# Patient Record
Sex: MALE | Race: WHITE | Employment: OTHER | URBAN - METROPOLITAN AREA
[De-identification: names, ages, dates, MRNs, and addresses within clinical notes are randomized per-mention and may not be internally consistent; named-entity substitution may affect disease eponyms.]

---

## 2017-01-12 ENCOUNTER — HOSPITAL ENCOUNTER (OUTPATIENT)
Dept: LAB | Age: 77
Discharge: HOME | End: 2017-01-12
Payer: MEDICARE

## 2017-01-12 DIAGNOSIS — C79.89 SQUAMOUS CELL CARCINOMA METASTATIC TO HEAD AND NECK WITH UNKNOWN PRIMARY SITE (HCC): ICD-10-CM

## 2017-01-12 DIAGNOSIS — C80.1 SQUAMOUS CELL CARCINOMA METASTATIC TO HEAD AND NECK WITH UNKNOWN PRIMARY SITE (HCC): ICD-10-CM

## 2017-01-12 LAB
ALBUMIN SERPL BCP-MCNC: 3.9 G/DL (ref 3.2–4.6)
ALBUMIN/GLOB SERPL: 1.3 {RATIO} (ref 1.2–3.5)
ALP SERPL-CCNC: 58 U/L (ref 50–136)
ALT SERPL-CCNC: 20 U/L (ref 12–65)
ANION GAP BLD CALC-SCNC: 6 MMOL/L (ref 7–16)
AST SERPL W P-5'-P-CCNC: 10 U/L (ref 15–37)
BASOPHILS # BLD AUTO: 0.1 K/UL (ref 0–0.2)
BASOPHILS # BLD: 1 % (ref 0–2)
BILIRUB SERPL-MCNC: 0.3 MG/DL (ref 0.2–1.1)
BUN SERPL-MCNC: 13 MG/DL (ref 8–23)
CALCIUM SERPL-MCNC: 9 MG/DL (ref 8.3–10.4)
CHLORIDE SERPL-SCNC: 97 MMOL/L (ref 98–107)
CO2 SERPL-SCNC: 28 MMOL/L (ref 23–32)
CREAT SERPL-MCNC: 1.18 MG/DL (ref 0.8–1.5)
DIFFERENTIAL METHOD BLD: ABNORMAL
EOSINOPHIL # BLD: 0.1 K/UL (ref 0–0.8)
EOSINOPHIL NFR BLD: 2 % (ref 0.5–7.8)
ERYTHROCYTE [DISTWIDTH] IN BLOOD BY AUTOMATED COUNT: 12.4 % (ref 11.9–14.6)
GLOBULIN SER CALC-MCNC: 2.9 G/DL (ref 2.3–3.5)
GLUCOSE SERPL-MCNC: 59 MG/DL (ref 65–100)
HCT VFR BLD AUTO: 37.5 % (ref 41.1–50.3)
HGB BLD-MCNC: 13.3 G/DL (ref 13.6–17.2)
LYMPHOCYTES # BLD AUTO: 37 % (ref 13–44)
LYMPHOCYTES # BLD: 2.1 K/UL (ref 0.5–4.6)
MCH RBC QN AUTO: 32.3 PG (ref 26.1–32.9)
MCHC RBC AUTO-ENTMCNC: 35.5 G/DL (ref 31.4–35)
MCV RBC AUTO: 91 FL (ref 79.6–97.8)
MONOCYTES # BLD: 0.6 K/UL (ref 0.1–1.3)
MONOCYTES NFR BLD AUTO: 11 % (ref 4–12)
NEUTS SEG # BLD: 2.8 K/UL (ref 1.7–8.2)
NEUTS SEG NFR BLD AUTO: 49 % (ref 43–78)
NRBC # BLD: 0 K/UL (ref 0–0.2)
PLATELET # BLD AUTO: 175 K/UL (ref 150–450)
PMV BLD AUTO: 8.7 FL (ref 10.8–14.1)
POTASSIUM SERPL-SCNC: 4.1 MMOL/L (ref 3.5–5.1)
PROT SERPL-MCNC: 6.8 G/DL (ref 6.3–8.2)
RBC # BLD AUTO: 4.12 M/UL (ref 4.23–5.67)
SODIUM SERPL-SCNC: 131 MMOL/L (ref 136–145)
WBC # BLD AUTO: 5.7 K/UL (ref 4.3–11.1)

## 2017-01-12 PROCEDURE — 36415 COLL VENOUS BLD VENIPUNCTURE: CPT | Performed by: NURSE PRACTITIONER

## 2017-01-12 PROCEDURE — 85025 COMPLETE CBC W/AUTO DIFF WBC: CPT | Performed by: NURSE PRACTITIONER

## 2017-01-12 PROCEDURE — 80053 COMPREHEN METABOLIC PANEL: CPT | Performed by: NURSE PRACTITIONER

## 2017-04-13 ENCOUNTER — APPOINTMENT (OUTPATIENT)
Dept: RADIATION ONCOLOGY | Age: 77
End: 2017-04-13

## 2017-04-27 ENCOUNTER — HOSPITAL ENCOUNTER (OUTPATIENT)
Dept: LAB | Age: 77
Discharge: HOME | End: 2017-04-27
Payer: MEDICARE

## 2017-04-27 ENCOUNTER — HOSPITAL ENCOUNTER (OUTPATIENT)
Dept: RADIATION ONCOLOGY | Age: 77
Discharge: HOME | End: 2017-04-27
Payer: MEDICARE

## 2017-04-27 VITALS
DIASTOLIC BLOOD PRESSURE: 109 MMHG | SYSTOLIC BLOOD PRESSURE: 200 MMHG | TEMPERATURE: 97.9 F | OXYGEN SATURATION: 99 % | HEART RATE: 74 BPM | BODY MASS INDEX: 21.9 KG/M2 | WEIGHT: 170.6 LBS

## 2017-04-27 DIAGNOSIS — C79.89 SQUAMOUS CELL CARCINOMA METASTATIC TO HEAD AND NECK WITH UNKNOWN PRIMARY SITE (HCC): ICD-10-CM

## 2017-04-27 DIAGNOSIS — C80.1 SQUAMOUS CELL CARCINOMA METASTATIC TO HEAD AND NECK WITH UNKNOWN PRIMARY SITE (HCC): ICD-10-CM

## 2017-04-27 LAB
ALBUMIN SERPL BCP-MCNC: 3.9 G/DL (ref 3.2–4.6)
ALBUMIN/GLOB SERPL: 1.2 {RATIO} (ref 1.2–3.5)
ALP SERPL-CCNC: 71 U/L (ref 50–136)
ALT SERPL-CCNC: 26 U/L (ref 12–65)
ANION GAP BLD CALC-SCNC: 6 MMOL/L (ref 7–16)
AST SERPL W P-5'-P-CCNC: 15 U/L (ref 15–37)
BASOPHILS # BLD AUTO: 0.1 K/UL (ref 0–0.2)
BASOPHILS # BLD: 1 % (ref 0–2)
BILIRUB SERPL-MCNC: 0.3 MG/DL (ref 0.2–1.1)
BUN SERPL-MCNC: 15 MG/DL (ref 8–23)
CALCIUM SERPL-MCNC: 9.6 MG/DL (ref 8.3–10.4)
CHLORIDE SERPL-SCNC: 95 MMOL/L (ref 98–107)
CO2 SERPL-SCNC: 31 MMOL/L (ref 21–32)
CREAT SERPL-MCNC: 1.14 MG/DL (ref 0.8–1.5)
DIFFERENTIAL METHOD BLD: ABNORMAL
EOSINOPHIL # BLD: 0.1 K/UL (ref 0–0.8)
EOSINOPHIL NFR BLD: 1 % (ref 0.5–7.8)
ERYTHROCYTE [DISTWIDTH] IN BLOOD BY AUTOMATED COUNT: 12.6 % (ref 11.9–14.6)
GLOBULIN SER CALC-MCNC: 3.3 G/DL (ref 2.3–3.5)
GLUCOSE SERPL-MCNC: 116 MG/DL (ref 65–100)
HCT VFR BLD AUTO: 40.5 % (ref 41.1–50.3)
HGB BLD-MCNC: 14.2 G/DL (ref 13.6–17.2)
LYMPHOCYTES # BLD AUTO: 25 % (ref 13–44)
LYMPHOCYTES # BLD: 1.2 K/UL (ref 0.5–4.6)
MCH RBC QN AUTO: 31.6 PG (ref 26.1–32.9)
MCHC RBC AUTO-ENTMCNC: 35.1 G/DL (ref 31.4–35)
MCV RBC AUTO: 90 FL (ref 79.6–97.8)
MONOCYTES # BLD: 0.5 K/UL (ref 0.1–1.3)
MONOCYTES NFR BLD AUTO: 11 % (ref 4–12)
NEUTS SEG # BLD: 2.9 K/UL (ref 1.7–8.2)
NEUTS SEG NFR BLD AUTO: 62 % (ref 43–78)
NRBC # BLD: 0 K/UL (ref 0–0.2)
PLATELET # BLD AUTO: 187 K/UL (ref 150–450)
PMV BLD AUTO: 8.3 FL (ref 10.8–14.1)
POTASSIUM SERPL-SCNC: 4.2 MMOL/L (ref 3.5–5.1)
PROT SERPL-MCNC: 7.2 G/DL (ref 6.3–8.2)
RBC # BLD AUTO: 4.5 M/UL (ref 4.23–5.67)
SODIUM SERPL-SCNC: 132 MMOL/L (ref 136–145)
WBC # BLD AUTO: 4.7 K/UL (ref 4.3–11.1)

## 2017-04-27 PROCEDURE — 85025 COMPLETE CBC W/AUTO DIFF WBC: CPT | Performed by: INTERNAL MEDICINE

## 2017-04-27 PROCEDURE — 36415 COLL VENOUS BLD VENIPUNCTURE: CPT | Performed by: INTERNAL MEDICINE

## 2017-04-27 PROCEDURE — 80053 COMPREHEN METABOLIC PANEL: CPT | Performed by: INTERNAL MEDICINE

## 2017-04-27 PROCEDURE — 99211 OFF/OP EST MAY X REQ PHY/QHP: CPT

## 2017-04-27 NOTE — PROGRESS NOTES
Patient: Phil Leach MRN: [id number]  SSN: [id number]    YOB: 1940  Age: 69 y.o. 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 PREVIOUS TREATMENT:  Lymph node excision 9/9/15 of right neck/parapharyngeal mass    PREVIOUS TREATMENT:  1)  Lymph node excision 9/9/15 of right neck/parapharyngeal mass  2) Adjuvant radiation with gross disease present, 70 Gy in 35 fractions to the gross disease,      63 Gy to the ipsilateral neck, and 56 Gy to the contralateral neck. Delivered with concurrent weekly cisplatin. Completed radiation: 12/28/2015    HISTORY OF PRESENT ILLNESS:  Phil Leach is a 69 y.o. male now 9 months out from his chemoradiation treatment delivered adjuvantly. He presented with a right neck mass that had been growing for several months. He was evaluated by his primary care physician and eventually referred to Dr. Arjun Paula for further workup. His pertinent medical history includes hypertension and diabetes. He is a lifelong nonsmoker and nondrinker. His workup included a CT of the neck and soft tissues which showed a large necrotic mass in the right submandibular region measuring 5 x 4.1 cm. The appearance was most suggestive of a large necrotic lymph node. There was no potential primary lesion noted. He was taken for a selective neck dissection where the right neck parapharyngeal mass was excised. We reviewed the operative report where this was excised from the skull base where gross tumor was felt to be present on the resection. This was followed by a PET/CT scan 9/17/2015. This confirmed residual soft tissue abnormality at the superior margin of this previously noted large right neck mass. This was associated with FDG activity.   He was presented at our multidisciplinary head and neck tumor board and felt his tumor was consistent with an unknown primary and planned to treat him as such. He also had tooth extractions at our request in anticipation of radiation. He completed treatment now 2 weeks ago. The patient had expected side effects including oral and pharyngeal pain.  He did require supplementation with his feeding tube, oral analgesics, and a fentanyl patch.  These medications were titrated throughout his treatment successfully.  We also used magic mouthwash regularly.  While he did have difficulty with confluent mucositis, he was able to complete all of his treatments without unplanned treatment breaks. At 2 weeks out, his oral pain was slightly improved and while he still had trouble eating, he has noted dramatic improvement and can eat nearly anything at this point but has no taste. He notes that his mouth is dry and has an edematous tongue.  At 3 months his weight was increasing although he still used his feeding tube with 2 cans of ensure and medications.  Overall he feels he was recovering perhaps more slowly than expected and hoped but has made significant strides since that time. He did have his feeding tube removed 4/11/2016. INTERVAL HISTORY: Mr. Galilea Garcia returns today for follow up. He is now 16 months following completion of his previous radiation. He is eating and drinking well, denying trouble with swallowing. He has xerostomia, well controlled with fluids. He has full ROM with his neck. He continues to feel thickening on the right side of his neck (surgical site) that is tender, but doesn't appreciate change in size. He denies headaches. He continues on salt tabs. His energy continues to improve. He recently had several basal cell lesions removed from his face. He is hypertensive at this visit and denies recent change in his medications.       MEDICATIONS:     Current Outpatient Prescriptions:     potassium chloride (KLOR-CON M20) 20 mEq tablet, TAKE 1 TABLET BY MOUTH TWICE A DAY, Disp: 180 Tab, Rfl: 0    sodium chloride 1 gram tablet, TAKE 1 TAB BY MOUTH THREE (3) TIMES DAILY. , Disp: 270 Tab, Rfl: 0    amLODIPine (NORVASC) 5 mg tablet, Take 1 Tab by mouth daily. , Disp: 30 Tab, Rfl: 2    magnesium oxide 500 mg tab, Take 2 Tabs by mouth daily. , Disp: , Rfl:     acetaminophen (TYLENOL) 325 mg tablet, Take  by mouth every four (4) hours as needed for Pain., Disp: , Rfl:     lisinopril (PRINIVIL, ZESTRIL) 20 mg tablet, Take 20 mg by mouth daily. Indications: HYPERTENSION, Disp: , Rfl:     glimepiride (AMARYL) 4 mg tablet, Take 4 mg by mouth every morning. Indications: TYPE 2 DIABETES MELLITUS, Disp: , Rfl:     metFORMIN (GLUCOPHAGE) 1,000 mg tablet, Take 1,000 mg by mouth daily. Indications: TYPE 2 DIABETES MELLITUS, Disp: , Rfl:     ALLERGIES:   No Known Allergies    PHYSICAL EXAMINATION:   ECOG Performance status 1  VITAL SIGNS: Blood pressure   200/109  (repeat manual 182/100)    Pulse   74       Weight  170.6    Temperature 97.9     GENERAL: The patient is well-developed, ambulatory, alert and in no acute distress. HEENT: Head is normocephalic, atraumatic. Oral cavity is moist with no visual oral masses. Raised area on left, lateral tongue is not new. NECK: Neck is supple. Palpable firmness in the right submandibular region (tender), otherwise negative for palpable masses. CARDIOVASCULAR: Heart is regular rate and rhythm. RESPIRATORY: Lungs are clear to auscultation. There is normal respiratory effort. Remainder of exam deferred. LABORATORY: stat creatinine for restart of metformin - pending result     RADIOLOGY:  CT of the neck and chest with contrast 10/12/2016  Comparison: 6/15/2016  Neck CT:  Parotid and submandibular glands are normal. Thyroid gland is normal. Carotid  arteries and jugular veins appear patent.    Enhancing nodule posterior margin right parotid gland stable at 1.4 cm. No  interval adenopathy. .    Nasopharynx, oropharynx, and hypopharynx are normal. Larynx is normal. No  compression of the airway.  Parapharyngeal fat is normal. [place]run and lingual  tonsils are normal.  Cervical spine is normal in appearance. Imaged intracranial structures are  normal. Orbits are normal. Paranasal sinuses and mastoid air cells are normal.  Chest CT:  Mild cardiac enlargement with scattered coronary artery calcifications. No  lymphadenopathy. No effusion or pneumothorax. Limited evaluation upper abdomen  within normal limits. No acute or aggressive osseous lesion. Central airways  patent. Lung parenchyma unremarkable. No pulmonary mass. IMPRESSION  Stable right neck enhancing nodule posterior to the right parotid gland. No  progressive disease in the neck or chest..    TUMOR STATUS:  AUGUST    IMPRESSION:  Kelby Sims is a 69 y.o. male with right walker-parotid lymph node mass. Unknown primary, [id number], Stage DENTON, now 16 months following completion of his previous radiation. Patient continues to recover as anticipated from his prior course of radiotherapy. Scans in October 2016 showed stable nodule without progression (note above). He reports that he is having no trouble swallowing. We discussed scheduling an appointment with Dr. Douglas Owens at his last visit, which he has not done as of now. He is hypertensive today. He denies any recent change with his medication. We discussed following up with his primary care physician this week for further evaluation. Othewise, he is feeling well and has no complaints. PLAN:    1) Future imaging by Dr Erasmo Sarmiento - He is scheduled with him today  2) We will assist in scheduling an appointment with Dr. Douglas Owens   3) Follow up with primary care regarding elevated blood pressure  4) Continue neck excercies  5) We will see him again in 6 months     Davide Andre NP   April 27, 2017      Portions of this note were copied from prior encounters and reviewed for accuracy, currency, and represent documentation and tasks completed during this encounter. I verify and attest these portions to be unchanged from prior visits.

## 2017-04-27 NOTE — NURSE NAVIGATOR
Pt here for f/u for H/N cancer from Unknown primary. S/p Lymph node excision right parapharyngeal mass 9-9-15. S/p concurrent chemo/RT. RT end 12-28-15. F/u Dr. Anneliese Piedra today. F/u Dr. Glenys Escobedo not scheduled. CT N/C 10-12-16. Blood pressure high. Pt states he took meds. Instructed pt to notify primary MD of elevated blood pressure today. Pt recently had several basal cell skin cancers removed from face.     Jovany Garcia RN

## 2017-05-02 ENCOUNTER — PATIENT OUTREACH (OUTPATIENT)
Dept: CASE MANAGEMENT | Age: 77
End: 2017-05-02

## 2017-05-02 NOTE — PROGRESS NOTES
Called Dr Little Dobbs and appt made for 10:30 at [facility] at patient request on Monday 5-15-17-attempted to call pt to notify-unable to reach but voicemail left for pt to return call

## 2017-07-20 ENCOUNTER — HOSPITAL ENCOUNTER (OUTPATIENT)
Dept: CT IMAGING | Age: 77
Discharge: HOME | End: 2017-07-20
Attending: INTERNAL MEDICINE
Payer: MEDICARE

## 2017-07-20 DIAGNOSIS — C80.1 SQUAMOUS CELL CARCINOMA METASTATIC TO HEAD AND NECK WITH UNKNOWN PRIMARY SITE (HCC): ICD-10-CM

## 2017-07-20 DIAGNOSIS — C79.89 SQUAMOUS CELL CARCINOMA METASTATIC TO HEAD AND NECK WITH UNKNOWN PRIMARY SITE (HCC): ICD-10-CM

## 2017-07-20 LAB — CREAT BLD-MCNC: 1 MG/DL (ref 0.6–1)

## 2017-07-20 PROCEDURE — 82565 ASSAY OF CREATININE: CPT

## 2017-07-20 PROCEDURE — 74011000258 HC RX REV CODE- 258: Performed by: INTERNAL MEDICINE

## 2017-07-20 PROCEDURE — 70491 CT SOFT TISSUE NECK W/DYE: CPT

## 2017-07-20 PROCEDURE — 74011636320 HC RX REV CODE- 636/320: Performed by: INTERNAL MEDICINE

## 2017-07-20 RX ORDER — SODIUM CHLORIDE 0.9 % (FLUSH) 0.9 %
10 SYRINGE (ML) INJECTION
Status: COMPLETED | OUTPATIENT
Start: 2017-07-20 | End: 2017-07-20

## 2017-07-20 RX ADMIN — SODIUM CHLORIDE 100 ML: 900 INJECTION, SOLUTION INTRAVENOUS at 11:22

## 2017-07-20 RX ADMIN — Medication 10 ML: at 11:22

## 2017-07-20 RX ADMIN — IOPAMIDOL 80 ML: 755 INJECTION, SOLUTION INTRAVENOUS at 11:22

## 2017-07-27 ENCOUNTER — HOSPITAL ENCOUNTER (OUTPATIENT)
Dept: LAB | Age: 77
Discharge: HOME | End: 2017-07-27
Payer: MEDICARE

## 2017-07-27 DIAGNOSIS — C79.89 SQUAMOUS CELL CARCINOMA METASTATIC TO HEAD AND NECK WITH UNKNOWN PRIMARY SITE (HCC): ICD-10-CM

## 2017-07-27 DIAGNOSIS — C80.1 SQUAMOUS CELL CARCINOMA METASTATIC TO HEAD AND NECK WITH UNKNOWN PRIMARY SITE (HCC): ICD-10-CM

## 2017-07-27 LAB
ALBUMIN SERPL BCP-MCNC: 3.9 G/DL (ref 3.2–4.6)
ALBUMIN/GLOB SERPL: 1.2 {RATIO} (ref 1.2–3.5)
ALP SERPL-CCNC: 68 U/L (ref 50–136)
ALT SERPL-CCNC: 23 U/L (ref 12–65)
ANION GAP BLD CALC-SCNC: 4 MMOL/L (ref 7–16)
AST SERPL W P-5'-P-CCNC: 15 U/L (ref 15–37)
BASOPHILS # BLD AUTO: 0 K/UL (ref 0–0.2)
BASOPHILS # BLD: 1 % (ref 0–2)
BILIRUB SERPL-MCNC: 0.4 MG/DL (ref 0.2–1.1)
BUN SERPL-MCNC: 11 MG/DL (ref 8–23)
CALCIUM SERPL-MCNC: 9.3 MG/DL (ref 8.3–10.4)
CHLORIDE SERPL-SCNC: 93 MMOL/L (ref 98–107)
CO2 SERPL-SCNC: 31 MMOL/L (ref 21–32)
CREAT SERPL-MCNC: 1.23 MG/DL (ref 0.8–1.5)
DIFFERENTIAL METHOD BLD: ABNORMAL
EOSINOPHIL # BLD: 0.1 K/UL (ref 0–0.8)
EOSINOPHIL NFR BLD: 2 % (ref 0.5–7.8)
ERYTHROCYTE [DISTWIDTH] IN BLOOD BY AUTOMATED COUNT: 12.5 % (ref 11.9–14.6)
GLOBULIN SER CALC-MCNC: 3.3 G/DL (ref 2.3–3.5)
GLUCOSE SERPL-MCNC: 134 MG/DL (ref 65–100)
HCT VFR BLD AUTO: 38.9 % (ref 41.1–50.3)
HGB BLD-MCNC: 14.2 G/DL (ref 13.6–17.2)
LYMPHOCYTES # BLD AUTO: 33 % (ref 13–44)
LYMPHOCYTES # BLD: 1.2 K/UL (ref 0.5–4.6)
MAGNESIUM SERPL-MCNC: 2 MG/DL (ref 1.8–2.4)
MCH RBC QN AUTO: 32.3 PG (ref 26.1–32.9)
MCHC RBC AUTO-ENTMCNC: 36.5 G/DL (ref 31.4–35)
MCV RBC AUTO: 88.6 FL (ref 79.6–97.8)
MONOCYTES # BLD: 0.4 K/UL (ref 0.1–1.3)
MONOCYTES NFR BLD AUTO: 12 % (ref 4–12)
NEUTS SEG # BLD: 1.9 K/UL (ref 1.7–8.2)
NEUTS SEG NFR BLD AUTO: 52 % (ref 43–78)
NRBC # BLD: 0 K/UL (ref 0–0.2)
PLATELET # BLD AUTO: 141 K/UL (ref 150–450)
PMV BLD AUTO: 8.2 FL (ref 10.8–14.1)
POTASSIUM SERPL-SCNC: 4.7 MMOL/L (ref 3.5–5.1)
PROT SERPL-MCNC: 7.2 G/DL (ref 6.3–8.2)
RBC # BLD AUTO: 4.39 M/UL (ref 4.23–5.67)
SODIUM SERPL-SCNC: 128 MMOL/L (ref 136–145)
WBC # BLD AUTO: 3.5 K/UL (ref 4.3–11.1)

## 2017-07-27 PROCEDURE — 83735 ASSAY OF MAGNESIUM: CPT | Performed by: INTERNAL MEDICINE

## 2017-07-27 PROCEDURE — 36415 COLL VENOUS BLD VENIPUNCTURE: CPT | Performed by: INTERNAL MEDICINE

## 2017-07-27 PROCEDURE — 85025 COMPLETE CBC W/AUTO DIFF WBC: CPT | Performed by: INTERNAL MEDICINE

## 2017-07-27 PROCEDURE — 80053 COMPREHEN METABOLIC PANEL: CPT | Performed by: INTERNAL MEDICINE

## 2017-10-19 ENCOUNTER — HOSPITAL ENCOUNTER (OUTPATIENT)
Dept: LAB | Age: 77
Discharge: HOME | End: 2017-10-19
Payer: MEDICARE

## 2017-10-19 DIAGNOSIS — C80.1 SQUAMOUS CELL CARCINOMA METASTATIC TO HEAD AND NECK WITH UNKNOWN PRIMARY SITE (HCC): ICD-10-CM

## 2017-10-19 DIAGNOSIS — C79.89 SQUAMOUS CELL CARCINOMA METASTATIC TO HEAD AND NECK WITH UNKNOWN PRIMARY SITE (HCC): ICD-10-CM

## 2017-10-19 LAB
ALBUMIN SERPL-MCNC: 3.6 G/DL (ref 3.2–4.6)
ALBUMIN/GLOB SERPL: 1.1 {RATIO} (ref 1.2–3.5)
ALP SERPL-CCNC: 73 U/L (ref 50–136)
ALT SERPL-CCNC: 26 U/L (ref 12–65)
ANION GAP SERPL CALC-SCNC: 5 MMOL/L (ref 7–16)
APPEARANCE UR: CLEAR
AST SERPL-CCNC: 15 U/L (ref 15–37)
BASOPHILS # BLD: 0 K/UL (ref 0–0.2)
BASOPHILS NFR BLD: 1 % (ref 0–2)
BILIRUB SERPL-MCNC: 0.3 MG/DL (ref 0.2–1.1)
BILIRUB UR QL: NEGATIVE
BUN SERPL-MCNC: 12 MG/DL (ref 8–23)
CALCIUM SERPL-MCNC: 9 MG/DL (ref 8.3–10.4)
CHLORIDE SERPL-SCNC: 97 MMOL/L (ref 98–107)
CO2 SERPL-SCNC: 30 MMOL/L (ref 21–32)
COLOR UR: YELLOW
CREAT SERPL-MCNC: 1.04 MG/DL (ref 0.8–1.5)
DIFFERENTIAL METHOD BLD: ABNORMAL
EOSINOPHIL # BLD: 0.1 K/UL (ref 0–0.8)
EOSINOPHIL NFR BLD: 2 % (ref 0.5–7.8)
ERYTHROCYTE [DISTWIDTH] IN BLOOD BY AUTOMATED COUNT: 12.3 % (ref 11.9–14.6)
GLOBULIN SER CALC-MCNC: 3.4 G/DL (ref 2.3–3.5)
GLUCOSE SERPL-MCNC: 106 MG/DL (ref 65–100)
GLUCOSE UR STRIP.AUTO-MCNC: NEGATIVE MG/DL
HCT VFR BLD AUTO: 36.2 % (ref 41.1–50.3)
HGB BLD-MCNC: 13.3 G/DL (ref 13.6–17.2)
HGB UR QL STRIP: NEGATIVE
KETONES UR QL STRIP.AUTO: NEGATIVE MG/DL
LEUKOCYTE ESTERASE UR QL STRIP.AUTO: NEGATIVE
LYMPHOCYTES # BLD: 1.1 K/UL (ref 0.5–4.6)
LYMPHOCYTES NFR BLD: 29 % (ref 13–44)
MCH RBC QN AUTO: 32.9 PG (ref 26.1–32.9)
MCHC RBC AUTO-ENTMCNC: 36.7 G/DL (ref 31.4–35)
MCV RBC AUTO: 89.6 FL (ref 79.6–97.8)
MONOCYTES # BLD: 0.5 K/UL (ref 0.1–1.3)
MONOCYTES NFR BLD: 13 % (ref 4–12)
NEUTS SEG # BLD: 2.1 K/UL (ref 1.7–8.2)
NEUTS SEG NFR BLD: 55 % (ref 43–78)
NITRITE UR QL STRIP.AUTO: NEGATIVE
NRBC # BLD: 0 K/UL (ref 0–0.2)
PH UR STRIP: 7 [PH] (ref 5–9)
PLATELET # BLD AUTO: 179 K/UL (ref 150–450)
PMV BLD AUTO: 8.5 FL (ref 10.8–14.1)
POTASSIUM SERPL-SCNC: 3.6 MMOL/L (ref 3.5–5.1)
PROT SERPL-MCNC: 7 G/DL (ref 6.3–8.2)
PROT UR STRIP-MCNC: NEGATIVE MG/DL
RBC # BLD AUTO: 4.04 M/UL (ref 4.23–5.67)
SODIUM SERPL-SCNC: 132 MMOL/L (ref 136–145)
SP GR UR REFRACTOMETRY: 1.01 (ref 1–1.02)
UROBILINOGEN UR QL STRIP.AUTO: 0.2 EU/DL (ref 0.2–1)
WBC # BLD AUTO: 3.8 K/UL (ref 4.3–11.1)

## 2017-10-19 PROCEDURE — 80053 COMPREHEN METABOLIC PANEL: CPT | Performed by: NURSE PRACTITIONER

## 2017-10-19 PROCEDURE — 36415 COLL VENOUS BLD VENIPUNCTURE: CPT | Performed by: NURSE PRACTITIONER

## 2017-10-19 PROCEDURE — 85025 COMPLETE CBC W/AUTO DIFF WBC: CPT | Performed by: NURSE PRACTITIONER

## 2017-10-19 PROCEDURE — 81003 URINALYSIS AUTO W/O SCOPE: CPT | Performed by: NURSE PRACTITIONER

## 2017-10-26 ENCOUNTER — HOSPITAL ENCOUNTER (OUTPATIENT)
Dept: LAB | Age: 77
Discharge: HOME | End: 2017-10-26
Payer: MEDICARE

## 2017-10-26 ENCOUNTER — HOSPITAL ENCOUNTER (OUTPATIENT)
Dept: RADIATION ONCOLOGY | Age: 77
Discharge: HOME | End: 2017-10-26
Payer: MEDICARE

## 2017-10-26 VITALS
WEIGHT: 178 LBS | BODY MASS INDEX: 22.85 KG/M2 | HEART RATE: 79 BPM | SYSTOLIC BLOOD PRESSURE: 168 MMHG | DIASTOLIC BLOOD PRESSURE: 89 MMHG | TEMPERATURE: 97.9 F | OXYGEN SATURATION: 97 %

## 2017-10-26 DIAGNOSIS — Z92.3 HISTORY OF RADIATION THERAPY: ICD-10-CM

## 2017-10-26 DIAGNOSIS — Z92.3 HISTORY OF RADIATION THERAPY: Primary | ICD-10-CM

## 2017-10-26 DIAGNOSIS — C76.0 HEAD AND NECK CANCER (HCC): ICD-10-CM

## 2017-10-26 DIAGNOSIS — I10 HYPERTENSION, UNSPECIFIED TYPE: ICD-10-CM

## 2017-10-26 LAB
T4 FREE SERPL-MCNC: 1.1 NG/DL (ref 0.78–1.46)
TSH SERPL DL<=0.005 MIU/L-ACNC: 4.46 UIU/ML (ref 0.36–3.74)

## 2017-10-26 PROCEDURE — 84443 ASSAY THYROID STIM HORMONE: CPT | Performed by: NURSE PRACTITIONER

## 2017-10-26 PROCEDURE — 99211 OFF/OP EST MAY X REQ PHY/QHP: CPT

## 2017-10-26 PROCEDURE — 84439 ASSAY OF FREE THYROXINE: CPT | Performed by: NURSE PRACTITIONER

## 2017-10-26 PROCEDURE — 36415 COLL VENOUS BLD VENIPUNCTURE: CPT | Performed by: NURSE PRACTITIONER

## 2017-10-26 NOTE — PROGRESS NOTES
6 month f/u H/N cancer. S/p chemo/RT. RT end 12-28-15. CT scan 7-20-17. F/u Dr. Lewis Asa 1-30-18. No f/u Dr. Kayleigh Parrish. C/o periods of weakness almost daily, left hand numbness, slurred speech, left leg impaired movement, confusion. Spouse states when sodium was low in the past he had similar symptoms.     Migue Hinson RN

## 2017-10-26 NOTE — PROGRESS NOTES
Patient: Blanca Bruner MRN: [id number]  SSN: [id number]    YOB: 1940  Age: 69 y.o. Sex: male      Other Providers:  Adia Forbes MD, Aggie Simon MD    DIAGNOSIS: Right walker-parotid lymph node mass. Unknown primary, [id number], Stage DENTON. PREVIOUS TREATMENT:  Lymph node excision 9/9/15 of right neck/parapharyngeal mass    PREVIOUS TREATMENT:  1)  Lymph node excision 9/9/15 of right neck/parapharyngeal mass  2) Adjuvant radiation with gross disease present, 70 Gy in 35 fractions to the gross disease,      63 Gy to the ipsilateral neck, and 56 Gy to the contralateral neck. Delivered with concurrent weekly cisplatin. Completed radiation: 12/28/2015    HISTORY OF PRESENT ILLNESS:  Blanca Bruner is a 69 y.o. male now 22 months out from his chemoradiation treatment delivered adjuvantly. He presented with a right neck mass that had been growing for several months. He was evaluated by his primary care physician and eventually referred to Dr. Arabella Domingo for further workup. His pertinent medical history includes hypertension and diabetes. He is a lifelong nonsmoker and nondrinker. His workup included a CT of the neck and soft tissues which showed a large necrotic mass in the right submandibular region measuring 5 x 4.1 cm. The appearance was most suggestive of a large necrotic lymph node. There was no potential primary lesion noted. He was taken for a selective neck dissection where the right neck parapharyngeal mass was excised. We reviewed the operative report where this was excised from the skull base where gross tumor was felt to be present on the resection. This was followed by a PET/CT scan 9/17/2015. This confirmed residual soft tissue abnormality at the superior margin of this previously noted large right neck mass. This was associated with FDG activity.   He was presented at our multidisciplinary head and neck tumor board and felt his tumor was consistent with an unknown primary and planned to treat him as such. He also had tooth extractions at our request in anticipation of radiation. He completed treatment now 2 weeks ago. The patient had expected side effects including oral and pharyngeal pain.  He did require supplementation with his feeding tube, oral analgesics, and a fentanyl patch.  These medications were titrated throughout his treatment successfully.  We also used magic mouthwash regularly.  While he did have difficulty with confluent mucositis, he was able to complete all of his treatments without unplanned treatment breaks. At 2 weeks out, his oral pain was slightly improved and while he still had trouble eating, he has noted dramatic improvement and can eat nearly anything at this point but has no taste. He notes that his mouth is dry and has an edematous tongue.  At 3 months his weight was increasing although he still used his feeding tube with 2 cans of ensure and medications.  Overall he feels he was recovering perhaps more slowly than expected and hoped but has made significant strides since that time. He did have his feeding tube removed 4/11/2016. INTERVAL HISTORY: Mr. Sukhjinder Taylor returns today for follow up. He is now 22 months following completion of his previous radiation. He is eating and drinking well, denying trouble with swallowing. He has xerostomia, well controlled with fluids. He has full ROM with his neck. He continues to feel thickening on the right side of his neck (surgical site) that is tender, but doesn't appreciate change in size. He denies headaches. He continues on salt tabs. His energy continues to improve.   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      MEDICATIONS:     Current Outpatient Prescriptions:     sodium chloride 1 gram tablet, TAKE 1 TAB BY MOUTH THREE (3) TIMES DAILY., Disp: 90 Tab, Rfl: 3    amLODIPine (NORVASC) 5 mg tablet, Take 2 Tabs by mouth daily. , Disp: 30 Tab, Rfl: 2    sodium chloride 1 gram tablet, TAKE 1 TAB BY MOUTH THREE (3) TIMES DAILY. (Patient taking differently: Take 1 g by mouth two (2) times a day.), Disp: 270 Tab, Rfl: 0    magnesium oxide 500 mg tab, Take 2 Tabs by mouth daily. , Disp: , Rfl:     acetaminophen (TYLENOL) 325 mg tablet, Take  by mouth every four (4) hours as needed for Pain., Disp: , Rfl:     lisinopril (PRINIVIL, ZESTRIL) 20 mg tablet, Take 20 mg by mouth daily. Indications: HYPERTENSION, Disp: , Rfl:     glimepiride (AMARYL) 4 mg tablet, Take 4 mg by mouth every morning. Indications: TYPE 2 DIABETES MELLITUS, Disp: , Rfl:     metFORMIN (GLUCOPHAGE) 1,000 mg tablet, Take 1,000 mg by mouth daily. Indications: TYPE 2 DIABETES MELLITUS, Disp: , Rfl:     ALLERGIES:   No Known Allergies    PHYSICAL EXAMINATION:   ECOG Performance status 1  VITAL SIGNS: Blood pressure   200/109  (repeat manual 182/100)    Pulse   74       Weight  170.6    Temperature 97.9     GENERAL: The patient is well-developed, ambulatory, alert and in no acute distress. HEENT: Head is normocephalic, atraumatic. Oral cavity is moist with no visual oral masses. Raised area on left, lateral tongue is not new. NECK: Neck is supple. Palpable firmness in the right submandibular region (tender), otherwise negative for palpable masses.  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     There is no developing adenopathy or new mass. There are no acute inflammatory  changes or focal fluid collections. There are no suspicious lesions in the  thyroid or the submandibular glands. There is normal enhancement of the major vessels, with scattered atherosclerotic  changes noted. The visualized paranasal sinuses are aerated, with note of mild peripheral  mucosal thickening of the bilateral maxillary sinuses. .   There are no suspicious osseous lesions. The lung apices are clear. Trace debris is noted dependently within the trachea.     IMPRESSION:    1. No acute findings in the neck. No evidence of worsening disease. 2. Stable small indeterminate nodule posterior to the right parotid.     TUMOR STATUS:  AUGUST      IMPRESSION:  Van Souza is a 69 y.o. male with right walker-parotid lymph node mass. Unknown primary, [id number], Stage DENTON, now 23 months following completion of radiation. Patient continues to recover as anticipated from his prior course of radiotherapy. Negative endoscopic evaluation with Dr. Iker Manning this past May. Scans done in July 2017 were stable with no change with in right parotid bed. He reports that he is having no trouble swallowing.    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 We do not believe these symptoms are associated to his previous radiation therapy, although we will check a TSH level and send result to his physician. He has a history of type 2 diabetes and hyponatremia. Sodium level of 132 done last week by his medical oncologist. We recommend following up with his primary care physician for further evaluation. His primary care physician has retired so we will assist in referring to Dr. Areli Bray. Dr. Aimee Dixon present during visit.     PLAN:    1) Future imaging by Dr Adarsh Brito - next visit scheduled 1/30/18  2) According to the note of Dr. Alejandrina Burrows, Mr. Ruiz Dong was to schedule an appointment for the fall. We will     assist in scheduling an appointment with Dr. Alejandrina Burrows   3) Continue neck exercises and massage  4) Refer to Dr. Kaitlynn Ulrich, PCP  5) Check TSH, hx of previous neck radiation  6) We will see him again in 6 months     Leni Hopkins NP   October 26, 2017      Portions of this note were copied from prior encounters and reviewed for accuracy, currency, and represent documentation and tasks completed during this encounter. I verify and attest these portions to be unchanged from prior visits.

## 2018-01-30 ENCOUNTER — HOSPITAL ENCOUNTER (OUTPATIENT)
Dept: LAB | Age: 78
Discharge: HOME | End: 2018-01-30
Payer: MEDICARE

## 2018-01-30 DIAGNOSIS — C80.1 SQUAMOUS CELL CARCINOMA METASTATIC TO HEAD AND NECK WITH UNKNOWN PRIMARY SITE (HCC): ICD-10-CM

## 2018-01-30 DIAGNOSIS — C79.89 SQUAMOUS CELL CARCINOMA METASTATIC TO HEAD AND NECK WITH UNKNOWN PRIMARY SITE (HCC): ICD-10-CM

## 2018-01-30 LAB
ALBUMIN SERPL-MCNC: 3.8 G/DL (ref 3.2–4.6)
ALBUMIN/GLOB SERPL: 1.1 {RATIO} (ref 1.2–3.5)
ALP SERPL-CCNC: 71 U/L (ref 50–136)
ALT SERPL-CCNC: 21 U/L (ref 12–65)
ANION GAP SERPL CALC-SCNC: 5 MMOL/L (ref 7–16)
AST SERPL-CCNC: 13 U/L (ref 15–37)
BASOPHILS # BLD: 0 K/UL (ref 0–0.2)
BASOPHILS NFR BLD: 1 % (ref 0–2)
BILIRUB SERPL-MCNC: 0.2 MG/DL (ref 0.2–1.1)
BUN SERPL-MCNC: 9 MG/DL (ref 8–23)
CALCIUM SERPL-MCNC: 9.3 MG/DL (ref 8.3–10.4)
CHLORIDE SERPL-SCNC: 99 MMOL/L (ref 98–107)
CO2 SERPL-SCNC: 30 MMOL/L (ref 21–32)
CREAT SERPL-MCNC: 1.04 MG/DL (ref 0.8–1.5)
DIFFERENTIAL METHOD BLD: ABNORMAL
EOSINOPHIL # BLD: 0.2 K/UL (ref 0–0.8)
EOSINOPHIL NFR BLD: 4 % (ref 0.5–7.8)
ERYTHROCYTE [DISTWIDTH] IN BLOOD BY AUTOMATED COUNT: 12.2 % (ref 11.9–14.6)
GLOBULIN SER CALC-MCNC: 3.6 G/DL (ref 2.3–3.5)
GLUCOSE SERPL-MCNC: 132 MG/DL (ref 65–100)
HCT VFR BLD AUTO: 40.3 % (ref 41.1–50.3)
HGB BLD-MCNC: 14.3 G/DL (ref 13.6–17.2)
LYMPHOCYTES # BLD: 1.1 K/UL (ref 0.5–4.6)
LYMPHOCYTES NFR BLD: 27 % (ref 13–44)
MCH RBC QN AUTO: 32.4 PG (ref 26.1–32.9)
MCHC RBC AUTO-ENTMCNC: 35.5 G/DL (ref 31.4–35)
MCV RBC AUTO: 91.2 FL (ref 79.6–97.8)
MONOCYTES # BLD: 0.5 K/UL (ref 0.1–1.3)
MONOCYTES NFR BLD: 12 % (ref 4–12)
NEUTS SEG # BLD: 2.3 K/UL (ref 1.7–8.2)
NEUTS SEG NFR BLD: 57 % (ref 43–78)
NRBC # BLD: 0 K/UL (ref 0–0.2)
PLATELET # BLD AUTO: 189 K/UL (ref 150–450)
PMV BLD AUTO: 8.4 FL (ref 10.8–14.1)
POTASSIUM SERPL-SCNC: 3.8 MMOL/L (ref 3.5–5.1)
PROT SERPL-MCNC: 7.4 G/DL (ref 6.3–8.2)
RBC # BLD AUTO: 4.42 M/UL (ref 4.23–5.67)
SODIUM SERPL-SCNC: 134 MMOL/L (ref 136–145)
WBC # BLD AUTO: 4.1 K/UL (ref 4.3–11.1)

## 2018-01-30 PROCEDURE — 85025 COMPLETE CBC W/AUTO DIFF WBC: CPT | Performed by: INTERNAL MEDICINE

## 2018-01-30 PROCEDURE — 80053 COMPREHEN METABOLIC PANEL: CPT | Performed by: INTERNAL MEDICINE

## 2018-01-30 PROCEDURE — 36415 COLL VENOUS BLD VENIPUNCTURE: CPT | Performed by: INTERNAL MEDICINE

## 2018-02-16 ENCOUNTER — HOSPITAL ENCOUNTER (OUTPATIENT)
Dept: CT IMAGING | Age: 78
Discharge: HOME | End: 2018-02-16
Attending: INTERNAL MEDICINE
Payer: MEDICARE

## 2018-02-16 DIAGNOSIS — C79.89 SQUAMOUS CELL CARCINOMA METASTATIC TO HEAD AND NECK WITH UNKNOWN PRIMARY SITE (HCC): ICD-10-CM

## 2018-02-16 DIAGNOSIS — C80.1 SQUAMOUS CELL CARCINOMA METASTATIC TO HEAD AND NECK WITH UNKNOWN PRIMARY SITE (HCC): ICD-10-CM

## 2018-02-16 PROCEDURE — 74011000258 HC RX REV CODE- 258: Performed by: INTERNAL MEDICINE

## 2018-02-16 PROCEDURE — 74011636320 HC RX REV CODE- 636/320: Performed by: INTERNAL MEDICINE

## 2018-02-16 PROCEDURE — 70491 CT SOFT TISSUE NECK W/DYE: CPT

## 2018-02-16 RX ORDER — SODIUM CHLORIDE 0.9 % (FLUSH) 0.9 %
10 SYRINGE (ML) INJECTION
Status: COMPLETED | OUTPATIENT
Start: 2018-02-16 | End: 2018-02-16

## 2018-02-16 RX ADMIN — IOPAMIDOL 100 ML: 755 INJECTION, SOLUTION INTRAVENOUS at 10:34

## 2018-02-16 RX ADMIN — Medication 10 ML: at 10:34

## 2018-02-16 RX ADMIN — SODIUM CHLORIDE 100 ML: 900 INJECTION, SOLUTION INTRAVENOUS at 10:34

## 2018-05-08 ENCOUNTER — HOSPITAL ENCOUNTER (OUTPATIENT)
Dept: LAB | Age: 78
Discharge: HOME | End: 2018-05-08
Payer: MEDICARE

## 2018-05-08 DIAGNOSIS — C80.1 SQUAMOUS CELL CARCINOMA METASTATIC TO HEAD AND NECK WITH UNKNOWN PRIMARY SITE (HCC): ICD-10-CM

## 2018-05-08 DIAGNOSIS — C79.89 SQUAMOUS CELL CARCINOMA METASTATIC TO HEAD AND NECK WITH UNKNOWN PRIMARY SITE (HCC): ICD-10-CM

## 2018-05-08 LAB
ALBUMIN SERPL-MCNC: 3.7 G/DL (ref 3.2–4.6)
ALBUMIN/GLOB SERPL: 1.1 {RATIO} (ref 1.2–3.5)
ALP SERPL-CCNC: 69 U/L (ref 50–136)
ALT SERPL-CCNC: 19 U/L (ref 12–65)
ANION GAP SERPL CALC-SCNC: 8 MMOL/L (ref 7–16)
AST SERPL-CCNC: 11 U/L (ref 15–37)
BASOPHILS # BLD: 0 K/UL (ref 0–0.2)
BASOPHILS NFR BLD: 1 % (ref 0–2)
BILIRUB SERPL-MCNC: 0.4 MG/DL (ref 0.2–1.1)
BUN SERPL-MCNC: 10 MG/DL (ref 8–23)
CALCIUM SERPL-MCNC: 9.2 MG/DL (ref 8.3–10.4)
CHLORIDE SERPL-SCNC: 93 MMOL/L (ref 98–107)
CO2 SERPL-SCNC: 29 MMOL/L (ref 21–32)
CREAT SERPL-MCNC: 1.06 MG/DL (ref 0.8–1.5)
DIFFERENTIAL METHOD BLD: ABNORMAL
EOSINOPHIL # BLD: 0.1 K/UL (ref 0–0.8)
EOSINOPHIL NFR BLD: 3 % (ref 0.5–7.8)
ERYTHROCYTE [DISTWIDTH] IN BLOOD BY AUTOMATED COUNT: 12.4 % (ref 11.9–14.6)
GLOBULIN SER CALC-MCNC: 3.3 G/DL (ref 2.3–3.5)
GLUCOSE SERPL-MCNC: 143 MG/DL (ref 65–100)
HCT VFR BLD AUTO: 37.8 % (ref 41.1–50.3)
HGB BLD-MCNC: 13.5 G/DL (ref 13.6–17.2)
LYMPHOCYTES # BLD: 1.1 K/UL (ref 0.5–4.6)
LYMPHOCYTES NFR BLD: 33 % (ref 13–44)
MCH RBC QN AUTO: 32.4 PG (ref 26.1–32.9)
MCHC RBC AUTO-ENTMCNC: 35.7 G/DL (ref 31.4–35)
MCV RBC AUTO: 90.6 FL (ref 79.6–97.8)
MONOCYTES # BLD: 0.4 K/UL (ref 0.1–1.3)
MONOCYTES NFR BLD: 12 % (ref 4–12)
NEUTS SEG # BLD: 1.8 K/UL (ref 1.7–8.2)
NEUTS SEG NFR BLD: 51 % (ref 43–78)
NRBC # BLD: 0 K/UL (ref 0–0.2)
PLATELET # BLD AUTO: 171 K/UL (ref 150–450)
PMV BLD AUTO: 8.4 FL (ref 10.8–14.1)
POTASSIUM SERPL-SCNC: 3.7 MMOL/L (ref 3.5–5.1)
PROT SERPL-MCNC: 7 G/DL (ref 6.3–8.2)
RBC # BLD AUTO: 4.17 M/UL (ref 4.23–5.67)
SODIUM SERPL-SCNC: 130 MMOL/L (ref 136–145)
WBC # BLD AUTO: 3.5 K/UL (ref 4.3–11.1)

## 2018-05-08 PROCEDURE — 36415 COLL VENOUS BLD VENIPUNCTURE: CPT | Performed by: INTERNAL MEDICINE

## 2018-05-08 PROCEDURE — 80053 COMPREHEN METABOLIC PANEL: CPT | Performed by: INTERNAL MEDICINE

## 2018-05-08 PROCEDURE — 85025 COMPLETE CBC W/AUTO DIFF WBC: CPT | Performed by: INTERNAL MEDICINE

## 2018-06-18 ENCOUNTER — PATIENT OUTREACH (OUTPATIENT)
Dept: CASE MANAGEMENT | Age: 78
End: 2018-06-18

## 2018-06-18 NOTE — PROGRESS NOTES
Called Dr Hardeep Benitez' office and left voice mail on nurses' line for a return call. Exlained wife has called me today stating biopsies of rectal area were stated to be 90% positive for malignancy after initial biopsy. Also wife states pathology has been sent to 3 different labs to get diagnosis and current specimen is in [facility]. Requested nurse call me since wife has requested office visit with Dr Dillan Wilkins.

## 2018-06-19 ENCOUNTER — PATIENT OUTREACH (OUTPATIENT)
Dept: CASE MANAGEMENT | Age: 78
End: 2018-06-19

## 2018-06-19 NOTE — PROGRESS NOTES
Spoke with nurse Ama Madison at Dr Moore's office and she faxed over last office note and stated Dr Kayleen Sutherland did think this was anal cancer and specimen was now in [facility] sent by Dr Alejandro Ramirez from [facility] (pathology) [phone]. Wife of patient would like visit with Dr Ric Morales. Planned visit with Dr Ric Morales on July 12th. At this time we should have pathology results back. Advised patientt to keep anal area where biopsied clean, use baby wipes, sitz baths and Dermoplast for relief. Patient's wife currently in agreement with this plan--she would like earlier appt if avail and path results back however.

## 2018-07-03 ENCOUNTER — PATIENT OUTREACH (OUTPATIENT)
Dept: CASE MANAGEMENT | Age: 78
End: 2018-07-03

## 2018-07-03 ENCOUNTER — HOSPITAL ENCOUNTER (OUTPATIENT)
Dept: LAB | Age: 78
Discharge: HOME | End: 2018-07-03
Payer: MEDICARE

## 2018-07-03 DIAGNOSIS — C80.1 SQUAMOUS CELL CARCINOMA METASTATIC TO HEAD AND NECK WITH UNKNOWN PRIMARY SITE (HCC): ICD-10-CM

## 2018-07-03 DIAGNOSIS — C79.89 SQUAMOUS CELL CARCINOMA METASTATIC TO HEAD AND NECK WITH UNKNOWN PRIMARY SITE (HCC): ICD-10-CM

## 2018-07-03 PROBLEM — C44.500: Status: ACTIVE | Noted: 2018-07-03

## 2018-07-03 PROBLEM — E11.21 TYPE 2 DIABETES WITH NEPHROPATHY (HCC): Status: ACTIVE | Noted: 2018-07-03

## 2018-07-03 LAB
ALBUMIN SERPL-MCNC: 3.7 G/DL (ref 3.2–4.6)
ALBUMIN/GLOB SERPL: 1.3 {RATIO} (ref 1.2–3.5)
ALP SERPL-CCNC: 65 U/L (ref 50–136)
ALT SERPL-CCNC: 19 U/L (ref 12–65)
ANION GAP SERPL CALC-SCNC: 7 MMOL/L (ref 7–16)
AST SERPL-CCNC: 10 U/L (ref 15–37)
BASOPHILS # BLD: 0 K/UL (ref 0–0.2)
BASOPHILS NFR BLD: 1 % (ref 0–2)
BILIRUB SERPL-MCNC: 0.4 MG/DL (ref 0.2–1.1)
BUN SERPL-MCNC: 14 MG/DL (ref 8–23)
CALCIUM SERPL-MCNC: 8.9 MG/DL (ref 8.3–10.4)
CHLORIDE SERPL-SCNC: 96 MMOL/L (ref 98–107)
CO2 SERPL-SCNC: 28 MMOL/L (ref 21–32)
CREAT SERPL-MCNC: 1.3 MG/DL (ref 0.8–1.5)
DIFFERENTIAL METHOD BLD: ABNORMAL
EOSINOPHIL # BLD: 0.1 K/UL (ref 0–0.8)
EOSINOPHIL NFR BLD: 2 % (ref 0.5–7.8)
ERYTHROCYTE [DISTWIDTH] IN BLOOD BY AUTOMATED COUNT: 12.5 % (ref 11.9–14.6)
GLOBULIN SER CALC-MCNC: 2.8 G/DL (ref 2.3–3.5)
GLUCOSE SERPL-MCNC: 270 MG/DL (ref 65–100)
HCT VFR BLD AUTO: 35.6 % (ref 41.1–50.3)
HGB BLD-MCNC: 12.8 G/DL (ref 13.6–17.2)
LYMPHOCYTES # BLD: 1 K/UL (ref 0.5–4.6)
LYMPHOCYTES NFR BLD: 32 % (ref 13–44)
MCH RBC QN AUTO: 32.7 PG (ref 26.1–32.9)
MCHC RBC AUTO-ENTMCNC: 36 G/DL (ref 31.4–35)
MCV RBC AUTO: 91 FL (ref 79.6–97.8)
MONOCYTES # BLD: 0.4 K/UL (ref 0.1–1.3)
MONOCYTES NFR BLD: 13 % (ref 4–12)
NEUTS SEG # BLD: 1.6 K/UL (ref 1.7–8.2)
NEUTS SEG NFR BLD: 52 % (ref 43–78)
NRBC # BLD: 0 K/UL (ref 0–0.2)
PLATELET # BLD AUTO: 156 K/UL (ref 150–450)
PMV BLD AUTO: 8.1 FL (ref 10.8–14.1)
POTASSIUM SERPL-SCNC: 4 MMOL/L (ref 3.5–5.1)
PROT SERPL-MCNC: 6.5 G/DL (ref 6.3–8.2)
RBC # BLD AUTO: 3.91 M/UL (ref 4.23–5.67)
SODIUM SERPL-SCNC: 131 MMOL/L (ref 136–145)
WBC # BLD AUTO: 3 K/UL (ref 4.3–11.1)

## 2018-07-03 PROCEDURE — 85025 COMPLETE CBC W/AUTO DIFF WBC: CPT | Performed by: INTERNAL MEDICINE

## 2018-07-03 PROCEDURE — 36415 COLL VENOUS BLD VENIPUNCTURE: CPT | Performed by: INTERNAL MEDICINE

## 2018-07-03 PROCEDURE — 80053 COMPREHEN METABOLIC PANEL: CPT | Performed by: INTERNAL MEDICINE

## 2018-07-10 ENCOUNTER — HOSPITAL ENCOUNTER (OUTPATIENT)
Dept: PET IMAGING | Age: 78
Discharge: HOME | End: 2018-07-10
Attending: INTERNAL MEDICINE
Payer: MEDICARE

## 2018-07-10 DIAGNOSIS — C79.89 SQUAMOUS CELL CARCINOMA METASTATIC TO HEAD AND NECK WITH UNKNOWN PRIMARY SITE (HCC): ICD-10-CM

## 2018-07-10 DIAGNOSIS — C80.1 SQUAMOUS CELL CARCINOMA METASTATIC TO HEAD AND NECK WITH UNKNOWN PRIMARY SITE (HCC): ICD-10-CM

## 2018-07-10 DIAGNOSIS — C44.500: ICD-10-CM

## 2018-07-10 PROCEDURE — 74011636320 HC RX REV CODE- 636/320: Performed by: INTERNAL MEDICINE

## 2018-07-10 PROCEDURE — A9552 F18 FDG: HCPCS

## 2018-07-10 RX ORDER — SODIUM CHLORIDE 0.9 % (FLUSH) 0.9 %
10 SYRINGE (ML) INJECTION
Status: COMPLETED | OUTPATIENT
Start: 2018-07-10 | End: 2018-07-10

## 2018-07-10 RX ADMIN — Medication 10 ML: at 16:43

## 2018-07-10 RX ADMIN — DIATRIZOATE MEGLUMINE AND DIATRIZOATE SODIUM 10 ML: 660; 100 LIQUID ORAL; RECTAL at 16:43

## 2018-07-17 ENCOUNTER — HOSPITAL ENCOUNTER (OUTPATIENT)
Dept: LAB | Age: 78
Discharge: HOME | End: 2018-07-17
Payer: MEDICARE

## 2018-07-17 DIAGNOSIS — C44.500: ICD-10-CM

## 2018-07-17 LAB
ALBUMIN SERPL-MCNC: 4.1 G/DL (ref 3.2–4.6)
ALBUMIN/GLOB SERPL: 1.3 {RATIO} (ref 1.2–3.5)
ALP SERPL-CCNC: 72 U/L (ref 50–136)
ALT SERPL-CCNC: 19 U/L (ref 12–65)
ANION GAP SERPL CALC-SCNC: 7 MMOL/L (ref 7–16)
AST SERPL-CCNC: 10 U/L (ref 15–37)
BASOPHILS # BLD: 0 K/UL (ref 0–0.2)
BASOPHILS NFR BLD: 1 % (ref 0–2)
BILIRUB SERPL-MCNC: 0.4 MG/DL (ref 0.2–1.1)
BUN SERPL-MCNC: 12 MG/DL (ref 8–23)
CALCIUM SERPL-MCNC: 9.3 MG/DL (ref 8.3–10.4)
CHLORIDE SERPL-SCNC: 93 MMOL/L (ref 98–107)
CO2 SERPL-SCNC: 28 MMOL/L (ref 21–32)
CREAT SERPL-MCNC: 1.27 MG/DL (ref 0.8–1.5)
DIFFERENTIAL METHOD BLD: ABNORMAL
EOSINOPHIL # BLD: 0.1 K/UL (ref 0–0.8)
EOSINOPHIL NFR BLD: 3 % (ref 0.5–7.8)
ERYTHROCYTE [DISTWIDTH] IN BLOOD BY AUTOMATED COUNT: 12.2 % (ref 11.9–14.6)
GLOBULIN SER CALC-MCNC: 3.1 G/DL (ref 2.3–3.5)
GLUCOSE SERPL-MCNC: 261 MG/DL (ref 65–100)
HCT VFR BLD AUTO: 38 % (ref 41.1–50.3)
HGB BLD-MCNC: 13.7 G/DL (ref 13.6–17.2)
LYMPHOCYTES # BLD: 1 K/UL (ref 0.5–4.6)
LYMPHOCYTES NFR BLD: 29 % (ref 13–44)
MCH RBC QN AUTO: 32.2 PG (ref 26.1–32.9)
MCHC RBC AUTO-ENTMCNC: 36.1 G/DL (ref 31.4–35)
MCV RBC AUTO: 89.4 FL (ref 79.6–97.8)
MONOCYTES # BLD: 0.4 K/UL (ref 0.1–1.3)
MONOCYTES NFR BLD: 13 % (ref 4–12)
NEUTS SEG # BLD: 1.9 K/UL (ref 1.7–8.2)
NEUTS SEG NFR BLD: 54 % (ref 43–78)
NRBC # BLD: 0 K/UL (ref 0–0.2)
NRBC BLD-RTO: 0 PER 100 WBC (ref 0–2)
PLATELET # BLD AUTO: 181 K/UL (ref 150–450)
PMV BLD AUTO: 8.5 FL (ref 10.8–14.1)
POTASSIUM SERPL-SCNC: 4.6 MMOL/L (ref 3.5–5.1)
PROT SERPL-MCNC: 7.2 G/DL (ref 6.3–8.2)
RBC # BLD AUTO: 4.25 M/UL (ref 4.23–5.67)
SODIUM SERPL-SCNC: 128 MMOL/L (ref 136–145)
WBC # BLD AUTO: 3.5 K/UL (ref 4.3–11.1)

## 2018-07-17 PROCEDURE — 85025 COMPLETE CBC W/AUTO DIFF WBC: CPT | Performed by: INTERNAL MEDICINE

## 2018-07-17 PROCEDURE — 80053 COMPREHEN METABOLIC PANEL: CPT | Performed by: INTERNAL MEDICINE

## 2018-07-17 PROCEDURE — 36415 COLL VENOUS BLD VENIPUNCTURE: CPT | Performed by: INTERNAL MEDICINE

## 2018-07-18 ENCOUNTER — PATIENT OUTREACH (OUTPATIENT)
Dept: CASE MANAGEMENT | Age: 78
End: 2018-07-18

## 2018-07-18 NOTE — PROGRESS NOTES
Saw patient on 7-17-18 with Dr Dany Sandhu. He reviewed PET scan and colonoscopy results with patient and scheduled patient to see Dr Cesar Hatch. Appt made for 7-24-18 and pt made aware. Tramadol given for pain. Pt encouraged to call with further questions or concerns.

## 2018-07-19 ENCOUNTER — PATIENT OUTREACH (OUTPATIENT)
Dept: CASE MANAGEMENT | Age: 78
End: 2018-07-19

## 2018-07-19 NOTE — PROGRESS NOTES
Spoke with Dr Brett Martin concerning dental appt and she stated her office  would contact pt and get him an appt.

## 2018-07-26 RX ORDER — CEFAZOLIN SODIUM IN 0.9 % NACL 2 G/50 ML
2 INTRAVENOUS SOLUTION, PIGGYBACK (ML) INTRAVENOUS ONCE
Status: CANCELLED | OUTPATIENT
Start: 2018-08-01 | End: 2018-08-01

## 2018-07-27 ENCOUNTER — HOSPITAL ENCOUNTER (OUTPATIENT)
Dept: SURGERY | Age: 78
Discharge: HOME | End: 2018-07-27
Payer: MEDICARE

## 2018-07-27 VITALS
RESPIRATION RATE: 16 BRPM | TEMPERATURE: 98.1 F | HEART RATE: 78 BPM | OXYGEN SATURATION: 98 % | WEIGHT: 174 LBS | BODY MASS INDEX: 22.33 KG/M2 | DIASTOLIC BLOOD PRESSURE: 94 MMHG | SYSTOLIC BLOOD PRESSURE: 167 MMHG | HEIGHT: 74 IN

## 2018-07-27 LAB
APTT PPP: 29.1 SEC (ref 23.2–35.3)
GLUCOSE BLD STRIP.AUTO-MCNC: 117 MG/DL (ref 65–100)
INR PPP: 1.1
PROTHROMBIN TIME: 14 SEC (ref 11.5–14.5)

## 2018-07-27 PROCEDURE — 85730 THROMBOPLASTIN TIME PARTIAL: CPT | Performed by: SURGERY

## 2018-07-27 PROCEDURE — 82962 GLUCOSE BLOOD TEST: CPT

## 2018-07-27 PROCEDURE — 85610 PROTHROMBIN TIME: CPT | Performed by: SURGERY

## 2018-07-27 RX ORDER — AMLODIPINE BESYLATE 10 MG/1
10 TABLET ORAL DAILY
COMMUNITY

## 2018-07-27 NOTE — PERIOP NOTES
Patient verified name, , and surgery as listed in [facility]. Patient provided medical/health information and PTA medications to the best of their ability. TYPE  CASE: 1B            Orders:     Received and dated  . Labs per surgeon:  PT, PTT  today's glucose was 117 Labs per anesthesia protocol: glucose on arrival 
EKG  :  None since , patient has no cardiac hx of any kind Instructed patient to continue previous medications as prescribed prior to surgery unless otherwise directed and to take the following medications the day of surgery according to anesthesia guidelines : amlodipine, and tramadol if needed, and sodium . Instructed patient to hold  the following medications: metformin and lisinopril. Patient instructed on the following: 
Arrive at Main entrance, time of arrival to be called the day before by 1700. Responsible adult must drive patient to and from the hospital, stay during surgery, and       patient will need supervision 24 hours after anesthesia NPO after midnight including gum, mints and ice chips. Shower the night before and the morning of surgery with a non-moisturizing soap. Leave all valuables at home. Bring insurance card and ID. No jewelry or body piercings on DOS. No perfumes, oil, powder, colognes, makeup or  lotions on the skin. Patient teach back successful and patient demonstrates knowledge of instruction.

## 2018-07-31 ENCOUNTER — ANESTHESIA EVENT (OUTPATIENT)
Dept: SURGERY | Age: 78
End: 2018-07-31
Payer: MEDICARE

## 2018-08-01 ENCOUNTER — HOSPITAL ENCOUNTER (OUTPATIENT)
Age: 78
Setting detail: OUTPATIENT SURGERY
Discharge: HOME | End: 2018-08-01
Attending: SURGERY | Admitting: SURGERY
Payer: MEDICARE

## 2018-08-01 ENCOUNTER — APPOINTMENT (OUTPATIENT)
Dept: GENERAL RADIOLOGY | Age: 78
End: 2018-08-01
Attending: SURGERY
Payer: MEDICARE

## 2018-08-01 ENCOUNTER — ANESTHESIA (OUTPATIENT)
Dept: SURGERY | Age: 78
End: 2018-08-01
Payer: MEDICARE

## 2018-08-01 VITALS
SYSTOLIC BLOOD PRESSURE: 145 MMHG | WEIGHT: 171.44 LBS | RESPIRATION RATE: 16 BRPM | HEIGHT: 74 IN | DIASTOLIC BLOOD PRESSURE: 78 MMHG | HEART RATE: 73 BPM | OXYGEN SATURATION: 97 % | TEMPERATURE: 97.7 F | BODY MASS INDEX: 22 KG/M2

## 2018-08-01 DIAGNOSIS — C80.1 SQUAMOUS CELL CARCINOMA METASTATIC TO HEAD AND NECK WITH UNKNOWN PRIMARY SITE (HCC): Primary | ICD-10-CM

## 2018-08-01 DIAGNOSIS — C79.89 SQUAMOUS CELL CARCINOMA METASTATIC TO HEAD AND NECK WITH UNKNOWN PRIMARY SITE (HCC): Primary | ICD-10-CM

## 2018-08-01 LAB — GLUCOSE BLD STRIP.AUTO-MCNC: 106 MG/DL (ref 65–100)

## 2018-08-01 PROCEDURE — 76010000149 HC OR TIME 1 TO 1.5 HR: Performed by: SURGERY

## 2018-08-01 PROCEDURE — 74011250636 HC RX REV CODE- 250/636: Performed by: ANESTHESIOLOGY

## 2018-08-01 PROCEDURE — 74011250636 HC RX REV CODE- 250/636: Performed by: SURGERY

## 2018-08-01 PROCEDURE — 77030003029 HC SUT VCRL J&J -B: Performed by: SURGERY

## 2018-08-01 PROCEDURE — 77030002996 HC SUT SLK J&J -A: Performed by: SURGERY

## 2018-08-01 PROCEDURE — 74011250636 HC RX REV CODE- 250/636

## 2018-08-01 PROCEDURE — 88305 TISSUE EXAM BY PATHOLOGIST: CPT | Performed by: SURGERY

## 2018-08-01 PROCEDURE — 77030031139 HC SUT VCRL2 J&J -A: Performed by: SURGERY

## 2018-08-01 PROCEDURE — 77030020782 HC GWN BAIR PAWS FLX 3M -B: Performed by: ANESTHESIOLOGY

## 2018-08-01 PROCEDURE — 77030002933 HC SUT MCRYL J&J -A: Performed by: SURGERY

## 2018-08-01 PROCEDURE — 76060000033 HC ANESTHESIA 1 TO 1.5 HR: Performed by: SURGERY

## 2018-08-01 PROCEDURE — 76210000020 HC REC RM PH II FIRST 0.5 HR: Performed by: SURGERY

## 2018-08-01 PROCEDURE — 76210000016 HC OR PH I REC 1 TO 1.5 HR: Performed by: SURGERY

## 2018-08-01 PROCEDURE — 82962 GLUCOSE BLOOD TEST: CPT

## 2018-08-01 PROCEDURE — 74011000250 HC RX REV CODE- 250

## 2018-08-01 PROCEDURE — 77030018836 HC SOL IRR NACL ICUM -A: Performed by: SURGERY

## 2018-08-01 PROCEDURE — C1788 PORT, INDWELLING, IMP: HCPCS | Performed by: SURGERY

## 2018-08-01 PROCEDURE — 74011000250 HC RX REV CODE- 250: Performed by: SURGERY

## 2018-08-01 PROCEDURE — 77030011640 HC PAD GRND REM COVD -A: Performed by: SURGERY

## 2018-08-01 PROCEDURE — 77030002986 HC SUT PROL J&J -A: Performed by: SURGERY

## 2018-08-01 PROCEDURE — 77030018673: Performed by: SURGERY

## 2018-08-01 PROCEDURE — 71045 X-RAY EXAM CHEST 1 VIEW: CPT

## 2018-08-01 PROCEDURE — 74011000250 HC RX REV CODE- 250: Performed by: ANESTHESIOLOGY

## 2018-08-01 DEVICE — PORT INFUS OD8FR SGL LUMN PLAS FILL N VLV PG BIOFLO [H965440130] [ANGIODYNAMICS INC]: Type: IMPLANTABLE DEVICE | Site: CHEST | Status: FUNCTIONAL

## 2018-08-01 RX ORDER — CEFAZOLIN SODIUM IN 0.9 % NACL 2 G/50 ML
2 INTRAVENOUS SOLUTION, PIGGYBACK (ML) INTRAVENOUS ONCE
Status: DISCONTINUED | OUTPATIENT
Start: 2018-08-01 | End: 2018-08-01 | Stop reason: SDUPTHER

## 2018-08-01 RX ORDER — SODIUM CHLORIDE 9 MG/ML
100 INJECTION, SOLUTION INTRAVENOUS CONTINUOUS
Status: DISCONTINUED | OUTPATIENT
Start: 2018-08-01 | End: 2018-08-01 | Stop reason: HOSPADM

## 2018-08-01 RX ORDER — HYDROMORPHONE HYDROCHLORIDE 2 MG/ML
0.5 INJECTION, SOLUTION INTRAMUSCULAR; INTRAVENOUS; SUBCUTANEOUS
Status: DISCONTINUED | OUTPATIENT
Start: 2018-08-01 | End: 2018-08-01 | Stop reason: HOSPADM

## 2018-08-01 RX ORDER — HYDROCODONE BITARTRATE AND ACETAMINOPHEN 5; 325 MG/1; MG/1
TABLET ORAL
Qty: 20 TAB | Refills: 0 | Status: SHIPPED | OUTPATIENT
Start: 2018-08-01 | End: 2018-09-05 | Stop reason: DRUGHIGH

## 2018-08-01 RX ORDER — CEFAZOLIN SODIUM/WATER 2 G/20 ML
2 SYRINGE (ML) INTRAVENOUS ONCE
Status: COMPLETED | OUTPATIENT
Start: 2018-08-01 | End: 2018-08-01

## 2018-08-01 RX ORDER — SODIUM CHLORIDE, SODIUM LACTATE, POTASSIUM CHLORIDE, CALCIUM CHLORIDE 600; 310; 30; 20 MG/100ML; MG/100ML; MG/100ML; MG/100ML
100 INJECTION, SOLUTION INTRAVENOUS CONTINUOUS
Status: DISCONTINUED | OUTPATIENT
Start: 2018-08-01 | End: 2018-08-01 | Stop reason: HOSPADM

## 2018-08-01 RX ORDER — NALOXONE HYDROCHLORIDE 0.4 MG/ML
0.04 INJECTION, SOLUTION INTRAMUSCULAR; INTRAVENOUS; SUBCUTANEOUS
Status: DISCONTINUED | OUTPATIENT
Start: 2018-08-01 | End: 2018-08-01 | Stop reason: HOSPADM

## 2018-08-01 RX ORDER — LIDOCAINE HYDROCHLORIDE 10 MG/ML
0.1 INJECTION INFILTRATION; PERINEURAL AS NEEDED
Status: DISCONTINUED | OUTPATIENT
Start: 2018-08-01 | End: 2018-08-01 | Stop reason: HOSPADM

## 2018-08-01 RX ORDER — HEPARIN 100 UNIT/ML
SYRINGE INTRAVENOUS AS NEEDED
Status: DISCONTINUED | OUTPATIENT
Start: 2018-08-01 | End: 2018-08-01 | Stop reason: HOSPADM

## 2018-08-01 RX ORDER — MIDAZOLAM HYDROCHLORIDE 1 MG/ML
2 INJECTION, SOLUTION INTRAMUSCULAR; INTRAVENOUS ONCE
Status: DISCONTINUED | OUTPATIENT
Start: 2018-08-01 | End: 2018-08-01 | Stop reason: HOSPADM

## 2018-08-01 RX ORDER — OXYCODONE HYDROCHLORIDE 5 MG/1
5 TABLET ORAL
Status: DISCONTINUED | OUTPATIENT
Start: 2018-08-01 | End: 2018-08-01 | Stop reason: HOSPADM

## 2018-08-01 RX ORDER — BUPIVACAINE HYDROCHLORIDE AND EPINEPHRINE 5; 5 MG/ML; UG/ML
INJECTION, SOLUTION EPIDURAL; INTRACAUDAL; PERINEURAL AS NEEDED
Status: DISCONTINUED | OUTPATIENT
Start: 2018-08-01 | End: 2018-08-01 | Stop reason: HOSPADM

## 2018-08-01 RX ORDER — PROPOFOL 10 MG/ML
INJECTION, EMULSION INTRAVENOUS
Status: DISCONTINUED | OUTPATIENT
Start: 2018-08-01 | End: 2018-08-01 | Stop reason: HOSPADM

## 2018-08-01 RX ORDER — PROPOFOL 10 MG/ML
INJECTION, EMULSION INTRAVENOUS AS NEEDED
Status: DISCONTINUED | OUTPATIENT
Start: 2018-08-01 | End: 2018-08-01 | Stop reason: HOSPADM

## 2018-08-01 RX ORDER — MIDAZOLAM HYDROCHLORIDE 1 MG/ML
2 INJECTION, SOLUTION INTRAMUSCULAR; INTRAVENOUS
Status: DISCONTINUED | OUTPATIENT
Start: 2018-08-01 | End: 2018-08-01 | Stop reason: HOSPADM

## 2018-08-01 RX ORDER — FENTANYL CITRATE 50 UG/ML
100 INJECTION, SOLUTION INTRAMUSCULAR; INTRAVENOUS ONCE
Status: DISCONTINUED | OUTPATIENT
Start: 2018-08-01 | End: 2018-08-01 | Stop reason: HOSPADM

## 2018-08-01 RX ADMIN — Medication 2 G: at 07:13

## 2018-08-01 RX ADMIN — SODIUM CHLORIDE 100 ML/HR: 900 INJECTION, SOLUTION INTRAVENOUS at 06:51

## 2018-08-01 RX ADMIN — PROPOFOL 50 MG: 10 INJECTION, EMULSION INTRAVENOUS at 07:12

## 2018-08-01 RX ADMIN — PROPOFOL 200 MCG/KG/MIN: 10 INJECTION, EMULSION INTRAVENOUS at 07:12

## 2018-08-01 RX ADMIN — SODIUM CHLORIDE, SODIUM LACTATE, POTASSIUM CHLORIDE, AND CALCIUM CHLORIDE 100 ML/HR: 600; 310; 30; 20 INJECTION, SOLUTION INTRAVENOUS at 06:00

## 2018-08-01 NOTE — IP AVS SNAPSHOT
[address] [address] 
[phone] Patient: Angie Perez MRN: [id number]:[id number] About your hospitalization You were admitted on:  August 1, 2018 You last received care in the:  [facility] PACU You were discharged on:  August 1, 2018 Why you were hospitalized Your primary diagnosis was:  Not on File Follow-up Information Follow up With Details Comments Contact Info Ludwin Wilkes MD   [address] [place] [address] [phone] Nadege Rizzo MD   [address] [facility] [address] 
[phone] Your Scheduled Appointments Wednesday August 01, 2018  8:40 AM EDT  
XR PLAIN FILM 15 with [facility] PORTABLE AMX 4  
[facility] Radiology ([address] [place] [address]) [address]  
[phone] PATIENT ARRIVAL Please report 30 minutes early to check in.  
  
    
[address], [address] [place] [address]- 2nd floor of Outpatient Medical Office Building Monday August 06, 2018  1:00 PM EDT  
GCC CONSULT with Jamaal Matthews MD  
800 [facility]) [address] [facility] [address] [facility] [address]  
[phone] Thursday August 09, 2018  3:00 PM EDT  
LAB with [address]  
1808 [facility] OUTREACH INSURANCE [facility]) [facility] [address] [place] [address]  
[phone] Thursday August 09, 2018  3:30 PM EDT Follow Up with Twan Wolfe MD  
[facility] Hematology and Oncology [facility]) C/ [address] [facility] [address]  
[phone] Thursday August 09, 2018  3:30 PM EDT Follow Up with Scot Knight RD [facility] Hematology and Oncology [facility]) C/ [address] [facility] [address]  
[phone] Friday August 10, 2018  8:45 AM EDT Global Post Op with Nadege Rizzo MD  
 [facility] MAIN ([facility] MAIN) [address] [facility]  
[phone] Discharge Orders None A check kassie indicates which time of day the medication should be taken. My Medications START taking these medications Instructions Each Dose to Equal  
 Morning Noon Evening Bedtime HYDROcodone-acetaminophen 5-325 mg per tablet Commonly known as:  Jerri Fields Your last dose was: Your next dose is:    
   
   
 1-2 tabs by mouth every 4 hours prn pain CHANGE how you take these medications Instructions Each Dose to Equal  
 Morning Noon Evening Bedtime  
 sodium chloride 1 gram tablet What changed:  See the new instructions. Your last dose was: Your next dose is: TAKE 1 TAB BY MOUTH DAILY. CONTINUE taking these medications Instructions Each Dose to Equal  
 Morning Noon Evening Bedtime  
 amLODIPine 10 mg tablet Commonly known as:  Flavio Lyons Your last dose was: Your next dose is: Take 10 mg by mouth daily. In am  
 10 mg  
    
   
   
   
  
 glimepiride 4 mg tablet Commonly known as:  AMARYL Your last dose was: Your next dose is: Take 4 mg by mouth every morning. Indications: TYPE 2 DIABETES MELLITUS  
 4 mg  
    
   
   
   
  
 lisinopril 20 mg tablet Commonly known as:  María Alejandro Your last dose was: Your next dose is: Take 20 mg by mouth daily. In am  Indications: hypertension 20 mg  
    
   
   
   
  
 metFORMIN 1,000 mg tablet Commonly known as:  GLUCOPHAGE Your last dose was: Your next dose is: Take 1,000 mg by mouth daily. In am  Indications: type 2 diabetes mellitus 1000 mg  
    
   
   
   
  
 traMADol 50 mg tablet Commonly known as:  ULTRAM  
   
Your last dose was: Your next dose is: Take 1 Tab by mouth every six (6) hours as needed for Pain. Max Daily Amount: 200 mg.  
 50 mg  
    
   
   
   
  
  
STOP taking these medications TYLENOL 325 mg tablet Generic drug:  acetaminophen Where to Get Your Medications Information on where to get these meds will be given to you by the nurse or doctor. ! Ask your nurse or doctor about these medications HYDROcodone-acetaminophen 5-325 mg per tablet Opioid Education Prescription Opioids: What You Need to Know: 
 
  
Follow-up with Dr. Eric Paula. Call to make appt. Keep incisions clean and dry, Remove plastic dressing and gauze after 48 hours, leave sterile strips on for 7-10 days, OK to shower Do not apply lotions, creams or ointments to incisions. 
  
Diet - as tolerated Activity - ambulate - as tolerated - no heavy lifting >20lb. May shower - no tub baths or soaking/submerging. 
  
No driving while taking narcotics. Do not drink alcohol while taking narcotics. Resume other home medications. Take Rx as prescribed Take stool softeners while on narcotics to avoid constipation 
  
If problems or questions arise, please call our office at [phone]. 
  
Greater than 30 minutes were spent discharging the patient After general anesthesia or intravenous sedation, for 24 hours or while taking prescription Narcotics: · Limit your activities · Do not drive and operate hazardous machinery · Do not make important personal or business decisions · Do  not drink alcoholic beverages · If you have not urinated within 8 hours after discharge, please contact your surgeon on call. *  Please give a list of your current medications to your Primary Care Provider. *  Please update this list whenever your medications are discontinued, doses are 
    changed, or new medications (including over-the-counter products) are added. *  Please carry medication information at all times in case of emergency situations. These are general instructions for a healthy lifestyle: No smoking/ No tobacco products/ Avoid exposure to second hand smoke Surgeon General's Warning:  Quitting smoking now greatly reduces serious risk to your health. Obesity, smoking, and sedentary lifestyle greatly increases your risk for illness A healthy diet, regular physical exercise & weight monitoring are important for maintaining a healthy lifestyle You may be retaining fluid if you have a history of heart failure or if you experience any of the following symptoms:  Weight gain of 3 pounds or more overnight or 5 pounds in a week, increased swelling in our hands or feet or shortness of breath while lying flat in bed. Please call your doctor as soon as you notice any of these symptoms; do not wait until your next office visit. Recognize signs and symptoms of STROKE: 
F-face looks uneven A-arms unable to move or move unevenly S-speech slurred or non-existent T-time-call 911 as soon as signs and symptoms begin-DO NOT go Back to bed or wait to see if you get better-TIME IS BRAIN. Introducing [facility]! Romayne Duster introduces [employer] patient portal. Now you can access parts of your medical record, email your doctor's office, and request medication refills online. 1. In your internet browser, go to [email or web address] 2. Click on the First Time User? Click Here link in the Sign In box. You will see the New Member Sign Up page. 3. Enter your [employer] Access Code exactly as it appears below. You will not need to use this code after youve completed the sign-up process.  If you do not sign up before the expiration date, you must request a new code. · [employer] Access Code: [email or web address] Expires: 8/6/2018  2:35 PM 
 
4. Enter the last four digits of your Social Security Number (xxxx) and Date of Birth (mm/dd/yyyy) as indicated and click Submit. You will be taken to the next sign-up page. 5. Create a [employer] ID. This will be your [employer] login ID and cannot be changed, so think of one that is secure and easy to remember. 6. Create a [employer] password. You can change your password at any time. 7. Enter your Password Reset Question and Answer. This can be used at a later time if you forget your password. 8. Enter your e-mail address. You will receive e-mail notification when new information is available in [address] 9. Click Sign Up. You can now view and download portions of your medical record. 10. Click the Download Summary menu link to download a portable copy of your medical information. If you have questions, please visit the Frequently Asked Questions section of the [employer] website. Remember, [employer] is NOT to be used for urgent needs. For medical emergencies, dial 911. Now available from your iPhone and Android! Introducing Jacob Gamboa As a [employer] patient, I wanted to make you aware of our electronic visit tool called Jacob [employer]. [employer] 24/7 allows you to connect within minutes with a medical provider 24 hours a day, seven days a week via a mobile device or tablet or logging into a secure website from your computer. You can access Jacob Gamboa from anywhere in the [place].  
 
A virtual visit might be right for you when you have a simple condition and feel like you just dont want to get out of bed, or cant get away from work for an appointment, when your regular [employer] provider is not available (evenings, weekends or holidays), or when youre out of town and need minor care. Electronic visits cost only $49 and if the [employer] 24/7 provider determines a prescription is needed to treat your condition, one can be electronically transmitted to a nearby pharmacy*. Please take a moment to enroll today if you have not already done so. The enrollment process is free and takes just a few minutes. To enroll, please download the [employer] ifrah to your tablet or phone, or visit [email or web address] to enroll on your computer. And, as an [address] [place] [address] patient with a [employer] account, the results of your visits will be scanned into your electronic medical record and your primary care provider will be able to view the scanned results. We urge you to continue to see your regular [employer] provider for your ongoing medical care. And while your primary care provider may not be the one available when you seek a [employer] virtual visit, the peace of mind you get from getting a real diagnosis real time can be priceless. For more information on [employer], view our Frequently Asked Questions (FAQs) at [email or web address]. Sincerely, 
 
Brandon Garay MD 
Chief Medical Officer [address] *:  certain medications cannot be prescribed via [employer] Unresulted Labs-Please follow up with your PCP about these lab tests Order Current Status NC XR TECHNOLOGIST SERVICE In process Providers Seen During Your Hospitalization Provider Specialty Primary office phone Marilee Chavarria MD General Surgery [phone] Your Primary Care Physician (PCP) Primary Care Physician Office Phone Office Fax Jersey Womack [phone] You are allergic to the following No active allergies Recent Documentation Height Weight BMI Smoking Status 1.88 m 77.8 kg 22.01 kg/m2 Never Smoker Emergency Contacts Name Discharge Info Relation Home Work Mobile Isidra Xavier DISCHARGE CAREGIVER [3] Spouse [3] [phone] Patient Belongings The following personal items are in your possession at time of discharge: 
  Dental Appliances: None         Home Medications: None   Jewelry: None  Clothing: Socks, Undergarments, Shirt, Belt, Pants, Footwear    Other Valuables: None Please provide this summary of care documentation to your next provider. Signatures-by signing, you are acknowledging that this After Visit Summary has been reviewed with you and you have received a copy. Patient Signature:  ____________________________________________________________ Date:  ____________________________________________________________  
  
[facility] Provider Signature:  ____________________________________________________________ Date:  ____________________________________________________________

## 2018-08-01 NOTE — BRIEF OP NOTE
BRIEF OPERATIVE NOTE Date of Procedure: 8/1/2018 Preoperative Diagnosis: anal cancer ([address] [C18.0] Postoperative Diagnosis: anal cancer Procedure(s): LEFT INGUINAL LYMPH NODE BIOPSY INFUSAPORT INSERTION with fluoro Surgeon(s) and Role: Kurtis Zhang MD - Primary Surgical Assistant: Isac Carbajal NP Surgical Staff: 
Circ-1: Ramsey Officer, RN Radiology Technician: CHIDI Malloy, RT, R, CT Scrub Tech-1: Hans Rich Nurse Practitioner: Kaylene Schafer NP Event Time In Incision Start  7:29 AM  
Incision Close  8:11 AM  
 
Anesthesia: MAC Estimated Blood Loss: minimal 
Specimens:  
ID Type Source Tests Collected by Time Destination 1 : left inguinal lymphnode Preservative Groin  Zane Mendez MD 8/1/2018  7:57 AM Pathology Findings: several matted inguinal LN; fluoro confirmed port positioning Complications: none Implants:  
Implant Name Type Inv. Item Serial No.  Lot No. LRB No. Used Action PORT INFUS SGL PLAS 8FR UNFIL -- BIOFLO - [id number]   PORT INFUS SGL PLAS 8FR UNFIL -- BIOFLO   [employer] [id number]   1 Implanted

## 2018-08-01 NOTE — ANESTHESIA PREPROCEDURE EVALUATION
Anesthetic History No history of anesthetic complications Review of Systems / Medical History Pertinent labs reviewed Pulmonary Within defined limits Neuro/Psych Within defined limits Cardiovascular Hypertension Exercise tolerance: >4 METS 
  
GI/Hepatic/Renal 
  
GERD: well controlled Endo/Other Diabetes: type 2 Cancer (rectal, oral) Other Findings Physical Exam 
 
Airway Mallampati: III 
TM Distance: 4 - 6 cm Neck ROM: normal range of motion Mouth opening: Normal 
 
Comments: Asymmetric mouth opening from previous right neck surgery. Cardiovascular Regular rate and rhythm,  S1 and S2 normal,  no murmur, click, rub, or gallop Dental 
No notable dental hx Pulmonary Breath sounds clear to auscultation Abdominal 
GI exam deferred Other Findings Anesthetic Plan ASA: 3 Anesthesia type: total IV anesthesia Induction: Intravenous Anesthetic plan and risks discussed with: Patient and Spouse

## 2018-08-01 NOTE — ANESTHESIA POSTPROCEDURE EVALUATION
Post-Anesthesia Evaluation and Assessment Patient: Nevin Knight MRN: [id number]  SSN: [id number] YOB: 1940  Age: 69 y.o. Sex: male Cardiovascular Function/Vital Signs Visit Vitals  /67  Pulse 69  Temp 36.6 °C (97.8 °F)  Resp 16  
 Ht 6' 2\" (1.88 m)  Wt 77.8 kg (171 lb 7 oz)  SpO2 97%  BMI 22.01 kg/m2 Patient is status post total IV anesthesia anesthesia for Procedure(s): LEFT INGUINAL LYMPH NODE BIOPSY INFUSAPORT INSERTION. Nausea/Vomiting: None Postoperative hydration reviewed and adequate. Pain: 
Pain Scale 1: Numeric (0 - 10) (08/01/18 2912) Pain Intensity 1: 0 (08/01/18 0834) Managed Neurological Status:  
Neuro (WDL): Within Defined Limits (08/01/18 0820) At baseline Mental Status and Level of Consciousness: Awake. Pulmonary Status:  
O2 Device: Room air (08/01/18 0839) Adequate oxygenation and airway patent Complications related to anesthesia: None Post-anesthesia assessment completed. No concerns Signed By: Annmarie Juarez MD   
 August 1, 2018 No complaints

## 2018-08-01 NOTE — DISCHARGE INSTRUCTIONS
Discharge Instructions/Follow-up Plans:   MD Instructions:     Follow-up with Dr. Jordi Corcoran. Call to make appt. Keep incisions clean and dry, Remove plastic dressing and gauze after 48 hours, leave sterile strips on for 7-10 days, OK to shower    Do not apply lotions, creams or ointments to incisions.     Diet - as tolerated  Activity - ambulate - as tolerated - no heavy lifting >20lb. May shower - no tub baths or soaking/submerging.     No driving while taking narcotics. Do not drink alcohol while taking narcotics. Resume other home medications. Take Rx as prescribed   Take stool softeners while on narcotics to avoid constipation     If problems or questions arise, please call our office at [phone].     Greater than 30 minutes were spent discharging the patient    After general anesthesia or intravenous sedation, for 24 hours or while taking prescription Narcotics:  · Limit your activities  · Do not drive and operate hazardous machinery  · Do not make important personal or business decisions  · Do  not drink alcoholic beverages  · If you have not urinated within 8 hours after discharge, please contact your surgeon on call. *  Please give a list of your current medications to your Primary Care Provider. *  Please update this list whenever your medications are discontinued, doses are      changed, or new medications (including over-the-counter products) are added. *  Please carry medication information at all times in case of emergency situations. These are general instructions for a healthy lifestyle:  No smoking/ No tobacco products/ Avoid exposure to second hand smoke  Surgeon General's Warning:  Quitting smoking now greatly reduces serious risk to your health.   Obesity, smoking, and sedentary lifestyle greatly increases your risk for illness  A healthy diet, regular physical exercise & weight monitoring are important for maintaining a healthy lifestyle    You may be retaining fluid if you have a history of heart failure or if you experience any of the following symptoms:  Weight gain of 3 pounds or more overnight or 5 pounds in a week, increased swelling in our hands or feet or shortness of breath while lying flat in bed. Please call your doctor as soon as you notice any of these symptoms; do not wait until your next office visit. Recognize signs and symptoms of STROKE:  F-face looks uneven  A-arms unable to move or move unevenly  S-speech slurred or non-existent  T-time-call 911 as soon as signs and symptoms begin-DO NOT go       Back to bed or wait to see if you get better-TIME IS BRAIN.

## 2018-08-01 NOTE — OP NOTES
[address] 
OPERATIVE REPORT Renetta Mayes 
MR#: [id number] : 1940 ACCOUNT #: [de-identified] DATE OF SERVICE: 2018 SURGEON:  Uche Morgan MD  
 
ASSISTANT:  Daly York NP 
  
ANESTHESIA:  MAC PREOPERATIVE DIAGNOSIS:  Anal cancer with a questionable left inguinal lymphadenopathy. POSTOPERATIVE DIAGNOSIS:  Anal cancer with a questionable left inguinal lymphadenopathy. PROCEDURES PERFORMED: 1. Port-A-Cath placement under fluoroscopy. 2.  Left inguinal lymph node biopsy. INDICATION:  This is a 67-year-old gentleman who presented with anal cancer. He needs chemoradiation and a port was requested. He also has a questionable left inguinal lymphadenopathy which was palpable, and a lymph node biopsy was offered at the same time. Patient understood risks and benefits, agreed to proceed. FINDINGS: 
1.  Enlarged left inguinal lymph nodes. Actually there were several small lymph nodes matted together. 2.  Intraoperative fluoroscopy confirmed position and port placement. DESCRIPTION OF PROCEDURE:  After informed consent obtained, the patient brought into the operating room. Managed anesthesia care was administered. The patient neck, upper chest and the left groin area were prepped and draped at the same time. The first third of the port placement and the left subclavian vein was accessed. Venous blood return was obtained. Guidewire was then advanced under the guidance of fluoroscopy followed by a dilator introducer sheath in the EndoCatch. There was a band around the introducer sheath under the guidance of fluoroscopy.   At the same time, a pocket was made in the left upper chest.  The catheter was tunneled to the pocket, connected to the reservoir, post reservoir and the catheter was flushed with heparin solution 100 units per milliliter with good aspiration and good flush and then the port was sutured into place with 2-0 Prolene stitch. The incision closed with 3-0 Vicryl stitch on the dermal layer, 4-0 Vicryl stitch in subcuticular running fashion. The port site was dressed and then with more attention to the left groin. The inguinal incision was made right out of the palpable abnormality. Dissection carried through the dermal layer and then several firm matted lymph node was immediately encountered. This was removed. There were small feeder vessels. This was tied with a Vicryl and a surgical field was inspected. Good hemostasis obtained and the dermal layer closed with 3-0 Vicryl stitch and then the skin closed with 4-0 Vicryl stitch in subcuticular running fashion. Patient tolerated procedure well and was transferred to recovery room in stable condition. All the instrument and lap count were correct. SPECIMENS REMOVED:  As above COMPLICATIONS:  none IMPLANTS:  As above ESTIMATED BLOOD LOSS:  Minimal.  Laura Ken  present and assisted during the entire case. MD JUDY Winkler / RONAN 
D: 08/01/2018 08:21 T: 08/01/2018 11:56 JOB #: [id number]

## 2018-08-06 ENCOUNTER — HOSPITAL ENCOUNTER (OUTPATIENT)
Dept: RADIATION ONCOLOGY | Age: 78
Discharge: HOME | End: 2018-08-06
Payer: MEDICARE

## 2018-08-06 ENCOUNTER — PATIENT OUTREACH (OUTPATIENT)
Dept: CASE MANAGEMENT | Age: 78
End: 2018-08-06

## 2018-08-06 VITALS
TEMPERATURE: 97.8 F | DIASTOLIC BLOOD PRESSURE: 94 MMHG | HEIGHT: 71 IN | RESPIRATION RATE: 16 BRPM | OXYGEN SATURATION: 98 % | HEART RATE: 76 BPM | BODY MASS INDEX: 24.46 KG/M2 | WEIGHT: 174.7 LBS | SYSTOLIC BLOOD PRESSURE: 195 MMHG

## 2018-08-06 PROCEDURE — 77470 SPECIAL RADIATION TREATMENT: CPT

## 2018-08-06 PROCEDURE — 99211 OFF/OP EST MAY X REQ PHY/QHP: CPT

## 2018-08-06 NOTE — CONSULTS
Patient: Luis Enrique Randhawa MRN: [id number]  SSN: [id number]    YOB: 1940  Age: 69 y.o. Sex: male      Other Providers:  Edu Crowell MD, Lucie Wells MD    CHIEF COMPLAINT: Anal cancer    DIAGNOSIS: T2N0M0, Stage II anal SCC. Prior right walker-parotid lymph node mass. Unknown primary, [id number], Stage DENTON.      PREVIOUS TREATMENT:  1) None for anal cancer    Unknown primary treatment. 1)  Lymph node excision 9/9/15 of right neck/parapharyngeal mass  2) Adjuvant radiation with gross disease present, 70 Gy in 35 fractions to the gross disease,      63 Gy to the ipsilateral neck, and 56 Gy to the contralateral neck. Delivered with concurrent weekly cisplatin. Completed radiation: 12/28/2015    HISTORY OF PRESENT ILLNESS:  Luis Enrique Randhawa is a 69 y.o. male who I am seeing at the request of Dr. Myra Guzman. He is previously known to me as result of treatment for his unknown primary head and neck cancer. He presented with a right neck mass that had been growing for several months. He was evaluated by his primary care physician and eventually referred to Dr. Iker Manning for further workup. His pertinent medical history includes hypertension and diabetes. He is a lifelong nonsmoker and nondrinker. His workup included a CT of the neck and soft tissues which showed a large necrotic mass in the right submandibular region measuring 5 x 4.1 cm. The appearance was most suggestive of a large necrotic lymph node. There was no potential primary lesion noted. He was taken for a selective neck dissection where the right neck parapharyngeal mass was excised. We reviewed the operative report where this was excised from the skull base where gross tumor was felt to be present on the resection. This was followed by a PET/CT scan 9/17/2015. This confirmed residual soft tissue abnormality at the superior margin of this previously noted large right neck mass. This was associated with FDG activity.   He was presented at our multidisciplinary head and neck tumor board and felt his tumor was consistent with an unknown primary and planned to treat him as such. He also had tooth extractions at our request in anticipation of radiation. He completed treatment now 2 weeks ago. The patient had expected side effects including oral and pharyngeal pain.  He did require supplementation with his feeding tube, oral analgesics, and a fentanyl patch.  These medications were titrated throughout his treatment successfully.  We also used magic mouthwash regularly.  While he did have difficulty with confluent mucositis, he was able to complete all of his treatments without unplanned treatment breaks. At 2 weeks out, his oral pain was slightly improved and while he still had trouble eating, he has noted dramatic improvement and can eat nearly anything at this point but has no taste. He notes that his mouth is dry and has an edematous tongue.  At 3 months his weight was increasing although he still used his feeding tube with 2 cans of ensure and medications.  Overall he felt he was recovering perhaps more slowly than expected and hoped but has made significant strides since that time. He did have his feeding tube removed 4/11/2016. At time of his last follow up in our office 10/27/17, we was eating and drinking well, denying trouble with swallowing. He has xerostomia, well controlled with fluids. He has full ROM with his neck. He continued to feel thickening on the right side of his neck (surgical site) that was tender, but didn't appreciate change in size. He denied headaches. In July 2018, he related a history of having been treated for hemorrhoids over the prior 5 years with various topical preparations. Ultimately, because of persistence of symptoms he was referred to a gastroenterologist and a colonoscopy performed. Unfortunately, biopsies of the anal region showed a poorly differentiated tumor with both glandular and neuroendocrine features. This was subsequently confirmed at the [facility] in [place] and felt to be consistent with an anal SCC. He underwent a PET scan which showed some activity in the anal skin as well as a 1.2 cm lymph node in the left inguinal region with some slight SUV activity. There was no evidence of disease dissemination. He denied other complaints. His case was presented at tumor board and it was thought that perhaps he could be treated with an excision of the perianal skin alone. However, after surgical evaluation he had circumferential involvement of the perianal skin and anal canal with a tight stricture and was therefore not amenable to local resection. As a result definitive chemotherapy and radiation was recommended which was the rationale for our consultation. He did undergo a lymph node biopsy of the left inguinal region which was negative 8/1/2018. PAST MEDICAL HISTORY:    Past Medical History:   Diagnosis Date    Diabetes ([address]     type 2; avg FBS <150, has not had a recent A1C    GERD (gastroesophageal reflux disease)     occ OTC med    Head and neck cancer [facility] [address] 9/30/2015    radiation and chemo and and surgery    Hypercholesteremia     med    Hypertension     med    Rectal cancer ([address] 2018    Vomiting 2/23/2016       The patient denies history of collagen vascular diseases, pacemaker insertion, but has had prior radiation and prior chemotherapy as outlined above.      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 In am, Disp: , Rfl:     traMADol (ULTRAM) 50 mg tablet, Take 1 Tab by mouth every six (6) hours as needed for Pain. Max Daily Amount: 200 mg., Disp: 30 Tab, Rfl: 0    sodium chloride 1 gram tablet, TAKE 1 TAB BY MOUTH DAILY. (Patient taking differently: TAKE 1 TAB BY MOUTH DAILY. in am), Disp: 90 Tab, Rfl: 0    lisinopril (PRINIVIL, ZESTRIL) 20 mg tablet, Take 20 mg by mouth daily. In am  Indications: hypertension, Disp: , Rfl:     glimepiride (AMARYL) 4 mg tablet, Take 4 mg by mouth every morning. Indications: TYPE 2 DIABETES MELLITUS, Disp: , Rfl:     metFORMIN (GLUCOPHAGE) 1,000 mg tablet, Take 1,000 mg by mouth daily. In am  Indications: type 2 diabetes mellitus, Disp: , Rfl:     ALLERGIES:   No Known Allergies    SOCIAL HISTORY:   Social History     Social History    Marital status:      Spouse name: N/A    Number of children: N/A    Years of education: N/A     Occupational History    Not on file. Social History Main Topics    Smoking status: Never Smoker    Smokeless tobacco: Never Used    Alcohol use No    Drug use: No    Sexual activity: Not on file     Other Topics Concern    Not on file     Social History Narrative       FAMILY HISTORY:   Family History   Problem Relation Age of Onset    Emphysema Father     Lung Disease Father     Cancer Brother      Colon    Heart Disease Sister     Hypertension Sister     Heart Disease Sister     Hypertension Sister     Heart Disease Brother     Hypertension Brother     Heart Disease Brother     Hypertension Brother     Heart Disease Brother     Hypertension Brother     Heart Disease Brother     Hypertension Brother     Heart Disease Brother     Hypertension Brother        REVIEW OF SYSTEMS: Please see the completed review of systems sheet in the chart that I have reviewed today.       PHYSICAL EXAMINATION:   ECOG Performance status 1  VITAL SIGNS:   Visit Vitals    BP (!) 195/94 (BP 1 Location: Left arm, BP Patient Position: Sitting)    Pulse 76    Temp 97.8 °F (36.6 °C)    Resp 16    Ht 5' 11\" (1.803 m)    Wt 79.2 kg (174 lb 11.2 oz)    SpO2 98%    BMI 24.37 kg/m2        GENERAL: The patient is well-developed, ambulatory, alert and in no acute distress. HEENT: Head is normocephalic, atraumatic. Pupils are equal, round and reactive to light and accommodation. Extraocular movement intact. Hearing is intact bilaterally to finger rub. Oral cavity reveals no lesions. Mucous membranes are moist. NECK: Neck is supple with no masses. CARDIOVASCULAR: Heart is regular rate and rhythm. There are no murmurs rubs or gallups. Radial pulses are 2+ RESPIRATORY: Lungs are clear to auscultation and percussion. There is normal respiratory effort. GASTROINTESTINAL: The abdomen is soft, non-tender, nondistended with no hepatospelnomagaly. Digital rectal examination: thickened circumferential anal canal region consistent with known tumor. This extends into the anal canal and 3 cm outside of it. LYMPHATIC: There is no cervical, supraclavicular or axillary lymphadenopathy bilaterally. MUSCULOSKELETAL: Extremities reveal no cyanosis, clubbing or edema.  is 5+/5. NEURO:  Cranial nerves II-XII grossly intact. Muscular strength and sensation are intact throughout all four extremities. PATHOLOGY:   6/28/18:       DIAGNOSIS   RIGHT LATERAL SUPERIOR NECK NODULE, NEEDLE BIOPSY: MINUTE FRAGMENT OF SQUAMOUS EPITHELIUM WITH FOCAL CALCIFICATION. SEE COMMENT. Comment   Right lateral superior neck nodule, needle biopsy has a minute fragment of apparent squamous epithelium with focal calcifications.  While the morphologic changes of a very low nuclear cytoplasmic ratio and extremely limited nature of biopsy material are nondiagnostic for squamous carcinoma on morphology alone, these findings in a neck nodule with a prior history of squamous carcinoma (see [id number]) become very worrisome for at least treated squamous carcinoma. 8/1/18:     DIAGNOSIS   LEFT INGUINAL LYMPH NODE:   ONE LYMPH NODE NEGATIVE FOR METASTATIC CARCINOMA. LABORATORY:   Lab Results   Component Value Date/Time    Sodium 128 (L) 07/17/2018 12:45 PM    Potassium 4.6 07/17/2018 12:45 PM    Chloride 93 (L) 07/17/2018 12:45 PM    CO2 28 07/17/2018 12:45 PM    Anion gap 7 07/17/2018 12:45 PM    Glucose 261 (H) 07/17/2018 12:45 PM    BUN 12 07/17/2018 12:45 PM    Creatinine 1.27 07/17/2018 12:45 PM    GFR est AA >60 07/17/2018 12:45 PM    GFR est non-AA 58 (L) 07/17/2018 12:45 PM    Calcium 9.3 07/17/2018 12:45 PM    Magnesium 2.0 07/27/2017 01:51 PM    Albumin 4.1 07/17/2018 12:45 PM    Protein, total 7.2 07/17/2018 12:45 PM    Globulin 3.1 07/17/2018 12:45 PM    A-G Ratio 1.3 07/17/2018 12:45 PM    AST (SGOT) 10 (L) 07/17/2018 12:45 PM    ALT (SGPT) 19 07/17/2018 12:45 PM     Lab Results   Component Value Date/Time    WBC 3.5 (L) 07/17/2018 12:45 PM    HGB 13.7 07/17/2018 12:45 PM    HCT 38.0 (L) 07/17/2018 12:45 PM    PLATELET 660 18/48/6149 12:45 PM       RADIOLOGY:    I have personally reviewed the imaging and agree with the reports below. Pet/ct Tumor Image Skull Thigh (sub)    Result Date: 7/11/2018  PET/CT  Indication: Restaging head and neck carcinoma, perianal skin carcinoma. Right neck chemoradiation 12/26/2015, right neck excision 9/9/2015 Radiopharmaceutical: 17.6 mCi F18-FDG, intravenously. Technique: Imaging was performed from the skull through the proximal thighs using routine PET/CT acquisition protocol. Imaging was performed approximately 60 minutes post injection. Oral contrast was administered. Radiation dose reduction techniques were used for this study:  Our CT scanners use one or all of the following: Automated exposure control, adjustment of the mA and/or kVp according to patient's size, iterative reconstruction.  Serum glucose: 87 mg/dL prior to injection. Comparison studies: CT neck 2/16/2018 and prior, PET/CT 3/17/2016 Findings: Head and Neck: No abnormal FDG uptake or lymphadenopathy. Similar postoperative change right neck. Chest: Cardiac enlargement. No effusion or pneumothorax. No lymphadenopathy or abnormal FDG uptake. No discrete pulmonary nodule on nonbreath-hold technique. Coronary artery calcification. Abdomen/Pelvis: Perianal skin thickening with FDG activity, max SUV 5.0 image 271. Minimally prominent left inguinal node with SUV 2.4 image 253 measuring 1.2 cm short axis. No bowel obstruction. IMPRESSION: 1. Suspected primary site of perianal disease versus recent treatment change as above. 2. Solitary metabolically active left inguinal node which is nonspecific. Metastatic spread is possible. 3. No additional sites of abnormal FDG uptake. IMPRESSION:  Vaishnavi Jeronimo is a 69 y.o. male with anal SCC. We discussed the natural history of anal cancer and the management in the definitive setting. We specifically discussed the implications of T and N stage, performance status, HIV status, smoking status, and co-morbid disorders. Surgery is morbid in this area and does not lead to improved outcomes. We discussed the importance of chemotherapy and radiation in patients with T2-T4 tumors or node positive disease as demonstarted by various clinical trials including UKCCR and EORTC trials showing improved outcomes in patients receiving combined therapy. We also discussed trials attempting to alter the chemotherapy regimens which have failed to show any improvement and I would therefore still advocate for chemoRT. While this pathology is not classic, with the circumferential nature of his lesion, he would be best served by accommodation of chemotherapy and radiation as discussed in tumor board with surgery and medical oncology.     I discussed the logistics of radiaiton and planned treatment over 5.5 weeks delivering 50.4 Gy in 28 fractions to the gross disease. Radiation using IMRT and dose painting is then employed to treat the intermediate and low risk regions to lower dose ranges. Chemotherapy will be coordinated appropriately. I will coordinate his care with medical oncology and surgery. Informed consent was obtained after reviewing the risks and benefits of therapy and the anticipated toxicities. Thank you for allowing me to participate in this very pleasant patient's care. I spent 60 minutes with the patient, more than 50% was spent in counseling and coordination of care. Plan:  1. Genetic testing-not indicated  2. Smoking cessation-not indicated  3. Patient will be simulated Thursday at 10:00, with radiotherapy to begin shortly thereafter. A dose of 50.4 Gy in 28 fractions will be delivered to the primary with intermediate dose to the regional lymph nodes. 4.  His start date will be coordinated with Dr. Larry Irizarry in medical oncology. Portions of this note were copied from prior encounters and reviewed for accuracy, currency, and represent documentation and tasks completed during this encounter. I verify and attest these portions to be unchanged from prior visits.     Belinda Arora MD  08/06/18

## 2018-08-06 NOTE — PROGRESS NOTES
Saw patient today for consultation with Dr Stevo Gould. He was told he would need 28 treatments for anal cancer along with chemotherapy. Pt signed consent and is now ready for CT SIM on 8-9-18. Pt will have appt with Dr David Arciniega afterward in the evenings.

## 2018-08-06 NOTE — PROGRESS NOTES
Consult for anal cancer. Spouse and NN present for consult. Colonoscopy 6-18-18. Pet scan 7-10-18. Biopsy 7-17-18. History chemo/RT for H/N cancer 12-28-15. CONSENTS SIGNED FOR RT TO THE ANAL CANAL. PLAN IS CONCURRENT CHEMO/RT.   CT [facility] SCHEDULED Thursday, 8-9-18 AT 10 AM.  APT GIVEN TO PT.    Bj Garcia RN

## 2018-08-09 ENCOUNTER — HOSPITAL ENCOUNTER (OUTPATIENT)
Dept: RADIATION ONCOLOGY | Age: 78
Discharge: HOME | End: 2018-08-09
Payer: MEDICARE

## 2018-08-09 ENCOUNTER — HOSPITAL ENCOUNTER (OUTPATIENT)
Dept: LAB | Age: 78
Discharge: HOME | End: 2018-08-09
Attending: INTERNAL MEDICINE
Payer: MEDICARE

## 2018-08-09 DIAGNOSIS — C44.500: ICD-10-CM

## 2018-08-09 PROBLEM — C21.0 ANAL CARCINOMA (HCC): Status: ACTIVE | Noted: 2018-08-09

## 2018-08-09 LAB
ALBUMIN SERPL-MCNC: 3.8 G/DL (ref 3.2–4.6)
ALBUMIN/GLOB SERPL: 1.2 {RATIO} (ref 1.2–3.5)
ALP SERPL-CCNC: 77 U/L (ref 50–136)
ALT SERPL-CCNC: 24 U/L (ref 12–65)
ANION GAP SERPL CALC-SCNC: 5 MMOL/L (ref 7–16)
AST SERPL-CCNC: 12 U/L (ref 15–37)
BASOPHILS # BLD: 0.1 K/UL (ref 0–0.2)
BASOPHILS NFR BLD: 1 % (ref 0–2)
BILIRUB SERPL-MCNC: 0.4 MG/DL (ref 0.2–1.1)
BUN SERPL-MCNC: 10 MG/DL (ref 8–23)
CALCIUM SERPL-MCNC: 9.2 MG/DL (ref 8.3–10.4)
CHLORIDE SERPL-SCNC: 94 MMOL/L (ref 98–107)
CO2 SERPL-SCNC: 30 MMOL/L (ref 21–32)
CREAT SERPL-MCNC: 1.15 MG/DL (ref 0.8–1.5)
DIFFERENTIAL METHOD BLD: ABNORMAL
EOSINOPHIL # BLD: 0.1 K/UL (ref 0–0.8)
EOSINOPHIL NFR BLD: 2 % (ref 0.5–7.8)
ERYTHROCYTE [DISTWIDTH] IN BLOOD BY AUTOMATED COUNT: 12 % (ref 11.9–14.6)
GLOBULIN SER CALC-MCNC: 3.3 G/DL (ref 2.3–3.5)
GLUCOSE SERPL-MCNC: 180 MG/DL (ref 65–100)
HCT VFR BLD AUTO: 37.7 % (ref 41.1–50.3)
HGB BLD-MCNC: 13.5 G/DL (ref 13.6–17.2)
IMM GRANULOCYTES # BLD: 0 K/UL (ref 0–0.5)
IMM GRANULOCYTES NFR BLD AUTO: 1 % (ref 0–5)
LYMPHOCYTES # BLD: 0.9 K/UL (ref 0.5–4.6)
LYMPHOCYTES NFR BLD: 24 % (ref 13–44)
MCH RBC QN AUTO: 32.1 PG (ref 26.1–32.9)
MCHC RBC AUTO-ENTMCNC: 35.8 G/DL (ref 31.4–35)
MCV RBC AUTO: 89.5 FL (ref 79.6–97.8)
MONOCYTES # BLD: 0.4 K/UL (ref 0.1–1.3)
MONOCYTES NFR BLD: 11 % (ref 4–12)
NEUTS SEG # BLD: 2.3 K/UL (ref 1.7–8.2)
NEUTS SEG NFR BLD: 60 % (ref 43–78)
NRBC # BLD: 0 K/UL (ref 0–0.2)
PLATELET # BLD AUTO: 166 K/UL (ref 150–450)
PMV BLD AUTO: 8.2 FL (ref 9.4–12.3)
POTASSIUM SERPL-SCNC: 3.6 MMOL/L (ref 3.5–5.1)
PROT SERPL-MCNC: 7.1 G/DL (ref 6.3–8.2)
RBC # BLD AUTO: 4.21 M/UL (ref 4.23–5.67)
SODIUM SERPL-SCNC: 129 MMOL/L (ref 136–145)
WBC # BLD AUTO: 3.8 K/UL (ref 4.3–11.1)

## 2018-08-09 PROCEDURE — 36415 COLL VENOUS BLD VENIPUNCTURE: CPT

## 2018-08-09 PROCEDURE — 77332 RADIATION TREATMENT AID(S): CPT

## 2018-08-09 PROCEDURE — 85025 COMPLETE CBC W/AUTO DIFF WBC: CPT

## 2018-08-09 PROCEDURE — 80053 COMPREHEN METABOLIC PANEL: CPT

## 2018-08-09 PROCEDURE — 77334 RADIATION TREATMENT AID(S): CPT

## 2018-08-13 ENCOUNTER — PATIENT OUTREACH (OUTPATIENT)
Dept: CASE MANAGEMENT | Age: 78
End: 2018-08-13

## 2018-08-14 NOTE — PROGRESS NOTES
Saw patient on 8-9-18 with Dr Sudarshan Wheeler. Dr Sudarshan Wheeler discussed need for chemo/radiation. Pt will start Mitomycin/5FU and radiation. He received chemocare information on Mitomycin and Fluorouracil and we reviewed possible side effects such as mouth sores, rectal irritation, N/V/D, rectal pain and low counts. Pt has experienced IV chemo and radiation concurrently within the past 2 years and voices realistic expectations. Consent for chemotherapy signed. Medication compazine, zofran and Emla sent to pharmacy.  Nurse navigation will be following

## 2018-08-20 ENCOUNTER — HOSPITAL ENCOUNTER (OUTPATIENT)
Dept: LAB | Age: 78
Discharge: HOME | End: 2018-08-20
Payer: MEDICARE

## 2018-08-20 ENCOUNTER — PATIENT OUTREACH (OUTPATIENT)
Dept: CASE MANAGEMENT | Age: 78
End: 2018-08-20

## 2018-08-20 ENCOUNTER — HOSPITAL ENCOUNTER (OUTPATIENT)
Dept: INFUSION THERAPY | Age: 78
Discharge: HOME | End: 2018-08-20
Payer: MEDICARE

## 2018-08-20 ENCOUNTER — HOSPITAL ENCOUNTER (OUTPATIENT)
Dept: RADIATION ONCOLOGY | Age: 78
Discharge: HOME | End: 2018-08-20
Payer: MEDICARE

## 2018-08-20 VITALS — WEIGHT: 176 LBS | BODY MASS INDEX: 23.22 KG/M2

## 2018-08-20 DIAGNOSIS — C80.1 SQUAMOUS CELL CARCINOMA METASTATIC TO HEAD AND NECK WITH UNKNOWN PRIMARY SITE (HCC): ICD-10-CM

## 2018-08-20 DIAGNOSIS — C79.89 SQUAMOUS CELL CARCINOMA METASTATIC TO HEAD AND NECK WITH UNKNOWN PRIMARY SITE (HCC): ICD-10-CM

## 2018-08-20 DIAGNOSIS — E87.1 HYPONATREMIA: ICD-10-CM

## 2018-08-20 DIAGNOSIS — R11.2 NON-INTRACTABLE VOMITING WITH NAUSEA, UNSPECIFIED VOMITING TYPE: ICD-10-CM

## 2018-08-20 DIAGNOSIS — C21.0 ANAL CARCINOMA (HCC): ICD-10-CM

## 2018-08-20 LAB
ALBUMIN SERPL-MCNC: 3.9 G/DL (ref 3.2–4.6)
ALBUMIN/GLOB SERPL: 1.1 {RATIO} (ref 1.2–3.5)
ALP SERPL-CCNC: 83 U/L (ref 50–136)
ALT SERPL-CCNC: 20 U/L (ref 12–65)
ANION GAP SERPL CALC-SCNC: 3 MMOL/L (ref 7–16)
AST SERPL-CCNC: 12 U/L (ref 15–37)
BASOPHILS # BLD: 0 K/UL (ref 0–0.2)
BASOPHILS NFR BLD: 1 % (ref 0–2)
BILIRUB SERPL-MCNC: 0.3 MG/DL (ref 0.2–1.1)
BUN SERPL-MCNC: 10 MG/DL (ref 8–23)
CALCIUM SERPL-MCNC: 9.1 MG/DL (ref 8.3–10.4)
CHLORIDE SERPL-SCNC: 96 MMOL/L (ref 98–107)
CO2 SERPL-SCNC: 28 MMOL/L (ref 21–32)
CREAT SERPL-MCNC: 1.23 MG/DL (ref 0.8–1.5)
DIFFERENTIAL METHOD BLD: ABNORMAL
EOSINOPHIL # BLD: 0.1 K/UL (ref 0–0.8)
EOSINOPHIL NFR BLD: 5 % (ref 0.5–7.8)
ERYTHROCYTE [DISTWIDTH] IN BLOOD BY AUTOMATED COUNT: 12.2 % (ref 11.9–14.6)
GLOBULIN SER CALC-MCNC: 3.4 G/DL (ref 2.3–3.5)
GLUCOSE SERPL-MCNC: 276 MG/DL (ref 65–100)
HCT VFR BLD AUTO: 38.5 % (ref 41.1–50.3)
HGB BLD-MCNC: 13.4 G/DL (ref 13.6–17.2)
IMM GRANULOCYTES # BLD: 0 K/UL (ref 0–0.5)
IMM GRANULOCYTES NFR BLD AUTO: 0 % (ref 0–5)
LYMPHOCYTES # BLD: 0.9 K/UL (ref 0.5–4.6)
LYMPHOCYTES NFR BLD: 29 % (ref 13–44)
MCH RBC QN AUTO: 31.8 PG (ref 26.1–32.9)
MCHC RBC AUTO-ENTMCNC: 34.8 G/DL (ref 31.4–35)
MCV RBC AUTO: 91.4 FL (ref 79.6–97.8)
MONOCYTES # BLD: 0.4 K/UL (ref 0.1–1.3)
MONOCYTES NFR BLD: 12 % (ref 4–12)
NEUTS SEG # BLD: 1.6 K/UL (ref 1.7–8.2)
NEUTS SEG NFR BLD: 53 % (ref 43–78)
NRBC # BLD: 0 K/UL (ref 0–0.2)
PLATELET # BLD AUTO: 185 K/UL (ref 150–450)
PMV BLD AUTO: 8.3 FL (ref 9.4–12.3)
POTASSIUM SERPL-SCNC: 3.8 MMOL/L (ref 3.5–5.1)
PROT SERPL-MCNC: 7.3 G/DL (ref 6.3–8.2)
RBC # BLD AUTO: 4.21 M/UL (ref 4.23–5.67)
SODIUM SERPL-SCNC: 127 MMOL/L (ref 136–145)
WBC # BLD AUTO: 3.1 K/UL (ref 4.3–11.1)

## 2018-08-20 PROCEDURE — 74011250636 HC RX REV CODE- 250/636: Performed by: INTERNAL MEDICINE

## 2018-08-20 PROCEDURE — 77030003560 HC NDL HUBR BARD -A

## 2018-08-20 PROCEDURE — 36415 COLL VENOUS BLD VENIPUNCTURE: CPT

## 2018-08-20 PROCEDURE — 77338 DESIGN MLC DEVICE FOR IMRT: CPT

## 2018-08-20 PROCEDURE — 77386 HC IMRT TRMT DLVR COMPL: CPT

## 2018-08-20 PROCEDURE — 80053 COMPREHEN METABOLIC PANEL: CPT

## 2018-08-20 PROCEDURE — 85025 COMPLETE CBC W/AUTO DIFF WBC: CPT

## 2018-08-20 PROCEDURE — 96375 TX/PRO/DX INJ NEW DRUG ADDON: CPT

## 2018-08-20 PROCEDURE — 96409 CHEMO IV PUSH SNGL DRUG: CPT

## 2018-08-20 RX ORDER — MITOMYCIN 5 MG/10ML
20 INJECTION, POWDER, LYOPHILIZED, FOR SOLUTION INTRAVENOUS ONCE
Status: COMPLETED | OUTPATIENT
Start: 2018-08-20 | End: 2018-08-20

## 2018-08-20 RX ORDER — SODIUM CHLORIDE 9 MG/ML
500 INJECTION, SOLUTION INTRAVENOUS CONTINUOUS
Status: ACTIVE | OUTPATIENT
Start: 2018-08-20 | End: 2018-08-20

## 2018-08-20 RX ORDER — ONDANSETRON 2 MG/ML
8 INJECTION INTRAMUSCULAR; INTRAVENOUS ONCE
Status: COMPLETED | OUTPATIENT
Start: 2018-08-20 | End: 2018-08-20

## 2018-08-20 RX ORDER — SODIUM CHLORIDE 0.9 % (FLUSH) 0.9 %
10 SYRINGE (ML) INJECTION AS NEEDED
Status: ACTIVE | OUTPATIENT
Start: 2018-08-20 | End: 2018-08-20

## 2018-08-20 RX ADMIN — SODIUM CHLORIDE 500 ML: 900 INJECTION, SOLUTION INTRAVENOUS at 11:20

## 2018-08-20 RX ADMIN — Medication 10 ML: at 14:30

## 2018-08-20 RX ADMIN — ONDANSETRON 8 MG: 2 INJECTION INTRAMUSCULAR; INTRAVENOUS at 11:51

## 2018-08-20 RX ADMIN — Medication 10 ML: at 11:20

## 2018-08-20 RX ADMIN — MITOMYCIN 20 MG: 20 INJECTION, POWDER, LYOPHILIZED, FOR SOLUTION INTRAVENOUS at 12:34

## 2018-08-20 NOTE — PROGRESS NOTES
Patient: Stephnaie Garcia MRN: [id number]  SSN: [id number]    YOB: 1940  Age: 69 y.o. Sex: male      DIAGNOSIS:  T2N0M0, Stage II anal SCC. Prior right walker-parotid lymph node mass. Unknown primary, [id number], Stage DENTON. TREATMENT SITE:  Anal canal    DOSE and FRACTIONATION:  1/25 fractions, 180 cGy of 4500 cGy planned, 0/3 boost, 0 cGy of 540 cGy planned. INTERVAL HISTORY:  Stephanie Garcia is a 69 y.o. male being treated for anal cancer with prior head and neck cancer. He was doing well without complaints week 1. OBJECTIVE:  No findings week 1. There were no vitals taken for this visit. Lab Results   Component Value Date/Time    Sodium 127 (L) 08/20/2018 10:21 AM    Potassium 3.8 08/20/2018 10:21 AM    Chloride 96 (L) 08/20/2018 10:21 AM    CO2 28 08/20/2018 10:21 AM    Anion gap 3 (L) 08/20/2018 10:21 AM    Glucose 276 (H) 08/20/2018 10:21 AM    BUN 10 08/20/2018 10:21 AM    Creatinine 1.23 08/20/2018 10:21 AM    GFR est AA >60 08/20/2018 10:21 AM    GFR est non-AA >60 08/20/2018 10:21 AM    Calcium 9.1 08/20/2018 10:21 AM    Magnesium 2.0 07/27/2017 01:51 PM    Albumin 3.9 08/20/2018 10:21 AM    Protein, total 7.3 08/20/2018 10:21 AM    Globulin 3.4 08/20/2018 10:21 AM    A-G Ratio 1.1 (L) 08/20/2018 10:21 AM    AST (SGOT) 12 (L) 08/20/2018 10:21 AM    ALT (SGPT) 20 08/20/2018 10:21 AM     Lab Results   Component Value Date/Time    WBC 3.1 (L) 08/20/2018 10:21 AM    HGB 13.4 (L) 08/20/2018 10:21 AM    HCT 38.5 (L) 08/20/2018 10:21 AM    PLATELET 189 54/45/1259 10:21 AM       ASSESSMENT and PLAN:  Stephanie Garcia is tolerating radiation as anticipated for the current dose and fraction. We will continue on as planned with another treatment visit anticipated next week.         Alejandro Perez MD   August 20, 2018

## 2018-08-20 NOTE — PROGRESS NOTES
Saw patient today prior to chemo/radiation start. We reviewed possible side effects such as low blood counts, risk for infection and sore rectal area. Pt verbalizes understanding of anti nausea meds Zofran and Compazine. We also reviewed use of pump and 5 day use. Pt has contact number for nurse navigation and navigation will be following.

## 2018-08-20 NOTE — PROGRESS NOTES
Problem: Patient Education:  Go to Education Activity  Goal: Patient/Family Education  Outcome: Progressing Towards Goal  Verbalizes/demonstrates understanding of purpose/procedure/potential side effects of mitomycin/fluorouracil.

## 2018-08-20 NOTE — PROGRESS NOTES
Pt arrived ambulatory today at 1115, to receive IV chemotherapy. Pt tolerated without difficulty. [facility] RN did teaching on fluorouracil pump. Patient discharged via ambulatory accompanied by spouse. Instructed to notify physician of any problems, questions or concerns. Allowed opportunity for patient/family to ask questions. Verbalized understanding. Next appointment is August 24 at 1700 with Wiley Plunkett.

## 2018-08-21 ENCOUNTER — HOSPITAL ENCOUNTER (OUTPATIENT)
Dept: RADIATION ONCOLOGY | Age: 78
Discharge: HOME | End: 2018-08-21
Payer: MEDICARE

## 2018-08-21 PROCEDURE — 77386 HC IMRT TRMT DLVR COMPL: CPT

## 2018-08-22 ENCOUNTER — APPOINTMENT (OUTPATIENT)
Dept: INFUSION THERAPY | Age: 78
End: 2018-08-22
Payer: MEDICARE

## 2018-08-22 ENCOUNTER — HOSPITAL ENCOUNTER (OUTPATIENT)
Dept: RADIATION ONCOLOGY | Age: 78
Discharge: HOME | End: 2018-08-22
Payer: MEDICARE

## 2018-08-22 PROCEDURE — 77386 HC IMRT TRMT DLVR COMPL: CPT

## 2018-08-23 ENCOUNTER — HOSPITAL ENCOUNTER (OUTPATIENT)
Dept: RADIATION ONCOLOGY | Age: 78
Discharge: HOME | End: 2018-08-23
Payer: MEDICARE

## 2018-08-23 PROCEDURE — 77386 HC IMRT TRMT DLVR COMPL: CPT

## 2018-08-24 ENCOUNTER — HOSPITAL ENCOUNTER (OUTPATIENT)
Dept: RADIATION ONCOLOGY | Age: 78
Discharge: HOME | End: 2018-08-24
Payer: MEDICARE

## 2018-08-24 ENCOUNTER — HOSPITAL ENCOUNTER (OUTPATIENT)
Dept: INFUSION THERAPY | Age: 78
Discharge: HOME | End: 2018-08-24
Payer: MEDICARE

## 2018-08-24 VITALS
TEMPERATURE: 97.9 F | SYSTOLIC BLOOD PRESSURE: 161 MMHG | WEIGHT: 178.6 LBS | HEART RATE: 84 BPM | BODY MASS INDEX: 23.56 KG/M2 | DIASTOLIC BLOOD PRESSURE: 72 MMHG | OXYGEN SATURATION: 98 % | RESPIRATION RATE: 18 BRPM

## 2018-08-24 DIAGNOSIS — C79.89 SQUAMOUS CELL CARCINOMA METASTATIC TO HEAD AND NECK WITH UNKNOWN PRIMARY SITE (HCC): ICD-10-CM

## 2018-08-24 DIAGNOSIS — E87.1 HYPONATREMIA: ICD-10-CM

## 2018-08-24 DIAGNOSIS — C80.1 SQUAMOUS CELL CARCINOMA METASTATIC TO HEAD AND NECK WITH UNKNOWN PRIMARY SITE (HCC): ICD-10-CM

## 2018-08-24 DIAGNOSIS — C21.0 ANAL CARCINOMA (HCC): ICD-10-CM

## 2018-08-24 PROCEDURE — 74011250636 HC RX REV CODE- 250/636: Performed by: INTERNAL MEDICINE

## 2018-08-24 PROCEDURE — 77386 HC IMRT TRMT DLVR COMPL: CPT

## 2018-08-24 PROCEDURE — 96523 IRRIG DRUG DELIVERY DEVICE: CPT

## 2018-08-24 RX ORDER — HEPARIN 100 UNIT/ML
300-500 SYRINGE INTRAVENOUS AS NEEDED
Status: ACTIVE | OUTPATIENT
Start: 2018-08-24 | End: 2018-08-25

## 2018-08-24 RX ORDER — SODIUM CHLORIDE 0.9 % (FLUSH) 0.9 %
10 SYRINGE (ML) INJECTION AS NEEDED
Status: ACTIVE | OUTPATIENT
Start: 2018-08-24 | End: 2018-08-25

## 2018-08-24 RX ADMIN — Medication 10 ML: at 16:55

## 2018-08-24 RX ADMIN — HEPARIN 500 UNITS: 100 SYRINGE at 16:55

## 2018-08-24 NOTE — MR AVS SNAPSHOT
[address] [facility] [address] [place] [phone] [facility] [address] 
[phone] Patient: Aftab Cedillo MRN: [id number] CBU:[id number] Visit Information Date & Time Provider Department Dept. Phone Encounter #  
 8/24/2018  5:00 PM [address] [place] [address] [phone] [id number] Your Appointments 8/27/2018  2:40 PM  
Follow Up with Marcela De NP [address] [place] [address] Hematology and Oncology [facility]) Appt Note: 1 wk [facility] Anal  
Appt Note: left msg for pt about apt. MDR  
 [id number] Ev Womack [address] [facility] [address]  
[phone]  
  
   
 [address]  
  
    
 8/27/2018  2:45 PM  
Follow Up with Songcedric Sanchez, RADHA [address] [place] [address] Hematology and Oncology [facility]) Appt Note: Beau Hunting C/ Gurpreet Rushs [address] [place] [address]  
[phone]  
  
   
 [address]  
  
    
 9/6/2018 10:00 AM  
Follow Up with [facility] PALLATIVE CARE [address] [place] [address] Hematology and Oncology [facility]) Appt Note: 9/6 Palliative C/ Gurpreet Hill [address] [place] [address]  
[phone]  
  
   
 30 [facility] [address]  
  
    
 9/6/2018 11:15 AM  
Follow Up with Sindy Nielsen MD  
[address] [place] [address] Hematology and Oncology [facility]) Appt Note: 2 wk [address] 
[address] [facility] [address]  
[phone]  
  
   
 30 [facility] [address] 9/17/2018 10:40 AM  
Follow Up with Marcela De NP [address] [place] [address] Hematology and Oncology [facility]) Appt Note: 9/[address] 
[address] [place] [address]  
[phone]  
  
    
 9/17/2018 12:30 PM  
Follow Up with [facility] PALLATIVE CARE [address] [place] [address] Hematology and Oncology [facility] Appt Note: 9/17 Palliative In Infusion C/ Gurpreet Hill [address] [place] [address]  
[phone]  
  
    
 9/25/2018  8:00 AM  
Follow Up with [facility] P[facility]ATIVE CARE [facility] Hematology and Oncology [facility]) Appt Note: 9/25 Palliative C/ Gurpreet Hill [address] [place] [address]  
[phone]  
  
    
 9/25/2018  8:45 AM  
Follow Up with Gabriella Cerda MD  
[facility] Hematology and Oncology [facility]) Appt Note: 9/[address] 
[address] [facility] [address] [phone] Upcoming Health Maintenance Date Due HEMOGLOBIN A1C Q6M 1940 LIPID PANEL Q1 1940 FOOT EXAM Q1 2/24/1951 MICROALBUMIN Q1 2/24/1951 EYE EXAM RETINAL OR DILATED Q1 2/24/1951 DTaP/Tdap/Td series (1 - Tdap) 2/24/1962 ZOSTER VACCINE AGE 60> 12/24/2000 GLAUCOMA SCREENING Q2Y 2/24/2006 Pneumococcal 65+ High/Highest Risk (1 of 2 - PCV13) 2/24/2006 MEDICARE YEARLY EXAM 3/20/2018 Influenza Age 5 to Adult 8/1/2018 Allergies as of 8/24/2018  Review Complete On: 8/22/2018 By: Alee Turner RD No Known Allergies Current Immunizations  Reviewed on 9/11/2015 Name Date Influenza Vaccine (Quad) PF 9/11/2015  7:51 AM  
  
 Not reviewed this visit You Were Diagnosed With   
  
 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)     ICD-10-CM: C79.89, C80.1 ICD-9-CM: 198.89, 199.1 Vitals BP Pulse Temp Resp Weight(growth percentile) SpO2  
 161/72 (BP 1 Location: Left arm, BP Patient Position: Sitting) 84 97.9 °F (36.6 °C) 18 178 lb 9.6 oz (81 kg) 98% BMI Smoking Status 23.56 kg/m2 Never Smoker BMI and BSA Data  Body Mass Index Body Surface Area  
 23.56 kg/m 2 2.04 m 2  
  
  
 Preferred Pharmacy Pharmacy Name Phone [id number] - [address] AT [address] [place] [address] Your Updated Medication List  
  
ASK your doctor about these medications   
 amLODIPine 10 mg tablet Commonly known as:  Alecia Yeboah Take 10 mg by mouth daily. In am  
  
 glimepiride 4 mg tablet Commonly known as:  AMARYL Take 4 mg by mouth every morning. Indications: TYPE 2 DIABETES MELLITUS HYDROcodone-acetaminophen 5-325 mg per tablet Commonly known as:  NORCO  
1-2 tabs by mouth every 4 hours prn pain  
  
 lidocaine-prilocaine topical cream  
Commonly known as:  EMLA Apply  to affected area as needed for Pain. lisinopril 20 mg tablet Commonly known as:  Arya Mejia Take 20 mg by mouth daily. In am  Indications: hypertension  
  
 metFORMIN 1,000 mg tablet Commonly known as:  GLUCOPHAGE Take 1,000 mg by mouth daily. In am  Indications: type 2 diabetes mellitus  
  
 ondansetron 8 mg disintegrating tablet Commonly known as:  ZOFRAN ODT Take 1 Tab by mouth every eight (8) hours as needed for Nausea. Indications: CANCER CHEMOTHERAPY-INDUCED NAUSEA AND VOMITING  
  
 sodium chloride 1 gram tablet TAKE 1 TAB BY MOUTH DAILY. traMADol 50 mg tablet Commonly known as:  ULTRAM  
Take 1 Tab by mouth every six (6) hours as needed for Pain. Max Daily Amount: 200 mg.   
  
  
  
  
To-Do List   
 08/24/2018  5:00 PM  
  Appointment with Pauline Mari at [address] [facility] ([phone])  
  
 08/27/2018 2:10 PM  
  Appointment with Fernanda Thomas at [address] ([phone])  
  
 08/27/2018 4:00 PM  
  Appointment with Renetta Khan 35 at [address] ([phone])  
  
 08/28/2018 4:00 PM  
  Appointment with Renetta Harper at [address] ([phone])  
  
 08/29/2018 4:00 PM  
  Appointment with Renetta Harper at [address] ONCOLOGY ([phone])  
  
 08/30/2018 4:00 PM  
  Appointment with Via Valentin Mignogna [address] at [address] ([phone])  
  
 08/31/2018 4:00 PM  
  Appointment with Via Valentin Mignogna [address] at [address] ([phone]) 09/04/2018 4:00 PM  
  Appointment with Via Valentin Mignogna [address] at [address] ([phone]) 09/05/2018 4:00 PM  
  Appointment with Via Valentin Mignogna [address] at [address] ([phone]) 09/06/2018 10:45 AM  
  Appointment with Fernanda Thomas at [place] [address] ([phone]) 09/06/2018 4:00 PM  
  Appointment with [address] Valentin Mignogna [address] at [address] ([phone])  09/07/2018 4:00 PM  
  Appointment with [address] Valentin Mignogna [address] at [address] ([phone])  
  
 09/10/2018 4:00 PM  
  Appointment with [address] Valentin Mignogna [address] at [address] ([phone])  
  
 09/11/2018 4:00 PM  
  Appointment with Via Valentin Mignogna [address] at [address] ([phone])  
  
 09/12/2018 4:00 PM  
  Appointment with Via Valentin Mignogna [address] at [address] ([phone])  
  
 09/13/2018 4:00 PM  
  Appointment with [address] Valentin Mignogna [address] at [address] ([phone])  
  
 09/14/2018 4:00 PM  
  Appointment with Via Valentin Mignogna [address] at [address] ([phone])  
  
 09/17/2018 10:10 AM  
  Appointment with Fernanda Thomas at [place] [address] ([phone])  
  
 09/17/2018 4:00 PM  
  Appointment with Via Valentin Mignogna [address] at [address] ([phone])  
  
 09/18/2018 4:00 PM  
  Appointment with Via Valentin Mignogna [address] at [address] ([phone])  
  
 09/19/2018 4:00 PM  
  Appointment with Via Valentin Harper at [address] ([phone])  
  
 09/20/2018 4:00 PM  
 Appointment with Via Valentin Khan [address] at [address] ([phone])  
  
 09/21/2018 4:00 PM  
  Appointment with Via Valentin Hankinsgna [address] at [address] ([phone])  
  
 09/24/2018 4:00 PM  
  Appointment with Via Valentin Hankinsgna [address] at [address] ([phone])  
  
 09/25/2018 8:15 AM  
  Appointment with Arvindruliya 58 at [address] ([phone])  
  
 09/25/2018 4:00 PM  
  Appointment with Via Valentin Rochaa [address] at [address] ([phone])  
  
 09/26/2018 4:00 PM  
  Appointment with Via Valentin Rochaa [address] at [address] ([phone])  
  
 09/27/2018 4:00 PM  
  Appointment with Via Valentin Khan [address] at [address] ([phone]) Introducing \[facility] [place] [facility]\"" & [facility] SERVICES! [employer] introduces [employer] patient portal. Now you can access parts of your medical record, email your doctor's office, and request medication refills online. 1. In your internet browser, go to [email or web address] 2. Click on the First Time User? Click Here link in the Sign In box. You will see the New Member Sign Up page. 3. Enter your [employer] Access Code exactly as it appears below. You will not need to use this code after youve completed the sign-up process. If you do not sign up before the expiration date, you must request a new code. · [employer] Access Code: [email or web address] Expires: 11/7/2018  9:21 AM 
 
4. Enter the last four digits of your Social Security Number (xxxx) and Date of Birth (mm/dd/yyyy) as indicated and click Submit. You will be taken to the next sign-up page. 5. Create a [employer] ID. This will be your [employer] login ID and cannot be changed, so think of one that is secure and easy to remember. 6. Create a [employer] password. You can change your password at any time. 7. Enter your Password Reset Question and Answer.  This can be used at a later time if you forget your password. 8. Enter your e-mail address. You will receive e-mail notification when new information is available in [address] 9. Click Sign Up. You can now view and download portions of your medical record. 10. Click the Download Summary menu link to download a portable copy of your medical information. If you have questions, please visit the Frequently Asked Questions section of the [employer] website. Remember, [employer] is NOT to be used for urgent needs. For medical emergencies, dial 911. Now available from your iPhone and Android! Please provide this summary of care documentation to your next provider. Your primary care clinician is listed as Ishan Ordonez. If you have any questions after today's visit, please call [phone].

## 2018-08-24 NOTE — PROGRESS NOTES
Arrived to the [facility]. Chemotherapy pump discontinued. Port flushed & then de accessed. Patient tolerated well. Any issues or concerns during appointment: none. Patient aware of next appointment with MD on 8-27-18 at 2:40  Discharged via ambulatory.

## 2018-08-27 ENCOUNTER — HOSPITAL ENCOUNTER (OUTPATIENT)
Dept: RADIATION ONCOLOGY | Age: 78
Discharge: HOME | End: 2018-08-27
Payer: MEDICARE

## 2018-08-27 ENCOUNTER — HOSPITAL ENCOUNTER (OUTPATIENT)
Dept: LAB | Age: 78
Discharge: HOME | End: 2018-08-27
Payer: MEDICARE

## 2018-08-27 DIAGNOSIS — C21.0 ANAL CARCINOMA (HCC): ICD-10-CM

## 2018-08-27 LAB
ALBUMIN SERPL-MCNC: 3.3 G/DL (ref 3.2–4.6)
ALBUMIN/GLOB SERPL: 1 {RATIO} (ref 1.2–3.5)
ALP SERPL-CCNC: 79 U/L (ref 50–136)
ALT SERPL-CCNC: 26 U/L (ref 12–65)
ANION GAP SERPL CALC-SCNC: 8 MMOL/L (ref 7–16)
AST SERPL-CCNC: 14 U/L (ref 15–37)
BASOPHILS # BLD: 0 K/UL (ref 0–0.2)
BASOPHILS NFR BLD: 1 % (ref 0–2)
BILIRUB SERPL-MCNC: 0.5 MG/DL (ref 0.2–1.1)
BUN SERPL-MCNC: 14 MG/DL (ref 8–23)
CALCIUM SERPL-MCNC: 8.7 MG/DL (ref 8.3–10.4)
CHLORIDE SERPL-SCNC: 94 MMOL/L (ref 98–107)
CO2 SERPL-SCNC: 26 MMOL/L (ref 21–32)
CREAT SERPL-MCNC: 1.13 MG/DL (ref 0.8–1.5)
DIFFERENTIAL METHOD BLD: ABNORMAL
EOSINOPHIL # BLD: 0 K/UL (ref 0–0.8)
EOSINOPHIL NFR BLD: 2 % (ref 0.5–7.8)
ERYTHROCYTE [DISTWIDTH] IN BLOOD BY AUTOMATED COUNT: 11.6 % (ref 11.9–14.6)
GLOBULIN SER CALC-MCNC: 3.4 G/DL (ref 2.3–3.5)
GLUCOSE SERPL-MCNC: 217 MG/DL (ref 65–100)
HCT VFR BLD AUTO: 34.4 % (ref 41.1–50.3)
HGB BLD-MCNC: 12.1 G/DL (ref 13.6–17.2)
IMM GRANULOCYTES # BLD: 0 K/UL (ref 0–0.5)
IMM GRANULOCYTES NFR BLD AUTO: 1 % (ref 0–5)
LYMPHOCYTES # BLD: 0.5 K/UL (ref 0.5–4.6)
LYMPHOCYTES NFR BLD: 19 % (ref 13–44)
MAGNESIUM SERPL-MCNC: 1.9 MG/DL (ref 1.8–2.4)
MCH RBC QN AUTO: 31.9 PG (ref 26.1–32.9)
MCHC RBC AUTO-ENTMCNC: 35.2 G/DL (ref 31.4–35)
MCV RBC AUTO: 90.8 FL (ref 79.6–97.8)
MONOCYTES # BLD: 0.1 K/UL (ref 0.1–1.3)
MONOCYTES NFR BLD: 4 % (ref 4–12)
NEUTS SEG # BLD: 2 K/UL (ref 1.7–8.2)
NEUTS SEG NFR BLD: 74 % (ref 43–78)
NRBC # BLD: 0 K/UL (ref 0–0.2)
PLATELET # BLD AUTO: 128 K/UL (ref 150–450)
PMV BLD AUTO: 8.4 FL (ref 9.4–12.3)
POTASSIUM SERPL-SCNC: 4 MMOL/L (ref 3.5–5.1)
PROT SERPL-MCNC: 6.7 G/DL (ref 6.3–8.2)
RBC # BLD AUTO: 3.79 M/UL (ref 4.23–5.67)
SODIUM SERPL-SCNC: 128 MMOL/L (ref 136–145)
WBC # BLD AUTO: 2.7 K/UL (ref 4.3–11.1)

## 2018-08-27 PROCEDURE — 77386 HC IMRT TRMT DLVR COMPL: CPT

## 2018-08-27 PROCEDURE — 85025 COMPLETE CBC W/AUTO DIFF WBC: CPT

## 2018-08-27 PROCEDURE — 83735 ASSAY OF MAGNESIUM: CPT

## 2018-08-27 PROCEDURE — 80053 COMPREHEN METABOLIC PANEL: CPT

## 2018-08-27 PROCEDURE — 77336 RADIATION PHYSICS CONSULT: CPT

## 2018-08-27 NOTE — PROGRESS NOTES
Patient: Tessa Renteria MRN: [id number]  SSN: [id number]    YOB: 1940  Age: 69 y.o. Sex: male      DIAGNOSIS:  T2N0M0, Stage II anal SCC. Prior right walker-parotid lymph node mass. Unknown primary, [id number], Stage DENTON. TREATMENT SITE:  Anal canal    DOSE and FRACTIONATION:  6/25 fractions, 1080 cGy of 4500 cGy planned, 0/3 boost, 0 cGy of 540 cGy planned. INTERVAL HISTORY:  Tessa Renteria is a 69 y.o. male being treated for anal cancer with prior head and neck cancer. He was doing well without complaints week 1. Week 2 with oral pain and rash being treated by medical oncology but feels \"rough. \"    OBJECTIVE:  No findings week 1. Oral sores and erythematous facial rash. There were no vitals taken for this visit. Lab Results   Component Value Date/Time    Sodium 128 (L) 08/27/2018 02:22 PM    Potassium 4.0 08/27/2018 02:22 PM    Chloride 94 (L) 08/27/2018 02:22 PM    CO2 26 08/27/2018 02:22 PM    Anion gap 8 08/27/2018 02:22 PM    Glucose 217 (H) 08/27/2018 02:22 PM    BUN 14 08/27/2018 02:22 PM    Creatinine 1.13 08/27/2018 02:22 PM    GFR est AA >60 08/27/2018 02:22 PM    GFR est non-AA >60 08/27/2018 02:22 PM    Calcium 8.7 08/27/2018 02:22 PM    Magnesium 1.9 08/27/2018 02:22 PM    Albumin 3.3 08/27/2018 02:22 PM    Protein, total 6.7 08/27/2018 02:22 PM    Globulin 3.4 08/27/2018 02:22 PM    A-G Ratio 1.0 (L) 08/27/2018 02:22 PM    AST (SGOT) 14 (L) 08/27/2018 02:22 PM    ALT (SGPT) 26 08/27/2018 02:22 PM     Lab Results   Component Value Date/Time    WBC 2.7 (L) 08/27/2018 02:22 PM    HGB 12.1 (L) 08/27/2018 02:22 PM    HCT 34.4 (L) 08/27/2018 02:22 PM    PLATELET 729 (L) 47/75/0515 02:22 PM       ASSESSMENT and PLAN:  Tessa Renteria is tolerating radiation as anticipated for the current dose and fraction. We will continue on as planned with another treatment visit anticipated next week.         Trenton Dhillon MD   August 27, 2018

## 2018-08-28 ENCOUNTER — HOSPITAL ENCOUNTER (OUTPATIENT)
Dept: RADIATION ONCOLOGY | Age: 78
Discharge: HOME | End: 2018-08-28
Payer: MEDICARE

## 2018-08-28 ENCOUNTER — PATIENT OUTREACH (OUTPATIENT)
Dept: CASE MANAGEMENT | Age: 78
End: 2018-08-28

## 2018-08-28 PROCEDURE — 77386 HC IMRT TRMT DLVR COMPL: CPT

## 2018-08-28 NOTE — PROGRESS NOTES
Saw patient on 8-27-18 with [address] for tox chk. He is D6 radiation and post Mitomycin/5FU. Pt states mouth soreness and difficulty swallowing. Diflucan 7 day supply given for thrush and Dex mouthwash QID along with magic mouthwash before meals for eating given to pt and explained use. Pt also encouraged to sit in warm sitz bath and use aqua phor suave to rectal radiated area for soothing. Salt tabs increased to 2 times a day. Nurse navigation will be following-weekly MD visits.

## 2018-08-29 ENCOUNTER — HOSPITAL ENCOUNTER (OUTPATIENT)
Dept: RADIATION ONCOLOGY | Age: 78
Discharge: HOME | End: 2018-08-29
Payer: MEDICARE

## 2018-08-29 PROCEDURE — 77386 HC IMRT TRMT DLVR COMPL: CPT

## 2018-08-30 ENCOUNTER — HOSPITAL ENCOUNTER (OUTPATIENT)
Dept: RADIATION ONCOLOGY | Age: 78
Discharge: HOME | End: 2018-08-30
Payer: MEDICARE

## 2018-08-30 PROCEDURE — 77386 HC IMRT TRMT DLVR COMPL: CPT

## 2018-08-31 ENCOUNTER — HOSPITAL ENCOUNTER (OUTPATIENT)
Dept: RADIATION ONCOLOGY | Age: 78
Discharge: HOME | End: 2018-08-31
Payer: MEDICARE

## 2018-08-31 PROCEDURE — 77336 RADIATION PHYSICS CONSULT: CPT

## 2018-08-31 PROCEDURE — 77386 HC IMRT TRMT DLVR COMPL: CPT

## 2018-09-02 ENCOUNTER — HOSPITAL ENCOUNTER (INPATIENT)
Age: 78
LOS: 1 days | Discharge: HOME HEALTH | DRG: 871 | End: 2018-09-03
Attending: EMERGENCY MEDICINE | Admitting: INTERNAL MEDICINE
Payer: MEDICARE

## 2018-09-02 ENCOUNTER — APPOINTMENT (OUTPATIENT)
Dept: GENERAL RADIOLOGY | Age: 78
DRG: 871 | End: 2018-09-02
Attending: EMERGENCY MEDICINE
Payer: MEDICARE

## 2018-09-02 DIAGNOSIS — C21.0 ANAL CARCINOMA (HCC): ICD-10-CM

## 2018-09-02 DIAGNOSIS — L03.90 CELLULITIS, UNSPECIFIED CELLULITIS SITE: Primary | ICD-10-CM

## 2018-09-02 DIAGNOSIS — R11.2 NAUSEA AND VOMITING, INTRACTABILITY OF VOMITING NOT SPECIFIED, UNSPECIFIED VOMITING TYPE: ICD-10-CM

## 2018-09-02 DIAGNOSIS — R19.7 DIARRHEA, UNSPECIFIED TYPE: ICD-10-CM

## 2018-09-02 DIAGNOSIS — L03.314 CELLULITIS OF GROIN, LEFT: ICD-10-CM

## 2018-09-02 PROBLEM — A41.9 SEPSIS (HCC): Status: ACTIVE | Noted: 2018-09-02

## 2018-09-02 LAB
ALBUMIN SERPL-MCNC: 3 G/DL (ref 3.2–4.6)
ALBUMIN/GLOB SERPL: 0.8 {RATIO} (ref 1.2–3.5)
ALP SERPL-CCNC: 118 U/L (ref 50–136)
ALT SERPL-CCNC: 25 U/L (ref 12–65)
ANION GAP SERPL CALC-SCNC: 11 MMOL/L (ref 7–16)
AST SERPL-CCNC: 17 U/L (ref 15–37)
BACTERIA URNS QL MICRO: 0 /HPF
BASOPHILS # BLD: 0 K/UL (ref 0–0.2)
BASOPHILS NFR BLD: 0 % (ref 0–2)
BILIRUB SERPL-MCNC: 0.3 MG/DL (ref 0.2–1.1)
BUN SERPL-MCNC: 13 MG/DL (ref 8–23)
CALCIUM SERPL-MCNC: 8.9 MG/DL (ref 8.3–10.4)
CASTS URNS QL MICRO: NORMAL /LPF
CHLORIDE SERPL-SCNC: 95 MMOL/L (ref 98–107)
CO2 SERPL-SCNC: 24 MMOL/L (ref 21–32)
CREAT SERPL-MCNC: 1.44 MG/DL (ref 0.8–1.5)
DIFFERENTIAL METHOD BLD: ABNORMAL
EOSINOPHIL # BLD: 0 K/UL (ref 0–0.8)
EOSINOPHIL NFR BLD: 1 % (ref 0.5–7.8)
EPI CELLS #/AREA URNS HPF: NORMAL /HPF
ERYTHROCYTE [DISTWIDTH] IN BLOOD BY AUTOMATED COUNT: 11.4 %
EST. AVERAGE GLUCOSE BLD GHB EST-MCNC: 151 MG/DL
GLOBULIN SER CALC-MCNC: 3.7 G/DL (ref 2.3–3.5)
GLUCOSE BLD STRIP.AUTO-MCNC: 257 MG/DL (ref 65–100)
GLUCOSE SERPL-MCNC: 336 MG/DL (ref 65–100)
HBA1C MFR BLD: 6.9 % (ref 4.8–6)
HCT VFR BLD AUTO: 32.2 % (ref 41.1–50.3)
HGB BLD-MCNC: 11.6 G/DL (ref 13.6–17.2)
IMM GRANULOCYTES # BLD: 0 K/UL (ref 0–0.5)
IMM GRANULOCYTES NFR BLD AUTO: 1 % (ref 0–5)
LACTATE BLD-SCNC: 2.9 MMOL/L (ref 0.5–1.9)
LIPASE SERPL-CCNC: 92 U/L (ref 73–393)
LYMPHOCYTES # BLD: 0.2 K/UL (ref 0.5–4.6)
LYMPHOCYTES NFR BLD: 7 % (ref 13–44)
MCH RBC QN AUTO: 32.6 PG (ref 26.1–32.9)
MCHC RBC AUTO-ENTMCNC: 36 G/DL (ref 31.4–35)
MCV RBC AUTO: 90.4 FL (ref 79.6–97.8)
MONOCYTES # BLD: 0.2 K/UL (ref 0.1–1.3)
MONOCYTES NFR BLD: 8 % (ref 4–12)
NEUTS SEG # BLD: 1.7 K/UL (ref 1.7–8.2)
NEUTS SEG NFR BLD: 83 % (ref 43–78)
NRBC # BLD: 0 K/UL (ref 0–0.2)
PLATELET # BLD AUTO: 88 K/UL (ref 150–450)
PMV BLD AUTO: 8.8 FL (ref 9.4–12.3)
POTASSIUM SERPL-SCNC: 4.1 MMOL/L (ref 3.5–5.1)
PROCALCITONIN SERPL-MCNC: 3.2 NG/ML
PROT SERPL-MCNC: 6.7 G/DL (ref 6.3–8.2)
RBC # BLD AUTO: 3.56 M/UL (ref 4.23–5.6)
RBC #/AREA URNS HPF: NORMAL /HPF
SODIUM SERPL-SCNC: 130 MMOL/L (ref 136–145)
WBC # BLD AUTO: 2 K/UL (ref 4.3–11.1)
WBC URNS QL MICRO: NORMAL /HPF

## 2018-09-02 PROCEDURE — 87040 BLOOD CULTURE FOR BACTERIA: CPT

## 2018-09-02 PROCEDURE — 83036 HEMOGLOBIN GLYCOSYLATED A1C: CPT

## 2018-09-02 PROCEDURE — 81003 URINALYSIS AUTO W/O SCOPE: CPT | Performed by: EMERGENCY MEDICINE

## 2018-09-02 PROCEDURE — 80053 COMPREHEN METABOLIC PANEL: CPT

## 2018-09-02 PROCEDURE — 83690 ASSAY OF LIPASE: CPT

## 2018-09-02 PROCEDURE — 83605 ASSAY OF LACTIC ACID: CPT

## 2018-09-02 PROCEDURE — 85025 COMPLETE CBC W/AUTO DIFF WBC: CPT

## 2018-09-02 PROCEDURE — 84145 PROCALCITONIN (PCT): CPT

## 2018-09-02 PROCEDURE — 82962 GLUCOSE BLOOD TEST: CPT

## 2018-09-02 PROCEDURE — 65270000029 HC RM PRIVATE

## 2018-09-02 PROCEDURE — 96360 HYDRATION IV INFUSION INIT: CPT | Performed by: EMERGENCY MEDICINE

## 2018-09-02 PROCEDURE — 71045 X-RAY EXAM CHEST 1 VIEW: CPT

## 2018-09-02 PROCEDURE — 74011250636 HC RX REV CODE- 250/636: Performed by: EMERGENCY MEDICINE

## 2018-09-02 PROCEDURE — 81015 MICROSCOPIC EXAM OF URINE: CPT

## 2018-09-02 PROCEDURE — 99284 EMERGENCY DEPT VISIT MOD MDM: CPT | Performed by: EMERGENCY MEDICINE

## 2018-09-02 RX ORDER — LISINOPRIL 20 MG/1
20 TABLET ORAL DAILY
Status: DISCONTINUED | OUTPATIENT
Start: 2018-09-03 | End: 2018-09-03 | Stop reason: HOSPADM

## 2018-09-02 RX ORDER — SODIUM CHLORIDE 0.9 % (FLUSH) 0.9 %
5-10 SYRINGE (ML) INJECTION EVERY 8 HOURS
Status: DISCONTINUED | OUTPATIENT
Start: 2018-09-02 | End: 2018-09-03 | Stop reason: HOSPADM

## 2018-09-02 RX ORDER — VANCOMYCIN 2 GRAM/500 ML IN 0.9 % SODIUM CHLORIDE INTRAVENOUS
2000 ONCE
Status: COMPLETED | OUTPATIENT
Start: 2018-09-02 | End: 2018-09-02

## 2018-09-02 RX ORDER — ONDANSETRON 4 MG/1
8 TABLET, ORALLY DISINTEGRATING ORAL
Status: DISCONTINUED | OUTPATIENT
Start: 2018-09-02 | End: 2018-09-03 | Stop reason: HOSPADM

## 2018-09-02 RX ORDER — HYDROCODONE BITARTRATE AND ACETAMINOPHEN 5; 325 MG/1; MG/1
1 TABLET ORAL
Status: DISCONTINUED | OUTPATIENT
Start: 2018-09-02 | End: 2018-09-03 | Stop reason: HOSPADM

## 2018-09-02 RX ORDER — AMLODIPINE BESYLATE 10 MG/1
10 TABLET ORAL DAILY
Status: DISCONTINUED | OUTPATIENT
Start: 2018-09-03 | End: 2018-09-03 | Stop reason: HOSPADM

## 2018-09-02 RX ORDER — ACETAMINOPHEN 325 MG/1
650 TABLET ORAL
Status: DISCONTINUED | OUTPATIENT
Start: 2018-09-02 | End: 2018-09-03 | Stop reason: HOSPADM

## 2018-09-02 RX ORDER — INSULIN LISPRO 100 [IU]/ML
INJECTION, SOLUTION INTRAVENOUS; SUBCUTANEOUS
Status: DISCONTINUED | OUTPATIENT
Start: 2018-09-02 | End: 2018-09-03 | Stop reason: HOSPADM

## 2018-09-02 RX ORDER — VANCOMYCIN/0.9 % SOD CHLORIDE 1.5G/250ML
1500 PLASTIC BAG, INJECTION (ML) INTRAVENOUS
Status: DISCONTINUED | OUTPATIENT
Start: 2018-09-03 | End: 2018-09-03 | Stop reason: HOSPADM

## 2018-09-02 RX ORDER — SODIUM CHLORIDE 0.9 % (FLUSH) 0.9 %
5-10 SYRINGE (ML) INJECTION AS NEEDED
Status: DISCONTINUED | OUTPATIENT
Start: 2018-09-02 | End: 2018-09-03 | Stop reason: HOSPADM

## 2018-09-02 RX ADMIN — VANCOMYCIN HYDROCHLORIDE 2000 MG: 10 INJECTION, POWDER, LYOPHILIZED, FOR SOLUTION INTRAVENOUS at 21:33

## 2018-09-02 RX ADMIN — SODIUM CHLORIDE 1000 ML: 900 INJECTION, SOLUTION INTRAVENOUS at 19:43

## 2018-09-02 NOTE — ED PROVIDER NOTES
HPI Comments: 78-year-old male with history of diabetes, squamous cell carcinoma of the head and neck, anal carcinoma followed by oncology with last chemotherapy around 2 weeks ago presents with complaint of persistent loose stools and several episodes of vomiting earlier today. Patient states that he recently had left inner node biopsy. States that over the past several days he's noticed increased swelling and redness to the left inguinal node region. Patient reports overlying redness and warmth to the site. Patient denies chest pain, cough, headache, neck pain, dizziness, sore throat, shortness of breath. Wife states that he's been having subjective fever and chills over the course of today. States she took his temperature and it was elevated at 100.4 F (oral). Patient is a 69 y.o. male presenting with diarrhea and vomiting. The history is provided by the patient. No  was used. Diarrhea This is a new problem. The current episode started more than 2 days ago. The problem occurs constantly. The problem has not changed since onset. The pain is associated with vomiting. The pain is at a severity of 0/10. The patient is experiencing no pain. Associated symptoms include a fever, diarrhea, nausea and vomiting. Pertinent negatives include no anorexia, no belching, no flatus, no hematochezia, no melena, no constipation, no dysuria, no frequency, no hematuria, no headaches, no arthralgias, no myalgias, no trauma, no chest pain, no testicular pain and no back pain. Nothing worsens the pain. The pain is relieved by nothing. Vomiting Associated symptoms include a fever and diarrhea. Pertinent negatives include no abdominal pain, no headaches, no arthralgias, no myalgias, no cough and no headaches. Past Medical History:  
Diagnosis Date  Diabetes ([facility] [address] type 2; avg FBS <150, has not had a recent A1C  
 GERD (gastroesophageal reflux disease)   
 occ OTC med  Head and neck cancer ([facility] 9/30/2015  
 radiation and chemo and and surgery  Hypercholesteremia   
 med  Hypertension   
 med  Rectal cancer ([facility] 2018  Vomiting 2/23/2016 Past Surgical History:  
Procedure Laterality Date  HX COLONOSCOPY  05/2018  HX HEENT    
 sinus  HX HEENT  2015  
 surgery on right throat for squamous cell ca right ear wedge  HX ORTHOPAEDIC  1966  
 right leg  HX OTHER SURGICAL  9/9/15 EXCISION OF RIGHT Neck and PARAPHARYNGEAL MASS/RIGHT EAR WEDGE RESECTION  
 HX TONSILLECTOMY  HX VASCULAR ACCESS Family History:  
Problem Relation Age of Onset  Emphysema Father  Lung Disease Father  Cancer Brother Colon  Heart Disease Sister  Hypertension Sister  Heart Disease Sister  Hypertension Sister  Heart Disease Brother  Hypertension Brother  Heart Disease Brother  Hypertension Brother  Heart Disease Brother  Hypertension Brother  Heart Disease Brother  Hypertension Brother  Heart Disease Brother  Hypertension Brother Social History Social History  Marital status:  Spouse name: N/A  
 Number of children: N/A  
 Years of education: N/A Occupational History  Not on file. Social History Main Topics  Smoking status: Never Smoker  Smokeless tobacco: Never Used  Alcohol use No  
 Drug use: No  
 Sexual activity: Not on file Other Topics Concern  Not on file Social History Narrative ALLERGIES: Review of patient's allergies indicates no known allergies. Review of Systems Constitutional: Positive for fever. HENT: Negative for congestion, sore throat and trouble swallowing. Respiratory: Negative for cough and shortness of breath. Cardiovascular: Negative for chest pain. Gastrointestinal: Positive for diarrhea, nausea and vomiting.  Negative for abdominal pain, anorexia, constipation, flatus, hematochezia and melena. Genitourinary: Negative for dysuria, frequency, hematuria and testicular pain. Musculoskeletal: Negative for arthralgias, back pain, myalgias, neck pain and neck stiffness. Skin: Positive for rash. Neurological: Negative for headaches. Vitals:  
 09/02/18 1855 BP: 107/59 Pulse: 78 Resp: 16 Temp: 98.4 °F (36.9 °C) SpO2: 95% Weight: 78 kg (172 lb) Height: 6' 1\" (1.854 m) Physical Exam  
Constitutional: He is oriented to person, place, and time. Patient well-appearing in no acute distress. HENT:  
Head: Normocephalic. MMM. Uvula midline. No tonsillar erythema or exudate noted. No evidence of peritonsillar abscess. Eyes: Conjunctivae and EOM are normal. Pupils are equal, round, and reactive to light. Neck: Normal range of motion. No JVD present. No tracheal deviation present. FROM. No nuchal rigidity. Cardiovascular: Normal rate, regular rhythm, normal heart sounds and intact distal pulses. Radial pulses 2+ and equal bilaterally. Pulmonary/Chest: Effort normal and breath sounds normal. He has no wheezes. He has no rales. CTAB. No chest wall TTP. Abdominal: Soft. There is no tenderness. There is no rebound and no guarding. Soft, NTND. No rebound or guarding. No CVAT. L inguinal region w/ significant lymphadenopathy with overlying warmth and erythema to site. Musculoskeletal: Normal range of motion. He exhibits no tenderness. No LE edema. Neurological: He is alert and oriented to person, place, and time. No cranial nerve deficit. Coordination normal.  
No meningismus. Skin: Skin is warm and dry. No rash. Psychiatric: He has a normal mood and affect. Judgment normal.  
Nursing note and vitals reviewed. MDM Number of Diagnoses or Management Options Cellulitis, unspecified cellulitis site: new and requires workup Diarrhea, unspecified type: new and requires workup Nausea and vomiting, intractability of vomiting not specified, unspecified vomiting type: new and requires workup Diagnosis management comments: Patient with elevated lactic acid and procal. 
WBC 2.0. Blood cultures obtained. CXR neg for infiltrate. UA neg for UTI. Pt with evidence of cellulitis to L groin. Onc consulted. Recommend Vanc IV and Hospitalist consultation for admission. Amount and/or Complexity of Data Reviewed Clinical lab tests: ordered and reviewed Tests in the radiology section of CPT®: ordered and reviewed Tests in the medicine section of CPT®: ordered and reviewed Review and summarize past medical records: yes Risk of Complications, Morbidity, and/or Mortality Presenting problems: moderate Diagnostic procedures: moderate Management options: moderate Patient Progress Patient progress: stable ED Course Comment By Time CXR IMPRESSION: No acute cardiac pulmonary disease. Stable right upper lobe nodule. Lobo Gr MD 09/02 2818 Procedures Results Include: 
 
Recent Results (from the past 24 hour(s)) CBC WITH AUTOMATED DIFF Collection Time: 09/02/18  7:10 PM  
Result Value Ref Range WBC 2.0 (LL) 4.3 - 11.1 K/uL  
 RBC 3.56 (L) 4.23 - 5.6 M/uL  
 HGB 11.6 (L) 13.6 - 17.2 g/dL HCT 32.2 (L) 41.1 - 50.3 % MCV 90.4 79.6 - 97.8 FL  
 MCH 32.6 26.1 - 32.9 PG  
 MCHC 36.0 (H) 31.4 - 35.0 g/dL  
 RDW 11.4 % PLATELET 88 (L) 172 - 450 K/uL MPV 8.8 (L) 9.4 - 12.3 FL ABSOLUTE NRBC 0.00 0.0 - 0.2 K/uL  
 DF AUTOMATED NEUTROPHILS 83 (H) 43 - 78 % LYMPHOCYTES 7 (L) 13 - 44 % MONOCYTES 8 4.0 - 12.0 % EOSINOPHILS 1 0.5 - 7.8 % BASOPHILS 0 0.0 - 2.0 % IMMATURE GRANULOCYTES 1 0.0 - 5.0 %  
 ABS. NEUTROPHILS 1.7 1.7 - 8.2 K/UL  
 ABS. LYMPHOCYTES 0.2 (L) 0.5 - 4.6 K/UL  
 ABS. MONOCYTES 0.2 0.1 - 1.3 K/UL  
 ABS. EOSINOPHILS 0.0 0.0 - 0.8 K/UL  
 ABS. BASOPHILS 0.0 0.0 - 0.2 K/UL  
 ABS. IMM. GRANS. 0.0 0.0 - 0.5 K/UL METABOLIC PANEL, COMPREHENSIVE Collection Time: 09/02/18  7:10 PM  
Result Value Ref Range Sodium 130 (L) 136 - 145 mmol/L Potassium 4.1 3.5 - 5.1 mmol/L Chloride 95 (L) 98 - 107 mmol/L  
 CO2 24 21 - 32 mmol/L Anion gap 11 7 - 16 mmol/L Glucose 336 (H) 65 - 100 mg/dL BUN 13 8 - 23 MG/DL Creatinine 1.44 0.8 - 1.5 MG/DL  
 GFR est AA >60 >60 ml/min/1.73m2 GFR est non-AA 51 (L) >60 ml/min/1.73m2 Calcium 8.9 8.3 - 10.4 MG/DL Bilirubin, total 0.3 0.2 - 1.1 MG/DL  
 ALT (SGPT) 25 12 - 65 U/L  
 AST (SGOT) 17 15 - 37 U/L Alk. phosphatase 118 50 - 136 U/L Protein, total 6.7 6.3 - 8.2 g/dL Albumin 3.0 (L) 3.2 - 4.6 g/dL Globulin 3.7 (H) 2.3 - 3.5 g/dL A-G Ratio 0.8 (L) 1.2 - 3.5 LIPASE Collection Time: 09/02/18  7:10 PM  
Result Value Ref Range Lipase 92 73 - 393 U/L  
PROCALCITONIN Collection Time: 09/02/18  7:10 PM  
Result Value Ref Range Procalcitonin 3.2 ng/mL POC LACTIC ACID Collection Time: 09/02/18  7:59 PM  
Result Value Ref Range Lactic Acid (POC) 2.9 (H) 0.5 - 1.9 mmol/L  
URINE MICROSCOPIC Collection Time: 09/02/18  9:35 PM  
Result Value Ref Range WBC 0-3 0 /hpf  
 RBC 0-3 0 /hpf Epithelial cells 0-3 0 /hpf Bacteria 0 0 /hpf Casts 5-10 0 /lpf  
GLUCOSE, POC Collection Time: 09/02/18  9:59 PM  
Result Value Ref Range Glucose (POC) 257 (H) 65 - 100 mg/dL Rama Dior MD; 9/2/2018 @11:08 PM Voice dictation software was used during the making of this note. This software is not perfect and grammatical and other typographical errors may be present.   This note has not been proofread for errors. 
===================================================================

## 2018-09-02 NOTE — ED TRIAGE NOTES
Oncology patient reports diarrhea and vomiting. Patient received chemo 2 weeks ago. Patient has just had 10 days of radiation. Patient reports chills and nausea.

## 2018-09-02 NOTE — IP AVS SNAPSHOT
[address] [facility] 
 
 
 [address] 
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 
[phone] Erica Singh MD  Please call tomorrow morning to schedule hospital follow-up appointment [address] 
[address] [address] 
[phone] Your Scheduled Appointments Tuesday September 04, 2018  4:00 PM EDT  
RADIATION ONCOLOGY with [address] ([address]) [address] [facility] [address] [facility] [address]  
[phone] Wednesday September 05, 2018  4:00 PM EDT  
RADIATION ONCOLOGY with [address] ([address]) [address] [facility] [address] [facility] [address]  
[phone] Thursday September 06, 2018 10:00 AM EDT Follow Up with DIOGO [facility]/ [address]reet [address] [facility] [address]  
[phone] Thursday September 06, 2018 10:45 AM EDT  
LAB with [address]  
[facility] OUTREACH INSURANCE [address] ) Gonzalez Mark [address] [place] [address]  
[phone] Thursday September 06, 2018 11:15 AM EDT Follow Up with Geoffrey Rocha MD  
[facility] Hematology and Oncology [facility]) C/ [address] [facility] [address]  
[phone] Thursday September 06, 2018 11:15 AM EDT Follow Up with Tejinder Kevin RD [facility] Hematology and Oncology [facility]) C/ [address] [facility] [address]  
[phone] Thursday September 06, 2018  4:00 PM EDT  
RADIATION ONCOLOGY with [address] ([address]) [address] [employer] [address] [facility] [address]  
[phone] Friday September 07, 2018  4:00 PM EDT  
RADIATION ONCOLOGY with [address] ([address]) [address] [employer] [address] [facility] [address]  
[phone] Monday September 10, 2018  4:00 PM EDT  
RADIATION ONCOLOGY with [address] ([address]) [address] [employer] [address] [facility] [address]  
[phone] Tuesday September 11, 2018  4:00 PM EDT  
RADIATION ONCOLOGY with [address] ([address]) [address] [employer] [address] [facility] [address]  
[phone] Wednesday September 12, 2018  4:00 PM EDT  
RADIATION ONCOLOGY with [address] ([address]) [address] [employer] [address] [facility] [address]  
[phone] Thursday September 13, 2018  4:00 PM EDT  
RADIATION ONCOLOGY with [address] ([address]) [address] [employer] [address] [facility] [address]  
[phone] Friday September 14, 2018  4:00 PM EDT  
RADIATION ONCOLOGY with 1808 [facility] RADIATION ONCOLOGY [address] ([address]) [address] [employer] [address] [facility] [place] [address]  
[phone] Monday September 17, 2018 10:50 AM EDT  
LAB with [address]  
18[address] [facility] OUTREACH INSURANCE [address]) Pedrojeremías Mark [address] 187 [facility]  
[phone] Monday September 17, 2018 11:20 AM EDT Follow Up with Madhu Llanos, LIAM Aldridge Hematology and Oncology [facility]) C/ [address] [facility] [place] [address]  
[phone] Monday September 17, 2018 12:30 PM EDT Follow Up with DIOGO Blake [address] Penny Aldridge Hematology and Oncology [facility]) C/ Gur[address]reet [address] [facility] [place] [address]  
[phone] Monday September 17, 2018  2:00 PM EDT Infusion with NUR7  
ST. [address] ([address]) [address]  Kristin Mosquera [phone] [facility] [place] [address]  
[phone] [address] Monday September 17, 2018  4:00 PM EDT  
RADIATION ONCOLOGY with [address] ([address]) [address] [employer] [address] [facility] [place] [address]  
[phone] Tuesday September 18, 2018  4:00 PM EDT  
RADIATION ONCOLOGY with [address] ([address]) [address] [employer] [address] [facility] [place] [address]  
[phone] Wednesday September 19, 2018  4:00 PM EDT  
RADIATION ONCOLOGY with [address] ([address]) [address] [employer] [address] [facility] [place] [address]  
[phone] Thursday September 20, 2018  4:00 PM EDT  
RADIATION ONCOLOGY with [address] ([address]) [address] [employer] [address] [facility] [place] [address] [phone] Friday September 21, 2018  4:00 PM EDT  
RADIATION ONCOLOGY with [address] ([address]) [address] [facility] [address] [facility] [address]  
[phone] Monday September 24, 2018  4:00 PM EDT  
RADIATION ONCOLOGY with [address] ([address]) [address] [facility] [address] [facility] [address]  
[phone] Tuesday September 25, 2018  8:00 AM EDT Follow Up with DIOGO Arevalo Hematology and Oncology [facility]) C/ Gur[address]reet Hill [address] [facility] [address]  
[phone] Tuesday September 25, 2018  8:15 AM EDT  
LAB with [address]  
[address] [place] [address] OUTREACH INSURANCE [address]) Pedrojeremías Mark [address] [place] [address]  
[phone] Tuesday September 25, 2018  8:45 AM EDT Follow Up with MD Rosa Albarran Hematology and Oncology [facility]) C/ Gur[address]reet [address] [facility] [address]  
[phone] Tuesday September 25, 2018  4:00 PM EDT  
RADIATION ONCOLOGY with [address] ([address]) [address] [facility] [address] [facility] [address]  
[phone] Wednesday September 26, 2018  4:00 PM EDT  
RADIATION ONCOLOGY with [address] ([address]) [address] [facility] [address] [facility] [address]  
[phone] Thursday September 27, 2018  4:00 PM EDT  
RADIATION ONCOLOGY with [address] ([address]) [address] [facility] [address] [facility] [address]  
[phone] Discharge Orders None A check kassie indicates which time of day the medication should be taken. My Medications START taking these medications Instructions Each Dose to Equal  
 Morning Noon Evening Bedtime  
 amoxicillin-clavulanate 875-125 mg per tablet Commonly known as:  AUGMENTIN Your next dose is: This evening Take 1 Tab by mouth every twelve (12) hours for 7 days. Indications: Skin and Skin Structure Infection 1 Tab CONTINUE taking these medications Instructions Each Dose to Equal  
 Morning Noon Evening Bedtime  
 amLODIPine 10 mg tablet Commonly known as:  Suzon Salle Your next dose is:  Tomorrow Morning Take 10 mg by mouth daily. In am  
 10 mg  
    
  
   
   
   
  
 * dexamethasone 0.5 mg/5 mL elixir Commonly known as:  DECADRON Your next dose is:  Resume home schedule Take 10 mL by mouth four (4) times daily for 10 days. 1 mg * dexamethasone 0.5 mg/5 mL elixir Commonly known as:  DECADRON Take 10 mL by mouth four (4) times daily for 10 days. 1 mg  
    
   
   
   
  
 fluconazole 150 mg tablet Commonly known as:  DIFLUCAN Your next dose is:  Tomorrow Morning Take 1 Tab by mouth daily for 7 days. FDA advises cautious prescribing of oral fluconazole in pregnancy. Indications: ESOPHAGEAL CANDIDIASIS  
 150 mg  
    
  
   
   
   
  
 glimepiride 4 mg tablet Commonly known as:  AMARYL Your next dose is:  Tomorrow Morning Take 4 mg by mouth every morning. Indications: TYPE 2 DIABETES MELLITUS  
 4 mg HYDROcodone-acetaminophen 5-325 mg per tablet Commonly known as:  Parag Mcdonald Your last dose was:  9/3 10:07 a.m. Your next dose is:  2:07 p.m.  
   
 1-2 tabs by mouth every 4 hours prn pain  
     
   
   
   
  
 lidocaine-prilocaine topical cream  
Commonly known as:  EMLA Apply  to affected area as needed for Pain. lisinopril 20 mg tablet Commonly known as:  Radha Dinh  
 Your next dose is:  Tomorrow Morning Take 20 mg by mouth daily. In am  Indications: hypertension 20 mg  
    
  
   
   
   
  
 magic mouthwash solution Your next dose is: Take on as needed schedule Magic mouth wash  Maalox Lidocaine 2% viscous  Diphenhydramine oral solution   Pharmacy to mix equal portions of ingredients to a total volume as indicated in the dispense amount. Take 10 ml 4 times a day swish and spit sore mouth and swish and swallow for sore throat  
     
   
   
   
  
 metFORMIN 1,000 mg tablet Commonly known as:  GLUCOPHAGE Your next dose is:  Tomorrow Morning Take 1,000 mg by mouth daily. In am  Indications: type 2 diabetes mellitus 1000 mg  
    
  
   
   
   
  
 ondansetron 8 mg disintegrating tablet Commonly known as:  ZOFRAN ODT Your next dose is: Take on as needed schedule Take 1 Tab by mouth every eight (8) hours as needed for Nausea. Indications: CANCER CHEMOTHERAPY-INDUCED NAUSEA AND VOMITING  
 8 mg  
    
   
   
   
  
 sodium chloride 1 gram tablet Your next dose is:  Tomorrow Morning TAKE 1 TAB BY MOUTH DAILY. traMADol 50 mg tablet Commonly known as:  ULTRAM  
Your next dose is: Take on as needed schedule Take 1 Tab by mouth every six (6) hours as needed for Pain. Max Daily Amount: 200 mg.  
 50 mg  
    
   
   
   
  
 * Notice: This list has 2 medication(s) that are the same as other medications prescribed for you. Read the directions carefully, and ask your doctor or other care provider to review them with you. Where to Get Your Medications Information on where to get these meds will be given to you by the nurse or doctor. ! Ask your nurse or doctor about these medications  
  amoxicillin-clavulanate 875-125 mg per tablet Opioid Education Prescription Opioids: What You Need to Know: Prescription opioids can be used to help relieve moderate-to-severe pain and are often prescribed following a surgery or injury, or for certain health conditions. These medications can be an important part of treatment but also come with serious risks. Opioids are strong pain medicines. Examples include hydrocodone, oxycodone, fentanyl, and morphine. Heroin is an example of an illegal opioid. It is important to work with your health care provider to make sure you are getting the safest, most effective care. WHAT ARE THE RISKS AND SIDE EFFECTS OF OPIOID USE? Prescription opioids carry serious risks of addiction and overdose, especially with prolonged use. An opioid overdose, often marked by slow breathing, can cause sudden death. The use of prescription opioids can have a number of side effects as well, even when taken as directed. · Tolerance-meaning you might need to take more of a medication for the same pain relief · Physical dependence-meaning you have symptoms of withdrawal when the medication is stopped. Withdrawal symptoms can include nausea, sweating, chills, diarrhea, stomach cramps, and muscle aches. Withdrawal can last up to several weeks, depending on which drug you took and how long you took it. · Increased sensitivity to pain · Constipation · Nausea, vomiting, and dry mouth · Sleepiness and dizziness · Confusion · Depression · Low levels of testosterone that can result in lower sex drive, energy, and strength · Itching and sweating RISKS ARE GREATER WITH:      
· History of drug misuse, substance use disorder, or overdose · Mental health conditions (such as depression or anxiety) · Sleep apnea · Older age (72 years or older) · Pregnancy Avoid alcohol while taking prescription opioids. Also, unless specifically advised by your health care provider, medications to avoid include: · Benzodiazepines (such as Xanax or Valium) · Muscle relaxants (such as Soma or Flexeril) · Hypnotics (such as Ambien or Lunesta) · Other prescription opioids KNOW YOUR OPTIONS Talk to your health care provider about ways to manage your pain that don't involve prescription opioids. Some of these options may actually work better and have fewer risks and side effects. Options may include: 
· Pain relievers such as acetaminophen, ibuprofen, and naproxen · Some medications that are also used for depression or seizures · Physical therapy and exercise · Counseling to help patients learn how to cope better with triggers of pain and stress. · Application of heat or cold compress · Massage therapy · Relaxation techniques Be Informed Make sure you know the name of your medication, how much and how often to take it, and its potential risks & side effects. IF YOU ARE PRESCRIBED OPIOIDS FOR PAIN: 
· Never take opioids in greater amounts or more often than prescribed. Remember the goal is not to be pain-free but to manage your pain at a tolerable level. · Follow up with your primary care provider to: · Work together to create a plan on how to manage your pain. · Talk about ways to help manage your pain that don't involve prescription opioids. · Talk about any and all concerns and side effects. · Help prevent misuse and abuse. · Never sell or share prescription opioids · Help prevent misuse and abuse. · Store prescription opioids in a secure place and out of reach of others (this may include visitors, children, friends, and family). · Safely dispose of unused/unwanted prescription opioids: Find your community drug take-back program or your pharmacy mail-back program, or flush them down the toilet, following guidance from the Food and Drug Administration (www.fda.gov/Drugs/ResourcesForYou). · Visit www.cdc.gov/drugoverdose to learn about the risks of opioid abuse and overdose.  
· If you believe you may be struggling with addiction, tell your health care provider and ask for guidance or call [address] [employer] at 5-223-702-ABTB. Discharge Instructions Seroma: Care Instructions Your Care Instructions After a surgery, fluid can collect under the skin near the cut the doctor made (incision). This soft, puffy area is called a seroma. It can be tender to touch. The incision may even have opened up. Some seromas get better on their own. But when there is a lot of fluid under the skin, a seroma is drained to help the area heal. 
If your incision has opened up, it may either be packed with gauze or left open to heal. To prevent infection, make sure to keep the area clean and to take all medicines as prescribed. Follow-up care is a key part of your treatment and safety. Be sure to make and go to all appointments, and call your doctor if you are having problems. It's also a good idea to know your test results and keep a list of the medicines you take. How can you care for yourself at home? · Follow your doctor's instructions for seroma care. If you have a drain tube, your doctor will tell you how to take care of it. · Look at the incision every day. Keep the area clean and dry. · Do not bathe unless you can keep the incision dry. Start with sponge baths. Ask your doctor when it is safe to shower. · Do not scrub or rub the incision. And don't wear clothing that rubs it. · Leave any tape strips (such as Steri-Strips) on your incision. They will fall off on their own, or your doctor may tell you when to take them off. · Do not put lotion or powder on incisions. · Keep your incision out of direct sunlight. · Be safe with medicines. Read and follow all instructions on the label. ¨ If the doctor gave you a prescription medicine for pain, take it as prescribed. ¨ If you are not taking a prescription pain medicine, ask the doctor if you can take an over-the-counter medicine. · Your doctor may give you specific instructions on when you can do your normal activities again, such as driving and going back to work. When should you call for help? Call 911 anytime you think you may need emergency care. For example, call if: 
  · You passed out (lost consciousness).  
  · You have severe trouble breathing.  
 Call your doctor now or seek immediate medical care if: 
  · You have symptoms of infection, such as: 
¨ Increased pain, swelling, warmth, or redness. ¨ Red streaks leading from the incision. ¨ Pus draining from the incision or a yellow or green discharge that is increasing. ¨ A fever.  
  · You bleed through a bandage.  
  · The incision opens up.  
 Watch closely for changes in your health, and be sure to contact your doctor if: 
  · The incision is not healing as expected. Where can you learn more? Go to http://venessaSierra Photonicssnehal.info/. Enter H101 in the search box to learn more about \"Seroma: Care Instructions. \" Current as of: November 20, 2017 Content Version: 11.7 © 2139-2132 [employer]. Care instructions adapted under license by [employer] (which disclaims liability or warranty for this information). If you have questions about a medical condition or this instruction, always ask your healthcare professional. [address] any warranty or liability for your use of this information. DISCHARGE SUMMARY from Nurse PATIENT INSTRUCTIONS: 
 
After general anesthesia or intravenous sedation, for 24 hours or while taking prescription Narcotics: · Limit your activities · Do not drive and operate hazardous machinery · Do not make important personal or business decisions · Do  not drink alcoholic beverages · If you have not urinated within 8 hours after discharge, please contact your surgeon on call. Report the following to your surgeon: · Excessive pain, swelling, redness or odor of or around the surgical area · Temperature over 100.5 · Nausea and vomiting lasting longer than 4 hours or if unable to take medications · Any signs of decreased circulation or nerve impairment to extremity: change in color, persistent  numbness, tingling, coldness or increase pain · Any questions What to do at Home: 
Recommended activity: Activity as tolerated, resume home diet as tolerated. Daily dressing changes as instructed by Dr. Marjory Habermann.  
 
*  Please give a list of your current medications to your Primary Care Provider. *  Please update this list whenever your medications are discontinued, doses are 
    changed, or new medications (including over-the-counter products) are added. *  Please carry medication information at all times in case of emergency situations. These are general instructions for a healthy lifestyle: No smoking/ No tobacco products/ Avoid exposure to second hand smoke Surgeon General's Warning:  Quitting smoking now greatly reduces serious risk to your health. Obesity, smoking, and sedentary lifestyle greatly increases your risk for illness A healthy diet, regular physical exercise & weight monitoring are important for maintaining a healthy lifestyle You may be retaining fluid if you have a history of heart failure or if you experience any of the following symptoms:  Weight gain of 3 pounds or more overnight or 5 pounds in a week, increased swelling in our hands or feet or shortness of breath while lying flat in bed. Please call your doctor as soon as you notice any of these symptoms; do not wait until your next office visit. Recognize signs and symptoms of STROKE: 
 
F-face looks uneven A-arms unable to move or move unevenly S-speech slurred or non-existent T-time-call 911 as soon as signs and symptoms begin-DO NOT go Back to bed or wait to see if you get better-TIME IS BRAIN. Warning Signs of HEART ATTACK Call 911 if you have these symptoms: 
? Chest discomfort. Most heart attacks involve discomfort in the center of the chest that lasts more than a few minutes, or that goes away and comes back. It can feel like uncomfortable pressure, squeezing, fullness, or pain. ? Discomfort in other areas of the upper body. Symptoms can include pain or discomfort in one or both arms, the back, neck, jaw, or stomach. ? Shortness of breath with or without chest discomfort. ? Other signs may include breaking out in a cold sweat, nausea, or lightheadedness. Don't wait more than five minutes to call [address]! Fast action can save your life. Calling 911 is almost always the fastest way to get lifesaving treatment. Emergency Medical Services staff can begin treatment when they arrive  up to an hour sooner than if someone gets to the hospital by car. The discharge information has been reviewed with the patient. The patient verbalized understanding. Discharge medications reviewed with the patient and appropriate educational materials and side effects teaching were provided. ___________________________________________________________________________________________________________________________________ Introducing [facility] & [facility] SERVICES! Idalia Love introduces Tk20 patient portal. Now you can access parts of your medical record, email your doctor's office, and request medication refills online. 1. In your internet browser, go to [email or web address] 2. Click on the First Time User? Click Here link in the Sign In box. You will see the New Member Sign Up page. 3. Enter your Tk20 Access Code exactly as it appears below. You will not need to use this code after youve completed the sign-up process. If you do not sign up before the expiration date, you must request a new code. · Tk20 Access Code: [email or web address] Expires: 11/7/2018  9:21 AM 
 
 4. Enter the last four digits of your Social Security Number (xxxx) and Date of Birth (mm/dd/yyyy) as indicated and click Submit. You will be taken to the next sign-up page. 5. Create a Vendly ID. This will be your Vendly login ID and cannot be changed, so think of one that is secure and easy to remember. 6. Create a Vendly password. You can change your password at any time. 7. Enter your Password Reset Question and Answer. This can be used at a later time if you forget your password. 8. Enter your e-mail address. You will receive e-mail notification when new information is available in [address] 9. Click Sign Up. You can now view and download portions of your medical record. 10. Click the Download Summary menu link to download a portable copy of your medical information. If you have questions, please visit the Frequently Asked Questions section of the Vendly website. Remember, Vendly is NOT to be used for urgent needs. For medical emergencies, dial 911. Now available from your iPhone and Android! Introducing Jacob Gamboa As a [facility] patient, I wanted to make you aware of our electronic visit tool called Jacob Gamboa. [facility] 24/7 allows you to connect within minutes with a medical provider 24 hours a day, seven days a week via a mobile device or tablet or logging into a secure website from your computer. You can access Jacob Bienvenidomarinfin from anywhere in the [place]. A virtual visit might be right for you when you have a simple condition and feel like you just dont want to get out of bed, or cant get away from work for an appointment, when your regular [facility] provider is not available (evenings, weekends or holidays), or when youre out of town and need minor care.   Electronic visits cost only $49 and if the Jacob Bienvenidokenji provider determines a prescription is needed to treat your condition, one can be electronically transmitted to a nearby pharmacy*. Please take a moment to enroll today if you have not already done so. The enrollment process is free and takes just a few minutes. To enroll, please download the iRezQ 24/7 ifrah to your tablet or phone, or visit [email or web address] to enroll on your computer. And, as an [address] [place] [address] patient with a [employer] account, the results of your visits will be scanned into your electronic medical record and your primary care provider will be able to view the scanned results. We urge you to continue to see your regular [facility] provider for your ongoing medical care. And while your primary care provider may not be the one available when you seek a AGLOGIC virtual visit, the peace of mind you get from getting a real diagnosis real time can be priceless. For more information on AGLOGIC, view our Frequently Asked Questions (FAQs) at [email or web address]. Sincerely, 
 
Laura Wakefield MD 
Chief Medical Officer [facility] Elizabeth Abimael *:  certain medications cannot be prescribed via AGLOGIC Unresulted Labs-Please follow up with your PCP about these lab tests Order Current Status CULTURE, BLOOD Preliminary result CULTURE, BLOOD Preliminary result Providers Seen During Your Hospitalization Provider Specialty Primary office phone Fermin Hughes MD Emergency Medicine [phone] Lashanda Singh MD Internal Medicine [phone] Your Primary Care Physician (PCP) Primary Care Physician Office Phone Office Fax Iliana [place] [phone] You are allergic to the following No active allergies Recent Documentation Height Weight BMI Smoking Status 1.854 m 78 kg 22.69 kg/m2 Never Smoker Emergency Contacts Name Discharge Info Relation Home Work Mobile Isidra Xvaier DISCHARGE CAREGIVER [3] Spouse [3] [phone] Patient Belongings The following personal items are in your possession at time of discharge: 
  Dental Appliances: None         Home Medications: Sent home   Jewelry: None  Clothing: Pants    Other Valuables: None  Personal Items Sent to Safe: n Please provide this summary of care documentation to your next provider. Signatures-by signing, you are acknowledging that this After Visit Summary has been reviewed with you and you have received a copy. Patient Signature:  ____________________________________________________________ Date:  ____________________________________________________________  
  
[facility] Provider Signature:  ____________________________________________________________ Date:  ____________________________________________________________

## 2018-09-03 VITALS
RESPIRATION RATE: 18 BRPM | WEIGHT: 172 LBS | DIASTOLIC BLOOD PRESSURE: 64 MMHG | HEART RATE: 82 BPM | BODY MASS INDEX: 22.8 KG/M2 | OXYGEN SATURATION: 97 % | TEMPERATURE: 98.4 F | HEIGHT: 73 IN | SYSTOLIC BLOOD PRESSURE: 108 MMHG

## 2018-09-03 PROBLEM — A41.9 SEPSIS (HCC): Status: RESOLVED | Noted: 2018-09-02 | Resolved: 2018-09-03

## 2018-09-03 PROBLEM — L03.314 CELLULITIS OF GROIN, LEFT: Status: ACTIVE | Noted: 2018-09-03

## 2018-09-03 PROBLEM — L76.34 POSTOPERATIVE SEROMA OF SUBCUTANEOUS TISSUE AFTER NON-DERMATOLOGIC PROCEDURE: Status: ACTIVE | Noted: 2018-09-03

## 2018-09-03 LAB
ANION GAP SERPL CALC-SCNC: 8 MMOL/L (ref 7–16)
BASOPHILS # BLD: 0 K/UL (ref 0–0.2)
BASOPHILS NFR BLD: 0 % (ref 0–2)
BUN SERPL-MCNC: 13 MG/DL (ref 8–23)
CALCIUM SERPL-MCNC: 8.6 MG/DL (ref 8.3–10.4)
CHLORIDE SERPL-SCNC: 97 MMOL/L (ref 98–107)
CO2 SERPL-SCNC: 26 MMOL/L (ref 21–32)
CREAT SERPL-MCNC: 1.04 MG/DL (ref 0.8–1.5)
DIFFERENTIAL METHOD BLD: ABNORMAL
EOSINOPHIL # BLD: 0 K/UL (ref 0–0.8)
EOSINOPHIL NFR BLD: 0 % (ref 0.5–7.8)
ERYTHROCYTE [DISTWIDTH] IN BLOOD BY AUTOMATED COUNT: 11.7 %
GLUCOSE BLD STRIP.AUTO-MCNC: 109 MG/DL (ref 65–100)
GLUCOSE BLD STRIP.AUTO-MCNC: 113 MG/DL (ref 65–100)
GLUCOSE SERPL-MCNC: 95 MG/DL (ref 65–100)
HCT VFR BLD AUTO: 30.7 % (ref 41.1–50.3)
HGB BLD-MCNC: 10.7 G/DL (ref 13.6–17.2)
IMM GRANULOCYTES # BLD: 0.2 K/UL (ref 0–0.5)
IMM GRANULOCYTES NFR BLD AUTO: 5 % (ref 0–5)
LACTATE SERPL-SCNC: 1.3 MMOL/L (ref 0.4–2)
LYMPHOCYTES # BLD: 0.2 K/UL (ref 0.5–4.6)
LYMPHOCYTES NFR BLD: 8 % (ref 13–44)
MCH RBC QN AUTO: 32.4 PG (ref 26.1–32.9)
MCHC RBC AUTO-ENTMCNC: 34.9 G/DL (ref 31.4–35)
MCV RBC AUTO: 93 FL (ref 79.6–97.8)
MONOCYTES # BLD: 0.4 K/UL (ref 0.1–1.3)
MONOCYTES NFR BLD: 13 % (ref 4–12)
NEUTS SEG # BLD: 2.3 K/UL (ref 1.7–8.2)
NEUTS SEG NFR BLD: 74 % (ref 43–78)
NRBC # BLD: 0 K/UL (ref 0–0.2)
PLATELET # BLD AUTO: 89 K/UL (ref 150–450)
PMV BLD AUTO: 8.7 FL (ref 9.4–12.3)
POTASSIUM SERPL-SCNC: 4.1 MMOL/L (ref 3.5–5.1)
RBC # BLD AUTO: 3.3 M/UL (ref 4.23–5.6)
SODIUM SERPL-SCNC: 131 MMOL/L (ref 136–145)
WBC # BLD AUTO: 3.1 K/UL (ref 4.3–11.1)

## 2018-09-03 PROCEDURE — 80048 BASIC METABOLIC PNL TOTAL CA: CPT

## 2018-09-03 PROCEDURE — 74011000258 HC RX REV CODE- 258: Performed by: INTERNAL MEDICINE

## 2018-09-03 PROCEDURE — 74011250637 HC RX REV CODE- 250/637: Performed by: INTERNAL MEDICINE

## 2018-09-03 PROCEDURE — 99222 1ST HOSP IP/OBS MODERATE 55: CPT | Performed by: INTERNAL MEDICINE

## 2018-09-03 PROCEDURE — 85025 COMPLETE CBC W/AUTO DIFF WBC: CPT

## 2018-09-03 PROCEDURE — 77030018836 HC SOL IRR NACL ICUM -A

## 2018-09-03 PROCEDURE — 75810000112 HC INC/DRN ABCESS SIMP

## 2018-09-03 PROCEDURE — 36415 COLL VENOUS BLD VENIPUNCTURE: CPT

## 2018-09-03 PROCEDURE — 74011250636 HC RX REV CODE- 250/636: Performed by: SURGERY

## 2018-09-03 PROCEDURE — 74011250636 HC RX REV CODE- 250/636: Performed by: INTERNAL MEDICINE

## 2018-09-03 PROCEDURE — 77030019895 HC PCKNG STRP IODO -A

## 2018-09-03 PROCEDURE — 74011636637 HC RX REV CODE- 636/637: Performed by: INTERNAL MEDICINE

## 2018-09-03 PROCEDURE — 83605 ASSAY OF LACTIC ACID: CPT

## 2018-09-03 PROCEDURE — 0H9AX0Z DRAINAGE OF INGUINAL SKIN WITH DRAINAGE DEVICE, EXTERNAL APPROACH: ICD-10-PCS | Performed by: SURGERY

## 2018-09-03 PROCEDURE — 82962 GLUCOSE BLOOD TEST: CPT

## 2018-09-03 RX ORDER — SODIUM CHLORIDE 9 MG/ML
75 INJECTION, SOLUTION INTRAVENOUS CONTINUOUS
Status: DISCONTINUED | OUTPATIENT
Start: 2018-09-03 | End: 2018-09-03 | Stop reason: HOSPADM

## 2018-09-03 RX ORDER — AMOXICILLIN AND CLAVULANATE POTASSIUM 875; 125 MG/1; MG/1
1 TABLET, FILM COATED ORAL EVERY 12 HOURS
Qty: 14 TAB | Refills: 0 | Status: SHIPPED | OUTPATIENT
Start: 2018-09-03 | End: 2018-09-10

## 2018-09-03 RX ORDER — LIDOCAINE HYDROCHLORIDE 10 MG/ML
10 INJECTION INFILTRATION; PERINEURAL ONCE
Status: COMPLETED | OUTPATIENT
Start: 2018-09-03 | End: 2018-09-03

## 2018-09-03 RX ADMIN — AMLODIPINE BESYLATE 10 MG: 10 TABLET ORAL at 10:07

## 2018-09-03 RX ADMIN — Medication 10 ML: at 05:45

## 2018-09-03 RX ADMIN — Medication 10 ML: at 00:26

## 2018-09-03 RX ADMIN — LISINOPRIL 20 MG: 20 TABLET ORAL at 10:07

## 2018-09-03 RX ADMIN — LIDOCAINE HYDROCHLORIDE 10 ML: 10 INJECTION, SOLUTION INFILTRATION; PERINEURAL at 11:31

## 2018-09-03 RX ADMIN — PIPERACILLIN SODIUM AND TAZOBACTAM SODIUM 3.38 G: 3; .375 INJECTION, POWDER, LYOPHILIZED, FOR SOLUTION INTRAVENOUS at 05:44

## 2018-09-03 RX ADMIN — PIPERACILLIN SODIUM AND TAZOBACTAM SODIUM 3.38 G: 3; .375 INJECTION, POWDER, LYOPHILIZED, FOR SOLUTION INTRAVENOUS at 00:24

## 2018-09-03 RX ADMIN — INSULIN LISPRO 6 UNITS: 100 INJECTION, SOLUTION INTRAVENOUS; SUBCUTANEOUS at 00:25

## 2018-09-03 RX ADMIN — ACETAMINOPHEN 650 MG: 325 TABLET, FILM COATED ORAL at 05:44

## 2018-09-03 RX ADMIN — HYDROCODONE BITARTRATE AND ACETAMINOPHEN 1 TABLET: 5; 325 TABLET ORAL at 10:07

## 2018-09-03 RX ADMIN — SODIUM CHLORIDE 75 ML/HR: 900 INJECTION, SOLUTION INTRAVENOUS at 10:07

## 2018-09-03 RX ADMIN — HYDROCODONE BITARTRATE AND ACETAMINOPHEN 1 TABLET: 5; 325 TABLET ORAL at 00:32

## 2018-09-03 NOTE — PROGRESS NOTES
Initial visit by  to convey care and concern and encourage patient that  services are available if desired. No needs were voiced during the visit. Provided business card for future reference. Merline Cough, MDiv Board Certified [employer]

## 2018-09-03 NOTE — PROGRESS NOTES
Left groin seroma, recurred after aspiration. Since he is on chemoradiation, will I&D and do daily dressing change, this way will not affect his chemoradiation.

## 2018-09-03 NOTE — ED NOTES
TRANSFER - OUT REPORT: 
 
Verbal report given to Montserrat(name) on [employer]  being transferred to [facility](unit) for routine progression of care Report consisted of patients Situation, Background, Assessment and  
Recommendations(SBAR). Information from the following report(s) SBAR was reviewed with the receiving nurse. Lines:  
Venous Access Device Left single lumen port 08/01/18 Upper chest (subclavicular area), left (Active) Peripheral IV 09/02/18 Left Antecubital (Active) Site Assessment Clean, dry, & intact 9/2/2018  7:18 PM  
Phlebitis Assessment 0 9/2/2018  7:18 PM  
Infiltration Assessment 0 9/2/2018  7:18 PM  
Dressing Status Clean, dry, & intact 9/2/2018  7:18 PM  
Hub Color/Line Status Green 9/2/2018  7:18 PM  
  
 
Opportunity for questions and clarification was provided. Patient transported with: 
 Madeleine Market

## 2018-09-03 NOTE — DISCHARGE INSTRUCTIONS
Seroma: Care Instructions  Your Care Instructions  After a surgery, fluid can collect under the skin near the cut the doctor made (incision). This soft, puffy area is called a seroma. It can be tender to touch. The incision may even have opened up. Some seromas get better on their own. But when there is a lot of fluid under the skin, a seroma is drained to help the area heal.  If your incision has opened up, it may either be packed with gauze or left open to heal. To prevent infection, make sure to keep the area clean and to take all medicines as prescribed. Follow-up care is a key part of your treatment and safety. Be sure to make and go to all appointments, and call your doctor if you are having problems. It's also a good idea to know your test results and keep a list of the medicines you take. How can you care for yourself at home? · Follow your doctor's instructions for seroma care. If you have a drain tube, your doctor will tell you how to take care of it. · Look at the incision every day. Keep the area clean and dry. · Do not bathe unless you can keep the incision dry. Start with sponge baths. Ask your doctor when it is safe to shower. · Do not scrub or rub the incision. And don't wear clothing that rubs it. · Leave any tape strips (such as Steri-Strips) on your incision. They will fall off on their own, or your doctor may tell you when to take them off. · Do not put lotion or powder on incisions. · Keep your incision out of direct sunlight. · Be safe with medicines. Read and follow all instructions on the label. ¨ If the doctor gave you a prescription medicine for pain, take it as prescribed. ¨ If you are not taking a prescription pain medicine, ask the doctor if you can take an over-the-counter medicine. · Your doctor may give you specific instructions on when you can do your normal activities again, such as driving and going back to work. When should you call for help?   Call 911 anytime you think you may need emergency care. For example, call if:    · You passed out (lost consciousness).     · You have severe trouble breathing.    Call your doctor now or seek immediate medical care if:    · You have symptoms of infection, such as:  ¨ Increased pain, swelling, warmth, or redness. ¨ Red streaks leading from the incision. ¨ Pus draining from the incision or a yellow or green discharge that is increasing. ¨ A fever.     · You bleed through a bandage.     · The incision opens up.    Watch closely for changes in your health, and be sure to contact your doctor if:    · The incision is not healing as expected. Where can you learn more? Go to http://venessa-snehal.info/. Enter E114 in the search box to learn more about \"Seroma: Care Instructions. \"  Current as of: November 20, 2017  Content Version: 11.7  © 3589-5260 [employer]. Care instructions adapted under license by [employer] (which disclaims liability or warranty for this information). If you have questions about a medical condition or this instruction, always ask your healthcare professional. [address] any warranty or liability for your use of this information. DISCHARGE SUMMARY from Nurse    PATIENT INSTRUCTIONS:    After general anesthesia or intravenous sedation, for 24 hours or while taking prescription Narcotics:  · Limit your activities  · Do not drive and operate hazardous machinery  · Do not make important personal or business decisions  · Do  not drink alcoholic beverages  · If you have not urinated within 8 hours after discharge, please contact your surgeon on call.     Report the following to your surgeon:  · Excessive pain, swelling, redness or odor of or around the surgical area  · Temperature over 100.5  · Nausea and vomiting lasting longer than 4 hours or if unable to take medications  · Any signs of decreased circulation or nerve impairment to extremity: change in color, persistent  numbness, tingling, coldness or increase pain  · Any questions    What to do at Home:  Recommended activity: Activity as tolerated, resume home diet as tolerated. Daily dressing changes as instructed by Dr. Jordi Corcoran.     *  Please give a list of your current medications to your Primary Care Provider. *  Please update this list whenever your medications are discontinued, doses are      changed, or new medications (including over-the-counter products) are added. *  Please carry medication information at all times in case of emergency situations. These are general instructions for a healthy lifestyle:    No smoking/ No tobacco products/ Avoid exposure to second hand smoke  Surgeon General's Warning:  Quitting smoking now greatly reduces serious risk to your health. Obesity, smoking, and sedentary lifestyle greatly increases your risk for illness    A healthy diet, regular physical exercise & weight monitoring are important for maintaining a healthy lifestyle    You may be retaining fluid if you have a history of heart failure or if you experience any of the following symptoms:  Weight gain of 3 pounds or more overnight or 5 pounds in a week, increased swelling in our hands or feet or shortness of breath while lying flat in bed. Please call your doctor as soon as you notice any of these symptoms; do not wait until your next office visit. Recognize signs and symptoms of STROKE:    F-face looks uneven    A-arms unable to move or move unevenly    S-speech slurred or non-existent    T-time-call 911 as soon as signs and symptoms begin-DO NOT go       Back to bed or wait to see if you get better-TIME IS BRAIN. Warning Signs of HEART ATTACK     Call 911 if you have these symptoms:   Chest discomfort. Most heart attacks involve discomfort in the center of the chest that lasts more than a few minutes, or that goes away and comes back.  It can feel like uncomfortable pressure, squeezing, fullness, or pain.  Discomfort in other areas of the upper body. Symptoms can include pain or discomfort in one or both arms, the back, neck, jaw, or stomach.  Shortness of breath with or without chest discomfort.  Other signs may include breaking out in a cold sweat, nausea, or lightheadedness. Don't wait more than five minutes to call 911 - MINUTES MATTER! Fast action can save your life. Calling 911 is almost always the fastest way to get lifesaving treatment. Emergency Medical Services staff can begin treatment when they arrive -- up to an hour sooner than if someone gets to the hospital by car. The discharge information has been reviewed with the patient. The patient verbalized understanding. Discharge medications reviewed with the patient and appropriate educational materials and side effects teaching were provided.   ___________________________________________________________________________________________________________________________________

## 2018-09-03 NOTE — PROGRESS NOTES
Discharge instructions and prescriptions given and explained to pt. Pt verbalized understanding. Medication side effects sheet reviewed with pt. Pt to be discharged home, after primary RN reviews dressing change with pt's wife. IV removed, tip intact.

## 2018-09-03 NOTE — PROGRESS NOTES
Hospitalist Progress Note 9/3/2018 Admit Date: 2018  6:56 PM  
NAME: Renetta Dallas :  1940 MRN:  [id number] Attending: Luli Lazaro MD 
PCP:  Julio Cesar Randhawa MD 
 
SUBJECTIVE:  
Marc Woods was admitted  for L inguinal cellulitis/possible abscess after having L inguinal node biopsy in early August.  He is seen with his wife at bedside. Reports L inguinal pain. Dr. Jordi Corcoran has evaluated and planning for bedside drainage. He denies other concerns, but he and his wife are concerned about making radiation treatment tomorrow. Review of Systems negative with exception of pertinent positives noted above PHYSICAL EXAM  
 
Visit Vitals  /80  Pulse 95  Temp 98 °F (36.7 °C)  Resp 18  Ht 6' 1\" (1.854 m)  Wt 78 kg (172 lb)  SpO2 96%  BMI 22.69 kg/m2 Temp (24hrs), Av.7 °F (37.1 °C), Min:98 °F (36.7 °C), Max:99.4 °F (37.4 °C) Oxygen Therapy O2 Sat (%): 96 % (18 0724) Pulse via Oximetry: 78 beats per minute (18 1855) O2 Device: Room air (18 2138) No intake or output data in the 24 hours ending 18 1042 General: No acute distress   
Lungs:  CTA Bilaterally. Heart:  Regular rate and rhythm,  No murmur, rub, or gallop Abdomen: Soft, Non distended, Non tender, Positive bowel sounds Extremities: No cyanosis, clubbing or edema Skin:  L inguinal area with 10x4 cm area of erythema, tender to palpate, some fluctuance Neurologic:  No focal deficits Recent Results (from the past 24 hour(s)) CBC WITH AUTOMATED DIFF Collection Time: 18  7:10 PM  
Result Value Ref Range WBC 2.0 (LL) 4.3 - 11.1 K/uL  
 RBC 3.56 (L) 4.23 - 5.6 M/uL  
 HGB 11.6 (L) 13.6 - 17.2 g/dL HCT 32.2 (L) 41.1 - 50.3 % MCV 90.4 79.6 - 97.8 FL  
 MCH 32.6 26.1 - 32.9 PG  
 MCHC 36.0 (H) 31.4 - 35.0 g/dL  
 RDW 11.4 % PLATELET 88 (L) 324 - 450 K/uL MPV 8.8 (L) 9.4 - 12.3 FL ABSOLUTE NRBC 0.00 0.0 - 0.2 K/uL DF AUTOMATED NEUTROPHILS 83 (H) 43 - 78 % LYMPHOCYTES 7 (L) 13 - 44 % MONOCYTES 8 4.0 - 12.0 % EOSINOPHILS 1 0.5 - 7.8 % BASOPHILS 0 0.0 - 2.0 % IMMATURE GRANULOCYTES 1 0.0 - 5.0 %  
 ABS. NEUTROPHILS 1.7 1.7 - 8.2 K/UL  
 ABS. LYMPHOCYTES 0.2 (L) 0.5 - 4.6 K/UL  
 ABS. MONOCYTES 0.2 0.1 - 1.3 K/UL  
 ABS. EOSINOPHILS 0.0 0.0 - 0.8 K/UL  
 ABS. BASOPHILS 0.0 0.0 - 0.2 K/UL  
 ABS. IMM. GRANS. 0.0 0.0 - 0.5 K/UL METABOLIC PANEL, COMPREHENSIVE Collection Time: 09/02/18  7:10 PM  
Result Value Ref Range Sodium 130 (L) 136 - 145 mmol/L Potassium 4.1 3.5 - 5.1 mmol/L Chloride 95 (L) 98 - 107 mmol/L  
 CO2 24 21 - 32 mmol/L Anion gap 11 7 - 16 mmol/L Glucose 336 (H) 65 - 100 mg/dL BUN 13 8 - 23 MG/DL Creatinine 1.44 0.8 - 1.5 MG/DL  
 GFR est AA >60 >60 ml/min/1.73m2 GFR est non-AA 51 (L) >60 ml/min/1.73m2 Calcium 8.9 8.3 - 10.4 MG/DL Bilirubin, total 0.3 0.2 - 1.1 MG/DL  
 ALT (SGPT) 25 12 - 65 U/L  
 AST (SGOT) 17 15 - 37 U/L Alk. phosphatase 118 50 - 136 U/L Protein, total 6.7 6.3 - 8.2 g/dL Albumin 3.0 (L) 3.2 - 4.6 g/dL Globulin 3.7 (H) 2.3 - 3.5 g/dL A-G Ratio 0.8 (L) 1.2 - 3.5 LIPASE Collection Time: 09/02/18  7:10 PM  
Result Value Ref Range Lipase 92 73 - 393 U/L  
PROCALCITONIN Collection Time: 09/02/18  7:10 PM  
Result Value Ref Range Procalcitonin 3.2 ng/mL HEMOGLOBIN A1C WITH EAG Collection Time: 09/02/18  7:10 PM  
Result Value Ref Range Hemoglobin A1c 6.9 (H) 4.8 - 6.0 % Est. average glucose 151 mg/dL POC LACTIC ACID Collection Time: 09/02/18  7:59 PM  
Result Value Ref Range Lactic Acid (POC) 2.9 (H) 0.5 - 1.9 mmol/L  
CULTURE, BLOOD Collection Time: 09/02/18  8:18 PM  
Result Value Ref Range Special Requests: LEFT Antecubital 
    
 Culture result: NO GROWTH AFTER 12 HOURS    
CULTURE, BLOOD Collection Time: 09/02/18  8:20 PM  
Result Value Ref Range Special Requests: LEFT 
HAND Culture result: NO GROWTH AFTER 12 HOURS    
URINE MICROSCOPIC Collection Time: 09/02/18  9:35 PM  
Result Value Ref Range WBC 0-3 0 /hpf  
 RBC 0-3 0 /hpf Epithelial cells 0-3 0 /hpf Bacteria 0 0 /hpf Casts 5-10 0 /lpf  
GLUCOSE, POC Collection Time: 09/02/18  9:59 PM  
Result Value Ref Range Glucose (POC) 257 (H) 65 - 100 mg/dL METABOLIC PANEL, BASIC Collection Time: 09/03/18  5:11 AM  
Result Value Ref Range Sodium 131 (L) 136 - 145 mmol/L Potassium 4.1 3.5 - 5.1 mmol/L Chloride 97 (L) 98 - 107 mmol/L  
 CO2 26 21 - 32 mmol/L Anion gap 8 7 - 16 mmol/L Glucose 95 65 - 100 mg/dL BUN 13 8 - 23 MG/DL Creatinine 1.04 0.8 - 1.5 MG/DL  
 GFR est AA >60 >60 ml/min/1.73m2 GFR est non-AA >60 >60 ml/min/1.73m2 Calcium 8.6 8.3 - 10.4 MG/DL  
CBC WITH AUTOMATED DIFF Collection Time: 09/03/18  5:11 AM  
Result Value Ref Range WBC 3.1 (L) 4.3 - 11.1 K/uL  
 RBC 3.30 (L) 4.23 - 5.6 M/uL  
 HGB 10.7 (L) 13.6 - 17.2 g/dL HCT 30.7 (L) 41.1 - 50.3 % MCV 93.0 79.6 - 97.8 FL  
 MCH 32.4 26.1 - 32.9 PG  
 MCHC 34.9 31.4 - 35.0 g/dL  
 RDW 11.7 % PLATELET 89 (L) 570 - 450 K/uL MPV 8.7 (L) 9.4 - 12.3 FL ABSOLUTE NRBC 0.00 0.0 - 0.2 K/uL NEUTROPHILS 74 43 - 78 % LYMPHOCYTES 8 (L) 13 - 44 % MONOCYTES 13 (H) 4.0 - 12.0 % EOSINOPHILS 0 (L) 0.5 - 7.8 % BASOPHILS 0 0.0 - 2.0 % IMMATURE GRANULOCYTES 5 0.0 - 5.0 %  
 ABS. NEUTROPHILS 2.3 1.7 - 8.2 K/UL  
 ABS. LYMPHOCYTES 0.2 (L) 0.5 - 4.6 K/UL  
 ABS. MONOCYTES 0.4 0.1 - 1.3 K/UL  
 ABS. EOSINOPHILS 0.0 0.0 - 0.8 K/UL  
 ABS. BASOPHILS 0.0 0.0 - 0.2 K/UL  
 ABS. IMM. GRANS. 0.2 0.0 - 0.5 K/UL  
 DF AUTOMATED    
LACTIC ACID Collection Time: 09/03/18  5:11 AM  
Result Value Ref Range Lactic acid 1.3 0.4 - 2.0 MMOL/L  
GLUCOSE, POC Collection Time: 09/03/18  7:27 AM  
Result Value Ref Range Glucose (POC) 113 (H) 65 - 100 mg/dL Imaging: XR CHEST PORT Final Result IMPRESSION: No acute cardiac pulmonary disease. Stable right upper lobe nodule. ASSESSMENT Hospital Problems as of 9/3/2018  Date Reviewed: 9/2/2018 Codes Class Noted - Resolved POA * (Principal)Cellulitis ICD-10-CM: L03.90 ICD-9-CM: 682.9  9/2/2018 - Present Unknown Sepsis ([facility] ICD-10-CM: A41.9 ICD-9-CM: 038.9, 995.91  9/2/2018 - Present Unknown Anal carcinoma ([facility] ICD-10-CM: C21.0 ICD-9-CM: 154.3  8/9/2018 - Present Yes Primary malignant neoplasm of perianal skin ICD-10-CM: C44.500 ICD-9-CM: 173.50  7/3/2018 - Present Yes Diabetes mellitus due to underlying condition with hyperglycemia ([facility] ICD-10-CM: Z54.44 ICD-9-CM: 249.80  12/21/2015 - Present Yes Plan: 
· Continue IV vanc/zosyn. · Await surgical drainage. · Heme/onc consult pending. DVT Prophylaxis: SCDs Signed By: Linda Valiente MD   
 September 3, 2018

## 2018-09-03 NOTE — PROGRESS NOTES
CM met with pt at bedside. Pt lives with spouse (also present in room). No steps at entry. Pt reports being independent with ADLS-still drives. No DME or home oxygen. PCP Ihsan Line) and insurance verified. Prefers follow appts be scheduled late afternoons on any day during the week. Best contact [phone]. Pt plans to Dc home. Radiation begins tomorrow. No needs voiced at this time. Care Management Interventions PCP Verified by CM: Yes Transition of Care Consult (CM Consult): Discharge Planning Current Support Network: Lives with Spouse Confirm Follow Up Transport: Family Plan discussed with Pt/Family/Caregiver: Yes Freedom of Choice Offered: Yes Discharge Location Discharge Placement: Home

## 2018-09-03 NOTE — OP NOTES
Incision/Drainage Procedure Note Performed By:  Mary Jane Wang MD  
 
Assistant:  none Anesthesia: Local 
  
Procedure Details: The risks,benefits and alternatives  were explained and consent was obtained for the procedure. RBAs also explained. The area was cleansed with Betadine and draped in the usual manner. Local anesthesia with 1% lidocaine was infiltrated into the skin overlying the seroma. An incision using a #15 was made. A Large amount of clear fluid was obtained. A culture was not obtained. The wound was packed with iodoform gauze. A sterile dressing was then applied. Estimated Blood Loss:  None Complications:  None; patient tolerated the procedure well. Condition: stable Signed By: Mary Jane Wang MD

## 2018-09-03 NOTE — CONSULTS
Inpatient Hematology / Oncology Consult Note Reason for Consult:  Cellulitis Referring Physician:  Tommie Gallagher MD 
 
History of Present Illness: Mr. Alissa Patino is a 69 y.o. male admitted on 9/2/2018 with a primary diagnosis of The primary encounter diagnosis was Cellulitis, unspecified cellulitis site. Diagnoses of Diarrhea, unspecified type and Nausea and vomiting, intractability of vomiting not specified, unspecified vomiting type were also pertinent to this visit. Jina Rm Mr Alissa Patino is a patient of Dr Gianni Poole with anal adenocarcinoma, perianal Paget's disease. He received C1D1 Mitomycin and 5FU pump (96 hr infusion) on 8/20 and has completed 10/25 fractions XRT. He presented to the ED with complaints of erythema and tenderness to his left groin at the site of recent inguinal lymph node biopsy. He was seen by Dr Marlena Lee on 8/31 with similar complaints, Dr Marlena Lee stated seroma and aspirated 60 ml from site. On arrival to ED he was found to have low grade fever, tachycardia and lactic acid of 2.9. He was started on vanc and zosyn for cellulitis. Dr Marlena Lee performed an  I&D and packed wound and instructed daily dressing changes. We were consulted due to his pancytopenia. WBC 3.1, ANC 2.3, Hgb 10.7, Plt 89K. He is chronic hyponatremia as well, Na+ stable at 131. Review of Systems: 
Constitutional +fever, fatigue Denies chills, weight loss, appetite changes,  night sweats. HEENT Denies trauma, blurry vision, hearing loss, ear pain, nosebleeds, sore throat, neck pain and ear discharge. Skin Denies lesions or rashes. Lungs Denies dyspnea, cough, sputum production or hemoptysis. Cardiovascular Denies chest pain, palpitations, or lower extremity edema. Gastrointestinal +nausea, diarrhea, occas bloody stool Denies vomiting,  abdominal pain.  Denies dysuria, frequency or hesitancy of urination. Neuro Denies headaches, visual changes or ataxia.  Denies dizziness, tingling, tremors, sensory change, speech change, focal weakness or headaches. Hematology Denies easy bruising or bleeding, denies gingival bleeding or epistaxis. Endo Denies heat/cold intolerance, denies diabetes or thyroid abnormalities. MSK Denies back pain, arthralgias, myalgias or frequent falls. Psychiatric/Behavioral Denies depression and substance abuse. The patient is not nervous/anxious. No Known Allergies Past Medical History:  
Diagnosis Date  Diabetes ([facility] type 2; avg FBS <150, has not had a recent A1C  
 GERD (gastroesophageal reflux disease)   
 occ OTC med  Head and neck cancer ([facility] [facility] 9/30/2015  
 radiation and chemo and and surgery  Hypercholesteremia   
 med  Hypertension   
 med  Rectal cancer ([facility] 2018  Vomiting 2/23/2016 Past Surgical History:  
Procedure Laterality Date  HX COLONOSCOPY  05/2018  HX HEENT    
 sinus  HX HEENT  2015  
 surgery on right throat for squamous cell ca right ear wedge  HX ORTHOPAEDIC  1966  
 right leg  HX OTHER SURGICAL  9/9/15 EXCISION OF RIGHT Neck and PARAPHARYNGEAL MASS/RIGHT EAR WEDGE RESECTION  
 HX TONSILLECTOMY  HX VASCULAR ACCESS Family History Problem Relation Age of Onset  Emphysema Father  Lung Disease Father  Cancer Brother Colon  Heart Disease Sister  Hypertension Sister  Heart Disease Sister  Hypertension Sister  Heart Disease Brother  Hypertension Brother  Heart Disease Brother  Hypertension Brother  Heart Disease Brother  Hypertension Brother  Heart Disease Brother  Hypertension Brother  Heart Disease Brother  Hypertension Brother Social History Social History  Marital status:  Spouse name: N/A  
 Number of children: N/A  
 Years of education: N/A Occupational History  Not on file. Social History Main Topics  Smoking status: Never Smoker  Smokeless tobacco: Never Used  Alcohol use No  
 Drug use: No  
 Sexual activity: Not on file Other Topics Concern  Not on file Social History Narrative Current Facility-Administered Medications Medication Dose Route Frequency Provider Last Rate Last Dose  
 0.9% sodium chloride infusion  75 mL/hr IntraVENous CONTINUOUS Vickey Dunlap MD      
 sodium chloride (NS) flush 5-10 mL  5-10 mL IntraVENous Q8H Vickey Dunlap MD   10 mL at 18 0545  sodium chloride (NS) flush 5-10 mL  5-10 mL IntraVENous PRN Vickey Dunlap MD      
 piperacillin-tazobactam (ZOSYN) 3.375 g in 0.9% sodium chloride (MBP/ADV) 100 mL  3.375 g IntraVENous Q8H Ladarius Tripathi MD 25 mL/hr at 18 0544 3.375 g at 18 0544  acetaminophen (TYLENOL) tablet 650 mg  650 mg Oral Q4H PRN Ladarius Tripathi MD   650 mg at 18 2152  insulin lispro (HUMALOG) injection   SubCUTAneous AC&HS Ladarius Tripathi MD   6 Units at 18 0025  
 amLODIPine (NORVASC) tablet 10 mg  10 mg Oral DAILY Vickey Dunlap MD      
 HYDROcodone-acetaminophen [facility]) 5-325 mg per tablet 1 Tab  1 Tab Oral Q6H PRN Ladarius Tripathi MD   1 Tab at 18 0263  lisinopril (PRINIVIL, ZESTRIL) tablet 20 mg  20 mg Oral DAILY Vickey Dunlap MD      
 ondansetron (ZOFRAN ODT) tablet 8 mg  8 mg Oral Q8H PRN Vickey Dunlap MD      
 vancomycin (VANCOCIN) 1500 mg in  ml infusion  1,500 mg IntraVENous Q18H Vickey Dunlap MD      
 
 
OBJECTIVE: 
Patient Vitals for the past 8 hrs: 
 BP Temp Pulse Resp SpO2  
18 0841 164/80 - - - -  
18 0724 99/63 98 °F (36.7 °C) 95 18 96 % 18 0408 153/83 98.1 °F (36.7 °C) 89 18 95 % Temp (24hrs), Av.7 °F (37.1 °C), Min:98 °F (36.7 °C), Max:99.4 °F (37.4 °C) Physical Exam: 
Constitutional: Well developed, well nourished male in no acute distress, sitting comfortably in the hospital bed. Wife at bedside. HEENT: Normocephalic and atraumatic. Oropharynx is clear, mucous membranes are moist.  Sclerae anicteric. Neck supple without JVD. No thyromegaly present. Skin Warm and dry. No bruising and no rash noted. No erythema. No pallor. Bandage to left groin, c/d/i. Respiratory Lungs are clear to auscultation bilaterally without wheezes, rales or rhonchi, normal air exchange without accessory muscle use. CVS Normal rate, regular rhythm and normal S1 and S2. No murmurs, gallops, or rubs. Abdomen Soft, nontender and nondistended, normoactive bowel sounds. No palpable mass. No hepatosplenomegaly. Neuro Grossly nonfocal with no obvious sensory or motor deficits. MSK Normal range of motion in general.  No edema and no tenderness. Psych Appropriate mood and affect. Labs:   
Recent Results (from the past 24 hour(s)) CBC WITH AUTOMATED DIFF Collection Time: 09/02/18  7:10 PM  
Result Value Ref Range WBC 2.0 (LL) 4.3 - 11.1 K/uL  
 RBC 3.56 (L) 4.23 - 5.6 M/uL  
 HGB 11.6 (L) 13.6 - 17.2 g/dL HCT 32.2 (L) 41.1 - 50.3 % MCV 90.4 79.6 - 97.8 FL  
 MCH 32.6 26.1 - 32.9 PG  
 MCHC 36.0 (H) 31.4 - 35.0 g/dL  
 RDW 11.4 % PLATELET 88 (L) 934 - 450 K/uL MPV 8.8 (L) 9.4 - 12.3 FL ABSOLUTE NRBC 0.00 0.0 - 0.2 K/uL  
 DF AUTOMATED NEUTROPHILS 83 (H) 43 - 78 % LYMPHOCYTES 7 (L) 13 - 44 % MONOCYTES 8 4.0 - 12.0 % EOSINOPHILS 1 0.5 - 7.8 % BASOPHILS 0 0.0 - 2.0 % IMMATURE GRANULOCYTES 1 0.0 - 5.0 %  
 ABS. NEUTROPHILS 1.7 1.7 - 8.2 K/UL  
 ABS. LYMPHOCYTES 0.2 (L) 0.5 - 4.6 K/UL  
 ABS. MONOCYTES 0.2 0.1 - 1.3 K/UL  
 ABS. EOSINOPHILS 0.0 0.0 - 0.8 K/UL  
 ABS. BASOPHILS 0.0 0.0 - 0.2 K/UL  
 ABS. IMM. GRANS. 0.0 0.0 - 0.5 K/UL METABOLIC PANEL, COMPREHENSIVE Collection Time: 09/02/18  7:10 PM  
Result Value Ref Range Sodium 130 (L) 136 - 145 mmol/L  Potassium 4.1 3.5 - 5.1 mmol/L  
 Chloride 95 (L) 98 - 107 mmol/L  
 CO2 24 21 - 32 mmol/L Anion gap 11 7 - 16 mmol/L Glucose 336 (H) 65 - 100 mg/dL BUN 13 8 - 23 MG/DL Creatinine 1.44 0.8 - 1.5 MG/DL  
 GFR est AA >60 >60 ml/min/1.73m2 GFR est non-AA 51 (L) >60 ml/min/1.73m2 Calcium 8.9 8.3 - 10.4 MG/DL Bilirubin, total 0.3 0.2 - 1.1 MG/DL  
 ALT (SGPT) 25 12 - 65 U/L  
 AST (SGOT) 17 15 - 37 U/L Alk. phosphatase 118 50 - 136 U/L Protein, total 6.7 6.3 - 8.2 g/dL Albumin 3.0 (L) 3.2 - 4.6 g/dL Globulin 3.7 (H) 2.3 - 3.5 g/dL A-G Ratio 0.8 (L) 1.2 - 3.5 LIPASE Collection Time: 09/02/18  7:10 PM  
Result Value Ref Range Lipase 92 73 - 393 U/L  
PROCALCITONIN Collection Time: 09/02/18  7:10 PM  
Result Value Ref Range Procalcitonin 3.2 ng/mL HEMOGLOBIN A1C WITH EAG Collection Time: 09/02/18  7:10 PM  
Result Value Ref Range Hemoglobin A1c 6.9 (H) 4.8 - 6.0 % Est. average glucose 151 mg/dL POC LACTIC ACID Collection Time: 09/02/18  7:59 PM  
Result Value Ref Range Lactic Acid (POC) 2.9 (H) 0.5 - 1.9 mmol/L  
URINE MICROSCOPIC Collection Time: 09/02/18  9:35 PM  
Result Value Ref Range WBC 0-3 0 /hpf  
 RBC 0-3 0 /hpf Epithelial cells 0-3 0 /hpf Bacteria 0 0 /hpf Casts 5-10 0 /lpf  
GLUCOSE, POC Collection Time: 09/02/18  9:59 PM  
Result Value Ref Range Glucose (POC) 257 (H) 65 - 100 mg/dL METABOLIC PANEL, BASIC Collection Time: 09/03/18  5:11 AM  
Result Value Ref Range Sodium 131 (L) 136 - 145 mmol/L Potassium 4.1 3.5 - 5.1 mmol/L Chloride 97 (L) 98 - 107 mmol/L  
 CO2 26 21 - 32 mmol/L Anion gap 8 7 - 16 mmol/L Glucose 95 65 - 100 mg/dL BUN 13 8 - 23 MG/DL Creatinine 1.04 0.8 - 1.5 MG/DL  
 GFR est AA >60 >60 ml/min/1.73m2 GFR est non-AA >60 >60 ml/min/1.73m2 Calcium 8.6 8.3 - 10.4 MG/DL  
CBC WITH AUTOMATED DIFF Collection Time: 09/03/18  5:11 AM  
Result Value Ref Range WBC 3.1 (L) 4.3 - 11.1 K/uL RBC 3.30 (L) 4.23 - 5.6 M/uL  
 HGB 10.7 (L) 13.6 - 17.2 g/dL HCT 30.7 (L) 41.1 - 50.3 % MCV 93.0 79.6 - 97.8 FL  
 MCH 32.4 26.1 - 32.9 PG  
 MCHC 34.9 31.4 - 35.0 g/dL  
 RDW 11.7 % PLATELET 89 (L) 745 - 450 K/uL MPV 8.7 (L) 9.4 - 12.3 FL ABSOLUTE NRBC 0.00 0.0 - 0.2 K/uL NEUTROPHILS 74 43 - 78 % LYMPHOCYTES 8 (L) 13 - 44 % MONOCYTES 13 (H) 4.0 - 12.0 % EOSINOPHILS 0 (L) 0.5 - 7.8 % BASOPHILS 0 0.0 - 2.0 % IMMATURE GRANULOCYTES 5 0.0 - 5.0 %  
 ABS. NEUTROPHILS 2.3 1.7 - 8.2 K/UL  
 ABS. LYMPHOCYTES 0.2 (L) 0.5 - 4.6 K/UL  
 ABS. MONOCYTES 0.4 0.1 - 1.3 K/UL  
 ABS. EOSINOPHILS 0.0 0.0 - 0.8 K/UL  
 ABS. BASOPHILS 0.0 0.0 - 0.2 K/UL  
 ABS. IMM. GRANS. 0.2 0.0 - 0.5 K/UL  
 DF AUTOMATED    
LACTIC ACID Collection Time: 09/03/18  5:11 AM  
Result Value Ref Range Lactic acid 1.3 0.4 - 2.0 MMOL/L  
GLUCOSE, POC Collection Time: 09/03/18  7:27 AM  
Result Value Ref Range Glucose (POC) 113 (H) 65 - 100 mg/dL Imaging: 
[unfilled] ASSESSMENT: 
Problem List  Date Reviewed: 9/2/2018 Codes Class Noted * (Principal)Cellulitis ICD-10-CM: L03.90 ICD-9-CM: 682.9  9/2/2018 Sepsis ([facility] ICD-10-CM: A41.9 ICD-9-CM: 038.9, 995.91  9/2/2018 Anal carcinoma ([facility] ICD-10-CM: C21.0 ICD-9-CM: 154.3  8/9/2018 Type 2 diabetes with nephropathy ([facility] ICD-10-CM: E11.21 
ICD-9-CM: 250.40, 583.81  7/3/2018 Primary malignant neoplasm of perianal skin ICD-10-CM: C44.500 ICD-9-CM: 173.50  7/3/2018 Vomiting ICD-10-CM: R11.10 ICD-9-CM: 787.03  2/23/2016 Diabetes mellitus due to underlying condition with hyperglycemia ([facility] ICD-10-CM: Y36.89 ICD-9-CM: 249.80  12/21/2015 Mucositis due to radiation therapy ICD-10-CM: K12.33 
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) ICD-10-CM: C79.89, C80.1 ICD-9-CM: 198.89, 199.1  10/14/2015 RECOMMENDATIONS: 
Anal adenocarcinoma/Perianal Paget's Disease 
-s/p C1D1 Mitomycin/5FU pump (96 hr) with concurrent XRT, 10/25 completed, 0/3 boost 
 
Cellulitis-Left inguinal node biopsy site 
-per primary team and surgery, on vanc/zosyn Pancytopenia due to chemotherapy 
-ANC 2.3, Hgb 10.7, Plt 89K 
- not neutropenic, no transfusions needed at this time Supportive Care 
-antiemetics and antidiarrheals prn Hyponatremia 
-chronic, stable at Na+ 131 Disposition: From our standpoint he is OK to be discharged. He will resume XRT tomorrow as planned. Lab studies were personally reviewed. Pertinent old records were reviewed. Thank you for allowing us to participate in the care of Mr. Cristino Guevara. Calos Weathers, LIAM [address] [place] [address] Hematology and Oncology [address] [place] [address] Office : [phone] Fax : [phone] I personally saw, exammed and counselled the patient, and discussed with NP, agree with above history/assessment/plan. 69 y. o.male anal cancer ongoing chemorad admitted for nausea/vomiting and dehydration, responded to IVF and lactate down, empirically on antibiotics for concern of inguinal biopsy site cellulitis, explored and drained for seroma, ok to dc and continue chemorad. Yvan Melendez M.D. [address] Office : [phone] Fax : [phone]

## 2018-09-03 NOTE — PROGRESS NOTES
Pharmacokinetic Consult to Pharmacist 
 
Vamsi Tre is a 69 y.o. male being treated with vancomycin and zosyn. Height: 6' 1\" (185.4 cm)  Weight: 78 kg (172 lb) Lab Results Component Value Date/Time BUN 13 09/03/2018 05:11 AM  
 Creatinine 1.04 09/03/2018 05:11 AM  
 WBC 3.1 (L) 09/03/2018 05:11 AM  
 Procalcitonin 3.2 09/02/2018 07:10 PM  
 Lactic acid 1.3 09/03/2018 05:11 AM  
 Lactic Acid (POC) 2.9 (H) 09/02/2018 07:59 PM  
  
Estimated Creatinine Clearance: 65.6 mL/min (based on Cr of 1.04). CULTURES: 
pending Day 1 of vancomycin. Goal trough is 15-20. Vancomycin dose initiated at 2000 mg x 1, then 1500 mg q 18 hours. Will continue to follow patient. Thank you, Melinda Villar, PharmD Clinical Pharmacist 
[phone]

## 2018-09-03 NOTE — PROGRESS NOTES
PRN Norco administered prior to sterile procedure at 1007. Abscess drained per Dr. Charlotte Spencer at bedside at this time with local anesthetic; tolerated well. Wound packed with iodoform and covered with 4X4's and tape; daily dressing change per MD order. Teaching per RN and MD at bedside; needs reinforcement.

## 2018-09-03 NOTE — H&P
Hospitalist H&P/Consult Note Admit Date:  2018  6:56 PM  
Name:  Dell Valiente Age:  69 y.o. 
:  1940 MRN:  [id number] PCP:  Whitley Dallas MD 
Treatment Team: Attending Provider: Rama Dior MD; Primary Nurse: Clive Toledo RN 
 
HPI:  
69years old F with PMH anal CA on chemo and radiation therapy, DM presented to the hospital complaining of redness and tenderness on L groin 3-4 days. Patient reported having recent biopsy of a node of his L groin. Few days after procedure patient started to have redness, tenderness and a \"bump\" in his L groin. Patient also reported having associated nausea x 1 and 8 episodes of diarrhea. Patient stated last episode of diarrhea was yesterday. Patient reported surgery drained his groin on Friday. Patient denies abdominal pain, chest pain, SOB, urinary symptoms. In the ED labs revealed pancytopenia with WBC of 2.0, platelets 88, hb 11. CXR with no acute disease. 10 systems reviewed and negative except as noted in HPI. Past Medical History:  
Diagnosis Date  Diabetes ([address] type 2; avg FBS <150, has not had a recent A1C  
 GERD (gastroesophageal reflux disease)   
 occ OTC med  Head and neck cancer ([facility] 2015  
 radiation and chemo and and surgery  Hypercholesteremia   
 med  Hypertension   
 med  Rectal cancer ([facility] 2018  Vomiting 2016 Past Surgical History:  
Procedure Laterality Date  HX COLONOSCOPY  2018  HX HEENT    
 sinus  HX HEENT    
 surgery on right throat for squamous cell ca right ear wedge  HX ORTHOPAEDIC  1966  
 right leg  HX OTHER SURGICAL  9/9/15 EXCISION OF RIGHT Neck and PARAPHARYNGEAL MASS/RIGHT EAR WEDGE RESECTION  
 HX TONSILLECTOMY  HX VASCULAR ACCESS Prior to Admission Medications Prescriptions Last Dose Informant Patient Reported? Taking? HYDROcodone-acetaminophen (NORCO) 5-325 mg per tablet   No No  
 Si-2 tabs by mouth every 4 hours prn pain  
amLODIPine (NORVASC) 10 mg tablet   Yes No  
Sig: Take 10 mg by mouth daily. In am  
dexamethasone (DECADRON) 0.5 mg/5 mL elixir   No No  
Sig: Take 10 mL by mouth four (4) times daily for 10 days. dexamethasone (DECADRON) 0.5 mg/5 mL elixir   No No  
Sig: Take 10 mL by mouth four (4) times daily for 10 days. fluconazole (DIFLUCAN) 150 mg tablet   No No  
Sig: Take 1 Tab by mouth daily for 7 days. FDA advises cautious prescribing of oral fluconazole in pregnancy. Indications: ESOPHAGEAL CANDIDIASIS  
glimepiride (AMARYL) 4 mg tablet   Yes No  
Sig: Take 4 mg by mouth every morning. Indications: TYPE 2 DIABETES MELLITUS  
lidocaine-prilocaine (EMLA) topical cream   No No  
Sig: Apply  to affected area as needed for Pain. lisinopril (PRINIVIL, ZESTRIL) 20 mg tablet   Yes No  
Sig: Take 20 mg by mouth daily. In am  Indications: hypertension  
magic mouthwash solution   No No  
Sig: Magic mouth wash Maalox Lidocaine 2% viscous Diphenhydramine oral solution Pharmacy to mix equal portions of ingredients to a total volume as indicated in the dispense amount. Take 10 ml 4 times a day swish and spit sore mouth and swish and swallow for sore throat  
metFORMIN (GLUCOPHAGE) 1,000 mg tablet   Yes No  
Sig: Take 1,000 mg by mouth daily. In am  Indications: type 2 diabetes mellitus  
ondansetron (ZOFRAN ODT) 8 mg disintegrating tablet   No No  
Sig: Take 1 Tab by mouth every eight (8) hours as needed for Nausea. Indications: CANCER CHEMOTHERAPY-INDUCED NAUSEA AND VOMITING  
sodium chloride 1 gram tablet   No No  
Sig: TAKE 1 TAB BY MOUTH DAILY. traMADol (ULTRAM) 50 mg tablet   No No  
Sig: Take 1 Tab by mouth every six (6) hours as needed for Pain. Max Daily Amount: 200 mg. Facility-Administered Medications: None No Known Allergies Social History Substance Use Topics  Smoking status: Never Smoker  Smokeless tobacco: Never Used  Alcohol use No  
  
Family History Problem Relation Age of Onset  Emphysema Father  Lung Disease Father  Cancer Brother Colon  Heart Disease Sister  Hypertension Sister  Heart Disease Sister  Hypertension Sister  Heart Disease Brother  Hypertension Brother  Heart Disease Brother  Hypertension Brother  Heart Disease Brother  Hypertension Brother  Heart Disease Brother  Hypertension Brother  Heart Disease Brother  Hypertension Brother Immunization History Administered Date(s) Administered  Influenza Vaccine (Quad) PF 09/11/2015 Objective:  
 
Patient Vitals for the past 24 hrs: 
 Temp Pulse Resp BP SpO2  
09/02/18 2138 - (!) 103 18 126/65 97 % 09/02/18 1900 99.4 °F (37.4 °C) (!) 102 18 104/55 96 % 09/02/18 1855 98.4 °F (36.9 °C) 78 16 107/59 95 % Oxygen Therapy O2 Sat (%): 97 % (09/02/18 2138) Pulse via Oximetry: 78 beats per minute (09/02/18 1855) O2 Device: Room air (09/02/18 2138) No intake or output data in the 24 hours ending 09/02/18 2205 Physical Exam: 
General:    Well nourished. Alert. Eyes:   Normal sclera. Extraocular movements intact. ENT:  Normocephalic, atraumatic. Dry mucous membranes CV:   RRR. No murmur, rub, or gallop. Lungs:  CTAB. No wheezing, rhonchi, or rales. Abdomen: Soft, nontender, nondistended. Bowel sounds normal.  
Extremities: Warm and dry. No cyanosis or edema. Neurologic: CN II-XII grossly intact. Sensation intact. No gross focal deficits Skin:     R groin with erythema, tenderness and ?fluctuance. No drainage Psych:  Normal mood and affect. I reviewed the labs and other studies done this admission. Data Review:  
Recent Results (from the past 24 hour(s)) CBC WITH AUTOMATED DIFF Collection Time: 09/02/18  7:10 PM  
Result Value Ref Range WBC 2.0 (LL) 4.3 - 11.1 K/uL  
 RBC 3.56 (L) 4.23 - 5.6 M/uL  
 HGB 11.6 (L) 13.6 - 17.2 g/dL HCT 32.2 (L) 41.1 - 50.3 % MCV 90.4 79.6 - 97.8 FL  
 MCH 32.6 26.1 - 32.9 PG  
 MCHC 36.0 (H) 31.4 - 35.0 g/dL  
 RDW 11.4 % PLATELET 88 (L) 539 - 450 K/uL MPV 8.8 (L) 9.4 - 12.3 FL ABSOLUTE NRBC 0.00 0.0 - 0.2 K/uL  
 DF AUTOMATED NEUTROPHILS 83 (H) 43 - 78 % LYMPHOCYTES 7 (L) 13 - 44 % MONOCYTES 8 4.0 - 12.0 % EOSINOPHILS 1 0.5 - 7.8 % BASOPHILS 0 0.0 - 2.0 % IMMATURE GRANULOCYTES 1 0.0 - 5.0 %  
 ABS. NEUTROPHILS 1.7 1.7 - 8.2 K/UL  
 ABS. LYMPHOCYTES 0.2 (L) 0.5 - 4.6 K/UL  
 ABS. MONOCYTES 0.2 0.1 - 1.3 K/UL  
 ABS. EOSINOPHILS 0.0 0.0 - 0.8 K/UL  
 ABS. BASOPHILS 0.0 0.0 - 0.2 K/UL  
 ABS. IMM. GRANS. 0.0 0.0 - 0.5 K/UL METABOLIC PANEL, COMPREHENSIVE Collection Time: 09/02/18  7:10 PM  
Result Value Ref Range Sodium 130 (L) 136 - 145 mmol/L Potassium 4.1 3.5 - 5.1 mmol/L Chloride 95 (L) 98 - 107 mmol/L  
 CO2 24 21 - 32 mmol/L Anion gap 11 7 - 16 mmol/L Glucose 336 (H) 65 - 100 mg/dL BUN 13 8 - 23 MG/DL Creatinine 1.44 0.8 - 1.5 MG/DL  
 GFR est AA >60 >60 ml/min/1.73m2 GFR est non-AA 51 (L) >60 ml/min/1.73m2 Calcium 8.9 8.3 - 10.4 MG/DL Bilirubin, total 0.3 0.2 - 1.1 MG/DL  
 ALT (SGPT) 25 12 - 65 U/L  
 AST (SGOT) 17 15 - 37 U/L Alk. phosphatase 118 50 - 136 U/L Protein, total 6.7 6.3 - 8.2 g/dL Albumin 3.0 (L) 3.2 - 4.6 g/dL Globulin 3.7 (H) 2.3 - 3.5 g/dL A-G Ratio 0.8 (L) 1.2 - 3.5 LIPASE Collection Time: 09/02/18  7:10 PM  
Result Value Ref Range Lipase 92 73 - 393 U/L  
PROCALCITONIN Collection Time: 09/02/18  7:10 PM  
Result Value Ref Range Procalcitonin 3.2 ng/mL POC LACTIC ACID Collection Time: 09/02/18  7:59 PM  
Result Value Ref Range Lactic Acid (POC) 2.9 (H) 0.5 - 1.9 mmol/L  
URINE MICROSCOPIC Collection Time: 09/02/18  9:35 PM  
Result Value Ref Range WBC 0-3 0 /hpf  
 RBC 0-3 0 /hpf Epithelial cells 0-3 0 /hpf Bacteria 0 0 /hpf Casts 5-10 0 /lpf Imaging Studies: CXR Results  (Last 48 hours) 09/02/18 2037  XR CHEST PORT Final result Impression:  IMPRESSION: No acute cardiac pulmonary disease. Stable right upper lobe nodule. Narrative:  EXAM:  XR CHEST PORT INDICATION:  Fever COMPARISON:  8/1/2018 FINDINGS: A portable AP radiograph of the chest was obtained at 1945 hours. Bguwby-d-Cjdf catheter in adequate position. Stable right upper lobe nodule. The lungs are clear. The cardiac and mediastinal contours and pulmonary  
vascularity are normal.  The bones and soft tissues are grossly within normal  
limits. CT Results  (Last 48 hours) None Assessment and Plan:  
 
Hospital Problems as of 9/2/2018  Date Reviewed: 8/27/2018 Codes Class Noted - Resolved POA * (Principal)Cellulitis ICD-10-CM: L03.90 ICD-9-CM: 682.9  9/2/2018 - Present Unknown Anal carcinoma ([facility] ICD-10-CM: C21.0 ICD-9-CM: 154.3  8/9/2018 - Present Yes Primary malignant neoplasm of perianal skin ICD-10-CM: C44.500 ICD-9-CM: 173.50  7/3/2018 - Present Yes Diabetes mellitus due to underlying condition with hyperglycemia ([facility] ICD-10-CM: A55.16 ICD-9-CM: 249.80  12/21/2015 - Present Yes A/P: 
 
-Sepsis Cellulitis R groin Meets criteria WBC 2.0. Temp: 99.4 and tachycardic at ED 
S/p lymph node biopsy on L side groin 08/01 LA: 2.9 Plan Start vanco and zosyn IV Follow up cultures Surgery evaluation for possible I&D Keep patient NPO Recheck LA later tonight IVF hydration with NS 
 
-DM On PO meds at home Start ISS 
 
-Pancytopenia Likely secondary to chemo vs infection Will ask Hem-onc to evaluate Check CBC on AM 
 
-Nausea and vomiting Denies abdominal pain Last episode of diarrhea yesterday Send stool work up if recurrence DVT ppx: SCDs Code status: Full Estimated LOS:  2-3 nights Signed: Madison Manuel MD

## 2018-09-03 NOTE — PROGRESS NOTES
Dressing saturated with serosanguineous drainage; dressing changed at this time. Educated son and wife at bedside who assisted with wound care/ dressing change; verbalized/ demonstrated understanding. Instructions to include: shower prior to dressing change per MD washing surrounding area/ no direct water to dressing, change dressing daily and when soiled; verbalized understanding. Supplies for future changes provided. Dressing cdi at this time prior to discharge.

## 2018-09-03 NOTE — DISCHARGE SUMMARY
Hospitalist Discharge Summary Patient ID: 
Bryan Simmonds [id number] 
44 y.o. 
1940 Admit date: 9/2/2018  6:56 PM 
Discharge date and time: 9/3/2018 Attending: Neri Soni MD 
PCP:  Vu Holbrook MD 
Treatment Team: Attending Provider: Neri Soni MD; Consulting Provider: Peggy Guzman MD; Consulting Provider: Vito Singh MD; Utilization Review: Tyree Hernandez RN; Care Manager: Claudio Sheets LM Principal Diagnosis Cellulitis of groin, left Hospital Problems as of 9/3/2018  Date Reviewed: 9/2/2018 Codes Class Noted - Resolved POA * (Principal)Cellulitis of groin, left ICD-10-CM: L03.314 ICD-9-CM: 682.2  9/3/2018 - Present Yes Postoperative seroma of subcutaneous tissue after non-dermatologic procedure ICD-10-CM: L76.34 
ICD-9-CM: 998.13  9/3/2018 - Present Yes Anal carcinoma ([facility] ICD-10-CM: C21.0 ICD-9-CM: 154.3  8/9/2018 - Present Yes Primary malignant neoplasm of perianal skin ICD-10-CM: C44.500 ICD-9-CM: 173.50  7/3/2018 - Present Yes Diabetes mellitus due to underlying condition with hyperglycemia ([facility] ICD-10-CM: H06.16 ICD-9-CM: 249.80  12/21/2015 - Present Yes RESOLVED: Sepsis ([facility] ICD-10-CM: A41.9 ICD-9-CM: 038.9, 995.91  9/2/2018 - 9/3/2018 Unknown HPI:  '69years old F with PMH anal CA on chemo and radiation therapy, DM presented to the hospital complaining of redness and tenderness on L groin 3-4 days. Patient reported having recent biopsy of a node of his L groin. Few days after procedure patient started to have redness, tenderness and a \"bump\" in his L groin. Patient also reported having associated nausea x 1 and 8 episodes of diarrhea. Patient stated last episode of diarrhea was yesterday. Patient reported surgery drained his groin on Friday. Patient denies abdominal pain, chest pain, SOB, urinary symptoms.  In the ED labs revealed pancytopenia with WBC of 2.0, platelets 88, hb 11. CXR with no acute disease.' Hospital Course: 
L inguinal cellulitis with post-surgical seroma- he was treated with IV vanc and zosyn. General surgery evaluated and performed I&D. Fluid was clear and no culture obtained. He felt better after procedure. Discussed with Dr. Delphine Colon and he was okay for Mr. Po Singleton to be discharged home and agreed with 7 days of augmentin therapy for the cellulitis. Significant Diagnostic Studies:  
 
 
Labs: Results:  
   
Chemistry Recent Labs  
   09/03/18 
 0511  09/02/18 [address] GLU  95  336* NA  131*  130*  
K  4.1  4.1 CL  97*  95* CO2  26  24 BUN  13  13 CREA  1.04  1.44  
CA  8.6  8.9 AGAP  8  11 AP   --   118 TP   --   6.7 ALB   --   3.0*  
GLOB   --   3.7* AGRAT   --   0.8* CBC w/Diff Recent Labs  
   09/03/18 
 0511  09/02/18 [address] WBC  3.1*  2.0*  
RBC  3.30*  3.56* HGB  10.7*  11.6* HCT  30.7*  32.2*  
PLT  89*  88* GRANS  74  83* LYMPH  8*  7* EOS  0*  1 Cardiac Enzymes No results for input(s): CPK, CKND1, ELSY in the last 72 hours. No lab exists for component: Verenice Cardoza Coagulation No results for input(s): PTP, INR, APTT in the last 72 hours. No lab exists for component: INREXT, INREXT Lipid Panel No results found for: CHOL, CHOLPOCT, CHOLX, CHLST, CHOLV, 901346, HDL, LDL, LDLC, DLDLP, 870047, VLDLC, VLDL, TGLX, TRIGL, TRIGP, TGLPOCT, CHHD, CHHDX  
BNP No results for input(s): BNPP in the last 72 hours. Liver Enzymes Recent Labs  
   09/02/18 [address] TP  6.7 ALB  3.0*  
AP  118 SGOT  17 Thyroid Studies Lab Results Component Value Date/Time TSH 4.460 (H) 10/26/2017 04:14 PM  
    
 
 
Discharge Exam: 
Visit Neena Aranda  /64 (BP 1 Location: Right arm, BP Patient Position: At rest)  Pulse 82  Temp 98.4 °F (36.9 °C)  Resp 18  Ht 6' 1\" (1.854 m)  Wt 78 kg (172 lb)  SpO2 97%  BMI 22.69 kg/m2 General appearance: alert, cooperative, no distress, appears stated age Lungs: clear to auscultation bilaterally Heart: regular rate and rhythm, S1, S2 normal, no murmur, click, rub or gallop Abdomen: soft, non-tender. Bowel sounds normal. No masses,  no organomegaly Skin- erythema of L inguinal area prior to I&D Extremities: no cyanosis or edema Neurologic: Grossly normal 
 
Disposition: home Discharge Condition: stable Patient Instructions:  
Current Discharge Medication List  
  
START taking these medications Details  
amoxicillin-clavulanate (AUGMENTIN) 875-125 mg per tablet Take 1 Tab by mouth every twelve (12) hours for 7 days. Indications: Skin and Skin Structure Infection 
Qty: 14 Tab, Refills: 0 CONTINUE these medications which have NOT CHANGED Details  
amLODIPine (NORVASC) 10 mg tablet Take 10 mg by mouth daily. In am  
  
magic mouthwash solution Magic mouth wash Maalox Lidocaine 2% viscous Diphenhydramine oral solution Pharmacy to mix equal portions of ingredients to a total volume as indicated in the dispense amount. Take 10 ml 4 times a day swish and spit sore mouth and swish and swallow for sore throat Qty: 480 mL, Refills: 1 Associated Diagnoses: Thrush  
  
!! dexamethasone (DECADRON) 0.5 mg/5 mL elixir Take 10 mL by mouth four (4) times daily for 10 days. 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  
  
!! dexamethasone (DECADRON) 0.5 mg/5 mL elixir Take 10 mL by mouth four (4) times daily for 10 days. Qty: 400 mL, Refills: 0 Associated Diagnoses: Anal carcinoma ([address] fluconazole (DIFLUCAN) 150 mg tablet Take 1 Tab by mouth daily for 7 days. FDA advises cautious prescribing of oral fluconazole in pregnancy. Indications: ESOPHAGEAL CANDIDIASIS Qty: 7 Tab, Refills: 0 Associated Diagnoses: Anal carcinoma ([address] ondansetron (ZOFRAN ODT) 8 mg disintegrating tablet Take 1 Tab by mouth every eight (8) hours as needed for Nausea. Indications: CANCER CHEMOTHERAPY-INDUCED NAUSEA AND VOMITING Qty: 60 Tab, Refills: 1 Associated Diagnoses: Anal carcinoma ([address] lidocaine-prilocaine (EMLA) topical cream Apply  to affected area as needed for Pain. Qty: 30 g, Refills: 0 Associated Diagnoses: Anal carcinoma ([address]  
  
traMADol (ULTRAM) 50 mg tablet Take 1 Tab by mouth every six (6) hours as needed for Pain. Max Daily Amount: 200 mg. Qty: 30 Tab, Refills: 0 Associated Diagnoses: Primary malignant neoplasm of perianal skin  
  
sodium chloride 1 gram tablet TAKE 1 TAB BY MOUTH DAILY. Qty: 90 Tab, Refills: 0 Associated Diagnoses: Hyponatremia HYDROcodone-acetaminophen (NORCO) 5-325 mg per tablet 1-2 tabs by mouth every 4 hours prn pain 
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  
  
lisinopril (PRINIVIL, ZESTRIL) 20 mg tablet Take 20 mg by mouth daily. In am  Indications: hypertension  
  
glimepiride (AMARYL) 4 mg tablet Take 4 mg by mouth every morning. Indications: TYPE 2 DIABETES MELLITUS  
  
metFORMIN (GLUCOPHAGE) 1,000 mg tablet Take 1,000 mg by mouth daily. In am  Indications: type 2 diabetes mellitus ! ! - Potential duplicate medications found. Please discuss with provider. Activity: Activity as tolerated Diet: Diabetic Diet Wound Care: Pack with iodoform gauze and cover with sterile dressing Of note, due to quick improvement, Mr. Sukhjnider Taylor was downgraded to observation status prior to discharge. Follow-up Follow-up Appointments Procedures  FOLLOW UP VISIT Appointment in: Other (Specify) Appointments as scheduled. Dr. Akanksha Nash as needed if L groin does not heal.  
  Appointments as scheduled. Dr. Akanksha Nash as needed if L groin does not heal.  
  Standing Status:   Standing Number of Occurrences:   1 Order Specific Question:   Appointment in Answer: Other (Specify) ·  
· Time spent to discharge patient 22 minutes Signed: 
Deja Devlin MD 
9/3/2018 12:51 PM

## 2018-09-04 ENCOUNTER — PATIENT OUTREACH (OUTPATIENT)
Dept: CASE MANAGEMENT | Age: 78
End: 2018-09-04

## 2018-09-04 ENCOUNTER — HOME HEALTH ADMISSION (OUTPATIENT)
Dept: HOME HEALTH SERVICES | Facility: HOME HEALTH | Age: 78
End: 2018-09-04

## 2018-09-04 ENCOUNTER — HOSPITAL ENCOUNTER (OUTPATIENT)
Dept: RADIATION ONCOLOGY | Age: 78
Discharge: HOME | End: 2018-09-04
Payer: MEDICARE

## 2018-09-04 PROCEDURE — 77386 HC IMRT TRMT DLVR COMPL: CPT

## 2018-09-04 NOTE — PROGRESS NOTES
Per MD, patient could benefit from [facility] R[facility]. Referral sent to [facility].

## 2018-09-04 NOTE — PROGRESS NOTES
[facility] [place] - INPATIENT Face to Face Encounter Patients Name: Tessa Renteria    YOB: 1940 Ordering Physician: Dr. Kayla Ortiz Primary Diagnosis: Cellulitis Cellulitis of groin, left Date of Face to Face:   9/4/2018 Face to Face Encounter findings are related to primary reason for home care:   yes. 1. I certify that the patient needs intermittent care as follows: skilled nursing care:  skilled observation/assessment, patient education and wound care 2. I certify that this patient is homebound, that is: 1) patient requires the use of a  
 device, special transportation, or assistance of another to leave the home; or 2) patient's condition makes leaving the home medically contraindicated; and 3) patient has a normal inability to leave the home and leaving the home requires considerable and taxing effort. Patient may leave the home for infrequent and short duration for medical reasons, and occasional absences for non-medical reasons. Homebound status is due to the following functional limitations: Patient with poor safety awareness and is at risk for falls without assistance of another person and the use of an assistive device. Patient with poor ambulation endurance limiting their safe ability to ascend/descend the required number of steps to leave the home. 3. I certify that this patient is under my care and that I, or a nurse practitioner or [phone] [id number], or clinical nurse specialist, or certified nurse midwife, working with me, had a Face-to-Face Encounter that meets the physician Face-to-Face Encounter requirements. The following are the clinical findings from the [address] [place] [address] encounter that support the need for skilled services and is a summary of the encounter: see hospital chart See hospital chart Silas Mcgowan RN 
9/4/2018 THE FOLLOWING TO BE COMPLETED BY THE COMMUNITY PHYSICIAN: 
 
 I concur with the findings described above from the F2F encounter that this patient is homebound and in need of a skilled service. Certifying Physician: _____________________________________ Printed Certifying Physician Name: _____________________________________ Date: _________________

## 2018-09-04 NOTE — PROGRESS NOTES
Transition of Care Discharge Follow-up Questionnaire Date/Time of JOSELINE Outreach: 9/04/18 2:03 PM  
What was the patient hospitalized for? Patient was hospitalized for Cellulitis of Groin Left Does the patient understand his/her diagnosis and/or treatment and what happened during the hospitalization? CM Assessed Risk for Readmission: 
 
 
 
Patient stated Risk for Readmission: 
 
 Spoke to Wife who consented to [place] outreach, and verbalized understanding of treatment and diagnosis. Risk for Readmission completed and assessed and Care Coordinator assessed risk would be due to diagnosis. Wife agrees. Did the patient receive discharge instructions? Wife states d/c instructions received. Review any discharge instructions (see notes in [facility]). Ask patient if they understand these. Do they have any questions? Wife discussed discharge plan and instruction as Wife understood it to be with Care Coordinator. Which I have documented in the pertinent areas throughout this progress note. Were home services ordered (nursing, PT, OT, ST, etc.)? [facility] ordered at d/c RN services. If so, has the first visit occurred? If not, why? (Assist with coordination of services if necessary.) Wife states she has not spoken to [facility], but that we do not need their help, it was just for dressing his wound and I am doing that.  Was any DME ordered? No DME ordered at d/c. If so, has it been received? If not, why?  (Assist with coordination of arranging DME orders if necessary.) N/A Complete a review of all medications (new, continued and discontinued meds per the D/C instructions and medication tab in [address] [place] [address]). Review of all meds completed with Wife and Care Coordinator. Augmentin prescribed at d/c. Were all new prescriptions filled? If not, why?  (Assist with obtainment of medications if necessary.) Yes.   
Does the patient understand the purpose and dosing instructions for all medications? (If patient has questions, provide explanation and education.) Indicated by Wife to Care Coordinator, the purpose and dosing instructions for all medications were understood. Does the patient have any problems in performing ADLs? (If patient is unable to perform ADLs  what is the limiting factor(s)? Do they have a support system that can assist? If no support system is present, discuss possible assistance that they may be able to obtain.) Wife states patient is independent with all ADLs. Does the patient have all follow-up appointments scheduled? 7 day f/up with PCP? 
 
7-14 day f/up with specialist? 
 
If f/up has not been made  what actions has the care coordinator made to accomplish this? Has transportation been arranged? [facility] follow-up should be within 7 days of discharge; all others should have PCP follow-up within 7 days of discharge; follow-ups with other specialists as appropriate or ordered.) Wife declines PCP f/u and states that patient will only be following up with Oncology for appts. and treatments. Oncology 9/4 at 4pm. 
 
Patient is not in need of transportation. Schedule next appointment with [facility] Coordinator or refer to RN Case Manager/  per the workflow guidelines. Care Coordinator  f/u 9/14. Any other questions or concerns expressed by the patient? Gratitude stated and no further questions asked or needs identified. JOSELINE Call Completed By:  
Emeka Crowe LPN/ Care Coordinator [facility]:[phone] Chantelle [address] [place] [address] 
[email or web address] This note will not be viewable in [address]

## 2018-09-05 ENCOUNTER — HOSPITAL ENCOUNTER (OUTPATIENT)
Dept: RADIATION ONCOLOGY | Age: 78
Discharge: HOME | End: 2018-09-05
Payer: MEDICARE

## 2018-09-05 PROCEDURE — 77386 HC IMRT TRMT DLVR COMPL: CPT

## 2018-09-06 ENCOUNTER — HOSPITAL ENCOUNTER (OUTPATIENT)
Dept: RADIATION ONCOLOGY | Age: 78
Discharge: HOME | End: 2018-09-06
Payer: MEDICARE

## 2018-09-06 ENCOUNTER — PATIENT OUTREACH (OUTPATIENT)
Dept: CASE MANAGEMENT | Age: 78
End: 2018-09-06

## 2018-09-06 ENCOUNTER — HOSPITAL ENCOUNTER (OUTPATIENT)
Dept: LAB | Age: 78
Discharge: HOME | End: 2018-09-06
Payer: MEDICARE

## 2018-09-06 DIAGNOSIS — C21.0 ANAL CARCINOMA (HCC): ICD-10-CM

## 2018-09-06 LAB
ALBUMIN SERPL-MCNC: 2.9 G/DL (ref 3.2–4.6)
ALBUMIN/GLOB SERPL: 0.8 {RATIO} (ref 1.2–3.5)
ALP SERPL-CCNC: 138 U/L (ref 50–136)
ALT SERPL-CCNC: 33 U/L (ref 12–65)
ANION GAP SERPL CALC-SCNC: 9 MMOL/L (ref 7–16)
AST SERPL-CCNC: 13 U/L (ref 15–37)
BASOPHILS # BLD: 0 K/UL (ref 0–0.2)
BASOPHILS NFR BLD: 0 % (ref 0–2)
BILIRUB SERPL-MCNC: 0.2 MG/DL (ref 0.2–1.1)
BUN SERPL-MCNC: 7 MG/DL (ref 8–23)
CALCIUM SERPL-MCNC: 8.8 MG/DL (ref 8.3–10.4)
CHLORIDE SERPL-SCNC: 91 MMOL/L (ref 98–107)
CO2 SERPL-SCNC: 26 MMOL/L (ref 21–32)
CREAT SERPL-MCNC: 1.11 MG/DL (ref 0.8–1.5)
DIFFERENTIAL METHOD BLD: ABNORMAL
EOSINOPHIL # BLD: 0 K/UL (ref 0–0.8)
EOSINOPHIL NFR BLD: 1 % (ref 0.5–7.8)
ERYTHROCYTE [DISTWIDTH] IN BLOOD BY AUTOMATED COUNT: 12 % (ref 11.9–14.6)
GLOBULIN SER CALC-MCNC: 3.6 G/DL (ref 2.3–3.5)
GLUCOSE SERPL-MCNC: 400 MG/DL (ref 65–100)
HCT VFR BLD AUTO: 28.9 % (ref 41.1–50.3)
HGB BLD-MCNC: 10.4 G/DL (ref 13.6–17.2)
IMM GRANULOCYTES # BLD: 0 K/UL (ref 0–0.5)
IMM GRANULOCYTES NFR BLD AUTO: 1 % (ref 0–5)
LYMPHOCYTES # BLD: 0.2 K/UL (ref 0.5–4.6)
LYMPHOCYTES NFR BLD: 7 % (ref 13–44)
MAGNESIUM SERPL-MCNC: 1.6 MG/DL (ref 1.8–2.4)
MCH RBC QN AUTO: 32.7 PG (ref 26.1–32.9)
MCHC RBC AUTO-ENTMCNC: 36 G/DL (ref 31.4–35)
MCV RBC AUTO: 90.9 FL (ref 79.6–97.8)
MONOCYTES # BLD: 0.3 K/UL (ref 0.1–1.3)
MONOCYTES NFR BLD: 14 % (ref 4–12)
NEUTS SEG # BLD: 1.9 K/UL (ref 1.7–8.2)
NEUTS SEG NFR BLD: 77 % (ref 43–78)
NRBC # BLD: 0 K/UL (ref 0–0.2)
PLATELET # BLD AUTO: 66 K/UL (ref 150–450)
PMV BLD AUTO: 8.5 FL (ref 9.4–12.3)
POTASSIUM SERPL-SCNC: 3.4 MMOL/L (ref 3.5–5.1)
PROT SERPL-MCNC: 6.5 G/DL (ref 6.3–8.2)
RBC # BLD AUTO: 3.18 M/UL (ref 4.23–5.67)
SODIUM SERPL-SCNC: 126 MMOL/L (ref 136–145)
WBC # BLD AUTO: 2.5 K/UL (ref 4.3–11.1)

## 2018-09-06 PROCEDURE — 36415 COLL VENOUS BLD VENIPUNCTURE: CPT

## 2018-09-06 PROCEDURE — 83735 ASSAY OF MAGNESIUM: CPT

## 2018-09-06 PROCEDURE — 80053 COMPREHEN METABOLIC PANEL: CPT

## 2018-09-06 PROCEDURE — 77386 HC IMRT TRMT DLVR COMPL: CPT

## 2018-09-06 PROCEDURE — 85025 COMPLETE CBC W/AUTO DIFF WBC: CPT

## 2018-09-06 NOTE — PROGRESS NOTES
Patient: Kriss Andnio MRN: [id number]  SSN: [id number] YOB: 1940  Age: 69 y.o. Sex: male DIAGNOSIS:  T2N0M0, Stage II anal SCC. Prior right walker-parotid lymph node mass. Unknown primary, [id number], Stage DENTON. TREATMENT SITE:  Anal canal 
 
DOSE and FRACTIONATION:  13/25 fractions, 2340 cGy of 4500 cGy planned, 0/3 boost, 0 cGy of 540 cGy planned. INTERVAL HISTORY:  Kriss Andino is a 69 y.o. male being treated for anal cancer with prior head and neck cancer. He was doing well without complaints week 1. Week 2 with oral pain and rash being treated by medical oncology but feels \"rough. \"  He did have drainage and required packing of his wound from the biopsy week 3. OBJECTIVE:  No findings week 1. Oral sores and erythematous facial rash. There were no vitals taken for this visit. Lab Results Component Value Date/Time Sodium 126 (L) 09/06/2018 11:28 AM  
 Potassium 3.4 (L) 09/06/2018 11:28 AM  
 Chloride 91 (L) 09/06/2018 11:28 AM  
 CO2 26 09/06/2018 11:28 AM  
 Anion gap 9 09/06/2018 11:28 AM  
 Glucose 400 (H) 09/06/2018 11:28 AM  
 BUN 7 (L) 09/06/2018 11:28 AM  
 Creatinine 1.11 09/06/2018 11:28 AM  
 GFR est AA >60 09/06/2018 11:28 AM  
 GFR est non-AA >60 09/06/2018 11:28 AM  
 Calcium 8.8 09/06/2018 11:28 AM  
 Magnesium 1.6 (L) 09/06/2018 11:28 AM  
 Albumin 2.9 (L) 09/06/2018 11:28 AM  
 Protein, total 6.5 09/06/2018 11:28 AM  
 Globulin 3.6 (H) 09/06/2018 11:28 AM  
 A-G Ratio 0.8 (L) 09/06/2018 11:28 AM  
 AST (SGOT) 13 (L) 09/06/2018 11:28 AM  
 ALT (SGPT) 33 09/06/2018 11:28 AM  
 
Lab Results Component Value Date/Time WBC 2.5 (L) 09/06/2018 11:28 AM  
 HGB 10.4 (L) 09/06/2018 11:28 AM  
 HCT 28.9 (L) 09/06/2018 11:28 AM  
 PLATELET 66 (L) 82/62/8937 11:28 AM  
 
 
ASSESSMENT and PLAN:  Kriss Andino is tolerating radiation as anticipated for the current dose and fraction.   We will continue on as planned with another treatment visit anticipated next week. Manuela Esparza MD  
September 6, 2018

## 2018-09-07 ENCOUNTER — HOSPITAL ENCOUNTER (OUTPATIENT)
Dept: RADIATION ONCOLOGY | Age: 78
Discharge: HOME | End: 2018-09-07
Payer: MEDICARE

## 2018-09-07 ENCOUNTER — APPOINTMENT (OUTPATIENT)
Dept: RADIATION ONCOLOGY | Age: 78
End: 2018-09-07
Payer: MEDICARE

## 2018-09-07 LAB
BACTERIA SPEC CULT: NORMAL
BACTERIA SPEC CULT: NORMAL
SERVICE CMNT-IMP: NORMAL
SERVICE CMNT-IMP: NORMAL

## 2018-09-07 PROCEDURE — 77386 HC IMRT TRMT DLVR COMPL: CPT

## 2018-09-07 NOTE — PROGRESS NOTES
Saw patient today with Dr Shade Collazo 13 of 28 radiation 5FU/Mitomycin. He will be back Day 29 to have 5FU (5) day pump only and Dr Allegra Kline will decrease dose due to mouth soreness. Pt is managing to continue eating/drinking and maintaining weight overall. Victor M Tobar is her from palliative care and pt receives pain medication to assist with rectal soreness management. Pt encouraged to call with any questions or concerns. OV Sept 17th scheduled.

## 2018-09-10 ENCOUNTER — APPOINTMENT (OUTPATIENT)
Dept: RADIATION ONCOLOGY | Age: 78
End: 2018-09-10
Payer: MEDICARE

## 2018-09-10 ENCOUNTER — HOSPITAL ENCOUNTER (OUTPATIENT)
Dept: RADIATION ONCOLOGY | Age: 78
Discharge: HOME | End: 2018-09-10
Payer: MEDICARE

## 2018-09-10 VITALS — BODY MASS INDEX: 22.73 KG/M2 | WEIGHT: 172.3 LBS

## 2018-09-10 PROCEDURE — 77386 HC IMRT TRMT DLVR COMPL: CPT

## 2018-09-10 RX ORDER — TAMSULOSIN HYDROCHLORIDE 0.4 MG/1
0.4 CAPSULE ORAL DAILY
Qty: 30 CAP | Refills: 3 | Status: SHIPPED | OUTPATIENT
Start: 2018-09-10 | End: 2019-01-16

## 2018-09-10 NOTE — PROGRESS NOTES
Patient: Yani Trejo MRN: [id number]  SSN: [id number] YOB: 1940  Age: 69 y.o. Sex: male DIAGNOSIS:  T2N0M0, Stage II anal SCC. Prior right walker-parotid lymph node mass. Unknown primary, [id number], Stage DENTON. TREATMENT SITE:  Anal canal 
 
DOSE and FRACTIONATION:  15/25 fractions, 2700 cGy of 4500 cGy planned, 0/3 boost, 0 cGy of 540 cGy planned. INTERVAL HISTORY:  Yani Trejo is a 69 y.o. male being treated for anal cancer with prior head and neck cancer. He was doing well without complaints week 1. Week 2 with oral pain and rash being treated by medical oncology but feels \"rough. \"  He did have drainage and required packing of his wound from the biopsy week 3. Doing well with controlled pain later week 3 (Norco 7.5). OBJECTIVE:  No findings week 1. Oral sores and erythematous facial rash. Visit Vitals  Wt 78.2 kg (172 lb 4.8 oz)  BMI 22.73 kg/m2 Lab Results Component Value Date/Time Sodium 126 (L) 09/06/2018 11:28 AM  
 Potassium 3.4 (L) 09/06/2018 11:28 AM  
 Chloride 91 (L) 09/06/2018 11:28 AM  
 CO2 26 09/06/2018 11:28 AM  
 Anion gap 9 09/06/2018 11:28 AM  
 Glucose 400 (H) 09/06/2018 11:28 AM  
 BUN 7 (L) 09/06/2018 11:28 AM  
 Creatinine 1.11 09/06/2018 11:28 AM  
 GFR est AA >60 09/06/2018 11:28 AM  
 GFR est non-AA >60 09/06/2018 11:28 AM  
 Calcium 8.8 09/06/2018 11:28 AM  
 Magnesium 1.6 (L) 09/06/2018 11:28 AM  
 Albumin 2.9 (L) 09/06/2018 11:28 AM  
 Protein, total 6.5 09/06/2018 11:28 AM  
 Globulin 3.6 (H) 09/06/2018 11:28 AM  
 A-G Ratio 0.8 (L) 09/06/2018 11:28 AM  
 AST (SGOT) 13 (L) 09/06/2018 11:28 AM  
 ALT (SGPT) 33 09/06/2018 11:28 AM  
 
Lab Results Component Value Date/Time  WBC 2.5 (L) 09/06/2018 11:28 AM  
 HGB 10.4 (L) 09/06/2018 11:28 AM  
 HCT 28.9 (L) 09/06/2018 11:28 AM  
 PLATELET 66 (L) 89/07/7068 11:28 AM  
 
 
ASSESSMENT and PLAN:  Yani Trejo is tolerating radiation as anticipated for the current dose and fraction. We will continue on as planned with another treatment visit anticipated next week. Anneliese Gardiner MD  
September 10, 2018

## 2018-09-11 ENCOUNTER — HOSPITAL ENCOUNTER (OUTPATIENT)
Dept: RADIATION ONCOLOGY | Age: 78
Discharge: HOME | End: 2018-09-11
Payer: MEDICARE

## 2018-09-11 ENCOUNTER — APPOINTMENT (OUTPATIENT)
Dept: RADIATION ONCOLOGY | Age: 78
End: 2018-09-11
Payer: MEDICARE

## 2018-09-11 PROCEDURE — 77386 HC IMRT TRMT DLVR COMPL: CPT

## 2018-09-11 PROCEDURE — 77336 RADIATION PHYSICS CONSULT: CPT

## 2018-09-12 ENCOUNTER — HOSPITAL ENCOUNTER (OUTPATIENT)
Dept: RADIATION ONCOLOGY | Age: 78
Discharge: HOME | End: 2018-09-12
Payer: MEDICARE

## 2018-09-12 ENCOUNTER — APPOINTMENT (OUTPATIENT)
Dept: RADIATION ONCOLOGY | Age: 78
End: 2018-09-12
Payer: MEDICARE

## 2018-09-12 PROCEDURE — 77386 HC IMRT TRMT DLVR COMPL: CPT

## 2018-09-13 ENCOUNTER — APPOINTMENT (OUTPATIENT)
Dept: RADIATION ONCOLOGY | Age: 78
End: 2018-09-13
Payer: MEDICARE

## 2018-09-13 ENCOUNTER — HOSPITAL ENCOUNTER (OUTPATIENT)
Dept: RADIATION ONCOLOGY | Age: 78
Discharge: HOME | End: 2018-09-13
Payer: MEDICARE

## 2018-09-13 PROCEDURE — 77300 RADIATION THERAPY DOSE PLAN: CPT

## 2018-09-13 PROCEDURE — 77386 HC IMRT TRMT DLVR COMPL: CPT

## 2018-09-14 ENCOUNTER — HOSPITAL ENCOUNTER (OUTPATIENT)
Dept: RADIATION ONCOLOGY | Age: 78
Discharge: HOME | End: 2018-09-14
Payer: MEDICARE

## 2018-09-14 ENCOUNTER — APPOINTMENT (OUTPATIENT)
Dept: RADIATION ONCOLOGY | Age: 78
End: 2018-09-14
Payer: MEDICARE

## 2018-09-14 PROCEDURE — 77386 HC IMRT TRMT DLVR COMPL: CPT

## 2018-09-17 ENCOUNTER — HOSPITAL ENCOUNTER (OUTPATIENT)
Dept: INFUSION THERAPY | Age: 78
Discharge: HOME | End: 2018-09-17
Payer: MEDICARE

## 2018-09-17 ENCOUNTER — HOSPITAL ENCOUNTER (OUTPATIENT)
Dept: LAB | Age: 78
Discharge: HOME | End: 2018-09-17
Payer: MEDICARE

## 2018-09-17 ENCOUNTER — HOSPITAL ENCOUNTER (OUTPATIENT)
Dept: RADIATION ONCOLOGY | Age: 78
Discharge: HOME | End: 2018-09-17
Payer: MEDICARE

## 2018-09-17 ENCOUNTER — APPOINTMENT (OUTPATIENT)
Dept: RADIATION ONCOLOGY | Age: 78
End: 2018-09-17
Payer: MEDICARE

## 2018-09-17 DIAGNOSIS — C21.0 ANAL CARCINOMA (HCC): ICD-10-CM

## 2018-09-17 DIAGNOSIS — R11.2 NON-INTRACTABLE VOMITING WITH NAUSEA, UNSPECIFIED VOMITING TYPE: ICD-10-CM

## 2018-09-17 DIAGNOSIS — C79.89 SQUAMOUS CELL CARCINOMA METASTATIC TO HEAD AND NECK WITH UNKNOWN PRIMARY SITE (HCC): ICD-10-CM

## 2018-09-17 DIAGNOSIS — E87.1 HYPONATREMIA: ICD-10-CM

## 2018-09-17 DIAGNOSIS — C80.1 SQUAMOUS CELL CARCINOMA METASTATIC TO HEAD AND NECK WITH UNKNOWN PRIMARY SITE (HCC): ICD-10-CM

## 2018-09-17 LAB
ALBUMIN SERPL-MCNC: 2.8 G/DL (ref 3.2–4.6)
ALBUMIN/GLOB SERPL: 0.8 {RATIO} (ref 1.2–3.5)
ALP SERPL-CCNC: 102 U/L (ref 50–136)
ALT SERPL-CCNC: 16 U/L (ref 12–65)
ANION GAP SERPL CALC-SCNC: 9 MMOL/L (ref 7–16)
AST SERPL-CCNC: 8 U/L (ref 15–37)
BASOPHILS # BLD: 0 K/UL (ref 0–0.2)
BASOPHILS NFR BLD: 0 % (ref 0–2)
BILIRUB SERPL-MCNC: 0.2 MG/DL (ref 0.2–1.1)
BUN SERPL-MCNC: 10 MG/DL (ref 8–23)
CALCIUM SERPL-MCNC: 8.7 MG/DL (ref 8.3–10.4)
CHLORIDE SERPL-SCNC: 89 MMOL/L (ref 98–107)
CO2 SERPL-SCNC: 27 MMOL/L (ref 21–32)
CREAT SERPL-MCNC: 1.07 MG/DL (ref 0.8–1.5)
DIFFERENTIAL METHOD BLD: ABNORMAL
EOSINOPHIL # BLD: 0 K/UL (ref 0–0.8)
EOSINOPHIL NFR BLD: 2 % (ref 0.5–7.8)
ERYTHROCYTE [DISTWIDTH] IN BLOOD BY AUTOMATED COUNT: 12.9 % (ref 11.9–14.6)
GLOBULIN SER CALC-MCNC: 3.7 G/DL (ref 2.3–3.5)
GLUCOSE SERPL-MCNC: 352 MG/DL (ref 65–100)
HCT VFR BLD AUTO: 28.1 % (ref 41.1–50.3)
HGB BLD-MCNC: 10 G/DL (ref 13.6–17.2)
IMM GRANULOCYTES # BLD: 0 K/UL (ref 0–0.5)
IMM GRANULOCYTES NFR BLD AUTO: 1 % (ref 0–5)
LYMPHOCYTES # BLD: 0.2 K/UL (ref 0.5–4.6)
LYMPHOCYTES NFR BLD: 9 % (ref 13–44)
MAGNESIUM SERPL-MCNC: 1.9 MG/DL (ref 1.8–2.4)
MCH RBC QN AUTO: 32.4 PG (ref 26.1–32.9)
MCHC RBC AUTO-ENTMCNC: 35.6 G/DL (ref 31.4–35)
MCV RBC AUTO: 90.9 FL (ref 79.6–97.8)
MONOCYTES # BLD: 0.4 K/UL (ref 0.1–1.3)
MONOCYTES NFR BLD: 16 % (ref 4–12)
NEUTS SEG # BLD: 1.8 K/UL (ref 1.7–8.2)
NEUTS SEG NFR BLD: 72 % (ref 43–78)
NRBC # BLD: 0 K/UL (ref 0–0.2)
PLATELET # BLD AUTO: 145 K/UL (ref 150–450)
PMV BLD AUTO: 8.4 FL (ref 9.4–12.3)
POTASSIUM SERPL-SCNC: 3.8 MMOL/L (ref 3.5–5.1)
PROT SERPL-MCNC: 6.5 G/DL (ref 6.3–8.2)
RBC # BLD AUTO: 3.09 M/UL (ref 4.23–5.67)
SODIUM SERPL-SCNC: 125 MMOL/L (ref 136–145)
WBC # BLD AUTO: 2.5 K/UL (ref 4.3–11.1)

## 2018-09-17 PROCEDURE — 83735 ASSAY OF MAGNESIUM: CPT

## 2018-09-17 PROCEDURE — 96374 THER/PROPH/DIAG INJ IV PUSH: CPT

## 2018-09-17 PROCEDURE — 85025 COMPLETE CBC W/AUTO DIFF WBC: CPT

## 2018-09-17 PROCEDURE — 74011250636 HC RX REV CODE- 250/636: Performed by: INTERNAL MEDICINE

## 2018-09-17 PROCEDURE — 80053 COMPREHEN METABOLIC PANEL: CPT

## 2018-09-17 PROCEDURE — 77386 HC IMRT TRMT DLVR COMPL: CPT

## 2018-09-17 RX ORDER — SODIUM CHLORIDE 9 MG/ML
500 INJECTION, SOLUTION INTRAVENOUS CONTINUOUS
Status: ACTIVE | OUTPATIENT
Start: 2018-09-17 | End: 2018-09-18

## 2018-09-17 RX ORDER — ONDANSETRON 2 MG/ML
8 INJECTION INTRAMUSCULAR; INTRAVENOUS ONCE
Status: COMPLETED | OUTPATIENT
Start: 2018-09-17 | End: 2018-09-17

## 2018-09-17 RX ADMIN — ONDANSETRON 8 MG: 2 INJECTION INTRAMUSCULAR; INTRAVENOUS at 13:31

## 2018-09-17 NOTE — PROGRESS NOTES
Patient: Bianca Rodríguez MRN: [id number]  SSN: [id number] YOB: 1940  Age: 69 y.o. Sex: male DIAGNOSIS:  T2N0M0, Stage II anal SCC. Prior right walker-parotid lymph node mass. Unknown primary, [id number], Stage DENTON. TREATMENT SITE:  Anal canal 
 
DOSE and FRACTIONATION:  20/25 fractions, 3600 cGy of 4500 cGy planned, 0/3 boost, 0 cGy of 540 cGy planned. INTERVAL HISTORY:  Bianca Rodríguez is a 69 y.o. male being treated for anal cancer with prior head and neck cancer. He was doing well without complaints week 1. Week 2 with oral pain and rash being treated by medical oncology but feels \"rough. \"  He did have drainage and required packing of his wound from the biopsy week 3. Doing well with controlled pain later week 3 (Norco 7.5). No new issues week 4. OBJECTIVE:  No findings week 1. Oral sores and erythematous facial rash. Anal tumor appears to be responding nicely week 4. There were no vitals taken for this visit. Lab Results Component Value Date/Time Sodium 125 (L) 09/17/2018 11:26 AM  
 Potassium 3.8 09/17/2018 11:26 AM  
 Chloride 89 (L) 09/17/2018 11:26 AM  
 CO2 27 09/17/2018 11:26 AM  
 Anion gap 9 09/17/2018 11:26 AM  
 Glucose 352 (H) 09/17/2018 11:26 AM  
 BUN 10 09/17/2018 11:26 AM  
 Creatinine 1.07 09/17/2018 11:26 AM  
 GFR est AA >60 09/17/2018 11:26 AM  
 GFR est non-AA >60 09/17/2018 11:26 AM  
 Calcium 8.7 09/17/2018 11:26 AM  
 Magnesium 1.9 09/17/2018 11:26 AM  
 Albumin 2.8 (L) 09/17/2018 11:26 AM  
 Protein, total 6.5 09/17/2018 11:26 AM  
 Globulin 3.7 (H) 09/17/2018 11:26 AM  
 A-G Ratio 0.8 (L) 09/17/2018 11:26 AM  
 AST (SGOT) 8 (L) 09/17/2018 11:26 AM  
 ALT (SGPT) 16 09/17/2018 11:26 AM  
 
Lab Results Component Value Date/Time  WBC 2.5 (L) 09/17/2018 11:26 AM  
 HGB 10.0 (L) 09/17/2018 11:26 AM  
 HCT 28.1 (L) 09/17/2018 11:26 AM  
 PLATELET 273 (L) 43/41/6904 11:26 AM  
 
 
 ASSESSMENT and PLAN:  Oliver Chanel is tolerating radiation as anticipated for the current dose and fraction. We will continue on as planned with another treatment visit anticipated next week. Valerie Mitchell MD  
September 17, 2018

## 2018-09-17 NOTE — PROGRESS NOTES
Arrived to the [facility]. 5FU pump placement completed. Patient tolerated well. Any issues or concerns during appointment: None. Patient aware of next infusion appointment on 09.21.2018 (date) at 1600 (time). Discharged in wheelchair to Radiation Therapy being pushed by spouse.

## 2018-09-18 ENCOUNTER — HOSPITAL ENCOUNTER (OUTPATIENT)
Dept: RADIATION ONCOLOGY | Age: 78
Discharge: HOME | End: 2018-09-18
Payer: MEDICARE

## 2018-09-18 ENCOUNTER — APPOINTMENT (OUTPATIENT)
Dept: RADIATION ONCOLOGY | Age: 78
End: 2018-09-18
Payer: MEDICARE

## 2018-09-18 PROCEDURE — 77336 RADIATION PHYSICS CONSULT: CPT

## 2018-09-18 PROCEDURE — 77386 HC IMRT TRMT DLVR COMPL: CPT

## 2018-09-19 ENCOUNTER — APPOINTMENT (OUTPATIENT)
Dept: RADIATION ONCOLOGY | Age: 78
End: 2018-09-19
Payer: MEDICARE

## 2018-09-19 ENCOUNTER — HOSPITAL ENCOUNTER (OUTPATIENT)
Dept: RADIATION ONCOLOGY | Age: 78
Discharge: HOME | End: 2018-09-19
Payer: MEDICARE

## 2018-09-19 PROCEDURE — 77386 HC IMRT TRMT DLVR COMPL: CPT

## 2018-09-19 PROCEDURE — 77338 DESIGN MLC DEVICE FOR IMRT: CPT

## 2018-09-20 ENCOUNTER — HOSPITAL ENCOUNTER (OUTPATIENT)
Dept: RADIATION ONCOLOGY | Age: 78
Discharge: HOME | End: 2018-09-20
Payer: MEDICARE

## 2018-09-20 ENCOUNTER — APPOINTMENT (OUTPATIENT)
Dept: RADIATION ONCOLOGY | Age: 78
End: 2018-09-20
Payer: MEDICARE

## 2018-09-20 PROCEDURE — 77386 HC IMRT TRMT DLVR COMPL: CPT

## 2018-09-21 ENCOUNTER — APPOINTMENT (OUTPATIENT)
Dept: RADIATION ONCOLOGY | Age: 78
End: 2018-09-21
Payer: MEDICARE

## 2018-09-21 ENCOUNTER — HOSPITAL ENCOUNTER (OUTPATIENT)
Dept: INFUSION THERAPY | Age: 78
Discharge: HOME | End: 2018-09-21
Payer: MEDICARE

## 2018-09-21 ENCOUNTER — HOSPITAL ENCOUNTER (OUTPATIENT)
Dept: RADIATION ONCOLOGY | Age: 78
Discharge: HOME | End: 2018-09-21
Payer: MEDICARE

## 2018-09-21 VITALS
OXYGEN SATURATION: 97 % | WEIGHT: 176 LBS | RESPIRATION RATE: 18 BRPM | SYSTOLIC BLOOD PRESSURE: 173 MMHG | HEART RATE: 91 BPM | BODY MASS INDEX: 23.87 KG/M2 | TEMPERATURE: 97.3 F | DIASTOLIC BLOOD PRESSURE: 85 MMHG

## 2018-09-21 PROCEDURE — 77386 HC IMRT TRMT DLVR COMPL: CPT

## 2018-09-21 PROCEDURE — 96523 IRRIG DRUG DELIVERY DEVICE: CPT

## 2018-09-21 PROCEDURE — 74011250636 HC RX REV CODE- 250/636: Performed by: INTERNAL MEDICINE

## 2018-09-21 RX ORDER — SODIUM CHLORIDE 0.9 % (FLUSH) 0.9 %
10-30 SYRINGE (ML) INJECTION AS NEEDED
Status: DISCONTINUED | OUTPATIENT
Start: 2018-09-21 | End: 2018-09-25 | Stop reason: HOSPADM

## 2018-09-21 RX ORDER — HEPARIN 100 UNIT/ML
500 SYRINGE INTRAVENOUS AS NEEDED
Status: ACTIVE | OUTPATIENT
Start: 2018-09-21 | End: 2018-09-21

## 2018-09-21 RX ADMIN — Medication 10 ML: at 16:00

## 2018-09-21 RX ADMIN — HEPARIN 500 UNITS: 100 SYRINGE at 16:00

## 2018-09-21 NOTE — PROGRESS NOTES
Arrived to the [facility]. 5 FU pump completed. Patient tolerated well Any issues or c oncerns during appointment: No. 
Patient has no future appointments in OPI @ this time Patient discharged ambulatory with wife

## 2018-09-24 ENCOUNTER — HOSPITAL ENCOUNTER (OUTPATIENT)
Dept: RADIATION ONCOLOGY | Age: 78
Discharge: HOME | End: 2018-09-24
Payer: MEDICARE

## 2018-09-24 ENCOUNTER — APPOINTMENT (OUTPATIENT)
Dept: RADIATION ONCOLOGY | Age: 78
End: 2018-09-24
Payer: MEDICARE

## 2018-09-24 PROCEDURE — 77386 HC IMRT TRMT DLVR COMPL: CPT

## 2018-09-24 NOTE — PROGRESS NOTES
Patient: Rey Loyd MRN: [id number]  SSN: [id number] YOB: 1940  Age: 69 y.o. Sex: male DIAGNOSIS:  T2N0M0, Stage II anal SCC. Prior right walker-parotid lymph node mass. Unknown primary, [id number], Stage DENTON. TREATMENT SITE:  Anal canal 
 
DOSE and FRACTIONATION:  25/25 fractions, 4500 cGy of 4500 cGy planned, 0/3 boost, 0 cGy of 540 cGy planned. INTERVAL HISTORY:  Rey Loyd is a 69 y.o. male being treated for anal cancer with prior head and neck cancer. He was doing well without complaints week 1. Week 2 with oral pain and rash being treated by medical oncology but feels \"rough. \"  He did have drainage and required packing of his wound from the biopsy week 3. Doing well with controlled pain later week 3 (Norco 7.5). No new issues week 4. Very sore and swollen anteriorly week 5. OBJECTIVE:  No findings week 1. Oral sores and erythematous facial rash. Anal tumor appears to be responding nicely week 4. Diffuse erythema anteriorly. There were no vitals taken for this visit. Lab Results Component Value Date/Time Sodium 125 (L) 09/17/2018 11:26 AM  
 Potassium 3.8 09/17/2018 11:26 AM  
 Chloride 89 (L) 09/17/2018 11:26 AM  
 CO2 27 09/17/2018 11:26 AM  
 Anion gap 9 09/17/2018 11:26 AM  
 Glucose 352 (H) 09/17/2018 11:26 AM  
 BUN 10 09/17/2018 11:26 AM  
 Creatinine 1.07 09/17/2018 11:26 AM  
 GFR est AA >60 09/17/2018 11:26 AM  
 GFR est non-AA >60 09/17/2018 11:26 AM  
 Calcium 8.7 09/17/2018 11:26 AM  
 Magnesium 1.9 09/17/2018 11:26 AM  
 Albumin 2.8 (L) 09/17/2018 11:26 AM  
 Protein, total 6.5 09/17/2018 11:26 AM  
 Globulin 3.7 (H) 09/17/2018 11:26 AM  
 A-G Ratio 0.8 (L) 09/17/2018 11:26 AM  
 AST (SGOT) 8 (L) 09/17/2018 11:26 AM  
 ALT (SGPT) 16 09/17/2018 11:26 AM  
 
Lab Results Component Value Date/Time  WBC 2.5 (L) 09/17/2018 11:26 AM  
 HGB 10.0 (L) 09/17/2018 11:26 AM  
 HCT 28.1 (L) 09/17/2018 11:26 AM  
 PLATELET 978 (L) 59/61/7735 11:26 AM  
 
 
ASSESSMENT and PLAN:  Linnette Christy is tolerating radiation as anticipated for the current dose and fraction. Contemplated a break but he want to complete his final 3 fractions. Will plan for 2 week follow up. Andrea Bundy MD  
September 24, 2018

## 2018-09-25 ENCOUNTER — APPOINTMENT (OUTPATIENT)
Dept: RADIATION ONCOLOGY | Age: 78
End: 2018-09-25
Payer: MEDICARE

## 2018-09-25 ENCOUNTER — HOSPITAL ENCOUNTER (OUTPATIENT)
Dept: RADIATION ONCOLOGY | Age: 78
Discharge: HOME | End: 2018-09-25
Payer: MEDICARE

## 2018-09-25 PROCEDURE — 77386 HC IMRT TRMT DLVR COMPL: CPT

## 2018-09-26 ENCOUNTER — HOSPITAL ENCOUNTER (OUTPATIENT)
Dept: RADIATION ONCOLOGY | Age: 78
Discharge: HOME | End: 2018-09-26
Payer: MEDICARE

## 2018-09-26 ENCOUNTER — APPOINTMENT (OUTPATIENT)
Dept: RADIATION ONCOLOGY | Age: 78
End: 2018-09-26
Payer: MEDICARE

## 2018-09-26 PROCEDURE — 77336 RADIATION PHYSICS CONSULT: CPT

## 2018-09-26 PROCEDURE — 77386 HC IMRT TRMT DLVR COMPL: CPT

## 2018-09-27 ENCOUNTER — HOSPITAL ENCOUNTER (OUTPATIENT)
Dept: RADIATION ONCOLOGY | Age: 78
Discharge: HOME | End: 2018-09-27
Payer: MEDICARE

## 2018-09-27 ENCOUNTER — APPOINTMENT (OUTPATIENT)
Dept: RADIATION ONCOLOGY | Age: 78
End: 2018-09-27
Payer: MEDICARE

## 2018-09-27 PROCEDURE — 77386 HC IMRT TRMT DLVR COMPL: CPT

## 2018-10-04 ENCOUNTER — HOSPITAL ENCOUNTER (OUTPATIENT)
Dept: LAB | Age: 78
Discharge: HOME | End: 2018-10-04
Payer: MEDICARE

## 2018-10-04 DIAGNOSIS — C21.0 ANAL CARCINOMA (HCC): ICD-10-CM

## 2018-10-04 LAB
ALBUMIN SERPL-MCNC: 2.7 G/DL (ref 3.2–4.6)
ALBUMIN/GLOB SERPL: 0.8 {RATIO}
ALP SERPL-CCNC: 109 U/L (ref 50–136)
ALT SERPL-CCNC: 21 U/L (ref 12–65)
ANION GAP SERPL CALC-SCNC: 8 MMOL/L
AST SERPL-CCNC: 15 U/L (ref 15–37)
BASOPHILS # BLD: 0 K/UL (ref 0–0.2)
BASOPHILS NFR BLD: 1 % (ref 0–2)
BILIRUB SERPL-MCNC: 0.3 MG/DL (ref 0.2–1.1)
BUN SERPL-MCNC: 10 MG/DL (ref 8–23)
CALCIUM SERPL-MCNC: 8.6 MG/DL (ref 8.3–10.4)
CHLORIDE SERPL-SCNC: 88 MMOL/L (ref 98–107)
CO2 SERPL-SCNC: 30 MMOL/L (ref 21–32)
CREAT SERPL-MCNC: 1 MG/DL (ref 0.8–1.5)
DIFFERENTIAL METHOD BLD: ABNORMAL
EOSINOPHIL # BLD: 0 K/UL (ref 0–0.8)
EOSINOPHIL NFR BLD: 1 % (ref 0.5–7.8)
ERYTHROCYTE [DISTWIDTH] IN BLOOD BY AUTOMATED COUNT: 14.7 % (ref 11.9–14.6)
GLOBULIN SER CALC-MCNC: 3.5 G/DL
GLUCOSE SERPL-MCNC: 407 MG/DL (ref 65–100)
HCT VFR BLD AUTO: 27.8 % (ref 41.1–50.3)
HGB BLD-MCNC: 9.8 G/DL (ref 13.6–17.2)
IMM GRANULOCYTES # BLD: 0 K/UL (ref 0–0.5)
IMM GRANULOCYTES NFR BLD AUTO: 1 % (ref 0–5)
LYMPHOCYTES # BLD: 0.2 K/UL (ref 0.5–4.6)
LYMPHOCYTES NFR BLD: 9 % (ref 13–44)
MAGNESIUM SERPL-MCNC: 1.8 MG/DL (ref 1.8–2.4)
MCH RBC QN AUTO: 32.3 PG (ref 26.1–32.9)
MCHC RBC AUTO-ENTMCNC: 35.3 G/DL (ref 31.4–35)
MCV RBC AUTO: 91.7 FL (ref 79.6–97.8)
MONOCYTES # BLD: 0.6 K/UL (ref 0.1–1.3)
MONOCYTES NFR BLD: 22 % (ref 4–12)
NEUTS SEG # BLD: 1.7 K/UL (ref 1.7–8.2)
NEUTS SEG NFR BLD: 66 % (ref 43–78)
NRBC # BLD: 0.01 K/UL (ref 0–0.2)
PLATELET # BLD AUTO: 144 K/UL (ref 150–450)
PMV BLD AUTO: 8.3 FL (ref 9.4–12.3)
POTASSIUM SERPL-SCNC: 3.5 MMOL/L (ref 3.5–5.1)
PROT SERPL-MCNC: 6.2 G/DL (ref 6.3–8.2)
RBC # BLD AUTO: 3.03 M/UL (ref 4.23–5.67)
SODIUM SERPL-SCNC: 126 MMOL/L (ref 136–145)
WBC # BLD AUTO: 2.6 K/UL (ref 4.3–11.1)

## 2018-10-04 PROCEDURE — 85025 COMPLETE CBC W/AUTO DIFF WBC: CPT

## 2018-10-04 PROCEDURE — 83735 ASSAY OF MAGNESIUM: CPT

## 2018-10-04 PROCEDURE — 36415 COLL VENOUS BLD VENIPUNCTURE: CPT

## 2018-10-04 PROCEDURE — 80053 COMPREHEN METABOLIC PANEL: CPT

## 2018-10-11 ENCOUNTER — HOSPITAL ENCOUNTER (OUTPATIENT)
Dept: RADIATION ONCOLOGY | Age: 78
Discharge: HOME | End: 2018-10-11
Payer: MEDICARE

## 2018-10-11 VITALS
BODY MASS INDEX: 23.15 KG/M2 | SYSTOLIC BLOOD PRESSURE: 145 MMHG | WEIGHT: 170.7 LBS | OXYGEN SATURATION: 97 % | RESPIRATION RATE: 18 BRPM | HEART RATE: 85 BPM | DIASTOLIC BLOOD PRESSURE: 86 MMHG | TEMPERATURE: 97.8 F

## 2018-10-11 DIAGNOSIS — C21.0 ANUS CANCER (HCC): Primary | ICD-10-CM

## 2018-10-11 PROCEDURE — 99211 OFF/OP EST MAY X REQ PHY/QHP: CPT

## 2018-10-11 NOTE — PROGRESS NOTES
Patient: Leydi Leavitt MRN: [id number]  SSN: [id number] YOB: 1940  Age: 69 y.o. Sex: male Other Providers:  Gerardo Mercado MD, Dustin Ling MD 
 
DIAGNOSIS: T2N0M0, Stage II anal SCC.   
Prior right walker-parotid lymph node mass. Unknown primary, [id number], Stage DENTON PREVIOUS TREATMENT: 
1) 9/27/18:  Completion of definitive chemotherapy and radiation for his anal squamous cell carcinoma, 5040 cGy in 28 fractions. Concurrent MMC and 5-Fu Unknown primary treatment. 1)  Lymph node excision 9/9/15 of right neck/parapharyngeal mass 2) Adjuvant radiation with gross disease present, 70 Gy in 35 fractions to the gross disease,      63 Gy to the ipsilateral neck, and 56 Gy to the contralateral neck.  Delivered with concurrent weekly cisplatin. Completed radiation: 12/28/2015 INTERVAL HISTORY:  Leydi Leavitt is a 69 y.o. male previously known to me as result of treatment for his unknown primary head and neck cancer. Kenn Lechuga presented with a right neck mass that had been growing for several months. He was evaluated by his primary care physician and eventually referred to Dr. Aaron Perez for further workup. His pertinent medical history includes hypertension and diabetes. He is a lifelong nonsmoker and nondrinker. His workup included a CT of the neck and soft tissues which showed a large necrotic mass in the right submandibular region measuring 5 x 4.1 cm. The appearance was most suggestive of a large necrotic lymph node. There was no potential primary lesion noted. He was taken for a selective neck dissection where the right neck parapharyngeal mass was excised. We reviewed the operative report where this was excised from the skull base where gross tumor was felt to be present on the resection. This was followed by a PET/CT scan 9/17/2015.  This confirmed residual soft tissue abnormality at the superior margin of this previously noted large right neck mass. This was associated with FDG activity. He was presented at our multidisciplinary head and neck tumor board and felt his tumor was consistent with an unknown primary and planned to treat him as such. He also had tooth extractions at our request in anticipation of radiation. Sandra Godinez completed treatment now 2 weeks ago. The patient had expected side effects including oral and pharyngeal pain.  He did require supplementation with his feeding tube, oral analgesics, and a fentanyl patch.  These medications were titrated throughout his treatment successfully.  We also used magic mouthwash regularly.  While he did have difficulty with confluent mucositis, he was able to complete all of his treatments without unplanned treatment breaks.  At 2 weeks out, his oral pain was slightly improved and while he still had trouble eating, he has noted dramatic improvement and can eat nearly anything at this point but has no taste. He notes that his mouth is dry and has an edematous tongue.  At 3 months his weight was increasing although he still used his feeding tube with 2 cans of ensure and medications.  Overall he felt he was recovering perhaps more slowly than expected and hoped but has made significant strides since that time. [facility] did have his feeding tube removed 4/11/2016.  At time of his last follow up in our office 10/27/17, we was eating and drinking well, denying trouble with swallowing. He has xerostomia, well controlled with fluids. He has full ROM with his neck. He continued to feel thickening on the right side of his neck (surgical site) that was tender, but didn't appreciate change in size. He denied headaches.  
  
In July 2018, he related a history of having been treated for hemorrhoids over the prior 5 years with various topical preparations.  Ultimately, because of persistence of symptoms he was referred to a gastroenterologist and a colonoscopy performed.  Unfortunately, biopsies of the anal region showed a poorly differentiated tumor with both glandular and neuroendocrine features.  This was subsequently confirmed at the [facility] in [facility] and felt to be consistent with an anal SCC. He underwent a PET scan which showed some activity in the anal skin as well as a 1.2 cm lymph node in the left inguinal region with some slight SUV activity.  There was no evidence of disease dissemination.  He denied other complaints.  His case was presented at tumor board and it was thought that perhaps he could be treated with an excision of the perianal skin alone. However, after surgical evaluation he had circumferential involvement of the perianal skin and anal canal with a tight stricture and was therefore not amenable to local resection. As a result definitive chemotherapy and radiation was recommended which was the rationale for our consultation. He did undergo a lymph node biopsy of the left inguinal region which was negative 8/1/2018. Ultimately recommended definitive chemotherapy and radiation which was completed 9/27/18. He was doing well without complaints week 1. Week 2 with oral pain and rash being treated by medical oncology but feels \"rough. \"  He did have drainage and required packing of his wound from the biopsy week 3. Doing well with controlled pain later week 3 (Norco 7.5). No new issues week 4. Very sore and swollen anteriorly week 5. He did ultimately complete all therapy. He was seen 1 month following treatment. He had see medical culture 10/4/2018. He was having persistent perineal pain which was being managed with hydrocodone every 3-4 hours. He had an unpredictable bowel movements with intermittent constipation and diarrhea. He was able to maintain his weight and had no sign of infection, abdominal distention, or discomfort. He was also followed with palliative care. At 1 month he was having more issues with constipation and was taking Colace and stool softener. UPDATED PAST MEDICAL HISTORY:  Since our prior encounter, Agustin Martin has not been hospitalized. There have been no significant changes to the medical history. MEDICATIONS:  
 
Current Outpatient Prescriptions:  
  HYDROcodone-acetaminophen (NORCO) 7.5-325 mg per tablet, Take 1-2 Tabs by mouth every six (6) hours as needed for Pain. Max Daily Amount: 8 Tabs., Disp: 90 Tab, Rfl: 0 
  tamsulosin (FLOMAX) 0.4 mg capsule, Take 1 Cap by mouth daily. , Disp: 30 Cap, Rfl: 3 
  magic mouthwash solution, Magic mouth wash  Maalox Lidocaine 2% viscous  Diphenhydramine oral solution   Pharmacy to mix equal portions of ingredients to a total volume as indicated in the dispense amount. Take 10 ml 4 times a day swish and spit sore mouth and swish and swallow for sore throat, Disp: 480 mL, Rfl: 1 
  ondansetron (ZOFRAN ODT) 8 mg disintegrating tablet, Take 1 Tab by mouth every eight (8) hours as needed for Nausea. Indications: CANCER CHEMOTHERAPY-INDUCED NAUSEA AND VOMITING, Disp: 60 Tab, Rfl: 1 
  lidocaine-prilocaine (EMLA) topical cream, Apply  to affected area as needed for Pain., Disp: 30 g, Rfl: 0 
  sodium chloride 1 gram tablet, TAKE 1 TAB BY MOUTH DAILY. , Disp: 90 Tab, Rfl: 0 
  amLODIPine (NORVASC) 10 mg tablet, Take 10 mg by mouth daily. In am, Disp: , Rfl:  
  lisinopril (PRINIVIL, ZESTRIL) 20 mg tablet, Take 20 mg by mouth daily. In am  Indications: hypertension, Disp: , Rfl:  
  glimepiride (AMARYL) 4 mg tablet, Take 4 mg by mouth every morning. Indications: TYPE 2 DIABETES MELLITUS, Disp: , Rfl:  
  metFORMIN (GLUCOPHAGE) 1,000 mg tablet, Take 1,000 mg by mouth daily. In am  Indications: type 2 diabetes mellitus, Disp: , Rfl: ALLERGIES:  
No Known Allergies PHYSICAL EXAMINATION:  
ECOG Performance status 0 
VITAL SIGNS:  
Visit Vitals  /86  Pulse 85  Temp 97.8 °F (36.6 °C)  Resp 18  Wt 77.4 kg (170 lb 11.2 oz)  SpO2 97%  BMI 23.15 kg/m2 GENERAL: The patient is well-developed, ambulatory, alert and in no acute distress. CARDIOVASCULAR: Heart is regular rate and rhythm. There are no murmurs rubs or gallups. Radial pulses are 2+ RESPIRATORY: Lungs are clear to auscultation and percussion. There is normal respiratory effort. GASTROINTESTINAL: The abdomen is soft, non-tender, nondistended with no hepatospelnomagaly. Digital rectal examination: deferred. Substantial decrease in size of his primary mass seen in the perineal area with continued dry desquamation, no moist areas. LABORATORY:  
Lab Results Component Value Date/Time Sodium 126 (L) 10/04/2018 11:20 AM  
 Potassium 3.5 10/04/2018 11:20 AM  
 Chloride 88 (L) 10/04/2018 11:20 AM  
 CO2 30 10/04/2018 11:20 AM  
 Anion gap 8 10/04/2018 11:20 AM  
 Glucose 407 (H) 10/04/2018 11:20 AM  
 BUN 10 10/04/2018 11:20 AM  
 Creatinine 1.00 10/04/2018 11:20 AM  
 GFR est AA >60 10/04/2018 11:20 AM  
 GFR est non-AA >60 10/04/2018 11:20 AM  
 Calcium 8.6 10/04/2018 11:20 AM  
 Magnesium 1.8 10/04/2018 11:20 AM  
 Albumin 2.7 (L) 10/04/2018 11:20 AM  
 Protein, total 6.2 (L) 10/04/2018 11:20 AM  
 Globulin 3.5 10/04/2018 11:20 AM  
 A-G Ratio 0.8 10/04/2018 11:20 AM  
 AST (SGOT) 15 10/04/2018 11:20 AM  
 ALT (SGPT) 21 10/04/2018 11:20 AM  
 
Lab Results Component Value Date/Time WBC 2.6 (L) 10/04/2018 11:20 AM  
 HGB 9.8 (L) 10/04/2018 11:20 AM  
 HCT 27.8 (L) 10/04/2018 11:20 AM  
 PLATELET 856 (L) 31/23/0210 11:20 AM  
 
 
RADIOLOGY: 
No new imaging. TUMOR STATUS:  Recently completed therapy, pending repeat evaluation. IMPRESSION:  Leydi Leavitt is a 69 y.o. male with a prior head and neck cancer and a newly developed anal canal squamous cell carcinoma with extensive perianal skin involvement. He was treated with definitive chemotherapy and radiation and had significant peritoneal irritation as would be expected.   He did however complete all of his therapy and 2 weeks following treatment was doing fair. His pain was controlled with hydrocodone. He had a nice clinical response. We discussed further surveillance which will include a PET/CT in 2 months. He has continued follow up with medical oncology and palliative care. Therefore I'll see him back after his PET scan in 2 months or sooner if needed. PLAN:   
1) Patient is recovering as anticipated from his prior course of radiotherapy despite persistent issues. Continue management under palliative care and medical oncology. Kvng Greene 2) Future follow up will be coordinated with other providers. I'll see him back in 2 months with a PET CT. Portions of this note were copied from prior encounters and reviewed for accuracy, currency, and represent documentation and tasks completed during this encounter. I verify and attest these portions to be unchanged from prior visits. Lupe Malone MD 
10/11/18

## 2018-10-11 NOTE — PROGRESS NOTES
Here today for FUP post RT to anus which ended 9/27/18. Pt stated his bottom is feeling better, but it is still a pain level of 7/10 with burning and soreness. A Mepilex dressing was applied to left groin biopsy site which remains slightly open, but is clean and dry. Pt will return in 12/2018 with a PET scan prior.

## 2018-10-18 ENCOUNTER — HOSPITAL ENCOUNTER (OUTPATIENT)
Dept: LAB | Age: 78
Discharge: HOME | End: 2018-10-18
Payer: MEDICARE

## 2018-10-18 DIAGNOSIS — C21.0 ANAL CARCINOMA (HCC): ICD-10-CM

## 2018-10-18 LAB
ALBUMIN SERPL-MCNC: 3 G/DL (ref 3.2–4.6)
ALBUMIN/GLOB SERPL: 0.8 {RATIO} (ref 1.2–3.5)
ALP SERPL-CCNC: 104 U/L (ref 50–136)
ALT SERPL-CCNC: 19 U/L (ref 12–65)
ANION GAP SERPL CALC-SCNC: 7 MMOL/L (ref 7–16)
AST SERPL-CCNC: 12 U/L (ref 15–37)
BASOPHILS # BLD: 0 K/UL (ref 0–0.2)
BASOPHILS NFR BLD: 1 % (ref 0–2)
BILIRUB SERPL-MCNC: 0.3 MG/DL (ref 0.2–1.1)
BUN SERPL-MCNC: 9 MG/DL (ref 8–23)
CALCIUM SERPL-MCNC: 8.9 MG/DL (ref 8.3–10.4)
CHLORIDE SERPL-SCNC: 90 MMOL/L (ref 98–107)
CO2 SERPL-SCNC: 29 MMOL/L (ref 21–32)
CREAT SERPL-MCNC: 1.05 MG/DL (ref 0.8–1.5)
DIFFERENTIAL METHOD BLD: ABNORMAL
EOSINOPHIL # BLD: 0 K/UL (ref 0–0.8)
EOSINOPHIL NFR BLD: 1 % (ref 0.5–7.8)
ERYTHROCYTE [DISTWIDTH] IN BLOOD BY AUTOMATED COUNT: 14.7 % (ref 11.9–14.6)
GLOBULIN SER CALC-MCNC: 3.6 G/DL (ref 2.3–3.5)
GLUCOSE SERPL-MCNC: 369 MG/DL (ref 65–100)
HCT VFR BLD AUTO: 29.4 % (ref 41.1–50.3)
HGB BLD-MCNC: 10.1 G/DL (ref 13.6–17.2)
IMM GRANULOCYTES # BLD: 0.1 K/UL (ref 0–0.5)
IMM GRANULOCYTES NFR BLD AUTO: 1 % (ref 0–5)
LYMPHOCYTES # BLD: 0.7 K/UL (ref 0.5–4.6)
LYMPHOCYTES NFR BLD: 17 % (ref 13–44)
MAGNESIUM SERPL-MCNC: 2.1 MG/DL (ref 1.8–2.4)
MCH RBC QN AUTO: 32.5 PG (ref 26.1–32.9)
MCHC RBC AUTO-ENTMCNC: 34.4 G/DL (ref 31.4–35)
MCV RBC AUTO: 94.5 FL (ref 79.6–97.8)
MONOCYTES # BLD: 0.6 K/UL (ref 0.1–1.3)
MONOCYTES NFR BLD: 14 % (ref 4–12)
NEUTS SEG # BLD: 2.9 K/UL (ref 1.7–8.2)
NEUTS SEG NFR BLD: 67 % (ref 43–78)
NRBC # BLD: 0 K/UL (ref 0–0.2)
PLATELET # BLD AUTO: 166 K/UL (ref 150–450)
PMV BLD AUTO: 8.2 FL (ref 9.4–12.3)
POTASSIUM SERPL-SCNC: 4.1 MMOL/L (ref 3.5–5.1)
PROT SERPL-MCNC: 6.6 G/DL (ref 6.3–8.2)
RBC # BLD AUTO: 3.11 M/UL (ref 4.23–5.67)
SODIUM SERPL-SCNC: 126 MMOL/L (ref 136–145)
WBC # BLD AUTO: 4.3 K/UL (ref 4.3–11.1)

## 2018-10-18 PROCEDURE — 83735 ASSAY OF MAGNESIUM: CPT

## 2018-10-18 PROCEDURE — 80053 COMPREHEN METABOLIC PANEL: CPT

## 2018-10-18 PROCEDURE — 36415 COLL VENOUS BLD VENIPUNCTURE: CPT

## 2018-10-18 PROCEDURE — 85025 COMPLETE CBC W/AUTO DIFF WBC: CPT

## 2018-10-19 NOTE — DISCHARGE SUMMARY
Patient: Leydi Leavitt MRN: [id number]  SSN: [id number] YOB: 1940  Age: 69 y.o. Sex: male Leydi Leavitt is a 69 y.o. male is a 69 y.o. male who was seen by radiation oncology at the request of Dr. Louis Medeiros. He is previously known to me as result of treatment for his unknown primary head and neck cancer. Kenn Lechuga presented with a right neck mass that had been growing for several months. He was evaluated by his primary care physician and eventually referred to Dr. Aaron Perez for further workup. His pertinent medical history includes hypertension and diabetes. He is a lifelong nonsmoker and nondrinker. His workup included a CT of the neck and soft tissues which showed a large necrotic mass in the right submandibular region measuring 5 x 4.1 cm. The appearance was most suggestive of a large necrotic lymph node. There was no potential primary lesion noted. He was taken for a selective neck dissection where the right neck parapharyngeal mass was excised. We reviewed the operative report where this was excised from the skull base where gross tumor was felt to be present on the resection. This was followed by a PET/CT scan 9/17/2015. This confirmed residual soft tissue abnormality at the superior margin of this previously noted large right neck mass. This was associated with FDG activity. He was presented at our multidisciplinary head and neck tumor board and felt his tumor was consistent with an unknown primary and planned to treat him as such. He also had tooth extractions at our request in anticipation of radiation. Kenn Lechuga completed treatment now 2 weeks ago.  The patient had expected side effects including oral and pharyngeal pain.  He did require supplementation with his feeding tube, oral analgesics, and a fentanyl patch.  These medications were titrated throughout his treatment successfully.  We also used magic mouthwash regularly.  While he did have difficulty with confluent mucositis, he was able to complete all of his treatments without unplanned treatment breaks.  At 2 weeks out, his oral pain was slightly improved and while he still had trouble eating, he has noted dramatic improvement and can eat nearly anything at this point but has no taste. He notes that his mouth is dry and has an edematous tongue.  At 3 months his weight was increasing although he still used his feeding tube with 2 cans of ensure and medications.  Overall he felt he was recovering perhaps more slowly than expected and hoped but has made significant strides since that time. [facility] did have his feeding tube removed 4/11/2016.  At time of his last follow up in our office 10/27/17, we was eating and drinking well, denying trouble with swallowing. He had xerostomia, well controlled with fluids. He had full ROM with his neck. He continued to feel thickening on the right side of his neck (surgical site) that was tender, but didn't appreciate change in size. He denied headaches.  
  
In July 2018, he related a history of having been treated for hemorrhoids over the prior 5 years with various topical preparations.  Ultimately, because of persistence of symptoms he was referred to a gastroenterologist and a colonoscopy performed. Unfortunately, biopsies of the anal region showed a poorly differentiated tumor with both glandular and neuroendocrine features.  This was subsequently confirmed at the [facility] in [place] and felt to be consistent with an anal SCC.  He underwent a PET scan which showed some activity in the anal skin as well as a 1.2 cm lymph node in the left inguinal region with some slight SUV activity.  There was no evidence of disease dissemination.  He denied other complaints.  His case was presented at tumor board and it was thought that perhaps he could be treated with an excision of the perianal skin alone. However, after surgical evaluation he had circumferential involvement of the perianal skin and anal canal with a tight stricture and was therefore not amenable to local resection. As a result definitive chemotherapy and radiation was recommended. He did undergo a lymph node biopsy of the left inguinal region which was negative 8/1/2018. 
  
Please see the details of his treatment below as well as my plans for future care and surveillance. Please do not hesitate to call with questions or concerns at any time. DIAGNOSIS:  T2N0M0, Stage II anal SCC. Prior right walker-parotid lymph node mass. Unknown primary, [id number], Stage DENTON.  
  
PREVIOUS TREATMENT:   
1) None for anal cancer 2) Unknown primary treatment.  
-Lymph node excision 9/9/15 of right neck/parapharyngeal mass 
-Adjuvant radiation with gross disease present, 70 Gy in 35 fractions to the gross disease,      63 Gy to the ipsilateral neck, and 56 Gy to the contralateral neck. Delivered with concurrent weekly cisplatin. Completed radiation: 12/28/2015 TREATMENT DATES:   
1) Initial: 8/20/2018 - 09/24/18 2) Boost: 09/25/18 - 09/27/18 ANATOMIC SITE:  Pelvis DOSE:  4500 cGy in 25 fractions pelvis, 540 cGy in 3 fractions pelvis boost. Total 5040 cGy. BEAM ARRANGEMENT: IMRT - 6MV pelvis, IMRT - 6MV pelvis boost.  
 
CHEMOTHERAPY: Fluorouracil and mitomycin TREATMENT COURSE:   Mr Olya Davis did well without complaints week 1. On week 2, he complained of oral pain and rash that was being treated by medical oncology but felt \"rough. \"  He did have drainage and required packing of his wound from the biopsy week 3. He did well with controlled pain later week 3 (Norco 7.5). No new issues week 4. Anal tumor appeared to be responding nicely. Very sore and swollen anteriorly week 5. PLAN:  The patient will be seen in follow up in 2 weeks to assess acute and sub acute side effects.    
 
Alesia Krishnan NP    
 
 Jamar Sweeney MD 
10/24/18

## 2018-10-21 ENCOUNTER — PATIENT OUTREACH (OUTPATIENT)
Dept: CASE MANAGEMENT | Age: 78
End: 2018-10-21

## 2018-10-21 NOTE — PROGRESS NOTES
Saw patient after visit on 10-18-18 for navigation touch point. Navigation will sign off after Jan visit if scans are good. For now,  Encouraged pt to call with any questions or concerns. He will return in Jan 2018 with scans.

## 2018-10-31 ENCOUNTER — APPOINTMENT (OUTPATIENT)
Dept: RADIATION ONCOLOGY | Age: 78
End: 2018-10-31
Payer: MEDICARE

## 2018-12-06 ENCOUNTER — HOSPITAL ENCOUNTER (OUTPATIENT)
Dept: PET IMAGING | Age: 78
Discharge: HOME | End: 2018-12-06
Payer: MEDICARE

## 2018-12-06 DIAGNOSIS — C21.0 ANUS CANCER (HCC): ICD-10-CM

## 2018-12-06 PROCEDURE — A9552 F18 FDG: HCPCS

## 2018-12-06 PROCEDURE — 74011636320 HC RX REV CODE- 636/320: Performed by: RADIOLOGY

## 2018-12-06 RX ADMIN — DIATRIZOATE MEGLUMINE AND DIATRIZOATE SODIUM 10 ML: 600; 100 SOLUTION ORAL; RECTAL at 14:40

## 2018-12-13 ENCOUNTER — HOSPITAL ENCOUNTER (OUTPATIENT)
Dept: RADIATION ONCOLOGY | Age: 78
Discharge: HOME | End: 2018-12-13
Payer: MEDICARE

## 2018-12-13 VITALS
RESPIRATION RATE: 18 BRPM | WEIGHT: 169.9 LBS | TEMPERATURE: 97.6 F | DIASTOLIC BLOOD PRESSURE: 107 MMHG | BODY MASS INDEX: 23.04 KG/M2 | SYSTOLIC BLOOD PRESSURE: 183 MMHG | HEART RATE: 87 BPM | OXYGEN SATURATION: 99 %

## 2018-12-13 PROCEDURE — 99211 OFF/OP EST MAY X REQ PHY/QHP: CPT

## 2018-12-13 NOTE — PROGRESS NOTES
Patient: Haley Kraft MRN: [id number]  SSN: [id number]    YOB: 1940  Age: 69 y.o. Sex: male      Other Providers:  Margaret Mccartney MD, Waldemar Mayo MD    DIAGNOSIS: [id number], Stage II anal SCC.    Prior right walker-parotid lymph node mass. Unknown primary, [id number], Stage DENTON    PREVIOUS TREATMENT:  1) 9/27/18:  Completion of definitive chemotherapy and radiation for his anal squamous cell carcinoma, 5040 cGy in 28 fractions. Concurrent MMC and 5-Fu    Unknown primary treatment. 1)  Lymph node excision 9/9/15 of right neck/parapharyngeal mass  2) Adjuvant radiation with gross disease present, 70 Gy in 35 fractions to the gross disease,      63 Gy to the ipsilateral neck, and 56 Gy to the contralateral neck.  Delivered with concurrent weekly cisplatin. Completed radiation: 12/28/2015    INTERVAL HISTORY:  Haley Kraft is a 69 y.o. male previously known to us as result of treatment for his unknown primary head and neck cancer. Kang Baker presented with a right neck mass that had been growing for several months. He was evaluated by his primary care physician and eventually referred to Dr. Radha Prieto for further workup. His pertinent medical history includes hypertension and diabetes. He is a lifelong nonsmoker and nondrinker. His workup included a CT of the neck and soft tissues which showed a large necrotic mass in the right submandibular region measuring 5 x 4.1 cm. The appearance was most suggestive of a large necrotic lymph node. There was no potential primary lesion noted. He was taken for a selective neck dissection where the right neck parapharyngeal mass was excised. We reviewed the operative report where this was excised from the skull base where gross tumor was felt to be present on the resection. This was followed by a PET/CT scan 9/17/2015. This confirmed residual soft tissue abnormality at the superior margin of this previously noted large right neck mass.  This was associated with FDG activity. He was presented at our multidisciplinary head and neck tumor board and felt his tumor was consistent with an unknown primary and planned to treat him as such. He also had tooth extractions at our request in anticipation of radiation. Kwan Pierce completed treatment now 2 weeks ago. The patient had expected side effects including oral and pharyngeal pain.  He did require supplementation with his feeding tube, oral analgesics, and a fentanyl patch.  These medications were titrated throughout his treatment successfully.  We also used magic mouthwash regularly.  While he did have difficulty with confluent mucositis, he was able to complete all of his treatments without unplanned treatment breaks.  At 2 weeks out, his oral pain was slightly improved and while he still had trouble eating, he has noted dramatic improvement and can eat nearly anything at this point but has no taste. He notes that his mouth is dry and has an edematous tongue.  At 3 months his weight was increasing although he still used his feeding tube with 2 cans of ensure and medications.  Overall he felt he was recovering perhaps more slowly than expected and hoped but has made significant strides since that time. [facility] did have his feeding tube removed 4/11/2016.  At time of his last follow up in our office 10/27/17, kayla was eating and drinking well, denying trouble with swallowing. He has xerostomia, well controlled with fluids. He has full ROM with his neck. He continued to feel thickening on the right side of his neck (surgical site) that was tender, but didn't appreciate change in size. He denied headaches.      In July 2018, he related a history of having been treated for hemorrhoids over the prior 5 years with various topical preparations.  Ultimately, because of persistence of symptoms he was referred to a gastroenterologist and a colonoscopy performed.  Unfortunately, biopsies of the anal region showed a poorly differentiated tumor with both glandular and neuroendocrine features.  This was subsequently confirmed at the [facility] in [place] and felt to be consistent with an anal SCC. He underwent a PET scan which showed some activity in the anal skin as well as a 1.2 cm lymph node in the left inguinal region with some slight SUV activity.  There was no evidence of disease dissemination.  He denied other complaints.  His case was presented at tumor board and it was thought that perhaps he could be treated with an excision of the perianal skin alone. However, after surgical evaluation he had circumferential involvement of the perianal skin and anal canal with a tight stricture and was therefore not amenable to local resection. As a result definitive chemotherapy and radiation was recommended which was the rationale for our consultation. He did undergo a lymph node biopsy of the left inguinal region which was negative 8/1/2018. Ultimately recommended definitive chemotherapy and radiation which was completed 9/27/18. TREATMENT COURSE  He was doing well without complaints week 1. Week 2 with oral pain and rash being treated by medical oncology but feels \"rough. \"  He did have drainage and required packing of his wound from the biopsy week 3. Doing well with controlled pain later week 3 (Norco 7.5). No new issues week 4. Very sore and swollen anteriorly week 5. He did ultimately complete all therapy. He was seen 1 month following treatment. He had see medical culture 10/4/2018. He was having persistent perineal pain which was being managed with hydrocodone every 3-4 hours. He had an unpredictable bowel movements with intermittent constipation and diarrhea. He was able to maintain his weight and had no sign of infection, abdominal distention, or discomfort. He was also followed with palliative care. At 1 month he was having more issues with constipation and was taking Colace and stool softener.       12/12/18: Returns today for routine follow up, 3 months after completing radiation. He continues with painful defecation. He reports that having a bowel movements brings tears to his eyes. He is using laxatives along with stool softeners with moderate results. He has been followed by Dr. Oniel Diaz for assessment and packing of left groin wound. According to Mrs Al Sam they are no longer packing the wound. The wound is no longer draining. He has Norco at home but has been very reluctant in taking the medication for fear of addiction. UPDATED PAST MEDICAL HISTORY:  Since our prior encounter, Atha Sacks has not been hospitalized. There have been no significant changes to the medical history. MEDICATIONS:     Current Outpatient Medications:     HYDROcodone-acetaminophen (NORCO) 7.5-325 mg per tablet, Take 1-2 Tabs by mouth every six (6) hours as needed for Pain. Max Daily Amount: 8 Tabs., Disp: 90 Tab, Rfl: 0    sodium chloride 1 gram tablet, Take 1 Tab by mouth two (2) times a day., Disp: 180 Tab, Rfl: 3    tamsulosin (FLOMAX) 0.4 mg capsule, Take 1 Cap by mouth daily. , Disp: 30 Cap, Rfl: 3    magic mouthwash solution, Magic mouth wash  Maalox Lidocaine 2% viscous  Diphenhydramine oral solution   Pharmacy to mix equal portions of ingredients to a total volume as indicated in the dispense amount. Take 10 ml 4 times a day swish and spit sore mouth and swish and swallow for sore throat, Disp: 480 mL, Rfl: 1    ondansetron (ZOFRAN ODT) 8 mg disintegrating tablet, Take 1 Tab by mouth every eight (8) hours as needed for Nausea. Indications: CANCER CHEMOTHERAPY-INDUCED NAUSEA AND VOMITING, Disp: 60 Tab, Rfl: 1    lidocaine-prilocaine (EMLA) topical cream, Apply  to affected area as needed for Pain., Disp: 30 g, Rfl: 0    amLODIPine (NORVASC) 10 mg tablet, Take 10 mg by mouth daily. In am, Disp: , Rfl:     lisinopril (PRINIVIL, ZESTRIL) 20 mg tablet, Take 20 mg by mouth daily.  In am  Indications: hypertension, Disp: , Rfl:    glimepiride (AMARYL) 4 mg tablet, Take 4 mg by mouth every morning. Indications: TYPE 2 DIABETES MELLITUS, Disp: , Rfl:     metFORMIN (GLUCOPHAGE) 1,000 mg tablet, Take 1,000 mg by mouth daily. In am  Indications: type 2 diabetes mellitus, Disp: , Rfl:     ALLERGIES:   No Known Allergies    PHYSICAL EXAMINATION:   ECOG Performance status 0  VITAL SIGNS: Blood pressure 183/107  Pulse 87  Temp 97.6  Weight 169.9     GENERAL: The patient is well-developed, ambulatory, alert and in no acute distress. Rectal examination: Substantial decrease in size of his primary mass seen in the perineal area with no moist areas. LABORATORY:   Lab Results   Component Value Date/Time    Sodium 126 (L) 10/18/2018 02:30 PM    Potassium 4.1 10/18/2018 02:30 PM    Chloride 90 (L) 10/18/2018 02:30 PM    CO2 29 10/18/2018 02:30 PM    Anion gap 7 10/18/2018 02:30 PM    Glucose 369 (H) 10/18/2018 02:30 PM    BUN 9 10/18/2018 02:30 PM    Creatinine 1.05 10/18/2018 02:30 PM    GFR est AA >60 10/18/2018 02:30 PM    GFR est non-AA >60 10/18/2018 02:30 PM    Calcium 8.9 10/18/2018 02:30 PM    Magnesium 2.1 10/18/2018 02:30 PM    Albumin 3.0 (L) 10/18/2018 02:30 PM    Protein, total 6.6 10/18/2018 02:30 PM    Globulin 3.6 (H) 10/18/2018 02:30 PM    A-G Ratio 0.8 (L) 10/18/2018 02:30 PM    AST (SGOT) 12 (L) 10/18/2018 02:30 PM    ALT (SGPT) 19 10/18/2018 02:30 PM     Lab Results   Component Value Date/Time    WBC 4.3 10/18/2018 02:30 PM    HGB 10.1 (L) 10/18/2018 02:30 PM    HCT 29.4 (L) 10/18/2018 02:30 PM    PLATELET 833 96/48/2854 02:30 PM       RADIOLOGY:  No new imaging. TUMOR STATUS:  Recently completed therapy, pending repeat evaluation. PET/CT: 12/6/2018     INDICATION: Anal cancer status post chemotherapy and radiation treatment.      COMPARISON: PET scan 7/10/2018     FINDINGS:   NECK/CHEST:  No worrisome activity is seen in the neck. Only symmetric benign activity is  seen within neck base musculature.  Stable irregular density and calcifications  are seen within the right upper neck soft tissues which are not felt to be  significantly changed and do not demonstrate abnormal activity. No enlarging  lymph nodes are seen.     Only physiologic activity is seen in the chest. No evolving adenopathy is seen. A trace pericardial effusion is seen. No worrisome pulmonary nodules or masses  are seen.     ABDOMEN/PELVIS:  Only physiologic activity is seen in the abdomen. No evolving adenopathy is  seen. Moderate atherosclerotic calcification is seen of the abdominal aorta.     New focal density is seen in the left groin soft tissues on image 232 measuring  2.4 cm x 1.9 cm in size and demonstrating a maximum SUV of 2.4 g/mL. This is  favored to represent an inflammatory process given the only borderline increased  activity and likely corresponds to the recently drained abscess noted on recent  clinic notes. Prior activity of the anal soft tissues is improved now  demonstrating a maximum SUV of 2.1 g/mm. Stranding is seen within superficial  and deep soft tissues of the lower pelvis likely related to interval radiation  treatment. No evolving adenopathy or worrisome activity is seen.     BONES:  No aggressive or hypermetabolic osseous lesion is seen. Deformity is seen in the  proximal right femur suggesting prior fracture and internal fixation. IMPRESSION:   1. Improving activity at the anus. No findings concerning for disease progression are seen. Only new findings are seen in the pelvis as described  above consistent with interval treatment. IMPRESSION:  Ciera Enriquez is a 69 y.o. male with a prior head and neck cancer and a newly developed anal canal squamous cell carcinoma with extensive perianal skin involvement. He was treated with definitive chemotherapy and radiation and had significant peritoneal irritation as would be expected. He did however complete all of his therapy and 2 weeks following treatment was doing fair.   His pain was controlled with hydrocodone. He had a nice clinical response. Now 3 months after completing radiation therapy, he continues with painful defecation. Takes laxative along and stool softeners with little results. He is reluctant to take his norco for fear of addiction. He has a good clinical response. He has been seeing Dr Sajan Angela for assessment/packing of the left groin wound. Mr and Mrs Winston Hooper are asking to transfer his care to another surgeon. PLAN:    1) Patient is recovering as anticipated from his prior course of radiotherapy despite persistent issues. Continue management under palliative care and medical oncology. 2) Referral placed to Dr. aCterina Jerez. Future follow up will be coordinated with other providers. Emily Bass NP  12/13/18     Portions of this note were copied from prior encounters and reviewed for accuracy, currency, and represent documentation and tasks completed during this encounter. I verify and attest these portions to be unchanged from prior visits. Patient was seen and examined with the nurse practitioner, Kenrick Andrew. I agree with the history, assessment, and plan as detailed above. Additional information as follows:   Patient is doing fair after completion of his treatment for his anal canal cancer. He is suffering significant continued discomfort which is likely to be expected. Imaging was extensively reviewed and shows a very favorable local response. I'm optimistic for this response as well as he after reviewing personally with him. His exam was also favorable despite continued discomfort. Lungs clear, abdomen is benign, extremities without clubbing, cyanosis, or edema. Perineal region showed extensive pigment change consistent with prior treatment response. There was some continued moist desquamation directly around the perineum where he was uncomfortable. PLAN-Follow up in 3 months.   He will also be referred to Dr. Amparo Altamirano for follow-up as he was wishing to move providers from Dr. Thelma Cardenas.  I contacted Dr. Trent Arciniega directly to make them aware of the findings and changes that have been seen previously and the discomfort that he was having. Portions of this note were copied from prior encounters and reviewed for accuracy, currency, and represent documentation and tasks completed during this encounter. I verify and attest these portions to be unchanged from prior visits.     Rita White MD  12/17/18

## 2018-12-13 NOTE — PROGRESS NOTES
Pt here for FUP post RT to the anus which ended 9/27/18. Pt also completed head and neck RT 12/28/15. The 12/6/18 PET indicated that his cancer is improving. Pt stated he does not want to have bowel movements because his rectal area feels \"torn up. \"  Pt stated he is using laxatives and stool softeners. He takes Norco for a pain level of 8-10/10 after he has a B.M. Pt also has a left groin wound that his wife stated has \"hardened\" and she is no longer packing. Pt has asked for a referral to a different MD besides Dr. Dana Smith.  A referral will be made to Dr. Will Grubbs for rectal cancer post RT per Dr. Zaina Stern.

## 2018-12-14 DIAGNOSIS — C20 RECTAL CANCER (HCC): Primary | ICD-10-CM

## 2019-01-01 ENCOUNTER — HOSPITAL ENCOUNTER (OUTPATIENT)
Dept: LAB | Age: 79
Discharge: HOME | End: 2019-08-08
Payer: MEDICARE

## 2019-01-01 ENCOUNTER — HOSPITAL ENCOUNTER (OUTPATIENT)
Dept: RADIATION ONCOLOGY | Age: 79
Discharge: HOME | End: 2019-08-29
Payer: MEDICARE

## 2019-01-01 ENCOUNTER — PATIENT OUTREACH (OUTPATIENT)
Dept: CASE MANAGEMENT | Age: 79
End: 2019-01-01

## 2019-01-01 ENCOUNTER — HOSPITAL ENCOUNTER (OUTPATIENT)
Dept: LAB | Age: 79
Discharge: HOME | End: 2019-12-27
Payer: MEDICARE

## 2019-01-01 ENCOUNTER — HOSPITAL ENCOUNTER (OUTPATIENT)
Dept: LAB | Age: 79
Discharge: HOME | End: 2019-09-24
Payer: MEDICARE

## 2019-01-01 ENCOUNTER — HOSPITAL ENCOUNTER (OUTPATIENT)
Dept: PET IMAGING | Age: 79
Discharge: HOME | End: 2019-09-10
Attending: INTERNAL MEDICINE
Payer: MEDICARE

## 2019-01-01 VITALS
DIASTOLIC BLOOD PRESSURE: 98 MMHG | RESPIRATION RATE: 16 BRPM | SYSTOLIC BLOOD PRESSURE: 185 MMHG | HEART RATE: 73 BPM | BODY MASS INDEX: 23.96 KG/M2 | WEIGHT: 174.2 LBS | TEMPERATURE: 97.9 F

## 2019-01-01 DIAGNOSIS — C44.500: ICD-10-CM

## 2019-01-01 DIAGNOSIS — C21.0 ANAL CARCINOMA (HCC): ICD-10-CM

## 2019-01-01 LAB
ALBUMIN SERPL-MCNC: 3.4 G/DL (ref 3.2–4.6)
ALBUMIN SERPL-MCNC: 3.5 G/DL (ref 3.2–4.6)
ALBUMIN SERPL-MCNC: 3.6 G/DL (ref 3.2–4.6)
ALBUMIN/GLOB SERPL: 1 {RATIO} (ref 1.2–3.5)
ALBUMIN/GLOB SERPL: 1 {RATIO} (ref 1.2–3.5)
ALBUMIN/GLOB SERPL: 1.1 {RATIO} (ref 1.2–3.5)
ALP SERPL-CCNC: 118 U/L (ref 50–136)
ALP SERPL-CCNC: 61 U/L (ref 50–136)
ALP SERPL-CCNC: 68 U/L (ref 50–136)
ALT SERPL-CCNC: 15 U/L (ref 12–65)
ALT SERPL-CCNC: 17 U/L (ref 12–65)
ALT SERPL-CCNC: 19 U/L (ref 12–65)
ANION GAP SERPL CALC-SCNC: 3 MMOL/L (ref 7–16)
ANION GAP SERPL CALC-SCNC: 4 MMOL/L (ref 7–16)
ANION GAP SERPL CALC-SCNC: 8 MMOL/L (ref 7–16)
AST SERPL-CCNC: 10 U/L (ref 15–37)
AST SERPL-CCNC: 17 U/L (ref 15–37)
AST SERPL-CCNC: 6 U/L (ref 15–37)
BASOPHILS # BLD: 0 K/UL (ref 0–0.2)
BASOPHILS NFR BLD: 1 % (ref 0–2)
BILIRUB SERPL-MCNC: 0.3 MG/DL (ref 0.2–1.1)
BILIRUB SERPL-MCNC: 0.3 MG/DL (ref 0.2–1.1)
BILIRUB SERPL-MCNC: 0.4 MG/DL (ref 0.2–1.1)
BUN SERPL-MCNC: 11 MG/DL (ref 8–23)
BUN SERPL-MCNC: 11 MG/DL (ref 8–23)
BUN SERPL-MCNC: 12 MG/DL (ref 8–23)
CALCIUM SERPL-MCNC: 9 MG/DL (ref 8.3–10.4)
CALCIUM SERPL-MCNC: 9 MG/DL (ref 8.3–10.4)
CALCIUM SERPL-MCNC: 9.2 MG/DL (ref 8.3–10.4)
CHLORIDE SERPL-SCNC: 90 MMOL/L (ref 98–107)
CHLORIDE SERPL-SCNC: 93 MMOL/L (ref 98–107)
CHLORIDE SERPL-SCNC: 97 MMOL/L (ref 98–107)
CO2 SERPL-SCNC: 28 MMOL/L (ref 21–32)
CO2 SERPL-SCNC: 31 MMOL/L (ref 21–32)
CO2 SERPL-SCNC: 32 MMOL/L (ref 21–32)
CREAT SERPL-MCNC: 1.09 MG/DL (ref 0.8–1.5)
CREAT SERPL-MCNC: 1.13 MG/DL (ref 0.8–1.5)
CREAT SERPL-MCNC: 1.14 MG/DL (ref 0.8–1.5)
DIFFERENTIAL METHOD BLD: ABNORMAL
EOSINOPHIL # BLD: 0.1 K/UL (ref 0–0.8)
EOSINOPHIL NFR BLD: 3 % (ref 0.5–7.8)
EOSINOPHIL NFR BLD: 4 % (ref 0.5–7.8)
EOSINOPHIL NFR BLD: 4 % (ref 0.5–7.8)
ERYTHROCYTE [DISTWIDTH] IN BLOOD BY AUTOMATED COUNT: 12.7 % (ref 11.9–14.6)
ERYTHROCYTE [DISTWIDTH] IN BLOOD BY AUTOMATED COUNT: 12.8 % (ref 11.9–14.6)
ERYTHROCYTE [DISTWIDTH] IN BLOOD BY AUTOMATED COUNT: 12.9 % (ref 11.9–14.6)
GLOBULIN SER CALC-MCNC: 3.4 G/DL (ref 2.3–3.5)
GLOBULIN SER CALC-MCNC: 3.4 G/DL (ref 2.3–3.5)
GLOBULIN SER CALC-MCNC: 3.5 G/DL (ref 2.3–3.5)
GLUCOSE SERPL-MCNC: 265 MG/DL (ref 65–100)
GLUCOSE SERPL-MCNC: 267 MG/DL (ref 65–100)
GLUCOSE SERPL-MCNC: 327 MG/DL (ref 65–100)
HCT VFR BLD AUTO: 33.9 % (ref 41.1–50.3)
HCT VFR BLD AUTO: 34 % (ref 41.1–50.3)
HCT VFR BLD AUTO: 35.2 % (ref 41.1–50.3)
HGB BLD-MCNC: 11.7 G/DL (ref 13.6–17.2)
HGB BLD-MCNC: 12 G/DL (ref 13.6–17.2)
HGB BLD-MCNC: 12.1 G/DL (ref 13.6–17.2)
IMM GRANULOCYTES # BLD AUTO: 0 K/UL (ref 0–0.5)
IMM GRANULOCYTES NFR BLD AUTO: 0 % (ref 0–5)
IMM GRANULOCYTES NFR BLD AUTO: 0 % (ref 0–5)
IMM GRANULOCYTES NFR BLD AUTO: 1 % (ref 0–5)
LYMPHOCYTES # BLD: 0.5 K/UL (ref 0.5–4.6)
LYMPHOCYTES # BLD: 0.7 K/UL (ref 0.5–4.6)
LYMPHOCYTES # BLD: 0.7 K/UL (ref 0.5–4.6)
LYMPHOCYTES NFR BLD: 24 % (ref 13–44)
LYMPHOCYTES NFR BLD: 25 % (ref 13–44)
LYMPHOCYTES NFR BLD: 26 % (ref 13–44)
MAGNESIUM SERPL-MCNC: 1.8 MG/DL (ref 1.8–2.4)
MAGNESIUM SERPL-MCNC: 2 MG/DL (ref 1.8–2.4)
MCH RBC QN AUTO: 31.4 PG (ref 26.1–32.9)
MCH RBC QN AUTO: 32.3 PG (ref 26.1–32.9)
MCH RBC QN AUTO: 32.7 PG (ref 26.1–32.9)
MCHC RBC AUTO-ENTMCNC: 34.4 G/DL (ref 31.4–35)
MCHC RBC AUTO-ENTMCNC: 34.5 G/DL (ref 31.4–35)
MCHC RBC AUTO-ENTMCNC: 35.3 G/DL (ref 31.4–35)
MCV RBC AUTO: 91.4 FL (ref 79.6–97.8)
MCV RBC AUTO: 91.4 FL (ref 79.6–97.8)
MCV RBC AUTO: 94.7 FL (ref 79.6–97.8)
MONOCYTES # BLD: 0.3 K/UL (ref 0.1–1.3)
MONOCYTES # BLD: 0.4 K/UL (ref 0.1–1.3)
MONOCYTES # BLD: 0.4 K/UL (ref 0.1–1.3)
MONOCYTES NFR BLD: 13 % (ref 4–12)
MONOCYTES NFR BLD: 15 % (ref 4–12)
MONOCYTES NFR BLD: 16 % (ref 4–12)
NEUTS SEG # BLD: 1.2 K/UL (ref 1.7–8.2)
NEUTS SEG # BLD: 1.5 K/UL (ref 1.7–8.2)
NEUTS SEG # BLD: 1.5 K/UL (ref 1.7–8.2)
NEUTS SEG NFR BLD: 53 % (ref 43–78)
NEUTS SEG NFR BLD: 56 % (ref 43–78)
NEUTS SEG NFR BLD: 58 % (ref 43–78)
NRBC # BLD: 0 K/UL (ref 0–0.2)
PLATELET # BLD AUTO: 135 K/UL (ref 150–450)
PLATELET # BLD AUTO: 139 K/UL (ref 150–450)
PLATELET # BLD AUTO: 162 K/UL (ref 150–450)
PMV BLD AUTO: 7.8 FL (ref 9.4–12.3)
PMV BLD AUTO: 8 FL (ref 9.4–12.3)
PMV BLD AUTO: 8.2 FL (ref 9.4–12.3)
POTASSIUM SERPL-SCNC: 3.8 MMOL/L (ref 3.5–5.1)
POTASSIUM SERPL-SCNC: 4.2 MMOL/L (ref 3.5–5.1)
POTASSIUM SERPL-SCNC: 4.3 MMOL/L (ref 3.5–5.1)
PROT SERPL-MCNC: 6.8 G/DL (ref 6.3–8.2)
PROT SERPL-MCNC: 7 G/DL (ref 6.3–8.2)
PROT SERPL-MCNC: 7 G/DL (ref 6.3–8.2)
RBC # BLD AUTO: 3.58 M/UL (ref 4.23–5.67)
RBC # BLD AUTO: 3.72 M/UL (ref 4.23–5.67)
RBC # BLD AUTO: 3.85 M/UL (ref 4.23–5.67)
SODIUM SERPL-SCNC: 125 MMOL/L (ref 136–145)
SODIUM SERPL-SCNC: 128 MMOL/L (ref 136–145)
SODIUM SERPL-SCNC: 133 MMOL/L (ref 136–145)
WBC # BLD AUTO: 2.1 K/UL (ref 4.3–11.1)
WBC # BLD AUTO: 2.6 K/UL (ref 4.3–11.1)
WBC # BLD AUTO: 2.8 K/UL (ref 4.3–11.1)

## 2019-01-01 PROCEDURE — 36415 COLL VENOUS BLD VENIPUNCTURE: CPT

## 2019-01-01 PROCEDURE — A9552 F18 FDG: HCPCS

## 2019-01-01 PROCEDURE — 83735 ASSAY OF MAGNESIUM: CPT

## 2019-01-01 PROCEDURE — 99211 OFF/OP EST MAY X REQ PHY/QHP: CPT

## 2019-01-01 PROCEDURE — 85025 COMPLETE CBC W/AUTO DIFF WBC: CPT

## 2019-01-01 PROCEDURE — 74011636320 HC RX REV CODE- 636/320: Performed by: INTERNAL MEDICINE

## 2019-01-01 PROCEDURE — 80053 COMPREHEN METABOLIC PANEL: CPT

## 2019-01-01 RX ORDER — SODIUM CHLORIDE 0.9 % (FLUSH) 0.9 %
5-10 SYRINGE (ML) INJECTION
Status: COMPLETED | OUTPATIENT
Start: 2019-01-01 | End: 2019-01-01

## 2019-01-01 RX ADMIN — Medication 10 ML: at 12:43

## 2019-01-01 RX ADMIN — DIATRIZOATE MEGLUMINE AND DIATRIZOATE SODIUM 10 ML: 660; 100 LIQUID ORAL; RECTAL at 12:43

## 2019-01-15 RX ORDER — SODIUM CHLORIDE 0.9 % (FLUSH) 0.9 %
5-40 SYRINGE (ML) INJECTION AS NEEDED
Status: CANCELLED | OUTPATIENT
Start: 2019-01-15

## 2019-01-15 RX ORDER — SODIUM CHLORIDE 0.9 % (FLUSH) 0.9 %
5-40 SYRINGE (ML) INJECTION EVERY 8 HOURS
Status: CANCELLED | OUTPATIENT
Start: 2019-01-15

## 2019-01-16 ENCOUNTER — ANESTHESIA EVENT (OUTPATIENT)
Dept: SURGERY | Age: 79
End: 2019-01-16
Payer: MEDICARE

## 2019-01-17 ENCOUNTER — PATIENT OUTREACH (OUTPATIENT)
Dept: CASE MANAGEMENT | Age: 79
End: 2019-01-17

## 2019-01-17 ENCOUNTER — HOSPITAL ENCOUNTER (OUTPATIENT)
Dept: LAB | Age: 79
Discharge: HOME | End: 2019-01-17
Payer: MEDICARE

## 2019-01-17 DIAGNOSIS — C21.0 ANAL CARCINOMA (HCC): ICD-10-CM

## 2019-01-17 LAB
ALBUMIN SERPL-MCNC: 3.5 G/DL (ref 3.2–4.6)
ALBUMIN/GLOB SERPL: 1.1 {RATIO} (ref 1.2–3.5)
ALP SERPL-CCNC: 60 U/L (ref 50–136)
ALT SERPL-CCNC: 14 U/L (ref 12–65)
ANION GAP SERPL CALC-SCNC: 3 MMOL/L (ref 7–16)
AST SERPL-CCNC: 6 U/L (ref 15–37)
BASOPHILS # BLD: 0 K/UL (ref 0–0.2)
BASOPHILS NFR BLD: 1 % (ref 0–2)
BILIRUB SERPL-MCNC: 0.3 MG/DL (ref 0.2–1.1)
BUN SERPL-MCNC: 8 MG/DL (ref 8–23)
CALCIUM SERPL-MCNC: 8.7 MG/DL (ref 8.3–10.4)
CHLORIDE SERPL-SCNC: 97 MMOL/L (ref 98–107)
CO2 SERPL-SCNC: 31 MMOL/L (ref 21–32)
CREAT SERPL-MCNC: 0.97 MG/DL (ref 0.8–1.5)
DIFFERENTIAL METHOD BLD: ABNORMAL
EOSINOPHIL # BLD: 0.1 K/UL (ref 0–0.8)
EOSINOPHIL NFR BLD: 3 % (ref 0.5–7.8)
ERYTHROCYTE [DISTWIDTH] IN BLOOD BY AUTOMATED COUNT: 12.4 % (ref 11.9–14.6)
GLOBULIN SER CALC-MCNC: 3.3 G/DL (ref 2.3–3.5)
GLUCOSE SERPL-MCNC: 143 MG/DL (ref 65–100)
HCT VFR BLD AUTO: 33.1 % (ref 41.1–50.3)
HGB BLD-MCNC: 11.5 G/DL (ref 13.6–17.2)
IMM GRANULOCYTES # BLD AUTO: 0 K/UL (ref 0–0.5)
IMM GRANULOCYTES NFR BLD AUTO: 0 % (ref 0–5)
LYMPHOCYTES # BLD: 0.8 K/UL (ref 0.5–4.6)
LYMPHOCYTES NFR BLD: 31 % (ref 13–44)
MAGNESIUM SERPL-MCNC: 2 MG/DL (ref 1.8–2.4)
MCH RBC QN AUTO: 32.3 PG (ref 26.1–32.9)
MCHC RBC AUTO-ENTMCNC: 34.7 G/DL (ref 31.4–35)
MCV RBC AUTO: 93 FL (ref 79.6–97.8)
MONOCYTES # BLD: 0.4 K/UL (ref 0.1–1.3)
MONOCYTES NFR BLD: 17 % (ref 4–12)
NEUTS SEG # BLD: 1.2 K/UL (ref 1.7–8.2)
NEUTS SEG NFR BLD: 48 % (ref 43–78)
NRBC # BLD: 0 K/UL (ref 0–0.2)
PLATELET # BLD AUTO: 140 K/UL (ref 150–450)
PMV BLD AUTO: 7.9 FL (ref 9.4–12.3)
POTASSIUM SERPL-SCNC: 3.8 MMOL/L (ref 3.5–5.1)
PROT SERPL-MCNC: 6.8 G/DL (ref 6.3–8.2)
RBC # BLD AUTO: 3.56 M/UL (ref 4.23–5.67)
SODIUM SERPL-SCNC: 131 MMOL/L (ref 136–145)
WBC # BLD AUTO: 2.4 K/UL (ref 4.3–11.1)

## 2019-01-17 PROCEDURE — 36415 COLL VENOUS BLD VENIPUNCTURE: CPT

## 2019-01-17 PROCEDURE — 80053 COMPREHEN METABOLIC PANEL: CPT

## 2019-01-17 PROCEDURE — 85025 COMPLETE CBC W/AUTO DIFF WBC: CPT

## 2019-01-17 PROCEDURE — 83735 ASSAY OF MAGNESIUM: CPT

## 2019-01-17 NOTE — PROGRESS NOTES
I have reviewed the patients controlled substance prescription history, as maintained in the [place] prescription monitoring program, so that the prescription(s) for a  controlled substance can be given.

## 2019-01-17 NOTE — PROGRESS NOTES
Saw patient with Dr Yunior Rizzo and Chris Maradiaga for follow up for head and neck cancer and anal cancer. PET results show no findings concerning for disease progression. Pt completed radiation  ~ 9/28/19 and is still experiencing pain and incomplete healing at site. Pt c/o difficulty passing stool. Continues to take Norco once or twice daily. Pt has maintained weight throughout treatment. Na+ has improved to 131 and pt will continue to take sodium tablets. Pt will see Dr. Ana Aguilar tomorrow and RTC in 3 months.

## 2019-01-18 ENCOUNTER — HOSPITAL ENCOUNTER (OUTPATIENT)
Age: 79
Setting detail: OUTPATIENT SURGERY
Discharge: HOME | End: 2019-01-18
Attending: SURGERY | Admitting: SURGERY
Payer: MEDICARE

## 2019-01-18 ENCOUNTER — ANESTHESIA (OUTPATIENT)
Dept: SURGERY | Age: 79
End: 2019-01-18
Payer: MEDICARE

## 2019-01-18 VITALS
WEIGHT: 169.2 LBS | BODY MASS INDEX: 23.27 KG/M2 | SYSTOLIC BLOOD PRESSURE: 140 MMHG | RESPIRATION RATE: 19 BRPM | DIASTOLIC BLOOD PRESSURE: 66 MMHG | TEMPERATURE: 97.4 F | HEART RATE: 70 BPM | OXYGEN SATURATION: 96 %

## 2019-01-18 LAB — GLUCOSE BLD STRIP.AUTO-MCNC: 117 MG/DL (ref 65–100)

## 2019-01-18 PROCEDURE — 74011250636 HC RX REV CODE- 250/636

## 2019-01-18 PROCEDURE — 74011250636 HC RX REV CODE- 250/636: Performed by: ANESTHESIOLOGY

## 2019-01-18 PROCEDURE — 82962 GLUCOSE BLOOD TEST: CPT

## 2019-01-18 PROCEDURE — 76010000154 HC OR TIME FIRST 0.5 HR: Performed by: SURGERY

## 2019-01-18 PROCEDURE — 76060000031 HC ANESTHESIA FIRST 0.5 HR: Performed by: SURGERY

## 2019-01-18 PROCEDURE — 76210000021 HC REC RM PH II 0.5 TO 1 HR: Performed by: SURGERY

## 2019-01-18 PROCEDURE — 76210000063 HC OR PH I REC FIRST 0.5 HR: Performed by: SURGERY

## 2019-01-18 RX ORDER — LIDOCAINE HYDROCHLORIDE 10 MG/ML
0.1 INJECTION INFILTRATION; PERINEURAL AS NEEDED
Status: DISCONTINUED | OUTPATIENT
Start: 2019-01-18 | End: 2019-01-18 | Stop reason: HOSPADM

## 2019-01-18 RX ORDER — SODIUM CHLORIDE, SODIUM LACTATE, POTASSIUM CHLORIDE, CALCIUM CHLORIDE 600; 310; 30; 20 MG/100ML; MG/100ML; MG/100ML; MG/100ML
75 INJECTION, SOLUTION INTRAVENOUS CONTINUOUS
Status: DISCONTINUED | OUTPATIENT
Start: 2019-01-18 | End: 2019-01-18 | Stop reason: HOSPADM

## 2019-01-18 RX ORDER — LIDOCAINE HYDROCHLORIDE 20 MG/ML
INJECTION, SOLUTION EPIDURAL; INFILTRATION; INTRACAUDAL; PERINEURAL AS NEEDED
Status: DISCONTINUED | OUTPATIENT
Start: 2019-01-18 | End: 2019-01-18 | Stop reason: HOSPADM

## 2019-01-18 RX ORDER — SODIUM CHLORIDE 0.9 % (FLUSH) 0.9 %
5-40 SYRINGE (ML) INJECTION AS NEEDED
Status: DISCONTINUED | OUTPATIENT
Start: 2019-01-18 | End: 2019-01-18 | Stop reason: HOSPADM

## 2019-01-18 RX ORDER — HYDROMORPHONE HYDROCHLORIDE 2 MG/ML
0.5 INJECTION, SOLUTION INTRAMUSCULAR; INTRAVENOUS; SUBCUTANEOUS
Status: DISCONTINUED | OUTPATIENT
Start: 2019-01-18 | End: 2019-01-18 | Stop reason: HOSPADM

## 2019-01-18 RX ORDER — CEFAZOLIN SODIUM/WATER 2 G/20 ML
2 SYRINGE (ML) INTRAVENOUS ONCE
Status: DISCONTINUED | OUTPATIENT
Start: 2019-01-18 | End: 2019-01-18 | Stop reason: HOSPADM

## 2019-01-18 RX ORDER — FLUMAZENIL 0.1 MG/ML
0.2 INJECTION INTRAVENOUS AS NEEDED
Status: DISCONTINUED | OUTPATIENT
Start: 2019-01-18 | End: 2019-01-18 | Stop reason: HOSPADM

## 2019-01-18 RX ORDER — FENTANYL CITRATE 50 UG/ML
INJECTION, SOLUTION INTRAMUSCULAR; INTRAVENOUS AS NEEDED
Status: DISCONTINUED | OUTPATIENT
Start: 2019-01-18 | End: 2019-01-18 | Stop reason: HOSPADM

## 2019-01-18 RX ORDER — SODIUM CHLORIDE 0.9 % (FLUSH) 0.9 %
5-40 SYRINGE (ML) INJECTION EVERY 8 HOURS
Status: DISCONTINUED | OUTPATIENT
Start: 2019-01-18 | End: 2019-01-18 | Stop reason: HOSPADM

## 2019-01-18 RX ORDER — OXYCODONE HYDROCHLORIDE 5 MG/1
5 TABLET ORAL
Status: DISCONTINUED | OUTPATIENT
Start: 2019-01-18 | End: 2019-01-18 | Stop reason: HOSPADM

## 2019-01-18 RX ORDER — OXYCODONE HYDROCHLORIDE 5 MG/1
10 TABLET ORAL
Status: DISCONTINUED | OUTPATIENT
Start: 2019-01-18 | End: 2019-01-18 | Stop reason: HOSPADM

## 2019-01-18 RX ORDER — PROPOFOL 10 MG/ML
INJECTION, EMULSION INTRAVENOUS AS NEEDED
Status: DISCONTINUED | OUTPATIENT
Start: 2019-01-18 | End: 2019-01-18 | Stop reason: HOSPADM

## 2019-01-18 RX ORDER — DIPHENHYDRAMINE HYDROCHLORIDE 50 MG/ML
12.5 INJECTION, SOLUTION INTRAMUSCULAR; INTRAVENOUS
Status: DISCONTINUED | OUTPATIENT
Start: 2019-01-18 | End: 2019-01-18 | Stop reason: HOSPADM

## 2019-01-18 RX ORDER — NALOXONE HYDROCHLORIDE 0.4 MG/ML
0.1 INJECTION, SOLUTION INTRAMUSCULAR; INTRAVENOUS; SUBCUTANEOUS
Status: DISCONTINUED | OUTPATIENT
Start: 2019-01-18 | End: 2019-01-18 | Stop reason: HOSPADM

## 2019-01-18 RX ADMIN — PROPOFOL 30 MG: 10 INJECTION, EMULSION INTRAVENOUS at 11:33

## 2019-01-18 RX ADMIN — PROPOFOL 40 MG: 10 INJECTION, EMULSION INTRAVENOUS at 11:34

## 2019-01-18 RX ADMIN — PROPOFOL 50 MG: 10 INJECTION, EMULSION INTRAVENOUS at 11:30

## 2019-01-18 RX ADMIN — PROPOFOL 20 MG: 10 INJECTION, EMULSION INTRAVENOUS at 11:31

## 2019-01-18 RX ADMIN — SODIUM CHLORIDE, SODIUM LACTATE, POTASSIUM CHLORIDE, AND CALCIUM CHLORIDE 75 ML/HR: 600; 310; 30; 20 INJECTION, SOLUTION INTRAVENOUS at 09:44

## 2019-01-18 RX ADMIN — LIDOCAINE HYDROCHLORIDE 60 MG: 20 INJECTION, SOLUTION EPIDURAL; INFILTRATION; INTRACAUDAL; PERINEURAL at 11:30

## 2019-01-18 RX ADMIN — FENTANYL CITRATE 25 MCG: 50 INJECTION, SOLUTION INTRAMUSCULAR; INTRAVENOUS at 11:35

## 2019-01-18 NOTE — ANESTHESIA PREPROCEDURE EVALUATION
Anesthetic History No history of anesthetic complications Review of Systems / Medical History Pertinent labs reviewed Pulmonary Within defined limits Neuro/Psych Within defined limits Cardiovascular Hypertension Exercise tolerance: >4 METS 
  
GI/Hepatic/Renal 
  
GERD: well controlled Endo/Other Diabetes: type 2 Cancer (rectal, oral) Other Findings Physical Exam 
 
Airway Mallampati: II 
TM Distance: 4 - 6 cm Neck ROM: normal range of motion Mouth opening: Normal 
 
Comments: Asymmetric mouth opening from previous right neck surgery. Cardiovascular Regular rate and rhythm,  S1 and S2 normal,  no murmur, click, rub, or gallop Dental 
No notable dental hx Pulmonary Breath sounds clear to auscultation Abdominal 
GI exam deferred Other Findings Anesthetic Plan ASA: 3 Anesthesia type: total IV anesthesia Induction: Intravenous Anesthetic plan and risks discussed with: Patient and Spouse

## 2019-01-18 NOTE — ANESTHESIA POSTPROCEDURE EVALUATION
Procedure(s): EXAM UNDER ANESTHESIA (EUA) RECTAL. Anesthesia Post Evaluation Multimodal analgesia: multimodal analgesia used between 6 hours prior to anesthesia start to PACU discharge Patient location during evaluation: PACU Patient participation: complete - patient participated Level of consciousness: awake Pain management: adequate Airway patency: patent Anesthetic complications: no 
Cardiovascular status: acceptable and hemodynamically stable Respiratory status: acceptable Hydration status: acceptable Comments: Acceptable for discharge from PACU. Visit Vitals /52 Pulse 68 Temp 36.6 °C (97.8 °F) Resp 13 Wt 76.7 kg (169 lb 3.2 oz) SpO2 98% BMI 23.27 kg/m²

## 2019-01-18 NOTE — H&P
Chava An 1/18/2019 Date of Admission:  1/18/2019 Subjective:  
 
Patient is a 69 y.o.  male presents with need for repeat exam of anal canal after finishing radiation and chemotherapy treatment. Sarthak Garcia Patient Active Problem List  
 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  
Diagnosis Date  GERD (gastroesophageal reflux disease)   
 pt wife denies  Head and neck cancer ([address] 9/30/2015  
 radiation and chemo and and surgery  History of squamous cell carcinoma   
 to neck area- 38 radiation treatement and 8 chemo treatment  History of throat cancer 2015  
 peg tube for about 4 months  Hypercholesteremia   
 managed well with meds  Hypertension   
 managed well with meds  Rectal cancer ([address] 2018  
 28 radiation treatment and chemo pump  Type 2 diabetes mellitus ([address] oral agent only/AVG BS: /no s.s of hypoglycemia  Vomiting 2/23/2016  
 pt wife denies Past Surgical History:  
Procedure Laterality Date  HX COLONOSCOPY  05/2018  HX HEENT    
 sinus  HX HEENT  2015  
 surgery on right throat for squamous cell ca right ear wedge  HX LYMPH NODE DISSECTION    
 HX ORTHOPAEDIC Right 1966  
 right leg  HX OTHER SURGICAL  9/9/15 EXCISION OF RIGHT Neck and PARAPHARYNGEAL MASS/RIGHT EAR WEDGE RESECTION  
 HX OTHER SURGICAL    
 peg placed and removed  HX TONSILLECTOMY  HX VASCULAR ACCESS    
 placed and removed Prior to Admission Medications Prescriptions Last Dose Informant Patient Reported? Taking? HYDROcodone-acetaminophen (NORCO) 7.5-325 mg per tablet 1/18/2019 at 0800  No Yes Sig: Take 1-2 Tabs by mouth every six (6) hours as needed for Pain. Max Daily Amount: 8 Tabs. acetaminophen (TYLENOL) 325 mg tablet 1/17/2019 at Unknown time  Yes Yes Sig: Take 325 mg by mouth every four (4) hours as needed for Pain. amLODIPine (NORVASC) 10 mg tablet 1/18/2019 at 0800  Yes Yes Sig: Take 10 mg by mouth daily. In am  
docusate sodium (COLACE) 100 mg capsule 1/11/2019 at Unknown time  Yes Yes Sig: Take 100 mg by mouth as needed for Constipation. glimepiride (AMARYL) 4 mg tablet 1/17/2019 at Unknown time  Yes Yes Sig: Take 4 mg by mouth every morning. Indications: TYPE 2 DIABETES MELLITUS  
lidocaine-prilocaine (EMLA) topical cream Not Taking at Unknown time  No No  
Sig: Apply  to affected area as needed for Pain. lisinopril (PRINIVIL, ZESTRIL) 20 mg tablet 1/17/2019 at Unknown time  Yes Yes Sig: Take 20 mg by mouth daily. In am  Indications: hypertension  
metFORMIN (GLUCOPHAGE) 1,000 mg tablet 1/17/2019 at Unknown time  Yes Yes Sig: Take 1,000 mg by mouth daily. In am  Indications: type 2 diabetes mellitus  
ondansetron (ZOFRAN ODT) 8 mg disintegrating tablet Unknown at Unknown time  No No  
Sig: Take 1 Tab by mouth every eight (8) hours as needed for Nausea. Indications: CANCER CHEMOTHERAPY-INDUCED NAUSEA AND VOMITING  
sodium chloride 1 gram tablet 1/17/2019 at Unknown time  No Yes Sig: Take 1 Tab by mouth two (2) times a day. Facility-Administered Medications: None No Known Allergies Social History Tobacco Use  Smoking status: Never Smoker  Smokeless tobacco: Never Used Substance Use Topics  Alcohol use: No  
  
Social History Social History Narrative  Not on file Family History Problem Relation Age of Onset  Emphysema Father  Lung Disease Father  Cancer Brother Colon  Heart Disease Sister  Hypertension Sister  Heart Disease Sister  Hypertension Sister  Heart Disease Brother  Hypertension Brother  Heart Disease Brother  Hypertension Brother  Heart Disease Brother  Hypertension Brother  Heart Disease Brother  Hypertension Brother  Heart Disease Brother  Hypertension Brother Current Facility-Administered Medications Medication Dose Route Frequency  lidocaine (XYLOCAINE) 10 mg/mL (1 %) injection 0.1 mL  0.1 mL SubCUTAneous PRN  
 lactated Ringers infusion  75 mL/hr IntraVENous CONTINUOUS  
 ceFAZolin (ANCEF) 2 g/20 mL in sterile water IV syringe  2 g IntraVENous ONCE  
 sodium chloride (NS) flush 5-40 mL  5-40 mL IntraVENous Q8H  
 sodium chloride (NS) flush 5-40 mL  5-40 mL IntraVENous PRN Review of Systems A comprehensive review of systems was negative except for that written in the HPI. Objective:  
 
Vitals:  
 01/18/19 0944 BP: (!) 182/94 Pulse: 71 Resp: 18 Temp: 98.2 °F (36.8 °C) SpO2: 100% Weight: 169 lb 3.2 oz (76.7 kg) PHYSICAL EXAM  
 
Gen- the patient is well developed and in no acute distress HEENT- PERRL, EOMI, no scleral icterus 
     nose without alar flaring or epistaxis 
                oral muscosa moist without cyanosis Neck- no JVD or retractions Lungs- clear Heart- RRR without M,G,R Abd- soft and non-tender; with positive bowel sounds. Ext- warm without cyanosis. There is no lower leg edema. Skin- no jaundice or rashes, no wounds Neuro- alert and oriented x 3. No gross sensorimotor deficits are present. Recent Labs  
  01/17/19 
1455 WBC 2.4* HGB 11.5* HCT 33.1*  
* Recent Labs  
  01/17/19 
1455 *  
K 3.8 CL 97* * CO2 31 BUN 8  
CREA 0.97  
MG 2.0 No results for input(s): PH, PCO2, PO2, HCO3 in the last 72 hours. Assessment:  
 
Hospital Problems  Date Reviewed: 1/17/2019 None Plan:  
 
Recommend anal exam under anesthesia as pt can not tolerate this awake due to pain I have discussed the risks, benefits and alternatives of surgery. Pt understands and wishes to proceed. Consent obtained Rusty Pedroza MD

## 2019-01-18 NOTE — DISCHARGE INSTRUCTIONS
HERNIA REPAIR    ACTIVITY  · As tolerated and as directed by your doctor. · You may shower starting tomorrow but do not take a bath until released by your doctor. · Avoid lifting more than 5 pounds (pulling and straining). Avoid excessive use of stairs. · Take deep breathes and support incision with pillow when you cough. DIET  · Clear liquids until no nausea or vomiting; then light diet for the first day. · Advance to regular diet on second day, unless directed by your doctor. · If nausea or vomiting continues, call your doctor. You may notice that your bowel movements are not regular right after your surgery. This is common. Try to avoid constipation and straining with bowel movements. You may want to take a fiber supplement every day (Miralax). If you have not had a bowel movement after a couple of days, ask your doctor about taking a mild laxative. CALL YOUR DOCTOR IF   · Excessive bleeding that does not stop after holding pressure over the area. · Temperature of 101 degrees F or above. · Redness, excessive swelling or bruising, and/ or green or yellow, smelly discharge from incision. AFTER ANESTHESIA  · For the first 24 hours: DO NOT drive, drink alcoholic beverages, or make important decisions. · Be aware of dizziness following anesthesia and while taking pain medication. · Limit your activities  · Do not  operate hazardous machinery  · If you have not urinated within 8 hours after discharge, please contact your surgeon on call. *  Please give a list of your current medications to your Primary Care Provider. *  Please update this list whenever your medications are discontinued, doses are      changed, or new medications (including over-the-counter products) are added. *  Please carry medication information at all times in case of emergency situations.     No smoking/ No tobacco products/ Avoid exposure to second hand smoke  Surgeon General's Warning:  Quitting smoking now greatly reduces serious risk to your health. Obesity, smoking, and sedentary lifestyle greatly increases your risk for illness  A healthy diet, regular physical exercise & weight monitoring are important for maintaining a healthy lifestyle    Recognize signs and symptoms of STROKE:  F-face looks uneven  A-arms unable to move or move unevenly  S-speech slurred or non-existent  T-time-call 911 as soon as signs and symptoms begin-DO NOT go       Back to bed or wait to see if you get better-TIME IS BRAIN. Warm soaks in tube as needed     Use lidocaine cream as needed     ACTIVITY  · As tolerated and as directed by your doctor. · Bathe or shower as directed by your doctor. DIET  · Clear liquids until no nausea or vomiting; then light diet for the first day. · Advance to regular diet on second day, unless your doctor orders otherwise. · If nausea and vomiting continues, call your doctor. PAIN  · Take pain medication as directed by your doctor. · Call your doctor if pain is NOT relieved by medication. · DO NOT take aspirin of blood thinners unless directed by your doctor. DRESSING CARE       CALL YOUR DOCTOR IF   · Excessive bleeding that does not stop after holding pressure over the area  · Temperature of 101 degrees F or above  · Excessive redness, swelling or bruising, and/ or green or yellow, smelly discharge from incision    AFTER ANESTHESIA   · For the first 24 hours: DO NOT Drive, Drink alcoholic beverages, or Make important decisions. · Be aware of dizziness following anesthesia and while taking pain medication.      APPOINTMENT DATE/ TIME    YOUR DOCTOR'S PHONE NUMBER       DISCHARGE SUMMARY from Nurse    PATIENT INSTRUCTIONS:    After general anesthesia or intravenous sedation, for 24 hours or while taking prescription Narcotics:  · Limit your activities  · Do not drive and operate hazardous machinery  · Do not make important personal or business decisions  · Do  not drink alcoholic beverages  · If you have not urinated within 8 hours after discharge, please contact your surgeon on call. *  Please give a list of your current medications to your Primary Care Provider. *  Please update this list whenever your medications are discontinued, doses are      changed, or new medications (including over-the-counter products) are added. *  Please carry medication information at all times in case of emergency situations. These are general instructions for a healthy lifestyle:    No smoking/ No tobacco products/ Avoid exposure to second hand smoke    Surgeon General's Warning:  Quitting smoking now greatly reduces serious risk to your health. Obesity, smoking, and sedentary lifestyle greatly increases your risk for illness    A healthy diet, regular physical exercise & weight monitoring are important for maintaining a healthy lifestyle    You may be retaining fluid if you have a history of heart failure or if you experience any of the following symptoms:  Weight gain of 3 pounds or more overnight or 5 pounds in a week, increased swelling in our hands or feet or shortness of breath while lying flat in bed. Please call your doctor as soon as you notice any of these symptoms; do not wait until your next office visit. Recognize signs and symptoms of STROKE:    F-face looks uneven    A-arms unable to move or move unevenly    S-speech slurred or non-existent    T-time-call 911 as soon as signs and symptoms begin-DO NOT go       Back to bed or wait to see if you get better-TIME IS BRAIN.

## 2019-01-22 NOTE — OP NOTES
[address] [address] 
OPERATIVE REPORT Anjelica Jesus 
MR#: [id number] : 1940 ACCOUNT #: [de-identified] DATE OF SERVICE: 2019 PREOPERATIVE DIAGNOSIS:  Anal carcinoma. POSTOPERATIVE DIAGNOSIS:  Anal carcinoma. PROCEDURE PERFORMED:  Exam under anesthesia. SURGEON:  Rebekah Richardson MD 
 
ASSISTANT:  None. ANESTHESIA:  mac COMPLICATIONS:  None. CONDITION:  Patient was stable. ESTIMATED BLOOD LOSS:  Minimal. 
 
IMPLANTS:  None. DRAINS:  None. SPECIMENS REMOVED:  none INDICATIONS:  The patient is a 81-year-old white male who has undergone chemotherapy and radiation treatment for anal carcinoma within the last several months. The patient was referred to the back for an exam in order to gauge response to treatment. Due to the patient's pain and inability to tolerate exam in the office, we recommended exam under propofol anesthesia for comfort. He understood and wished to proceed and gave appropriate consent. PROCEDURE IN DETAIL:  The patient was taken to the operating room, where MAC anesthesia was administered. Once the sedative had taken effect, the exam was performed. Of note, the patient had a large amount of radiation changes to the anus with excoriation and erythema surrounding the anus. The patient had also developed a large fissure at the orifice at the 12 o'clock position. This tissue was very friable and bled easily during the exam.  Palpation at the anus and higher revealed no evidence of palpable nodules or gross disease. Speculum exam was carefully performed, which confirmed no suspicious findings there. 1% topical lidocaine gel was applied to the fissure area and dressed appropriately. The patient was taken to Recovery in stable condition. MD Melodie Escalante 
D: 2019 09:00    
T: 2019 09:24 
JOB #: [id number]

## 2019-01-25 ENCOUNTER — HOSPITAL ENCOUNTER (OUTPATIENT)
Dept: LAB | Age: 79
Discharge: HOME | End: 2019-01-25

## 2019-04-11 ENCOUNTER — HOSPITAL ENCOUNTER (OUTPATIENT)
Dept: RADIATION ONCOLOGY | Age: 79
Discharge: HOME | End: 2019-04-11
Payer: MEDICARE

## 2019-04-11 VITALS
SYSTOLIC BLOOD PRESSURE: 179 MMHG | WEIGHT: 169.2 LBS | BODY MASS INDEX: 23.27 KG/M2 | TEMPERATURE: 97.6 F | DIASTOLIC BLOOD PRESSURE: 90 MMHG | OXYGEN SATURATION: 99 % | HEART RATE: 76 BPM

## 2019-04-11 PROCEDURE — 99211 OFF/OP EST MAY X REQ PHY/QHP: CPT

## 2019-04-11 RX ORDER — FERROUS SULFATE 324(65)MG
TABLET, DELAYED RELEASE (ENTERIC COATED) ORAL
COMMUNITY

## 2019-04-11 NOTE — PROGRESS NOTES
Patient: Linnette Christy MRN: [id number]  SSN: [id number] YOB: 1940  Age: 67 y.o. Sex: male Other Providers:  Shannon Ramirez MD, Marilee Chavarria MD 
 
DIAGNOSIS: T2N0M0, Stage II anal SCC.   
Prior right walker-parotid lymph node mass. Unknown primary, [id number], Stage DENTON PREVIOUS TREATMENT: 
1) 9/27/18:  Completion of definitive chemotherapy and radiation for his anal squamous cell carcinoma, 5040 cGy in 28 fractions. Concurrent MMC and 5-Fu Unknown primary treatment. 1)  Lymph node excision 9/9/15 of right neck/parapharyngeal mass 2) Adjuvant radiation with gross disease present, 70 Gy in 35 fractions to the gross disease,      63 Gy to the ipsilateral neck, and 56 Gy to the contralateral neck.  Delivered with concurrent weekly cisplatin. Completed radiation: 12/28/2015 3) Radiation to Pelvis. Dose: 4500 cGy in 25 fractions pelvis, 540 cGy in 3 fractions pelvis boost. Total 5040 cGy. Treatment dates: 8/20/19 - 9/27/2019. INTERVAL HISTORY:  Linnette Christy is a 67 y.o. male previously known to us as result of treatment for his unknown primary head and neck cancer. Elvis Ford presented with a right neck mass that had been growing for several months. He was evaluated by his primary care physician and eventually referred to Dr. Grady Bernal for further workup. His pertinent medical history includes hypertension and diabetes. He is a lifelong nonsmoker and nondrinker. His workup included a CT of the neck and soft tissues which showed a large necrotic mass in the right submandibular region measuring 5 x 4.1 cm. The appearance was most suggestive of a large necrotic lymph node. There was no potential primary lesion noted. He was taken for a selective neck dissection where the right neck parapharyngeal mass was excised. We reviewed the operative report where this was excised from the skull base where gross tumor was felt to be present on the resection.  This was followed by a PET/CT scan 9/17/2015. This confirmed residual soft tissue abnormality at the superior margin of this previously noted large right neck mass. This was associated with FDG activity. He was presented at our multidisciplinary head and neck tumor board and felt his tumor was consistent with an unknown primary and planned to treat him as such. He also had tooth extractions at our request in anticipation of radiation. [facility] completed treatment now 2 weeks ago. The patient had expected side effects including oral and pharyngeal pain.  He did require supplementation with his feeding tube, oral analgesics, and a fentanyl patch.  These medications were titrated throughout his treatment successfully.  We also used magic mouthwash regularly.  While he did have difficulty with confluent mucositis, he was able to complete all of his treatments without unplanned treatment breaks.  At 2 weeks out, his oral pain was slightly improved and while he still had trouble eating, he has noted dramatic improvement and can eat nearly anything at this point but has no taste. He notes that his mouth is dry and has an edematous tongue.  At 3 months his weight was increasing although he still used his feeding tube with 2 cans of ensure and medications.  Overall he felt he was recovering perhaps more slowly than expected and hoped but has made significant strides since that time. [facility] did have his feeding tube removed 4/11/2016.  At time of his last follow up in our office 10/27/17, we was eating and drinking well, denying trouble with swallowing. He has xerostomia, well controlled with fluids. He has full ROM with his neck. He continued to feel thickening on the right side of his neck (surgical site) that was tender, but didn't appreciate change in size.  He denied headaches.  
  
In July 2018, he related a history of having been treated for hemorrhoids over the prior 5 years with various topical preparations.  Ultimately, because of persistence of symptoms he was referred to a gastroenterologist and a colonoscopy performed. Unfortunately, biopsies of the anal region showed a poorly differentiated tumor with both glandular and neuroendocrine features.  This was subsequently confirmed at the [facility] in [place] and felt to be consistent with an anal SCC. He underwent a PET scan which showed some activity in the anal skin as well as a 1.2 cm lymph node in the left inguinal region with some slight SUV activity.  There was no evidence of disease dissemination.  He denied other complaints.  His case was presented at tumor board and it was thought that perhaps he could be treated with an excision of the perianal skin alone. However, after surgical evaluation he had circumferential involvement of the perianal skin and anal canal with a tight stricture and was therefore not amenable to local resection. As a result definitive chemotherapy and radiation was recommended which was the rationale for our consultation. He did undergo a lymph node biopsy of the left inguinal region which was negative 8/1/2018. Ultimately recommended definitive chemotherapy and radiation which was completed 9/27/18. TREATMENT COURSE He was doing well without complaints week 1. Week 2 with oral pain and rash being treated by medical oncology but feels \"rough. \"  He did have drainage and required packing of his wound from the biopsy week 3. Doing well with controlled pain later week 3 (Norco 7.5). No new issues week 4. Very sore and swollen anteriorly week 5. He did ultimately complete all therapy. He was seen 1 month following treatment. He had see medical culture 10/4/2018. He was having persistent perineal pain which was being managed with hydrocodone every 3-4 hours. He had an unpredictable bowel movements with intermittent constipation and diarrhea.   He was able to maintain his weight and had no sign of infection, abdominal distention, or discomfort. He was also followed with palliative care. At 1 month he was having more issues with constipation and was taking Colace and stool softener. 12/12/18: Returns for routine follow up, 3 months after completing radiation. He continues with painful defecation. He reports that having a bowel movements brings tears to his eyes. He is using laxatives along with stool softeners with moderate results. He has been followed by Dr. Ute Bernard for assessment and packing of left groin wound. According to Mrs Rubio Joseph they are no longer packing the wound. The wound is no longer draining. He has Norco at home but has been very reluctant in taking the medication for fear of addiction. 4/11/19: He returns in follow up 7 months after completing definitive radiation therapy to the pelvis for his anal cancer. He underwent an anal exam under anesthesia by Dr. Donn Marroquin on 1/06/9584 and was found to have a large amount of excoriation and erythema surrounding the anus. He had also developed a fissure at the orifice at the 12 o'clock position (information provided by Dr. Donn Marroquin note). Anal pain unchanged. States his pain continues to be an 8/10. He continues with significant pain with defecation and bloody stools. He is reluctant to take his Norco other than at bedtime for fear of constipation and he becomes very relaxed. His energy is low most days but manageable. He remains in good spirits. UPDATED PAST MEDICAL HISTORY:  Since our prior encounter, Nabil Puentes has not been hospitalized. There have been no significant changes to the medical history. MEDICATIONS:  
 
Current Outpatient Medications:  
  HYDROcodone-acetaminophen (NORCO) 7.5-325 mg per tablet, Take 1-2 Tabs by mouth every six (6) hours as needed for Pain for up to 30 days.  Max Daily Amount: 8 Tabs., Disp: 90 Tab, Rfl: 0 
   acetaminophen (TYLENOL) 325 mg tablet, Take 325 mg by mouth every four (4) hours as needed for Pain., Disp: , Rfl:  
  docusate sodium (COLACE) 100 mg capsule, Take 100 mg by mouth as needed for Constipation. , Disp: , Rfl:  
  sodium chloride 1 gram tablet, Take 1 Tab by mouth two (2) times a day., Disp: 180 Tab, Rfl: 3 
  ondansetron (ZOFRAN ODT) 8 mg disintegrating tablet, Take 1 Tab by mouth every eight (8) hours as needed for Nausea. Indications: CANCER CHEMOTHERAPY-INDUCED NAUSEA AND VOMITING, Disp: 60 Tab, Rfl: 1 
  lidocaine-prilocaine (EMLA) topical cream, Apply  to affected area as needed for Pain., Disp: 30 g, Rfl: 0 
  amLODIPine (NORVASC) 10 mg tablet, Take 10 mg by mouth daily. In am, Disp: , Rfl:  
  lisinopril (PRINIVIL, ZESTRIL) 20 mg tablet, Take 20 mg by mouth daily. In am  Indications: hypertension, Disp: , Rfl:  
  glimepiride (AMARYL) 4 mg tablet, Take 4 mg by mouth every morning. Indications: TYPE 2 DIABETES MELLITUS, Disp: , Rfl:  
  metFORMIN (GLUCOPHAGE) 1,000 mg tablet, Take 1,000 mg by mouth daily. In am  Indications: type 2 diabetes mellitus, Disp: , Rfl: ALLERGIES:  
No Known Allergies PHYSICAL EXAMINATION:  
ECOG Performance status 0 
VITAL SIGNS: Blood pressure 179/90  Pulse 76  Temp 97.6  Weight 169.2 GENERAL: The patient is well-developed, ambulatory, alert and in no acute distress. Rectal examination: deferred LABORATORY:  
Lab Results Component Value Date/Time  Sodium 131 (L) 01/17/2019 02:55 PM  
 Potassium 3.8 01/17/2019 02:55 PM  
 Chloride 97 (L) 01/17/2019 02:55 PM  
 CO2 31 01/17/2019 02:55 PM  
 Anion gap 3 (L) 01/17/2019 02:55 PM  
 Glucose 143 (H) 01/17/2019 02:55 PM  
 BUN 8 01/17/2019 02:55 PM  
 Creatinine 0.97 01/17/2019 02:55 PM  
 GFR est AA >60 01/17/2019 02:55 PM  
 GFR est non-AA >60 01/17/2019 02:55 PM  
 Calcium 8.7 01/17/2019 02:55 PM  
 Magnesium 2.0 01/17/2019 02:55 PM  
 Albumin 3.5 01/17/2019 02:55 PM  
 Protein, total 6.8 01/17/2019 02:55 PM  
 Globulin 3.3 01/17/2019 02:55 PM  
 A-G Ratio 1.1 (L) 01/17/2019 02:55 PM  
 AST (SGOT) 6 (L) 01/17/2019 02:55 PM  
 ALT (SGPT) 14 01/17/2019 02:55 PM  
 
Lab Results Component Value Date/Time WBC 2.4 (L) 01/17/2019 02:55 PM  
 HGB 11.5 (L) 01/17/2019 02:55 PM  
 HCT 33.1 (L) 01/17/2019 02:55 PM  
 PLATELET 078 (L) 13/77/2670 02:55 PM  
 
 
RADIOLOGY 
 
TUMOR STATUS:  Recently completed therapy, pending repeat evaluation. PET/CT: 12/6/2018 
  
INDICATION: Anal cancer status post chemotherapy and radiation treatment. 
   
COMPARISON: PET scan 7/10/2018 
  
FINDINGS:  
NECK/CHEST: 
No worrisome activity is seen in the neck. Only symmetric benign activity is 
seen within neck base musculature. Stable irregular density and calcifications 
are seen within the right upper neck soft tissues which are not felt to be 
significantly changed and do not demonstrate abnormal activity. No enlarging 
lymph nodes are seen. 
  
Only physiologic activity is seen in the chest. No evolving adenopathy is seen. A trace pericardial effusion is seen. No worrisome pulmonary nodules or masses 
are seen. 
  
ABDOMEN/PELVIS: 
Only physiologic activity is seen in the abdomen. No evolving adenopathy is 
seen. Moderate atherosclerotic calcification is seen of the abdominal aorta. 
  
New focal density is seen in the left groin soft tissues on image 232 measuring 2.4 cm x 1.9 cm in size and demonstrating a maximum SUV of 2.4 g/mL. This is 
favored to represent an inflammatory process given the only borderline increased 
activity and likely corresponds to the recently drained abscess noted on recent 
clinic notes. Prior activity of the anal soft tissues is improved now 
demonstrating a maximum SUV of 2.1 g/mm. Stranding is seen within superficial 
and deep soft tissues of the lower pelvis likely related to interval radiation 
treatment.  No evolving adenopathy or worrisome activity is seen. 
  
 BONES: 
No aggressive or hypermetabolic osseous lesion is seen. Deformity is seen in the 
proximal right femur suggesting prior fracture and internal fixation. IMPRESSION:  
1. Improving activity at the anus. No findings concerning for disease progression are seen. Only new findings are seen in the pelvis as described 
above consistent with interval treatment. PET/CT: 12/6/2018 
  
INDICATION: Anal cancer status post chemotherapy and radiation treatment. 
   
COMPARISON: PET scan 7/10/2018 
  
FINDINGS:  
NECK/CHEST: 
No worrisome activity is seen in the neck. Only symmetric benign activity is 
seen within neck base musculature. Stable irregular density and calcifications 
are seen within the right upper neck soft tissues which are not felt to be 
significantly changed and do not demonstrate abnormal activity. No enlarging 
lymph nodes are seen. 
  
Only physiologic activity is seen in the chest. No evolving adenopathy is seen. A trace pericardial effusion is seen. No worrisome pulmonary nodules or masses 
are seen. 
  
ABDOMEN/PELVIS: 
Only physiologic activity is seen in the abdomen. No evolving adenopathy is 
seen. Moderate atherosclerotic calcification is seen of the abdominal aorta. 
  
New focal density is seen in the left groin soft tissues on image 232 measuring 2.4 cm x 1.9 cm in size and demonstrating a maximum SUV of 2.4 g/mL. This is 
favored to represent an inflammatory process given the only borderline increased 
activity and likely corresponds to the recently drained abscess noted on recent 
clinic notes. Prior activity of the anal soft tissues is improved now 
demonstrating a maximum SUV of 2.1 g/mm. Stranding is seen within superficial 
and deep soft tissues of the lower pelvis likely related to interval radiation 
treatment. No evolving adenopathy or worrisome activity is seen. 
  
BONES: 
No aggressive or hypermetabolic osseous lesion is seen.  Deformity is seen in the 
 proximal right femur suggesting prior fracture and internal fixation. 
  
IMPRESSION:  
1. Improving activity at the anus. No findings concerning for disease 
progression are seen. Only new findings are seen in the pelvis as described 
above consistent with interval treatment. IMPRESSION:  Kathie Luke is a 67 y.o. male with a prior head and neck cancer and a newly developed anal canal squamous cell carcinoma with extensive perianal skin involvement. He was treated with definitive chemotherapy and radiation and had significant peritoneal irritation as would be expected. He did however complete all of his therapy and 2 weeks following treatment was doing fair. His pain was controlled with hydrocodone. He had a nice clinical response. PET/CT in 12/2018 had no worrisome uptake suggestive of recurrent malignancy with no evidence of metastatic disease. Mr Cristino Guevara is now 7 months after completing definitive radiation therapy to the pelvis for his anal cancer. He underwent an anal exam under anesthesia by Dr. Miriam Rosen on 3/10/3628 and was found to have a large amount of excoriation and erythema surrounding the anus. He had also developed a fissure at the orifice at the 12 o'clock position (information provided by Dr. Miriam Rosen note). Anal pain unchanged. States his pain continues to be an 8/10. He continues with significant pain with defecation and bloody stools. He is reluctant to take his Norco other than at bedtime for fear of constipation and he becomes very relaxed. His energy is low most days but manageable. PLAN:   
Anal squamous cell carcinoma 1) Will see him again in 3 months. Discussed looking into treatment with hyperbaric oxygen therapy if no relief with his anal pain in 3 months. Continue prep H prn  
2) Mr Meghna Turcios will be 6 months out from his anal exam at our next visit. We discuss facilitating follow up with Dr. Miriam Rosen for repeat anal exam at that time 3) Pain management: Encouraged taking Norco more often. Patient is recovering as anticipated from his prior course of radiotherapy despite persistent issues. Continue management under palliative care and medical oncology. 4) We spent 30 minutes in the care of Mr Radha Cao today, over 50% of which was in direct counseling and ongoing management of his anal cancer. All questions were answered to the best of my ability. H/N cancer 1) 4 years out for completion of radiation therapy - followed by Dr. Bradley Pang, ENT Future follow up will be coordinated with other providers. Arnoldo Pink NP 
04/11/19 Portions of this note were copied from prior encounters and reviewed for accuracy, currency, and represent documentation and tasks completed during this encounter. I verify and attest these portions to be unchanged from prior visits.

## 2019-04-11 NOTE — NURSE NAVIGATOR
F/u H/N and anal cancer. RT end 9-27-18-Chemo/RT for anal cancer. Chemo/RT for H/N ended 12-28-15. Pet 12-6-18. C/o rectal pain level 8, rectal bleeding with bowel movements. . Taking pain meds at hs. 
F/u Dr. Moncho Patel 4-18-19. Saw Dr. Ayesha Ortiz 3-13-19-[place]. F/u 9-16-19. Saw Dr. Roman Mejía 2-90-50. Pt has anal fissure.  
 
Yani Gant RN

## 2019-04-18 ENCOUNTER — HOSPITAL ENCOUNTER (OUTPATIENT)
Dept: LAB | Age: 79
Discharge: HOME | End: 2019-04-18
Payer: MEDICARE

## 2019-04-18 DIAGNOSIS — C79.89 SQUAMOUS CELL CARCINOMA METASTATIC TO HEAD AND NECK WITH UNKNOWN PRIMARY SITE (HCC): ICD-10-CM

## 2019-04-18 DIAGNOSIS — C80.1 SQUAMOUS CELL CARCINOMA METASTATIC TO HEAD AND NECK WITH UNKNOWN PRIMARY SITE (HCC): ICD-10-CM

## 2019-04-18 LAB
ALBUMIN SERPL-MCNC: 3.4 G/DL (ref 3.2–4.6)
ALBUMIN/GLOB SERPL: 0.9 {RATIO}
ALP SERPL-CCNC: 72 U/L (ref 50–136)
ALT SERPL-CCNC: 20 U/L (ref 12–65)
ANION GAP SERPL CALC-SCNC: 7 MMOL/L
AST SERPL-CCNC: 13 U/L (ref 15–37)
BASOPHILS # BLD: 0 K/UL (ref 0–0.2)
BASOPHILS NFR BLD: 1 % (ref 0–2)
BILIRUB SERPL-MCNC: 0.3 MG/DL (ref 0.2–1.1)
BUN SERPL-MCNC: 9 MG/DL (ref 8–23)
CALCIUM SERPL-MCNC: 9.5 MG/DL (ref 8.3–10.4)
CHLORIDE SERPL-SCNC: 92 MMOL/L (ref 98–107)
CO2 SERPL-SCNC: 30 MMOL/L (ref 21–32)
CREAT SERPL-MCNC: 1 MG/DL (ref 0.8–1.5)
DIFFERENTIAL METHOD BLD: ABNORMAL
EOSINOPHIL # BLD: 0.2 K/UL (ref 0–0.8)
EOSINOPHIL NFR BLD: 4 % (ref 0.5–7.8)
ERYTHROCYTE [DISTWIDTH] IN BLOOD BY AUTOMATED COUNT: 13.4 % (ref 11.9–14.6)
GLOBULIN SER CALC-MCNC: 3.7 G/DL
GLUCOSE SERPL-MCNC: 236 MG/DL (ref 65–100)
HCT VFR BLD AUTO: 34.6 % (ref 41.1–50.3)
HGB BLD-MCNC: 12.1 G/DL (ref 13.6–17.2)
IMM GRANULOCYTES # BLD AUTO: 0 K/UL (ref 0–0.5)
IMM GRANULOCYTES NFR BLD AUTO: 0 % (ref 0–5)
LYMPHOCYTES # BLD: 0.7 K/UL (ref 0.5–4.6)
LYMPHOCYTES NFR BLD: 20 % (ref 13–44)
MAGNESIUM SERPL-MCNC: 1.8 MG/DL (ref 1.8–2.4)
MCH RBC QN AUTO: 32.8 PG (ref 26.1–32.9)
MCHC RBC AUTO-ENTMCNC: 35 G/DL (ref 31.4–35)
MCV RBC AUTO: 93.8 FL (ref 79.6–97.8)
MONOCYTES # BLD: 0.4 K/UL (ref 0.1–1.3)
MONOCYTES NFR BLD: 13 % (ref 4–12)
NEUTS SEG # BLD: 2.1 K/UL (ref 1.7–8.2)
NEUTS SEG NFR BLD: 62 % (ref 43–78)
NRBC # BLD: 0 K/UL (ref 0–0.2)
PLATELET # BLD AUTO: 150 K/UL (ref 150–450)
PMV BLD AUTO: 8.2 FL (ref 9.4–12.3)
POTASSIUM SERPL-SCNC: 4 MMOL/L (ref 3.5–5.1)
PROT SERPL-MCNC: 7.1 G/DL (ref 6.3–8.2)
RBC # BLD AUTO: 3.69 M/UL (ref 4.23–5.67)
SODIUM SERPL-SCNC: 129 MMOL/L (ref 136–145)
WBC # BLD AUTO: 3.4 K/UL (ref 4.3–11.1)

## 2019-04-18 PROCEDURE — 36415 COLL VENOUS BLD VENIPUNCTURE: CPT

## 2019-04-18 PROCEDURE — 83735 ASSAY OF MAGNESIUM: CPT

## 2019-04-18 PROCEDURE — 80053 COMPREHEN METABOLIC PANEL: CPT

## 2019-04-18 PROCEDURE — 85025 COMPLETE CBC W/AUTO DIFF WBC: CPT

## 2019-05-07 ENCOUNTER — HOSPITAL ENCOUNTER (OUTPATIENT)
Dept: PET IMAGING | Age: 79
Discharge: HOME | End: 2019-05-07
Attending: INTERNAL MEDICINE
Payer: MEDICARE

## 2019-05-07 DIAGNOSIS — C44.500: ICD-10-CM

## 2019-05-07 DIAGNOSIS — C79.89 SQUAMOUS CELL CARCINOMA METASTATIC TO HEAD AND NECK WITH UNKNOWN PRIMARY SITE (HCC): ICD-10-CM

## 2019-05-07 DIAGNOSIS — C80.1 SQUAMOUS CELL CARCINOMA METASTATIC TO HEAD AND NECK WITH UNKNOWN PRIMARY SITE (HCC): ICD-10-CM

## 2019-05-07 PROCEDURE — 74011636320 HC RX REV CODE- 636/320: Performed by: INTERNAL MEDICINE

## 2019-05-07 PROCEDURE — A9552 F18 FDG: HCPCS

## 2019-05-07 RX ORDER — SODIUM CHLORIDE 0.9 % (FLUSH) 0.9 %
10 SYRINGE (ML) INJECTION
Status: COMPLETED | OUTPATIENT
Start: 2019-05-07 | End: 2019-05-07

## 2019-05-07 RX ADMIN — DIATRIZOATE MEGLUMINE AND DIATRIZOATE SODIUM 10 ML: 660; 100 LIQUID ORAL; RECTAL at 12:55

## 2019-05-07 RX ADMIN — Medication 10 ML: at 12:55

## 2019-05-23 ENCOUNTER — HOSPITAL ENCOUNTER (OUTPATIENT)
Dept: MRI IMAGING | Age: 79
Discharge: HOME | End: 2019-05-23
Attending: INTERNAL MEDICINE
Payer: MEDICARE

## 2019-05-23 DIAGNOSIS — C79.89 SQUAMOUS CELL CARCINOMA METASTATIC TO HEAD AND NECK WITH UNKNOWN PRIMARY SITE (HCC): ICD-10-CM

## 2019-05-23 DIAGNOSIS — C21.0 ANAL CARCINOMA (HCC): ICD-10-CM

## 2019-05-23 DIAGNOSIS — C80.1 SQUAMOUS CELL CARCINOMA METASTATIC TO HEAD AND NECK WITH UNKNOWN PRIMARY SITE (HCC): ICD-10-CM

## 2019-05-23 PROCEDURE — 74011250636 HC RX REV CODE- 250/636: Performed by: INTERNAL MEDICINE

## 2019-05-23 PROCEDURE — 72197 MRI PELVIS W/O & W/DYE: CPT

## 2019-05-23 PROCEDURE — A9575 INJ GADOTERATE MEGLUMI 0.1ML: HCPCS | Performed by: INTERNAL MEDICINE

## 2019-05-23 RX ORDER — GADOTERATE MEGLUMINE 376.9 MG/ML
16 INJECTION INTRAVENOUS
Status: COMPLETED | OUTPATIENT
Start: 2019-05-23 | End: 2019-05-23

## 2019-05-23 RX ORDER — SODIUM CHLORIDE 0.9 % (FLUSH) 0.9 %
10 SYRINGE (ML) INJECTION
Status: COMPLETED | OUTPATIENT
Start: 2019-05-23 | End: 2019-05-23

## 2019-05-23 RX ADMIN — Medication 10 ML: at 18:56

## 2019-05-23 RX ADMIN — GADOTERATE MEGLUMINE 16 ML: 376.9 INJECTION INTRAVENOUS at 18:56

## 2019-05-30 ENCOUNTER — HOSPITAL ENCOUNTER (OUTPATIENT)
Dept: LAB | Age: 79
Discharge: HOME | End: 2019-05-30
Payer: MEDICARE

## 2019-05-30 DIAGNOSIS — C44.500: ICD-10-CM

## 2019-05-30 LAB
ALBUMIN SERPL-MCNC: 3.5 G/DL (ref 3.2–4.6)
ALBUMIN/GLOB SERPL: 1 {RATIO} (ref 1.2–3.5)
ALP SERPL-CCNC: 72 U/L (ref 50–136)
ALT SERPL-CCNC: 17 U/L (ref 12–65)
ANION GAP SERPL CALC-SCNC: 6 MMOL/L (ref 7–16)
AST SERPL-CCNC: 12 U/L (ref 15–37)
BASOPHILS # BLD: 0 K/UL (ref 0–0.2)
BASOPHILS NFR BLD: 1 % (ref 0–2)
BILIRUB SERPL-MCNC: 0.3 MG/DL (ref 0.2–1.1)
BUN SERPL-MCNC: 13 MG/DL (ref 8–23)
CALCIUM SERPL-MCNC: 9.2 MG/DL (ref 8.3–10.4)
CHLORIDE SERPL-SCNC: 90 MMOL/L (ref 98–107)
CO2 SERPL-SCNC: 29 MMOL/L (ref 21–32)
CREAT SERPL-MCNC: 1.04 MG/DL (ref 0.8–1.5)
DIFFERENTIAL METHOD BLD: ABNORMAL
EOSINOPHIL # BLD: 0.1 K/UL (ref 0–0.8)
EOSINOPHIL NFR BLD: 4 % (ref 0.5–7.8)
ERYTHROCYTE [DISTWIDTH] IN BLOOD BY AUTOMATED COUNT: 12.6 % (ref 11.9–14.6)
GLOBULIN SER CALC-MCNC: 3.5 G/DL (ref 2.3–3.5)
GLUCOSE SERPL-MCNC: 241 MG/DL (ref 65–100)
HCT VFR BLD AUTO: 35.1 % (ref 41.1–50.3)
HGB BLD-MCNC: 12.3 G/DL (ref 13.6–17.2)
IMM GRANULOCYTES # BLD AUTO: 0 K/UL (ref 0–0.5)
IMM GRANULOCYTES NFR BLD AUTO: 0 % (ref 0–5)
LYMPHOCYTES # BLD: 0.5 K/UL (ref 0.5–4.6)
LYMPHOCYTES NFR BLD: 16 % (ref 13–44)
MAGNESIUM SERPL-MCNC: 1.9 MG/DL (ref 1.8–2.4)
MCH RBC QN AUTO: 32.6 PG (ref 26.1–32.9)
MCHC RBC AUTO-ENTMCNC: 35 G/DL (ref 31.4–35)
MCV RBC AUTO: 93.1 FL (ref 79.6–97.8)
MONOCYTES # BLD: 0.5 K/UL (ref 0.1–1.3)
MONOCYTES NFR BLD: 16 % (ref 4–12)
NEUTS SEG # BLD: 2.1 K/UL (ref 1.7–8.2)
NEUTS SEG NFR BLD: 63 % (ref 43–78)
NRBC # BLD: 0 K/UL (ref 0–0.2)
PLATELET # BLD AUTO: 151 K/UL (ref 150–450)
PMV BLD AUTO: 7.9 FL (ref 9.4–12.3)
POTASSIUM SERPL-SCNC: 4 MMOL/L (ref 3.5–5.1)
PROT SERPL-MCNC: 7 G/DL (ref 6.3–8.2)
RBC # BLD AUTO: 3.77 M/UL (ref 4.23–5.67)
SODIUM SERPL-SCNC: 125 MMOL/L (ref 136–145)
WBC # BLD AUTO: 3.3 K/UL (ref 4.3–11.1)

## 2019-05-30 PROCEDURE — 36415 COLL VENOUS BLD VENIPUNCTURE: CPT

## 2019-05-30 PROCEDURE — 85025 COMPLETE CBC W/AUTO DIFF WBC: CPT

## 2019-05-30 PROCEDURE — 80053 COMPREHEN METABOLIC PANEL: CPT

## 2019-05-30 PROCEDURE — 83735 ASSAY OF MAGNESIUM: CPT

## 2019-06-10 VITALS — HEIGHT: 73 IN | BODY MASS INDEX: 23.06 KG/M2 | WEIGHT: 174 LBS

## 2019-06-11 ENCOUNTER — HOSPITAL ENCOUNTER (OUTPATIENT)
Dept: CT IMAGING | Age: 79
Discharge: HOME | End: 2019-06-11
Attending: INTERNAL MEDICINE

## 2019-06-17 ENCOUNTER — HOSPITAL ENCOUNTER (OUTPATIENT)
Dept: CT IMAGING | Age: 79
Discharge: HOME | End: 2019-06-17
Attending: INTERNAL MEDICINE
Payer: MEDICARE

## 2019-06-17 VITALS
BODY MASS INDEX: 22.69 KG/M2 | HEART RATE: 72 BPM | OXYGEN SATURATION: 95 % | TEMPERATURE: 98.6 F | DIASTOLIC BLOOD PRESSURE: 67 MMHG | RESPIRATION RATE: 18 BRPM | WEIGHT: 172 LBS | SYSTOLIC BLOOD PRESSURE: 123 MMHG

## 2019-06-17 DIAGNOSIS — C44.500: ICD-10-CM

## 2019-06-17 DIAGNOSIS — M89.9 BONE LESION: ICD-10-CM

## 2019-06-17 LAB — GLUCOSE BLD STRIP.AUTO-MCNC: 127 MG/DL (ref 65–100)

## 2019-06-17 PROCEDURE — 20225 BONE BIOPSY TROCAR/NDL DEEP: CPT

## 2019-06-17 PROCEDURE — 88311 DECALCIFY TISSUE: CPT

## 2019-06-17 PROCEDURE — 99152 MOD SED SAME PHYS/QHP 5/>YRS: CPT

## 2019-06-17 PROCEDURE — 88305 TISSUE EXAM BY PATHOLOGIST: CPT

## 2019-06-17 PROCEDURE — 74011250636 HC RX REV CODE- 250/636: Performed by: RADIOLOGY

## 2019-06-17 PROCEDURE — 82962 GLUCOSE BLOOD TEST: CPT

## 2019-06-17 PROCEDURE — 88342 IMHCHEM/IMCYTCHM 1ST ANTB: CPT

## 2019-06-17 PROCEDURE — 88341 IMHCHEM/IMCYTCHM EA ADD ANTB: CPT

## 2019-06-17 PROCEDURE — 74011250636 HC RX REV CODE- 250/636

## 2019-06-17 PROCEDURE — 99153 MOD SED SAME PHYS/QHP EA: CPT

## 2019-06-17 RX ORDER — SODIUM CHLORIDE 9 MG/ML
25 INJECTION, SOLUTION INTRAVENOUS CONTINUOUS
Status: DISCONTINUED | OUTPATIENT
Start: 2019-06-17 | End: 2019-06-21 | Stop reason: HOSPADM

## 2019-06-17 RX ORDER — DIPHENHYDRAMINE HYDROCHLORIDE 50 MG/ML
12.5-5 INJECTION, SOLUTION INTRAMUSCULAR; INTRAVENOUS ONCE
Status: ACTIVE | OUTPATIENT
Start: 2019-06-17 | End: 2019-06-17

## 2019-06-17 RX ORDER — IBUPROFEN 200 MG
600 TABLET ORAL
Status: DISCONTINUED | OUTPATIENT
Start: 2019-06-17 | End: 2019-06-21 | Stop reason: HOSPADM

## 2019-06-17 RX ORDER — LIDOCAINE HYDROCHLORIDE 20 MG/ML
2-20 INJECTION, SOLUTION INFILTRATION; PERINEURAL ONCE
Status: COMPLETED | OUTPATIENT
Start: 2019-06-17 | End: 2019-06-17

## 2019-06-17 RX ORDER — HYDROCODONE BITARTRATE AND ACETAMINOPHEN 7.5; 325 MG/1; MG/1
1 TABLET ORAL
Status: DISCONTINUED | OUTPATIENT
Start: 2019-06-17 | End: 2019-06-21 | Stop reason: HOSPADM

## 2019-06-17 RX ORDER — SODIUM CHLORIDE 9 MG/ML
150 INJECTION, SOLUTION INTRAVENOUS CONTINUOUS
Status: DISCONTINUED | OUTPATIENT
Start: 2019-06-17 | End: 2019-06-18 | Stop reason: HOSPADM

## 2019-06-17 RX ORDER — MIDAZOLAM HYDROCHLORIDE 1 MG/ML
.5-2 INJECTION, SOLUTION INTRAMUSCULAR; INTRAVENOUS
Status: DISCONTINUED | OUTPATIENT
Start: 2019-06-17 | End: 2019-06-21 | Stop reason: HOSPADM

## 2019-06-17 RX ORDER — FENTANYL CITRATE 50 UG/ML
25-50 INJECTION, SOLUTION INTRAMUSCULAR; INTRAVENOUS
Status: DISCONTINUED | OUTPATIENT
Start: 2019-06-17 | End: 2019-06-21 | Stop reason: HOSPADM

## 2019-06-17 RX ADMIN — FENTANYL CITRATE 25 MCG: 50 INJECTION, SOLUTION INTRAMUSCULAR; INTRAVENOUS at 08:44

## 2019-06-17 RX ADMIN — MIDAZOLAM HYDROCHLORIDE 0.5 MG: 1 INJECTION, SOLUTION INTRAMUSCULAR; INTRAVENOUS at 08:50

## 2019-06-17 RX ADMIN — MIDAZOLAM HYDROCHLORIDE 0.5 MG: 1 INJECTION, SOLUTION INTRAMUSCULAR; INTRAVENOUS at 08:44

## 2019-06-17 RX ADMIN — FENTANYL CITRATE 50 MCG: 50 INJECTION, SOLUTION INTRAMUSCULAR; INTRAVENOUS at 08:24

## 2019-06-17 RX ADMIN — FENTANYL CITRATE 50 MCG: 50 INJECTION, SOLUTION INTRAMUSCULAR; INTRAVENOUS at 08:35

## 2019-06-17 RX ADMIN — FENTANYL CITRATE 25 MCG: 50 INJECTION, SOLUTION INTRAMUSCULAR; INTRAVENOUS at 08:50

## 2019-06-17 RX ADMIN — LIDOCAINE HYDROCHLORIDE 180 MG: 20 INJECTION, SOLUTION INFILTRATION; PERINEURAL at 08:49

## 2019-06-17 RX ADMIN — MIDAZOLAM HYDROCHLORIDE 1 MG: 1 INJECTION, SOLUTION INTRAMUSCULAR; INTRAVENOUS at 08:24

## 2019-06-17 RX ADMIN — MIDAZOLAM HYDROCHLORIDE 1 MG: 1 INJECTION, SOLUTION INTRAMUSCULAR; INTRAVENOUS at 08:35

## 2019-06-17 NOTE — PROGRESS NOTES
Recovery period without difficulty. Pt alert and oriented and denies pain. Dressing is clean, dry, and intact. Reviewed discharge instructions with patient and wife, both verbalized understanding. [facility] Vital signs and Jessa score completed.

## 2019-06-17 NOTE — DISCHARGE INSTRUCTIONS
[address] [facility]  Department of Interventional Radiology  [facility]  [phone] Office  [phone] Fax    BIOPSY DISCHARGE INSTRUCTIONS    General Instructions:     A biopsy is the removal of a small piece of tissue for microscopic examination or testing. Healthy tissue can be obtained for the purpose of tissue-type matching for transplants. Unhealthy tissues are more commonly biopsied to diagnose disease. Lung Biopsy:     A needle lung biopsy is performed when there is a mass discovered in the lung area. The most serious risk is infection, bleeding, and pneumothorax (a collapsed lung). Signs of pneumothorax include shortness of breath, rapid heart rate, and blueness of the skin. If any of these occur, call 911. Liver Biopsy: This test helps detect cancer, infections, and the cause of an enlargement of the liver or elevated liver enzymes. It also helps to diagnose a number of liver diseases. The pain associated with the procedure may be felt in the shoulder. The risks include internal bleeding, pneumothorax, and injury to the surrounding organs. Renal Biopsy: This procedure is sometimes done to evaluate a transplanted kidney. It is also used to evaluate unexplained decrease in kidney function, blood, or protein in the urine. You may see bright red blood in the urine the first 24 hours after the test. If the bleeding lasts longer, you need to call your doctor. There is a risk of infection and bleeding into the muscle, which may cause soreness. Spinal Biopsy: This test is sometimes done in conjunction with other procedures. Your back will be sore, as we are taking a small sample of bone, which is slightly more difficult to sample than tissue. General Biopsy:     A mass can grow in any area of the body, and we are taking a specimen as ordered by your doctor. The risks are the same.  They include bleeding, pain, and infection. Home Care Instructions: You may resume your regular diet and medication regimen. Do not drink alcohol, drive, or make any important legal decisions in the next 24 hours. Do not lift anything heavier than a gallon of milk until the soreness goes away. You may use over the counter acetaminophen or ibuprofen for the soreness. You may apply an ice pack to the affected area for 20-30 minutes at time for the first 24 hours. After that, you may apply a heat pack. Call If: You should call your Physician and/or the Radiology Nurse if you have any questions or concerns about the biopsy site. Call if you should have increased pain, fever, redness, drainage, or bleeding more than a small spot on the bandage. Interventional Radiology General Nurse Discharge    After general anesthesia or intravenous sedation, for 24 hours or while taking prescription Narcotics:  · Limit your activities  · Do not drive and operate hazardous machinery  · Do not make important personal or business decisions  · Do  not drink alcoholic beverages  · If you have not urinated within 8 hours after discharge, please contact your surgeon on call. * Please give a list of your current medications to your Primary Care Provider. * Please update this list whenever your medications are discontinued, doses are     changed, or new medications (including over-the-counter products) are added. * Please carry medication information at all times in case of emergency situations. These are general instructions for a healthy lifestyle:    No smoking/ No tobacco products/ Avoid exposure to second hand smoke  Surgeon General's Warning:  Quitting smoking now greatly reduces serious risk to your health.     Obesity, smoking, and sedentary lifestyle greatly increases your risk for illness  A healthy diet, regular physical exercise & weight monitoring are important for maintaining a healthy lifestyle    You may be retaining fluid if you have a history of heart failure or if you experience any of the following symptoms:  Weight gain of 3 pounds or more overnight or 5 pounds in a week, increased swelling in our hands or feet or shortness of breath while lying flat in bed. Please call your doctor as soon as you notice any of these symptoms; do not wait until your next office visit. Recognize signs and symptoms of STROKE:  F-face looks uneven    A-arms unable to move or move unevenly    S-speech slurred or non-existent    T-time-call 911 as soon as signs and symptoms begin-DO NOT go       Back to bed or wait to see if you get better-TIME IS BRAIN. Follow-Up Instructions: Please see your ordering doctor as he/she has requested. To Reach Us: If you have any questions about your procedure, please call the Interventional Radiology department at [phone]. After business hours (5pm) and weekends, call the answering service at [phone] and ask for the Radiologist on call to be paged. Si tiene Preguntas acerca del procedimiento, por favor llame al departamento de Radiología Intervencional al [phone]. Después de horas de oficina (5 pm) y los fines de Paulina, llamar al [place] [facility] al [phone] y pregunte por el Radiologo de [facility].          Patient Signature:  Date: 6/17/2019  Discharging Nurse: Alise Arizmendi RN

## 2019-06-17 NOTE — PROGRESS NOTES
TRANSFER - OUT REPORT:    Verbal report given to DAVID Yarbrough on "[employer]"  being transferred to IR prep room 1 for routine post - op       Report consisted of patients Situation, Background, Assessment and   Recommendations(SBAR). Information from the following report(s) SBAR, Kardex, Procedure Summary and MAR was reviewed with the receiving nurse. Lines:   Venous Access Device Left single lumen port 08/01/18 Upper chest (subclavicular area), left (Active)       Peripheral IV 06/17/19 Right Forearm (Active)   Site Assessment Clean, dry, & intact 6/17/2019  7:32 AM   Phlebitis Assessment 0 6/17/2019  7:32 AM   Infiltration Assessment 0 6/17/2019  7:32 AM   Dressing Status Clean, dry, & intact 6/17/2019  7:32 AM        Opportunity for questions and clarification was provided. Patient may be discharged in 1 hr.

## 2019-06-17 NOTE — PROCEDURES
Department of Interventional Radiology  [phone]        Interventional Radiology Brief Procedure Note    Patient: Vaishnavi Jeroinmo MRN: [id number]  SSN: [id number]    YOB: 1940  Age: 67 y.o. Sex: male      Date of Procedure: 6/17/2019    Pre-Procedure Diagnosis: Left iliac bone lesion. Post-Procedure Diagnosis: SAME    Procedure(s): Image Guided Biopsy    Brief Description of Procedure: 13G core biopsy. Performed By: Liu Nash MD     Assistants: None    Anesthesia:Moderate Sedation    Estimated Blood Loss: Less than 10ml    Specimens:  Pathology    Implants:  None    Findings: Subtle, mixed density, lesion in the left iliac bone. Complications: None    Recommendations: 1 hour bedrest.       Follow Up: Dr Dany Sandhu.      Signed By: Liu Nash MD     June 17, 2019

## 2019-06-17 NOTE — H&P
Department of Interventional Radiology  [phone]    History and Physical    Patient:  Daphne Chaves MRN:  [id number]  SSN:  [id number]    YOB: 1940  Age:  67 y.o. Sex:  male      Primary Care Provider:  Dayan Melo MD  Referring Physician:  Gaston Jaffe MD    Subjective:     Chief Complaint: bone biopsy    History of the Present Illness: The patient is a 67 y.o. male who presents for biopsy of a new hypermetabolic left iliac bone lesion. Hx anal cancer and head/neck cancer. NPO x meds. Past Medical History:   Diagnosis Date    GERD (gastroesophageal reflux disease)     pt wife denies    Head and neck cancer ([facility] [address] 9/30/2015    radiation and chemo and and surgery    History of squamous cell carcinoma     to neck area- 38 radiation treatement and 8 chemo treatment    History of throat cancer 2015    peg tube for about 4 months    Hypercholesteremia     managed well with meds    Hypertension     managed well with meds    Rectal cancer ([address] 2018    28 radiation treatment and chemo pump    Type 2 diabetes mellitus ([address]     oral agent only/AVG BS: /no s.s of hypoglycemia    Vomiting 2/23/2016    pt wife denies      Past Surgical History:   Procedure Laterality Date    HX COLONOSCOPY  05/2018    HX HEENT      sinus    HX HEENT  2015    surgery on right throat for squamous cell ca right ear wedge    HX LYMPH NODE DISSECTION      HX ORTHOPAEDIC Right 1966    right leg    HX OTHER SURGICAL  9/9/15    EXCISION OF RIGHT Neck and PARAPHARYNGEAL MASS/RIGHT EAR WEDGE RESECTION    HX OTHER SURGICAL      peg placed and removed    HX TONSILLECTOMY      HX VASCULAR ACCESS      placed and removed        Review of Systems:    Pertinent items are noted in the History of Present Illness. Current Outpatient Medications   Medication Sig    ferrous sulfate 324 mg (65 mg iron) tablet Take  by mouth Daily (before breakfast).     acetaminophen (TYLENOL) 325 mg tablet Take 325 mg by mouth every four (4) hours as needed for Pain.  docusate sodium (COLACE) 100 mg capsule Take 100 mg by mouth as needed for Constipation.  sodium chloride 1 gram tablet Take 1 Tab by mouth two (2) times a day.  amLODIPine (NORVASC) 10 mg tablet Take 10 mg by mouth daily. In am    lisinopril (PRINIVIL, ZESTRIL) 20 mg tablet Take 20 mg by mouth daily. In am  Indications: hypertension    glimepiride (AMARYL) 4 mg tablet Take 4 mg by mouth every morning. Indications: TYPE 2 DIABETES MELLITUS    metFORMIN (GLUCOPHAGE) 1,000 mg tablet Take 1,000 mg by mouth daily. In am  Indications: type 2 diabetes mellitus     No current facility-administered medications for this encounter. No Known Allergies    Family History   Problem Relation Age of Onset    Emphysema Father     Lung Disease Father     Cancer Brother         Colon    Heart Disease Sister     Hypertension Sister     Heart Disease Sister     Hypertension Sister     Heart Disease Brother     Hypertension Brother     Heart Disease Brother     Hypertension Brother     Heart Disease Brother     Hypertension Brother     Heart Disease Brother     Hypertension Brother     Heart Disease Brother     Hypertension Brother      Social History     Tobacco Use    Smoking status: Never Smoker    Smokeless tobacco: Never Used   Substance Use Topics    Alcohol use: No        Objective:       Physical Examination:    Vitals:    06/13/19 1605 06/17/19 0728   BP:  (!) 184/92   Pulse:  84   Resp:  16   Temp:  98.6 °F (37 °C)   SpO2:  98%   Weight: 78 kg (172 lb)      Blood pressure (!) 184/92, pulse 84, temperature 98.6 °F (37 °C), resp. rate 16, weight 78 kg (172 lb), SpO2 98 %.   HEART: regular rate and rhythm  LUNG: clear to auscultation bilaterally  ABDOMEN: normal findings: soft, non-tender  EXTREMITIES: normal strength, tone, and muscle mass    Laboratory:     Lab Results   Component Value Date/Time    Sodium 125 (L) 05/30/2019 02:19 PM    Sodium 129 (L) 04/18/2019 03:06 PM    Potassium 4.0 05/30/2019 02:19 PM    Potassium 4.0 04/18/2019 03:06 PM    Chloride 90 (L) 05/30/2019 02:19 PM    Chloride 92 (L) 04/18/2019 03:06 PM    CO2 29 05/30/2019 02:19 PM    CO2 30 04/18/2019 03:06 PM    Anion gap 6 (L) 05/30/2019 02:19 PM    Anion gap 7 04/18/2019 03:06 PM    Glucose 241 (H) 05/30/2019 02:19 PM    Glucose 236 (H) 04/18/2019 03:06 PM    BUN 13 05/30/2019 02:19 PM    BUN 9 04/18/2019 03:06 PM    Creatinine 1.04 05/30/2019 02:19 PM    Creatinine 1.00 04/18/2019 03:06 PM    GFR est AA >60 05/30/2019 02:19 PM    GFR est AA >60 04/18/2019 03:06 PM    GFR est non-AA >60 05/30/2019 02:19 PM    GFR est non-AA >60 04/18/2019 03:06 PM    Calcium 9.2 05/30/2019 02:19 PM    Calcium 9.5 04/18/2019 03:06 PM    Magnesium 1.9 05/30/2019 02:19 PM    Magnesium 1.8 04/18/2019 03:06 PM    Albumin 3.5 05/30/2019 02:19 PM    Albumin 3.4 04/18/2019 03:06 PM    Protein, total 7.0 05/30/2019 02:19 PM    Protein, total 7.1 04/18/2019 03:06 PM    Globulin 3.5 05/30/2019 02:19 PM    Globulin 3.7 04/18/2019 03:06 PM    A-G Ratio 1.0 (L) 05/30/2019 02:19 PM    A-G Ratio 0.9 04/18/2019 03:06 PM    AST (SGOT) 12 (L) 05/30/2019 02:19 PM    AST (SGOT) 13 (L) 04/18/2019 03:06 PM    ALT (SGPT) 17 05/30/2019 02:19 PM    ALT (SGPT) 20 04/18/2019 03:06 PM     Lab Results   Component Value Date/Time    WBC 3.3 (L) 05/30/2019 02:19 PM    WBC 3.4 (L) 04/18/2019 03:06 PM    HGB 12.3 (L) 05/30/2019 02:19 PM    HGB 12.1 (L) 04/18/2019 03:06 PM    HCT 35.1 (L) 05/30/2019 02:19 PM    HCT 34.6 (L) 04/18/2019 03:06 PM    PLATELET 925 88/37/9552 02:19 PM    PLATELET 788 50/77/9546 03:06 PM     Lab Results   Component Value Date/Time    aPTT 29.1 07/27/2018 02:00 PM    aPTT 24.5 10/28/2015 02:00 PM    Prothrombin time 14.0 07/27/2018 02:00 PM    Prothrombin time 9.5 (L) 10/28/2015 02:00 PM    INR 1.1 07/27/2018 02:00 PM    INR 0.9 10/28/2015 02:00 PM       Assessment:     Anal cancer, iliac bone lesion    Plan:     Planned Procedure:  Biopsy left iliac bone lesion    Risks, benefits, and alternatives reviewed with patient and he agrees to proceed with the procedure.       Signed By: Miriam Zamora PA-C     June 17, 2019

## 2019-06-26 ENCOUNTER — HOSPITAL ENCOUNTER (OUTPATIENT)
Dept: LAB | Age: 79
Discharge: HOME | End: 2019-06-26
Payer: MEDICARE

## 2019-06-26 DIAGNOSIS — C21.0 ANAL CARCINOMA (HCC): ICD-10-CM

## 2019-06-26 PROBLEM — C79.51 MALIGNANT NEOPLASM METASTATIC TO BONE (HCC): Status: ACTIVE | Noted: 2019-06-26

## 2019-06-26 LAB
ALBUMIN SERPL-MCNC: 3.4 G/DL (ref 3.2–4.6)
ALBUMIN/GLOB SERPL: 1 {RATIO} (ref 1.2–3.5)
ALP SERPL-CCNC: 73 U/L (ref 50–136)
ALT SERPL-CCNC: 20 U/L (ref 12–65)
ANION GAP SERPL CALC-SCNC: 4 MMOL/L (ref 7–16)
AST SERPL-CCNC: 9 U/L (ref 15–37)
BASOPHILS # BLD: 0 K/UL (ref 0–0.2)
BASOPHILS NFR BLD: 1 % (ref 0–2)
BILIRUB SERPL-MCNC: 0.2 MG/DL (ref 0.2–1.1)
BUN SERPL-MCNC: 12 MG/DL (ref 8–23)
CALCIUM SERPL-MCNC: 8.8 MG/DL (ref 8.3–10.4)
CHLORIDE SERPL-SCNC: 90 MMOL/L (ref 98–107)
CO2 SERPL-SCNC: 30 MMOL/L (ref 21–32)
CREAT SERPL-MCNC: 1.05 MG/DL (ref 0.8–1.5)
DIFFERENTIAL METHOD BLD: ABNORMAL
EOSINOPHIL # BLD: 0.1 K/UL (ref 0–0.8)
EOSINOPHIL NFR BLD: 3 % (ref 0.5–7.8)
ERYTHROCYTE [DISTWIDTH] IN BLOOD BY AUTOMATED COUNT: 12.1 % (ref 11.9–14.6)
GLOBULIN SER CALC-MCNC: 3.4 G/DL (ref 2.3–3.5)
GLUCOSE SERPL-MCNC: 294 MG/DL (ref 65–100)
HCT VFR BLD AUTO: 32.5 % (ref 41.1–50.3)
HGB BLD-MCNC: 11.5 G/DL (ref 13.6–17.2)
IMM GRANULOCYTES # BLD AUTO: 0 K/UL (ref 0–0.5)
IMM GRANULOCYTES NFR BLD AUTO: 0 % (ref 0–5)
LYMPHOCYTES # BLD: 0.8 K/UL (ref 0.5–4.6)
LYMPHOCYTES NFR BLD: 26 % (ref 13–44)
MCH RBC QN AUTO: 32.4 PG (ref 26.1–32.9)
MCHC RBC AUTO-ENTMCNC: 35.4 G/DL (ref 31.4–35)
MCV RBC AUTO: 91.5 FL (ref 79.6–97.8)
MONOCYTES # BLD: 0.5 K/UL (ref 0.1–1.3)
MONOCYTES NFR BLD: 15 % (ref 4–12)
NEUTS SEG # BLD: 1.6 K/UL (ref 1.7–8.2)
NEUTS SEG NFR BLD: 54 % (ref 43–78)
NRBC # BLD: 0 K/UL (ref 0–0.2)
PLATELET # BLD AUTO: 145 K/UL (ref 150–450)
PMV BLD AUTO: 7.6 FL (ref 9.4–12.3)
POTASSIUM SERPL-SCNC: 4.2 MMOL/L (ref 3.5–5.1)
PROT SERPL-MCNC: 6.8 G/DL (ref 6.3–8.2)
RBC # BLD AUTO: 3.55 M/UL (ref 4.23–5.67)
SODIUM SERPL-SCNC: 124 MMOL/L (ref 136–145)
WBC # BLD AUTO: 3 K/UL (ref 4.3–11.1)

## 2019-06-26 PROCEDURE — 80053 COMPREHEN METABOLIC PANEL: CPT

## 2019-06-26 PROCEDURE — 36415 COLL VENOUS BLD VENIPUNCTURE: CPT

## 2019-06-26 PROCEDURE — 85025 COMPLETE CBC W/AUTO DIFF WBC: CPT

## 2019-06-28 ENCOUNTER — PATIENT OUTREACH (OUTPATIENT)
Dept: CASE MANAGEMENT | Age: 79
End: 2019-06-28

## 2019-06-28 NOTE — PROGRESS NOTES
Saw patient after visit with Dr Liss Malik on 6-26-19. He will be referred to radiation after scan showed met to left illeum. Called pt today on 6-28-19 and spoke with wife. Iincreased sodium tabs to 2gms twice a day per Dr Liss Malik. Pt has an appt at [facility] radiation today for consult. Encouraged pt and wife to call with any questions or concerns.

## 2019-07-18 ENCOUNTER — APPOINTMENT (OUTPATIENT)
Dept: RADIATION ONCOLOGY | Age: 79
End: 2019-07-18

## 2019-08-13 NOTE — PROGRESS NOTES
Spoke with Dr Shanita Olivier concerning patient's need for upcoming dental appt. Pt will have dental appt on 9-5-19 at 10am. Left voicemail on both numbers avail.

## 2019-08-15 NOTE — PROGRESS NOTES
Called patient after Dr Estela Starkey contacted me and now appt will be Sept 6 at [address] with patient's wife and she is aware.

## 2019-08-29 NOTE — PROGRESS NOTES
Patient: Torsten Rossi MRN: [id number]  SSN: [id number]    YOB: 1940  Age: 67 y.o. Sex: male      Other Providers:  Yudith Tripathi MD, Samantha Yarbrough MD    DIAGNOSIS: [id number], Stage II anal SCC.    Prior right walker-parotid lymph node mass. Unknown primary, [id number], Stage DENTON    PREVIOUS TREATMENT:  1) 9/27/18:  Completion of definitive chemotherapy and radiation for his anal squamous cell carcinoma, 5040 cGy in 28 fractions. Concurrent MMC and 5-Fu    Unknown primary treatment. 1)  Lymph node excision 9/9/15 of right neck/parapharyngeal mass  2) Adjuvant radiation with gross disease present, 70 Gy in 35 fractions to the gross disease,      63 Gy to the ipsilateral neck, and 56 Gy to the contralateral neck.  Delivered with concurrent      weekly cisplatin. Completed radiation: 12/28/2015  3) Radiation to Pelvis. Dose: 4500 cGy in 25 fractions pelvis, 540 cGy in 3 fractions pelvis    boost. Total 5040 cGy. Treatment dates: 8/20/18 - 9/27/2018.   4)  SBRT to left ilium. Dose: 3000 cGy in 3 fractions. Treatment completed 7/16/2019    INTERVAL HISTORY:  Torsten Rossi is a 67 y.o. male previously known to us as result of treatment for his unknown primary head and neck cancer. Lita Lyons presented with a right neck mass that had been growing for several months. He was evaluated by his primary care physician and eventually referred to Dr. Bre Boudreaux for further workup. His pertinent medical history includes hypertension and diabetes. He is a lifelong nonsmoker and nondrinker. His workup included a CT of the neck and soft tissues which showed a large necrotic mass in the right submandibular region measuring 5 x 4.1 cm. The appearance was most suggestive of a large necrotic lymph node. There was no potential primary lesion noted. He was taken for a selective neck dissection where the right neck parapharyngeal mass was excised.  We reviewed the operative report where this was excised from the skull base where gross tumor was felt to be present on the resection. This was followed by a PET/CT scan 9/17/2015. This confirmed residual soft tissue abnormality at the superior margin of this previously noted large right neck mass. This was associated with FDG activity. He was presented at our multidisciplinary head and neck tumor board and felt his tumor was consistent with an unknown primary and planned to treat him as such. He also had tooth extractions at our request in anticipation of radiation. Prabhakar Hicks completed treatment now 2 weeks ago. The patient had expected side effects including oral and pharyngeal pain.  He did require supplementation with his feeding tube, oral analgesics, and a fentanyl patch.  These medications were titrated throughout his treatment successfully.  We also used magic mouthwash regularly.  While he did have difficulty with confluent mucositis, he was able to complete all of his treatments without unplanned treatment breaks.  At 2 weeks out, his oral pain was slightly improved and while he still had trouble eating, he has noted dramatic improvement and can eat nearly anything at this point but has no taste. He notes that his mouth is dry and has an edematous tongue.  At 3 months his weight was increasing although he still used his feeding tube with 2 cans of ensure and medications.  Overall he felt he was recovering perhaps more slowly than expected and hoped but has made significant strides since that time. [facility] did have his feeding tube removed 4/11/2016.  At time of his last follow up in our office 10/27/17, we was eating and drinking well, denying trouble with swallowing. He has xerostomia, well controlled with fluids. He has full ROM with his neck. He continued to feel thickening on the right side of his neck (surgical site) that was tender, but didn't appreciate change in size.  He denied headaches.      In July 2018, he related a history of having been treated for hemorrhoids over the prior 5 years with various topical preparations.  Ultimately, because of persistence of symptoms he was referred to a gastroenterologist and a colonoscopy performed. Unfortunately, biopsies of the anal region showed a poorly differentiated tumor with both glandular and neuroendocrine features.  This was subsequently confirmed at the [facility] in [facility] and felt to be consistent with an anal SCC. He underwent a PET scan which showed some activity in the anal skin as well as a 1.2 cm lymph node in the left inguinal region with some slight SUV activity.  There was no evidence of disease dissemination.  He denied other complaints.  His case was presented at tumor board and it was thought that perhaps he could be treated with an excision of the perianal skin alone. However, after surgical evaluation he had circumferential involvement of the perianal skin and anal canal with a tight stricture and was therefore not amenable to local resection. As a result definitive chemotherapy and radiation was recommended which was the rationale for our consultation. He did undergo a lymph node biopsy of the left inguinal region which was negative 8/1/2018. Ultimately recommended definitive chemotherapy and radiation which was completed 9/27/18. TREATMENT COURSE  He was doing well without complaints week 1. Week 2 with oral pain and rash being treated by medical oncology but feels \"rough. \"  He did have drainage and required packing of his wound from the biopsy week 3. Doing well with controlled pain later week 3 (Norco 7.5). No new issues week 4. Very sore and swollen anteriorly week 5. He did ultimately complete all therapy. He was seen 1 month following treatment. He had see medical culture 10/4/2018. He was having persistent perineal pain which was being managed with hydrocodone every 3-4 hours. He had an unpredictable bowel movements with intermittent constipation and diarrhea.   He was able to maintain his weight and had no sign of infection, abdominal distention, or discomfort. He was also followed with palliative care. At 1 month he was having more issues with constipation and was taking Colace and stool softener. 12/12/18: Returns for routine follow up, 3 months after completing radiation. He continues with painful defecation. He reports that having a bowel movements brings tears to his eyes. He is using laxatives along with stool softeners with moderate results. He has been followed by Dr. Aniya Price for assessment and packing of left groin wound. According to Mrs Shayla Daugherty they are no longer packing the wound. The wound is no longer draining. He has Norco at home but has been very reluctant in taking the medication for fear of addiction. 4/11/19: He returns in follow up 7 months after completing definitive radiation therapy to the pelvis for his anal cancer. He underwent an anal exam under anesthesia by Dr. Ignacio Manning on 7/88/8353 and was found to have a large amount of excoriation and erythema surrounding the anus. He had also developed a fissure at the orifice at the 12 o'clock position (information provided by Dr. Ignacio Manning note). Anal pain unchanged. States his pain continues to be an 8/10. He continues with significant pain with defecation and bloody stools. He is reluctant to take his Norco other than at bedtime for fear of constipation and he becomes very relaxed. His energy is low most days but manageable. He remains in good spirits. 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 He tolerated radiation well. Denies any new bone pain. - Returns 11 months out from completion of radiation therapy to pelvis for history of anal cancer. Continues to have some discomfort with his bowel movements. He underwent a anal dilation by Dr. Nena Lomeli with [facility] which he states has been helpful.  He reports that he is beginning to have increased rectal pressure and difficulty moving his bowel again. He is scheduled with Dr. Rubina Dorsey for exam and possible repeat anal dilation. UPDATED PAST MEDICAL HISTORY:  Since our prior encounter, Zafar Qiu has not been hospitalized. There have been no significant changes to the medical history. MEDICATIONS:     Current Outpatient Medications:     traMADol (ULTRAM) 50 mg tablet, Take 1 Tab by mouth every six (6) hours as needed for Pain for up to 30 days. Max Daily Amount: 200 mg. Indications: pain, Disp: 90 Tab, Rfl: 0    sodium chloride 1 gram tablet, Take 2 Tabs by mouth two (2) times a day., Disp: 180 Tab, Rfl: 3    ferrous sulfate 324 mg (65 mg iron) tablet, Take  by mouth Daily (before breakfast). , Disp: , Rfl:     docusate sodium (COLACE) 100 mg capsule, Take 100 mg by mouth as needed for Constipation. , Disp: , Rfl:     amLODIPine (NORVASC) 10 mg tablet, Take 10 mg by mouth daily. In am, Disp: , Rfl:     lisinopril (PRINIVIL, ZESTRIL) 20 mg tablet, Take 20 mg by mouth daily. In am  Indications: hypertension, Disp: , Rfl:     glimepiride (AMARYL) 4 mg tablet, Take 4 mg by mouth every morning. Indications: TYPE 2 DIABETES MELLITUS, Disp: , Rfl:     metFORMIN (GLUCOPHAGE) 1,000 mg tablet, Take 1,000 mg by mouth daily. In am  Indications: type 2 diabetes mellitus, Disp: , Rfl:     ALLERGIES:   No Known Allergies    PHYSICAL EXAMINATION:   ECOG Performance status 0  VITAL SIGNS: Blood pressure 179/90  Pulse 76  Temp 97.6  Weight 169.2     GENERAL: The patient is well-developed, ambulatory, alert and in no acute distress.  Rectal examination: deferred    LABORATORY:   Lab Results   Component Value Date/Time    Sodium 128 (L) 08/08/2019 02:13 PM    Potassium 4.3 08/08/2019 02:13 PM    Chloride 93 (L) 08/08/2019 02:13 PM    CO2 31 08/08/2019 02:13 PM    Anion gap 4 (L) 08/08/2019 02:13 PM    Glucose 327 (H) 08/08/2019 02:13 PM    BUN 11 08/08/2019 02:13 PM    Creatinine 1.09 08/08/2019 02:13 PM    GFR est AA >60 08/08/2019 02:13 PM    GFR est non-AA >60 08/08/2019 02:13 PM    Calcium 9.2 08/08/2019 02:13 PM    Magnesium 1.9 05/30/2019 02:19 PM    Albumin 3.6 08/08/2019 02:13 PM    Protein, total 7.0 08/08/2019 02:13 PM    Globulin 3.4 08/08/2019 02:13 PM    A-G Ratio 1.1 (L) 08/08/2019 02:13 PM    AST (SGOT) 10 (L) 08/08/2019 02:13 PM    ALT (SGPT) 19 08/08/2019 02:13 PM     Lab Results   Component Value Date/Time    WBC 2.6 (L) 08/08/2019 02:13 PM    HGB 12.0 (L) 08/08/2019 02:13 PM    HCT 34.0 (L) 08/08/2019 02:13 PM    PLATELET 559 (L) 57/92/7707 02:13 PM       RADIOLOGY    TUMOR STATUS:  Recently completed therapy, pending repeat evaluation. PET/CT: 12/6/2018     INDICATION: Anal cancer status post chemotherapy and radiation treatment.      COMPARISON: PET scan 7/10/2018     FINDINGS:   NECK/CHEST:  No worrisome activity is seen in the neck. Only symmetric benign activity is  seen within neck base musculature. Stable irregular density and calcifications  are seen within the right upper neck soft tissues which are not felt to be  significantly changed and do not demonstrate abnormal activity. No enlarging  lymph nodes are seen.     Only physiologic activity is seen in the chest. No evolving adenopathy is seen. A trace pericardial effusion is seen. No worrisome pulmonary nodules or masses  are seen.     ABDOMEN/PELVIS:  Only physiologic activity is seen in the abdomen. No evolving adenopathy is  seen. Moderate atherosclerotic calcification is seen of the abdominal aorta.     New focal density is seen in the left groin soft tissues on image 232 measuring  2.4 cm x 1.9 cm in size and demonstrating a maximum SUV of 2.4 g/mL. This is  favored to represent an inflammatory process given the only borderline increased  activity and likely corresponds to the recently drained abscess noted on recent  clinic notes. Prior activity of the anal soft tissues is improved now  demonstrating a maximum SUV of 2.1 g/mm.  Stranding is seen within superficial  and deep soft tissues of the lower pelvis likely related to interval radiation  treatment. No evolving adenopathy or worrisome activity is seen.     BONES:  No aggressive or hypermetabolic osseous lesion is seen. Deformity is seen in the  proximal right femur suggesting prior fracture and internal fixation. IMPRESSION:   1. Improving activity at the anus. No findings concerning for disease progression are seen. Only new findings are seen in the pelvis as described  above consistent with interval treatment. PET/CT: 12/6/2018     INDICATION: Anal cancer status post chemotherapy and radiation treatment.      COMPARISON: PET scan 7/10/2018     FINDINGS:   NECK/CHEST:  No worrisome activity is seen in the neck. Only symmetric benign activity is  seen within neck base musculature. Stable irregular density and calcifications  are seen within the right upper neck soft tissues which are not felt to be  significantly changed and do not demonstrate abnormal activity. No enlarging  lymph nodes are seen.     Only physiologic activity is seen in the chest. No evolving adenopathy is seen. A trace pericardial effusion is seen. No worrisome pulmonary nodules or masses  are seen.     ABDOMEN/PELVIS:  Only physiologic activity is seen in the abdomen. No evolving adenopathy is  seen. Moderate atherosclerotic calcification is seen of the abdominal aorta.     New focal density is seen in the left groin soft tissues on image 232 measuring  2.4 cm x 1.9 cm in size and demonstrating a maximum SUV of 2.4 g/mL. This is  favored to represent an inflammatory process given the only borderline increased  activity and likely corresponds to the recently drained abscess noted on recent  clinic notes. Prior activity of the anal soft tissues is improved now  demonstrating a maximum SUV of 2.1 g/mm.  Stranding is seen within superficial  and deep soft tissues of the lower pelvis likely related to interval radiation  treatment. No evolving adenopathy or worrisome activity is seen.     BONES:  No aggressive or hypermetabolic osseous lesion is seen. Deformity is seen in the  proximal right femur suggesting prior fracture and internal fixation.     IMPRESSION:   1. Improving activity at the anus. No findings concerning for disease  progression are seen. Only new findings are seen in the pelvis as described  above consistent with interval treatment. IMPRESSION:  Mariela Allen is a 67 y.o. male with a prior head and neck cancer and a newly developed anal canal squamous cell carcinoma with extensive perianal skin involvement. He was treated with definitive chemotherapy and radiation and had significant peritoneal irritation as would be expected. He did however complete all of his therapy and 2 weeks following treatment was doing fair. His pain was controlled with hydrocodone. He had a nice clinical response. PET/CT in 12/2018 had no worrisome uptake suggestive of recurrent malignancy with no evidence of metastatic disease. He underwent an anal exam under anesthesia by Dr. Francois Thao on 0/31/3568 and was found to have a large amount of excoriation and erythema surrounding the anus. He had also developed a fissure at the orifice at the 12 o'clock position (information provided by Dr. Francois Thao note). 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 He tolerated radiation well. Denies any new bone pain. - Returns 11 months out from completion of radiation therapy to pelvis for history of anal cancer. Continues to have some discomfort with his bowel movements. He underwent a anal dilation by Dr. Neena Girard with [facility] which he states has been helpful. He reports that he is beginning to have increased rectal pressure and difficulty moving his bowel again. He is scheduled with Dr. Neena Girard for exam and possible repeat anal dilation.      PLAN:    ANAL Cancer  1) Will see him again in 6 months. Scheduled with Dr. Akshat Bellamy  2) PET/CT scheduled for 9/10/19 by Dr. Poornima Rogers with medical oncology  3) We spent 30 minutes in the care of Mr Ralph Perez today, over 50% of which was in direct counseling and ongoing management of his anal cancer. All questions were answered to the best of my ability. H/N cancer  1) 4 1/2 years out for completion of radiation therapy - followed by Dr. 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 Nehemias Bai NP  08/29/19     Portions of this note were copied from prior encounters and reviewed for accuracy, currency, and represent documentation and tasks completed during this encounter. I verify and attest these portions to be unchanged from prior visits.

## 2019-08-29 NOTE — PROGRESS NOTES
Right Neck Mass RT - 12/28/2015  S/P Chemo  S/P RT / Chemo anal Cancer - 09/27/2018 06/2019 - Left iliae bone lesion (+) METS  07/16/2019 - Had single SBRT to left ilium at [facility]  PET scheduled for 09/10/2019 Andrea Bundy, [address] [place] [address] [place]

## 2019-09-25 NOTE — PROGRESS NOTES
Saw patient on 9-24-19 with Dr Luciano Mendoza. He received report of scans which were good. Pt will return in 2 months for follow up. He will continue salt tabs 2 daily. He will also be following up with GI as needed.  Navigation will be following

## 2019-12-27 NOTE — PROGRESS NOTES
12/27/19 saw pt today with Dr. Jordyn Padgett for follow up anal cancer. He is feeling well. PO intake is good. Continues to have rectal pain with bowel movements but it is improving. He will get handicap placard from PCP like he has in the past.  Tramadol is controlling pain. Follow up in 3 months with PET, if insurance will no pay for PET will arrange CT. Encouraged to call with any concerns. Navigation will monitor imaging.

## 2020-01-01 ENCOUNTER — HOSPITAL ENCOUNTER (OUTPATIENT)
Dept: INFUSION THERAPY | Age: 80
End: 2020-01-01

## 2020-01-01 ENCOUNTER — APPOINTMENT (OUTPATIENT)
Dept: GENERAL RADIOLOGY | Age: 80
DRG: 843 | End: 2020-01-01
Attending: EMERGENCY MEDICINE
Payer: MEDICARE

## 2020-01-01 ENCOUNTER — APPOINTMENT (OUTPATIENT)
Dept: GENERAL RADIOLOGY | Age: 80
DRG: 177 | End: 2020-01-01
Attending: INTERNAL MEDICINE
Payer: MEDICARE

## 2020-01-01 ENCOUNTER — APPOINTMENT (OUTPATIENT)
Dept: GENERAL RADIOLOGY | Age: 80
DRG: 843 | End: 2020-01-01
Attending: INTERNAL MEDICINE
Payer: MEDICARE

## 2020-01-01 ENCOUNTER — HOSPITAL ENCOUNTER (OUTPATIENT)
Dept: PET IMAGING | Age: 80
Discharge: HOME | End: 2020-03-24
Attending: INTERNAL MEDICINE
Payer: MEDICARE

## 2020-01-01 ENCOUNTER — HOSPITAL ENCOUNTER (OUTPATIENT)
Dept: GENERAL RADIOLOGY | Age: 80
Discharge: HOME | End: 2020-06-04
Attending: INTERNAL MEDICINE

## 2020-01-01 ENCOUNTER — APPOINTMENT (OUTPATIENT)
Dept: CT IMAGING | Age: 80
DRG: 843 | End: 2020-01-01
Attending: INTERNAL MEDICINE
Payer: MEDICARE

## 2020-01-01 ENCOUNTER — HOSPITAL ENCOUNTER (OUTPATIENT)
Dept: INFUSION THERAPY | Age: 80
Discharge: HOME | End: 2020-05-08
Payer: MEDICARE

## 2020-01-01 ENCOUNTER — HOSPITAL ENCOUNTER (EMERGENCY)
Age: 80
Discharge: ANOTHER HOSPITAL | DRG: 843 | End: 2020-05-24
Attending: EMERGENCY MEDICINE
Payer: MEDICARE

## 2020-01-01 ENCOUNTER — HOSPITAL ENCOUNTER (OUTPATIENT)
Dept: LAB | Age: 80
Discharge: HOME | DRG: 375 | End: 2020-03-26
Payer: MEDICARE

## 2020-01-01 ENCOUNTER — HOSPICE ADMISSION (OUTPATIENT)
Dept: HOSPICE | Facility: HOSPICE | Age: 80
End: 2020-01-01

## 2020-01-01 ENCOUNTER — PATIENT OUTREACH (OUTPATIENT)
Dept: CASE MANAGEMENT | Age: 80
End: 2020-01-01

## 2020-01-01 ENCOUNTER — HOSPITAL ENCOUNTER (OUTPATIENT)
Dept: LAB | Age: 80
Discharge: HOME | End: 2020-05-08
Payer: MEDICARE

## 2020-01-01 ENCOUNTER — HOSPITAL ENCOUNTER (OUTPATIENT)
Dept: INFUSION THERAPY | Age: 80
End: 2020-01-01
Payer: MEDICARE

## 2020-01-01 ENCOUNTER — HOSPITAL ENCOUNTER (OUTPATIENT)
Dept: INFUSION THERAPY | Age: 80
Discharge: HOME | End: 2020-04-19
Payer: MEDICARE

## 2020-01-01 ENCOUNTER — HOSPITAL ENCOUNTER (OUTPATIENT)
Dept: INFUSION THERAPY | Age: 80
Discharge: HOME | End: 2020-05-20
Payer: MEDICARE

## 2020-01-01 ENCOUNTER — HOSPITAL ENCOUNTER (OUTPATIENT)
Dept: INFUSION THERAPY | Age: 80
Discharge: HOME | End: 2020-04-17
Payer: MEDICARE

## 2020-01-01 ENCOUNTER — HOSPITAL ENCOUNTER (INPATIENT)
Age: 80
LOS: 11 days | DRG: 177 | End: 2020-07-19
Admitting: INTERNAL MEDICINE
Payer: MEDICARE

## 2020-01-01 ENCOUNTER — APPOINTMENT (OUTPATIENT)
Dept: GENERAL RADIOLOGY | Age: 80
DRG: 177 | End: 2020-01-01
Payer: MEDICARE

## 2020-01-01 ENCOUNTER — HOSPITAL ENCOUNTER (OUTPATIENT)
Dept: INFUSION THERAPY | Age: 80
Discharge: HOME | End: 2020-04-18
Payer: MEDICARE

## 2020-01-01 ENCOUNTER — APPOINTMENT (OUTPATIENT)
Dept: RADIATION ONCOLOGY | Age: 80
End: 2020-01-01

## 2020-01-01 ENCOUNTER — HOSPITAL ENCOUNTER (INPATIENT)
Age: 80
LOS: 39 days | Discharge: NURSING, REHAB OR LONG-TERM CARE | DRG: 843 | End: 2020-07-02
Attending: FAMILY MEDICINE | Admitting: FAMILY MEDICINE
Payer: MEDICARE

## 2020-01-01 ENCOUNTER — APPOINTMENT (OUTPATIENT)
Dept: CT IMAGING | Age: 80
DRG: 843 | End: 2020-01-01
Attending: EMERGENCY MEDICINE
Payer: MEDICARE

## 2020-01-01 ENCOUNTER — ANESTHESIA (OUTPATIENT)
Dept: ENDOSCOPY | Age: 80
DRG: 843 | End: 2020-01-01
Payer: MEDICARE

## 2020-01-01 ENCOUNTER — HOSPITAL ENCOUNTER (INPATIENT)
Age: 80
LOS: 3 days | Discharge: ANOTHER HOSPITAL | DRG: 375 | End: 2020-03-29
Attending: INTERNAL MEDICINE | Admitting: INTERNAL MEDICINE
Payer: MEDICARE

## 2020-01-01 ENCOUNTER — APPOINTMENT (OUTPATIENT)
Dept: GENERAL RADIOLOGY | Age: 80
DRG: 843 | End: 2020-01-01
Attending: FAMILY MEDICINE
Payer: MEDICARE

## 2020-01-01 ENCOUNTER — HOSPITAL ENCOUNTER (INPATIENT)
Age: 80
LOS: 5 days | Discharge: HOME | DRG: 644 | End: 2020-04-03
Attending: INTERNAL MEDICINE | Admitting: INTERNAL MEDICINE
Payer: MEDICARE

## 2020-01-01 ENCOUNTER — HOSPITAL ENCOUNTER (OUTPATIENT)
Dept: LAB | Age: 80
Discharge: HOME | End: 2020-04-17
Payer: MEDICARE

## 2020-01-01 ENCOUNTER — ANESTHESIA EVENT (OUTPATIENT)
Dept: ENDOSCOPY | Age: 80
DRG: 843 | End: 2020-01-01
Payer: MEDICARE

## 2020-01-01 ENCOUNTER — HOSPITAL ENCOUNTER (OUTPATIENT)
Dept: INFUSION THERAPY | Age: 80
Discharge: HOME | End: 2020-05-09
Payer: MEDICARE

## 2020-01-01 ENCOUNTER — HOSPITAL ENCOUNTER (OUTPATIENT)
Dept: LAB | Age: 80
Discharge: HOME | End: 2020-05-20
Payer: MEDICARE

## 2020-01-01 ENCOUNTER — HOSPITAL ENCOUNTER (OUTPATIENT)
Dept: INFUSION THERAPY | Age: 80
Discharge: HOME | End: 2020-05-10
Payer: MEDICARE

## 2020-01-01 VITALS
WEIGHT: 170 LBS | RESPIRATION RATE: 16 BRPM | SYSTOLIC BLOOD PRESSURE: 153 MMHG | HEART RATE: 96 BPM | BODY MASS INDEX: 23.38 KG/M2 | TEMPERATURE: 98 F | OXYGEN SATURATION: 96 % | DIASTOLIC BLOOD PRESSURE: 77 MMHG

## 2020-01-01 VITALS
OXYGEN SATURATION: 97 % | DIASTOLIC BLOOD PRESSURE: 87 MMHG | BODY MASS INDEX: 22.21 KG/M2 | WEIGHT: 164 LBS | HEART RATE: 102 BPM | TEMPERATURE: 98.6 F | HEIGHT: 72 IN | SYSTOLIC BLOOD PRESSURE: 169 MMHG | RESPIRATION RATE: 19 BRPM

## 2020-01-01 VITALS
RESPIRATION RATE: 18 BRPM | WEIGHT: 173.8 LBS | SYSTOLIC BLOOD PRESSURE: 133 MMHG | DIASTOLIC BLOOD PRESSURE: 76 MMHG | TEMPERATURE: 97.7 F | HEART RATE: 93 BPM | BODY MASS INDEX: 23.9 KG/M2 | OXYGEN SATURATION: 95 %

## 2020-01-01 VITALS
SYSTOLIC BLOOD PRESSURE: 178 MMHG | OXYGEN SATURATION: 94 % | WEIGHT: 169.2 LBS | RESPIRATION RATE: 16 BRPM | BODY MASS INDEX: 22.92 KG/M2 | HEART RATE: 89 BPM | TEMPERATURE: 97.5 F | HEIGHT: 72 IN | DIASTOLIC BLOOD PRESSURE: 90 MMHG

## 2020-01-01 VITALS
DIASTOLIC BLOOD PRESSURE: 46 MMHG | HEIGHT: 72 IN | WEIGHT: 170 LBS | TEMPERATURE: 98.2 F | OXYGEN SATURATION: 94 % | HEART RATE: 110 BPM | BODY MASS INDEX: 23.03 KG/M2 | SYSTOLIC BLOOD PRESSURE: 91 MMHG | RESPIRATION RATE: 18 BRPM

## 2020-01-01 VITALS
WEIGHT: 170 LBS | SYSTOLIC BLOOD PRESSURE: 165 MMHG | HEIGHT: 72 IN | OXYGEN SATURATION: 96 % | BODY MASS INDEX: 23.03 KG/M2 | TEMPERATURE: 98.1 F | HEART RATE: 97 BPM | DIASTOLIC BLOOD PRESSURE: 80 MMHG | RESPIRATION RATE: 18 BRPM

## 2020-01-01 VITALS
HEART RATE: 91 BPM | OXYGEN SATURATION: 91 % | DIASTOLIC BLOOD PRESSURE: 66 MMHG | RESPIRATION RATE: 18 BRPM | BODY MASS INDEX: 23.03 KG/M2 | SYSTOLIC BLOOD PRESSURE: 128 MMHG | HEIGHT: 72 IN | TEMPERATURE: 97.7 F | WEIGHT: 170 LBS

## 2020-01-01 VITALS
BODY MASS INDEX: 23.05 KG/M2 | TEMPERATURE: 98.1 F | DIASTOLIC BLOOD PRESSURE: 81 MMHG | SYSTOLIC BLOOD PRESSURE: 156 MMHG | HEART RATE: 92 BPM | WEIGHT: 167.6 LBS | OXYGEN SATURATION: 97 % | RESPIRATION RATE: 18 BRPM

## 2020-01-01 VITALS
SYSTOLIC BLOOD PRESSURE: 141 MMHG | WEIGHT: 172.2 LBS | DIASTOLIC BLOOD PRESSURE: 70 MMHG | TEMPERATURE: 98.3 F | HEART RATE: 90 BPM | RESPIRATION RATE: 18 BRPM | BODY MASS INDEX: 23.68 KG/M2 | OXYGEN SATURATION: 96 %

## 2020-01-01 DIAGNOSIS — C79.51 MALIGNANT NEOPLASM METASTATIC TO BONE (HCC): ICD-10-CM

## 2020-01-01 DIAGNOSIS — R11.2 NON-INTRACTABLE VOMITING WITH NAUSEA, UNSPECIFIED VOMITING TYPE: Primary | ICD-10-CM

## 2020-01-01 DIAGNOSIS — C21.0 ANAL CARCINOMA (HCC): ICD-10-CM

## 2020-01-01 DIAGNOSIS — C80.1 SQUAMOUS CELL CARCINOMA METASTATIC TO HEAD AND NECK WITH UNKNOWN PRIMARY SITE (HCC): ICD-10-CM

## 2020-01-01 DIAGNOSIS — J18.9 COMMUNITY ACQUIRED PNEUMONIA, UNSPECIFIED LATERALITY: ICD-10-CM

## 2020-01-01 DIAGNOSIS — R06.03 RESPIRATORY DISTRESS: ICD-10-CM

## 2020-01-01 DIAGNOSIS — J90 PLEURAL EFFUSION ON RIGHT: ICD-10-CM

## 2020-01-01 DIAGNOSIS — R09.02 HYPOXIA: Primary | ICD-10-CM

## 2020-01-01 DIAGNOSIS — R54 FRAIL ELDERLY: ICD-10-CM

## 2020-01-01 DIAGNOSIS — E08.65 DIABETES MELLITUS DUE TO UNDERLYING CONDITION WITH HYPERGLYCEMIA, WITHOUT LONG-TERM CURRENT USE OF INSULIN (HCC): ICD-10-CM

## 2020-01-01 DIAGNOSIS — J96.01 ACUTE RESPIRATORY FAILURE WITH HYPOXIA (HCC): ICD-10-CM

## 2020-01-01 DIAGNOSIS — E22.2 SIADH (SYNDROME OF INAPPROPRIATE ADH PRODUCTION) (HCC): ICD-10-CM

## 2020-01-01 DIAGNOSIS — J96.21 ACUTE ON CHRONIC RESPIRATORY FAILURE WITH HYPOXIA (HCC): ICD-10-CM

## 2020-01-01 DIAGNOSIS — C21.0 ANAL CANCER (HCC): ICD-10-CM

## 2020-01-01 DIAGNOSIS — E87.1 HYPONATREMIA: ICD-10-CM

## 2020-01-01 DIAGNOSIS — C21.0 ANAL CARCINOMA (HCC): Primary | ICD-10-CM

## 2020-01-01 DIAGNOSIS — C79.89 SQUAMOUS CELL CARCINOMA METASTATIC TO HEAD AND NECK WITH UNKNOWN PRIMARY SITE (HCC): ICD-10-CM

## 2020-01-01 DIAGNOSIS — Z20.822 COVID-19 RULED OUT: ICD-10-CM

## 2020-01-01 DIAGNOSIS — E87.1 HYPONATREMIA: Primary | ICD-10-CM

## 2020-01-01 DIAGNOSIS — R53.81 DEBILITY: Chronic | ICD-10-CM

## 2020-01-01 DIAGNOSIS — J90 PLEURAL EFFUSION: ICD-10-CM

## 2020-01-01 DIAGNOSIS — J69.0 ASPIRATION PNEUMONIA OF BOTH LUNGS, UNSPECIFIED ASPIRATION PNEUMONIA TYPE, UNSPECIFIED PART OF LUNG (HCC): ICD-10-CM

## 2020-01-01 DIAGNOSIS — C80.1 SQUAMOUS CELL CARCINOMA METASTATIC TO HEAD AND NECK WITH UNKNOWN PRIMARY SITE (HCC): Primary | ICD-10-CM

## 2020-01-01 DIAGNOSIS — R54 FRAILTY: ICD-10-CM

## 2020-01-01 DIAGNOSIS — D64.9 NORMOCYTIC ANEMIA: ICD-10-CM

## 2020-01-01 DIAGNOSIS — R53.81 DEBILITY: ICD-10-CM

## 2020-01-01 DIAGNOSIS — C79.89 SQUAMOUS CELL CARCINOMA METASTATIC TO HEAD AND NECK WITH UNKNOWN PRIMARY SITE (HCC): Primary | ICD-10-CM

## 2020-01-01 DIAGNOSIS — J90 PLEURAL EFFUSION ON LEFT: ICD-10-CM

## 2020-01-01 DIAGNOSIS — R06.03 RESPIRATORY DISTRESS: Primary | ICD-10-CM

## 2020-01-01 DIAGNOSIS — C44.500: ICD-10-CM

## 2020-01-01 DIAGNOSIS — R50.9 FEVER, UNSPECIFIED FEVER CAUSE: ICD-10-CM

## 2020-01-01 DIAGNOSIS — K59.00 OBSTIPATION: ICD-10-CM

## 2020-01-01 DIAGNOSIS — D70.9 NEUTROPENIA, UNSPECIFIED TYPE (HCC): ICD-10-CM

## 2020-01-01 DIAGNOSIS — J69.0 ASPIRATION PNEUMONIA OF RIGHT LOWER LOBE, UNSPECIFIED ASPIRATION PNEUMONIA TYPE (HCC): ICD-10-CM

## 2020-01-01 DIAGNOSIS — Z51.5 ENCOUNTER FOR PALLIATIVE CARE: ICD-10-CM

## 2020-01-01 DIAGNOSIS — K62.4 RECTAL OBSTRUCTION: ICD-10-CM

## 2020-01-01 DIAGNOSIS — J18.9 PNEUMONIA OF BOTH LUNGS DUE TO INFECTIOUS ORGANISM, UNSPECIFIED PART OF LUNG: ICD-10-CM

## 2020-01-01 DIAGNOSIS — E83.42 HYPOMAGNESEMIA: ICD-10-CM

## 2020-01-01 DIAGNOSIS — G93.41 ACUTE METABOLIC ENCEPHALOPATHY: ICD-10-CM

## 2020-01-01 DIAGNOSIS — R06.00 DYSPNEA, UNSPECIFIED TYPE: ICD-10-CM

## 2020-01-01 DIAGNOSIS — J90 CHRONIC BILATERAL PLEURAL EFFUSIONS: Chronic | ICD-10-CM

## 2020-01-01 LAB
ABO + RH BLD: NORMAL
ACC. NO. FROM MICRO ORDER, ACCP: ABNORMAL
ACID FAST STN SPEC: NEGATIVE
ALBUMIN SERPL-MCNC: 1.8 G/DL (ref 3.2–4.6)
ALBUMIN SERPL-MCNC: 1.9 G/DL (ref 3.2–4.6)
ALBUMIN SERPL-MCNC: 2 G/DL (ref 3.2–4.6)
ALBUMIN SERPL-MCNC: 2 G/DL (ref 3.2–4.6)
ALBUMIN SERPL-MCNC: 2.4 G/DL (ref 3.2–4.6)
ALBUMIN SERPL-MCNC: 2.5 G/DL (ref 3.2–4.6)
ALBUMIN SERPL-MCNC: 2.7 G/DL (ref 3.2–4.6)
ALBUMIN SERPL-MCNC: 2.7 G/DL (ref 3.2–4.6)
ALBUMIN SERPL-MCNC: 2.8 G/DL (ref 3.2–4.6)
ALBUMIN SERPL-MCNC: 2.8 G/DL (ref 3.2–4.6)
ALBUMIN SERPL-MCNC: 2.9 G/DL (ref 3.2–4.6)
ALBUMIN SERPL-MCNC: 2.9 G/DL (ref 3.2–4.6)
ALBUMIN SERPL-MCNC: 3 G/DL (ref 3.2–4.6)
ALBUMIN SERPL-MCNC: 3 G/DL (ref 3.2–4.6)
ALBUMIN SERPL-MCNC: 3.1 G/DL (ref 3.2–4.6)
ALBUMIN SERPL-MCNC: 3.1 G/DL (ref 3.2–4.6)
ALBUMIN SERPL-MCNC: 3.2 G/DL (ref 3.2–4.6)
ALBUMIN SERPL-MCNC: 3.2 G/DL (ref 3.2–4.6)
ALBUMIN/GLOB SERPL: 0.5 {RATIO} (ref 1.2–3.5)
ALBUMIN/GLOB SERPL: 0.6 {RATIO} (ref 1.2–3.5)
ALBUMIN/GLOB SERPL: 0.6 {RATIO} (ref 1.2–3.5)
ALBUMIN/GLOB SERPL: 0.7 {RATIO} (ref 1.2–3.5)
ALBUMIN/GLOB SERPL: 0.8 {RATIO} (ref 1.2–3.5)
ALBUMIN/GLOB SERPL: 0.9 {RATIO} (ref 1.2–3.5)
ALP SERPL-CCNC: 1035 U/L (ref 50–136)
ALP SERPL-CCNC: 270 U/L (ref 50–136)
ALP SERPL-CCNC: 281 U/L (ref 50–136)
ALP SERPL-CCNC: 305 U/L (ref 50–136)
ALP SERPL-CCNC: 308 U/L (ref 50–136)
ALP SERPL-CCNC: 316 U/L (ref 50–136)
ALP SERPL-CCNC: 325 U/L (ref 50–136)
ALP SERPL-CCNC: 352 U/L (ref 50–136)
ALP SERPL-CCNC: 356 U/L (ref 50–136)
ALP SERPL-CCNC: 359 U/L (ref 50–136)
ALP SERPL-CCNC: 367 U/L (ref 50–136)
ALP SERPL-CCNC: 372 U/L (ref 50–136)
ALP SERPL-CCNC: 382 U/L (ref 50–136)
ALP SERPL-CCNC: 392 U/L (ref 50–136)
ALP SERPL-CCNC: 419 U/L (ref 50–136)
ALP SERPL-CCNC: 426 U/L (ref 50–136)
ALP SERPL-CCNC: 472 U/L (ref 50–136)
ALP SERPL-CCNC: 473 U/L (ref 50–136)
ALT SERPL-CCNC: 17 U/L (ref 12–65)
ALT SERPL-CCNC: 18 U/L (ref 12–65)
ALT SERPL-CCNC: 19 U/L (ref 12–65)
ALT SERPL-CCNC: 21 U/L (ref 12–65)
ALT SERPL-CCNC: 23 U/L (ref 12–65)
ALT SERPL-CCNC: 24 U/L (ref 12–65)
ALT SERPL-CCNC: 24 U/L (ref 12–65)
ALT SERPL-CCNC: 25 U/L (ref 12–65)
ALT SERPL-CCNC: 25 U/L (ref 12–65)
ALT SERPL-CCNC: 27 U/L (ref 12–65)
ALT SERPL-CCNC: 27 U/L (ref 12–65)
ALT SERPL-CCNC: 29 U/L (ref 12–65)
ALT SERPL-CCNC: 42 U/L (ref 12–65)
ANION GAP SERPL CALC-SCNC: 10 MMOL/L (ref 7–16)
ANION GAP SERPL CALC-SCNC: 11 MMOL/L (ref 7–16)
ANION GAP SERPL CALC-SCNC: 12 MMOL/L (ref 7–16)
ANION GAP SERPL CALC-SCNC: 15 MMOL/L (ref 7–16)
ANION GAP SERPL CALC-SCNC: 18 MMOL/L (ref 7–16)
ANION GAP SERPL CALC-SCNC: 4 MMOL/L (ref 7–16)
ANION GAP SERPL CALC-SCNC: 5 MMOL/L (ref 7–16)
ANION GAP SERPL CALC-SCNC: 6 MMOL/L (ref 7–16)
ANION GAP SERPL CALC-SCNC: 7 MMOL/L (ref 7–16)
ANION GAP SERPL CALC-SCNC: 8 MMOL/L (ref 7–16)
ANION GAP SERPL CALC-SCNC: 9 MMOL/L (ref 7–16)
ANTIGENS PRESENT BLD: NORMAL
ANTIGENS PRESENT RBC DONR: NORMAL
APPEARANCE FLD: CLEAR
APPEARANCE FLD: CLEAR
APPEARANCE FLD: NORMAL
APPEARANCE FLD: NORMAL
APPEARANCE UR: CLEAR
APTT PPP: 29.3 SEC (ref 24.3–35.4)
APTT PPP: 30.1 SEC (ref 24.3–35.4)
APTT PPP: 33 SEC (ref 24.3–35.4)
APTT PPP: 35.9 SEC (ref 24.3–35.4)
APTT PPP: 38.9 SEC (ref 24.3–35.4)
APTT PPP: 44 SEC (ref 24.3–35.4)
APTT PPP: 45.7 SEC (ref 24.3–35.4)
APTT PPP: 46.9 SEC (ref 24.3–35.4)
ARTERIAL PATENCY WRIST A: ABNORMAL
ARTERIAL PATENCY WRIST A: NORMAL
ARTERIAL PATENCY WRIST A: YES
AST SERPL-CCNC: 152 U/L (ref 15–37)
AST SERPL-CCNC: 19 U/L (ref 15–37)
AST SERPL-CCNC: 27 U/L (ref 15–37)
AST SERPL-CCNC: 28 U/L (ref 15–37)
AST SERPL-CCNC: 29 U/L (ref 15–37)
AST SERPL-CCNC: 31 U/L (ref 15–37)
AST SERPL-CCNC: 33 U/L (ref 15–37)
AST SERPL-CCNC: 35 U/L (ref 15–37)
AST SERPL-CCNC: 35 U/L (ref 15–37)
AST SERPL-CCNC: 38 U/L (ref 15–37)
AST SERPL-CCNC: 40 U/L (ref 15–37)
AST SERPL-CCNC: 43 U/L (ref 15–37)
AST SERPL-CCNC: 493 U/L (ref 15–37)
AST SERPL-CCNC: 53 U/L (ref 15–37)
AST SERPL-CCNC: 56 U/L (ref 15–37)
AST SERPL-CCNC: 56 U/L (ref 15–37)
AST SERPL-CCNC: 70 U/L (ref 15–37)
AST SERPL-CCNC: 76 U/L (ref 15–37)
ATRIAL RATE: 104 BPM
ATRIAL RATE: 105 BPM
ATRIAL RATE: 113 BPM
BACTERIA SPEC CULT: ABNORMAL
BACTERIA SPEC CULT: NORMAL
BACTERIA URNS QL MICRO: 0 /HPF
BASE DEFICIT BLD-SCNC: 2 MMOL/L
BASE EXCESS BLD CALC-SCNC: 0 MMOL/L
BASE EXCESS BLD CALC-SCNC: 1 MMOL/L
BASE EXCESS BLD CALC-SCNC: 3 MMOL/L
BASE EXCESS BLD CALC-SCNC: 3 MMOL/L
BASOPHILS # BLD: 0 K/UL (ref 0–0.2)
BASOPHILS NFR BLD: 0 %
BASOPHILS NFR BLD: 0 % (ref 0–2)
BASOPHILS NFR BLD: 1 % (ref 0–2)
BDY SITE: ABNORMAL
BDY SITE: NORMAL
BILIRUB SERPL-MCNC: 0.2 MG/DL (ref 0.2–1.1)
BILIRUB SERPL-MCNC: 0.3 MG/DL (ref 0.2–1.1)
BILIRUB SERPL-MCNC: 0.4 MG/DL (ref 0.2–1.1)
BILIRUB SERPL-MCNC: 0.5 MG/DL (ref 0.2–1.1)
BILIRUB SERPL-MCNC: 0.6 MG/DL (ref 0.2–1.1)
BILIRUB SERPL-MCNC: 0.6 MG/DL (ref 0.2–1.1)
BILIRUB SERPL-MCNC: 0.7 MG/DL (ref 0.2–1.1)
BILIRUB UR QL: ABNORMAL
BILIRUB UR QL: NEGATIVE
BILIRUB UR QL: NEGATIVE
BLD PROD TYP BPU: NORMAL
BLOOD BANK CMNT PATIENT-IMP: NORMAL
BLOOD BANK CMNT PATIENT-IMP: NORMAL
BLOOD GROUP ANTIBODIES SERPL: NORMAL
BNP SERPL-MCNC: 1875 PG/ML
BNP SERPL-MCNC: 934 PG/ML
BPU ID: NORMAL
BUN SERPL-MCNC: 10 MG/DL (ref 8–23)
BUN SERPL-MCNC: 11 MG/DL (ref 8–23)
BUN SERPL-MCNC: 12 MG/DL (ref 8–23)
BUN SERPL-MCNC: 13 MG/DL (ref 8–23)
BUN SERPL-MCNC: 14 MG/DL (ref 8–23)
BUN SERPL-MCNC: 14 MG/DL (ref 8–23)
BUN SERPL-MCNC: 15 MG/DL (ref 8–23)
BUN SERPL-MCNC: 16 MG/DL (ref 8–23)
BUN SERPL-MCNC: 17 MG/DL (ref 8–23)
BUN SERPL-MCNC: 18 MG/DL (ref 8–23)
BUN SERPL-MCNC: 18 MG/DL (ref 8–23)
BUN SERPL-MCNC: 19 MG/DL (ref 8–23)
BUN SERPL-MCNC: 21 MG/DL (ref 8–23)
BUN SERPL-MCNC: 21 MG/DL (ref 8–23)
BUN SERPL-MCNC: 22 MG/DL (ref 8–23)
BUN SERPL-MCNC: 22 MG/DL (ref 8–23)
BUN SERPL-MCNC: 23 MG/DL (ref 8–23)
BUN SERPL-MCNC: 24 MG/DL (ref 8–23)
BUN SERPL-MCNC: 24 MG/DL (ref 8–23)
BUN SERPL-MCNC: 25 MG/DL (ref 8–23)
BUN SERPL-MCNC: 25 MG/DL (ref 8–23)
BUN SERPL-MCNC: 26 MG/DL (ref 8–23)
BUN SERPL-MCNC: 28 MG/DL (ref 8–23)
BUN SERPL-MCNC: 35 MG/DL (ref 8–23)
BUN SERPL-MCNC: 36 MG/DL (ref 8–23)
BUN SERPL-MCNC: 45 MG/DL (ref 8–23)
BUN SERPL-MCNC: 6 MG/DL (ref 8–23)
BUN SERPL-MCNC: 62 MG/DL (ref 8–23)
BUN SERPL-MCNC: 7 MG/DL (ref 8–23)
BUN SERPL-MCNC: 70 MG/DL (ref 8–23)
BUN SERPL-MCNC: 70 MG/DL (ref 8–23)
BUN SERPL-MCNC: 8 MG/DL (ref 8–23)
BUN SERPL-MCNC: 9 MG/DL (ref 8–23)
BUN SERPL-MCNC: 9 MG/DL (ref 8–23)
CA-I BLD-MCNC: 1.25 MMOL/L (ref 1.12–1.32)
CALCIUM SERPL-MCNC: 7.5 MG/DL (ref 8.3–10.4)
CALCIUM SERPL-MCNC: 8 MG/DL (ref 8.3–10.4)
CALCIUM SERPL-MCNC: 8.2 MG/DL (ref 8.3–10.4)
CALCIUM SERPL-MCNC: 8.3 MG/DL (ref 8.3–10.4)
CALCIUM SERPL-MCNC: 8.4 MG/DL (ref 8.3–10.4)
CALCIUM SERPL-MCNC: 8.5 MG/DL (ref 8.3–10.4)
CALCIUM SERPL-MCNC: 8.6 MG/DL (ref 8.3–10.4)
CALCIUM SERPL-MCNC: 8.7 MG/DL (ref 8.3–10.4)
CALCIUM SERPL-MCNC: 8.8 MG/DL (ref 8.3–10.4)
CALCIUM SERPL-MCNC: 8.9 MG/DL (ref 8.3–10.4)
CALCIUM SERPL-MCNC: 9 MG/DL (ref 8.3–10.4)
CALCIUM SERPL-MCNC: 9.1 MG/DL (ref 8.3–10.4)
CALCIUM SERPL-MCNC: 9.2 MG/DL (ref 8.3–10.4)
CALCIUM SERPL-MCNC: 9.2 MG/DL (ref 8.3–10.4)
CALCIUM SERPL-MCNC: 9.3 MG/DL (ref 8.3–10.4)
CALCIUM SERPL-MCNC: 9.7 MG/DL (ref 8.3–10.4)
CALCULATED P AXIS, ECG09: 55 DEGREES
CALCULATED P AXIS, ECG09: 64 DEGREES
CALCULATED P AXIS, ECG09: 65 DEGREES
CALCULATED R AXIS, ECG10: 53 DEGREES
CALCULATED R AXIS, ECG10: 55 DEGREES
CALCULATED R AXIS, ECG10: 59 DEGREES
CALCULATED T AXIS, ECG11: 71 DEGREES
CALCULATED T AXIS, ECG11: 75 DEGREES
CALCULATED T AXIS, ECG11: 82 DEGREES
CASTS URNS QL MICRO: ABNORMAL /LPF
CASTS URNS QL MICRO: ABNORMAL /LPF
CHLORIDE SERPL-SCNC: 100 MMOL/L (ref 98–107)
CHLORIDE SERPL-SCNC: 101 MMOL/L (ref 98–107)
CHLORIDE SERPL-SCNC: 102 MMOL/L (ref 98–107)
CHLORIDE SERPL-SCNC: 103 MMOL/L (ref 98–107)
CHLORIDE SERPL-SCNC: 104 MMOL/L (ref 98–107)
CHLORIDE SERPL-SCNC: 107 MMOL/L (ref 98–107)
CHLORIDE SERPL-SCNC: 80 MMOL/L (ref 98–107)
CHLORIDE SERPL-SCNC: 84 MMOL/L (ref 98–107)
CHLORIDE SERPL-SCNC: 85 MMOL/L (ref 98–107)
CHLORIDE SERPL-SCNC: 87 MMOL/L (ref 98–107)
CHLORIDE SERPL-SCNC: 88 MMOL/L (ref 98–107)
CHLORIDE SERPL-SCNC: 89 MMOL/L (ref 98–107)
CHLORIDE SERPL-SCNC: 89 MMOL/L (ref 98–107)
CHLORIDE SERPL-SCNC: 90 MMOL/L (ref 98–107)
CHLORIDE SERPL-SCNC: 91 MMOL/L (ref 98–107)
CHLORIDE SERPL-SCNC: 91 MMOL/L (ref 98–107)
CHLORIDE SERPL-SCNC: 92 MMOL/L (ref 98–107)
CHLORIDE SERPL-SCNC: 93 MMOL/L (ref 98–107)
CHLORIDE SERPL-SCNC: 94 MMOL/L (ref 98–107)
CHLORIDE SERPL-SCNC: 94 MMOL/L (ref 98–107)
CHLORIDE SERPL-SCNC: 95 MMOL/L (ref 98–107)
CHLORIDE SERPL-SCNC: 96 MMOL/L (ref 98–107)
CHLORIDE SERPL-SCNC: 97 MMOL/L (ref 98–107)
CHLORIDE SERPL-SCNC: 98 MMOL/L (ref 98–107)
CHLORIDE SERPL-SCNC: 99 MMOL/L (ref 98–107)
CO2 BLD-SCNC: 26 MMOL/L
CO2 BLD-SCNC: 27 MMOL/L
CO2 BLD-SCNC: 29 MMOL/L
CO2 BLD-SCNC: 29 MMOL/L
CO2 SERPL-SCNC: 20 MMOL/L (ref 21–32)
CO2 SERPL-SCNC: 22 MMOL/L (ref 21–32)
CO2 SERPL-SCNC: 24 MMOL/L (ref 21–32)
CO2 SERPL-SCNC: 25 MMOL/L (ref 21–32)
CO2 SERPL-SCNC: 26 MMOL/L (ref 21–32)
CO2 SERPL-SCNC: 27 MMOL/L (ref 21–32)
CO2 SERPL-SCNC: 28 MMOL/L (ref 21–32)
CO2 SERPL-SCNC: 29 MMOL/L (ref 21–32)
CO2 SERPL-SCNC: 30 MMOL/L (ref 21–32)
CO2 SERPL-SCNC: 31 MMOL/L (ref 21–32)
CO2 SERPL-SCNC: 32 MMOL/L (ref 21–32)
CO2 SERPL-SCNC: 33 MMOL/L (ref 21–32)
CO2 SERPL-SCNC: 34 MMOL/L (ref 21–32)
CO2 SERPL-SCNC: 34 MMOL/L (ref 21–32)
CO2 SERPL-SCNC: 35 MMOL/L (ref 21–32)
CO2 SERPL-SCNC: 35 MMOL/L (ref 21–32)
COLLECT TIME,HTIME: 1024
COLLECT TIME,HTIME: 1720
COLLECT TIME,HTIME: 1808
COLLECT TIME,HTIME: 2115
COLLECT TIME,HTIME: 804
COLOR FLD: NORMAL
COLOR FLD: YELLOW
COLOR UR: YELLOW
COVID-19 RAPID TEST, COVR: NOT DETECTED
COVID-19 RAPID TEST, COVR: NOT DETECTED
COVID-19, XGCOVT: NORMAL
CREAT SERPL-MCNC: 0.5 MG/DL (ref 0.8–1.5)
CREAT SERPL-MCNC: 0.51 MG/DL (ref 0.8–1.5)
CREAT SERPL-MCNC: 0.55 MG/DL (ref 0.8–1.5)
CREAT SERPL-MCNC: 0.55 MG/DL (ref 0.8–1.5)
CREAT SERPL-MCNC: 0.56 MG/DL (ref 0.8–1.5)
CREAT SERPL-MCNC: 0.57 MG/DL (ref 0.8–1.5)
CREAT SERPL-MCNC: 0.57 MG/DL (ref 0.8–1.5)
CREAT SERPL-MCNC: 0.58 MG/DL (ref 0.8–1.5)
CREAT SERPL-MCNC: 0.58 MG/DL (ref 0.8–1.5)
CREAT SERPL-MCNC: 0.59 MG/DL (ref 0.8–1.5)
CREAT SERPL-MCNC: 0.6 MG/DL (ref 0.8–1.5)
CREAT SERPL-MCNC: 0.6 MG/DL (ref 0.8–1.5)
CREAT SERPL-MCNC: 0.61 MG/DL (ref 0.8–1.5)
CREAT SERPL-MCNC: 0.62 MG/DL (ref 0.8–1.5)
CREAT SERPL-MCNC: 0.63 MG/DL (ref 0.8–1.5)
CREAT SERPL-MCNC: 0.64 MG/DL (ref 0.8–1.5)
CREAT SERPL-MCNC: 0.64 MG/DL (ref 0.8–1.5)
CREAT SERPL-MCNC: 0.65 MG/DL (ref 0.8–1.5)
CREAT SERPL-MCNC: 0.66 MG/DL (ref 0.8–1.5)
CREAT SERPL-MCNC: 0.66 MG/DL (ref 0.8–1.5)
CREAT SERPL-MCNC: 0.67 MG/DL (ref 0.8–1.5)
CREAT SERPL-MCNC: 0.68 MG/DL (ref 0.8–1.5)
CREAT SERPL-MCNC: 0.7 MG/DL (ref 0.8–1.5)
CREAT SERPL-MCNC: 0.71 MG/DL (ref 0.8–1.5)
CREAT SERPL-MCNC: 0.71 MG/DL (ref 0.8–1.5)
CREAT SERPL-MCNC: 0.72 MG/DL (ref 0.8–1.5)
CREAT SERPL-MCNC: 0.74 MG/DL (ref 0.8–1.5)
CREAT SERPL-MCNC: 0.75 MG/DL (ref 0.8–1.5)
CREAT SERPL-MCNC: 0.76 MG/DL (ref 0.8–1.5)
CREAT SERPL-MCNC: 0.77 MG/DL (ref 0.8–1.5)
CREAT SERPL-MCNC: 0.78 MG/DL (ref 0.8–1.5)
CREAT SERPL-MCNC: 0.78 MG/DL (ref 0.8–1.5)
CREAT SERPL-MCNC: 0.79 MG/DL (ref 0.8–1.5)
CREAT SERPL-MCNC: 0.84 MG/DL (ref 0.8–1.5)
CREAT SERPL-MCNC: 0.85 MG/DL (ref 0.8–1.5)
CREAT SERPL-MCNC: 0.86 MG/DL (ref 0.8–1.5)
CREAT SERPL-MCNC: 0.88 MG/DL (ref 0.8–1.5)
CREAT SERPL-MCNC: 0.9 MG/DL (ref 0.8–1.5)
CREAT SERPL-MCNC: 0.91 MG/DL (ref 0.8–1.5)
CREAT SERPL-MCNC: 0.91 MG/DL (ref 0.8–1.5)
CREAT SERPL-MCNC: 0.94 MG/DL (ref 0.8–1.5)
CREAT SERPL-MCNC: 0.94 MG/DL (ref 0.8–1.5)
CREAT SERPL-MCNC: 0.95 MG/DL (ref 0.8–1.5)
CREAT SERPL-MCNC: 1 MG/DL (ref 0.8–1.5)
CREAT SERPL-MCNC: 1.07 MG/DL (ref 0.8–1.5)
CREAT SERPL-MCNC: 1.13 MG/DL (ref 0.8–1.5)
CREAT SERPL-MCNC: 1.51 MG/DL (ref 0.8–1.5)
CREAT SERPL-MCNC: 1.6 MG/DL (ref 0.8–1.5)
CREAT SERPL-MCNC: 1.74 MG/DL (ref 0.8–1.5)
CREAT SERPL-MCNC: 2.05 MG/DL (ref 0.8–1.5)
CREAT SERPL-MCNC: 2.05 MG/DL (ref 0.8–1.5)
CROSSMATCH RESULT,%XM: NORMAL
CRP SERPL-MCNC: 23.2 MG/DL (ref 0–0.9)
CRP SERPL-MCNC: 8.6 MG/DL (ref 0–0.9)
D DIMER PPP FEU-MCNC: 11.61 UG/ML(FEU)
D DIMER PPP FEU-MCNC: 12.78 UG/ML(FEU)
D DIMER PPP FEU-MCNC: 13.88 UG/ML(FEU)
D DIMER PPP FEU-MCNC: 14.25 UG/ML(FEU)
D DIMER PPP FEU-MCNC: 9.11 UG/ML(FEU)
D DIMER PPP FEU-MCNC: 9.23 UG/ML(FEU)
D DIMER PPP FEU-MCNC: 9.64 UG/ML(FEU)
D DIMER PPP FEU-MCNC: 9.93 UG/ML(FEU)
D DIMER PPP FEU-MCNC: >20 UG/ML(FEU)
DIAGNOSIS, 93000: NORMAL
DIFFERENTIAL METHOD BLD: ABNORMAL
EMERGENT DISEASE PANEL, EDPR: NOT DETECTED
EMERGENT DISEASE PANEL, EDPR: NOT DETECTED
EOSINOPHIL # BLD: 0 K/UL (ref 0–0.8)
EOSINOPHIL # BLD: 0.1 K/UL (ref 0–0.8)
EOSINOPHIL NFR BLD: 0 %
EOSINOPHIL NFR BLD: 0 % (ref 0.5–7.8)
EOSINOPHIL NFR BLD: 1 % (ref 0.5–7.8)
EOSINOPHIL NFR BLD: 2 % (ref 0.5–7.8)
EOSINOPHIL NFR BLD: 2 % (ref 0.5–7.8)
EOSINOPHIL NFR BLD: 4 % (ref 0.5–7.8)
EPI CELLS #/AREA URNS HPF: ABNORMAL /HPF
EPI CELLS #/AREA URNS HPF: ABNORMAL /HPF
ERYTHROCYTE [DISTWIDTH] IN BLOOD BY AUTOMATED COUNT: 13.3 % (ref 11.9–14.6)
ERYTHROCYTE [DISTWIDTH] IN BLOOD BY AUTOMATED COUNT: 13.3 % (ref 11.9–14.6)
ERYTHROCYTE [DISTWIDTH] IN BLOOD BY AUTOMATED COUNT: 13.4 % (ref 11.9–14.6)
ERYTHROCYTE [DISTWIDTH] IN BLOOD BY AUTOMATED COUNT: 13.5 % (ref 11.9–14.6)
ERYTHROCYTE [DISTWIDTH] IN BLOOD BY AUTOMATED COUNT: 13.5 % (ref 11.9–14.6)
ERYTHROCYTE [DISTWIDTH] IN BLOOD BY AUTOMATED COUNT: 13.6 % (ref 11.9–14.6)
ERYTHROCYTE [DISTWIDTH] IN BLOOD BY AUTOMATED COUNT: 13.6 % (ref 11.9–14.6)
ERYTHROCYTE [DISTWIDTH] IN BLOOD BY AUTOMATED COUNT: 14.5 % (ref 11.9–14.6)
ERYTHROCYTE [DISTWIDTH] IN BLOOD BY AUTOMATED COUNT: 15.2 % (ref 11.9–14.6)
ERYTHROCYTE [DISTWIDTH] IN BLOOD BY AUTOMATED COUNT: 15.4 % (ref 11.9–14.6)
ERYTHROCYTE [DISTWIDTH] IN BLOOD BY AUTOMATED COUNT: 15.5 % (ref 11.9–14.6)
ERYTHROCYTE [DISTWIDTH] IN BLOOD BY AUTOMATED COUNT: 15.6 % (ref 11.9–14.6)
ERYTHROCYTE [DISTWIDTH] IN BLOOD BY AUTOMATED COUNT: 15.6 % (ref 11.9–14.6)
ERYTHROCYTE [DISTWIDTH] IN BLOOD BY AUTOMATED COUNT: 15.7 % (ref 11.9–14.6)
ERYTHROCYTE [DISTWIDTH] IN BLOOD BY AUTOMATED COUNT: 15.8 % (ref 11.9–14.6)
ERYTHROCYTE [DISTWIDTH] IN BLOOD BY AUTOMATED COUNT: 15.9 % (ref 11.9–14.6)
ERYTHROCYTE [DISTWIDTH] IN BLOOD BY AUTOMATED COUNT: 16 % (ref 11.9–14.6)
ERYTHROCYTE [DISTWIDTH] IN BLOOD BY AUTOMATED COUNT: 16.1 % (ref 11.9–14.6)
ERYTHROCYTE [DISTWIDTH] IN BLOOD BY AUTOMATED COUNT: 16.1 % (ref 11.9–14.6)
ERYTHROCYTE [DISTWIDTH] IN BLOOD BY AUTOMATED COUNT: 16.2 % (ref 11.9–14.6)
ERYTHROCYTE [DISTWIDTH] IN BLOOD BY AUTOMATED COUNT: 16.2 % (ref 11.9–14.6)
ERYTHROCYTE [DISTWIDTH] IN BLOOD BY AUTOMATED COUNT: 16.3 % (ref 11.9–14.6)
ERYTHROCYTE [DISTWIDTH] IN BLOOD BY AUTOMATED COUNT: 16.4 % (ref 11.9–14.6)
ERYTHROCYTE [DISTWIDTH] IN BLOOD BY AUTOMATED COUNT: 16.4 % (ref 11.9–14.6)
ERYTHROCYTE [DISTWIDTH] IN BLOOD BY AUTOMATED COUNT: 16.5 % (ref 11.9–14.6)
ERYTHROCYTE [DISTWIDTH] IN BLOOD BY AUTOMATED COUNT: 16.5 % (ref 11.9–14.6)
ERYTHROCYTE [DISTWIDTH] IN BLOOD BY AUTOMATED COUNT: 16.6 % (ref 11.9–14.6)
ERYTHROCYTE [DISTWIDTH] IN BLOOD BY AUTOMATED COUNT: 16.7 % (ref 11.9–14.6)
ERYTHROCYTE [DISTWIDTH] IN BLOOD BY AUTOMATED COUNT: 16.8 % (ref 11.9–14.6)
ERYTHROCYTE [DISTWIDTH] IN BLOOD BY AUTOMATED COUNT: 17 % (ref 11.9–14.6)
ERYTHROCYTE [DISTWIDTH] IN BLOOD BY AUTOMATED COUNT: 17.1 % (ref 11.9–14.6)
ERYTHROCYTE [DISTWIDTH] IN BLOOD BY AUTOMATED COUNT: 17.2 % (ref 11.9–14.6)
ERYTHROCYTE [DISTWIDTH] IN BLOOD BY AUTOMATED COUNT: 17.3 % (ref 11.9–14.6)
ERYTHROCYTE [DISTWIDTH] IN BLOOD BY AUTOMATED COUNT: 18 % (ref 11.9–14.6)
ERYTHROCYTE [DISTWIDTH] IN BLOOD BY AUTOMATED COUNT: 18.4 % (ref 11.9–14.6)
ERYTHROCYTE [DISTWIDTH] IN BLOOD BY AUTOMATED COUNT: 18.5 % (ref 11.9–14.6)
ERYTHROCYTE [DISTWIDTH] IN BLOOD BY AUTOMATED COUNT: 18.6 % (ref 11.9–14.6)
ERYTHROCYTE [DISTWIDTH] IN BLOOD BY AUTOMATED COUNT: 18.7 % (ref 11.9–14.6)
ERYTHROCYTE [DISTWIDTH] IN BLOOD BY AUTOMATED COUNT: 18.7 % (ref 11.9–14.6)
ERYTHROCYTE [DISTWIDTH] IN BLOOD BY AUTOMATED COUNT: 18.8 % (ref 11.9–14.6)
ERYTHROCYTE [DISTWIDTH] IN BLOOD BY AUTOMATED COUNT: 19 % (ref 11.9–14.6)
FERRITIN SERPL-MCNC: 1334 NG/ML (ref 8–388)
FERRITIN SERPL-MCNC: 1500 NG/ML (ref 8–388)
FERRITIN SERPL-MCNC: 1630 NG/ML (ref 8–388)
FIBRINOGEN PPP-MCNC: 213 MG/DL (ref 190–501)
FIBRINOGEN PPP-MCNC: 216 MG/DL (ref 190–501)
FIBRINOGEN PPP-MCNC: 252 MG/DL (ref 190–501)
FIBRINOGEN PPP-MCNC: 290 MG/DL (ref 190–501)
FIBRINOGEN PPP-MCNC: 354 MG/DL (ref 190–501)
FIBRINOGEN PPP-MCNC: 357 MG/DL (ref 190–501)
FIBRINOGEN PPP-MCNC: 463 MG/DL (ref 190–501)
FIBRINOGEN PPP-MCNC: 469 MG/DL (ref 190–501)
FLOW RATE ISTAT,IFRATE: 10 L/MIN
FLOW RATE ISTAT,IFRATE: 3 L/MIN
FLOW RATE ISTAT,IFRATE: 3 L/MIN
FLOW RATE ISTAT,IFRATE: 50 L/MIN
FLOW RATE ISTAT,IFRATE: 60 L/MIN
FLUID COMMENTS, FCOM: NORMAL
FOLATE SERPL-MCNC: 9.8 NG/ML (ref 3.1–17.5)
FUNGUS CULTURE, RFCO2T: NEGATIVE
FUNGUS CULTURE, RFCO2T: NORMAL
FUNGUS SMEAR, RFCO1T: NORMAL
FUNGUS SMEAR, RFCO1T: NORMAL
FUNGUS SPEC CULT: NORMAL
FUNGUS SPEC CULT: NORMAL
FUNGUS STAIN, 188244: NORMAL
FUNGUS STAIN, 188244: NORMAL
GAS FLOW.O2 O2 DELIVERY SYS: ABNORMAL L/MIN
GAS FLOW.O2 O2 DELIVERY SYS: NORMAL L/MIN
GLOBULIN SER CALC-MCNC: 3.1 G/DL (ref 2.3–3.5)
GLOBULIN SER CALC-MCNC: 3.1 G/DL (ref 2.3–3.5)
GLOBULIN SER CALC-MCNC: 3.2 G/DL (ref 2.3–3.5)
GLOBULIN SER CALC-MCNC: 3.3 G/DL (ref 2.3–3.5)
GLOBULIN SER CALC-MCNC: 3.4 G/DL (ref 2.3–3.5)
GLOBULIN SER CALC-MCNC: 3.4 G/DL (ref 2.3–3.5)
GLOBULIN SER CALC-MCNC: 3.5 G/DL (ref 2.3–3.5)
GLOBULIN SER CALC-MCNC: 3.6 G/DL (ref 2.3–3.5)
GLOBULIN SER CALC-MCNC: 3.7 G/DL (ref 2.3–3.5)
GLOBULIN SER CALC-MCNC: 3.8 G/DL (ref 2.3–3.5)
GLOBULIN SER CALC-MCNC: 3.8 G/DL (ref 2.3–3.5)
GLOBULIN SER CALC-MCNC: 3.9 G/DL (ref 2.3–3.5)
GLOBULIN SER CALC-MCNC: 3.9 G/DL (ref 2.3–3.5)
GLOBULIN SER CALC-MCNC: 4 G/DL (ref 2.3–3.5)
GLOBULIN SER CALC-MCNC: 4.1 G/DL (ref 2.3–3.5)
GLOBULIN SER CALC-MCNC: 4.1 G/DL (ref 2.3–3.5)
GLOBULIN SER CALC-MCNC: 4.3 G/DL (ref 2.3–3.5)
GLOBULIN SER CALC-MCNC: 5 G/DL (ref 2.3–3.5)
GLUCOSE BLD STRIP.AUTO-MCNC: 103 MG/DL (ref 65–100)
GLUCOSE BLD STRIP.AUTO-MCNC: 103 MG/DL (ref 65–100)
GLUCOSE BLD STRIP.AUTO-MCNC: 104 MG/DL (ref 65–100)
GLUCOSE BLD STRIP.AUTO-MCNC: 108 MG/DL (ref 65–100)
GLUCOSE BLD STRIP.AUTO-MCNC: 110 MG/DL (ref 65–100)
GLUCOSE BLD STRIP.AUTO-MCNC: 113 MG/DL (ref 65–100)
GLUCOSE BLD STRIP.AUTO-MCNC: 114 MG/DL (ref 65–100)
GLUCOSE BLD STRIP.AUTO-MCNC: 119 MG/DL (ref 65–100)
GLUCOSE BLD STRIP.AUTO-MCNC: 122 MG/DL (ref 65–100)
GLUCOSE BLD STRIP.AUTO-MCNC: 123 MG/DL (ref 65–100)
GLUCOSE BLD STRIP.AUTO-MCNC: 123 MG/DL (ref 65–100)
GLUCOSE BLD STRIP.AUTO-MCNC: 126 MG/DL (ref 65–100)
GLUCOSE BLD STRIP.AUTO-MCNC: 129 MG/DL (ref 65–100)
GLUCOSE BLD STRIP.AUTO-MCNC: 129 MG/DL (ref 65–100)
GLUCOSE BLD STRIP.AUTO-MCNC: 130 MG/DL (ref 65–100)
GLUCOSE BLD STRIP.AUTO-MCNC: 131 MG/DL (ref 65–100)
GLUCOSE BLD STRIP.AUTO-MCNC: 132 MG/DL (ref 65–100)
GLUCOSE BLD STRIP.AUTO-MCNC: 133 MG/DL (ref 65–100)
GLUCOSE BLD STRIP.AUTO-MCNC: 134 MG/DL (ref 65–100)
GLUCOSE BLD STRIP.AUTO-MCNC: 135 MG/DL (ref 65–100)
GLUCOSE BLD STRIP.AUTO-MCNC: 138 MG/DL (ref 65–100)
GLUCOSE BLD STRIP.AUTO-MCNC: 138 MG/DL (ref 65–100)
GLUCOSE BLD STRIP.AUTO-MCNC: 139 MG/DL (ref 65–100)
GLUCOSE BLD STRIP.AUTO-MCNC: 140 MG/DL (ref 65–100)
GLUCOSE BLD STRIP.AUTO-MCNC: 141 MG/DL (ref 65–100)
GLUCOSE BLD STRIP.AUTO-MCNC: 143 MG/DL (ref 65–100)
GLUCOSE BLD STRIP.AUTO-MCNC: 144 MG/DL (ref 65–100)
GLUCOSE BLD STRIP.AUTO-MCNC: 145 MG/DL (ref 65–100)
GLUCOSE BLD STRIP.AUTO-MCNC: 146 MG/DL (ref 65–100)
GLUCOSE BLD STRIP.AUTO-MCNC: 148 MG/DL (ref 65–100)
GLUCOSE BLD STRIP.AUTO-MCNC: 149 MG/DL (ref 65–100)
GLUCOSE BLD STRIP.AUTO-MCNC: 149 MG/DL (ref 65–100)
GLUCOSE BLD STRIP.AUTO-MCNC: 150 MG/DL (ref 65–100)
GLUCOSE BLD STRIP.AUTO-MCNC: 150 MG/DL (ref 65–100)
GLUCOSE BLD STRIP.AUTO-MCNC: 153 MG/DL (ref 65–100)
GLUCOSE BLD STRIP.AUTO-MCNC: 156 MG/DL (ref 65–100)
GLUCOSE BLD STRIP.AUTO-MCNC: 157 MG/DL (ref 65–100)
GLUCOSE BLD STRIP.AUTO-MCNC: 157 MG/DL (ref 65–100)
GLUCOSE BLD STRIP.AUTO-MCNC: 158 MG/DL (ref 65–100)
GLUCOSE BLD STRIP.AUTO-MCNC: 161 MG/DL (ref 65–100)
GLUCOSE BLD STRIP.AUTO-MCNC: 162 MG/DL (ref 65–100)
GLUCOSE BLD STRIP.AUTO-MCNC: 164 MG/DL (ref 65–100)
GLUCOSE BLD STRIP.AUTO-MCNC: 165 MG/DL (ref 65–100)
GLUCOSE BLD STRIP.AUTO-MCNC: 173 MG/DL (ref 65–100)
GLUCOSE BLD STRIP.AUTO-MCNC: 174 MG/DL (ref 65–100)
GLUCOSE BLD STRIP.AUTO-MCNC: 177 MG/DL (ref 65–100)
GLUCOSE BLD STRIP.AUTO-MCNC: 177 MG/DL (ref 65–100)
GLUCOSE BLD STRIP.AUTO-MCNC: 178 MG/DL (ref 65–100)
GLUCOSE BLD STRIP.AUTO-MCNC: 179 MG/DL (ref 65–100)
GLUCOSE BLD STRIP.AUTO-MCNC: 180 MG/DL (ref 65–100)
GLUCOSE BLD STRIP.AUTO-MCNC: 184 MG/DL (ref 65–100)
GLUCOSE BLD STRIP.AUTO-MCNC: 185 MG/DL (ref 65–100)
GLUCOSE BLD STRIP.AUTO-MCNC: 186 MG/DL (ref 65–100)
GLUCOSE BLD STRIP.AUTO-MCNC: 187 MG/DL (ref 65–100)
GLUCOSE BLD STRIP.AUTO-MCNC: 187 MG/DL (ref 65–100)
GLUCOSE BLD STRIP.AUTO-MCNC: 189 MG/DL (ref 65–100)
GLUCOSE BLD STRIP.AUTO-MCNC: 192 MG/DL (ref 65–100)
GLUCOSE BLD STRIP.AUTO-MCNC: 192 MG/DL (ref 65–100)
GLUCOSE BLD STRIP.AUTO-MCNC: 194 MG/DL (ref 65–100)
GLUCOSE BLD STRIP.AUTO-MCNC: 194 MG/DL (ref 65–100)
GLUCOSE BLD STRIP.AUTO-MCNC: 196 MG/DL (ref 65–100)
GLUCOSE BLD STRIP.AUTO-MCNC: 196 MG/DL (ref 65–100)
GLUCOSE BLD STRIP.AUTO-MCNC: 197 MG/DL (ref 65–100)
GLUCOSE BLD STRIP.AUTO-MCNC: 197 MG/DL (ref 65–100)
GLUCOSE BLD STRIP.AUTO-MCNC: 198 MG/DL (ref 65–100)
GLUCOSE BLD STRIP.AUTO-MCNC: 199 MG/DL (ref 65–100)
GLUCOSE BLD STRIP.AUTO-MCNC: 200 MG/DL (ref 65–100)
GLUCOSE BLD STRIP.AUTO-MCNC: 200 MG/DL (ref 65–100)
GLUCOSE BLD STRIP.AUTO-MCNC: 204 MG/DL (ref 65–100)
GLUCOSE BLD STRIP.AUTO-MCNC: 205 MG/DL (ref 65–100)
GLUCOSE BLD STRIP.AUTO-MCNC: 206 MG/DL (ref 65–100)
GLUCOSE BLD STRIP.AUTO-MCNC: 206 MG/DL (ref 65–100)
GLUCOSE BLD STRIP.AUTO-MCNC: 209 MG/DL (ref 65–100)
GLUCOSE BLD STRIP.AUTO-MCNC: 210 MG/DL (ref 65–100)
GLUCOSE BLD STRIP.AUTO-MCNC: 211 MG/DL (ref 65–100)
GLUCOSE BLD STRIP.AUTO-MCNC: 211 MG/DL (ref 65–100)
GLUCOSE BLD STRIP.AUTO-MCNC: 212 MG/DL (ref 65–100)
GLUCOSE BLD STRIP.AUTO-MCNC: 214 MG/DL (ref 65–100)
GLUCOSE BLD STRIP.AUTO-MCNC: 215 MG/DL (ref 65–100)
GLUCOSE BLD STRIP.AUTO-MCNC: 216 MG/DL (ref 65–100)
GLUCOSE BLD STRIP.AUTO-MCNC: 217 MG/DL (ref 65–100)
GLUCOSE BLD STRIP.AUTO-MCNC: 217 MG/DL (ref 65–100)
GLUCOSE BLD STRIP.AUTO-MCNC: 219 MG/DL (ref 65–100)
GLUCOSE BLD STRIP.AUTO-MCNC: 220 MG/DL (ref 65–100)
GLUCOSE BLD STRIP.AUTO-MCNC: 222 MG/DL (ref 65–100)
GLUCOSE BLD STRIP.AUTO-MCNC: 222 MG/DL (ref 65–100)
GLUCOSE BLD STRIP.AUTO-MCNC: 223 MG/DL (ref 65–100)
GLUCOSE BLD STRIP.AUTO-MCNC: 224 MG/DL (ref 65–100)
GLUCOSE BLD STRIP.AUTO-MCNC: 225 MG/DL (ref 65–100)
GLUCOSE BLD STRIP.AUTO-MCNC: 225 MG/DL (ref 65–100)
GLUCOSE BLD STRIP.AUTO-MCNC: 227 MG/DL (ref 65–100)
GLUCOSE BLD STRIP.AUTO-MCNC: 227 MG/DL (ref 65–100)
GLUCOSE BLD STRIP.AUTO-MCNC: 228 MG/DL (ref 65–100)
GLUCOSE BLD STRIP.AUTO-MCNC: 228 MG/DL (ref 65–100)
GLUCOSE BLD STRIP.AUTO-MCNC: 229 MG/DL (ref 65–100)
GLUCOSE BLD STRIP.AUTO-MCNC: 229 MG/DL (ref 65–100)
GLUCOSE BLD STRIP.AUTO-MCNC: 231 MG/DL (ref 65–100)
GLUCOSE BLD STRIP.AUTO-MCNC: 232 MG/DL (ref 65–100)
GLUCOSE BLD STRIP.AUTO-MCNC: 234 MG/DL (ref 65–100)
GLUCOSE BLD STRIP.AUTO-MCNC: 235 MG/DL (ref 65–100)
GLUCOSE BLD STRIP.AUTO-MCNC: 235 MG/DL (ref 65–100)
GLUCOSE BLD STRIP.AUTO-MCNC: 237 MG/DL (ref 65–100)
GLUCOSE BLD STRIP.AUTO-MCNC: 237 MG/DL (ref 65–100)
GLUCOSE BLD STRIP.AUTO-MCNC: 238 MG/DL (ref 65–100)
GLUCOSE BLD STRIP.AUTO-MCNC: 239 MG/DL (ref 65–100)
GLUCOSE BLD STRIP.AUTO-MCNC: 240 MG/DL (ref 65–100)
GLUCOSE BLD STRIP.AUTO-MCNC: 241 MG/DL (ref 65–100)
GLUCOSE BLD STRIP.AUTO-MCNC: 243 MG/DL (ref 65–100)
GLUCOSE BLD STRIP.AUTO-MCNC: 246 MG/DL (ref 65–100)
GLUCOSE BLD STRIP.AUTO-MCNC: 248 MG/DL (ref 65–100)
GLUCOSE BLD STRIP.AUTO-MCNC: 250 MG/DL (ref 65–100)
GLUCOSE BLD STRIP.AUTO-MCNC: 251 MG/DL (ref 65–100)
GLUCOSE BLD STRIP.AUTO-MCNC: 251 MG/DL (ref 65–100)
GLUCOSE BLD STRIP.AUTO-MCNC: 252 MG/DL (ref 65–100)
GLUCOSE BLD STRIP.AUTO-MCNC: 253 MG/DL (ref 65–100)
GLUCOSE BLD STRIP.AUTO-MCNC: 254 MG/DL (ref 65–100)
GLUCOSE BLD STRIP.AUTO-MCNC: 255 MG/DL (ref 65–100)
GLUCOSE BLD STRIP.AUTO-MCNC: 255 MG/DL (ref 65–100)
GLUCOSE BLD STRIP.AUTO-MCNC: 256 MG/DL (ref 65–100)
GLUCOSE BLD STRIP.AUTO-MCNC: 258 MG/DL (ref 65–100)
GLUCOSE BLD STRIP.AUTO-MCNC: 258 MG/DL (ref 65–100)
GLUCOSE BLD STRIP.AUTO-MCNC: 259 MG/DL (ref 65–100)
GLUCOSE BLD STRIP.AUTO-MCNC: 263 MG/DL (ref 65–100)
GLUCOSE BLD STRIP.AUTO-MCNC: 264 MG/DL (ref 65–100)
GLUCOSE BLD STRIP.AUTO-MCNC: 265 MG/DL (ref 65–100)
GLUCOSE BLD STRIP.AUTO-MCNC: 265 MG/DL (ref 65–100)
GLUCOSE BLD STRIP.AUTO-MCNC: 266 MG/DL (ref 65–100)
GLUCOSE BLD STRIP.AUTO-MCNC: 269 MG/DL (ref 65–100)
GLUCOSE BLD STRIP.AUTO-MCNC: 270 MG/DL (ref 65–100)
GLUCOSE BLD STRIP.AUTO-MCNC: 271 MG/DL (ref 65–100)
GLUCOSE BLD STRIP.AUTO-MCNC: 278 MG/DL (ref 65–100)
GLUCOSE BLD STRIP.AUTO-MCNC: 280 MG/DL (ref 65–100)
GLUCOSE BLD STRIP.AUTO-MCNC: 285 MG/DL (ref 65–100)
GLUCOSE BLD STRIP.AUTO-MCNC: 292 MG/DL (ref 65–100)
GLUCOSE BLD STRIP.AUTO-MCNC: 294 MG/DL (ref 65–100)
GLUCOSE BLD STRIP.AUTO-MCNC: 295 MG/DL (ref 65–100)
GLUCOSE BLD STRIP.AUTO-MCNC: 298 MG/DL (ref 65–100)
GLUCOSE BLD STRIP.AUTO-MCNC: 302 MG/DL (ref 65–100)
GLUCOSE BLD STRIP.AUTO-MCNC: 307 MG/DL (ref 65–100)
GLUCOSE BLD STRIP.AUTO-MCNC: 313 MG/DL (ref 65–100)
GLUCOSE BLD STRIP.AUTO-MCNC: 320 MG/DL (ref 65–100)
GLUCOSE BLD STRIP.AUTO-MCNC: 324 MG/DL (ref 65–100)
GLUCOSE BLD STRIP.AUTO-MCNC: 325 MG/DL (ref 65–100)
GLUCOSE BLD STRIP.AUTO-MCNC: 327 MG/DL (ref 65–100)
GLUCOSE BLD STRIP.AUTO-MCNC: 332 MG/DL (ref 65–100)
GLUCOSE BLD STRIP.AUTO-MCNC: 343 MG/DL (ref 65–100)
GLUCOSE BLD STRIP.AUTO-MCNC: 346 MG/DL (ref 65–100)
GLUCOSE BLD STRIP.AUTO-MCNC: 349 MG/DL (ref 65–100)
GLUCOSE BLD STRIP.AUTO-MCNC: 355 MG/DL (ref 65–100)
GLUCOSE BLD STRIP.AUTO-MCNC: 356 MG/DL (ref 65–100)
GLUCOSE BLD STRIP.AUTO-MCNC: 389 MG/DL (ref 65–100)
GLUCOSE BLD STRIP.AUTO-MCNC: 395 MG/DL (ref 65–100)
GLUCOSE BLD STRIP.AUTO-MCNC: 417 MG/DL (ref 65–100)
GLUCOSE BLD STRIP.AUTO-MCNC: 417 MG/DL (ref 65–100)
GLUCOSE BLD STRIP.AUTO-MCNC: 430 MG/DL (ref 65–100)
GLUCOSE BLD STRIP.AUTO-MCNC: 491 MG/DL (ref 65–100)
GLUCOSE BLD STRIP.AUTO-MCNC: 56 MG/DL (ref 65–100)
GLUCOSE BLD STRIP.AUTO-MCNC: 78 MG/DL (ref 65–100)
GLUCOSE BLD STRIP.AUTO-MCNC: 80 MG/DL (ref 65–100)
GLUCOSE BLD STRIP.AUTO-MCNC: 81 MG/DL (ref 65–100)
GLUCOSE BLD STRIP.AUTO-MCNC: 82 MG/DL (ref 65–100)
GLUCOSE BLD STRIP.AUTO-MCNC: 83 MG/DL (ref 65–100)
GLUCOSE BLD STRIP.AUTO-MCNC: 88 MG/DL (ref 65–100)
GLUCOSE BLD STRIP.AUTO-MCNC: 90 MG/DL (ref 65–100)
GLUCOSE BLD STRIP.AUTO-MCNC: 90 MG/DL (ref 65–100)
GLUCOSE BLD STRIP.AUTO-MCNC: 91 MG/DL (ref 65–100)
GLUCOSE BLD STRIP.AUTO-MCNC: 95 MG/DL (ref 65–100)
GLUCOSE FLD-MCNC: 103 MG/DL
GLUCOSE FLD-MCNC: 107 MG/DL
GLUCOSE FLD-MCNC: 131 MG/DL
GLUCOSE FLD-MCNC: 179 MG/DL
GLUCOSE SERPL-MCNC: 105 MG/DL (ref 65–100)
GLUCOSE SERPL-MCNC: 105 MG/DL (ref 65–100)
GLUCOSE SERPL-MCNC: 106 MG/DL (ref 65–100)
GLUCOSE SERPL-MCNC: 106 MG/DL (ref 65–100)
GLUCOSE SERPL-MCNC: 114 MG/DL (ref 65–100)
GLUCOSE SERPL-MCNC: 114 MG/DL (ref 65–100)
GLUCOSE SERPL-MCNC: 118 MG/DL (ref 65–100)
GLUCOSE SERPL-MCNC: 120 MG/DL (ref 65–100)
GLUCOSE SERPL-MCNC: 124 MG/DL (ref 65–100)
GLUCOSE SERPL-MCNC: 126 MG/DL (ref 65–100)
GLUCOSE SERPL-MCNC: 127 MG/DL (ref 65–100)
GLUCOSE SERPL-MCNC: 129 MG/DL (ref 65–100)
GLUCOSE SERPL-MCNC: 130 MG/DL (ref 65–100)
GLUCOSE SERPL-MCNC: 131 MG/DL (ref 65–100)
GLUCOSE SERPL-MCNC: 133 MG/DL (ref 65–100)
GLUCOSE SERPL-MCNC: 133 MG/DL (ref 65–100)
GLUCOSE SERPL-MCNC: 135 MG/DL (ref 65–100)
GLUCOSE SERPL-MCNC: 136 MG/DL (ref 65–100)
GLUCOSE SERPL-MCNC: 141 MG/DL (ref 65–100)
GLUCOSE SERPL-MCNC: 142 MG/DL (ref 65–100)
GLUCOSE SERPL-MCNC: 144 MG/DL (ref 65–100)
GLUCOSE SERPL-MCNC: 149 MG/DL (ref 65–100)
GLUCOSE SERPL-MCNC: 150 MG/DL (ref 65–100)
GLUCOSE SERPL-MCNC: 160 MG/DL (ref 65–100)
GLUCOSE SERPL-MCNC: 164 MG/DL (ref 65–100)
GLUCOSE SERPL-MCNC: 169 MG/DL (ref 65–100)
GLUCOSE SERPL-MCNC: 170 MG/DL (ref 65–100)
GLUCOSE SERPL-MCNC: 171 MG/DL (ref 65–100)
GLUCOSE SERPL-MCNC: 172 MG/DL (ref 65–100)
GLUCOSE SERPL-MCNC: 175 MG/DL (ref 65–100)
GLUCOSE SERPL-MCNC: 175 MG/DL (ref 65–100)
GLUCOSE SERPL-MCNC: 176 MG/DL (ref 65–100)
GLUCOSE SERPL-MCNC: 181 MG/DL (ref 65–100)
GLUCOSE SERPL-MCNC: 181 MG/DL (ref 65–100)
GLUCOSE SERPL-MCNC: 185 MG/DL (ref 65–100)
GLUCOSE SERPL-MCNC: 193 MG/DL (ref 65–100)
GLUCOSE SERPL-MCNC: 202 MG/DL (ref 65–100)
GLUCOSE SERPL-MCNC: 204 MG/DL (ref 65–100)
GLUCOSE SERPL-MCNC: 206 MG/DL (ref 65–100)
GLUCOSE SERPL-MCNC: 209 MG/DL (ref 65–100)
GLUCOSE SERPL-MCNC: 209 MG/DL (ref 65–100)
GLUCOSE SERPL-MCNC: 217 MG/DL (ref 65–100)
GLUCOSE SERPL-MCNC: 219 MG/DL (ref 65–100)
GLUCOSE SERPL-MCNC: 243 MG/DL (ref 65–100)
GLUCOSE SERPL-MCNC: 252 MG/DL (ref 65–100)
GLUCOSE SERPL-MCNC: 259 MG/DL (ref 65–100)
GLUCOSE SERPL-MCNC: 269 MG/DL (ref 65–100)
GLUCOSE SERPL-MCNC: 289 MG/DL (ref 65–100)
GLUCOSE SERPL-MCNC: 298 MG/DL (ref 65–100)
GLUCOSE SERPL-MCNC: 303 MG/DL (ref 65–100)
GLUCOSE SERPL-MCNC: 306 MG/DL (ref 65–100)
GLUCOSE SERPL-MCNC: 307 MG/DL (ref 65–100)
GLUCOSE SERPL-MCNC: 73 MG/DL (ref 65–100)
GLUCOSE SERPL-MCNC: 74 MG/DL (ref 65–100)
GLUCOSE SERPL-MCNC: 75 MG/DL (ref 65–100)
GLUCOSE SERPL-MCNC: 81 MG/DL (ref 65–100)
GLUCOSE SERPL-MCNC: 83 MG/DL (ref 65–100)
GLUCOSE SERPL-MCNC: 83 MG/DL (ref 65–100)
GLUCOSE SERPL-MCNC: 85 MG/DL (ref 65–100)
GLUCOSE SERPL-MCNC: 85 MG/DL (ref 65–100)
GLUCOSE SERPL-MCNC: 93 MG/DL (ref 65–100)
GLUCOSE SERPL-MCNC: 94 MG/DL (ref 65–100)
GLUCOSE SERPL-MCNC: 97 MG/DL (ref 65–100)
GLUCOSE UR STRIP.AUTO-MCNC: NEGATIVE MG/DL
GRAM STN SPEC: ABNORMAL
GRAM STN SPEC: NORMAL
HCO3 BLD-SCNC: 23 MMOL/L (ref 22–26)
HCO3 BLD-SCNC: 24.6 MMOL/L (ref 22–26)
HCO3 BLD-SCNC: 25.9 MMOL/L (ref 22–26)
HCO3 BLD-SCNC: 27.4 MMOL/L (ref 22–26)
HCO3 BLD-SCNC: 27.8 MMOL/L (ref 22–26)
HCT VFR BLD AUTO: 20.2 % (ref 41.1–50.3)
HCT VFR BLD AUTO: 21.2 % (ref 41.1–50.3)
HCT VFR BLD AUTO: 21.7 % (ref 41.1–50.3)
HCT VFR BLD AUTO: 21.8 % (ref 41.1–50.3)
HCT VFR BLD AUTO: 23.1 % (ref 41.1–50.3)
HCT VFR BLD AUTO: 23.3 % (ref 41.1–50.3)
HCT VFR BLD AUTO: 23.4 % (ref 41.1–50.3)
HCT VFR BLD AUTO: 23.6 % (ref 41.1–50.3)
HCT VFR BLD AUTO: 23.7 % (ref 41.1–50.3)
HCT VFR BLD AUTO: 23.9 % (ref 41.1–50.3)
HCT VFR BLD AUTO: 24 % (ref 41.1–50.3)
HCT VFR BLD AUTO: 24.1 % (ref 41.1–50.3)
HCT VFR BLD AUTO: 24.1 % (ref 41.1–50.3)
HCT VFR BLD AUTO: 24.3 % (ref 41.1–50.3)
HCT VFR BLD AUTO: 24.4 % (ref 41.1–50.3)
HCT VFR BLD AUTO: 24.8 % (ref 41.1–50.3)
HCT VFR BLD AUTO: 25.1 % (ref 41.1–50.3)
HCT VFR BLD AUTO: 25.2 % (ref 41.1–50.3)
HCT VFR BLD AUTO: 25.2 % (ref 41.1–50.3)
HCT VFR BLD AUTO: 25.6 % (ref 41.1–50.3)
HCT VFR BLD AUTO: 25.8 % (ref 41.1–50.3)
HCT VFR BLD AUTO: 25.9 % (ref 41.1–50.3)
HCT VFR BLD AUTO: 26 % (ref 41.1–50.3)
HCT VFR BLD AUTO: 26.1 % (ref 41.1–50.3)
HCT VFR BLD AUTO: 26.3 % (ref 41.1–50.3)
HCT VFR BLD AUTO: 26.3 % (ref 41.1–50.3)
HCT VFR BLD AUTO: 26.6 % (ref 41.1–50.3)
HCT VFR BLD AUTO: 26.8 % (ref 41.1–50.3)
HCT VFR BLD AUTO: 26.8 % (ref 41.1–50.3)
HCT VFR BLD AUTO: 26.9 % (ref 41.1–50.3)
HCT VFR BLD AUTO: 27 % (ref 41.1–50.3)
HCT VFR BLD AUTO: 27 % (ref 41.1–50.3)
HCT VFR BLD AUTO: 27.1 % (ref 41.1–50.3)
HCT VFR BLD AUTO: 27.3 % (ref 41.1–50.3)
HCT VFR BLD AUTO: 27.5 % (ref 41.1–50.3)
HCT VFR BLD AUTO: 27.6 % (ref 41.1–50.3)
HCT VFR BLD AUTO: 27.9 % (ref 41.1–50.3)
HCT VFR BLD AUTO: 28 % (ref 41.1–50.3)
HCT VFR BLD AUTO: 28 % (ref 41.1–50.3)
HCT VFR BLD AUTO: 28.1 % (ref 41.1–50.3)
HCT VFR BLD AUTO: 28.6 % (ref 41.1–50.3)
HCT VFR BLD AUTO: 29.4 % (ref 41.1–50.3)
HCT VFR BLD AUTO: 29.5 % (ref 41.1–50.3)
HCT VFR BLD AUTO: 29.5 % (ref 41.1–50.3)
HCT VFR BLD AUTO: 30.1 % (ref 41.1–50.3)
HCT VFR BLD AUTO: 30.4 % (ref 41.1–50.3)
HCT VFR BLD AUTO: 30.5 % (ref 41.1–50.3)
HCT VFR BLD AUTO: 31.7 % (ref 41.1–50.3)
HCT VFR BLD AUTO: 32 % (ref 41.1–50.3)
HCT VFR BLD AUTO: 32.3 % (ref 41.1–50.3)
HGB BLD-MCNC: 10.3 G/DL (ref 13.6–17.2)
HGB BLD-MCNC: 10.4 G/DL (ref 13.6–17.2)
HGB BLD-MCNC: 10.4 G/DL (ref 13.6–17.2)
HGB BLD-MCNC: 10.8 G/DL (ref 13.6–17.2)
HGB BLD-MCNC: 11.1 G/DL (ref 13.6–17.2)
HGB BLD-MCNC: 6.4 G/DL (ref 13.6–17.2)
HGB BLD-MCNC: 6.7 G/DL (ref 13.6–17.2)
HGB BLD-MCNC: 6.9 G/DL (ref 13.6–17.2)
HGB BLD-MCNC: 7 G/DL (ref 13.6–17.2)
HGB BLD-MCNC: 7.2 G/DL (ref 13.6–17.2)
HGB BLD-MCNC: 7.4 G/DL (ref 13.6–17.2)
HGB BLD-MCNC: 7.4 G/DL (ref 13.6–17.2)
HGB BLD-MCNC: 7.5 G/DL (ref 13.6–17.2)
HGB BLD-MCNC: 7.5 G/DL (ref 13.6–17.2)
HGB BLD-MCNC: 7.6 G/DL (ref 13.6–17.2)
HGB BLD-MCNC: 7.8 G/DL (ref 13.6–17.2)
HGB BLD-MCNC: 7.8 G/DL (ref 13.6–17.2)
HGB BLD-MCNC: 8 G/DL (ref 13.6–17.2)
HGB BLD-MCNC: 8.1 G/DL (ref 13.6–17.2)
HGB BLD-MCNC: 8.2 G/DL (ref 13.6–17.2)
HGB BLD-MCNC: 8.3 G/DL (ref 13.6–17.2)
HGB BLD-MCNC: 8.4 G/DL (ref 13.6–17.2)
HGB BLD-MCNC: 8.5 G/DL (ref 13.6–17.2)
HGB BLD-MCNC: 8.6 G/DL (ref 13.6–17.2)
HGB BLD-MCNC: 8.7 G/DL (ref 13.6–17.2)
HGB BLD-MCNC: 8.8 G/DL (ref 13.6–17.2)
HGB BLD-MCNC: 8.9 G/DL (ref 13.6–17.2)
HGB BLD-MCNC: 9 G/DL (ref 13.6–17.2)
HGB BLD-MCNC: 9 G/DL (ref 13.6–17.2)
HGB BLD-MCNC: 9.1 G/DL (ref 13.6–17.2)
HGB BLD-MCNC: 9.4 G/DL (ref 13.6–17.2)
HGB BLD-MCNC: 9.6 G/DL (ref 13.6–17.2)
HGB BLD-MCNC: 9.6 G/DL (ref 13.6–17.2)
HGB BLD-MCNC: 9.7 G/DL (ref 13.6–17.2)
HGB UR QL STRIP: ABNORMAL
HGB UR QL STRIP: NEGATIVE
HGB UR QL STRIP: NEGATIVE
IMM GRANULOCYTES # BLD AUTO: 0 K/UL (ref 0–0.5)
IMM GRANULOCYTES # BLD AUTO: 0.1 K/UL (ref 0–0.5)
IMM GRANULOCYTES # BLD AUTO: 0.2 K/UL
IMM GRANULOCYTES # BLD AUTO: 0.2 K/UL (ref 0–0.5)
IMM GRANULOCYTES # BLD AUTO: 0.3 K/UL (ref 0–0.5)
IMM GRANULOCYTES # BLD AUTO: 0.5 K/UL (ref 0–0.5)
IMM GRANULOCYTES NFR BLD AUTO: 0 % (ref 0–5)
IMM GRANULOCYTES NFR BLD AUTO: 1 % (ref 0–5)
IMM GRANULOCYTES NFR BLD AUTO: 2 %
IMM GRANULOCYTES NFR BLD AUTO: 2 % (ref 0–5)
IMM GRANULOCYTES NFR BLD AUTO: 3 % (ref 0–5)
IMM GRANULOCYTES NFR BLD AUTO: 3 % (ref 0–5)
IMM GRANULOCYTES NFR BLD AUTO: 4 % (ref 0–5)
IMM GRANULOCYTES NFR BLD AUTO: 5 % (ref 0–5)
IMM GRANULOCYTES NFR BLD AUTO: 5 % (ref 0–5)
IMM GRANULOCYTES NFR BLD AUTO: 8 % (ref 0–5)
INR PPP: 1.3
INR PPP: 1.3
INR PPP: 1.4
INR PPP: 1.4
INR PPP: 1.5
INR PPP: 1.6
INR PPP: 1.6
INTERPRETATION: ABNORMAL
IRON SATN MFR SERPL: 20 %
IRON SERPL-MCNC: 25 UG/DL (ref 35–150)
KETONES UR QL STRIP.AUTO: ABNORMAL MG/DL
KETONES UR QL STRIP.AUTO: ABNORMAL MG/DL
KETONES UR QL STRIP.AUTO: NEGATIVE MG/DL
LACTATE SERPL-SCNC: 1.6 MMOL/L (ref 0.4–2)
LACTATE SERPL-SCNC: 1.6 MMOL/L (ref 0.4–2)
LACTATE SERPL-SCNC: 2.2 MMOL/L (ref 0.4–2)
LACTATE SERPL-SCNC: 2.8 MMOL/L (ref 0.4–2)
LDH FLD L TO P-CCNC: 135 U/L
LDH FLD L TO P-CCNC: 156 U/L
LDH FLD L TO P-CCNC: 157 U/L
LDH FLD L TO P-CCNC: 188 U/L
LDH SERPL L TO P-CCNC: 235 U/L (ref 110–210)
LDH SERPL L TO P-CCNC: 356 U/L (ref 110–210)
LEUKOCYTE ESTERASE UR QL STRIP.AUTO: NEGATIVE
LYMPHOCYTES # BLD: 0.4 K/UL (ref 0.5–4.6)
LYMPHOCYTES # BLD: 0.5 K/UL (ref 0.5–4.6)
LYMPHOCYTES # BLD: 0.6 K/UL (ref 0.5–4.6)
LYMPHOCYTES # BLD: 0.7 K/UL (ref 0.5–4.6)
LYMPHOCYTES # BLD: 0.7 K/UL (ref 0.5–4.6)
LYMPHOCYTES # BLD: 0.8 K/UL (ref 0.5–4.6)
LYMPHOCYTES # BLD: 0.9 K/UL (ref 0.5–4.6)
LYMPHOCYTES # BLD: 0.9 K/UL (ref 0.5–4.6)
LYMPHOCYTES # BLD: 1 K/UL (ref 0.5–4.6)
LYMPHOCYTES # BLD: 1 K/UL (ref 0.5–4.6)
LYMPHOCYTES # BLD: 1.1 K/UL (ref 0.5–4.6)
LYMPHOCYTES NFR BLD: 11 % (ref 13–44)
LYMPHOCYTES NFR BLD: 15 % (ref 13–44)
LYMPHOCYTES NFR BLD: 16 % (ref 13–44)
LYMPHOCYTES NFR BLD: 17 % (ref 13–44)
LYMPHOCYTES NFR BLD: 18 % (ref 13–44)
LYMPHOCYTES NFR BLD: 19 % (ref 13–44)
LYMPHOCYTES NFR BLD: 20 % (ref 13–44)
LYMPHOCYTES NFR BLD: 20 % (ref 13–44)
LYMPHOCYTES NFR BLD: 22 % (ref 13–44)
LYMPHOCYTES NFR BLD: 22 % (ref 13–44)
LYMPHOCYTES NFR BLD: 23 % (ref 13–44)
LYMPHOCYTES NFR BLD: 26 % (ref 13–44)
LYMPHOCYTES NFR BLD: 31 % (ref 13–44)
LYMPHOCYTES NFR BLD: 5 %
LYMPHOCYTES NFR BLD: 8 % (ref 13–44)
LYMPHOCYTES NFR BLD: 8 % (ref 13–44)
LYMPHOCYTES NFR BLD: 9 % (ref 13–44)
LYMPHOCYTES NFR BRONCH MANUAL: 79 %
LYMPHOCYTES NFR BRONCH MANUAL: 81 %
Lab: DETECTED
Lab: NORMAL
Lab: NORMAL
MACROPHAGES NFR BRONCH MANUAL: 2 %
MACROPHAGES NFR BRONCH MANUAL: 6 %
MAGNESIUM SERPL-MCNC: 1.6 MG/DL (ref 1.8–2.4)
MAGNESIUM SERPL-MCNC: 1.7 MG/DL (ref 1.8–2.4)
MAGNESIUM SERPL-MCNC: 1.8 MG/DL (ref 1.8–2.4)
MAGNESIUM SERPL-MCNC: 1.9 MG/DL (ref 1.8–2.4)
MAGNESIUM SERPL-MCNC: 2 MG/DL (ref 1.8–2.4)
MAGNESIUM SERPL-MCNC: 2.1 MG/DL (ref 1.8–2.4)
MAGNESIUM SERPL-MCNC: 2.2 MG/DL (ref 1.8–2.4)
MAGNESIUM SERPL-MCNC: 2.3 MG/DL (ref 1.8–2.4)
MAGNESIUM SERPL-MCNC: 2.4 MG/DL (ref 1.8–2.4)
MAGNESIUM SERPL-MCNC: 2.4 MG/DL (ref 1.8–2.4)
MAGNESIUM SERPL-MCNC: 2.5 MG/DL (ref 1.8–2.4)
MAGNESIUM SERPL-MCNC: 3.3 MG/DL (ref 1.8–2.4)
MCH RBC QN AUTO: 28.5 PG (ref 26.1–32.9)
MCH RBC QN AUTO: 28.6 PG (ref 26.1–32.9)
MCH RBC QN AUTO: 28.6 PG (ref 26.1–32.9)
MCH RBC QN AUTO: 28.8 PG (ref 26.1–32.9)
MCH RBC QN AUTO: 28.8 PG (ref 26.1–32.9)
MCH RBC QN AUTO: 29 PG (ref 26.1–32.9)
MCH RBC QN AUTO: 29.1 PG (ref 26.1–32.9)
MCH RBC QN AUTO: 29.1 PG (ref 26.1–32.9)
MCH RBC QN AUTO: 29.2 PG (ref 26.1–32.9)
MCH RBC QN AUTO: 29.3 PG (ref 26.1–32.9)
MCH RBC QN AUTO: 29.3 PG (ref 26.1–32.9)
MCH RBC QN AUTO: 29.5 PG (ref 26.1–32.9)
MCH RBC QN AUTO: 29.5 PG (ref 26.1–32.9)
MCH RBC QN AUTO: 29.6 PG (ref 26.1–32.9)
MCH RBC QN AUTO: 29.7 PG (ref 26.1–32.9)
MCH RBC QN AUTO: 29.8 PG (ref 26.1–32.9)
MCH RBC QN AUTO: 29.8 PG (ref 26.1–32.9)
MCH RBC QN AUTO: 29.9 PG (ref 26.1–32.9)
MCH RBC QN AUTO: 30 PG (ref 26.1–32.9)
MCH RBC QN AUTO: 30.1 PG (ref 26.1–32.9)
MCH RBC QN AUTO: 30.1 PG (ref 26.1–32.9)
MCH RBC QN AUTO: 30.2 PG (ref 26.1–32.9)
MCH RBC QN AUTO: 30.5 PG (ref 26.1–32.9)
MCH RBC QN AUTO: 30.5 PG (ref 26.1–32.9)
MCH RBC QN AUTO: 30.7 PG (ref 26.1–32.9)
MCH RBC QN AUTO: 30.7 PG (ref 26.1–32.9)
MCH RBC QN AUTO: 30.8 PG (ref 26.1–32.9)
MCH RBC QN AUTO: 30.9 PG (ref 26.1–32.9)
MCH RBC QN AUTO: 31 PG (ref 26.1–32.9)
MCH RBC QN AUTO: 31 PG (ref 26.1–32.9)
MCH RBC QN AUTO: 31.1 PG (ref 26.1–32.9)
MCH RBC QN AUTO: 31.2 PG (ref 26.1–32.9)
MCH RBC QN AUTO: 31.4 PG (ref 26.1–32.9)
MCH RBC QN AUTO: 31.7 PG (ref 26.1–32.9)
MCH RBC QN AUTO: 31.8 PG (ref 26.1–32.9)
MCHC RBC AUTO-ENTMCNC: 29.6 G/DL (ref 31.4–35)
MCHC RBC AUTO-ENTMCNC: 29.6 G/DL (ref 31.4–35)
MCHC RBC AUTO-ENTMCNC: 30.3 G/DL (ref 31.4–35)
MCHC RBC AUTO-ENTMCNC: 30.4 G/DL (ref 31.4–35)
MCHC RBC AUTO-ENTMCNC: 30.7 G/DL (ref 31.4–35)
MCHC RBC AUTO-ENTMCNC: 30.9 G/DL (ref 31.4–35)
MCHC RBC AUTO-ENTMCNC: 30.9 G/DL (ref 31.4–35)
MCHC RBC AUTO-ENTMCNC: 31.1 G/DL (ref 31.4–35)
MCHC RBC AUTO-ENTMCNC: 31.1 G/DL (ref 31.4–35)
MCHC RBC AUTO-ENTMCNC: 31.2 G/DL (ref 31.4–35)
MCHC RBC AUTO-ENTMCNC: 31.3 G/DL (ref 31.4–35)
MCHC RBC AUTO-ENTMCNC: 31.4 G/DL (ref 31.4–35)
MCHC RBC AUTO-ENTMCNC: 31.4 G/DL (ref 31.4–35)
MCHC RBC AUTO-ENTMCNC: 31.5 G/DL (ref 31.4–35)
MCHC RBC AUTO-ENTMCNC: 31.6 G/DL (ref 31.4–35)
MCHC RBC AUTO-ENTMCNC: 31.7 G/DL (ref 31.4–35)
MCHC RBC AUTO-ENTMCNC: 31.7 G/DL (ref 31.4–35)
MCHC RBC AUTO-ENTMCNC: 31.8 G/DL (ref 31.4–35)
MCHC RBC AUTO-ENTMCNC: 31.9 G/DL (ref 31.4–35)
MCHC RBC AUTO-ENTMCNC: 31.9 G/DL (ref 31.4–35)
MCHC RBC AUTO-ENTMCNC: 32 G/DL (ref 31.4–35)
MCHC RBC AUTO-ENTMCNC: 32 G/DL (ref 31.4–35)
MCHC RBC AUTO-ENTMCNC: 32.1 G/DL (ref 31.4–35)
MCHC RBC AUTO-ENTMCNC: 32.1 G/DL (ref 31.4–35)
MCHC RBC AUTO-ENTMCNC: 32.2 G/DL (ref 31.4–35)
MCHC RBC AUTO-ENTMCNC: 32.3 G/DL (ref 31.4–35)
MCHC RBC AUTO-ENTMCNC: 32.5 G/DL (ref 31.4–35)
MCHC RBC AUTO-ENTMCNC: 32.7 G/DL (ref 31.4–35)
MCHC RBC AUTO-ENTMCNC: 32.7 G/DL (ref 31.4–35)
MCHC RBC AUTO-ENTMCNC: 32.9 G/DL (ref 31.4–35)
MCHC RBC AUTO-ENTMCNC: 33.1 G/DL (ref 31.4–35)
MCHC RBC AUTO-ENTMCNC: 33.1 G/DL (ref 31.4–35)
MCHC RBC AUTO-ENTMCNC: 33.3 G/DL (ref 31.4–35)
MCHC RBC AUTO-ENTMCNC: 34 G/DL (ref 31.4–35)
MCHC RBC AUTO-ENTMCNC: 34.3 G/DL (ref 31.4–35)
MCHC RBC AUTO-ENTMCNC: 34.7 G/DL (ref 31.4–35)
MCHC RBC AUTO-ENTMCNC: 34.8 G/DL (ref 31.4–35)
MCHC RBC AUTO-ENTMCNC: 34.8 G/DL (ref 31.4–35)
MCHC RBC AUTO-ENTMCNC: 35.3 G/DL (ref 31.4–35)
MCHC RBC AUTO-ENTMCNC: 35.4 G/DL (ref 31.4–35)
MCHC RBC AUTO-ENTMCNC: 35.9 G/DL (ref 31.4–35)
MCHC RBC AUTO-ENTMCNC: 36.6 G/DL (ref 31.4–35)
MCHC RBC AUTO-ENTMCNC: 36.7 G/DL (ref 31.4–35)
MCV RBC AUTO: 100 FL (ref 79.6–97.8)
MCV RBC AUTO: 100 FL (ref 79.6–97.8)
MCV RBC AUTO: 100.4 FL (ref 79.6–97.8)
MCV RBC AUTO: 86.2 FL (ref 79.6–97.8)
MCV RBC AUTO: 86.5 FL (ref 79.6–97.8)
MCV RBC AUTO: 86.8 FL (ref 79.6–97.8)
MCV RBC AUTO: 88.2 FL (ref 79.6–97.8)
MCV RBC AUTO: 88.3 FL (ref 79.6–97.8)
MCV RBC AUTO: 88.6 FL (ref 79.6–97.8)
MCV RBC AUTO: 89.3 FL (ref 79.6–97.8)
MCV RBC AUTO: 89.7 FL (ref 79.6–97.8)
MCV RBC AUTO: 89.7 FL (ref 79.6–97.8)
MCV RBC AUTO: 90.2 FL (ref 79.6–97.8)
MCV RBC AUTO: 90.2 FL (ref 79.6–97.8)
MCV RBC AUTO: 90.3 FL (ref 79.6–97.8)
MCV RBC AUTO: 90.5 FL (ref 79.6–97.8)
MCV RBC AUTO: 90.8 FL (ref 79.6–97.8)
MCV RBC AUTO: 90.8 FL (ref 79.6–97.8)
MCV RBC AUTO: 91 FL (ref 79.6–97.8)
MCV RBC AUTO: 91 FL (ref 79.6–97.8)
MCV RBC AUTO: 91.1 FL (ref 79.6–97.8)
MCV RBC AUTO: 91.2 FL (ref 79.6–97.8)
MCV RBC AUTO: 91.3 FL (ref 79.6–97.8)
MCV RBC AUTO: 91.3 FL (ref 79.6–97.8)
MCV RBC AUTO: 91.8 FL (ref 79.6–97.8)
MCV RBC AUTO: 92 FL (ref 79.6–97.8)
MCV RBC AUTO: 92 FL (ref 79.6–97.8)
MCV RBC AUTO: 92.2 FL (ref 79.6–97.8)
MCV RBC AUTO: 92.3 FL (ref 79.6–97.8)
MCV RBC AUTO: 92.4 FL (ref 79.6–97.8)
MCV RBC AUTO: 92.6 FL (ref 79.6–97.8)
MCV RBC AUTO: 92.6 FL (ref 79.6–97.8)
MCV RBC AUTO: 92.7 FL (ref 79.6–97.8)
MCV RBC AUTO: 92.9 FL (ref 79.6–97.8)
MCV RBC AUTO: 93.1 FL (ref 79.6–97.8)
MCV RBC AUTO: 93.2 FL (ref 79.6–97.8)
MCV RBC AUTO: 93.4 FL (ref 79.6–97.8)
MCV RBC AUTO: 93.6 FL (ref 79.6–97.8)
MCV RBC AUTO: 93.7 FL (ref 79.6–97.8)
MCV RBC AUTO: 93.7 FL (ref 79.6–97.8)
MCV RBC AUTO: 93.9 FL (ref 79.6–97.8)
MCV RBC AUTO: 94.4 FL (ref 79.6–97.8)
MCV RBC AUTO: 95 FL (ref 79.6–97.8)
MCV RBC AUTO: 96.4 FL (ref 79.6–97.8)
MCV RBC AUTO: 96.6 FL (ref 79.6–97.8)
MCV RBC AUTO: 96.9 FL (ref 79.6–97.8)
MCV RBC AUTO: 98.2 FL (ref 79.6–97.8)
MECA (METHICILLIN-RESISTANCE GENES), MRGP: DETECTED
MM INDURATION POC: 0 MM (ref 0–5)
MM INDURATION POC: 0 MM (ref 0–5)
MONOCYTES # BLD: 0.1 K/UL (ref 0.1–1.3)
MONOCYTES # BLD: 0.2 K/UL (ref 0.1–1.3)
MONOCYTES # BLD: 0.3 K/UL (ref 0.1–1.3)
MONOCYTES # BLD: 0.4 K/UL (ref 0.1–1.3)
MONOCYTES # BLD: 0.5 K/UL (ref 0.1–1.3)
MONOCYTES # BLD: 0.6 K/UL (ref 0.1–1.3)
MONOCYTES # BLD: 0.7 K/UL (ref 0.1–1.3)
MONOCYTES # BLD: 0.8 K/UL (ref 0.1–1.3)
MONOCYTES # BLD: 0.9 K/UL (ref 0.1–1.3)
MONOCYTES # BLD: 0.9 K/UL (ref 0.1–1.3)
MONOCYTES # BLD: 1 K/UL (ref 0.1–1.3)
MONOCYTES # BLD: 1 K/UL (ref 0.1–1.3)
MONOCYTES # BLD: 1.1 K/UL (ref 0.1–1.3)
MONOCYTES # BLD: 1.2 K/UL (ref 0.1–1.3)
MONOCYTES NFR BLD: 11 % (ref 4–12)
MONOCYTES NFR BLD: 12 % (ref 4–12)
MONOCYTES NFR BLD: 12 % (ref 4–12)
MONOCYTES NFR BLD: 13 % (ref 4–12)
MONOCYTES NFR BLD: 13 % (ref 4–12)
MONOCYTES NFR BLD: 20 % (ref 4–12)
MONOCYTES NFR BLD: 21 % (ref 4–12)
MONOCYTES NFR BLD: 23 % (ref 4–12)
MONOCYTES NFR BLD: 3 % (ref 4–12)
MONOCYTES NFR BLD: 3 % (ref 4–12)
MONOCYTES NFR BLD: 34 % (ref 4–12)
MONOCYTES NFR BLD: 38 % (ref 4–12)
MONOCYTES NFR BLD: 38 % (ref 4–12)
MONOCYTES NFR BLD: 4 % (ref 4–12)
MONOCYTES NFR BLD: 5 % (ref 4–12)
MONOCYTES NFR BLD: 5 % (ref 4–12)
MONOCYTES NFR BLD: 6 % (ref 4–12)
MONOCYTES NFR BLD: 7 % (ref 4–12)
MONOCYTES NFR BLD: 8 % (ref 4–12)
MONOCYTES NFR BLD: 9 %
MONOCYTES NFR BLD: 9 % (ref 4–12)
MYCOBACTERIUM SPEC QL CULT: NEGATIVE
NEUTROPHILS NFR BRONCH MANUAL: 13 %
NEUTROPHILS NFR BRONCH MANUAL: 19 %
NEUTS SEG # BLD: 0.8 K/UL (ref 1.7–8.2)
NEUTS SEG # BLD: 0.9 K/UL (ref 1.7–8.2)
NEUTS SEG # BLD: 1.2 K/UL (ref 1.7–8.2)
NEUTS SEG # BLD: 1.3 K/UL (ref 1.7–8.2)
NEUTS SEG # BLD: 1.3 K/UL (ref 1.7–8.2)
NEUTS SEG # BLD: 1.5 K/UL (ref 1.7–8.2)
NEUTS SEG # BLD: 1.8 K/UL (ref 1.7–8.2)
NEUTS SEG # BLD: 2.4 K/UL (ref 1.7–8.2)
NEUTS SEG # BLD: 2.7 K/UL (ref 1.7–8.2)
NEUTS SEG # BLD: 2.9 K/UL (ref 1.7–8.2)
NEUTS SEG # BLD: 3 K/UL (ref 1.7–8.2)
NEUTS SEG # BLD: 3 K/UL (ref 1.7–8.2)
NEUTS SEG # BLD: 3.2 K/UL (ref 1.7–8.2)
NEUTS SEG # BLD: 3.4 K/UL (ref 1.7–8.2)
NEUTS SEG # BLD: 3.4 K/UL (ref 1.7–8.2)
NEUTS SEG # BLD: 3.5 K/UL (ref 1.7–8.2)
NEUTS SEG # BLD: 3.6 K/UL (ref 1.7–8.2)
NEUTS SEG # BLD: 3.7 K/UL (ref 1.7–8.2)
NEUTS SEG # BLD: 3.9 K/UL (ref 1.7–8.2)
NEUTS SEG # BLD: 4 K/UL (ref 1.7–8.2)
NEUTS SEG # BLD: 4.3 K/UL (ref 1.7–8.2)
NEUTS SEG # BLD: 4.5 K/UL (ref 1.7–8.2)
NEUTS SEG # BLD: 4.5 K/UL (ref 1.7–8.2)
NEUTS SEG # BLD: 5.3 K/UL (ref 1.7–8.2)
NEUTS SEG # BLD: 9.4 K/UL (ref 1.7–8.2)
NEUTS SEG NFR BLD: 37 % (ref 43–78)
NEUTS SEG NFR BLD: 41 % (ref 43–78)
NEUTS SEG NFR BLD: 42 % (ref 43–78)
NEUTS SEG NFR BLD: 44 % (ref 43–78)
NEUTS SEG NFR BLD: 58 % (ref 43–78)
NEUTS SEG NFR BLD: 64 % (ref 43–78)
NEUTS SEG NFR BLD: 65 % (ref 43–78)
NEUTS SEG NFR BLD: 68 % (ref 43–78)
NEUTS SEG NFR BLD: 68 % (ref 43–78)
NEUTS SEG NFR BLD: 69 % (ref 43–78)
NEUTS SEG NFR BLD: 70 % (ref 43–78)
NEUTS SEG NFR BLD: 70 % (ref 43–78)
NEUTS SEG NFR BLD: 71 % (ref 43–78)
NEUTS SEG NFR BLD: 72 % (ref 43–78)
NEUTS SEG NFR BLD: 72 % (ref 43–78)
NEUTS SEG NFR BLD: 73 % (ref 43–78)
NEUTS SEG NFR BLD: 73 % (ref 43–78)
NEUTS SEG NFR BLD: 74 % (ref 43–78)
NEUTS SEG NFR BLD: 84 %
NEUTS SEG NFR BLD: 84 % (ref 43–78)
NEUTS SEG NFR BLD: 84 % (ref 43–78)
NEUTS SEG NFR BLD: 86 % (ref 43–78)
NEUTS SEG NFR BLD: 86 % (ref 43–78)
NITRITE UR QL STRIP.AUTO: NEGATIVE
NRBC # BLD: 0 K/UL (ref 0–0.2)
NRBC # BLD: 0.02 K/UL (ref 0–0.2)
NRBC # BLD: 0.03 K/UL (ref 0–0.2)
NRBC # BLD: 0.04 K/UL (ref 0–0.2)
NUC CELL # FLD: 105 /CU MM
NUC CELL # FLD: 171 /CU MM
NUC CELL # FLD: <100 /CU MM
NUC CELL # FLD: <100 /CU MM
O2/TOTAL GAS SETTING VFR VENT: 100 %
O2/TOTAL GAS SETTING VFR VENT: 80 %
P-R INTERVAL, ECG05: 118 MS
P-R INTERVAL, ECG05: 124 MS
P-R INTERVAL, ECG05: 128 MS
PCO2 BLD: 39 MMHG (ref 35–45)
PCO2 BLD: 40.5 MMHG (ref 35–45)
PCO2 BLD: 41.1 MMHG (ref 35–45)
PCO2 BLD: 41.7 MMHG (ref 35–45)
PCO2 BLD: 41.9 MMHG (ref 35–45)
PH BLD: 7.36 [PH] (ref 7.35–7.45)
PH BLD: 7.4 [PH] (ref 7.35–7.45)
PH BLD: 7.41 [PH] (ref 7.35–7.45)
PH BLD: 7.42 [PH] (ref 7.35–7.45)
PH BLD: 7.44 [PH] (ref 7.35–7.45)
PH UR STRIP: 5.5 [PH] (ref 5–9)
PH UR STRIP: 6.5 [PH] (ref 5–9)
PH UR STRIP: 7 [PH] (ref 5–9)
PLATELET # BLD AUTO: 108 K/UL (ref 150–450)
PLATELET # BLD AUTO: 112 K/UL (ref 150–450)
PLATELET # BLD AUTO: 112 K/UL (ref 150–450)
PLATELET # BLD AUTO: 118 K/UL (ref 150–450)
PLATELET # BLD AUTO: 119 K/UL (ref 150–450)
PLATELET # BLD AUTO: 121 K/UL (ref 150–450)
PLATELET # BLD AUTO: 121 K/UL (ref 150–450)
PLATELET # BLD AUTO: 122 K/UL (ref 150–450)
PLATELET # BLD AUTO: 123 K/UL (ref 150–450)
PLATELET # BLD AUTO: 124 K/UL (ref 150–450)
PLATELET # BLD AUTO: 125 K/UL (ref 150–450)
PLATELET # BLD AUTO: 127 K/UL (ref 150–450)
PLATELET # BLD AUTO: 129 K/UL (ref 150–450)
PLATELET # BLD AUTO: 135 K/UL (ref 150–450)
PLATELET # BLD AUTO: 139 K/UL (ref 150–450)
PLATELET # BLD AUTO: 141 K/UL (ref 150–450)
PLATELET # BLD AUTO: 141 K/UL (ref 150–450)
PLATELET # BLD AUTO: 147 K/UL (ref 150–450)
PLATELET # BLD AUTO: 150 K/UL (ref 150–450)
PLATELET # BLD AUTO: 154 K/UL (ref 150–450)
PLATELET # BLD AUTO: 156 K/UL (ref 150–450)
PLATELET # BLD AUTO: 156 K/UL (ref 150–450)
PLATELET # BLD AUTO: 157 K/UL (ref 150–450)
PLATELET # BLD AUTO: 161 K/UL (ref 150–450)
PLATELET # BLD AUTO: 161 K/UL (ref 150–450)
PLATELET # BLD AUTO: 162 K/UL (ref 150–450)
PLATELET # BLD AUTO: 166 K/UL (ref 150–450)
PLATELET # BLD AUTO: 167 K/UL (ref 150–450)
PLATELET # BLD AUTO: 168 K/UL (ref 150–450)
PLATELET # BLD AUTO: 174 K/UL (ref 150–450)
PLATELET # BLD AUTO: 177 K/UL (ref 150–450)
PLATELET # BLD AUTO: 183 K/UL (ref 150–450)
PLATELET # BLD AUTO: 187 K/UL (ref 150–450)
PLATELET # BLD AUTO: 187 K/UL (ref 150–450)
PLATELET # BLD AUTO: 188 K/UL (ref 150–450)
PLATELET # BLD AUTO: 189 K/UL (ref 150–450)
PLATELET # BLD AUTO: 195 K/UL (ref 150–450)
PLATELET # BLD AUTO: 208 K/UL (ref 150–450)
PLATELET # BLD AUTO: 208 K/UL (ref 150–450)
PLATELET # BLD AUTO: 216 K/UL (ref 150–450)
PLATELET # BLD AUTO: 216 K/UL (ref 150–450)
PLATELET # BLD AUTO: 225 K/UL (ref 150–450)
PLATELET # BLD AUTO: 243 K/UL (ref 150–450)
PLATELET # BLD AUTO: 51 K/UL (ref 150–450)
PLATELET # BLD AUTO: 64 K/UL (ref 150–450)
PLATELET # BLD AUTO: 65 K/UL (ref 150–450)
PLATELET # BLD AUTO: 68 K/UL (ref 150–450)
PLATELET # BLD AUTO: 81 K/UL (ref 150–450)
PLATELET # BLD AUTO: 81 K/UL (ref 150–450)
PLATELET # BLD AUTO: 83 K/UL (ref 150–450)
PLATELET # BLD AUTO: 86 K/UL (ref 150–450)
PLATELET # BLD AUTO: 86 K/UL (ref 150–450)
PLATELET # BLD AUTO: 88 K/UL (ref 150–450)
PLATELET # BLD AUTO: 89 K/UL (ref 150–450)
PLATELET # BLD AUTO: 94 K/UL (ref 150–450)
PLATELET # BLD AUTO: 95 K/UL (ref 150–450)
PLATELET # BLD AUTO: 96 K/UL (ref 150–450)
PLATELET COMMENTS,PCOM: ADEQUATE
PMV BLD AUTO: 10 FL (ref 9.4–12.3)
PMV BLD AUTO: 10.1 FL (ref 9.4–12.3)
PMV BLD AUTO: 10.2 FL (ref 9.4–12.3)
PMV BLD AUTO: 10.2 FL (ref 9.4–12.3)
PMV BLD AUTO: 10.3 FL (ref 9.4–12.3)
PMV BLD AUTO: 10.4 FL (ref 9.4–12.3)
PMV BLD AUTO: 10.4 FL (ref 9.4–12.3)
PMV BLD AUTO: 10.5 FL (ref 9.4–12.3)
PMV BLD AUTO: 10.6 FL (ref 9.4–12.3)
PMV BLD AUTO: 10.6 FL (ref 9.4–12.3)
PMV BLD AUTO: 10.7 FL (ref 9.4–12.3)
PMV BLD AUTO: 10.7 FL (ref 9.4–12.3)
PMV BLD AUTO: 11.3 FL (ref 9.4–12.3)
PMV BLD AUTO: 11.4 FL (ref 9.4–12.3)
PMV BLD AUTO: 11.4 FL (ref 9.4–12.3)
PMV BLD AUTO: 7.6 FL (ref 9.4–12.3)
PMV BLD AUTO: 7.8 FL (ref 9.4–12.3)
PMV BLD AUTO: 8 FL (ref 9.4–12.3)
PMV BLD AUTO: 8 FL (ref 9.4–12.3)
PMV BLD AUTO: 8.1 FL (ref 9.4–12.3)
PMV BLD AUTO: 8.2 FL (ref 9.4–12.3)
PMV BLD AUTO: 8.2 FL (ref 9.4–12.3)
PMV BLD AUTO: 8.4 FL (ref 9.4–12.3)
PMV BLD AUTO: 8.5 FL (ref 9.4–12.3)
PMV BLD AUTO: 8.5 FL (ref 9.4–12.3)
PMV BLD AUTO: 8.8 FL (ref 9.4–12.3)
PMV BLD AUTO: 9.1 FL (ref 9.4–12.3)
PMV BLD AUTO: 9.2 FL (ref 9.4–12.3)
PMV BLD AUTO: 9.3 FL (ref 9.4–12.3)
PMV BLD AUTO: 9.4 FL (ref 9.4–12.3)
PMV BLD AUTO: 9.5 FL (ref 9.4–12.3)
PMV BLD AUTO: 9.6 FL (ref 9.4–12.3)
PMV BLD AUTO: 9.6 FL (ref 9.4–12.3)
PMV BLD AUTO: 9.7 FL (ref 9.4–12.3)
PMV BLD AUTO: 9.8 FL (ref 9.4–12.3)
PMV BLD AUTO: 9.9 FL (ref 9.4–12.3)
PO2 BLD: 58 MMHG (ref 75–100)
PO2 BLD: 65 MMHG (ref 75–100)
PO2 BLD: 66 MMHG (ref 75–100)
PO2 BLD: 70 MMHG (ref 75–100)
PO2 BLD: 76 MMHG (ref 75–100)
POTASSIUM BLD-SCNC: 3.9 MMOL/L (ref 3.5–5.1)
POTASSIUM SERPL-SCNC: 3 MMOL/L (ref 3.5–5.1)
POTASSIUM SERPL-SCNC: 3 MMOL/L (ref 3.5–5.1)
POTASSIUM SERPL-SCNC: 3.2 MMOL/L (ref 3.5–5.1)
POTASSIUM SERPL-SCNC: 3.3 MMOL/L (ref 3.5–5.1)
POTASSIUM SERPL-SCNC: 3.3 MMOL/L (ref 3.5–5.1)
POTASSIUM SERPL-SCNC: 3.4 MMOL/L (ref 3.5–5.1)
POTASSIUM SERPL-SCNC: 3.5 MMOL/L (ref 3.5–5.1)
POTASSIUM SERPL-SCNC: 3.6 MMOL/L (ref 3.5–5.1)
POTASSIUM SERPL-SCNC: 3.7 MMOL/L (ref 3.5–5.1)
POTASSIUM SERPL-SCNC: 3.7 MMOL/L (ref 3.5–5.1)
POTASSIUM SERPL-SCNC: 3.8 MMOL/L (ref 3.5–5.1)
POTASSIUM SERPL-SCNC: 3.8 MMOL/L (ref 3.5–5.1)
POTASSIUM SERPL-SCNC: 3.9 MMOL/L (ref 3.5–5.1)
POTASSIUM SERPL-SCNC: 4 MMOL/L (ref 3.5–5.1)
POTASSIUM SERPL-SCNC: 4.1 MMOL/L (ref 3.5–5.1)
POTASSIUM SERPL-SCNC: 4.2 MMOL/L (ref 3.5–5.1)
POTASSIUM SERPL-SCNC: 4.3 MMOL/L (ref 3.5–5.1)
POTASSIUM SERPL-SCNC: 4.4 MMOL/L (ref 3.5–5.1)
POTASSIUM SERPL-SCNC: 4.5 MMOL/L (ref 3.5–5.1)
POTASSIUM SERPL-SCNC: 4.6 MMOL/L (ref 3.5–5.1)
POTASSIUM SERPL-SCNC: 4.6 MMOL/L (ref 3.5–5.1)
POTASSIUM SERPL-SCNC: 4.7 MMOL/L (ref 3.5–5.1)
POTASSIUM SERPL-SCNC: 4.7 MMOL/L (ref 3.5–5.1)
POTASSIUM SERPL-SCNC: 4.8 MMOL/L (ref 3.5–5.1)
POTASSIUM SERPL-SCNC: 5.1 MMOL/L (ref 3.5–5.1)
POTASSIUM SERPL-SCNC: 6 MMOL/L (ref 3.5–5.1)
PPD POC: NEGATIVE NEGATIVE
PPD POC: NEGATIVE NEGATIVE
PREALB SERPL-MCNC: 6.41 MG/DL (ref 18–35.7)
PROCALCITONIN SERPL-MCNC: 0.3 NG/ML
PROCALCITONIN SERPL-MCNC: 0.6 NG/ML
PROT FLD-MCNC: 2.5 G/DL
PROT FLD-MCNC: 3 G/DL
PROT FLD-MCNC: 3.1 G/DL
PROT FLD-MCNC: 3.6 G/DL
PROT SERPL-MCNC: 5.7 G/DL (ref 6.3–8.2)
PROT SERPL-MCNC: 5.8 G/DL (ref 6.3–8.2)
PROT SERPL-MCNC: 5.9 G/DL (ref 6.3–8.2)
PROT SERPL-MCNC: 6 G/DL (ref 6.3–8.2)
PROT SERPL-MCNC: 6 G/DL (ref 6.3–8.2)
PROT SERPL-MCNC: 6.1 G/DL (ref 6.3–8.2)
PROT SERPL-MCNC: 6.4 G/DL (ref 6.3–8.2)
PROT SERPL-MCNC: 6.5 G/DL (ref 6.3–8.2)
PROT SERPL-MCNC: 6.6 G/DL (ref 6.3–8.2)
PROT SERPL-MCNC: 6.6 G/DL (ref 6.3–8.2)
PROT SERPL-MCNC: 6.7 G/DL (ref 6.3–8.2)
PROT SERPL-MCNC: 6.8 G/DL (ref 6.3–8.2)
PROT SERPL-MCNC: 6.9 G/DL (ref 6.3–8.2)
PROT SERPL-MCNC: 7 G/DL (ref 6.3–8.2)
PROT SERPL-MCNC: 7 G/DL (ref 6.3–8.2)
PROT SERPL-MCNC: 7.4 G/DL (ref 6.3–8.2)
PROT UR STRIP-MCNC: 30 MG/DL
PROT UR STRIP-MCNC: 30 MG/DL
PROT UR STRIP-MCNC: NEGATIVE MG/DL
PROTHROMBIN TIME: 16 SEC (ref 12–14.7)
PROTHROMBIN TIME: 16.4 SEC (ref 12–14.7)
PROTHROMBIN TIME: 17.7 SEC (ref 12–14.7)
PROTHROMBIN TIME: 17.7 SEC (ref 12–14.7)
PROTHROMBIN TIME: 18.2 SEC (ref 12–14.7)
PROTHROMBIN TIME: 19.1 SEC (ref 12–14.7)
PROTHROMBIN TIME: 19.4 SEC (ref 12–14.7)
Q-T INTERVAL, ECG07: 310 MS
Q-T INTERVAL, ECG07: 316 MS
Q-T INTERVAL, ECG07: 356 MS
QRS DURATION, ECG06: 72 MS
QRS DURATION, ECG06: 74 MS
QRS DURATION, ECG06: 82 MS
QTC CALCULATION (BEZET), ECG08: 415 MS
QTC CALCULATION (BEZET), ECG08: 425 MS
QTC CALCULATION (BEZET), ECG08: 470 MS
RBC # BLD AUTO: 2.17 M/UL (ref 4.23–5.6)
RBC # BLD AUTO: 2.19 M/UL (ref 4.23–5.6)
RBC # BLD AUTO: 2.31 M/UL (ref 4.23–5.6)
RBC # BLD AUTO: 2.39 M/UL (ref 4.23–5.6)
RBC # BLD AUTO: 2.42 M/UL (ref 4.23–5.6)
RBC # BLD AUTO: 2.5 M/UL (ref 4.23–5.6)
RBC # BLD AUTO: 2.53 M/UL (ref 4.23–5.6)
RBC # BLD AUTO: 2.55 M/UL (ref 4.23–5.6)
RBC # BLD AUTO: 2.56 M/UL (ref 4.23–5.6)
RBC # BLD AUTO: 2.57 M/UL (ref 4.23–5.6)
RBC # BLD AUTO: 2.59 M/UL (ref 4.23–5.6)
RBC # BLD AUTO: 2.6 M/UL (ref 4.23–5.6)
RBC # BLD AUTO: 2.61 M/UL (ref 4.23–5.6)
RBC # BLD AUTO: 2.62 M/UL (ref 4.23–5.6)
RBC # BLD AUTO: 2.62 M/UL (ref 4.23–5.6)
RBC # BLD AUTO: 2.63 M/UL (ref 4.23–5.6)
RBC # BLD AUTO: 2.68 M/UL (ref 4.23–5.6)
RBC # BLD AUTO: 2.69 M/UL (ref 4.23–5.67)
RBC # BLD AUTO: 2.74 M/UL (ref 4.23–5.6)
RBC # BLD AUTO: 2.75 M/UL (ref 4.23–5.6)
RBC # BLD AUTO: 2.77 M/UL (ref 4.23–5.6)
RBC # BLD AUTO: 2.77 M/UL (ref 4.23–5.6)
RBC # BLD AUTO: 2.78 M/UL (ref 4.23–5.6)
RBC # BLD AUTO: 2.79 M/UL (ref 4.23–5.6)
RBC # BLD AUTO: 2.8 M/UL (ref 4.23–5.6)
RBC # BLD AUTO: 2.8 M/UL (ref 4.23–5.6)
RBC # BLD AUTO: 2.81 M/UL (ref 4.23–5.6)
RBC # BLD AUTO: 2.84 M/UL (ref 4.23–5.6)
RBC # BLD AUTO: 2.86 M/UL (ref 4.23–5.6)
RBC # BLD AUTO: 2.87 M/UL (ref 4.23–5.6)
RBC # BLD AUTO: 2.87 M/UL (ref 4.23–5.6)
RBC # BLD AUTO: 2.89 M/UL (ref 4.23–5.67)
RBC # BLD AUTO: 2.9 M/UL (ref 4.23–5.6)
RBC # BLD AUTO: 2.93 M/UL (ref 4.23–5.6)
RBC # BLD AUTO: 2.94 M/UL (ref 4.23–5.6)
RBC # BLD AUTO: 2.95 M/UL (ref 4.23–5.6)
RBC # BLD AUTO: 2.95 M/UL (ref 4.23–5.6)
RBC # BLD AUTO: 2.96 M/UL (ref 4.23–5.6)
RBC # BLD AUTO: 2.96 M/UL (ref 4.23–5.6)
RBC # BLD AUTO: 2.96 M/UL (ref 4.23–5.67)
RBC # BLD AUTO: 2.98 M/UL (ref 4.23–5.6)
RBC # BLD AUTO: 3.01 M/UL (ref 4.23–5.6)
RBC # BLD AUTO: 3.04 M/UL (ref 4.23–5.6)
RBC # BLD AUTO: 3.09 M/UL (ref 4.23–5.6)
RBC # BLD AUTO: 3.09 M/UL (ref 4.23–5.6)
RBC # BLD AUTO: 3.12 M/UL (ref 4.23–5.6)
RBC # BLD AUTO: 3.19 M/UL (ref 4.23–5.6)
RBC # BLD AUTO: 3.34 M/UL (ref 4.23–5.6)
RBC # BLD AUTO: 3.35 M/UL (ref 4.23–5.6)
RBC # BLD AUTO: 3.4 M/UL (ref 4.23–5.6)
RBC # BLD AUTO: 3.41 M/UL (ref 4.23–5.6)
RBC # BLD AUTO: 3.46 M/UL (ref 4.23–5.6)
RBC # BLD AUTO: 3.48 M/UL (ref 4.23–5.6)
RBC # BLD AUTO: 3.48 M/UL (ref 4.23–5.67)
RBC # BLD AUTO: 3.61 M/UL (ref 4.23–5.6)
RBC # FLD: 1000 /CU MM
RBC # FLD: <1000 /CU MM
RBC # FLD: NORMAL /CU MM
RBC # FLD: NORMAL /CU MM
RBC #/AREA URNS HPF: ABNORMAL /HPF
RBC MORPH BLD: ABNORMAL
REFERENCE LAB,REFLB: NORMAL
REFLEX TO ID, RFCO3T: NORMAL
REFLEX TO ID, RFCO3T: NORMAL
SAO2 % BLD: 91 % (ref 95–98)
SAO2 % BLD: 92 % (ref 95–98)
SAO2 % BLD: 92 % (ref 95–98)
SAO2 % BLD: 94 % (ref 95–98)
SAO2 % BLD: 95 % (ref 95–98)
SARS-COV-2, COV2NT: NORMAL
SARS-COV-2, COV2NT: NOT DETECTED
SERVICE CMNT-IMP: ABNORMAL
SERVICE CMNT-IMP: NORMAL
SODIUM BLD-SCNC: 133 MMOL/L (ref 136–145)
SODIUM SERPL-SCNC: 114 MMOL/L (ref 136–145)
SODIUM SERPL-SCNC: 118 MMOL/L (ref 136–145)
SODIUM SERPL-SCNC: 118 MMOL/L (ref 136–145)
SODIUM SERPL-SCNC: 119 MMOL/L (ref 136–145)
SODIUM SERPL-SCNC: 120 MMOL/L (ref 136–145)
SODIUM SERPL-SCNC: 121 MMOL/L (ref 136–145)
SODIUM SERPL-SCNC: 122 MMOL/L (ref 136–145)
SODIUM SERPL-SCNC: 123 MMOL/L (ref 136–145)
SODIUM SERPL-SCNC: 123 MMOL/L (ref 136–145)
SODIUM SERPL-SCNC: 124 MMOL/L (ref 136–145)
SODIUM SERPL-SCNC: 124 MMOL/L (ref 136–145)
SODIUM SERPL-SCNC: 125 MMOL/L (ref 136–145)
SODIUM SERPL-SCNC: 125 MMOL/L (ref 136–145)
SODIUM SERPL-SCNC: 126 MMOL/L (ref 136–145)
SODIUM SERPL-SCNC: 127 MMOL/L (ref 136–145)
SODIUM SERPL-SCNC: 127 MMOL/L (ref 136–145)
SODIUM SERPL-SCNC: 128 MMOL/L (ref 136–145)
SODIUM SERPL-SCNC: 129 MMOL/L (ref 136–145)
SODIUM SERPL-SCNC: 130 MMOL/L (ref 136–145)
SODIUM SERPL-SCNC: 130 MMOL/L (ref 136–145)
SODIUM SERPL-SCNC: 131 MMOL/L (ref 136–145)
SODIUM SERPL-SCNC: 132 MMOL/L (ref 136–145)
SODIUM SERPL-SCNC: 133 MMOL/L (ref 136–145)
SODIUM SERPL-SCNC: 134 MMOL/L (ref 136–145)
SODIUM SERPL-SCNC: 135 MMOL/L (ref 136–145)
SODIUM SERPL-SCNC: 136 MMOL/L (ref 136–145)
SODIUM SERPL-SCNC: 137 MMOL/L (ref 136–145)
SODIUM SERPL-SCNC: 138 MMOL/L (ref 136–145)
SODIUM SERPL-SCNC: 139 MMOL/L (ref 136–145)
SODIUM SERPL-SCNC: 140 MMOL/L (ref 136–145)
SODIUM SERPL-SCNC: 143 MMOL/L (ref 136–145)
SODIUM UR-SCNC: 40 MMOL/L
SOURCE, COVRS: NORMAL
SP GR UR REFRACTOMETRY: 1.01 (ref 1–1.02)
SP GR UR REFRACTOMETRY: 1.01 (ref 1–1.02)
SP GR UR REFRACTOMETRY: 1.02 (ref 1–1.02)
SPECIMEN EXP DATE BLD: NORMAL
SPECIMEN PREPARATION: NORMAL
SPECIMEN SOURCE FLD: NORMAL
SPECIMEN SOURCE: NORMAL
SPECIMEN TYPE: ABNORMAL
SPECIMEN TYPE: NORMAL
STAPHYLOCOCCUS, STAPP: DETECTED
STATUS OF UNIT,%ST: NORMAL
TEST DESCRIPTION:,ATST: NORMAL
TIBC SERPL-MCNC: 127 UG/DL (ref 250–450)
TROPONIN I SERPL-MCNC: <0.02 NG/ML (ref 0.02–0.05)
TROPONIN-HIGH SENSITIVITY: 22.7 PG/ML (ref 0–14)
UNIT DIVISION, %UDIV: 0
UNIT DIVISION, %UDIV: NORMAL
UROBILINOGEN UR QL STRIP.AUTO: 0.2 EU/DL (ref 0.2–1)
VANCOMYCIN SERPL-MCNC: 13.3 UG/ML
VANCOMYCIN SERPL-MCNC: 16.2 UG/ML
VANCOMYCIN SERPL-MCNC: 16.9 UG/ML
VANCOMYCIN SERPL-MCNC: 18.3 UG/ML
VANCOMYCIN SERPL-MCNC: 20.8 UG/ML
VANCOMYCIN TROUGH SERPL-MCNC: 15.2 UG/ML (ref 5–20)
VANCOMYCIN TROUGH SERPL-MCNC: 26.8 UG/ML (ref 5–20)
VANCOMYCIN TROUGH SERPL-MCNC: 8.6 UG/ML (ref 5–20)
VENTRICULAR RATE, ECG03: 104 BPM
VENTRICULAR RATE, ECG03: 105 BPM
VENTRICULAR RATE, ECG03: 113 BPM
VIT B12 SERPL-MCNC: 1333 PG/ML (ref 193–986)
WBC # BLD AUTO: 1.8 K/UL (ref 4.3–11.1)
WBC # BLD AUTO: 11.2 K/UL (ref 4.3–11.1)
WBC # BLD AUTO: 11.6 K/UL (ref 4.3–11.1)
WBC # BLD AUTO: 2 K/UL (ref 4.3–11.1)
WBC # BLD AUTO: 2.1 K/UL (ref 4.3–11.1)
WBC # BLD AUTO: 2.2 K/UL (ref 4.3–11.1)
WBC # BLD AUTO: 2.5 K/UL (ref 4.3–11.1)
WBC # BLD AUTO: 2.8 K/UL (ref 4.3–11.1)
WBC # BLD AUTO: 3.2 K/UL (ref 4.3–11.1)
WBC # BLD AUTO: 3.3 K/UL (ref 4.3–11.1)
WBC # BLD AUTO: 3.6 K/UL (ref 4.3–11.1)
WBC # BLD AUTO: 4 K/UL (ref 4.3–11.1)
WBC # BLD AUTO: 4.1 K/UL (ref 4.3–11.1)
WBC # BLD AUTO: 4.7 K/UL (ref 4.3–11.1)
WBC # BLD AUTO: 4.8 K/UL (ref 4.3–11.1)
WBC # BLD AUTO: 4.8 K/UL (ref 4.3–11.1)
WBC # BLD AUTO: 4.9 K/UL (ref 4.3–11.1)
WBC # BLD AUTO: 5 K/UL (ref 4.3–11.1)
WBC # BLD AUTO: 5.2 K/UL (ref 4.3–11.1)
WBC # BLD AUTO: 5.3 K/UL (ref 4.3–11.1)
WBC # BLD AUTO: 5.3 K/UL (ref 4.3–11.1)
WBC # BLD AUTO: 5.4 K/UL (ref 4.3–11.1)
WBC # BLD AUTO: 5.4 K/UL (ref 4.3–11.1)
WBC # BLD AUTO: 5.5 K/UL (ref 4.3–11.1)
WBC # BLD AUTO: 5.6 K/UL (ref 4.3–11.1)
WBC # BLD AUTO: 5.7 K/UL (ref 4.3–11.1)
WBC # BLD AUTO: 5.9 K/UL (ref 4.3–11.1)
WBC # BLD AUTO: 6.1 K/UL (ref 4.3–11.1)
WBC # BLD AUTO: 6.2 K/UL (ref 4.3–11.1)
WBC # BLD AUTO: 6.2 K/UL (ref 4.3–11.1)
WBC # BLD AUTO: 6.3 K/UL (ref 4.3–11.1)
WBC # BLD AUTO: 6.6 K/UL (ref 4.3–11.1)
WBC # BLD AUTO: 6.6 K/UL (ref 4.3–11.1)
WBC # BLD AUTO: 6.8 K/UL (ref 4.3–11.1)
WBC # BLD AUTO: 6.8 K/UL (ref 4.3–11.1)
WBC # BLD AUTO: 6.9 K/UL (ref 4.3–11.1)
WBC # BLD AUTO: 7.1 K/UL (ref 4.3–11.1)
WBC # BLD AUTO: 7.2 K/UL (ref 4.3–11.1)
WBC # BLD AUTO: 7.4 K/UL (ref 4.3–11.1)
WBC # BLD AUTO: 7.4 K/UL (ref 4.3–11.1)
WBC # BLD AUTO: 7.9 K/UL (ref 4.3–11.1)
WBC # BLD AUTO: 7.9 K/UL (ref 4.3–11.1)
WBC # BLD AUTO: 8 K/UL (ref 4.3–11.1)
WBC # BLD AUTO: 8.3 K/UL (ref 4.3–11.1)
WBC # BLD AUTO: 8.8 K/UL (ref 4.3–11.1)
WBC # BLD AUTO: 8.8 K/UL (ref 4.3–11.1)
WBC # BLD AUTO: 9 K/UL (ref 4.3–11.1)
WBC # BLD AUTO: 9.7 K/UL (ref 4.3–11.1)
WBC MORPH BLD: ABNORMAL
WBC URNS QL MICRO: ABNORMAL /HPF

## 2020-01-01 PROCEDURE — 86922 COMPATIBILITY TEST ANTIGLOB: CPT

## 2020-01-01 PROCEDURE — 36415 COLL VENOUS BLD VENIPUNCTURE: CPT

## 2020-01-01 PROCEDURE — 74011250637 HC RX REV CODE- 250/637: Performed by: INTERNAL MEDICINE

## 2020-01-01 PROCEDURE — 94640 AIRWAY INHALATION TREATMENT: CPT

## 2020-01-01 PROCEDURE — 65270000029 HC RM PRIVATE

## 2020-01-01 PROCEDURE — 82962 GLUCOSE BLOOD TEST: CPT

## 2020-01-01 PROCEDURE — 74011250636 HC RX REV CODE- 250/636: Performed by: INTERNAL MEDICINE

## 2020-01-01 PROCEDURE — 74011000250 HC RX REV CODE- 250: Performed by: INTERNAL MEDICINE

## 2020-01-01 PROCEDURE — 85379 FIBRIN DEGRADATION QUANT: CPT

## 2020-01-01 PROCEDURE — 85384 FIBRINOGEN ACTIVITY: CPT

## 2020-01-01 PROCEDURE — 86580 TB INTRADERMAL TEST: CPT | Performed by: INTERNAL MEDICINE

## 2020-01-01 PROCEDURE — 87102 FUNGUS ISOLATION CULTURE: CPT

## 2020-01-01 PROCEDURE — 74011250637 HC RX REV CODE- 250/637: Performed by: FAMILY MEDICINE

## 2020-01-01 PROCEDURE — 74011636637 HC RX REV CODE- 636/637: Performed by: INTERNAL MEDICINE

## 2020-01-01 PROCEDURE — 85027 COMPLETE CBC AUTOMATED: CPT

## 2020-01-01 PROCEDURE — 83735 ASSAY OF MAGNESIUM: CPT

## 2020-01-01 PROCEDURE — 74011000258 HC RX REV CODE- 258: Performed by: INTERNAL MEDICINE

## 2020-01-01 PROCEDURE — 86140 C-REACTIVE PROTEIN: CPT

## 2020-01-01 PROCEDURE — 99232 SBSQ HOSP IP/OBS MODERATE 35: CPT | Performed by: INTERNAL MEDICINE

## 2020-01-01 PROCEDURE — 74011250637 HC RX REV CODE- 250/637: Performed by: NURSE PRACTITIONER

## 2020-01-01 PROCEDURE — 74011000250 HC RX REV CODE- 250: Performed by: NURSE ANESTHETIST, CERTIFIED REGISTERED

## 2020-01-01 PROCEDURE — 0W993ZZ DRAINAGE OF RIGHT PLEURAL CAVITY, PERCUTANEOUS APPROACH: ICD-10-PCS | Performed by: INTERNAL MEDICINE

## 2020-01-01 PROCEDURE — 84295 ASSAY OF SERUM SODIUM: CPT

## 2020-01-01 PROCEDURE — 87040 BLOOD CULTURE FOR BACTERIA: CPT

## 2020-01-01 PROCEDURE — 74011250636 HC RX REV CODE- 250/636: Performed by: FAMILY MEDICINE

## 2020-01-01 PROCEDURE — 74011000258 HC RX REV CODE- 258: Performed by: FAMILY MEDICINE

## 2020-01-01 PROCEDURE — 97110 THERAPEUTIC EXERCISES: CPT

## 2020-01-01 PROCEDURE — 99223 1ST HOSP IP/OBS HIGH 75: CPT | Performed by: INTERNAL MEDICINE

## 2020-01-01 PROCEDURE — 96413 CHEMO IV INFUSION 1 HR: CPT

## 2020-01-01 PROCEDURE — 94762 N-INVAS EAR/PLS OXIMTRY CONT: CPT

## 2020-01-01 PROCEDURE — 80048 BASIC METABOLIC PNL TOTAL CA: CPT

## 2020-01-01 PROCEDURE — 96375 TX/PRO/DX INJ NEW DRUG ADDON: CPT

## 2020-01-01 PROCEDURE — 86870 RBC ANTIBODY IDENTIFICATION: CPT

## 2020-01-01 PROCEDURE — 65660000000 HC RM CCU STEPDOWN

## 2020-01-01 PROCEDURE — 88112 CYTOPATH CELL ENHANCE TECH: CPT

## 2020-01-01 PROCEDURE — 71045 X-RAY EXAM CHEST 1 VIEW: CPT

## 2020-01-01 PROCEDURE — 80053 COMPREHEN METABOLIC PANEL: CPT

## 2020-01-01 PROCEDURE — 77030040393 HC DRSG OPTIFOAM GENT MDII -B

## 2020-01-01 PROCEDURE — 74022 RADEX COMPL AQT ABD SERIES: CPT

## 2020-01-01 PROCEDURE — 36430 TRANSFUSION BLD/BLD COMPNT: CPT

## 2020-01-01 PROCEDURE — 74011636637 HC RX REV CODE- 636/637: Performed by: FAMILY MEDICINE

## 2020-01-01 PROCEDURE — 74011250636 HC RX REV CODE- 250/636

## 2020-01-01 PROCEDURE — 94760 N-INVAS EAR/PLS OXIMETRY 1: CPT

## 2020-01-01 PROCEDURE — 99291 CRITICAL CARE FIRST HOUR: CPT | Performed by: INTERNAL MEDICINE

## 2020-01-01 PROCEDURE — 83615 LACTATE (LD) (LDH) ENZYME: CPT

## 2020-01-01 PROCEDURE — 93005 ELECTROCARDIOGRAM TRACING: CPT | Performed by: INTERNAL MEDICINE

## 2020-01-01 PROCEDURE — 83540 ASSAY OF IRON: CPT

## 2020-01-01 PROCEDURE — P9047 ALBUMIN (HUMAN), 25%, 50ML: HCPCS | Performed by: INTERNAL MEDICINE

## 2020-01-01 PROCEDURE — 87116 MYCOBACTERIA CULTURE: CPT

## 2020-01-01 PROCEDURE — 97166 OT EVAL MOD COMPLEX 45 MIN: CPT

## 2020-01-01 PROCEDURE — 97530 THERAPEUTIC ACTIVITIES: CPT

## 2020-01-01 PROCEDURE — 97535 SELF CARE MNGMENT TRAINING: CPT

## 2020-01-01 PROCEDURE — 76604 US EXAM CHEST: CPT | Performed by: INTERNAL MEDICINE

## 2020-01-01 PROCEDURE — 85730 THROMBOPLASTIN TIME PARTIAL: CPT

## 2020-01-01 PROCEDURE — 74011250636 HC RX REV CODE- 250/636: Performed by: NURSE PRACTITIONER

## 2020-01-01 PROCEDURE — 96367 TX/PROPH/DG ADDL SEQ IV INF: CPT

## 2020-01-01 PROCEDURE — 92526 ORAL FUNCTION THERAPY: CPT

## 2020-01-01 PROCEDURE — 77010033711 HC HIGH FLOW OXYGEN

## 2020-01-01 PROCEDURE — 85025 COMPLETE CBC W/AUTO DIFF WBC: CPT

## 2020-01-01 PROCEDURE — 94669 MECHANICAL CHEST WALL OSCILL: CPT

## 2020-01-01 PROCEDURE — 99231 SBSQ HOSP IP/OBS SF/LOW 25: CPT | Performed by: NURSE PRACTITIONER

## 2020-01-01 PROCEDURE — 82947 ASSAY GLUCOSE BLOOD QUANT: CPT

## 2020-01-01 PROCEDURE — 86921 COMPATIBILITY TEST INCUBATE: CPT

## 2020-01-01 PROCEDURE — 77030040361 HC SLV COMPR DVT MDII -B

## 2020-01-01 PROCEDURE — 77030040830 HC CATH URETH FOL MDII -A

## 2020-01-01 PROCEDURE — 74018 RADEX ABDOMEN 1 VIEW: CPT

## 2020-01-01 PROCEDURE — 87635 SARS-COV-2 COVID-19 AMP PRB: CPT

## 2020-01-01 PROCEDURE — 77010033678 HC OXYGEN DAILY

## 2020-01-01 PROCEDURE — 99285 EMERGENCY DEPT VISIT HI MDM: CPT

## 2020-01-01 PROCEDURE — 99233 SBSQ HOSP IP/OBS HIGH 50: CPT | Performed by: INTERNAL MEDICINE

## 2020-01-01 PROCEDURE — 82803 BLOOD GASES ANY COMBINATION: CPT

## 2020-01-01 PROCEDURE — 89050 BODY FLUID CELL COUNT: CPT

## 2020-01-01 PROCEDURE — BH4BZZZ ULTRASONOGRAPHY OF CHEST WALL: ICD-10-PCS | Performed by: INTERNAL MEDICINE

## 2020-01-01 PROCEDURE — 36600 WITHDRAWAL OF ARTERIAL BLOOD: CPT

## 2020-01-01 PROCEDURE — 76450000000

## 2020-01-01 PROCEDURE — P9016 RBC LEUKOCYTES REDUCED: HCPCS

## 2020-01-01 PROCEDURE — C8929 TTE W OR WO FOL WCON,DOPPLER: HCPCS

## 2020-01-01 PROCEDURE — 86920 COMPATIBILITY TEST SPIN: CPT

## 2020-01-01 PROCEDURE — 80202 ASSAY OF VANCOMYCIN: CPT

## 2020-01-01 PROCEDURE — 82728 ASSAY OF FERRITIN: CPT

## 2020-01-01 PROCEDURE — 96365 THER/PROPH/DIAG IV INF INIT: CPT

## 2020-01-01 PROCEDURE — 76040000026: Performed by: INTERNAL MEDICINE

## 2020-01-01 PROCEDURE — 81003 URINALYSIS AUTO W/O SCOPE: CPT

## 2020-01-01 PROCEDURE — 93005 ELECTROCARDIOGRAM TRACING: CPT | Performed by: EMERGENCY MEDICINE

## 2020-01-01 PROCEDURE — 74011250636 HC RX REV CODE- 250/636: Performed by: NURSE ANESTHETIST, CERTIFIED REGISTERED

## 2020-01-01 PROCEDURE — 85610 PROTHROMBIN TIME: CPT

## 2020-01-01 PROCEDURE — 30233N1 TRANSFUSION OF NONAUTOLOGOUS RED BLOOD CELLS INTO PERIPHERAL VEIN, PERCUTANEOUS APPROACH: ICD-10-PCS | Performed by: INTERNAL MEDICINE

## 2020-01-01 PROCEDURE — 32555 ASPIRATE PLEURA W/ IMAGING: CPT | Performed by: INTERNAL MEDICINE

## 2020-01-01 PROCEDURE — 84484 ASSAY OF TROPONIN QUANT: CPT

## 2020-01-01 PROCEDURE — 73060999999 HC MISC LAB CHARGE

## 2020-01-01 PROCEDURE — 77030020120 HC VLV RESP PEP HI -B

## 2020-01-01 PROCEDURE — 36573 INSJ PICC RS&I 5 YR+: CPT | Performed by: INTERNAL MEDICINE

## 2020-01-01 PROCEDURE — 77030014147 HC TY THORCENT PARA TELE -B: Performed by: INTERNAL MEDICINE

## 2020-01-01 PROCEDURE — 82945 GLUCOSE OTHER FLUID: CPT

## 2020-01-01 PROCEDURE — 93005 ELECTROCARDIOGRAM TRACING: CPT

## 2020-01-01 PROCEDURE — 71046 X-RAY EXAM CHEST 2 VIEWS: CPT

## 2020-01-01 PROCEDURE — 94761 N-INVAS EAR/PLS OXIMETRY MLT: CPT

## 2020-01-01 PROCEDURE — 83605 ASSAY OF LACTIC ACID: CPT

## 2020-01-01 PROCEDURE — 86905 BLOOD TYPING RBC ANTIGENS: CPT

## 2020-01-01 PROCEDURE — 87205 SMEAR GRAM STAIN: CPT

## 2020-01-01 PROCEDURE — 71260 CT THORAX DX C+: CPT

## 2020-01-01 PROCEDURE — 94664 DEMO&/EVAL PT USE INHALER: CPT

## 2020-01-01 PROCEDURE — 0DCQ7ZZ EXTIRPATION OF MATTER FROM ANUS, VIA NATURAL OR ARTIFICIAL OPENING: ICD-10-PCS | Performed by: INTERNAL MEDICINE

## 2020-01-01 PROCEDURE — 74011636320 HC RX REV CODE- 636/320: Performed by: EMERGENCY MEDICINE

## 2020-01-01 PROCEDURE — 99222 1ST HOSP IP/OBS MODERATE 55: CPT | Performed by: INTERNAL MEDICINE

## 2020-01-01 PROCEDURE — 76040000007: Performed by: INTERNAL MEDICINE

## 2020-01-01 PROCEDURE — 86900 BLOOD TYPING SEROLOGIC ABO: CPT

## 2020-01-01 PROCEDURE — 84157 ASSAY OF PROTEIN OTHER: CPT

## 2020-01-01 PROCEDURE — 0W9B3ZZ DRAINAGE OF LEFT PLEURAL CAVITY, PERCUTANEOUS APPROACH: ICD-10-PCS | Performed by: INTERNAL MEDICINE

## 2020-01-01 PROCEDURE — 74011000258 HC RX REV CODE- 258

## 2020-01-01 PROCEDURE — 86902 BLOOD TYPE ANTIGEN DONOR EA: CPT

## 2020-01-01 PROCEDURE — 83880 ASSAY OF NATRIURETIC PEPTIDE: CPT

## 2020-01-01 PROCEDURE — 82607 VITAMIN B-12: CPT

## 2020-01-01 PROCEDURE — 96368 THER/DIAG CONCURRENT INF: CPT

## 2020-01-01 PROCEDURE — 88325 CONSLTJ COMPRE RVW REC REPRT: CPT | Performed by: PHYSICAL MEDICINE & REHABILITATION

## 2020-01-01 PROCEDURE — 74011000258 HC RX REV CODE- 258: Performed by: NURSE PRACTITIONER

## 2020-01-01 PROCEDURE — 74011636320 HC RX REV CODE- 636/320: Performed by: INTERNAL MEDICINE

## 2020-01-01 PROCEDURE — 96417 CHEMO IV INFUS EACH ADDL SEQ: CPT

## 2020-01-01 PROCEDURE — 77030013140 HC MSK NEB VYRM -A

## 2020-01-01 PROCEDURE — 74011250636 HC RX REV CODE- 250/636: Performed by: EMERGENCY MEDICINE

## 2020-01-01 PROCEDURE — 74011000258 HC RX REV CODE- 258: Performed by: EMERGENCY MEDICINE

## 2020-01-01 PROCEDURE — 81001 URINALYSIS AUTO W/SCOPE: CPT

## 2020-01-01 PROCEDURE — 76040000019: Performed by: INTERNAL MEDICINE

## 2020-01-01 PROCEDURE — 84300 ASSAY OF URINE SODIUM: CPT

## 2020-01-01 PROCEDURE — 76040000025: Performed by: INTERNAL MEDICINE

## 2020-01-01 PROCEDURE — 97161 PT EVAL LOW COMPLEX 20 MIN: CPT

## 2020-01-01 PROCEDURE — 83935 ASSAY OF URINE OSMOLALITY: CPT

## 2020-01-01 PROCEDURE — 88305 TISSUE EXAM BY PATHOLOGIST: CPT

## 2020-01-01 PROCEDURE — C1751 CATH, INF, PER/CENT/MIDLINE: HCPCS

## 2020-01-01 PROCEDURE — 74011000302 HC RX REV CODE- 302: Performed by: INTERNAL MEDICINE

## 2020-01-01 PROCEDURE — 84145 PROCALCITONIN (PCT): CPT

## 2020-01-01 PROCEDURE — 77030021668 HC NEB PREFIL KT VYRM -A

## 2020-01-01 PROCEDURE — 96374 THER/PROPH/DIAG INJ IV PUSH: CPT

## 2020-01-01 PROCEDURE — 96366 THER/PROPH/DIAG IV INF ADDON: CPT

## 2020-01-01 PROCEDURE — B548ZZA ULTRASONOGRAPHY OF SUPERIOR VENA CAVA, GUIDANCE: ICD-10-PCS | Performed by: INTERNAL MEDICINE

## 2020-01-01 PROCEDURE — 83930 ASSAY OF BLOOD OSMOLALITY: CPT

## 2020-01-01 PROCEDURE — 97164 PT RE-EVAL EST PLAN CARE: CPT

## 2020-01-01 PROCEDURE — 87186 SC STD MICRODIL/AGAR DIL: CPT

## 2020-01-01 PROCEDURE — 82746 ASSAY OF FOLIC ACID SERUM: CPT

## 2020-01-01 PROCEDURE — 74011250636 HC RX REV CODE- 250/636: Performed by: ANESTHESIOLOGY

## 2020-01-01 PROCEDURE — 92610 EVALUATE SWALLOWING FUNCTION: CPT

## 2020-01-01 PROCEDURE — 84134 ASSAY OF PREALBUMIN: CPT

## 2020-01-01 PROCEDURE — 0DJD8ZZ INSPECTION OF LOWER INTESTINAL TRACT, VIA NATURAL OR ARTIFICIAL OPENING ENDOSCOPIC: ICD-10-PCS | Performed by: INTERNAL MEDICINE

## 2020-01-01 PROCEDURE — 87150 DNA/RNA AMPLIFIED PROBE: CPT

## 2020-01-01 PROCEDURE — 74011000250 HC RX REV CODE- 250: Performed by: EMERGENCY MEDICINE

## 2020-01-01 PROCEDURE — A9552 F18 FDG: HCPCS

## 2020-01-01 PROCEDURE — 87077 CULTURE AEROBIC IDENTIFY: CPT

## 2020-01-01 PROCEDURE — 02HV33Z INSERTION OF INFUSION DEVICE INTO SUPERIOR VENA CAVA, PERCUTANEOUS APPROACH: ICD-10-PCS | Performed by: INTERNAL MEDICINE

## 2020-01-01 PROCEDURE — 76060000031 HC ANESTHESIA FIRST 0.5 HR: Performed by: INTERNAL MEDICINE

## 2020-01-01 RX ORDER — METFORMIN HYDROCHLORIDE 500 MG/1
1000 TABLET ORAL DAILY
Status: DISCONTINUED | OUTPATIENT
Start: 2020-01-01 | End: 2020-01-01 | Stop reason: HOSPADM

## 2020-01-01 RX ORDER — TRAMADOL HYDROCHLORIDE 50 MG/1
50 TABLET ORAL
Status: DISCONTINUED | OUTPATIENT
Start: 2020-01-01 | End: 2020-01-01 | Stop reason: HOSPADM

## 2020-01-01 RX ORDER — SODIUM CHLORIDE 9 MG/ML
125 INJECTION, SOLUTION INTRAVENOUS CONTINUOUS
Status: DISCONTINUED | OUTPATIENT
Start: 2020-01-01 | End: 2020-01-01

## 2020-01-01 RX ORDER — LORAZEPAM 2 MG/ML
0.5 INJECTION INTRAMUSCULAR
Status: CANCELLED | OUTPATIENT
Start: 2020-01-01

## 2020-01-01 RX ORDER — EPINEPHRINE 1 MG/ML
0.3 INJECTION, SOLUTION, CONCENTRATE INTRAVENOUS AS NEEDED
Status: CANCELLED | OUTPATIENT
Start: 2020-01-01

## 2020-01-01 RX ORDER — DEXAMETHASONE SODIUM PHOSPHATE 4 MG/ML
6 INJECTION, SOLUTION INTRA-ARTICULAR; INTRALESIONAL; INTRAMUSCULAR; INTRAVENOUS; SOFT TISSUE DAILY
Status: DISCONTINUED | OUTPATIENT
Start: 2020-01-01 | End: 2020-01-01

## 2020-01-01 RX ORDER — MELOXICAM 7.5 MG/1
7.5 TABLET ORAL DAILY
Status: DISCONTINUED | OUTPATIENT
Start: 2020-01-01 | End: 2020-01-01 | Stop reason: HOSPADM

## 2020-01-01 RX ORDER — DEXAMETHASONE SODIUM PHOSPHATE 4 MG/ML
8 INJECTION, SOLUTION INTRA-ARTICULAR; INTRALESIONAL; INTRAMUSCULAR; INTRAVENOUS; SOFT TISSUE ONCE
Status: COMPLETED | OUTPATIENT
Start: 2020-01-01 | End: 2020-01-01

## 2020-01-01 RX ORDER — FUROSEMIDE 20 MG/1
20 TABLET ORAL 2 TIMES DAILY
Qty: 60 TAB | Refills: 0 | Status: SHIPPED | OUTPATIENT
Start: 2020-01-01 | End: 2020-01-01

## 2020-01-01 RX ORDER — AMOXICILLIN 250 MG
1 CAPSULE ORAL 2 TIMES DAILY
Status: DISCONTINUED | OUTPATIENT
Start: 2020-01-01 | End: 2020-01-01 | Stop reason: HOSPADM

## 2020-01-01 RX ORDER — MORPHINE SULFATE 2 MG/ML
1 INJECTION, SOLUTION INTRAMUSCULAR; INTRAVENOUS
Status: DISCONTINUED | OUTPATIENT
Start: 2020-01-01 | End: 2020-01-01 | Stop reason: HOSPADM

## 2020-01-01 RX ORDER — POTASSIUM CHLORIDE 14.9 MG/ML
20 INJECTION INTRAVENOUS
Status: COMPLETED | OUTPATIENT
Start: 2020-01-01 | End: 2020-01-01

## 2020-01-01 RX ORDER — FACIAL-BODY WIPES
10 EACH TOPICAL DAILY
Qty: 1 EACH | Refills: 0 | Status: SHIPPED
Start: 2020-01-01

## 2020-01-01 RX ORDER — INSULIN GLARGINE 100 [IU]/ML
5 INJECTION, SOLUTION SUBCUTANEOUS
Status: DISCONTINUED | OUTPATIENT
Start: 2020-01-01 | End: 2020-01-01

## 2020-01-01 RX ORDER — HYDRALAZINE HYDROCHLORIDE 20 MG/ML
5 INJECTION INTRAMUSCULAR; INTRAVENOUS
Status: DISCONTINUED | OUTPATIENT
Start: 2020-01-01 | End: 2020-01-01 | Stop reason: HOSPADM

## 2020-01-01 RX ORDER — LISINOPRIL 20 MG/1
20 TABLET ORAL DAILY
COMMUNITY

## 2020-01-01 RX ORDER — SIMETHICONE 80 MG
80 TABLET,CHEWABLE ORAL
Status: DISCONTINUED | OUTPATIENT
Start: 2020-01-01 | End: 2020-01-01 | Stop reason: HOSPADM

## 2020-01-01 RX ORDER — SODIUM CHLORIDE, SODIUM LACTATE, POTASSIUM CHLORIDE, CALCIUM CHLORIDE 600; 310; 30; 20 MG/100ML; MG/100ML; MG/100ML; MG/100ML
100 INJECTION, SOLUTION INTRAVENOUS CONTINUOUS
Status: DISCONTINUED | OUTPATIENT
Start: 2020-01-01 | End: 2020-01-01

## 2020-01-01 RX ORDER — ENOXAPARIN SODIUM 100 MG/ML
40 INJECTION SUBCUTANEOUS EVERY 24 HOURS
Status: CANCELLED | OUTPATIENT
Start: 2020-01-01

## 2020-01-01 RX ORDER — SODIUM CHLORIDE 9 MG/ML
10 INJECTION INTRAMUSCULAR; INTRAVENOUS; SUBCUTANEOUS AS NEEDED
Status: CANCELLED | OUTPATIENT
Start: 2020-01-01

## 2020-01-01 RX ORDER — SODIUM CHLORIDE 0.9 % (FLUSH) 0.9 %
5-40 SYRINGE (ML) INJECTION AS NEEDED
Status: DISCONTINUED | OUTPATIENT
Start: 2020-01-01 | End: 2020-01-01

## 2020-01-01 RX ORDER — ENOXAPARIN SODIUM 100 MG/ML
30 INJECTION SUBCUTANEOUS EVERY 12 HOURS
Status: DISCONTINUED | OUTPATIENT
Start: 2020-01-01 | End: 2020-01-01

## 2020-01-01 RX ORDER — ADHESIVE BANDAGE
30 BANDAGE TOPICAL DAILY PRN
Status: DISCONTINUED | OUTPATIENT
Start: 2020-01-01 | End: 2020-01-01

## 2020-01-01 RX ORDER — INSULIN LISPRO 100 [IU]/ML
4 INJECTION, SOLUTION INTRAVENOUS; SUBCUTANEOUS ONCE
Status: COMPLETED | OUTPATIENT
Start: 2020-01-01 | End: 2020-01-01

## 2020-01-01 RX ORDER — INSULIN GLARGINE 100 [IU]/ML
10 INJECTION, SOLUTION SUBCUTANEOUS
Status: DISCONTINUED | OUTPATIENT
Start: 2020-01-01 | End: 2020-01-01 | Stop reason: HOSPADM

## 2020-01-01 RX ORDER — ONDANSETRON 8 MG/1
8 TABLET, ORALLY DISINTEGRATING ORAL
Status: DISCONTINUED | OUTPATIENT
Start: 2020-01-01 | End: 2020-01-01 | Stop reason: HOSPADM

## 2020-01-01 RX ORDER — SODIUM CHLORIDE 0.9 % (FLUSH) 0.9 %
5-40 SYRINGE (ML) INJECTION AS NEEDED
Status: DISCONTINUED | OUTPATIENT
Start: 2020-01-01 | End: 2020-01-01 | Stop reason: HOSPADM

## 2020-01-01 RX ORDER — SODIUM CHLORIDE 9 MG/ML
250 INJECTION, SOLUTION INTRAVENOUS AS NEEDED
Status: DISCONTINUED | OUTPATIENT
Start: 2020-01-01 | End: 2020-01-01 | Stop reason: SDUPTHER

## 2020-01-01 RX ORDER — TOLVAPTAN 15 MG/1
15 TABLET ORAL ONCE
Status: DISPENSED | OUTPATIENT
Start: 2020-01-01 | End: 2020-01-01

## 2020-01-01 RX ORDER — TRAMADOL HYDROCHLORIDE 50 MG/1
50 TABLET ORAL
Qty: 12 TAB | Refills: 0 | Status: SHIPPED | OUTPATIENT
Start: 2020-01-01 | End: 2020-01-01

## 2020-01-01 RX ORDER — LORAZEPAM 2 MG/ML
0.5 INJECTION INTRAMUSCULAR
Status: ACTIVE | OUTPATIENT
Start: 2020-01-01 | End: 2020-01-01

## 2020-01-01 RX ORDER — INSULIN LISPRO 100 [IU]/ML
INJECTION, SOLUTION INTRAVENOUS; SUBCUTANEOUS
Status: DISCONTINUED | OUTPATIENT
Start: 2020-01-01 | End: 2020-01-01 | Stop reason: HOSPADM

## 2020-01-01 RX ORDER — HEPARIN 100 UNIT/ML
300-500 SYRINGE INTRAVENOUS AS NEEDED
Status: CANCELLED
Start: 2020-01-01

## 2020-01-01 RX ORDER — TOLVAPTAN 15 MG/1
15 TABLET ORAL DAILY
Qty: 30 TAB | Refills: 0 | Status: SHIPPED | OUTPATIENT
Start: 2020-01-01 | End: 2020-01-01

## 2020-01-01 RX ORDER — POLYETHYLENE GLYCOL 3350 17 G/17G
238 POWDER, FOR SOLUTION ORAL ONCE
Status: COMPLETED | OUTPATIENT
Start: 2020-01-01 | End: 2020-01-01

## 2020-01-01 RX ORDER — ALBUTEROL SULFATE 0.83 MG/ML
2.5 SOLUTION RESPIRATORY (INHALATION) AS NEEDED
Status: CANCELLED
Start: 2020-01-01

## 2020-01-01 RX ORDER — PREDNISONE 20 MG/1
40 TABLET ORAL
Status: DISCONTINUED | OUTPATIENT
Start: 2020-01-01 | End: 2020-01-01

## 2020-01-01 RX ORDER — FUROSEMIDE 10 MG/ML
40 INJECTION INTRAMUSCULAR; INTRAVENOUS
Status: COMPLETED | OUTPATIENT
Start: 2020-01-01 | End: 2020-01-01

## 2020-01-01 RX ORDER — SODIUM CHLORIDE 0.9 % (FLUSH) 0.9 %
10 SYRINGE (ML) INJECTION
Status: COMPLETED | OUTPATIENT
Start: 2020-01-01 | End: 2020-01-01

## 2020-01-01 RX ORDER — SODIUM CHLORIDE, SODIUM LACTATE, POTASSIUM CHLORIDE, CALCIUM CHLORIDE 600; 310; 30; 20 MG/100ML; MG/100ML; MG/100ML; MG/100ML
100 INJECTION, SOLUTION INTRAVENOUS CONTINUOUS
Status: CANCELLED | OUTPATIENT
Start: 2020-01-01

## 2020-01-01 RX ORDER — FUROSEMIDE 10 MG/ML
40 INJECTION INTRAMUSCULAR; INTRAVENOUS EVERY 12 HOURS
Status: DISCONTINUED | OUTPATIENT
Start: 2020-01-01 | End: 2020-01-01

## 2020-01-01 RX ORDER — HYDRALAZINE HYDROCHLORIDE 20 MG/ML
10 INJECTION INTRAMUSCULAR; INTRAVENOUS
Status: DISCONTINUED | OUTPATIENT
Start: 2020-01-01 | End: 2020-01-01 | Stop reason: HOSPADM

## 2020-01-01 RX ORDER — TOLVAPTAN 15 MG/1
15 TABLET ORAL DAILY
COMMUNITY
End: 2020-01-01

## 2020-01-01 RX ORDER — AMLODIPINE BESYLATE 10 MG/1
10 TABLET ORAL DAILY
Status: DISCONTINUED | OUTPATIENT
Start: 2020-01-01 | End: 2020-01-01 | Stop reason: HOSPADM

## 2020-01-01 RX ORDER — ALBUMIN HUMAN 250 G/1000ML
12.5 SOLUTION INTRAVENOUS EVERY 6 HOURS
Status: COMPLETED | OUTPATIENT
Start: 2020-01-01 | End: 2020-01-01

## 2020-01-01 RX ORDER — ONDANSETRON 2 MG/ML
8 INJECTION INTRAMUSCULAR; INTRAVENOUS AS NEEDED
Status: CANCELLED | OUTPATIENT
Start: 2020-01-01

## 2020-01-01 RX ORDER — LANOLIN ALCOHOL/MO/W.PET/CERES
1 CREAM (GRAM) TOPICAL
Status: DISCONTINUED | OUTPATIENT
Start: 2020-01-01 | End: 2020-01-01 | Stop reason: HOSPADM

## 2020-01-01 RX ORDER — PROPOFOL 10 MG/ML
INJECTION, EMULSION INTRAVENOUS AS NEEDED
Status: DISCONTINUED | OUTPATIENT
Start: 2020-01-01 | End: 2020-01-01 | Stop reason: HOSPADM

## 2020-01-01 RX ORDER — LISINOPRIL 20 MG/1
20 TABLET ORAL DAILY
Status: DISCONTINUED | OUTPATIENT
Start: 2020-01-01 | End: 2020-01-01 | Stop reason: HOSPADM

## 2020-01-01 RX ORDER — VANCOMYCIN HYDROCHLORIDE
1250
Status: DISCONTINUED | OUTPATIENT
Start: 2020-01-01 | End: 2020-01-01

## 2020-01-01 RX ORDER — LIDOCAINE HYDROCHLORIDE 20 MG/ML
INJECTION, SOLUTION EPIDURAL; INFILTRATION; INTRACAUDAL; PERINEURAL AS NEEDED
Status: DISCONTINUED | OUTPATIENT
Start: 2020-01-01 | End: 2020-01-01 | Stop reason: HOSPADM

## 2020-01-01 RX ORDER — MAGNESIUM SULFATE HEPTAHYDRATE 40 MG/ML
2 INJECTION, SOLUTION INTRAVENOUS ONCE
Status: COMPLETED | OUTPATIENT
Start: 2020-01-01 | End: 2020-01-01

## 2020-01-01 RX ORDER — SODIUM CHLORIDE 0.9 % (FLUSH) 0.9 %
10 SYRINGE (ML) INJECTION AS NEEDED
Status: ACTIVE | OUTPATIENT
Start: 2020-01-01 | End: 2020-01-01

## 2020-01-01 RX ORDER — ENOXAPARIN SODIUM 100 MG/ML
40 INJECTION SUBCUTANEOUS EVERY 24 HOURS
Status: DISCONTINUED | OUTPATIENT
Start: 2020-01-01 | End: 2020-01-01 | Stop reason: HOSPADM

## 2020-01-01 RX ORDER — HEPARIN 100 UNIT/ML
300-500 SYRINGE INTRAVENOUS AS NEEDED
Status: ACTIVE | OUTPATIENT
Start: 2020-01-01 | End: 2020-01-01

## 2020-01-01 RX ORDER — SODIUM CHLORIDE 9 MG/ML
1000 INJECTION, SOLUTION INTRAVENOUS CONTINUOUS
Status: DISCONTINUED | OUTPATIENT
Start: 2020-01-01 | End: 2020-01-01 | Stop reason: HOSPADM

## 2020-01-01 RX ORDER — LISINOPRIL 20 MG/1
40 TABLET ORAL DAILY
Qty: 28 TAB | Refills: 0 | Status: SHIPPED | OUTPATIENT
Start: 2020-01-01 | End: 2020-01-01

## 2020-01-01 RX ORDER — SODIUM CHLORIDE 0.9 % (FLUSH) 0.9 %
5-10 SYRINGE (ML) INJECTION AS NEEDED
Status: DISCONTINUED | OUTPATIENT
Start: 2020-01-01 | End: 2020-01-01

## 2020-01-01 RX ORDER — INSULIN GLARGINE 100 [IU]/ML
8 INJECTION, SOLUTION SUBCUTANEOUS
Status: DISCONTINUED | OUTPATIENT
Start: 2020-01-01 | End: 2020-01-01

## 2020-01-01 RX ORDER — GLIMEPIRIDE 4 MG/1
4 TABLET ORAL
Status: DISCONTINUED | OUTPATIENT
Start: 2020-01-01 | End: 2020-01-01

## 2020-01-01 RX ORDER — FUROSEMIDE 10 MG/ML
40 INJECTION INTRAMUSCULAR; INTRAVENOUS 2 TIMES DAILY
Status: DISCONTINUED | OUTPATIENT
Start: 2020-01-01 | End: 2020-01-01 | Stop reason: HOSPADM

## 2020-01-01 RX ORDER — POTASSIUM CHLORIDE 20 MEQ/1
40 TABLET, EXTENDED RELEASE ORAL 2 TIMES DAILY
Qty: 120 TAB | Refills: 0 | Status: SHIPPED | OUTPATIENT
Start: 2020-01-01 | End: 2020-01-01

## 2020-01-01 RX ORDER — LANOLIN ALCOHOL/MO/W.PET/CERES
325 CREAM (GRAM) TOPICAL DAILY
Status: DISCONTINUED | OUTPATIENT
Start: 2020-01-01 | End: 2020-01-01

## 2020-01-01 RX ORDER — FUROSEMIDE 40 MG/1
40 TABLET ORAL 2 TIMES DAILY
Qty: 1 TAB | Refills: 0 | Status: SHIPPED
Start: 2020-01-01 | End: 2020-01-01 | Stop reason: SDUPTHER

## 2020-01-01 RX ORDER — VANCOMYCIN HYDROCHLORIDE
1250 EVERY 12 HOURS
Status: DISCONTINUED | OUTPATIENT
Start: 2020-01-01 | End: 2020-01-01

## 2020-01-01 RX ORDER — INSULIN GLARGINE 100 [IU]/ML
10 INJECTION, SOLUTION SUBCUTANEOUS
Status: DISCONTINUED | OUTPATIENT
Start: 2020-01-01 | End: 2020-01-01

## 2020-01-01 RX ORDER — BISACODYL 5 MG
10 TABLET, DELAYED RELEASE (ENTERIC COATED) ORAL
Status: COMPLETED | OUTPATIENT
Start: 2020-01-01 | End: 2020-01-01

## 2020-01-01 RX ORDER — INSULIN LISPRO 100 [IU]/ML
INJECTION, SOLUTION INTRAVENOUS; SUBCUTANEOUS
Status: DISCONTINUED | OUTPATIENT
Start: 2020-01-01 | End: 2020-01-01

## 2020-01-01 RX ORDER — SIMETHICONE 80 MG
80 TABLET,CHEWABLE ORAL
Qty: 1 TAB | Refills: 0 | Status: SHIPPED
Start: 2020-01-01

## 2020-01-01 RX ORDER — HYDROCODONE BITARTRATE AND ACETAMINOPHEN 5; 325 MG/1; MG/1
1 TABLET ORAL
Status: DISCONTINUED | OUTPATIENT
Start: 2020-01-01 | End: 2020-01-01

## 2020-01-01 RX ORDER — DIPHENHYDRAMINE HYDROCHLORIDE 50 MG/ML
50 INJECTION, SOLUTION INTRAMUSCULAR; INTRAVENOUS AS NEEDED
Status: CANCELLED
Start: 2020-01-01

## 2020-01-01 RX ORDER — SODIUM CHLORIDE 9 MG/ML
25 INJECTION, SOLUTION INTRAVENOUS CONTINUOUS
Status: DISPENSED | OUTPATIENT
Start: 2020-01-01 | End: 2020-01-01

## 2020-01-01 RX ORDER — INSULIN LISPRO 100 [IU]/ML
8 INJECTION, SOLUTION INTRAVENOUS; SUBCUTANEOUS
Status: DISCONTINUED | OUTPATIENT
Start: 2020-01-01 | End: 2020-01-01 | Stop reason: HOSPADM

## 2020-01-01 RX ORDER — POTASSIUM CHLORIDE 20 MEQ/1
40 TABLET, EXTENDED RELEASE ORAL 2 TIMES DAILY
Status: DISCONTINUED | OUTPATIENT
Start: 2020-01-01 | End: 2020-01-01

## 2020-01-01 RX ORDER — ONDANSETRON 2 MG/ML
8 INJECTION INTRAMUSCULAR; INTRAVENOUS ONCE
Status: COMPLETED | OUTPATIENT
Start: 2020-01-01 | End: 2020-01-01

## 2020-01-01 RX ORDER — HYDRALAZINE HYDROCHLORIDE 20 MG/ML
5 INJECTION INTRAMUSCULAR; INTRAVENOUS
Status: CANCELLED | OUTPATIENT
Start: 2020-01-01

## 2020-01-01 RX ORDER — ENOXAPARIN SODIUM 100 MG/ML
30 INJECTION SUBCUTANEOUS EVERY 24 HOURS
Status: DISCONTINUED | OUTPATIENT
Start: 2020-01-01 | End: 2020-01-01

## 2020-01-01 RX ORDER — FUROSEMIDE 10 MG/ML
40 INJECTION INTRAMUSCULAR; INTRAVENOUS EVERY 12 HOURS
Status: COMPLETED | OUTPATIENT
Start: 2020-01-01 | End: 2020-01-01

## 2020-01-01 RX ORDER — SODIUM CHLORIDE 9 MG/ML
100 INJECTION, SOLUTION INTRAVENOUS CONTINUOUS
Status: DISPENSED | OUTPATIENT
Start: 2020-01-01 | End: 2020-01-01

## 2020-01-01 RX ORDER — TAMSULOSIN HYDROCHLORIDE 0.4 MG/1
0.4 CAPSULE ORAL DAILY
Status: DISCONTINUED | OUTPATIENT
Start: 2020-01-01 | End: 2020-01-01 | Stop reason: HOSPADM

## 2020-01-01 RX ORDER — INSULIN GLARGINE 100 [IU]/ML
20 INJECTION, SOLUTION SUBCUTANEOUS
Status: DISCONTINUED | OUTPATIENT
Start: 2020-01-01 | End: 2020-01-01

## 2020-01-01 RX ORDER — TOLVAPTAN 15 MG/1
15 TABLET ORAL ONCE
Status: COMPLETED | OUTPATIENT
Start: 2020-01-01 | End: 2020-01-01

## 2020-01-01 RX ORDER — SODIUM CHLORIDE 0.9 % (FLUSH) 0.9 %
5-40 SYRINGE (ML) INJECTION EVERY 8 HOURS
Status: DISCONTINUED | OUTPATIENT
Start: 2020-01-01 | End: 2020-01-01 | Stop reason: HOSPADM

## 2020-01-01 RX ORDER — METOPROLOL TARTRATE 25 MG/1
25 TABLET, FILM COATED ORAL 2 TIMES DAILY
Qty: 28 TAB | Refills: 0 | Status: SHIPPED | OUTPATIENT
Start: 2020-01-01 | End: 2020-01-01

## 2020-01-01 RX ORDER — FUROSEMIDE 10 MG/ML
40 INJECTION INTRAMUSCULAR; INTRAVENOUS ONCE
Status: COMPLETED | OUTPATIENT
Start: 2020-01-01 | End: 2020-01-01

## 2020-01-01 RX ORDER — INSULIN LISPRO 100 [IU]/ML
4 INJECTION, SOLUTION INTRAVENOUS; SUBCUTANEOUS
Status: DISCONTINUED | OUTPATIENT
Start: 2020-01-01 | End: 2020-01-01

## 2020-01-01 RX ORDER — HYDROCODONE BITARTRATE AND ACETAMINOPHEN 5; 325 MG/1; MG/1
1 TABLET ORAL
Status: DISCONTINUED | OUTPATIENT
Start: 2020-01-01 | End: 2020-01-01 | Stop reason: HOSPADM

## 2020-01-01 RX ORDER — SODIUM CHLORIDE 9 MG/ML
250 INJECTION, SOLUTION INTRAVENOUS AS NEEDED
Status: DISCONTINUED | OUTPATIENT
Start: 2020-01-01 | End: 2020-01-01 | Stop reason: HOSPADM

## 2020-01-01 RX ORDER — HYDROCORTISONE SODIUM SUCCINATE 100 MG/2ML
100 INJECTION, POWDER, FOR SOLUTION INTRAMUSCULAR; INTRAVENOUS AS NEEDED
Status: CANCELLED | OUTPATIENT
Start: 2020-01-01

## 2020-01-01 RX ORDER — HYDRALAZINE HYDROCHLORIDE 20 MG/ML
5 INJECTION INTRAMUSCULAR; INTRAVENOUS ONCE
Status: COMPLETED | OUTPATIENT
Start: 2020-01-01 | End: 2020-01-01

## 2020-01-01 RX ORDER — MORPHINE SULFATE 5 MG/ML
INJECTION, SOLUTION INTRAVENOUS
Status: DISCONTINUED | OUTPATIENT
Start: 2020-01-01 | End: 2020-01-01 | Stop reason: ALTCHOICE

## 2020-01-01 RX ORDER — AMLODIPINE BESYLATE 10 MG/1
10 TABLET ORAL DAILY
Status: CANCELLED | OUTPATIENT
Start: 2020-01-01

## 2020-01-01 RX ORDER — LACTULOSE 10 G/15ML
20 SOLUTION ORAL; RECTAL AS NEEDED
Status: CANCELLED | OUTPATIENT
Start: 2020-01-01

## 2020-01-01 RX ORDER — IPRATROPIUM BROMIDE AND ALBUTEROL SULFATE 2.5; .5 MG/3ML; MG/3ML
3 SOLUTION RESPIRATORY (INHALATION)
Status: DISCONTINUED | OUTPATIENT
Start: 2020-01-01 | End: 2020-01-01

## 2020-01-01 RX ORDER — ACETAMINOPHEN 325 MG/1
650 TABLET ORAL AS NEEDED
Status: CANCELLED
Start: 2020-01-01

## 2020-01-01 RX ORDER — SODIUM CHLORIDE 9 MG/ML
250 INJECTION, SOLUTION INTRAVENOUS AS NEEDED
Status: DISCONTINUED | OUTPATIENT
Start: 2020-01-01 | End: 2020-01-01

## 2020-01-01 RX ORDER — FUROSEMIDE 40 MG/1
40 TABLET ORAL
Status: DISCONTINUED | OUTPATIENT
Start: 2020-01-01 | End: 2020-01-01

## 2020-01-01 RX ORDER — DOCUSATE SODIUM 100 MG/1
100 CAPSULE, LIQUID FILLED ORAL AS NEEDED
Status: DISCONTINUED | OUTPATIENT
Start: 2020-01-01 | End: 2020-01-01 | Stop reason: HOSPADM

## 2020-01-01 RX ORDER — DEXAMETHASONE SODIUM PHOSPHATE 4 MG/ML
8 INJECTION, SOLUTION INTRA-ARTICULAR; INTRALESIONAL; INTRAMUSCULAR; INTRAVENOUS; SOFT TISSUE ONCE
Status: DISCONTINUED | OUTPATIENT
Start: 2020-01-01 | End: 2020-01-01 | Stop reason: HOSPADM

## 2020-01-01 RX ORDER — NYSTATIN 100000 [USP'U]/ML
500000 SUSPENSION ORAL 4 TIMES DAILY
Status: DISCONTINUED | OUTPATIENT
Start: 2020-01-01 | End: 2020-01-01 | Stop reason: HOSPADM

## 2020-01-01 RX ORDER — INSULIN LISPRO 100 [IU]/ML
8 INJECTION, SOLUTION INTRAVENOUS; SUBCUTANEOUS
Qty: 1 VIAL | Refills: 0 | Status: SHIPPED
Start: 2020-01-01

## 2020-01-01 RX ORDER — METFORMIN HYDROCHLORIDE 500 MG/1
1000 TABLET ORAL DAILY
Status: DISCONTINUED | OUTPATIENT
Start: 2020-01-01 | End: 2020-01-01

## 2020-01-01 RX ORDER — INSULIN LISPRO 100 [IU]/ML
5 INJECTION, SOLUTION INTRAVENOUS; SUBCUTANEOUS ONCE
Status: COMPLETED | OUTPATIENT
Start: 2020-01-01 | End: 2020-01-01

## 2020-01-01 RX ORDER — PROPOFOL 10 MG/ML
INJECTION, EMULSION INTRAVENOUS
Status: DISCONTINUED | OUTPATIENT
Start: 2020-01-01 | End: 2020-01-01 | Stop reason: HOSPADM

## 2020-01-01 RX ORDER — SODIUM CHLORIDE 0.9 % (FLUSH) 0.9 %
10 SYRINGE (ML) INJECTION EVERY 8 HOURS
Status: DISCONTINUED | OUTPATIENT
Start: 2020-01-01 | End: 2020-01-01

## 2020-01-01 RX ORDER — POTASSIUM CHLORIDE 20 MEQ/1
20 TABLET, EXTENDED RELEASE ORAL 3 TIMES DAILY
Qty: 1 TAB | Refills: 0 | Status: SHIPPED
Start: 2020-01-01

## 2020-01-01 RX ORDER — LABETALOL HYDROCHLORIDE 5 MG/ML
20 INJECTION, SOLUTION INTRAVENOUS
Status: DISCONTINUED | OUTPATIENT
Start: 2020-01-01 | End: 2020-01-01 | Stop reason: HOSPADM

## 2020-01-01 RX ORDER — PREDNISONE 10 MG/1
30 TABLET ORAL
Status: DISCONTINUED | OUTPATIENT
Start: 2020-01-01 | End: 2020-01-01

## 2020-01-01 RX ORDER — SODIUM CHLORIDE 9 MG/ML
25 INJECTION, SOLUTION INTRAVENOUS CONTINUOUS
Status: DISCONTINUED | OUTPATIENT
Start: 2020-01-01 | End: 2020-01-01 | Stop reason: HOSPADM

## 2020-01-01 RX ORDER — TAMSULOSIN HYDROCHLORIDE 0.4 MG/1
0.4 CAPSULE ORAL DAILY
Qty: 1 CAP | Refills: 0 | Status: SHIPPED
Start: 2020-01-01

## 2020-01-01 RX ORDER — PREDNISONE 10 MG/1
30 TABLET ORAL
Qty: 1 TAB | Refills: 0 | Status: SHIPPED
Start: 2020-01-01

## 2020-01-01 RX ORDER — SODIUM CHLORIDE TAB 1 GM 1 G
2 TAB MISCELLANEOUS 2 TIMES DAILY
Status: DISCONTINUED | OUTPATIENT
Start: 2020-01-01 | End: 2020-01-01 | Stop reason: HOSPADM

## 2020-01-01 RX ORDER — LISINOPRIL 20 MG/1
20 TABLET ORAL DAILY
Status: CANCELLED | OUTPATIENT
Start: 2020-01-01

## 2020-01-01 RX ORDER — AMOXICILLIN 250 MG
1 CAPSULE ORAL 2 TIMES DAILY
Qty: 1 TAB | Refills: 0 | Status: SHIPPED
Start: 2020-01-01

## 2020-01-01 RX ORDER — LORAZEPAM 2 MG/ML
0.5 INJECTION INTRAMUSCULAR
Status: DISCONTINUED | OUTPATIENT
Start: 2020-01-01 | End: 2020-01-01 | Stop reason: HOSPADM

## 2020-01-01 RX ORDER — POLYETHYLENE GLYCOL 3350 17 G/17G
17 POWDER, FOR SOLUTION ORAL DAILY
Status: CANCELLED | OUTPATIENT
Start: 2020-01-01

## 2020-01-01 RX ORDER — IPRATROPIUM BROMIDE AND ALBUTEROL SULFATE 2.5; .5 MG/3ML; MG/3ML
3 SOLUTION RESPIRATORY (INHALATION)
Status: COMPLETED | OUTPATIENT
Start: 2020-01-01 | End: 2020-01-01

## 2020-01-01 RX ORDER — FUROSEMIDE 40 MG/1
40 TABLET ORAL DAILY
Status: DISCONTINUED | OUTPATIENT
Start: 2020-01-01 | End: 2020-01-01

## 2020-01-01 RX ORDER — GLIMEPIRIDE 4 MG/1
4 TABLET ORAL
COMMUNITY

## 2020-01-01 RX ORDER — TAMSULOSIN HYDROCHLORIDE 0.4 MG/1
0.4 CAPSULE ORAL DAILY
Status: DISCONTINUED | OUTPATIENT
Start: 2020-01-01 | End: 2020-01-01

## 2020-01-01 RX ORDER — HYDROMORPHONE HCL IN WATER/PF 1 MG/ML
SYRINGE (ML) INJECTION CONTINUOUS
Status: DISCONTINUED | OUTPATIENT
Start: 2020-01-01 | End: 2020-01-01 | Stop reason: HOSPADM

## 2020-01-01 RX ORDER — IPRATROPIUM BROMIDE AND ALBUTEROL SULFATE 2.5; .5 MG/3ML; MG/3ML
3 SOLUTION RESPIRATORY (INHALATION)
Status: DISCONTINUED | OUTPATIENT
Start: 2020-01-01 | End: 2020-01-01 | Stop reason: HOSPADM

## 2020-01-01 RX ORDER — SODIUM CHLORIDE 9 MG/ML
25 INJECTION, SOLUTION INTRAVENOUS CONTINUOUS
Status: ACTIVE | OUTPATIENT
Start: 2020-01-01 | End: 2020-01-01

## 2020-01-01 RX ORDER — INSULIN GLARGINE 100 [IU]/ML
10 INJECTION, SOLUTION SUBCUTANEOUS
Qty: 1 VIAL | Refills: 0 | Status: SHIPPED | OUTPATIENT
Start: 2020-01-01

## 2020-01-01 RX ORDER — ALBUMIN HUMAN 250 G/1000ML
12.5 SOLUTION INTRAVENOUS EVERY 6 HOURS
Status: DISCONTINUED | OUTPATIENT
Start: 2020-01-01 | End: 2020-01-01 | Stop reason: SDUPTHER

## 2020-01-01 RX ORDER — VANCOMYCIN 1.75 GRAM/500 ML IN 0.9 % SODIUM CHLORIDE INTRAVENOUS
1750 ONCE
Status: COMPLETED | OUTPATIENT
Start: 2020-01-01 | End: 2020-01-01

## 2020-01-01 RX ORDER — TOLVAPTAN 15 MG/1
15 TABLET ORAL DAILY
Status: COMPLETED | OUTPATIENT
Start: 2020-01-01 | End: 2020-01-01

## 2020-01-01 RX ORDER — SODIUM CHLORIDE 0.9 % (FLUSH) 0.9 %
5-40 SYRINGE (ML) INJECTION EVERY 8 HOURS
Status: DISCONTINUED | OUTPATIENT
Start: 2020-01-01 | End: 2020-01-01

## 2020-01-01 RX ORDER — LEVOFLOXACIN 5 MG/ML
750 INJECTION, SOLUTION INTRAVENOUS EVERY 24 HOURS
Status: DISCONTINUED | OUTPATIENT
Start: 2020-01-01 | End: 2020-01-01

## 2020-01-01 RX ORDER — FUROSEMIDE 10 MG/ML
20 INJECTION INTRAMUSCULAR; INTRAVENOUS EVERY 12 HOURS
Status: DISCONTINUED | OUTPATIENT
Start: 2020-01-01 | End: 2020-01-01

## 2020-01-01 RX ORDER — POLYETHYLENE GLYCOL 3350 17 G/17G
17 POWDER, FOR SOLUTION ORAL DAILY
Status: DISCONTINUED | OUTPATIENT
Start: 2020-01-01 | End: 2020-01-01 | Stop reason: HOSPADM

## 2020-01-01 RX ORDER — AMOXICILLIN 250 MG
1 CAPSULE ORAL 2 TIMES DAILY
Status: CANCELLED | OUTPATIENT
Start: 2020-01-01

## 2020-01-01 RX ORDER — ONDANSETRON 2 MG/ML
4 INJECTION INTRAMUSCULAR; INTRAVENOUS
Status: DISCONTINUED | OUTPATIENT
Start: 2020-01-01 | End: 2020-01-01 | Stop reason: HOSPADM

## 2020-01-01 RX ORDER — ADHESIVE BANDAGE
30 BANDAGE TOPICAL DAILY
Status: DISCONTINUED | OUTPATIENT
Start: 2020-01-01 | End: 2020-01-01 | Stop reason: HOSPADM

## 2020-01-01 RX ORDER — HYDRALAZINE HYDROCHLORIDE 20 MG/ML
10 INJECTION INTRAMUSCULAR; INTRAVENOUS
Status: DISCONTINUED | OUTPATIENT
Start: 2020-01-01 | End: 2020-01-01

## 2020-01-01 RX ORDER — FUROSEMIDE 40 MG/1
40 TABLET ORAL 2 TIMES DAILY
Qty: 1 TAB | Refills: 0 | Status: SHIPPED
Start: 2020-01-01

## 2020-01-01 RX ORDER — LACTULOSE 10 G/15ML
20 SOLUTION ORAL; RECTAL AS NEEDED
Status: DISCONTINUED | OUTPATIENT
Start: 2020-01-01 | End: 2020-01-01 | Stop reason: HOSPADM

## 2020-01-01 RX ORDER — ADHESIVE BANDAGE
30 BANDAGE TOPICAL DAILY
Qty: 1 BOTTLE | Refills: 0 | Status: SHIPPED
Start: 2020-01-01

## 2020-01-01 RX ORDER — FACIAL-BODY WIPES
10 EACH TOPICAL DAILY
Status: DISCONTINUED | OUTPATIENT
Start: 2020-01-01 | End: 2020-01-01

## 2020-01-01 RX ORDER — LORAZEPAM 2 MG/ML
0.5 INJECTION INTRAMUSCULAR
Status: COMPLETED | OUTPATIENT
Start: 2020-01-01 | End: 2020-01-01

## 2020-01-01 RX ORDER — NALOXONE HYDROCHLORIDE 0.4 MG/ML
0.4 INJECTION, SOLUTION INTRAMUSCULAR; INTRAVENOUS; SUBCUTANEOUS AS NEEDED
Status: DISCONTINUED | OUTPATIENT
Start: 2020-01-01 | End: 2020-01-01

## 2020-01-01 RX ORDER — GLYCOPYRROLATE 0.2 MG/ML
0.1 INJECTION INTRAMUSCULAR; INTRAVENOUS
Status: DISCONTINUED | OUTPATIENT
Start: 2020-01-01 | End: 2020-01-01 | Stop reason: HOSPADM

## 2020-01-01 RX ORDER — INSULIN GLARGINE 100 [IU]/ML
12 INJECTION, SOLUTION SUBCUTANEOUS
Status: DISCONTINUED | OUTPATIENT
Start: 2020-01-01 | End: 2020-01-01

## 2020-01-01 RX ORDER — FUROSEMIDE 10 MG/ML
40 INJECTION INTRAMUSCULAR; INTRAVENOUS DAILY
Status: DISCONTINUED | OUTPATIENT
Start: 2020-01-01 | End: 2020-01-01

## 2020-01-01 RX ORDER — FUROSEMIDE 10 MG/ML
40 INJECTION INTRAMUSCULAR; INTRAVENOUS EVERY 12 HOURS
Status: CANCELLED | OUTPATIENT
Start: 2020-01-01

## 2020-01-01 RX ORDER — LANOLIN ALCOHOL/MO/W.PET/CERES
400 CREAM (GRAM) TOPICAL DAILY
Status: DISCONTINUED | OUTPATIENT
Start: 2020-01-01 | End: 2020-01-01

## 2020-01-01 RX ORDER — SODIUM CHLORIDE 0.9 % (FLUSH) 0.9 %
10 SYRINGE (ML) INJECTION AS NEEDED
Status: DISCONTINUED | OUTPATIENT
Start: 2020-01-01 | End: 2020-01-01 | Stop reason: HOSPADM

## 2020-01-01 RX ORDER — FACIAL-BODY WIPES
10 EACH TOPICAL DAILY PRN
Status: DISCONTINUED | OUTPATIENT
Start: 2020-01-01 | End: 2020-01-01

## 2020-01-01 RX ORDER — POTASSIUM CHLORIDE 20 MEQ/1
20 TABLET, EXTENDED RELEASE ORAL 3 TIMES DAILY
Status: DISCONTINUED | OUTPATIENT
Start: 2020-01-01 | End: 2020-01-01 | Stop reason: HOSPADM

## 2020-01-01 RX ORDER — SODIUM CHLORIDE 0.9 % (FLUSH) 0.9 %
10 SYRINGE (ML) INJECTION AS NEEDED
Status: CANCELLED
Start: 2020-01-01

## 2020-01-01 RX ORDER — DIPHENHYDRAMINE HYDROCHLORIDE 50 MG/ML
25 INJECTION, SOLUTION INTRAMUSCULAR; INTRAVENOUS AS NEEDED
Status: CANCELLED
Start: 2020-01-01

## 2020-01-01 RX ORDER — METFORMIN HYDROCHLORIDE 500 MG/1
1000 TABLET ORAL DAILY
Status: CANCELLED | OUTPATIENT
Start: 2020-01-01

## 2020-01-01 RX ORDER — VANCOMYCIN HYDROCHLORIDE
1250 ONCE
Status: COMPLETED | OUTPATIENT
Start: 2020-01-01 | End: 2020-01-01

## 2020-01-01 RX ORDER — LANOLIN ALCOHOL/MO/W.PET/CERES
1 CREAM (GRAM) TOPICAL
Status: DISCONTINUED | OUTPATIENT
Start: 2020-01-01 | End: 2020-01-01

## 2020-01-01 RX ORDER — MORPHINE SULFATE 2 MG/ML
2 INJECTION, SOLUTION INTRAMUSCULAR; INTRAVENOUS
Status: DISCONTINUED | OUTPATIENT
Start: 2020-01-01 | End: 2020-01-01 | Stop reason: HOSPADM

## 2020-01-01 RX ORDER — ONDANSETRON 2 MG/ML
4 INJECTION INTRAMUSCULAR; INTRAVENOUS
Status: DISCONTINUED | OUTPATIENT
Start: 2020-01-01 | End: 2020-01-01

## 2020-01-01 RX ORDER — AMOXICILLIN 250 MG
1 CAPSULE ORAL DAILY
Status: DISCONTINUED | OUTPATIENT
Start: 2020-01-01 | End: 2020-01-01

## 2020-01-01 RX ORDER — HYDROCORTISONE 25 MG/G
CREAM TOPICAL
Status: DISCONTINUED | OUTPATIENT
Start: 2020-01-01 | End: 2020-01-01 | Stop reason: HOSPADM

## 2020-01-01 RX ORDER — FACIAL-BODY WIPES
10 EACH TOPICAL DAILY
Status: DISCONTINUED | OUTPATIENT
Start: 2020-01-01 | End: 2020-01-01 | Stop reason: HOSPADM

## 2020-01-01 RX ORDER — LANOLIN ALCOHOL/MO/W.PET/CERES
400 CREAM (GRAM) TOPICAL DAILY
Qty: 30 TAB | Refills: 0 | Status: SHIPPED | OUTPATIENT
Start: 2020-01-01

## 2020-01-01 RX ORDER — ACETAMINOPHEN 325 MG/1
650 TABLET ORAL
Status: DISCONTINUED | OUTPATIENT
Start: 2020-01-01 | End: 2020-01-01 | Stop reason: HOSPADM

## 2020-01-01 RX ORDER — ACETAMINOPHEN 325 MG/1
650 TABLET ORAL
Status: DISCONTINUED | OUTPATIENT
Start: 2020-01-01 | End: 2020-01-01

## 2020-01-01 RX ORDER — HYDROCODONE BITARTRATE AND ACETAMINOPHEN 5; 325 MG/1; MG/1
1 TABLET ORAL
Status: CANCELLED | OUTPATIENT
Start: 2020-01-01

## 2020-01-01 RX ORDER — INSULIN LISPRO 100 [IU]/ML
6 INJECTION, SOLUTION INTRAVENOUS; SUBCUTANEOUS
Status: DISCONTINUED | OUTPATIENT
Start: 2020-01-01 | End: 2020-01-01

## 2020-01-01 RX ORDER — ONDANSETRON 8 MG/1
8 TABLET, ORALLY DISINTEGRATING ORAL
Status: CANCELLED | OUTPATIENT
Start: 2020-01-01

## 2020-01-01 RX ORDER — IPRATROPIUM BROMIDE AND ALBUTEROL SULFATE 2.5; .5 MG/3ML; MG/3ML
3 SOLUTION RESPIRATORY (INHALATION)
Qty: 30 NEBULE | Refills: 0 | Status: SHIPPED
Start: 2020-01-01

## 2020-01-01 RX ORDER — ALBUMIN HUMAN 250 G/1000ML
12.5 SOLUTION INTRAVENOUS ONCE
Status: COMPLETED | OUTPATIENT
Start: 2020-01-01 | End: 2020-01-01

## 2020-01-01 RX ORDER — AZITHROMYCIN 250 MG/1
500 TABLET, FILM COATED ORAL DAILY
Status: COMPLETED | OUTPATIENT
Start: 2020-01-01 | End: 2020-01-01

## 2020-01-01 RX ORDER — VANCOMYCIN 2 GRAM/500 ML IN 0.9 % SODIUM CHLORIDE INTRAVENOUS
2000 ONCE
Status: COMPLETED | OUTPATIENT
Start: 2020-01-01 | End: 2020-01-01

## 2020-01-01 RX ADMIN — HYDROCODONE BITARTRATE AND ACETAMINOPHEN 1 TABLET: 5; 325 TABLET ORAL at 19:42

## 2020-01-01 RX ADMIN — DEXAMETHASONE SODIUM PHOSPHATE 8 MG: 4 INJECTION, SOLUTION INTRAMUSCULAR; INTRAVENOUS at 11:42

## 2020-01-01 RX ADMIN — SODIUM CHLORIDE 150 MG: 900 INJECTION, SOLUTION INTRAVENOUS at 12:00

## 2020-01-01 RX ADMIN — ENOXAPARIN SODIUM 40 MG: 40 INJECTION SUBCUTANEOUS at 08:19

## 2020-01-01 RX ADMIN — Medication 10 ML: at 15:08

## 2020-01-01 RX ADMIN — CEFTRIAXONE SODIUM 1 G: 1 INJECTION, POWDER, FOR SOLUTION INTRAMUSCULAR; INTRAVENOUS at 03:00

## 2020-01-01 RX ADMIN — INSULIN LISPRO 265 UNITS: 100 INJECTION, SOLUTION INTRAVENOUS; SUBCUTANEOUS at 22:04

## 2020-01-01 RX ADMIN — POTASSIUM CHLORIDE 20 MEQ: 20 TABLET, EXTENDED RELEASE ORAL at 22:54

## 2020-01-01 RX ADMIN — SENNOSIDES AND DOCUSATE SODIUM 1 TABLET: 8.6; 5 TABLET ORAL at 17:01

## 2020-01-01 RX ADMIN — TRAMADOL HYDROCHLORIDE 50 MG: 50 TABLET, FILM COATED ORAL at 02:47

## 2020-01-01 RX ADMIN — MAGNESIUM HYDROXIDE 30 ML: 400 SUSPENSION ORAL at 08:27

## 2020-01-01 RX ADMIN — Medication 2 G: at 17:17

## 2020-01-01 RX ADMIN — VANCOMYCIN HYDROCHLORIDE 1000 MG: 1 INJECTION, POWDER, LYOPHILIZED, FOR SOLUTION INTRAVENOUS at 00:36

## 2020-01-01 RX ADMIN — Medication 10 ML: at 05:37

## 2020-01-01 RX ADMIN — NYSTATIN 500000 UNITS: 500000 SUSPENSION ORAL at 09:26

## 2020-01-01 RX ADMIN — NYSTATIN 500000 UNITS: 500000 SUSPENSION ORAL at 22:01

## 2020-01-01 RX ADMIN — HYDRALAZINE HYDROCHLORIDE 5 MG: 20 INJECTION, SOLUTION INTRAMUSCULAR; INTRAVENOUS at 22:55

## 2020-01-01 RX ADMIN — FUROSEMIDE 40 MG: 10 INJECTION INTRAMUSCULAR; INTRAVENOUS at 09:01

## 2020-01-01 RX ADMIN — IPRATROPIUM BROMIDE AND ALBUTEROL SULFATE 3 ML: .5; 3 SOLUTION RESPIRATORY (INHALATION) at 19:28

## 2020-01-01 RX ADMIN — POTASSIUM CHLORIDE 40 MEQ: 20 TABLET, EXTENDED RELEASE ORAL at 21:03

## 2020-01-01 RX ADMIN — Medication 10 ML: at 14:00

## 2020-01-01 RX ADMIN — FUROSEMIDE 40 MG: 10 INJECTION, SOLUTION INTRAMUSCULAR; INTRAVENOUS at 09:00

## 2020-01-01 RX ADMIN — DEXAMETHASONE SODIUM PHOSPHATE 6 MG: 4 INJECTION, SOLUTION INTRAMUSCULAR; INTRAVENOUS at 09:20

## 2020-01-01 RX ADMIN — IPRATROPIUM BROMIDE AND ALBUTEROL SULFATE 3 ML: .5; 3 SOLUTION RESPIRATORY (INHALATION) at 08:01

## 2020-01-01 RX ADMIN — POTASSIUM CHLORIDE 20 MEQ: 20 TABLET, EXTENDED RELEASE ORAL at 18:00

## 2020-01-01 RX ADMIN — SENNOSIDES AND DOCUSATE SODIUM 1 TABLET: 8.6; 5 TABLET ORAL at 17:08

## 2020-01-01 RX ADMIN — IPRATROPIUM BROMIDE AND ALBUTEROL SULFATE 3 ML: .5; 3 SOLUTION RESPIRATORY (INHALATION) at 09:27

## 2020-01-01 RX ADMIN — IPRATROPIUM BROMIDE AND ALBUTEROL SULFATE 3 ML: .5; 3 SOLUTION RESPIRATORY (INHALATION) at 21:25

## 2020-01-01 RX ADMIN — LISINOPRIL 20 MG: 5 TABLET ORAL at 09:44

## 2020-01-01 RX ADMIN — POTASSIUM CHLORIDE 20 MEQ: 20 TABLET, EXTENDED RELEASE ORAL at 09:46

## 2020-01-01 RX ADMIN — BISACODYL 10 MG: 10 SUPPOSITORY RECTAL at 09:10

## 2020-01-01 RX ADMIN — IPRATROPIUM BROMIDE AND ALBUTEROL SULFATE 3 ML: .5; 3 SOLUTION RESPIRATORY (INHALATION) at 15:19

## 2020-01-01 RX ADMIN — Medication 10 ML: at 22:11

## 2020-01-01 RX ADMIN — INSULIN LISPRO 6 UNITS: 100 INJECTION, SOLUTION INTRAVENOUS; SUBCUTANEOUS at 22:02

## 2020-01-01 RX ADMIN — Medication 2 G: at 17:14

## 2020-01-01 RX ADMIN — INSULIN LISPRO 2 UNITS: 100 INJECTION, SOLUTION INTRAVENOUS; SUBCUTANEOUS at 08:39

## 2020-01-01 RX ADMIN — IPRATROPIUM BROMIDE AND ALBUTEROL SULFATE 3 ML: .5; 3 SOLUTION RESPIRATORY (INHALATION) at 14:35

## 2020-01-01 RX ADMIN — MELOXICAM 7.5 MG: 7.5 TABLET ORAL at 09:10

## 2020-01-01 RX ADMIN — FUROSEMIDE 20 MG: 10 INJECTION, SOLUTION INTRAMUSCULAR; INTRAVENOUS at 21:03

## 2020-01-01 RX ADMIN — LISINOPRIL 20 MG: 20 TABLET ORAL at 08:08

## 2020-01-01 RX ADMIN — NYSTATIN 500000 UNITS: 500000 SUSPENSION ORAL at 12:35

## 2020-01-01 RX ADMIN — AMLODIPINE BESYLATE 10 MG: 10 TABLET ORAL at 10:14

## 2020-01-01 RX ADMIN — Medication 5 ML: at 05:54

## 2020-01-01 RX ADMIN — Medication 2 G: at 08:44

## 2020-01-01 RX ADMIN — AMLODIPINE BESYLATE 10 MG: 10 TABLET ORAL at 08:01

## 2020-01-01 RX ADMIN — IPRATROPIUM BROMIDE AND ALBUTEROL SULFATE 3 ML: .5; 3 SOLUTION RESPIRATORY (INHALATION) at 13:53

## 2020-01-01 RX ADMIN — POTASSIUM CHLORIDE 20 MEQ: 200 INJECTION, SOLUTION INTRAVENOUS at 08:22

## 2020-01-01 RX ADMIN — ENOXAPARIN SODIUM 30 MG: 30 INJECTION SUBCUTANEOUS at 09:21

## 2020-01-01 RX ADMIN — Medication 10 ML: at 05:02

## 2020-01-01 RX ADMIN — IPRATROPIUM BROMIDE AND ALBUTEROL SULFATE 3 ML: .5; 3 SOLUTION RESPIRATORY (INHALATION) at 20:02

## 2020-01-01 RX ADMIN — FUROSEMIDE 40 MG: 10 INJECTION, SOLUTION INTRAMUSCULAR; INTRAVENOUS at 09:09

## 2020-01-01 RX ADMIN — ENOXAPARIN SODIUM 40 MG: 40 INJECTION SUBCUTANEOUS at 08:44

## 2020-01-01 RX ADMIN — POTASSIUM CHLORIDE 20 MEQ: 20 TABLET, EXTENDED RELEASE ORAL at 08:20

## 2020-01-01 RX ADMIN — HYDRALAZINE HYDROCHLORIDE 5 MG: 20 INJECTION INTRAMUSCULAR; INTRAVENOUS at 19:55

## 2020-01-01 RX ADMIN — NYSTATIN 500000 UNITS: 500000 SUSPENSION ORAL at 19:12

## 2020-01-01 RX ADMIN — SODIUM CHLORIDE 25 ML/HR: 900 INJECTION, SOLUTION INTRAVENOUS at 11:47

## 2020-01-01 RX ADMIN — PIPERACILLIN AND TAZOBACTAM 3.38 G: 3; .375 INJECTION, POWDER, FOR SOLUTION INTRAVENOUS at 17:13

## 2020-01-01 RX ADMIN — FERROUS SULFATE TAB 325 MG (65 MG ELEMENTAL FE) 325 MG: 325 (65 FE) TAB at 08:24

## 2020-01-01 RX ADMIN — Medication 10 ML: at 22:03

## 2020-01-01 RX ADMIN — Medication 10 ML: at 21:37

## 2020-01-01 RX ADMIN — INSULIN LISPRO 6 UNITS: 100 INJECTION, SOLUTION INTRAVENOUS; SUBCUTANEOUS at 18:05

## 2020-01-01 RX ADMIN — ENOXAPARIN SODIUM 30 MG: 30 INJECTION SUBCUTANEOUS at 09:11

## 2020-01-01 RX ADMIN — LISINOPRIL 20 MG: 5 TABLET ORAL at 09:51

## 2020-01-01 RX ADMIN — METFORMIN HYDROCHLORIDE 1000 MG: 500 TABLET ORAL at 08:56

## 2020-01-01 RX ADMIN — INSULIN LISPRO 4 UNITS: 100 INJECTION, SOLUTION INTRAVENOUS; SUBCUTANEOUS at 12:29

## 2020-01-01 RX ADMIN — GLIMEPIRIDE 4 MG: 4 TABLET ORAL at 09:31

## 2020-01-01 RX ADMIN — Medication 10 ML: at 10:08

## 2020-01-01 RX ADMIN — Medication 5 ML: at 05:00

## 2020-01-01 RX ADMIN — TAMSULOSIN HYDROCHLORIDE 0.4 MG: 0.4 CAPSULE ORAL at 12:40

## 2020-01-01 RX ADMIN — POTASSIUM CHLORIDE 20 MEQ: 20 TABLET, EXTENDED RELEASE ORAL at 09:00

## 2020-01-01 RX ADMIN — BISACODYL 10 MG: 10 SUPPOSITORY RECTAL at 09:06

## 2020-01-01 RX ADMIN — LISINOPRIL 20 MG: 5 TABLET ORAL at 09:32

## 2020-01-01 RX ADMIN — HUMAN INSULIN 3 UNITS: 100 INJECTION, SOLUTION SUBCUTANEOUS at 12:15

## 2020-01-01 RX ADMIN — SENNOSIDES AND DOCUSATE SODIUM 1 TABLET: 8.6; 5 TABLET ORAL at 09:48

## 2020-01-01 RX ADMIN — Medication 2 G: at 18:26

## 2020-01-01 RX ADMIN — AMLODIPINE BESYLATE 10 MG: 10 TABLET ORAL at 07:57

## 2020-01-01 RX ADMIN — FUROSEMIDE 40 MG: 10 INJECTION, SOLUTION INTRAMUSCULAR; INTRAVENOUS at 00:46

## 2020-01-01 RX ADMIN — FUROSEMIDE 20 MG: 10 INJECTION, SOLUTION INTRAMUSCULAR; INTRAVENOUS at 08:25

## 2020-01-01 RX ADMIN — INSULIN LISPRO 6 UNITS: 100 INJECTION, SOLUTION INTRAVENOUS; SUBCUTANEOUS at 09:28

## 2020-01-01 RX ADMIN — INSULIN LISPRO 6 UNITS: 100 INJECTION, SOLUTION INTRAVENOUS; SUBCUTANEOUS at 09:00

## 2020-01-01 RX ADMIN — MAGNESIUM HYDROXIDE 30 ML: 400 SUSPENSION ORAL at 09:24

## 2020-01-01 RX ADMIN — HYDRALAZINE HYDROCHLORIDE 10 MG: 20 INJECTION, SOLUTION INTRAMUSCULAR; INTRAVENOUS at 17:26

## 2020-01-01 RX ADMIN — Medication 2 G: at 08:34

## 2020-01-01 RX ADMIN — GLIMEPIRIDE 4 MG: 4 TABLET ORAL at 08:31

## 2020-01-01 RX ADMIN — Medication 5 ML: at 15:55

## 2020-01-01 RX ADMIN — TAMSULOSIN HYDROCHLORIDE 0.4 MG: 0.4 CAPSULE ORAL at 11:50

## 2020-01-01 RX ADMIN — Medication 10 ML: at 22:35

## 2020-01-01 RX ADMIN — TRAMADOL HYDROCHLORIDE 50 MG: 50 TABLET, FILM COATED ORAL at 20:44

## 2020-01-01 RX ADMIN — VANCOMYCIN HYDROCHLORIDE 1250 MG: 10 INJECTION, POWDER, LYOPHILIZED, FOR SOLUTION INTRAVENOUS at 22:18

## 2020-01-01 RX ADMIN — INSULIN LISPRO 4 UNITS: 100 INJECTION, SOLUTION INTRAVENOUS; SUBCUTANEOUS at 10:16

## 2020-01-01 RX ADMIN — ACETAMINOPHEN 650 MG: 325 TABLET, FILM COATED ORAL at 08:59

## 2020-01-01 RX ADMIN — ENOXAPARIN SODIUM 40 MG: 40 INJECTION SUBCUTANEOUS at 11:45

## 2020-01-01 RX ADMIN — MELOXICAM 7.5 MG: 7.5 TABLET ORAL at 09:50

## 2020-01-01 RX ADMIN — FERROUS SULFATE TAB 325 MG (65 MG ELEMENTAL FE) 325 MG: 325 (65 FE) TAB at 11:50

## 2020-01-01 RX ADMIN — SENNOSIDES AND DOCUSATE SODIUM 1 TABLET: 8.6; 5 TABLET ORAL at 17:27

## 2020-01-01 RX ADMIN — INSULIN LISPRO 2 UNITS: 100 INJECTION, SOLUTION INTRAVENOUS; SUBCUTANEOUS at 21:00

## 2020-01-01 RX ADMIN — LISINOPRIL 20 MG: 5 TABLET ORAL at 08:55

## 2020-01-01 RX ADMIN — MELOXICAM 7.5 MG: 7.5 TABLET ORAL at 08:54

## 2020-01-01 RX ADMIN — BISACODYL 10 MG: 10 SUPPOSITORY RECTAL at 08:25

## 2020-01-01 RX ADMIN — TRAMADOL HYDROCHLORIDE 50 MG: 50 TABLET, FILM COATED ORAL at 05:41

## 2020-01-01 RX ADMIN — INSULIN LISPRO 4 UNITS: 100 INJECTION, SOLUTION INTRAVENOUS; SUBCUTANEOUS at 07:30

## 2020-01-01 RX ADMIN — Medication 10 ML: at 22:53

## 2020-01-01 RX ADMIN — NYSTATIN 500000 UNITS: 500000 SUSPENSION ORAL at 22:03

## 2020-01-01 RX ADMIN — VANCOMYCIN HYDROCHLORIDE 1250 MG: 10 INJECTION, POWDER, LYOPHILIZED, FOR SOLUTION INTRAVENOUS at 21:00

## 2020-01-01 RX ADMIN — IPRATROPIUM BROMIDE AND ALBUTEROL SULFATE 3 ML: .5; 3 SOLUTION RESPIRATORY (INHALATION) at 07:56

## 2020-01-01 RX ADMIN — POTASSIUM CHLORIDE 20 MEQ: 20 TABLET, EXTENDED RELEASE ORAL at 22:00

## 2020-01-01 RX ADMIN — LISINOPRIL 20 MG: 5 TABLET ORAL at 08:28

## 2020-01-01 RX ADMIN — TRAMADOL HYDROCHLORIDE 50 MG: 50 TABLET, FILM COATED ORAL at 14:49

## 2020-01-01 RX ADMIN — MAGNESIUM HYDROXIDE 30 ML: 400 SUSPENSION ORAL at 08:09

## 2020-01-01 RX ADMIN — Medication 2 G: at 09:04

## 2020-01-01 RX ADMIN — IPRATROPIUM BROMIDE AND ALBUTEROL SULFATE 3 ML: .5; 3 SOLUTION RESPIRATORY (INHALATION) at 21:30

## 2020-01-01 RX ADMIN — Medication 2 G: at 10:15

## 2020-01-01 RX ADMIN — LISINOPRIL 20 MG: 5 TABLET ORAL at 08:48

## 2020-01-01 RX ADMIN — ALBUMIN (HUMAN) 12.5 G: 0.25 INJECTION, SOLUTION INTRAVENOUS at 19:12

## 2020-01-01 RX ADMIN — INSULIN LISPRO 8 UNITS: 100 INJECTION, SOLUTION INTRAVENOUS; SUBCUTANEOUS at 13:02

## 2020-01-01 RX ADMIN — LISINOPRIL 20 MG: 5 TABLET ORAL at 11:01

## 2020-01-01 RX ADMIN — MAGNESIUM HYDROXIDE 30 ML: 400 SUSPENSION ORAL at 08:17

## 2020-01-01 RX ADMIN — INSULIN LISPRO 6 UNITS: 100 INJECTION, SOLUTION INTRAVENOUS; SUBCUTANEOUS at 17:44

## 2020-01-01 RX ADMIN — MELOXICAM 7.5 MG: 7.5 TABLET ORAL at 11:50

## 2020-01-01 RX ADMIN — POTASSIUM CHLORIDE 20 MEQ: 20 TABLET, EXTENDED RELEASE ORAL at 17:01

## 2020-01-01 RX ADMIN — Medication 2 G: at 09:00

## 2020-01-01 RX ADMIN — MAGNESIUM HYDROXIDE 30 ML: 400 SUSPENSION ORAL at 09:10

## 2020-01-01 RX ADMIN — NYSTATIN 500000 UNITS: 500000 SUSPENSION ORAL at 17:49

## 2020-01-01 RX ADMIN — CEFEPIME HYDROCHLORIDE 2 G: 2 INJECTION, POWDER, FOR SOLUTION INTRAVENOUS at 20:34

## 2020-01-01 RX ADMIN — CEFEPIME HYDROCHLORIDE 2 G: 2 INJECTION, POWDER, FOR SOLUTION INTRAVENOUS at 13:11

## 2020-01-01 RX ADMIN — FUROSEMIDE 40 MG: 10 INJECTION, SOLUTION INTRAMUSCULAR; INTRAVENOUS at 12:09

## 2020-01-01 RX ADMIN — Medication 2 G: at 18:00

## 2020-01-01 RX ADMIN — FUROSEMIDE 20 MG: 10 INJECTION, SOLUTION INTRAMUSCULAR; INTRAVENOUS at 08:28

## 2020-01-01 RX ADMIN — DEXAMETHASONE SODIUM PHOSPHATE 12 MG: 4 INJECTION, SOLUTION INTRAMUSCULAR; INTRAVENOUS at 16:00

## 2020-01-01 RX ADMIN — FUROSEMIDE 40 MG: 10 INJECTION, SOLUTION INTRAVENOUS at 22:06

## 2020-01-01 RX ADMIN — TAMSULOSIN HYDROCHLORIDE 0.4 MG: 0.4 CAPSULE ORAL at 08:25

## 2020-01-01 RX ADMIN — BISACODYL 10 MG: 10 SUPPOSITORY RECTAL at 08:31

## 2020-01-01 RX ADMIN — FUROSEMIDE 40 MG: 10 INJECTION INTRAMUSCULAR; INTRAVENOUS at 17:45

## 2020-01-01 RX ADMIN — CEFEPIME HYDROCHLORIDE 2 G: 2 INJECTION, POWDER, FOR SOLUTION INTRAVENOUS at 12:09

## 2020-01-01 RX ADMIN — BISACODYL 10 MG: 10 SUPPOSITORY RECTAL at 08:37

## 2020-01-01 RX ADMIN — PIPERACILLIN AND TAZOBACTAM 3.38 G: 3; .375 INJECTION, POWDER, FOR SOLUTION INTRAVENOUS at 09:35

## 2020-01-01 RX ADMIN — ENOXAPARIN SODIUM 40 MG: 40 INJECTION SUBCUTANEOUS at 09:20

## 2020-01-01 RX ADMIN — SENNOSIDES AND DOCUSATE SODIUM 1 TABLET: 8.6; 5 TABLET ORAL at 17:31

## 2020-01-01 RX ADMIN — HUMAN INSULIN 3 UNITS: 100 INJECTION, SOLUTION SUBCUTANEOUS at 22:57

## 2020-01-01 RX ADMIN — Medication 5 ML: at 18:33

## 2020-01-01 RX ADMIN — FERROUS SULFATE TAB 325 MG (65 MG ELEMENTAL FE) 325 MG: 325 (65 FE) TAB at 09:50

## 2020-01-01 RX ADMIN — NYSTATIN 500000 UNITS: 500000 SUSPENSION ORAL at 13:33

## 2020-01-01 RX ADMIN — SENNOSIDES AND DOCUSATE SODIUM 1 TABLET: 8.6; 5 TABLET ORAL at 09:00

## 2020-01-01 RX ADMIN — FUROSEMIDE 40 MG: 10 INJECTION, SOLUTION INTRAMUSCULAR; INTRAVENOUS at 09:24

## 2020-01-01 RX ADMIN — METHYLPREDNISOLONE SODIUM SUCCINATE 60 MG: 125 INJECTION, POWDER, FOR SOLUTION INTRAMUSCULAR; INTRAVENOUS at 09:26

## 2020-01-01 RX ADMIN — NYSTATIN 500000 UNITS: 500000 SUSPENSION ORAL at 12:28

## 2020-01-01 RX ADMIN — Medication 10 ML: at 13:53

## 2020-01-01 RX ADMIN — Medication 2 G: at 09:24

## 2020-01-01 RX ADMIN — DOCUSATE SODIUM 100 MG: 100 CAPSULE, LIQUID FILLED ORAL at 16:05

## 2020-01-01 RX ADMIN — LISINOPRIL 20 MG: 5 TABLET ORAL at 09:46

## 2020-01-01 RX ADMIN — HUMAN INSULIN 3 UNITS: 100 INJECTION, SOLUTION SUBCUTANEOUS at 08:49

## 2020-01-01 RX ADMIN — Medication 10 ML: at 22:13

## 2020-01-01 RX ADMIN — Medication 10 ML: at 15:16

## 2020-01-01 RX ADMIN — Medication 2 G: at 09:10

## 2020-01-01 RX ADMIN — ENOXAPARIN SODIUM 30 MG: 30 INJECTION SUBCUTANEOUS at 21:41

## 2020-01-01 RX ADMIN — IPRATROPIUM BROMIDE AND ALBUTEROL SULFATE 3 ML: .5; 3 SOLUTION RESPIRATORY (INHALATION) at 14:00

## 2020-01-01 RX ADMIN — INSULIN LISPRO 10 UNITS: 100 INJECTION, SOLUTION INTRAVENOUS; SUBCUTANEOUS at 17:45

## 2020-01-01 RX ADMIN — Medication 2 G: at 17:30

## 2020-01-01 RX ADMIN — Medication 5 ML: at 22:00

## 2020-01-01 RX ADMIN — Medication 10 ML: at 21:40

## 2020-01-01 RX ADMIN — DEXAMETHASONE SODIUM PHOSPHATE 12 MG: 4 INJECTION, SOLUTION INTRAMUSCULAR; INTRAVENOUS at 10:13

## 2020-01-01 RX ADMIN — POTASSIUM CHLORIDE 20 MEQ: 20 TABLET, EXTENDED RELEASE ORAL at 21:55

## 2020-01-01 RX ADMIN — ENOXAPARIN SODIUM 40 MG: 40 INJECTION SUBCUTANEOUS at 11:02

## 2020-01-01 RX ADMIN — POTASSIUM CHLORIDE 20 MEQ: 20 TABLET, EXTENDED RELEASE ORAL at 22:49

## 2020-01-01 RX ADMIN — GLIMEPIRIDE 4 MG: 4 TABLET ORAL at 08:21

## 2020-01-01 RX ADMIN — INSULIN LISPRO 2 UNITS: 100 INJECTION, SOLUTION INTRAVENOUS; SUBCUTANEOUS at 16:30

## 2020-01-01 RX ADMIN — INSULIN LISPRO 2 UNITS: 100 INJECTION, SOLUTION INTRAVENOUS; SUBCUTANEOUS at 12:01

## 2020-01-01 RX ADMIN — VANCOMYCIN HYDROCHLORIDE 1000 MG: 1 INJECTION, POWDER, LYOPHILIZED, FOR SOLUTION INTRAVENOUS at 19:42

## 2020-01-01 RX ADMIN — IPRATROPIUM BROMIDE AND ALBUTEROL SULFATE 3 ML: .5; 3 SOLUTION RESPIRATORY (INHALATION) at 08:18

## 2020-01-01 RX ADMIN — FUROSEMIDE 40 MG: 40 TABLET ORAL at 09:22

## 2020-01-01 RX ADMIN — INSULIN GLARGINE 5 UNITS: 100 INJECTION, SOLUTION SUBCUTANEOUS at 22:08

## 2020-01-01 RX ADMIN — Medication 10 ML: at 15:17

## 2020-01-01 RX ADMIN — FUROSEMIDE 40 MG: 10 INJECTION, SOLUTION INTRAMUSCULAR; INTRAVENOUS at 08:09

## 2020-01-01 RX ADMIN — SODIUM CHLORIDE 25 ML/HR: 900 INJECTION, SOLUTION INTRAVENOUS at 16:39

## 2020-01-01 RX ADMIN — POTASSIUM CHLORIDE 20 MEQ: 20 TABLET, EXTENDED RELEASE ORAL at 16:00

## 2020-01-01 RX ADMIN — Medication 10 ML: at 21:04

## 2020-01-01 RX ADMIN — VANCOMYCIN HYDROCHLORIDE 1250 MG: 10 INJECTION, POWDER, LYOPHILIZED, FOR SOLUTION INTRAVENOUS at 16:00

## 2020-01-01 RX ADMIN — IPRATROPIUM BROMIDE AND ALBUTEROL SULFATE 3 ML: .5; 3 SOLUTION RESPIRATORY (INHALATION) at 20:29

## 2020-01-01 RX ADMIN — TRAMADOL HYDROCHLORIDE 50 MG: 50 TABLET, FILM COATED ORAL at 21:03

## 2020-01-01 RX ADMIN — GLIMEPIRIDE 4 MG: 4 TABLET ORAL at 09:45

## 2020-01-01 RX ADMIN — TAMSULOSIN HYDROCHLORIDE 0.4 MG: 0.4 CAPSULE ORAL at 08:16

## 2020-01-01 RX ADMIN — AMLODIPINE BESYLATE 10 MG: 10 TABLET ORAL at 08:39

## 2020-01-01 RX ADMIN — Medication 10 ML: at 21:00

## 2020-01-01 RX ADMIN — POTASSIUM CHLORIDE 20 MEQ: 20 TABLET, EXTENDED RELEASE ORAL at 19:11

## 2020-01-01 RX ADMIN — INSULIN LISPRO 4 UNITS: 100 INJECTION, SOLUTION INTRAVENOUS; SUBCUTANEOUS at 09:21

## 2020-01-01 RX ADMIN — BISACODYL 10 MG: 10 SUPPOSITORY RECTAL at 09:00

## 2020-01-01 RX ADMIN — SENNOSIDES AND DOCUSATE SODIUM 1 TABLET: 8.6; 5 TABLET ORAL at 08:09

## 2020-01-01 RX ADMIN — MAGNESIUM HYDROXIDE 30 ML: 400 SUSPENSION ORAL at 11:50

## 2020-01-01 RX ADMIN — INSULIN LISPRO 4 UNITS: 100 INJECTION, SOLUTION INTRAVENOUS; SUBCUTANEOUS at 18:02

## 2020-01-01 RX ADMIN — POTASSIUM CHLORIDE 40 MEQ: 20 TABLET, EXTENDED RELEASE ORAL at 09:32

## 2020-01-01 RX ADMIN — Medication 5 ML: at 22:12

## 2020-01-01 RX ADMIN — Medication 10 ML: at 05:36

## 2020-01-01 RX ADMIN — PROPOFOL 30 MG: 10 INJECTION, EMULSION INTRAVENOUS at 14:18

## 2020-01-01 RX ADMIN — IPRATROPIUM BROMIDE AND ALBUTEROL SULFATE 3 ML: .5; 3 SOLUTION RESPIRATORY (INHALATION) at 14:40

## 2020-01-01 RX ADMIN — AMLODIPINE BESYLATE 10 MG: 10 TABLET ORAL at 08:49

## 2020-01-01 RX ADMIN — HYDROCODONE BITARTRATE AND ACETAMINOPHEN 1 TABLET: 5; 325 TABLET ORAL at 22:07

## 2020-01-01 RX ADMIN — Medication 10 ML: at 12:15

## 2020-01-01 RX ADMIN — MAGNESIUM SULFATE 2 G: 2 INJECTION INTRAVENOUS at 22:03

## 2020-01-01 RX ADMIN — MAGNESIUM HYDROXIDE 30 ML: 400 SUSPENSION ORAL at 08:45

## 2020-01-01 RX ADMIN — INSULIN LISPRO 6 UNITS: 100 INJECTION, SOLUTION INTRAVENOUS; SUBCUTANEOUS at 17:28

## 2020-01-01 RX ADMIN — FERROUS SULFATE TAB 325 MG (65 MG ELEMENTAL FE) 325 MG: 325 (65 FE) TAB at 08:47

## 2020-01-01 RX ADMIN — SENNOSIDES AND DOCUSATE SODIUM 1 TABLET: 8.6; 5 TABLET ORAL at 09:01

## 2020-01-01 RX ADMIN — Medication 2 G: at 18:03

## 2020-01-01 RX ADMIN — IPRATROPIUM BROMIDE AND ALBUTEROL SULFATE 3 ML: .5; 3 SOLUTION RESPIRATORY (INHALATION) at 20:38

## 2020-01-01 RX ADMIN — INSULIN LISPRO 4 UNITS: 100 INJECTION, SOLUTION INTRAVENOUS; SUBCUTANEOUS at 17:44

## 2020-01-01 RX ADMIN — Medication 2 G: at 18:17

## 2020-01-01 RX ADMIN — NYSTATIN 500000 UNITS: 500000 SUSPENSION ORAL at 08:09

## 2020-01-01 RX ADMIN — FERROUS SULFATE TAB 325 MG (65 MG ELEMENTAL FE) 325 MG: 325 (65 FE) TAB at 08:35

## 2020-01-01 RX ADMIN — PIPERACILLIN AND TAZOBACTAM 3.38 G: 3; .375 INJECTION, POWDER, FOR SOLUTION INTRAVENOUS at 18:42

## 2020-01-01 RX ADMIN — NYSTATIN 500000 UNITS: 500000 SUSPENSION ORAL at 23:47

## 2020-01-01 RX ADMIN — POTASSIUM CHLORIDE 20 MEQ: 20 TABLET, EXTENDED RELEASE ORAL at 18:59

## 2020-01-01 RX ADMIN — INSULIN LISPRO 4 UNITS: 100 INJECTION, SOLUTION INTRAVENOUS; SUBCUTANEOUS at 11:30

## 2020-01-01 RX ADMIN — Medication 10 ML: at 22:04

## 2020-01-01 RX ADMIN — Medication 10 ML: at 05:14

## 2020-01-01 RX ADMIN — Medication 2 G: at 18:41

## 2020-01-01 RX ADMIN — SENNOSIDES AND DOCUSATE SODIUM 1 TABLET: 8.6; 5 TABLET ORAL at 08:31

## 2020-01-01 RX ADMIN — VANCOMYCIN HYDROCHLORIDE 1000 MG: 1 INJECTION, POWDER, LYOPHILIZED, FOR SOLUTION INTRAVENOUS at 21:18

## 2020-01-01 RX ADMIN — HUMAN INSULIN 6 UNITS: 100 INJECTION, SOLUTION SUBCUTANEOUS at 16:17

## 2020-01-01 RX ADMIN — Medication 10 ML: at 06:12

## 2020-01-01 RX ADMIN — POTASSIUM CHLORIDE 20 MEQ: 20 TABLET, EXTENDED RELEASE ORAL at 17:05

## 2020-01-01 RX ADMIN — FUROSEMIDE 40 MG: 10 INJECTION, SOLUTION INTRAMUSCULAR; INTRAVENOUS at 17:05

## 2020-01-01 RX ADMIN — FUROSEMIDE 40 MG: 10 INJECTION INTRAMUSCULAR; INTRAVENOUS at 08:36

## 2020-01-01 RX ADMIN — Medication 2 G: at 17:45

## 2020-01-01 RX ADMIN — IPRATROPIUM BROMIDE AND ALBUTEROL SULFATE 3 ML: .5; 3 SOLUTION RESPIRATORY (INHALATION) at 07:50

## 2020-01-01 RX ADMIN — IPRATROPIUM BROMIDE AND ALBUTEROL SULFATE 3 ML: .5; 3 SOLUTION RESPIRATORY (INHALATION) at 13:33

## 2020-01-01 RX ADMIN — SENNOSIDES AND DOCUSATE SODIUM 1 TABLET: 8.6; 5 TABLET ORAL at 17:19

## 2020-01-01 RX ADMIN — MELOXICAM 7.5 MG: 7.5 TABLET ORAL at 08:08

## 2020-01-01 RX ADMIN — TAMSULOSIN HYDROCHLORIDE 0.4 MG: 0.4 CAPSULE ORAL at 09:22

## 2020-01-01 RX ADMIN — AMLODIPINE BESYLATE 10 MG: 10 TABLET ORAL at 09:22

## 2020-01-01 RX ADMIN — POTASSIUM CHLORIDE 40 MEQ: 20 TABLET, EXTENDED RELEASE ORAL at 08:26

## 2020-01-01 RX ADMIN — SENNOSIDES AND DOCUSATE SODIUM 1 TABLET: 8.6; 5 TABLET ORAL at 17:05

## 2020-01-01 RX ADMIN — INSULIN LISPRO 4 UNITS: 100 INJECTION, SOLUTION INTRAVENOUS; SUBCUTANEOUS at 12:09

## 2020-01-01 RX ADMIN — INSULIN GLARGINE 12 UNITS: 100 INJECTION, SOLUTION SUBCUTANEOUS at 21:42

## 2020-01-01 RX ADMIN — Medication 10 ML: at 05:54

## 2020-01-01 RX ADMIN — LISINOPRIL 20 MG: 5 TABLET ORAL at 08:16

## 2020-01-01 RX ADMIN — SENNOSIDES AND DOCUSATE SODIUM 1 TABLET: 8.6; 5 TABLET ORAL at 08:35

## 2020-01-01 RX ADMIN — INSULIN LISPRO 6 UNITS: 100 INJECTION, SOLUTION INTRAVENOUS; SUBCUTANEOUS at 17:13

## 2020-01-01 RX ADMIN — FUROSEMIDE 20 MG: 10 INJECTION, SOLUTION INTRAMUSCULAR; INTRAVENOUS at 21:34

## 2020-01-01 RX ADMIN — INSULIN LISPRO 2 UNITS: 100 INJECTION, SOLUTION INTRAVENOUS; SUBCUTANEOUS at 07:30

## 2020-01-01 RX ADMIN — FUROSEMIDE 40 MG: 10 INJECTION, SOLUTION INTRAMUSCULAR; INTRAVENOUS at 11:04

## 2020-01-01 RX ADMIN — HUMAN INSULIN 9 UNITS: 100 INJECTION, SOLUTION SUBCUTANEOUS at 23:34

## 2020-01-01 RX ADMIN — FERROUS SULFATE TAB 325 MG (65 MG ELEMENTAL FE) 325 MG: 325 (65 FE) TAB at 08:33

## 2020-01-01 RX ADMIN — SENNOSIDES AND DOCUSATE SODIUM 1 TABLET: 8.6; 5 TABLET ORAL at 17:26

## 2020-01-01 RX ADMIN — MELOXICAM 7.5 MG: 7.5 TABLET ORAL at 09:08

## 2020-01-01 RX ADMIN — ENOXAPARIN SODIUM 40 MG: 40 INJECTION SUBCUTANEOUS at 17:01

## 2020-01-01 RX ADMIN — BISACODYL 10 MG: 10 SUPPOSITORY RECTAL at 11:22

## 2020-01-01 RX ADMIN — HUMAN INSULIN 6 UNITS: 100 INJECTION, SOLUTION SUBCUTANEOUS at 22:07

## 2020-01-01 RX ADMIN — FUROSEMIDE 20 MG: 10 INJECTION, SOLUTION INTRAMUSCULAR; INTRAVENOUS at 07:58

## 2020-01-01 RX ADMIN — IPRATROPIUM BROMIDE AND ALBUTEROL SULFATE 3 ML: .5; 3 SOLUTION RESPIRATORY (INHALATION) at 09:07

## 2020-01-01 RX ADMIN — FUROSEMIDE 40 MG: 10 INJECTION INTRAMUSCULAR; INTRAVENOUS at 08:35

## 2020-01-01 RX ADMIN — SENNOSIDES AND DOCUSATE SODIUM 1 TABLET: 8.6; 5 TABLET ORAL at 09:50

## 2020-01-01 RX ADMIN — TAMSULOSIN HYDROCHLORIDE 0.4 MG: 0.4 CAPSULE ORAL at 08:31

## 2020-01-01 RX ADMIN — SENNOSIDES AND DOCUSATE SODIUM 1 TABLET: 8.6; 5 TABLET ORAL at 08:44

## 2020-01-01 RX ADMIN — HUMAN INSULIN 6 UNITS: 100 INJECTION, SOLUTION SUBCUTANEOUS at 12:15

## 2020-01-01 RX ADMIN — PREDNISONE 30 MG: 20 TABLET ORAL at 08:54

## 2020-01-01 RX ADMIN — POTASSIUM CHLORIDE 20 MEQ: 200 INJECTION, SOLUTION INTRAVENOUS at 20:10

## 2020-01-01 RX ADMIN — AMLODIPINE BESYLATE 10 MG: 10 TABLET ORAL at 08:06

## 2020-01-01 RX ADMIN — Medication 5 ML: at 05:10

## 2020-01-01 RX ADMIN — SENNOSIDES AND DOCUSATE SODIUM 1 TABLET: 8.6; 5 TABLET ORAL at 17:49

## 2020-01-01 RX ADMIN — Medication 5 ML: at 06:23

## 2020-01-01 RX ADMIN — Medication 2 G: at 08:25

## 2020-01-01 RX ADMIN — IPRATROPIUM BROMIDE AND ALBUTEROL SULFATE 3 ML: .5; 3 SOLUTION RESPIRATORY (INHALATION) at 15:46

## 2020-01-01 RX ADMIN — GLYCOPYRROLATE 0.1 MG: 0.2 INJECTION, SOLUTION INTRAMUSCULAR; INTRAVENOUS at 17:35

## 2020-01-01 RX ADMIN — Medication 5 ML: at 05:45

## 2020-01-01 RX ADMIN — HUMAN INSULIN 6 UNITS: 100 INJECTION, SOLUTION SUBCUTANEOUS at 17:06

## 2020-01-01 RX ADMIN — ENOXAPARIN SODIUM 40 MG: 40 INJECTION SUBCUTANEOUS at 17:59

## 2020-01-01 RX ADMIN — HYDROCODONE BITARTRATE AND ACETAMINOPHEN 1 TABLET: 5; 325 TABLET ORAL at 21:39

## 2020-01-01 RX ADMIN — BISACODYL 10 MG: 10 SUPPOSITORY RECTAL at 00:26

## 2020-01-01 RX ADMIN — SENNOSIDES AND DOCUSATE SODIUM 1 TABLET: 8.6; 5 TABLET ORAL at 08:49

## 2020-01-01 RX ADMIN — METFORMIN HYDROCHLORIDE 1000 MG: 500 TABLET ORAL at 08:36

## 2020-01-01 RX ADMIN — SENNOSIDES AND DOCUSATE SODIUM 1 TABLET: 8.6; 5 TABLET ORAL at 08:17

## 2020-01-01 RX ADMIN — NYSTATIN 500000 UNITS: 500000 SUSPENSION ORAL at 22:26

## 2020-01-01 RX ADMIN — Medication 2 G: at 08:21

## 2020-01-01 RX ADMIN — SENNOSIDES AND DOCUSATE SODIUM 1 TABLET: 8.6; 5 TABLET ORAL at 08:21

## 2020-01-01 RX ADMIN — INSULIN LISPRO 4 UNITS: 100 INJECTION, SOLUTION INTRAVENOUS; SUBCUTANEOUS at 17:43

## 2020-01-01 RX ADMIN — Medication 2 G: at 19:10

## 2020-01-01 RX ADMIN — BISACODYL 10 MG: 10 SUPPOSITORY RECTAL at 08:35

## 2020-01-01 RX ADMIN — ENOXAPARIN SODIUM 40 MG: 40 INJECTION SUBCUTANEOUS at 17:15

## 2020-01-01 RX ADMIN — HUMAN INSULIN 6 UNITS: 100 INJECTION, SOLUTION SUBCUTANEOUS at 08:55

## 2020-01-01 RX ADMIN — LORAZEPAM 0.5 MG: 2 INJECTION, SOLUTION INTRAMUSCULAR; INTRAVENOUS at 09:52

## 2020-01-01 RX ADMIN — LISINOPRIL 20 MG: 5 TABLET ORAL at 11:50

## 2020-01-01 RX ADMIN — POTASSIUM CHLORIDE 20 MEQ: 200 INJECTION, SOLUTION INTRAVENOUS at 22:08

## 2020-01-01 RX ADMIN — PREDNISONE 30 MG: 20 TABLET ORAL at 08:47

## 2020-01-01 RX ADMIN — LISINOPRIL 20 MG: 5 TABLET ORAL at 09:23

## 2020-01-01 RX ADMIN — VANCOMYCIN HYDROCHLORIDE 2000 MG: 10 INJECTION, POWDER, LYOPHILIZED, FOR SOLUTION INTRAVENOUS at 07:04

## 2020-01-01 RX ADMIN — INSULIN LISPRO 6 UNITS: 100 INJECTION, SOLUTION INTRAVENOUS; SUBCUTANEOUS at 21:34

## 2020-01-01 RX ADMIN — VANCOMYCIN HYDROCHLORIDE 1250 MG: 10 INJECTION, POWDER, LYOPHILIZED, FOR SOLUTION INTRAVENOUS at 10:55

## 2020-01-01 RX ADMIN — SENNOSIDES AND DOCUSATE SODIUM 1 TABLET: 8.6; 5 TABLET ORAL at 09:44

## 2020-01-01 RX ADMIN — INSULIN GLARGINE 5 UNITS: 100 INJECTION, SOLUTION SUBCUTANEOUS at 22:27

## 2020-01-01 RX ADMIN — HUMAN INSULIN 3 UNITS: 100 INJECTION, SOLUTION SUBCUTANEOUS at 22:54

## 2020-01-01 RX ADMIN — TAMSULOSIN HYDROCHLORIDE 0.4 MG: 0.4 CAPSULE ORAL at 08:35

## 2020-01-01 RX ADMIN — LISINOPRIL 20 MG: 20 TABLET ORAL at 09:48

## 2020-01-01 RX ADMIN — INSULIN LISPRO 6 UNITS: 100 INJECTION, SOLUTION INTRAVENOUS; SUBCUTANEOUS at 11:30

## 2020-01-01 RX ADMIN — Medication 10 ML: at 22:12

## 2020-01-01 RX ADMIN — Medication 10 ML: at 13:33

## 2020-01-01 RX ADMIN — TAMSULOSIN HYDROCHLORIDE 0.4 MG: 0.4 CAPSULE ORAL at 08:36

## 2020-01-01 RX ADMIN — Medication 10 ML: at 15:40

## 2020-01-01 RX ADMIN — Medication 2 G: at 17:36

## 2020-01-01 RX ADMIN — Medication 5 ML: at 08:25

## 2020-01-01 RX ADMIN — Medication 2 G: at 09:27

## 2020-01-01 RX ADMIN — POTASSIUM CHLORIDE 20 MEQ: 20 TABLET, EXTENDED RELEASE ORAL at 08:08

## 2020-01-01 RX ADMIN — POTASSIUM CHLORIDE 20 MEQ: 20 TABLET, EXTENDED RELEASE ORAL at 22:34

## 2020-01-01 RX ADMIN — NYSTATIN 500000 UNITS: 500000 SUSPENSION ORAL at 11:50

## 2020-01-01 RX ADMIN — NYSTATIN 500000 UNITS: 500000 SUSPENSION ORAL at 17:17

## 2020-01-01 RX ADMIN — ENOXAPARIN SODIUM 40 MG: 40 INJECTION SUBCUTANEOUS at 08:22

## 2020-01-01 RX ADMIN — MELOXICAM 7.5 MG: 7.5 TABLET ORAL at 09:27

## 2020-01-01 RX ADMIN — INSULIN LISPRO 2 UNITS: 100 INJECTION, SOLUTION INTRAVENOUS; SUBCUTANEOUS at 08:26

## 2020-01-01 RX ADMIN — Medication 10 ML: at 14:17

## 2020-01-01 RX ADMIN — MAGNESIUM HYDROXIDE 30 ML: 400 SUSPENSION ORAL at 09:26

## 2020-01-01 RX ADMIN — ETOPOSIDE 199 MG: 20 INJECTION, SOLUTION, CONCENTRATE INTRAVENOUS at 10:32

## 2020-01-01 RX ADMIN — POTASSIUM CHLORIDE 20 MEQ: 20 TABLET, EXTENDED RELEASE ORAL at 22:01

## 2020-01-01 RX ADMIN — INSULIN LISPRO 2 UNITS: 100 INJECTION, SOLUTION INTRAVENOUS; SUBCUTANEOUS at 22:00

## 2020-01-01 RX ADMIN — POTASSIUM CHLORIDE 20 MEQ: 20 TABLET, EXTENDED RELEASE ORAL at 21:00

## 2020-01-01 RX ADMIN — DEXAMETHASONE SODIUM PHOSPHATE 6 MG: 4 INJECTION, SOLUTION INTRAMUSCULAR; INTRAVENOUS at 13:23

## 2020-01-01 RX ADMIN — PIPERACILLIN AND TAZOBACTAM 3.38 G: 3; .375 INJECTION, POWDER, FOR SOLUTION INTRAVENOUS at 17:01

## 2020-01-01 RX ADMIN — ENOXAPARIN SODIUM 40 MG: 40 INJECTION SUBCUTANEOUS at 08:31

## 2020-01-01 RX ADMIN — NYSTATIN 500000 UNITS: 500000 SUSPENSION ORAL at 22:44

## 2020-01-01 RX ADMIN — Medication 10 ML: at 06:17

## 2020-01-01 RX ADMIN — IPRATROPIUM BROMIDE AND ALBUTEROL SULFATE 3 ML: .5; 3 SOLUTION RESPIRATORY (INHALATION) at 08:06

## 2020-01-01 RX ADMIN — PROPOFOL 140 MCG/KG/MIN: 10 INJECTION, EMULSION INTRAVENOUS at 14:17

## 2020-01-01 RX ADMIN — LISINOPRIL 20 MG: 20 TABLET ORAL at 08:42

## 2020-01-01 RX ADMIN — LISINOPRIL 20 MG: 5 TABLET ORAL at 09:24

## 2020-01-01 RX ADMIN — MELOXICAM 7.5 MG: 7.5 TABLET ORAL at 10:15

## 2020-01-01 RX ADMIN — DIATRIZOATE MEGLUMINE AND DIATRIZOATE SODIUM 15 ML: 660; 100 LIQUID ORAL; RECTAL at 03:30

## 2020-01-01 RX ADMIN — AMLODIPINE BESYLATE 10 MG: 10 TABLET ORAL at 08:25

## 2020-01-01 RX ADMIN — INSULIN LISPRO 8 UNITS: 100 INJECTION, SOLUTION INTRAVENOUS; SUBCUTANEOUS at 09:02

## 2020-01-01 RX ADMIN — FUROSEMIDE 40 MG: 10 INJECTION, SOLUTION INTRAMUSCULAR; INTRAVENOUS at 09:45

## 2020-01-01 RX ADMIN — INSULIN LISPRO 8 UNITS: 100 INJECTION, SOLUTION INTRAVENOUS; SUBCUTANEOUS at 12:28

## 2020-01-01 RX ADMIN — PIPERACILLIN AND TAZOBACTAM 3.38 G: 3; .375 INJECTION, POWDER, FOR SOLUTION INTRAVENOUS at 09:43

## 2020-01-01 RX ADMIN — HUMAN INSULIN 6 UNITS: 100 INJECTION, SOLUTION SUBCUTANEOUS at 12:29

## 2020-01-01 RX ADMIN — POTASSIUM CHLORIDE 20 MEQ: 20 TABLET, EXTENDED RELEASE ORAL at 18:15

## 2020-01-01 RX ADMIN — LISINOPRIL 20 MG: 5 TABLET ORAL at 10:15

## 2020-01-01 RX ADMIN — IPRATROPIUM BROMIDE AND ALBUTEROL SULFATE 3 ML: .5; 3 SOLUTION RESPIRATORY (INHALATION) at 18:25

## 2020-01-01 RX ADMIN — INSULIN GLARGINE 20 UNITS: 100 INJECTION, SOLUTION SUBCUTANEOUS at 21:43

## 2020-01-01 RX ADMIN — Medication 10 ML: at 13:24

## 2020-01-01 RX ADMIN — NYSTATIN 500000 UNITS: 500000 SUSPENSION ORAL at 13:40

## 2020-01-01 RX ADMIN — TRAMADOL HYDROCHLORIDE 50 MG: 50 TABLET, FILM COATED ORAL at 08:25

## 2020-01-01 RX ADMIN — MAGNESIUM HYDROXIDE 30 ML: 400 SUSPENSION ORAL at 09:50

## 2020-01-01 RX ADMIN — FERROUS SULFATE TAB 325 MG (65 MG ELEMENTAL FE) 325 MG: 325 (65 FE) TAB at 09:06

## 2020-01-01 RX ADMIN — PREDNISONE 30 MG: 20 TABLET ORAL at 09:20

## 2020-01-01 RX ADMIN — SENNOSIDES AND DOCUSATE SODIUM 1 TABLET: 8.6; 5 TABLET ORAL at 09:28

## 2020-01-01 RX ADMIN — HUMAN INSULIN 6 UNITS: 100 INJECTION, SOLUTION SUBCUTANEOUS at 21:47

## 2020-01-01 RX ADMIN — BISACODYL 10 MG: 10 SUPPOSITORY RECTAL at 08:28

## 2020-01-01 RX ADMIN — INSULIN LISPRO 4 UNITS: 100 INJECTION, SOLUTION INTRAVENOUS; SUBCUTANEOUS at 12:26

## 2020-01-01 RX ADMIN — HUMAN INSULIN 6 UNITS: 100 INJECTION, SOLUTION SUBCUTANEOUS at 17:22

## 2020-01-01 RX ADMIN — AZITHROMYCIN MONOHYDRATE 500 MG: 250 TABLET ORAL at 08:24

## 2020-01-01 RX ADMIN — IPRATROPIUM BROMIDE AND ALBUTEROL SULFATE 3 ML: .5; 3 SOLUTION RESPIRATORY (INHALATION) at 08:33

## 2020-01-01 RX ADMIN — Medication 2 G: at 09:34

## 2020-01-01 RX ADMIN — MAGNESIUM HYDROXIDE 30 ML: 400 SUSPENSION ORAL at 00:26

## 2020-01-01 RX ADMIN — AMLODIPINE BESYLATE 10 MG: 10 TABLET ORAL at 08:34

## 2020-01-01 RX ADMIN — GLIMEPIRIDE 4 MG: 4 TABLET ORAL at 08:24

## 2020-01-01 RX ADMIN — INSULIN LISPRO 4 UNITS: 100 INJECTION, SOLUTION INTRAVENOUS; SUBCUTANEOUS at 16:30

## 2020-01-01 RX ADMIN — HUMAN INSULIN 3 UNITS: 100 INJECTION, SOLUTION SUBCUTANEOUS at 21:59

## 2020-01-01 RX ADMIN — INSULIN LISPRO 2 UNITS: 100 INJECTION, SOLUTION INTRAVENOUS; SUBCUTANEOUS at 12:15

## 2020-01-01 RX ADMIN — SENNOSIDES AND DOCUSATE SODIUM 1 TABLET: 8.6; 5 TABLET ORAL at 17:45

## 2020-01-01 RX ADMIN — Medication 10 ML: at 05:04

## 2020-01-01 RX ADMIN — INSULIN GLARGINE 5 UNITS: 100 INJECTION, SOLUTION SUBCUTANEOUS at 21:46

## 2020-01-01 RX ADMIN — ACETAMINOPHEN 650 MG: 325 TABLET, FILM COATED ORAL at 09:13

## 2020-01-01 RX ADMIN — HUMAN INSULIN 6 UNITS: 100 INJECTION, SOLUTION SUBCUTANEOUS at 17:08

## 2020-01-01 RX ADMIN — SENNOSIDES AND DOCUSATE SODIUM 1 TABLET: 8.6; 5 TABLET ORAL at 11:50

## 2020-01-01 RX ADMIN — TUBERCULIN PURIFIED PROTEIN DERIVATIVE 5 UNITS: 5 INJECTION, SOLUTION INTRADERMAL at 11:15

## 2020-01-01 RX ADMIN — IOPAMIDOL 100 ML: 755 INJECTION, SOLUTION INTRAVENOUS at 18:38

## 2020-01-01 RX ADMIN — IPRATROPIUM BROMIDE AND ALBUTEROL SULFATE 3 ML: .5; 3 SOLUTION RESPIRATORY (INHALATION) at 08:14

## 2020-01-01 RX ADMIN — INSULIN LISPRO 6 UNITS: 100 INJECTION, SOLUTION INTRAVENOUS; SUBCUTANEOUS at 17:18

## 2020-01-01 RX ADMIN — DEXAMETHASONE SODIUM PHOSPHATE 12 MG: 4 INJECTION, SOLUTION INTRAMUSCULAR; INTRAVENOUS at 12:10

## 2020-01-01 RX ADMIN — NYSTATIN 500000 UNITS: 500000 SUSPENSION ORAL at 17:26

## 2020-01-01 RX ADMIN — Medication 10 ML: at 21:55

## 2020-01-01 RX ADMIN — HUMAN INSULIN 6 UNITS: 100 INJECTION, SOLUTION SUBCUTANEOUS at 22:08

## 2020-01-01 RX ADMIN — FUROSEMIDE 40 MG: 10 INJECTION, SOLUTION INTRAMUSCULAR; INTRAVENOUS at 09:50

## 2020-01-01 RX ADMIN — FUROSEMIDE 40 MG: 10 INJECTION, SOLUTION INTRAMUSCULAR; INTRAVENOUS at 09:33

## 2020-01-01 RX ADMIN — GLIMEPIRIDE 4 MG: 4 TABLET ORAL at 09:10

## 2020-01-01 RX ADMIN — INSULIN LISPRO 6 UNITS: 100 INJECTION, SOLUTION INTRAVENOUS; SUBCUTANEOUS at 22:13

## 2020-01-01 RX ADMIN — FUROSEMIDE 40 MG: 10 INJECTION, SOLUTION INTRAMUSCULAR; INTRAVENOUS at 09:26

## 2020-01-01 RX ADMIN — SENNOSIDES AND DOCUSATE SODIUM 1 TABLET: 8.6; 5 TABLET ORAL at 19:11

## 2020-01-01 RX ADMIN — POTASSIUM CHLORIDE 20 MEQ: 20 TABLET, EXTENDED RELEASE ORAL at 17:45

## 2020-01-01 RX ADMIN — TUBERCULIN PURIFIED PROTEIN DERIVATIVE 5 UNITS: 5 INJECTION, SOLUTION INTRADERMAL at 19:25

## 2020-01-01 RX ADMIN — Medication 2 G: at 18:24

## 2020-01-01 RX ADMIN — ENOXAPARIN SODIUM 30 MG: 30 INJECTION SUBCUTANEOUS at 21:34

## 2020-01-01 RX ADMIN — FERROUS SULFATE TAB 325 MG (65 MG ELEMENTAL FE) 325 MG: 325 (65 FE) TAB at 08:00

## 2020-01-01 RX ADMIN — MAGNESIUM HYDROXIDE 30 ML: 400 SUSPENSION ORAL at 10:14

## 2020-01-01 RX ADMIN — ENOXAPARIN SODIUM 30 MG: 30 INJECTION SUBCUTANEOUS at 08:52

## 2020-01-01 RX ADMIN — Medication 2 G: at 08:33

## 2020-01-01 RX ADMIN — HUMAN INSULIN 9 UNITS: 100 INJECTION, SOLUTION SUBCUTANEOUS at 12:26

## 2020-01-01 RX ADMIN — DOCUSATE SODIUM 100 MG: 100 CAPSULE, LIQUID FILLED ORAL at 16:42

## 2020-01-01 RX ADMIN — GLIMEPIRIDE 4 MG: 4 TABLET ORAL at 08:17

## 2020-01-01 RX ADMIN — TAMSULOSIN HYDROCHLORIDE 0.4 MG: 0.4 CAPSULE ORAL at 09:00

## 2020-01-01 RX ADMIN — LISINOPRIL 20 MG: 5 TABLET ORAL at 09:27

## 2020-01-01 RX ADMIN — NYSTATIN 500000 UNITS: 500000 SUSPENSION ORAL at 21:14

## 2020-01-01 RX ADMIN — POTASSIUM BICARBONATE 40 MEQ: 782 TABLET, EFFERVESCENT ORAL at 17:19

## 2020-01-01 RX ADMIN — AMLODIPINE BESYLATE 10 MG: 10 TABLET ORAL at 08:08

## 2020-01-01 RX ADMIN — LISINOPRIL 20 MG: 5 TABLET ORAL at 09:50

## 2020-01-01 RX ADMIN — METFORMIN HYDROCHLORIDE 1000 MG: 500 TABLET ORAL at 08:39

## 2020-01-01 RX ADMIN — Medication 10 ML: at 05:09

## 2020-01-01 RX ADMIN — TAMSULOSIN HYDROCHLORIDE 0.4 MG: 0.4 CAPSULE ORAL at 09:13

## 2020-01-01 RX ADMIN — GLIMEPIRIDE 4 MG: 4 TABLET ORAL at 08:00

## 2020-01-01 RX ADMIN — HUMAN INSULIN 3 UNITS: 100 INJECTION, SOLUTION SUBCUTANEOUS at 12:29

## 2020-01-01 RX ADMIN — IPRATROPIUM BROMIDE AND ALBUTEROL SULFATE 3 ML: .5; 3 SOLUTION RESPIRATORY (INHALATION) at 07:21

## 2020-01-01 RX ADMIN — SENNOSIDES AND DOCUSATE SODIUM 1 TABLET: 8.6; 5 TABLET ORAL at 09:47

## 2020-01-01 RX ADMIN — Medication 10 ML: at 23:36

## 2020-01-01 RX ADMIN — FUROSEMIDE 40 MG: 10 INJECTION, SOLUTION INTRAMUSCULAR; INTRAVENOUS at 08:31

## 2020-01-01 RX ADMIN — ENOXAPARIN SODIUM 40 MG: 40 INJECTION SUBCUTANEOUS at 08:48

## 2020-01-01 RX ADMIN — LISINOPRIL 20 MG: 5 TABLET ORAL at 09:00

## 2020-01-01 RX ADMIN — INSULIN LISPRO 6 UNITS: 100 INJECTION, SOLUTION INTRAVENOUS; SUBCUTANEOUS at 08:17

## 2020-01-01 RX ADMIN — DEXAMETHASONE SODIUM PHOSPHATE 8 MG: 4 INJECTION, SOLUTION INTRAMUSCULAR; INTRAVENOUS at 17:17

## 2020-01-01 RX ADMIN — IPRATROPIUM BROMIDE AND ALBUTEROL SULFATE 3 ML: .5; 3 SOLUTION RESPIRATORY (INHALATION) at 08:32

## 2020-01-01 RX ADMIN — BISACODYL 10 MG: 10 SUPPOSITORY RECTAL at 09:15

## 2020-01-01 RX ADMIN — HUMAN INSULIN 6 UNITS: 100 INJECTION, SOLUTION SUBCUTANEOUS at 22:56

## 2020-01-01 RX ADMIN — HUMAN INSULIN 3 UNITS: 100 INJECTION, SOLUTION SUBCUTANEOUS at 09:24

## 2020-01-01 RX ADMIN — CEFEPIME HYDROCHLORIDE 2 G: 2 INJECTION, POWDER, FOR SOLUTION INTRAVENOUS at 19:26

## 2020-01-01 RX ADMIN — LACTULOSE 45 ML: 20 SOLUTION ORAL at 10:25

## 2020-01-01 RX ADMIN — MELOXICAM 7.5 MG: 7.5 TABLET ORAL at 08:24

## 2020-01-01 RX ADMIN — INSULIN LISPRO 2 UNITS: 100 INJECTION, SOLUTION INTRAVENOUS; SUBCUTANEOUS at 18:05

## 2020-01-01 RX ADMIN — ETOPOSIDE 199 MG: 20 INJECTION, SOLUTION, CONCENTRATE INTRAVENOUS at 13:49

## 2020-01-01 RX ADMIN — Medication 2 G: at 09:08

## 2020-01-01 RX ADMIN — POTASSIUM CHLORIDE 20 MEQ: 20 TABLET, EXTENDED RELEASE ORAL at 08:28

## 2020-01-01 RX ADMIN — ETOPOSIDE 199 MG: 20 INJECTION INTRAVENOUS at 11:39

## 2020-01-01 RX ADMIN — HUMAN INSULIN 3 UNITS: 100 INJECTION, SOLUTION SUBCUTANEOUS at 06:18

## 2020-01-01 RX ADMIN — AMLODIPINE BESYLATE 10 MG: 10 TABLET ORAL at 09:24

## 2020-01-01 RX ADMIN — Medication 2 G: at 09:44

## 2020-01-01 RX ADMIN — FUROSEMIDE 40 MG: 10 INJECTION, SOLUTION INTRAMUSCULAR; INTRAVENOUS at 09:10

## 2020-01-01 RX ADMIN — INSULIN LISPRO 4 UNITS: 100 INJECTION, SOLUTION INTRAVENOUS; SUBCUTANEOUS at 13:14

## 2020-01-01 RX ADMIN — AMLODIPINE BESYLATE 10 MG: 10 TABLET ORAL at 08:56

## 2020-01-01 RX ADMIN — LISINOPRIL 20 MG: 20 TABLET ORAL at 08:41

## 2020-01-01 RX ADMIN — POTASSIUM CHLORIDE 20 MEQ: 20 TABLET, EXTENDED RELEASE ORAL at 23:34

## 2020-01-01 RX ADMIN — LISINOPRIL 20 MG: 5 TABLET ORAL at 08:33

## 2020-01-01 RX ADMIN — POTASSIUM CHLORIDE 20 MEQ: 20 TABLET, EXTENDED RELEASE ORAL at 08:31

## 2020-01-01 RX ADMIN — POLYETHYLENE GLYCOL (3350) 17 G: 17 POWDER, FOR SOLUTION ORAL at 08:42

## 2020-01-01 RX ADMIN — INSULIN GLARGINE 10 UNITS: 100 INJECTION, SOLUTION SUBCUTANEOUS at 22:01

## 2020-01-01 RX ADMIN — LISINOPRIL 20 MG: 20 TABLET ORAL at 08:29

## 2020-01-01 RX ADMIN — POLYETHYLENE GLYCOL-3350 AND ELECTROLYTES WITH FLAVOR PACK 2000 ML: 240; 5.84; 2.98; 6.72; 22.72 POWDER, FOR SOLUTION ORAL at 18:00

## 2020-01-01 RX ADMIN — LACTULOSE 30 ML: 20 SOLUTION ORAL at 21:39

## 2020-01-01 RX ADMIN — FERROUS SULFATE TAB 325 MG (65 MG ELEMENTAL FE) 325 MG: 325 (65 FE) TAB at 09:21

## 2020-01-01 RX ADMIN — Medication 2 G: at 18:02

## 2020-01-01 RX ADMIN — FUROSEMIDE 20 MG: 10 INJECTION, SOLUTION INTRAMUSCULAR; INTRAVENOUS at 21:40

## 2020-01-01 RX ADMIN — CEFTRIAXONE SODIUM 1 G: 1 INJECTION, POWDER, FOR SOLUTION INTRAMUSCULAR; INTRAVENOUS at 01:52

## 2020-01-01 RX ADMIN — POTASSIUM CHLORIDE 20 MEQ: 20 TABLET, EXTENDED RELEASE ORAL at 09:08

## 2020-01-01 RX ADMIN — INSULIN LISPRO 6 UNITS: 100 INJECTION, SOLUTION INTRAVENOUS; SUBCUTANEOUS at 17:11

## 2020-01-01 RX ADMIN — IPRATROPIUM BROMIDE AND ALBUTEROL SULFATE 3 ML: .5; 3 SOLUTION RESPIRATORY (INHALATION) at 13:50

## 2020-01-01 RX ADMIN — PIPERACILLIN AND TAZOBACTAM 3.38 G: 3; .375 INJECTION, POWDER, FOR SOLUTION INTRAVENOUS at 08:48

## 2020-01-01 RX ADMIN — INSULIN LISPRO 2 UNITS: 100 INJECTION, SOLUTION INTRAVENOUS; SUBCUTANEOUS at 14:11

## 2020-01-01 RX ADMIN — IPRATROPIUM BROMIDE AND ALBUTEROL SULFATE 3 ML: .5; 3 SOLUTION RESPIRATORY (INHALATION) at 14:24

## 2020-01-01 RX ADMIN — NYSTATIN 500000 UNITS: 500000 SUSPENSION ORAL at 09:00

## 2020-01-01 RX ADMIN — TOLVAPTAN 15 MG: 15 TABLET ORAL at 11:12

## 2020-01-01 RX ADMIN — IPRATROPIUM BROMIDE AND ALBUTEROL SULFATE 3 ML: .5; 3 SOLUTION RESPIRATORY (INHALATION) at 08:44

## 2020-01-01 RX ADMIN — PIPERACILLIN AND TAZOBACTAM 3.38 G: 3; .375 INJECTION, POWDER, FOR SOLUTION INTRAVENOUS at 17:27

## 2020-01-01 RX ADMIN — TAMSULOSIN HYDROCHLORIDE 0.4 MG: 0.4 CAPSULE ORAL at 08:48

## 2020-01-01 RX ADMIN — POTASSIUM CHLORIDE 20 MEQ: 20 TABLET, EXTENDED RELEASE ORAL at 18:06

## 2020-01-01 RX ADMIN — AZITHROMYCIN DIHYDRATE 500 MG: 500 INJECTION, POWDER, LYOPHILIZED, FOR SOLUTION INTRAVENOUS at 02:37

## 2020-01-01 RX ADMIN — TAMSULOSIN HYDROCHLORIDE 0.4 MG: 0.4 CAPSULE ORAL at 09:06

## 2020-01-01 RX ADMIN — AMLODIPINE BESYLATE 10 MG: 10 TABLET ORAL at 11:50

## 2020-01-01 RX ADMIN — PREDNISONE 40 MG: 20 TABLET ORAL at 10:22

## 2020-01-01 RX ADMIN — Medication 10 ML: at 13:52

## 2020-01-01 RX ADMIN — ETOPOSIDE 199 MG: 20 INJECTION INTRAVENOUS at 12:10

## 2020-01-01 RX ADMIN — INSULIN LISPRO 4 UNITS: 100 INJECTION, SOLUTION INTRAVENOUS; SUBCUTANEOUS at 22:00

## 2020-01-01 RX ADMIN — METFORMIN HYDROCHLORIDE 1000 MG: 500 TABLET ORAL at 08:34

## 2020-01-01 RX ADMIN — Medication 1 PACKET: at 08:27

## 2020-01-01 RX ADMIN — IPRATROPIUM BROMIDE AND ALBUTEROL SULFATE 3 ML: .5; 3 SOLUTION RESPIRATORY (INHALATION) at 08:07

## 2020-01-01 RX ADMIN — BISACODYL 10 MG: 10 SUPPOSITORY RECTAL at 18:06

## 2020-01-01 RX ADMIN — ENOXAPARIN SODIUM 40 MG: 40 INJECTION SUBCUTANEOUS at 10:15

## 2020-01-01 RX ADMIN — METFORMIN HYDROCHLORIDE 1000 MG: 500 TABLET ORAL at 08:42

## 2020-01-01 RX ADMIN — ENOXAPARIN SODIUM 40 MG: 40 INJECTION SUBCUTANEOUS at 09:10

## 2020-01-01 RX ADMIN — Medication 5 ML: at 05:09

## 2020-01-01 RX ADMIN — Medication 2 G: at 09:01

## 2020-01-01 RX ADMIN — NYSTATIN 500000 UNITS: 500000 SUSPENSION ORAL at 13:00

## 2020-01-01 RX ADMIN — MAGNESIUM SULFATE HEPTAHYDRATE 2 G: 40 INJECTION, SOLUTION INTRAVENOUS at 16:06

## 2020-01-01 RX ADMIN — TAMSULOSIN HYDROCHLORIDE 0.4 MG: 0.4 CAPSULE ORAL at 09:01

## 2020-01-01 RX ADMIN — SODIUM CHLORIDE 25 ML/HR: 900 INJECTION, SOLUTION INTRAVENOUS at 10:00

## 2020-01-01 RX ADMIN — FUROSEMIDE 40 MG: 10 INJECTION INTRAMUSCULAR; INTRAVENOUS at 17:11

## 2020-01-01 RX ADMIN — HUMAN INSULIN 6 UNITS: 100 INJECTION, SOLUTION SUBCUTANEOUS at 23:46

## 2020-01-01 RX ADMIN — Medication 2 G: at 18:38

## 2020-01-01 RX ADMIN — INSULIN GLARGINE 8 UNITS: 100 INJECTION, SOLUTION SUBCUTANEOUS at 22:46

## 2020-01-01 RX ADMIN — NYSTATIN 500000 UNITS: 500000 SUSPENSION ORAL at 18:23

## 2020-01-01 RX ADMIN — HUMAN INSULIN 6 UNITS: 100 INJECTION, SOLUTION SUBCUTANEOUS at 22:00

## 2020-01-01 RX ADMIN — IPRATROPIUM BROMIDE AND ALBUTEROL SULFATE 3 ML: .5; 3 SOLUTION RESPIRATORY (INHALATION) at 14:43

## 2020-01-01 RX ADMIN — POTASSIUM CHLORIDE 40 MEQ: 20 TABLET, EXTENDED RELEASE ORAL at 17:31

## 2020-01-01 RX ADMIN — ETOPOSIDE 199 MG: 20 INJECTION, SOLUTION, CONCENTRATE INTRAVENOUS at 16:20

## 2020-01-01 RX ADMIN — INSULIN LISPRO 2 UNITS: 100 INJECTION, SOLUTION INTRAVENOUS; SUBCUTANEOUS at 17:33

## 2020-01-01 RX ADMIN — METFORMIN HYDROCHLORIDE 1000 MG: 500 TABLET ORAL at 08:08

## 2020-01-01 RX ADMIN — HUMAN INSULIN 6 UNITS: 100 INJECTION, SOLUTION SUBCUTANEOUS at 18:26

## 2020-01-01 RX ADMIN — Medication 2 G: at 18:06

## 2020-01-01 RX ADMIN — SENNOSIDES AND DOCUSATE SODIUM 1 TABLET: 8.6; 5 TABLET ORAL at 18:17

## 2020-01-01 RX ADMIN — POLYETHYLENE GLYCOL (3350) 17 G: 17 POWDER, FOR SOLUTION ORAL at 08:07

## 2020-01-01 RX ADMIN — SENNOSIDES AND DOCUSATE SODIUM 1 TABLET: 8.6; 5 TABLET ORAL at 08:28

## 2020-01-01 RX ADMIN — POTASSIUM CHLORIDE 20 MEQ: 20 TABLET, EXTENDED RELEASE ORAL at 22:32

## 2020-01-01 RX ADMIN — Medication 2 G: at 17:49

## 2020-01-01 RX ADMIN — MELOXICAM 7.5 MG: 7.5 TABLET ORAL at 08:06

## 2020-01-01 RX ADMIN — POTASSIUM CHLORIDE 20 MEQ: 20 TABLET, EXTENDED RELEASE ORAL at 17:49

## 2020-01-01 RX ADMIN — Medication 10 ML: at 09:50

## 2020-01-01 RX ADMIN — Medication 10 ML: at 22:01

## 2020-01-01 RX ADMIN — DEXAMETHASONE SODIUM PHOSPHATE 6 MG: 4 INJECTION, SOLUTION INTRAMUSCULAR; INTRAVENOUS at 08:30

## 2020-01-01 RX ADMIN — FUROSEMIDE 40 MG: 10 INJECTION INTRAMUSCULAR; INTRAVENOUS at 09:00

## 2020-01-01 RX ADMIN — FUROSEMIDE 20 MG: 10 INJECTION, SOLUTION INTRAMUSCULAR; INTRAVENOUS at 21:58

## 2020-01-01 RX ADMIN — SODIUM CHLORIDE, SODIUM LACTATE, POTASSIUM CHLORIDE, AND CALCIUM CHLORIDE: 600; 310; 30; 20 INJECTION, SOLUTION INTRAVENOUS at 14:12

## 2020-01-01 RX ADMIN — BISACODYL 10 MG: 10 SUPPOSITORY RECTAL at 08:58

## 2020-01-01 RX ADMIN — INSULIN LISPRO 4 UNITS: 100 INJECTION, SOLUTION INTRAVENOUS; SUBCUTANEOUS at 22:26

## 2020-01-01 RX ADMIN — Medication 10 ML: at 05:15

## 2020-01-01 RX ADMIN — POTASSIUM CHLORIDE 20 MEQ: 20 TABLET, EXTENDED RELEASE ORAL at 21:45

## 2020-01-01 RX ADMIN — HUMAN INSULIN 2 UNITS: 100 INJECTION, SOLUTION SUBCUTANEOUS at 12:12

## 2020-01-01 RX ADMIN — POTASSIUM CHLORIDE 20 MEQ: 20 TABLET, EXTENDED RELEASE ORAL at 09:10

## 2020-01-01 RX ADMIN — POTASSIUM CHLORIDE 20 MEQ: 20 TABLET, EXTENDED RELEASE ORAL at 22:07

## 2020-01-01 RX ADMIN — Medication 10 ML: at 13:41

## 2020-01-01 RX ADMIN — Medication 10 ML: at 15:01

## 2020-01-01 RX ADMIN — BISACODYL 10 MG: 10 SUPPOSITORY RECTAL at 08:54

## 2020-01-01 RX ADMIN — IPRATROPIUM BROMIDE AND ALBUTEROL SULFATE 3 ML: .5; 3 SOLUTION RESPIRATORY (INHALATION) at 14:52

## 2020-01-01 RX ADMIN — FERROUS SULFATE TAB 325 MG (65 MG ELEMENTAL FE) 325 MG: 325 (65 FE) TAB at 08:06

## 2020-01-01 RX ADMIN — TRAMADOL HYDROCHLORIDE 50 MG: 50 TABLET, FILM COATED ORAL at 20:59

## 2020-01-01 RX ADMIN — INSULIN LISPRO 6 UNITS: 100 INJECTION, SOLUTION INTRAVENOUS; SUBCUTANEOUS at 13:04

## 2020-01-01 RX ADMIN — HUMAN INSULIN 12 UNITS: 100 INJECTION, SOLUTION SUBCUTANEOUS at 18:28

## 2020-01-01 RX ADMIN — INSULIN GLARGINE 10 UNITS: 100 INJECTION, SOLUTION SUBCUTANEOUS at 22:54

## 2020-01-01 RX ADMIN — INSULIN GLARGINE 20 UNITS: 100 INJECTION, SOLUTION SUBCUTANEOUS at 21:48

## 2020-01-01 RX ADMIN — FUROSEMIDE 40 MG: 10 INJECTION INTRAMUSCULAR; INTRAVENOUS at 18:00

## 2020-01-01 RX ADMIN — TOLVAPTAN 15 MG: 15 TABLET ORAL at 11:46

## 2020-01-01 RX ADMIN — AZITHROMYCIN DIHYDRATE 500 MG: 500 INJECTION, POWDER, LYOPHILIZED, FOR SOLUTION INTRAVENOUS at 01:52

## 2020-01-01 RX ADMIN — FERROUS SULFATE TAB 325 MG (65 MG ELEMENTAL FE) 325 MG: 325 (65 FE) TAB at 09:27

## 2020-01-01 RX ADMIN — ENOXAPARIN SODIUM 40 MG: 40 INJECTION SUBCUTANEOUS at 09:43

## 2020-01-01 RX ADMIN — MAGNESIUM HYDROXIDE 30 ML: 400 SUSPENSION ORAL at 09:35

## 2020-01-01 RX ADMIN — BISACODYL 10 MG: 10 SUPPOSITORY RECTAL at 09:51

## 2020-01-01 RX ADMIN — SENNOSIDES AND DOCUSATE SODIUM 1 TABLET: 8.6; 5 TABLET ORAL at 08:42

## 2020-01-01 RX ADMIN — MAGNESIUM HYDROXIDE 30 ML: 400 SUSPENSION ORAL at 09:00

## 2020-01-01 RX ADMIN — Medication 10 ML: at 09:40

## 2020-01-01 RX ADMIN — BISACODYL 10 MG: 10 SUPPOSITORY RECTAL at 09:21

## 2020-01-01 RX ADMIN — CEFEPIME HYDROCHLORIDE 2 G: 2 INJECTION, POWDER, FOR SOLUTION INTRAVENOUS at 12:16

## 2020-01-01 RX ADMIN — POTASSIUM CHLORIDE 20 MEQ: 20 TABLET, EXTENDED RELEASE ORAL at 17:21

## 2020-01-01 RX ADMIN — SODIUM CHLORIDE 25 ML/HR: 900 INJECTION, SOLUTION INTRAVENOUS at 09:40

## 2020-01-01 RX ADMIN — PIPERACILLIN AND TAZOBACTAM 3.38 G: 3; .375 INJECTION, POWDER, FOR SOLUTION INTRAVENOUS at 02:31

## 2020-01-01 RX ADMIN — TRAMADOL HYDROCHLORIDE 50 MG: 50 TABLET, FILM COATED ORAL at 00:28

## 2020-01-01 RX ADMIN — SENNOSIDES AND DOCUSATE SODIUM 1 TABLET: 8.6; 5 TABLET ORAL at 09:30

## 2020-01-01 RX ADMIN — Medication 5 ML: at 21:50

## 2020-01-01 RX ADMIN — MELOXICAM 7.5 MG: 7.5 TABLET ORAL at 08:31

## 2020-01-01 RX ADMIN — NYSTATIN 500000 UNITS: 500000 SUSPENSION ORAL at 22:55

## 2020-01-01 RX ADMIN — ONDANSETRON 8 MG: 2 INJECTION INTRAMUSCULAR; INTRAVENOUS at 16:38

## 2020-01-01 RX ADMIN — SENNOSIDES AND DOCUSATE SODIUM 1 TABLET: 8.6; 5 TABLET ORAL at 18:38

## 2020-01-01 RX ADMIN — PIPERACILLIN AND TAZOBACTAM 3.38 G: 3; .375 INJECTION, POWDER, FOR SOLUTION INTRAVENOUS at 01:00

## 2020-01-01 RX ADMIN — DEXAMETHASONE SODIUM PHOSPHATE 8 MG: 4 INJECTION, SOLUTION INTRAMUSCULAR; INTRAVENOUS at 10:18

## 2020-01-01 RX ADMIN — MAGNESIUM HYDROXIDE 30 ML: 400 SUSPENSION ORAL at 08:58

## 2020-01-01 RX ADMIN — SODIUM CHLORIDE 25 ML/HR: 900 INJECTION, SOLUTION INTRAVENOUS at 10:48

## 2020-01-01 RX ADMIN — IPRATROPIUM BROMIDE AND ALBUTEROL SULFATE 3 ML: .5; 3 SOLUTION RESPIRATORY (INHALATION) at 19:33

## 2020-01-01 RX ADMIN — INSULIN GLARGINE 5 UNITS: 100 INJECTION, SOLUTION SUBCUTANEOUS at 22:00

## 2020-01-01 RX ADMIN — Medication 10 ML: at 21:17

## 2020-01-01 RX ADMIN — POTASSIUM BICARBONATE 20 MEQ: 782 TABLET, EFFERVESCENT ORAL at 08:34

## 2020-01-01 RX ADMIN — ENOXAPARIN SODIUM 40 MG: 40 INJECTION SUBCUTANEOUS at 09:24

## 2020-01-01 RX ADMIN — SODIUM CHLORIDE 1000 ML: 9 INJECTION, SOLUTION INTRAVENOUS at 06:29

## 2020-01-01 RX ADMIN — POTASSIUM CHLORIDE 20 MEQ: 20 TABLET, EXTENDED RELEASE ORAL at 08:16

## 2020-01-01 RX ADMIN — GLIMEPIRIDE 4 MG: 4 TABLET ORAL at 06:23

## 2020-01-01 RX ADMIN — Medication 10 ML: at 06:23

## 2020-01-01 RX ADMIN — POTASSIUM CHLORIDE 20 MEQ: 20 TABLET, EXTENDED RELEASE ORAL at 22:56

## 2020-01-01 RX ADMIN — INSULIN LISPRO 6 UNITS: 100 INJECTION, SOLUTION INTRAVENOUS; SUBCUTANEOUS at 22:22

## 2020-01-01 RX ADMIN — IPRATROPIUM BROMIDE AND ALBUTEROL SULFATE 3 ML: .5; 3 SOLUTION RESPIRATORY (INHALATION) at 09:47

## 2020-01-01 RX ADMIN — INSULIN LISPRO 4 UNITS: 100 INJECTION, SOLUTION INTRAVENOUS; SUBCUTANEOUS at 17:51

## 2020-01-01 RX ADMIN — INSULIN GLARGINE 5 UNITS: 100 INJECTION, SOLUTION SUBCUTANEOUS at 22:56

## 2020-01-01 RX ADMIN — Medication 10 ML: at 16:39

## 2020-01-01 RX ADMIN — CEFTRIAXONE SODIUM 1 G: 1 INJECTION, POWDER, FOR SOLUTION INTRAMUSCULAR; INTRAVENOUS at 00:12

## 2020-01-01 RX ADMIN — Medication 1 PACKET: at 08:42

## 2020-01-01 RX ADMIN — HUMAN INSULIN 3 UNITS: 100 INJECTION, SOLUTION SUBCUTANEOUS at 23:35

## 2020-01-01 RX ADMIN — MELOXICAM 7.5 MG: 7.5 TABLET ORAL at 08:28

## 2020-01-01 RX ADMIN — HUMAN INSULIN 6 UNITS: 100 INJECTION, SOLUTION SUBCUTANEOUS at 11:30

## 2020-01-01 RX ADMIN — BISACODYL 10 MG: 10 SUPPOSITORY RECTAL at 08:02

## 2020-01-01 RX ADMIN — LISINOPRIL 20 MG: 5 TABLET ORAL at 08:01

## 2020-01-01 RX ADMIN — POTASSIUM CHLORIDE 20 MEQ: 200 INJECTION, SOLUTION INTRAVENOUS at 17:50

## 2020-01-01 RX ADMIN — FUROSEMIDE 20 MG: 10 INJECTION, SOLUTION INTRAMUSCULAR; INTRAVENOUS at 21:29

## 2020-01-01 RX ADMIN — FUROSEMIDE 40 MG: 10 INJECTION INTRAMUSCULAR; INTRAVENOUS at 20:20

## 2020-01-01 RX ADMIN — Medication 5 ML: at 06:00

## 2020-01-01 RX ADMIN — IPRATROPIUM BROMIDE AND ALBUTEROL SULFATE 3 ML: .5; 3 SOLUTION RESPIRATORY (INHALATION) at 14:48

## 2020-01-01 RX ADMIN — HYDRALAZINE HYDROCHLORIDE 5 MG: 20 INJECTION INTRAMUSCULAR; INTRAVENOUS at 08:02

## 2020-01-01 RX ADMIN — INSULIN LISPRO 4 UNITS: 100 INJECTION, SOLUTION INTRAVENOUS; SUBCUTANEOUS at 12:36

## 2020-01-01 RX ADMIN — BISACODYL 10 MG: 10 SUPPOSITORY RECTAL at 13:11

## 2020-01-01 RX ADMIN — INSULIN LISPRO 6 UNITS: 100 INJECTION, SOLUTION INTRAVENOUS; SUBCUTANEOUS at 21:44

## 2020-01-01 RX ADMIN — INSULIN LISPRO 8 UNITS: 100 INJECTION, SOLUTION INTRAVENOUS; SUBCUTANEOUS at 21:58

## 2020-01-01 RX ADMIN — BISACODYL 10 MG: 10 SUPPOSITORY RECTAL at 17:19

## 2020-01-01 RX ADMIN — AMLODIPINE BESYLATE 10 MG: 10 TABLET ORAL at 09:51

## 2020-01-01 RX ADMIN — MAGNESIUM HYDROXIDE 30 ML: 400 SUSPENSION ORAL at 08:55

## 2020-01-01 RX ADMIN — INSULIN LISPRO 2 UNITS: 100 INJECTION, SOLUTION INTRAVENOUS; SUBCUTANEOUS at 09:51

## 2020-01-01 RX ADMIN — DEXAMETHASONE SODIUM PHOSPHATE 8 MG: 4 INJECTION, SOLUTION INTRAMUSCULAR; INTRAVENOUS at 09:53

## 2020-01-01 RX ADMIN — Medication 2 G: at 09:21

## 2020-01-01 RX ADMIN — IPRATROPIUM BROMIDE AND ALBUTEROL SULFATE 3 ML: .5; 3 SOLUTION RESPIRATORY (INHALATION) at 15:06

## 2020-01-01 RX ADMIN — Medication 10 ML: at 21:34

## 2020-01-01 RX ADMIN — HUMAN INSULIN 9 UNITS: 100 INJECTION, SOLUTION SUBCUTANEOUS at 17:18

## 2020-01-01 RX ADMIN — ENOXAPARIN SODIUM 40 MG: 40 INJECTION SUBCUTANEOUS at 17:56

## 2020-01-01 RX ADMIN — POTASSIUM CHLORIDE 20 MEQ: 20 TABLET, EXTENDED RELEASE ORAL at 09:44

## 2020-01-01 RX ADMIN — IPRATROPIUM BROMIDE AND ALBUTEROL SULFATE 3 ML: .5; 3 SOLUTION RESPIRATORY (INHALATION) at 19:59

## 2020-01-01 RX ADMIN — HYDROCODONE BITARTRATE AND ACETAMINOPHEN 1 TABLET: 5; 325 TABLET ORAL at 13:56

## 2020-01-01 RX ADMIN — Medication 2 G: at 18:22

## 2020-01-01 RX ADMIN — PIPERACILLIN AND TAZOBACTAM 3.38 G: 3; .375 INJECTION, POWDER, FOR SOLUTION INTRAVENOUS at 17:38

## 2020-01-01 RX ADMIN — SENNOSIDES AND DOCUSATE SODIUM 1 TABLET: 8.6; 5 TABLET ORAL at 18:02

## 2020-01-01 RX ADMIN — Medication 1 PACKET: at 08:56

## 2020-01-01 RX ADMIN — INSULIN LISPRO 6 UNITS: 100 INJECTION, SOLUTION INTRAVENOUS; SUBCUTANEOUS at 14:10

## 2020-01-01 RX ADMIN — LISINOPRIL 20 MG: 5 TABLET ORAL at 09:10

## 2020-01-01 RX ADMIN — INSULIN GLARGINE 5 UNITS: 100 INJECTION, SOLUTION SUBCUTANEOUS at 23:39

## 2020-01-01 RX ADMIN — TAMSULOSIN HYDROCHLORIDE 0.4 MG: 0.4 CAPSULE ORAL at 09:15

## 2020-01-01 RX ADMIN — PIPERACILLIN AND TAZOBACTAM 3.38 G: 3; .375 INJECTION, POWDER, FOR SOLUTION INTRAVENOUS at 08:06

## 2020-01-01 RX ADMIN — IPRATROPIUM BROMIDE AND ALBUTEROL SULFATE 3 ML: .5; 3 SOLUTION RESPIRATORY (INHALATION) at 19:48

## 2020-01-01 RX ADMIN — INSULIN LISPRO 6 UNITS: 100 INJECTION, SOLUTION INTRAVENOUS; SUBCUTANEOUS at 08:36

## 2020-01-01 RX ADMIN — GLIMEPIRIDE 4 MG: 4 TABLET ORAL at 06:41

## 2020-01-01 RX ADMIN — GLIMEPIRIDE 4 MG: 4 TABLET ORAL at 07:56

## 2020-01-01 RX ADMIN — AMLODIPINE BESYLATE 10 MG: 10 TABLET ORAL at 09:27

## 2020-01-01 RX ADMIN — INSULIN GLARGINE 8 UNITS: 100 INJECTION, SOLUTION SUBCUTANEOUS at 22:32

## 2020-01-01 RX ADMIN — Medication 10 ML: at 11:20

## 2020-01-01 RX ADMIN — SODIUM CHLORIDE 100 ML: 900 INJECTION, SOLUTION INTRAVENOUS at 05:04

## 2020-01-01 RX ADMIN — HYDROCODONE BITARTRATE AND ACETAMINOPHEN 1 TABLET: 5; 325 TABLET ORAL at 03:53

## 2020-01-01 RX ADMIN — POTASSIUM CHLORIDE 20 MEQ: 20 TABLET, EXTENDED RELEASE ORAL at 22:13

## 2020-01-01 RX ADMIN — POTASSIUM CHLORIDE 20 MEQ: 20 TABLET, EXTENDED RELEASE ORAL at 09:50

## 2020-01-01 RX ADMIN — IPRATROPIUM BROMIDE AND ALBUTEROL SULFATE 3 ML: .5; 3 SOLUTION RESPIRATORY (INHALATION) at 07:09

## 2020-01-01 RX ADMIN — FUROSEMIDE 40 MG: 10 INJECTION INTRAMUSCULAR; INTRAVENOUS at 08:58

## 2020-01-01 RX ADMIN — DEXAMETHASONE SODIUM PHOSPHATE 6 MG: 4 INJECTION, SOLUTION INTRAMUSCULAR; INTRAVENOUS at 08:36

## 2020-01-01 RX ADMIN — PREDNISONE 40 MG: 20 TABLET ORAL at 08:16

## 2020-01-01 RX ADMIN — INSULIN LISPRO 10 UNITS: 100 INJECTION, SOLUTION INTRAVENOUS; SUBCUTANEOUS at 21:42

## 2020-01-01 RX ADMIN — FUROSEMIDE 40 MG: 10 INJECTION, SOLUTION INTRAMUSCULAR; INTRAVENOUS at 00:26

## 2020-01-01 RX ADMIN — IPRATROPIUM BROMIDE AND ALBUTEROL SULFATE 3 ML: .5; 3 SOLUTION RESPIRATORY (INHALATION) at 21:16

## 2020-01-01 RX ADMIN — MELOXICAM 7.5 MG: 7.5 TABLET ORAL at 08:16

## 2020-01-01 RX ADMIN — INSULIN LISPRO 6 UNITS: 100 INJECTION, SOLUTION INTRAVENOUS; SUBCUTANEOUS at 22:46

## 2020-01-01 RX ADMIN — INSULIN LISPRO 2 UNITS: 100 INJECTION, SOLUTION INTRAVENOUS; SUBCUTANEOUS at 17:03

## 2020-01-01 RX ADMIN — SODIUM CHLORIDE 467 MG: 900 INJECTION, SOLUTION INTRAVENOUS at 13:06

## 2020-01-01 RX ADMIN — SENNOSIDES AND DOCUSATE SODIUM 1 TABLET: 8.6; 5 TABLET ORAL at 09:10

## 2020-01-01 RX ADMIN — HUMAN INSULIN 6 UNITS: 100 INJECTION, SOLUTION SUBCUTANEOUS at 11:54

## 2020-01-01 RX ADMIN — INSULIN LISPRO 10 UNITS: 100 INJECTION, SOLUTION INTRAVENOUS; SUBCUTANEOUS at 17:38

## 2020-01-01 RX ADMIN — AMLODIPINE BESYLATE 10 MG: 10 TABLET ORAL at 08:24

## 2020-01-01 RX ADMIN — Medication 10 ML: at 06:53

## 2020-01-01 RX ADMIN — POTASSIUM CHLORIDE 20 MEQ: 20 TABLET, EXTENDED RELEASE ORAL at 08:56

## 2020-01-01 RX ADMIN — FUROSEMIDE 20 MG: 10 INJECTION, SOLUTION INTRAMUSCULAR; INTRAVENOUS at 21:49

## 2020-01-01 RX ADMIN — MELOXICAM 7.5 MG: 7.5 TABLET ORAL at 09:23

## 2020-01-01 RX ADMIN — FUROSEMIDE 40 MG: 10 INJECTION INTRAMUSCULAR; INTRAVENOUS at 09:26

## 2020-01-01 RX ADMIN — REMDESIVIR 200 MG: 100 INJECTION, POWDER, LYOPHILIZED, FOR SOLUTION INTRAVENOUS at 15:36

## 2020-01-01 RX ADMIN — CEFEPIME HYDROCHLORIDE 2 G: 2 INJECTION, POWDER, FOR SOLUTION INTRAVENOUS at 12:14

## 2020-01-01 RX ADMIN — BISACODYL 10 MG: 10 SUPPOSITORY RECTAL at 08:34

## 2020-01-01 RX ADMIN — INSULIN LISPRO 6 UNITS: 100 INJECTION, SOLUTION INTRAVENOUS; SUBCUTANEOUS at 12:37

## 2020-01-01 RX ADMIN — Medication 5 ML: at 05:24

## 2020-01-01 RX ADMIN — SODIUM CHLORIDE 150 MG: 900 INJECTION, SOLUTION INTRAVENOUS at 17:12

## 2020-01-01 RX ADMIN — Medication 2 G: at 17:40

## 2020-01-01 RX ADMIN — MELOXICAM 7.5 MG: 7.5 TABLET ORAL at 09:44

## 2020-01-01 RX ADMIN — SODIUM CHLORIDE 25 ML/HR: 900 INJECTION, SOLUTION INTRAVENOUS at 11:45

## 2020-01-01 RX ADMIN — FUROSEMIDE 20 MG: 10 INJECTION, SOLUTION INTRAMUSCULAR; INTRAVENOUS at 21:06

## 2020-01-01 RX ADMIN — VANCOMYCIN HYDROCHLORIDE 1250 MG: 10 INJECTION, POWDER, LYOPHILIZED, FOR SOLUTION INTRAVENOUS at 11:34

## 2020-01-01 RX ADMIN — FUROSEMIDE 40 MG: 10 INJECTION, SOLUTION INTRAMUSCULAR; INTRAVENOUS at 19:40

## 2020-01-01 RX ADMIN — Medication 10 ML: at 06:03

## 2020-01-01 RX ADMIN — MELOXICAM 7.5 MG: 7.5 TABLET ORAL at 09:03

## 2020-01-01 RX ADMIN — MAGNESIUM HYDROXIDE 30 ML: 400 SUSPENSION ORAL at 09:43

## 2020-01-01 RX ADMIN — FUROSEMIDE 40 MG: 10 INJECTION, SOLUTION INTRAMUSCULAR; INTRAVENOUS at 10:15

## 2020-01-01 RX ADMIN — POTASSIUM CHLORIDE 20 MEQ: 20 TABLET, EXTENDED RELEASE ORAL at 09:27

## 2020-01-01 RX ADMIN — BISACODYL 10 MG: 10 SUPPOSITORY RECTAL at 09:44

## 2020-01-01 RX ADMIN — LISINOPRIL 20 MG: 5 TABLET ORAL at 08:06

## 2020-01-01 RX ADMIN — SENNOSIDES AND DOCUSATE SODIUM 1 TABLET: 8.6; 5 TABLET ORAL at 08:36

## 2020-01-01 RX ADMIN — Medication 2 G: at 18:28

## 2020-01-01 RX ADMIN — POTASSIUM CHLORIDE 20 MEQ: 20 TABLET, EXTENDED RELEASE ORAL at 17:18

## 2020-01-01 RX ADMIN — NYSTATIN 500000 UNITS: 500000 SUSPENSION ORAL at 08:17

## 2020-01-01 RX ADMIN — NYSTATIN 500000 UNITS: 500000 SUSPENSION ORAL at 18:06

## 2020-01-01 RX ADMIN — MAGNESIUM HYDROXIDE 30 ML: 400 SUSPENSION ORAL at 11:57

## 2020-01-01 RX ADMIN — INSULIN GLARGINE 10 UNITS: 100 INJECTION, SOLUTION SUBCUTANEOUS at 22:50

## 2020-01-01 RX ADMIN — POTASSIUM CHLORIDE 20 MEQ: 200 INJECTION, SOLUTION INTRAVENOUS at 00:26

## 2020-01-01 RX ADMIN — CEFEPIME HYDROCHLORIDE 2 G: 2 INJECTION, POWDER, FOR SOLUTION INTRAVENOUS at 04:08

## 2020-01-01 RX ADMIN — TOLVAPTAN 15 MG: 15 TABLET ORAL at 14:39

## 2020-01-01 RX ADMIN — NYSTATIN 500000 UNITS: 500000 SUSPENSION ORAL at 17:37

## 2020-01-01 RX ADMIN — FERROUS SULFATE TAB 325 MG (65 MG ELEMENTAL FE) 325 MG: 325 (65 FE) TAB at 08:26

## 2020-01-01 RX ADMIN — HYDROCODONE BITARTRATE AND ACETAMINOPHEN 1 TABLET: 5; 325 TABLET ORAL at 18:59

## 2020-01-01 RX ADMIN — BISACODYL 10 MG: 10 SUPPOSITORY RECTAL at 09:27

## 2020-01-01 RX ADMIN — POTASSIUM BICARBONATE 40 MEQ: 782 TABLET, EFFERVESCENT ORAL at 09:06

## 2020-01-01 RX ADMIN — PIPERACILLIN AND TAZOBACTAM 3.38 G: 3; .375 INJECTION, POWDER, FOR SOLUTION INTRAVENOUS at 01:24

## 2020-01-01 RX ADMIN — SENNOSIDES AND DOCUSATE SODIUM 1 TABLET: 8.6; 5 TABLET ORAL at 17:18

## 2020-01-01 RX ADMIN — Medication 10 ML: at 21:48

## 2020-01-01 RX ADMIN — DEXAMETHASONE SODIUM PHOSPHATE 8 MG: 4 INJECTION, SOLUTION INTRAMUSCULAR; INTRAVENOUS at 15:58

## 2020-01-01 RX ADMIN — MELOXICAM 7.5 MG: 7.5 TABLET ORAL at 09:00

## 2020-01-01 RX ADMIN — POTASSIUM CHLORIDE 20 MEQ: 20 TABLET, EXTENDED RELEASE ORAL at 09:01

## 2020-01-01 RX ADMIN — IPRATROPIUM BROMIDE AND ALBUTEROL SULFATE 3 ML: .5; 3 SOLUTION RESPIRATORY (INHALATION) at 20:27

## 2020-01-01 RX ADMIN — IPRATROPIUM BROMIDE AND ALBUTEROL SULFATE 3 ML: .5; 3 SOLUTION RESPIRATORY (INHALATION) at 21:31

## 2020-01-01 RX ADMIN — ENOXAPARIN SODIUM 40 MG: 40 INJECTION SUBCUTANEOUS at 17:19

## 2020-01-01 RX ADMIN — Medication 10 ML: at 22:18

## 2020-01-01 RX ADMIN — NYSTATIN 500000 UNITS: 500000 SUSPENSION ORAL at 22:31

## 2020-01-01 RX ADMIN — POTASSIUM CHLORIDE 20 MEQ: 20 TABLET, EXTENDED RELEASE ORAL at 22:43

## 2020-01-01 RX ADMIN — MAGNESIUM HYDROXIDE 30 ML: 400 SUSPENSION ORAL at 09:08

## 2020-01-01 RX ADMIN — MAGNESIUM HYDROXIDE 30 ML: 400 SUSPENSION ORAL at 08:53

## 2020-01-01 RX ADMIN — LEVOFLOXACIN 750 MG: 5 INJECTION, SOLUTION INTRAVENOUS at 06:34

## 2020-01-01 RX ADMIN — AMLODIPINE BESYLATE 10 MG: 10 TABLET ORAL at 08:30

## 2020-01-01 RX ADMIN — SODIUM CHLORIDE 486 MG: 900 INJECTION, SOLUTION INTRAVENOUS at 11:00

## 2020-01-01 RX ADMIN — Medication 2 G: at 07:56

## 2020-01-01 RX ADMIN — MELOXICAM 7.5 MG: 7.5 TABLET ORAL at 09:01

## 2020-01-01 RX ADMIN — SODIUM CHLORIDE 1000 ML: 900 INJECTION, SOLUTION INTRAVENOUS at 13:45

## 2020-01-01 RX ADMIN — Medication 5 ML: at 21:56

## 2020-01-01 RX ADMIN — POTASSIUM CHLORIDE 20 MEQ: 200 INJECTION, SOLUTION INTRAVENOUS at 09:59

## 2020-01-01 RX ADMIN — POTASSIUM CHLORIDE 20 MEQ: 20 TABLET, EXTENDED RELEASE ORAL at 09:22

## 2020-01-01 RX ADMIN — BISACODYL 10 MG: 10 SUPPOSITORY RECTAL at 10:14

## 2020-01-01 RX ADMIN — INSULIN LISPRO 6 UNITS: 100 INJECTION, SOLUTION INTRAVENOUS; SUBCUTANEOUS at 12:26

## 2020-01-01 RX ADMIN — NYSTATIN 500000 UNITS: 500000 SUSPENSION ORAL at 17:04

## 2020-01-01 RX ADMIN — BISACODYL 10 MG: 10 SUPPOSITORY RECTAL at 09:39

## 2020-01-01 RX ADMIN — INSULIN LISPRO 6 UNITS: 100 INJECTION, SOLUTION INTRAVENOUS; SUBCUTANEOUS at 11:50

## 2020-01-01 RX ADMIN — NYSTATIN 500000 UNITS: 500000 SUSPENSION ORAL at 12:26

## 2020-01-01 RX ADMIN — TOLVAPTAN 15 MG: 15 TABLET ORAL at 10:47

## 2020-01-01 RX ADMIN — FERROUS SULFATE TAB 325 MG (65 MG ELEMENTAL FE) 325 MG: 325 (65 FE) TAB at 08:57

## 2020-01-01 RX ADMIN — LISINOPRIL 20 MG: 5 TABLET ORAL at 09:01

## 2020-01-01 RX ADMIN — POLYETHYLENE GLYCOL 3350 238 G: 17 POWDER, FOR SOLUTION ORAL at 17:14

## 2020-01-01 RX ADMIN — AMLODIPINE BESYLATE 10 MG: 10 TABLET ORAL at 08:44

## 2020-01-01 RX ADMIN — POTASSIUM CHLORIDE 20 MEQ: 20 TABLET, EXTENDED RELEASE ORAL at 08:55

## 2020-01-01 RX ADMIN — METHYLPREDNISOLONE SODIUM SUCCINATE 60 MG: 125 INJECTION, POWDER, FOR SOLUTION INTRAMUSCULAR; INTRAVENOUS at 21:41

## 2020-01-01 RX ADMIN — ONDANSETRON 8 MG: 2 INJECTION INTRAMUSCULAR; INTRAVENOUS at 10:12

## 2020-01-01 RX ADMIN — NYSTATIN 500000 UNITS: 500000 SUSPENSION ORAL at 17:05

## 2020-01-01 RX ADMIN — Medication 10 ML: at 21:30

## 2020-01-01 RX ADMIN — MAGNESIUM HYDROXIDE 30 ML: 400 SUSPENSION ORAL at 09:09

## 2020-01-01 RX ADMIN — NYSTATIN 500000 UNITS: 500000 SUSPENSION ORAL at 12:37

## 2020-01-01 RX ADMIN — INSULIN LISPRO 4 UNITS: 100 INJECTION, SOLUTION INTRAVENOUS; SUBCUTANEOUS at 12:43

## 2020-01-01 RX ADMIN — Medication 10 ML: at 21:47

## 2020-01-01 RX ADMIN — POTASSIUM CHLORIDE 20 MEQ: 20 TABLET, EXTENDED RELEASE ORAL at 21:41

## 2020-01-01 RX ADMIN — PREDNISONE 30 MG: 20 TABLET ORAL at 08:00

## 2020-01-01 RX ADMIN — SENNOSIDES AND DOCUSATE SODIUM 1 TABLET: 8.6; 5 TABLET ORAL at 09:51

## 2020-01-01 RX ADMIN — FUROSEMIDE 40 MG: 10 INJECTION INTRAMUSCULAR; INTRAVENOUS at 08:54

## 2020-01-01 RX ADMIN — PIPERACILLIN AND TAZOBACTAM 3.38 G: 3; .375 INJECTION, POWDER, FOR SOLUTION INTRAVENOUS at 16:35

## 2020-01-01 RX ADMIN — FUROSEMIDE 40 MG: 10 INJECTION INTRAMUSCULAR; INTRAVENOUS at 21:29

## 2020-01-01 RX ADMIN — MORPHINE SULFATE 2 MG: 2 INJECTION, SOLUTION INTRAMUSCULAR; INTRAVENOUS at 21:15

## 2020-01-01 RX ADMIN — POTASSIUM CHLORIDE 20 MEQ: 20 TABLET, EXTENDED RELEASE ORAL at 22:26

## 2020-01-01 RX ADMIN — AMLODIPINE BESYLATE 10 MG: 10 TABLET ORAL at 08:38

## 2020-01-01 RX ADMIN — SENNOSIDES AND DOCUSATE SODIUM 1 TABLET: 8.6; 5 TABLET ORAL at 18:28

## 2020-01-01 RX ADMIN — Medication 10 ML: at 17:56

## 2020-01-01 RX ADMIN — IPRATROPIUM BROMIDE AND ALBUTEROL SULFATE 3 ML: .5; 3 SOLUTION RESPIRATORY (INHALATION) at 08:12

## 2020-01-01 RX ADMIN — LISINOPRIL 20 MG: 20 TABLET ORAL at 08:55

## 2020-01-01 RX ADMIN — DEXAMETHASONE SODIUM PHOSPHATE 6 MG: 4 INJECTION, SOLUTION INTRAMUSCULAR; INTRAVENOUS at 08:56

## 2020-01-01 RX ADMIN — SENNOSIDES AND DOCUSATE SODIUM 1 TABLET: 8.6; 5 TABLET ORAL at 00:26

## 2020-01-01 RX ADMIN — Medication 2 G: at 17:31

## 2020-01-01 RX ADMIN — Medication 10 ML: at 15:36

## 2020-01-01 RX ADMIN — LISINOPRIL 20 MG: 5 TABLET ORAL at 08:46

## 2020-01-01 RX ADMIN — VANCOMYCIN HYDROCHLORIDE 1250 MG: 10 INJECTION, POWDER, LYOPHILIZED, FOR SOLUTION INTRAVENOUS at 10:16

## 2020-01-01 RX ADMIN — SODIUM CHLORIDE 4.5 G: 900 INJECTION, SOLUTION INTRAVENOUS at 21:34

## 2020-01-01 RX ADMIN — IPRATROPIUM BROMIDE AND ALBUTEROL SULFATE 3 ML: .5; 3 SOLUTION RESPIRATORY (INHALATION) at 20:33

## 2020-01-01 RX ADMIN — MAGNESIUM HYDROXIDE 30 ML: 400 SUSPENSION ORAL at 08:31

## 2020-01-01 RX ADMIN — Medication 5 ML: at 23:38

## 2020-01-01 RX ADMIN — Medication 5 ML: at 18:07

## 2020-01-01 RX ADMIN — AMLODIPINE BESYLATE 10 MG: 10 TABLET ORAL at 11:02

## 2020-01-01 RX ADMIN — Medication 10 ML: at 05:01

## 2020-01-01 RX ADMIN — CEFTRIAXONE SODIUM 1 G: 1 INJECTION, POWDER, FOR SOLUTION INTRAMUSCULAR; INTRAVENOUS at 00:19

## 2020-01-01 RX ADMIN — IPRATROPIUM BROMIDE AND ALBUTEROL SULFATE 3 ML: .5; 3 SOLUTION RESPIRATORY (INHALATION) at 21:19

## 2020-01-01 RX ADMIN — INSULIN LISPRO 10 UNITS: 100 INJECTION, SOLUTION INTRAVENOUS; SUBCUTANEOUS at 23:47

## 2020-01-01 RX ADMIN — INSULIN LISPRO 4 UNITS: 100 INJECTION, SOLUTION INTRAVENOUS; SUBCUTANEOUS at 17:25

## 2020-01-01 RX ADMIN — POTASSIUM CHLORIDE 20 MEQ: 20 TABLET, EXTENDED RELEASE ORAL at 17:37

## 2020-01-01 RX ADMIN — IPRATROPIUM BROMIDE AND ALBUTEROL SULFATE 3 ML: .5; 3 SOLUTION RESPIRATORY (INHALATION) at 08:49

## 2020-01-01 RX ADMIN — HYDRALAZINE HYDROCHLORIDE 5 MG: 20 INJECTION INTRAMUSCULAR; INTRAVENOUS at 21:19

## 2020-01-01 RX ADMIN — MELOXICAM 7.5 MG: 7.5 TABLET ORAL at 08:34

## 2020-01-01 RX ADMIN — IPRATROPIUM BROMIDE AND ALBUTEROL SULFATE 3 ML: .5; 3 SOLUTION RESPIRATORY (INHALATION) at 14:37

## 2020-01-01 RX ADMIN — ENOXAPARIN SODIUM 40 MG: 40 INJECTION SUBCUTANEOUS at 09:50

## 2020-01-01 RX ADMIN — SODIUM CHLORIDE 125 ML/HR: 900 INJECTION, SOLUTION INTRAVENOUS at 00:13

## 2020-01-01 RX ADMIN — FERROUS SULFATE TAB 325 MG (65 MG ELEMENTAL FE) 325 MG: 325 (65 FE) TAB at 08:44

## 2020-01-01 RX ADMIN — POTASSIUM CHLORIDE 20 MEQ: 20 TABLET, EXTENDED RELEASE ORAL at 15:31

## 2020-01-01 RX ADMIN — IPRATROPIUM BROMIDE AND ALBUTEROL SULFATE 3 ML: .5; 3 SOLUTION RESPIRATORY (INHALATION) at 20:10

## 2020-01-01 RX ADMIN — PREDNISONE 40 MG: 20 TABLET ORAL at 18:06

## 2020-01-01 RX ADMIN — TRAMADOL HYDROCHLORIDE 50 MG: 50 TABLET, FILM COATED ORAL at 21:58

## 2020-01-01 RX ADMIN — SODIUM CHLORIDE 25 ML/HR: 900 INJECTION, SOLUTION INTRAVENOUS at 15:40

## 2020-01-01 RX ADMIN — FERROUS SULFATE TAB 325 MG (65 MG ELEMENTAL FE) 325 MG: 325 (65 FE) TAB at 08:34

## 2020-01-01 RX ADMIN — Medication 10 ML: at 21:58

## 2020-01-01 RX ADMIN — Medication 5 ML: at 05:59

## 2020-01-01 RX ADMIN — METHYLPREDNISOLONE SODIUM SUCCINATE 60 MG: 125 INJECTION, POWDER, FOR SOLUTION INTRAMUSCULAR; INTRAVENOUS at 08:35

## 2020-01-01 RX ADMIN — NYSTATIN 500000 UNITS: 500000 SUSPENSION ORAL at 17:30

## 2020-01-01 RX ADMIN — AMLODIPINE BESYLATE 10 MG: 10 TABLET ORAL at 09:48

## 2020-01-01 RX ADMIN — HYDROCODONE BITARTRATE AND ACETAMINOPHEN 1 TABLET: 5; 325 TABLET ORAL at 19:31

## 2020-01-01 RX ADMIN — Medication 10 ML: at 22:39

## 2020-01-01 RX ADMIN — POTASSIUM CHLORIDE 20 MEQ: 20 TABLET, EXTENDED RELEASE ORAL at 18:23

## 2020-01-01 RX ADMIN — FUROSEMIDE 40 MG: 10 INJECTION, SOLUTION INTRAMUSCULAR; INTRAVENOUS at 09:07

## 2020-01-01 RX ADMIN — NYSTATIN 500000 UNITS: 500000 SUSPENSION ORAL at 14:11

## 2020-01-01 RX ADMIN — PROPOFOL 10 MG: 10 INJECTION, EMULSION INTRAVENOUS at 14:21

## 2020-01-01 RX ADMIN — Medication 5 ML: at 22:30

## 2020-01-01 RX ADMIN — SENNOSIDES AND DOCUSATE SODIUM 1 TABLET: 8.6; 5 TABLET ORAL at 09:21

## 2020-01-01 RX ADMIN — ENOXAPARIN SODIUM 40 MG: 40 INJECTION SUBCUTANEOUS at 18:18

## 2020-01-01 RX ADMIN — CEFEPIME HYDROCHLORIDE 2 G: 2 INJECTION, POWDER, FOR SOLUTION INTRAVENOUS at 20:10

## 2020-01-01 RX ADMIN — TAMSULOSIN HYDROCHLORIDE 0.4 MG: 0.4 CAPSULE ORAL at 08:56

## 2020-01-01 RX ADMIN — INSULIN LISPRO 4 UNITS: 100 INJECTION, SOLUTION INTRAVENOUS; SUBCUTANEOUS at 12:59

## 2020-01-01 RX ADMIN — NYSTATIN 500000 UNITS: 500000 SUSPENSION ORAL at 17:45

## 2020-01-01 RX ADMIN — SENNOSIDES AND DOCUSATE SODIUM 1 TABLET: 8.6; 5 TABLET ORAL at 09:08

## 2020-01-01 RX ADMIN — SENNOSIDES AND DOCUSATE SODIUM 1 TABLET: 8.6; 5 TABLET ORAL at 18:41

## 2020-01-01 RX ADMIN — POTASSIUM CHLORIDE 40 MEQ: 20 TABLET, EXTENDED RELEASE ORAL at 08:50

## 2020-01-01 RX ADMIN — LIDOCAINE HYDROCHLORIDE 50 MG: 20 INJECTION, SOLUTION EPIDURAL; INFILTRATION; INTRACAUDAL; PERINEURAL at 14:17

## 2020-01-01 RX ADMIN — AMLODIPINE BESYLATE 10 MG: 10 TABLET ORAL at 08:47

## 2020-01-01 RX ADMIN — AMLODIPINE BESYLATE 10 MG: 10 TABLET ORAL at 09:08

## 2020-01-01 RX ADMIN — Medication 2 G: at 17:03

## 2020-01-01 RX ADMIN — HYDROCODONE BITARTRATE AND ACETAMINOPHEN 1 TABLET: 5; 325 TABLET ORAL at 06:02

## 2020-01-01 RX ADMIN — INSULIN LISPRO 2 UNITS: 100 INJECTION, SOLUTION INTRAVENOUS; SUBCUTANEOUS at 09:15

## 2020-01-01 RX ADMIN — POTASSIUM BICARBONATE 20 MEQ: 782 TABLET, EFFERVESCENT ORAL at 17:30

## 2020-01-01 RX ADMIN — FUROSEMIDE 20 MG: 10 INJECTION, SOLUTION INTRAMUSCULAR; INTRAVENOUS at 10:33

## 2020-01-01 RX ADMIN — MELOXICAM 7.5 MG: 7.5 TABLET ORAL at 07:57

## 2020-01-01 RX ADMIN — MELOXICAM 7.5 MG: 7.5 TABLET ORAL at 09:32

## 2020-01-01 RX ADMIN — SODIUM CHLORIDE 125 ML/HR: 900 INJECTION, SOLUTION INTRAVENOUS at 08:38

## 2020-01-01 RX ADMIN — PROPOFOL 20 MG: 10 INJECTION, EMULSION INTRAVENOUS at 14:23

## 2020-01-01 RX ADMIN — POTASSIUM CHLORIDE 20 MEQ: 20 TABLET, EXTENDED RELEASE ORAL at 22:09

## 2020-01-01 RX ADMIN — Medication 10 ML: at 22:09

## 2020-01-01 RX ADMIN — VANCOMYCIN HYDROCHLORIDE 1000 MG: 1 INJECTION, POWDER, LYOPHILIZED, FOR SOLUTION INTRAVENOUS at 09:17

## 2020-01-01 RX ADMIN — HUMAN INSULIN 9 UNITS: 100 INJECTION, SOLUTION SUBCUTANEOUS at 11:30

## 2020-01-01 RX ADMIN — LISINOPRIL 20 MG: 5 TABLET ORAL at 08:31

## 2020-01-01 RX ADMIN — BISACODYL 10 MG: 10 SUPPOSITORY RECTAL at 10:32

## 2020-01-01 RX ADMIN — HUMAN INSULIN 3 UNITS: 100 INJECTION, SOLUTION SUBCUTANEOUS at 11:30

## 2020-01-01 RX ADMIN — FUROSEMIDE 40 MG: 10 INJECTION INTRAMUSCULAR; INTRAVENOUS at 08:55

## 2020-01-01 RX ADMIN — Medication 5 ML: at 14:03

## 2020-01-01 RX ADMIN — HUMAN INSULIN 3 UNITS: 100 INJECTION, SOLUTION SUBCUTANEOUS at 17:26

## 2020-01-01 RX ADMIN — CEFEPIME HYDROCHLORIDE 2 G: 2 INJECTION, POWDER, FOR SOLUTION INTRAVENOUS at 03:54

## 2020-01-01 RX ADMIN — IPRATROPIUM BROMIDE AND ALBUTEROL SULFATE 3 ML: .5; 3 SOLUTION RESPIRATORY (INHALATION) at 08:43

## 2020-01-01 RX ADMIN — POTASSIUM CHLORIDE 20 MEQ: 20 TABLET, EXTENDED RELEASE ORAL at 17:27

## 2020-01-01 RX ADMIN — IPRATROPIUM BROMIDE AND ALBUTEROL SULFATE 3 ML: .5; 3 SOLUTION RESPIRATORY (INHALATION) at 20:43

## 2020-01-01 RX ADMIN — NYSTATIN 500000 UNITS: 500000 SUSPENSION ORAL at 09:01

## 2020-01-01 RX ADMIN — PIPERACILLIN AND TAZOBACTAM 3.38 G: 3; .375 INJECTION, POWDER, FOR SOLUTION INTRAVENOUS at 00:59

## 2020-01-01 RX ADMIN — INSULIN LISPRO 8 UNITS: 100 INJECTION, SOLUTION INTRAVENOUS; SUBCUTANEOUS at 21:54

## 2020-01-01 RX ADMIN — IPRATROPIUM BROMIDE AND ALBUTEROL SULFATE 3 ML: .5; 3 SOLUTION RESPIRATORY (INHALATION) at 15:27

## 2020-01-01 RX ADMIN — IPRATROPIUM BROMIDE AND ALBUTEROL SULFATE 3 ML: .5; 3 SOLUTION RESPIRATORY (INHALATION) at 07:22

## 2020-01-01 RX ADMIN — HUMAN INSULIN 6 UNITS: 100 INJECTION, SOLUTION SUBCUTANEOUS at 17:02

## 2020-01-01 RX ADMIN — ETOPOSIDE 199 MG: 20 INJECTION, SOLUTION, CONCENTRATE INTRAVENOUS at 19:06

## 2020-01-01 RX ADMIN — IPRATROPIUM BROMIDE AND ALBUTEROL SULFATE 3 ML: .5; 3 SOLUTION RESPIRATORY (INHALATION) at 15:05

## 2020-01-01 RX ADMIN — FUROSEMIDE 40 MG: 10 INJECTION, SOLUTION INTRAMUSCULAR; INTRAVENOUS at 08:00

## 2020-01-01 RX ADMIN — FUROSEMIDE 40 MG: 10 INJECTION, SOLUTION INTRAMUSCULAR; INTRAVENOUS at 22:11

## 2020-01-01 RX ADMIN — LISINOPRIL 20 MG: 5 TABLET ORAL at 09:08

## 2020-01-01 RX ADMIN — IPRATROPIUM BROMIDE AND ALBUTEROL SULFATE 3 ML: .5; 3 SOLUTION RESPIRATORY (INHALATION) at 21:35

## 2020-01-01 RX ADMIN — Medication 2 G: at 17:07

## 2020-01-01 RX ADMIN — CEFEPIME HYDROCHLORIDE 2 G: 2 INJECTION, POWDER, FOR SOLUTION INTRAVENOUS at 20:35

## 2020-01-01 RX ADMIN — MELOXICAM 7.5 MG: 7.5 TABLET ORAL at 08:26

## 2020-01-01 RX ADMIN — SENNOSIDES AND DOCUSATE SODIUM 1 TABLET: 8.6; 5 TABLET ORAL at 09:03

## 2020-01-01 RX ADMIN — IPRATROPIUM BROMIDE AND ALBUTEROL SULFATE 3 ML: .5; 3 SOLUTION RESPIRATORY (INHALATION) at 13:48

## 2020-01-01 RX ADMIN — IPRATROPIUM BROMIDE AND ALBUTEROL SULFATE 3 ML: .5; 3 SOLUTION RESPIRATORY (INHALATION) at 19:54

## 2020-01-01 RX ADMIN — REMDESIVIR 100 MG: 100 INJECTION, POWDER, LYOPHILIZED, FOR SOLUTION INTRAVENOUS at 14:00

## 2020-01-01 RX ADMIN — Medication 2 G: at 17:26

## 2020-01-01 RX ADMIN — BISACODYL 10 MG: 10 SUPPOSITORY RECTAL at 09:14

## 2020-01-01 RX ADMIN — Medication 5 ML: at 00:23

## 2020-01-01 RX ADMIN — ENOXAPARIN SODIUM 40 MG: 40 INJECTION SUBCUTANEOUS at 09:11

## 2020-01-01 RX ADMIN — INSULIN GLARGINE 20 UNITS: 100 INJECTION, SOLUTION SUBCUTANEOUS at 22:30

## 2020-01-01 RX ADMIN — HUMAN INSULIN 6 UNITS: 100 INJECTION, SOLUTION SUBCUTANEOUS at 17:10

## 2020-01-01 RX ADMIN — AMLODIPINE BESYLATE 10 MG: 10 TABLET ORAL at 08:16

## 2020-01-01 RX ADMIN — POTASSIUM CHLORIDE 40 MEQ: 20 TABLET, EXTENDED RELEASE ORAL at 17:12

## 2020-01-01 RX ADMIN — HYDROCODONE BITARTRATE AND ACETAMINOPHEN 1 TABLET: 5; 325 TABLET ORAL at 08:36

## 2020-01-01 RX ADMIN — POTASSIUM CHLORIDE 20 MEQ: 20 TABLET, EXTENDED RELEASE ORAL at 22:03

## 2020-01-01 RX ADMIN — MELOXICAM 7.5 MG: 7.5 TABLET ORAL at 08:48

## 2020-01-01 RX ADMIN — CEFEPIME HYDROCHLORIDE 2 G: 2 INJECTION, POWDER, FOR SOLUTION INTRAVENOUS at 04:57

## 2020-01-01 RX ADMIN — AMLODIPINE BESYLATE 10 MG: 10 TABLET ORAL at 09:00

## 2020-01-01 RX ADMIN — FUROSEMIDE 40 MG: 10 INJECTION, SOLUTION INTRAMUSCULAR; INTRAVENOUS at 16:36

## 2020-01-01 RX ADMIN — PIPERACILLIN AND TAZOBACTAM 3.38 G: 3; .375 INJECTION, POWDER, FOR SOLUTION INTRAVENOUS at 11:07

## 2020-01-01 RX ADMIN — BISACODYL 10 MG: 10 SUPPOSITORY RECTAL at 09:26

## 2020-01-01 RX ADMIN — MORPHINE SULFATE 2 MG: 2 INJECTION, SOLUTION INTRAMUSCULAR; INTRAVENOUS at 00:27

## 2020-01-01 RX ADMIN — AMLODIPINE BESYLATE 10 MG: 10 TABLET ORAL at 09:46

## 2020-01-01 RX ADMIN — NYSTATIN 500000 UNITS: 500000 SUSPENSION ORAL at 21:54

## 2020-01-01 RX ADMIN — REMDESIVIR 100 MG: 100 INJECTION, POWDER, LYOPHILIZED, FOR SOLUTION INTRAVENOUS at 14:22

## 2020-01-01 RX ADMIN — FUROSEMIDE 40 MG: 10 INJECTION, SOLUTION INTRAMUSCULAR; INTRAVENOUS at 08:45

## 2020-01-01 RX ADMIN — FERROUS SULFATE TAB 325 MG (65 MG ELEMENTAL FE) 325 MG: 325 (65 FE) TAB at 08:48

## 2020-01-01 RX ADMIN — LISINOPRIL 20 MG: 5 TABLET ORAL at 08:34

## 2020-01-01 RX ADMIN — BISACODYL 10 MG: 10 SUPPOSITORY RECTAL at 11:24

## 2020-01-01 RX ADMIN — Medication 10 ML: at 05:45

## 2020-01-01 RX ADMIN — HYDROCODONE BITARTRATE AND ACETAMINOPHEN 1 TABLET: 5; 325 TABLET ORAL at 17:21

## 2020-01-01 RX ADMIN — AMLODIPINE BESYLATE 10 MG: 10 TABLET ORAL at 09:03

## 2020-01-01 RX ADMIN — Medication 10 ML: at 05:29

## 2020-01-01 RX ADMIN — HUMAN INSULIN 3 UNITS: 100 INJECTION, SOLUTION SUBCUTANEOUS at 07:58

## 2020-01-01 RX ADMIN — FUROSEMIDE 40 MG: 10 INJECTION, SOLUTION INTRAMUSCULAR; INTRAVENOUS at 08:22

## 2020-01-01 RX ADMIN — IPRATROPIUM BROMIDE AND ALBUTEROL SULFATE 3 ML: .5; 3 SOLUTION RESPIRATORY (INHALATION) at 08:41

## 2020-01-01 RX ADMIN — Medication 10 ML: at 05:46

## 2020-01-01 RX ADMIN — SENNOSIDES AND DOCUSATE SODIUM 1 TABLET: 8.6; 5 TABLET ORAL at 18:06

## 2020-01-01 RX ADMIN — IPRATROPIUM BROMIDE AND ALBUTEROL SULFATE 3 ML: .5; 3 SOLUTION RESPIRATORY (INHALATION) at 15:37

## 2020-01-01 RX ADMIN — Medication 10 ML: at 17:35

## 2020-01-01 RX ADMIN — HUMAN INSULIN 6 UNITS: 100 INJECTION, SOLUTION SUBCUTANEOUS at 12:03

## 2020-01-01 RX ADMIN — BISACODYL 10 MG: 5 TABLET, COATED ORAL at 12:40

## 2020-01-01 RX ADMIN — SODIUM CHLORIDE 1000 ML: 9 INJECTION, SOLUTION INTRAVENOUS at 07:04

## 2020-01-01 RX ADMIN — FUROSEMIDE 40 MG: 40 TABLET ORAL at 09:08

## 2020-01-01 RX ADMIN — Medication 10 ML: at 13:45

## 2020-01-01 RX ADMIN — DIATRIZOATE MEGLUMINE AND DIATRIZOATE SODIUM 10 ML: 660; 100 LIQUID ORAL; RECTAL at 14:17

## 2020-01-01 RX ADMIN — FUROSEMIDE 20 MG: 10 INJECTION, SOLUTION INTRAMUSCULAR; INTRAVENOUS at 08:23

## 2020-01-01 RX ADMIN — INSULIN LISPRO 6 UNITS: 100 INJECTION, SOLUTION INTRAVENOUS; SUBCUTANEOUS at 22:28

## 2020-01-01 RX ADMIN — ENOXAPARIN SODIUM 30 MG: 30 INJECTION SUBCUTANEOUS at 21:29

## 2020-01-01 RX ADMIN — HUMAN INSULIN 9 UNITS: 100 INJECTION, SOLUTION SUBCUTANEOUS at 12:56

## 2020-01-01 RX ADMIN — POLYETHYLENE GLYCOL-3350 AND ELECTROLYTES WITH FLAVOR PACK 2000 ML: 240; 5.84; 2.98; 6.72; 22.72 POWDER, FOR SOLUTION ORAL at 12:54

## 2020-01-01 RX ADMIN — MELOXICAM 7.5 MG: 7.5 TABLET ORAL at 08:20

## 2020-01-01 RX ADMIN — INSULIN LISPRO 4 UNITS: 100 INJECTION, SOLUTION INTRAVENOUS; SUBCUTANEOUS at 08:36

## 2020-01-01 RX ADMIN — Medication 10 ML: at 21:44

## 2020-01-01 RX ADMIN — Medication 5 ML: at 05:17

## 2020-01-01 RX ADMIN — ENOXAPARIN SODIUM 30 MG: 30 INJECTION SUBCUTANEOUS at 09:13

## 2020-01-01 RX ADMIN — SENNOSIDES AND DOCUSATE SODIUM 1 TABLET: 8.6; 5 TABLET ORAL at 17:56

## 2020-01-01 RX ADMIN — INSULIN LISPRO 2 UNITS: 100 INJECTION, SOLUTION INTRAVENOUS; SUBCUTANEOUS at 09:13

## 2020-01-01 RX ADMIN — Medication 2 G: at 09:23

## 2020-01-01 RX ADMIN — TRAMADOL HYDROCHLORIDE 50 MG: 50 TABLET, FILM COATED ORAL at 03:53

## 2020-01-01 RX ADMIN — ENOXAPARIN SODIUM 40 MG: 40 INJECTION SUBCUTANEOUS at 17:26

## 2020-01-01 RX ADMIN — IPRATROPIUM BROMIDE AND ALBUTEROL SULFATE 3 ML: .5; 3 SOLUTION RESPIRATORY (INHALATION) at 13:44

## 2020-01-01 RX ADMIN — FERROUS SULFATE TAB 325 MG (65 MG ELEMENTAL FE) 325 MG: 325 (65 FE) TAB at 08:55

## 2020-01-01 RX ADMIN — FUROSEMIDE 40 MG: 10 INJECTION INTRAMUSCULAR; INTRAVENOUS at 09:06

## 2020-01-01 RX ADMIN — IOPAMIDOL 100 ML: 755 INJECTION, SOLUTION INTRAVENOUS at 05:04

## 2020-01-01 RX ADMIN — HYDROCODONE BITARTRATE AND ACETAMINOPHEN 1 TABLET: 5; 325 TABLET ORAL at 14:38

## 2020-01-01 RX ADMIN — INSULIN LISPRO 2 UNITS: 100 INJECTION, SOLUTION INTRAVENOUS; SUBCUTANEOUS at 09:29

## 2020-01-01 RX ADMIN — POTASSIUM CHLORIDE 20 MEQ: 20 TABLET, EXTENDED RELEASE ORAL at 21:48

## 2020-01-01 RX ADMIN — DEXAMETHASONE SODIUM PHOSPHATE 8 MG: 4 INJECTION, SOLUTION INTRAMUSCULAR; INTRAVENOUS at 16:44

## 2020-01-01 RX ADMIN — IPRATROPIUM BROMIDE AND ALBUTEROL SULFATE 3 ML: .5; 3 SOLUTION RESPIRATORY (INHALATION) at 09:33

## 2020-01-01 RX ADMIN — LISINOPRIL 20 MG: 5 TABLET ORAL at 08:24

## 2020-01-01 RX ADMIN — POTASSIUM CHLORIDE 20 MEQ: 20 TABLET, EXTENDED RELEASE ORAL at 21:58

## 2020-01-01 RX ADMIN — FERROUS SULFATE TAB 325 MG (65 MG ELEMENTAL FE) 325 MG: 325 (65 FE) TAB at 08:56

## 2020-01-01 RX ADMIN — IPRATROPIUM BROMIDE AND ALBUTEROL SULFATE 3 ML: .5; 3 SOLUTION RESPIRATORY (INHALATION) at 14:28

## 2020-01-01 RX ADMIN — IPRATROPIUM BROMIDE AND ALBUTEROL SULFATE 3 ML: .5; 3 SOLUTION RESPIRATORY (INHALATION) at 20:40

## 2020-01-01 RX ADMIN — FUROSEMIDE 40 MG: 10 INJECTION, SOLUTION INTRAMUSCULAR; INTRAVENOUS at 09:43

## 2020-01-01 RX ADMIN — FERROUS SULFATE TAB 325 MG (65 MG ELEMENTAL FE) 325 MG: 325 (65 FE) TAB at 09:13

## 2020-01-01 RX ADMIN — FUROSEMIDE 40 MG: 10 INJECTION INTRAMUSCULAR; INTRAVENOUS at 17:49

## 2020-01-01 RX ADMIN — POTASSIUM CHLORIDE 20 MEQ: 20 TABLET, EXTENDED RELEASE ORAL at 08:44

## 2020-01-01 RX ADMIN — TAMSULOSIN HYDROCHLORIDE 0.4 MG: 0.4 CAPSULE ORAL at 09:28

## 2020-01-01 RX ADMIN — SENNOSIDES AND DOCUSATE SODIUM 1 TABLET: 8.6; 5 TABLET ORAL at 17:13

## 2020-01-01 RX ADMIN — INSULIN LISPRO 8 UNITS: 100 INJECTION, SOLUTION INTRAVENOUS; SUBCUTANEOUS at 17:42

## 2020-01-01 RX ADMIN — POTASSIUM CHLORIDE 20 MEQ: 20 TABLET, EXTENDED RELEASE ORAL at 16:17

## 2020-01-01 RX ADMIN — Medication 10 ML: at 10:00

## 2020-01-01 RX ADMIN — PIPERACILLIN AND TAZOBACTAM 4.5 G: 4; .5 INJECTION, POWDER, FOR SOLUTION INTRAVENOUS at 06:29

## 2020-01-01 RX ADMIN — BISACODYL 10 MG: 10 SUPPOSITORY RECTAL at 09:45

## 2020-01-01 RX ADMIN — Medication 5 ML: at 06:09

## 2020-01-01 RX ADMIN — SENNOSIDES AND DOCUSATE SODIUM 1 TABLET: 8.6; 5 TABLET ORAL at 17:55

## 2020-01-01 RX ADMIN — POTASSIUM CHLORIDE 40 MEQ: 20 TABLET, EXTENDED RELEASE ORAL at 17:13

## 2020-01-01 RX ADMIN — Medication 2 G: at 11:50

## 2020-01-01 RX ADMIN — INSULIN LISPRO 4 UNITS: 100 INJECTION, SOLUTION INTRAVENOUS; SUBCUTANEOUS at 17:49

## 2020-01-01 RX ADMIN — HUMAN INSULIN 6 UNITS: 100 INJECTION, SOLUTION SUBCUTANEOUS at 12:13

## 2020-01-01 RX ADMIN — PIPERACILLIN AND TAZOBACTAM 3.38 G: 3; .375 INJECTION, POWDER, FOR SOLUTION INTRAVENOUS at 01:42

## 2020-01-01 RX ADMIN — Medication 10 ML: at 12:45

## 2020-01-01 RX ADMIN — Medication 10 ML: at 04:57

## 2020-01-01 RX ADMIN — NYSTATIN 500000 UNITS: 500000 SUSPENSION ORAL at 09:45

## 2020-01-01 RX ADMIN — MELOXICAM 7.5 MG: 7.5 TABLET ORAL at 09:22

## 2020-01-01 RX ADMIN — FERROUS SULFATE TAB 325 MG (65 MG ELEMENTAL FE) 325 MG: 325 (65 FE) TAB at 09:14

## 2020-01-01 RX ADMIN — FERROUS SULFATE TAB 325 MG (65 MG ELEMENTAL FE) 325 MG: 325 (65 FE) TAB at 08:25

## 2020-01-01 RX ADMIN — POTASSIUM CHLORIDE 20 MEQ: 20 TABLET, EXTENDED RELEASE ORAL at 11:50

## 2020-01-01 RX ADMIN — HUMAN INSULIN 6 UNITS: 100 INJECTION, SOLUTION SUBCUTANEOUS at 16:48

## 2020-01-01 RX ADMIN — VANCOMYCIN HYDROCHLORIDE 1250 MG: 10 INJECTION, POWDER, LYOPHILIZED, FOR SOLUTION INTRAVENOUS at 08:35

## 2020-01-01 RX ADMIN — INSULIN LISPRO 6 UNITS: 100 INJECTION, SOLUTION INTRAVENOUS; SUBCUTANEOUS at 13:02

## 2020-01-01 RX ADMIN — NYSTATIN 500000 UNITS: 500000 SUSPENSION ORAL at 09:10

## 2020-01-01 RX ADMIN — FUROSEMIDE 40 MG: 10 INJECTION INTRAMUSCULAR; INTRAVENOUS at 17:37

## 2020-01-01 RX ADMIN — LISINOPRIL 20 MG: 20 TABLET ORAL at 08:39

## 2020-01-01 RX ADMIN — INSULIN GLARGINE 20 UNITS: 100 INJECTION, SOLUTION SUBCUTANEOUS at 22:02

## 2020-01-01 RX ADMIN — PROCHLORPERAZINE EDISYLATE 10 MG: 5 INJECTION INTRAMUSCULAR; INTRAVENOUS at 15:59

## 2020-01-01 RX ADMIN — NYSTATIN 500000 UNITS: 500000 SUSPENSION ORAL at 17:36

## 2020-01-01 RX ADMIN — INSULIN LISPRO 6 UNITS: 100 INJECTION, SOLUTION INTRAVENOUS; SUBCUTANEOUS at 17:38

## 2020-01-01 RX ADMIN — BISACODYL 10 MG: 10 SUPPOSITORY RECTAL at 08:49

## 2020-01-01 RX ADMIN — PIPERACILLIN AND TAZOBACTAM 3.38 G: 3; .375 INJECTION, POWDER, FOR SOLUTION INTRAVENOUS at 00:41

## 2020-01-01 RX ADMIN — PIPERACILLIN AND TAZOBACTAM 3.38 G: 3; .375 INJECTION, POWDER, FOR SOLUTION INTRAVENOUS at 00:45

## 2020-01-01 RX ADMIN — SENNOSIDES AND DOCUSATE SODIUM 1 TABLET: 8.6; 5 TABLET ORAL at 09:23

## 2020-01-01 RX ADMIN — Medication 2 G: at 17:13

## 2020-01-01 RX ADMIN — Medication 10 ML: at 05:53

## 2020-01-01 RX ADMIN — BISACODYL 10 MG: 10 SUPPOSITORY RECTAL at 11:04

## 2020-01-01 RX ADMIN — AZITHROMYCIN MONOHYDRATE 500 MG: 250 TABLET ORAL at 08:34

## 2020-01-01 RX ADMIN — AMLODIPINE BESYLATE 10 MG: 10 TABLET ORAL at 06:00

## 2020-01-01 RX ADMIN — Medication 10 ML: at 06:05

## 2020-01-01 RX ADMIN — HUMAN INSULIN 9 UNITS: 100 INJECTION, SOLUTION SUBCUTANEOUS at 13:52

## 2020-01-01 RX ADMIN — MAGNESIUM HYDROXIDE 30 ML: 400 SUSPENSION ORAL at 09:45

## 2020-01-01 RX ADMIN — BISACODYL 10 MG: 10 SUPPOSITORY RECTAL at 08:22

## 2020-01-01 RX ADMIN — CEFTRIAXONE SODIUM 1 G: 1 INJECTION, POWDER, FOR SOLUTION INTRAMUSCULAR; INTRAVENOUS at 01:27

## 2020-01-01 RX ADMIN — POTASSIUM CHLORIDE 20 MEQ: 20 TABLET, EXTENDED RELEASE ORAL at 23:36

## 2020-01-01 RX ADMIN — FERROUS SULFATE TAB 325 MG (65 MG ELEMENTAL FE) 325 MG: 325 (65 FE) TAB at 10:14

## 2020-01-01 RX ADMIN — AMLODIPINE BESYLATE 10 MG: 10 TABLET ORAL at 08:28

## 2020-01-01 RX ADMIN — HUMAN INSULIN 12 UNITS: 100 INJECTION, SOLUTION SUBCUTANEOUS at 17:39

## 2020-01-01 RX ADMIN — HYDRALAZINE HYDROCHLORIDE 5 MG: 20 INJECTION, SOLUTION INTRAMUSCULAR; INTRAVENOUS at 02:28

## 2020-01-01 RX ADMIN — SENNOSIDES AND DOCUSATE SODIUM 1 TABLET: 8.6; 5 TABLET ORAL at 09:32

## 2020-01-01 RX ADMIN — Medication 10 ML: at 14:50

## 2020-01-01 RX ADMIN — SODIUM CHLORIDE 100 ML/HR: 9 INJECTION, SOLUTION INTRAVENOUS at 08:00

## 2020-01-01 RX ADMIN — ONDANSETRON 8 MG: 2 INJECTION, SOLUTION INTRAMUSCULAR; INTRAVENOUS at 12:10

## 2020-01-01 RX ADMIN — VANCOMYCIN HYDROCHLORIDE 1000 MG: 1 INJECTION, POWDER, LYOPHILIZED, FOR SOLUTION INTRAVENOUS at 12:00

## 2020-01-01 RX ADMIN — INSULIN LISPRO 10 UNITS: 100 INJECTION, SOLUTION INTRAVENOUS; SUBCUTANEOUS at 17:35

## 2020-01-01 RX ADMIN — INSULIN LISPRO 4 UNITS: 100 INJECTION, SOLUTION INTRAVENOUS; SUBCUTANEOUS at 17:20

## 2020-01-01 RX ADMIN — HYDROCODONE BITARTRATE AND ACETAMINOPHEN 1 TABLET: 5; 325 TABLET ORAL at 22:11

## 2020-01-01 RX ADMIN — MORPHINE SULFATE 2 MG: 2 INJECTION, SOLUTION INTRAMUSCULAR; INTRAVENOUS at 10:28

## 2020-01-01 RX ADMIN — POTASSIUM BICARBONATE 20 MEQ: 782 TABLET, EFFERVESCENT ORAL at 12:35

## 2020-01-01 RX ADMIN — ENOXAPARIN SODIUM 40 MG: 40 INJECTION SUBCUTANEOUS at 09:07

## 2020-01-01 RX ADMIN — IPRATROPIUM BROMIDE AND ALBUTEROL SULFATE 3 ML: .5; 3 SOLUTION RESPIRATORY (INHALATION) at 20:17

## 2020-01-01 RX ADMIN — GLIMEPIRIDE 4 MG: 4 TABLET ORAL at 06:46

## 2020-01-01 RX ADMIN — INSULIN LISPRO 4 UNITS: 100 INJECTION, SOLUTION INTRAVENOUS; SUBCUTANEOUS at 09:51

## 2020-01-01 RX ADMIN — NYSTATIN 500000 UNITS: 500000 SUSPENSION ORAL at 08:55

## 2020-01-01 RX ADMIN — FUROSEMIDE 20 MG: 10 INJECTION, SOLUTION INTRAMUSCULAR; INTRAVENOUS at 09:20

## 2020-01-01 RX ADMIN — AMLODIPINE BESYLATE 10 MG: 10 TABLET ORAL at 09:44

## 2020-01-01 RX ADMIN — NYSTATIN 500000 UNITS: 500000 SUSPENSION ORAL at 19:06

## 2020-01-01 RX ADMIN — MAGNESIUM HYDROXIDE 30 ML: 400 SUSPENSION ORAL at 08:00

## 2020-01-01 RX ADMIN — NYSTATIN 500000 UNITS: 500000 SUSPENSION ORAL at 09:50

## 2020-01-01 RX ADMIN — Medication: at 17:03

## 2020-01-01 RX ADMIN — MORPHINE SULFATE 2 MG: 2 INJECTION, SOLUTION INTRAMUSCULAR; INTRAVENOUS at 10:03

## 2020-01-01 RX ADMIN — Medication 10 ML: at 05:47

## 2020-01-01 RX ADMIN — IPRATROPIUM BROMIDE AND ALBUTEROL SULFATE 3 ML: .5; 3 SOLUTION RESPIRATORY (INHALATION) at 17:37

## 2020-01-01 RX ADMIN — SENNOSIDES AND DOCUSATE SODIUM 1 TABLET: 8.6; 5 TABLET ORAL at 17:59

## 2020-01-01 RX ADMIN — FERROUS SULFATE TAB 325 MG (65 MG ELEMENTAL FE) 325 MG: 325 (65 FE) TAB at 09:08

## 2020-01-01 RX ADMIN — SENNOSIDES AND DOCUSATE SODIUM 1 TABLET: 8.6; 5 TABLET ORAL at 17:21

## 2020-01-01 RX ADMIN — SENNOSIDES AND DOCUSATE SODIUM 1 TABLET: 8.6; 5 TABLET ORAL at 08:08

## 2020-01-01 RX ADMIN — Medication 10 ML: at 22:00

## 2020-01-01 RX ADMIN — AMLODIPINE BESYLATE 10 MG: 10 TABLET ORAL at 09:01

## 2020-01-01 RX ADMIN — CEFTRIAXONE SODIUM 1 G: 1 INJECTION, POWDER, FOR SOLUTION INTRAMUSCULAR; INTRAVENOUS at 00:35

## 2020-01-01 RX ADMIN — SODIUM CHLORIDE 25 ML/HR: 900 INJECTION, SOLUTION INTRAVENOUS at 16:44

## 2020-01-01 RX ADMIN — VANCOMYCIN HYDROCHLORIDE 1000 MG: 1 INJECTION, POWDER, LYOPHILIZED, FOR SOLUTION INTRAVENOUS at 09:12

## 2020-01-01 RX ADMIN — SODIUM CHLORIDE 550 MG: 900 INJECTION, SOLUTION INTRAVENOUS at 18:02

## 2020-01-01 RX ADMIN — SENNOSIDES AND DOCUSATE SODIUM 1 TABLET: 8.6; 5 TABLET ORAL at 17:37

## 2020-01-01 RX ADMIN — MAGNESIUM HYDROXIDE 30 ML: 400 SUSPENSION ORAL at 09:01

## 2020-01-01 RX ADMIN — Medication 10 ML: at 18:38

## 2020-01-01 RX ADMIN — Medication 5 ML: at 05:36

## 2020-01-01 RX ADMIN — HYDROCODONE BITARTRATE AND ACETAMINOPHEN 1 TABLET: 5; 325 TABLET ORAL at 20:10

## 2020-01-01 RX ADMIN — INSULIN GLARGINE 5 UNITS: 100 INJECTION, SOLUTION SUBCUTANEOUS at 23:33

## 2020-01-01 RX ADMIN — Medication 1 PACKET: at 08:07

## 2020-01-01 RX ADMIN — GLIMEPIRIDE 4 MG: 4 TABLET ORAL at 08:48

## 2020-01-01 RX ADMIN — Medication 2 G: at 17:25

## 2020-01-01 RX ADMIN — INSULIN LISPRO 5 UNITS: 100 INJECTION, SOLUTION INTRAVENOUS; SUBCUTANEOUS at 21:42

## 2020-01-01 RX ADMIN — NYSTATIN 500000 UNITS: 500000 SUSPENSION ORAL at 09:08

## 2020-01-01 RX ADMIN — Medication 2 G: at 08:55

## 2020-01-01 RX ADMIN — IPRATROPIUM BROMIDE AND ALBUTEROL SULFATE 3 ML: .5; 3 SOLUTION RESPIRATORY (INHALATION) at 21:15

## 2020-01-01 RX ADMIN — MELOXICAM 7.5 MG: 7.5 TABLET ORAL at 08:49

## 2020-01-01 RX ADMIN — NYSTATIN 500000 UNITS: 500000 SUSPENSION ORAL at 12:43

## 2020-01-01 RX ADMIN — AMLODIPINE BESYLATE 10 MG: 10 TABLET ORAL at 08:36

## 2020-01-01 RX ADMIN — POTASSIUM CHLORIDE 20 MEQ: 20 TABLET, EXTENDED RELEASE ORAL at 22:22

## 2020-01-01 RX ADMIN — MAGNESIUM SULFATE 2 G: 2 INJECTION INTRAVENOUS at 12:07

## 2020-01-01 RX ADMIN — ENOXAPARIN SODIUM 40 MG: 40 INJECTION SUBCUTANEOUS at 08:28

## 2020-01-01 RX ADMIN — FERROUS SULFATE TAB 325 MG (65 MG ELEMENTAL FE) 325 MG: 325 (65 FE) TAB at 08:16

## 2020-01-01 RX ADMIN — POTASSIUM CHLORIDE 40 MEQ: 20 TABLET, EXTENDED RELEASE ORAL at 18:38

## 2020-01-01 RX ADMIN — POTASSIUM CHLORIDE 20 MEQ: 20 TABLET, EXTENDED RELEASE ORAL at 08:47

## 2020-01-01 RX ADMIN — VANCOMYCIN HYDROCHLORIDE 1250 MG: 10 INJECTION, POWDER, LYOPHILIZED, FOR SOLUTION INTRAVENOUS at 21:58

## 2020-01-01 RX ADMIN — POTASSIUM CHLORIDE 40 MEQ: 20 TABLET, EXTENDED RELEASE ORAL at 08:24

## 2020-01-01 RX ADMIN — MORPHINE SULFATE 2 MG: 2 INJECTION, SOLUTION INTRAMUSCULAR; INTRAVENOUS at 10:32

## 2020-01-01 RX ADMIN — INSULIN LISPRO 4 UNITS: 100 INJECTION, SOLUTION INTRAVENOUS; SUBCUTANEOUS at 21:48

## 2020-01-01 RX ADMIN — MELOXICAM 7.5 MG: 7.5 TABLET ORAL at 09:24

## 2020-01-01 RX ADMIN — AMLODIPINE BESYLATE 10 MG: 10 TABLET ORAL at 08:55

## 2020-01-01 RX ADMIN — Medication 2 G: at 08:38

## 2020-01-01 RX ADMIN — VANCOMYCIN HYDROCHLORIDE 1750 MG: 10 INJECTION, POWDER, LYOPHILIZED, FOR SOLUTION INTRAVENOUS at 10:00

## 2020-01-01 RX ADMIN — PIPERACILLIN AND TAZOBACTAM 3.38 G: 3; .375 INJECTION, POWDER, FOR SOLUTION INTRAVENOUS at 09:40

## 2020-01-01 RX ADMIN — VANCOMYCIN HYDROCHLORIDE 1000 MG: 1 INJECTION, POWDER, LYOPHILIZED, FOR SOLUTION INTRAVENOUS at 00:21

## 2020-01-01 RX ADMIN — LISINOPRIL 20 MG: 5 TABLET ORAL at 07:56

## 2020-01-01 RX ADMIN — Medication 10 ML: at 22:55

## 2020-01-01 RX ADMIN — LISINOPRIL 20 MG: 5 TABLET ORAL at 09:22

## 2020-01-01 RX ADMIN — IPRATROPIUM BROMIDE AND ALBUTEROL SULFATE 3 ML: .5; 3 SOLUTION RESPIRATORY (INHALATION) at 19:56

## 2020-01-01 RX ADMIN — Medication 5 ML: at 17:26

## 2020-01-01 RX ADMIN — POTASSIUM CHLORIDE 20 MEQ: 20 TABLET, EXTENDED RELEASE ORAL at 09:02

## 2020-01-01 RX ADMIN — NYSTATIN 500000 UNITS: 500000 SUSPENSION ORAL at 22:34

## 2020-01-01 RX ADMIN — INSULIN LISPRO 4 UNITS: 100 INJECTION, SOLUTION INTRAVENOUS; SUBCUTANEOUS at 18:03

## 2020-01-01 RX ADMIN — Medication 2 G: at 09:46

## 2020-01-01 RX ADMIN — ALBUMIN (HUMAN) 12.5 G: 0.25 INJECTION, SOLUTION INTRAVENOUS at 00:30

## 2020-01-01 RX ADMIN — ALBUMIN (HUMAN) 12.5 G: 0.25 INJECTION, SOLUTION INTRAVENOUS at 10:15

## 2020-01-01 RX ADMIN — IPRATROPIUM BROMIDE AND ALBUTEROL SULFATE 3 ML: .5; 3 SOLUTION RESPIRATORY (INHALATION) at 08:58

## 2020-01-01 RX ADMIN — VANCOMYCIN HYDROCHLORIDE 1000 MG: 1 INJECTION, POWDER, LYOPHILIZED, FOR SOLUTION INTRAVENOUS at 17:02

## 2020-01-01 RX ADMIN — SENNOSIDES AND DOCUSATE SODIUM 1 TABLET: 8.6; 5 TABLET ORAL at 11:06

## 2020-01-01 RX ADMIN — ENOXAPARIN SODIUM 30 MG: 30 INJECTION SUBCUTANEOUS at 21:43

## 2020-01-01 RX ADMIN — BISACODYL 10 MG: 10 SUPPOSITORY RECTAL at 09:08

## 2020-01-01 RX ADMIN — IPRATROPIUM BROMIDE AND ALBUTEROL SULFATE 3 ML: .5; 3 SOLUTION RESPIRATORY (INHALATION) at 17:06

## 2020-01-01 RX ADMIN — ENOXAPARIN SODIUM 40 MG: 40 INJECTION SUBCUTANEOUS at 09:36

## 2020-01-01 RX ADMIN — POTASSIUM CHLORIDE 20 MEQ: 20 TABLET, EXTENDED RELEASE ORAL at 17:17

## 2020-01-01 RX ADMIN — MAGNESIUM HYDROXIDE 30 ML: 400 SUSPENSION ORAL at 08:19

## 2020-01-01 RX ADMIN — MAGNESIUM HYDROXIDE 30 ML: 400 SUSPENSION ORAL at 09:21

## 2020-01-01 RX ADMIN — AMLODIPINE BESYLATE 10 MG: 10 TABLET ORAL at 08:21

## 2020-01-01 RX ADMIN — AMLODIPINE BESYLATE 10 MG: 10 TABLET ORAL at 09:10

## 2020-01-01 RX ADMIN — INSULIN LISPRO 6 UNITS: 100 INJECTION, SOLUTION INTRAVENOUS; SUBCUTANEOUS at 09:02

## 2020-01-01 RX ADMIN — MAGNESIUM GLUCONATE 500 MG ORAL TABLET 400 MG: 500 TABLET ORAL at 13:10

## 2020-01-01 RX ADMIN — MAGNESIUM HYDROXIDE 30 ML: 400 SUSPENSION ORAL at 09:07

## 2020-01-01 RX ADMIN — SODIUM CHLORIDE 100 ML: 900 INJECTION, SOLUTION INTRAVENOUS at 18:38

## 2020-01-01 RX ADMIN — Medication 5 ML: at 22:50

## 2020-01-01 RX ADMIN — SENNOSIDES AND DOCUSATE SODIUM 1 TABLET: 8.6; 5 TABLET ORAL at 08:34

## 2020-01-01 RX ADMIN — POTASSIUM CHLORIDE 20 MEQ: 20 TABLET, EXTENDED RELEASE ORAL at 18:02

## 2020-01-01 RX ADMIN — METFORMIN HYDROCHLORIDE 1000 MG: 500 TABLET ORAL at 09:48

## 2020-01-01 RX ADMIN — TAMSULOSIN HYDROCHLORIDE 0.4 MG: 0.4 CAPSULE ORAL at 08:44

## 2020-01-01 RX ADMIN — FERROUS SULFATE TAB 325 MG (65 MG ELEMENTAL FE) 325 MG: 325 (65 FE) TAB at 08:31

## 2020-01-01 RX ADMIN — HUMAN INSULIN 6 UNITS: 100 INJECTION, SOLUTION SUBCUTANEOUS at 22:52

## 2020-01-01 RX ADMIN — Medication 10 ML: at 14:04

## 2020-01-01 RX ADMIN — METHYLPREDNISOLONE SODIUM SUCCINATE 60 MG: 125 INJECTION, POWDER, FOR SOLUTION INTRAMUSCULAR; INTRAVENOUS at 22:22

## 2020-01-01 RX ADMIN — INSULIN LISPRO 8 UNITS: 100 INJECTION, SOLUTION INTRAVENOUS; SUBCUTANEOUS at 17:14

## 2020-01-01 RX ADMIN — DEXAMETHASONE SODIUM PHOSPHATE 6 MG: 4 INJECTION, SOLUTION INTRAMUSCULAR; INTRAVENOUS at 09:14

## 2020-01-01 RX ADMIN — NYSTATIN 500000 UNITS: 500000 SUSPENSION ORAL at 21:37

## 2020-01-01 RX ADMIN — INSULIN LISPRO 4 UNITS: 100 INJECTION, SOLUTION INTRAVENOUS; SUBCUTANEOUS at 17:11

## 2020-01-01 RX ADMIN — POTASSIUM CHLORIDE 20 MEQ: 20 TABLET, EXTENDED RELEASE ORAL at 08:35

## 2020-01-01 RX ADMIN — HYDROCODONE BITARTRATE AND ACETAMINOPHEN 1 TABLET: 5; 325 TABLET ORAL at 14:17

## 2020-01-01 RX ADMIN — FUROSEMIDE 40 MG: 10 INJECTION, SOLUTION INTRAMUSCULAR; INTRAVENOUS at 22:34

## 2020-01-01 RX ADMIN — MORPHINE SULFATE 2 MG: 2 INJECTION, SOLUTION INTRAMUSCULAR; INTRAVENOUS at 17:58

## 2020-01-01 RX ADMIN — NYSTATIN 500000 UNITS: 500000 SUSPENSION ORAL at 12:40

## 2020-01-01 RX ADMIN — SENNOSIDES AND DOCUSATE SODIUM 1 TABLET: 8.6; 5 TABLET ORAL at 08:55

## 2020-01-01 RX ADMIN — IPRATROPIUM BROMIDE AND ALBUTEROL SULFATE 3 ML: .5; 3 SOLUTION RESPIRATORY (INHALATION) at 08:36

## 2020-01-01 RX ADMIN — TRAMADOL HYDROCHLORIDE 50 MG: 50 TABLET, FILM COATED ORAL at 04:04

## 2020-01-01 RX ADMIN — Medication 1 PACKET: at 08:34

## 2020-01-01 RX ADMIN — FUROSEMIDE 40 MG: 10 INJECTION INTRAMUSCULAR; INTRAVENOUS at 08:27

## 2020-01-01 RX ADMIN — FERROUS SULFATE TAB 325 MG (65 MG ELEMENTAL FE) 325 MG: 325 (65 FE) TAB at 09:01

## 2020-01-01 RX ADMIN — TAMSULOSIN HYDROCHLORIDE 0.4 MG: 0.4 CAPSULE ORAL at 09:21

## 2020-01-01 RX ADMIN — PREDNISONE 30 MG: 20 TABLET ORAL at 08:42

## 2020-01-01 RX ADMIN — Medication 2 G: at 17:32

## 2020-01-01 RX ADMIN — INSULIN LISPRO 6 UNITS: 100 INJECTION, SOLUTION INTRAVENOUS; SUBCUTANEOUS at 08:54

## 2020-01-01 RX ADMIN — MAGNESIUM HYDROXIDE 30 ML: 400 SUSPENSION ORAL at 11:01

## 2020-01-01 RX ADMIN — ENOXAPARIN SODIUM 40 MG: 40 INJECTION SUBCUTANEOUS at 09:26

## 2020-01-01 RX ADMIN — Medication 10 ML: at 00:13

## 2020-01-01 RX ADMIN — BISACODYL 10 MG: 10 SUPPOSITORY RECTAL at 10:27

## 2020-01-01 RX ADMIN — Medication 10 ML: at 23:34

## 2020-01-01 RX ADMIN — INSULIN LISPRO 4 UNITS: 100 INJECTION, SOLUTION INTRAVENOUS; SUBCUTANEOUS at 17:27

## 2020-01-01 RX ADMIN — POTASSIUM CHLORIDE 20 MEQ: 20 TABLET, EXTENDED RELEASE ORAL at 22:51

## 2020-01-01 RX ADMIN — HUMAN INSULIN 3 UNITS: 100 INJECTION, SOLUTION SUBCUTANEOUS at 21:30

## 2020-01-01 RX ADMIN — ENOXAPARIN SODIUM 30 MG: 30 INJECTION SUBCUTANEOUS at 21:47

## 2020-01-01 RX ADMIN — Medication 5 ML: at 13:03

## 2020-01-01 RX ADMIN — NYSTATIN 500000 UNITS: 500000 SUSPENSION ORAL at 17:12

## 2020-01-01 RX ADMIN — NYSTATIN 500000 UNITS: 500000 SUSPENSION ORAL at 08:34

## 2020-01-01 RX ADMIN — FERROUS SULFATE TAB 325 MG (65 MG ELEMENTAL FE) 325 MG: 325 (65 FE) TAB at 08:39

## 2020-01-01 RX ADMIN — NYSTATIN 500000 UNITS: 500000 SUSPENSION ORAL at 22:13

## 2020-01-01 RX ADMIN — POTASSIUM CHLORIDE 20 MEQ: 20 TABLET, EXTENDED RELEASE ORAL at 17:08

## 2020-01-01 RX ADMIN — Medication 2 G: at 17:12

## 2020-01-01 RX ADMIN — FOSAPREPITANT 150 MG: 150 INJECTION, POWDER, LYOPHILIZED, FOR SOLUTION INTRAVENOUS at 10:28

## 2020-01-01 RX ADMIN — IPRATROPIUM BROMIDE AND ALBUTEROL SULFATE 3 ML: .5; 3 SOLUTION RESPIRATORY (INHALATION) at 20:51

## 2020-01-01 RX ADMIN — IPRATROPIUM BROMIDE AND ALBUTEROL SULFATE 3 ML: .5; 3 SOLUTION RESPIRATORY (INHALATION) at 01:28

## 2020-01-01 RX ADMIN — IPRATROPIUM BROMIDE AND ALBUTEROL SULFATE 3 ML: .5; 3 SOLUTION RESPIRATORY (INHALATION) at 20:19

## 2020-01-01 RX ADMIN — BISACODYL 10 MG: 10 SUPPOSITORY RECTAL at 12:00

## 2020-01-01 RX ADMIN — HYDROCODONE BITARTRATE AND ACETAMINOPHEN 1 TABLET: 5; 325 TABLET ORAL at 13:11

## 2020-01-01 RX ADMIN — INSULIN LISPRO 2 UNITS: 100 INJECTION, SOLUTION INTRAVENOUS; SUBCUTANEOUS at 13:39

## 2020-01-01 RX ADMIN — SENNOSIDES AND DOCUSATE SODIUM 1 TABLET: 8.6; 5 TABLET ORAL at 18:59

## 2020-01-01 RX ADMIN — NYSTATIN 500000 UNITS: 500000 SUSPENSION ORAL at 10:14

## 2020-01-01 RX ADMIN — MORPHINE SULFATE 2 MG: 2 INJECTION, SOLUTION INTRAMUSCULAR; INTRAVENOUS at 02:53

## 2020-01-01 RX ADMIN — HUMAN INSULIN 3 UNITS: 100 INJECTION, SOLUTION SUBCUTANEOUS at 22:01

## 2020-01-01 RX ADMIN — POLYETHYLENE GLYCOL (3350) 17 G: 17 POWDER, FOR SOLUTION ORAL at 08:56

## 2020-01-01 RX ADMIN — POLYETHYLENE GLYCOL (3350) 17 G: 17 POWDER, FOR SOLUTION ORAL at 08:34

## 2020-01-01 RX ADMIN — INSULIN LISPRO 10 UNITS: 100 INJECTION, SOLUTION INTRAVENOUS; SUBCUTANEOUS at 21:49

## 2020-01-01 RX ADMIN — FUROSEMIDE 40 MG: 10 INJECTION, SOLUTION INTRAMUSCULAR; INTRAVENOUS at 11:57

## 2020-01-01 RX ADMIN — POTASSIUM CHLORIDE 20 MEQ: 20 TABLET, EXTENDED RELEASE ORAL at 17:12

## 2020-01-01 RX ADMIN — MELOXICAM 7.5 MG: 7.5 TABLET ORAL at 08:39

## 2020-01-01 RX ADMIN — MELOXICAM 7.5 MG: 7.5 TABLET ORAL at 09:46

## 2020-01-01 RX ADMIN — INSULIN LISPRO 2 UNITS: 100 INJECTION, SOLUTION INTRAVENOUS; SUBCUTANEOUS at 12:28

## 2020-01-01 RX ADMIN — IPRATROPIUM BROMIDE AND ALBUTEROL SULFATE 3 ML: .5; 3 SOLUTION RESPIRATORY (INHALATION) at 09:01

## 2020-01-01 RX ADMIN — FUROSEMIDE 40 MG: 10 INJECTION INTRAMUSCULAR; INTRAVENOUS at 19:11

## 2020-01-01 RX ADMIN — NYSTATIN 500000 UNITS: 500000 SUSPENSION ORAL at 12:19

## 2020-01-01 RX ADMIN — NYSTATIN 500000 UNITS: 500000 SUSPENSION ORAL at 21:41

## 2020-01-01 RX ADMIN — INSULIN GLARGINE 5 UNITS: 100 INJECTION, SOLUTION SUBCUTANEOUS at 22:57

## 2020-01-01 RX ADMIN — ENOXAPARIN SODIUM 40 MG: 40 INJECTION SUBCUTANEOUS at 08:07

## 2020-01-01 RX ADMIN — IPRATROPIUM BROMIDE AND ALBUTEROL SULFATE 3 ML: .5; 3 SOLUTION RESPIRATORY (INHALATION) at 14:38

## 2020-01-01 RX ADMIN — Medication 10 ML: at 08:28

## 2020-01-01 RX ADMIN — LISINOPRIL 20 MG: 5 TABLET ORAL at 08:22

## 2020-01-01 RX ADMIN — SENNOSIDES AND DOCUSATE SODIUM 1 TABLET: 8.6; 5 TABLET ORAL at 18:24

## 2020-01-01 RX ADMIN — HUMAN INSULIN 15 UNITS: 100 INJECTION, SOLUTION SUBCUTANEOUS at 17:27

## 2020-01-01 RX ADMIN — HUMAN INSULIN 6 UNITS: 100 INJECTION, SOLUTION SUBCUTANEOUS at 16:30

## 2020-01-01 RX ADMIN — FERROUS SULFATE TAB 325 MG (65 MG ELEMENTAL FE) 325 MG: 325 (65 FE) TAB at 08:36

## 2020-01-01 RX ADMIN — Medication 10 ML: at 12:05

## 2020-01-01 RX ADMIN — LISINOPRIL 20 MG: 5 TABLET ORAL at 08:09

## 2020-01-01 RX ADMIN — Medication 5 ML: at 06:46

## 2020-01-01 RX ADMIN — POLYETHYLENE GLYCOL 3350 17 G: 17 POWDER, FOR SOLUTION ORAL at 08:28

## 2020-01-01 RX ADMIN — POTASSIUM CHLORIDE 40 MEQ: 20 TABLET, EXTENDED RELEASE ORAL at 18:42

## 2020-01-01 RX ADMIN — PIPERACILLIN AND TAZOBACTAM 3.38 G: 3; .375 INJECTION, POWDER, FOR SOLUTION INTRAVENOUS at 09:11

## 2020-01-01 RX ADMIN — FERROUS SULFATE TAB 325 MG (65 MG ELEMENTAL FE) 325 MG: 325 (65 FE) TAB at 08:17

## 2020-01-01 RX ADMIN — POTASSIUM BICARBONATE 20 MEQ: 782 TABLET, EFFERVESCENT ORAL at 17:39

## 2020-01-01 RX ADMIN — TAMSULOSIN HYDROCHLORIDE 0.4 MG: 0.4 CAPSULE ORAL at 10:15

## 2020-01-01 RX ADMIN — NYSTATIN 500000 UNITS: 500000 SUSPENSION ORAL at 12:56

## 2020-01-01 RX ADMIN — IPRATROPIUM BROMIDE AND ALBUTEROL SULFATE 3 ML: .5; 3 SOLUTION RESPIRATORY (INHALATION) at 14:54

## 2020-01-01 RX ADMIN — NYSTATIN 500000 UNITS: 500000 SUSPENSION ORAL at 18:03

## 2020-01-01 RX ADMIN — POTASSIUM CHLORIDE 20 MEQ: 20 TABLET, EXTENDED RELEASE ORAL at 22:06

## 2020-01-01 RX ADMIN — FUROSEMIDE 20 MG: 10 INJECTION, SOLUTION INTRAMUSCULAR; INTRAVENOUS at 08:54

## 2020-01-01 RX ADMIN — FUROSEMIDE 20 MG: 10 INJECTION, SOLUTION INTRAMUSCULAR; INTRAVENOUS at 08:34

## 2020-01-01 RX ADMIN — NYSTATIN 500000 UNITS: 500000 SUSPENSION ORAL at 21:48

## 2020-01-01 RX ADMIN — NYSTATIN 500000 UNITS: 500000 SUSPENSION ORAL at 22:49

## 2020-01-01 RX ADMIN — POTASSIUM CHLORIDE 40 MEQ: 20 TABLET, EXTENDED RELEASE ORAL at 17:26

## 2020-01-01 RX ADMIN — TRAMADOL HYDROCHLORIDE 50 MG: 50 TABLET, FILM COATED ORAL at 21:39

## 2020-01-01 RX ADMIN — VANCOMYCIN HYDROCHLORIDE 1250 MG: 10 INJECTION, POWDER, LYOPHILIZED, FOR SOLUTION INTRAVENOUS at 22:09

## 2020-01-01 RX ADMIN — IPRATROPIUM BROMIDE AND ALBUTEROL SULFATE 3 ML: .5; 3 SOLUTION RESPIRATORY (INHALATION) at 07:51

## 2020-01-01 RX ADMIN — Medication 1 PACKET: at 08:37

## 2020-01-01 RX ADMIN — HUMAN INSULIN 6 UNITS: 100 INJECTION, SOLUTION SUBCUTANEOUS at 21:08

## 2020-01-01 RX ADMIN — INSULIN LISPRO 6 UNITS: 100 INJECTION, SOLUTION INTRAVENOUS; SUBCUTANEOUS at 16:30

## 2020-01-01 RX ADMIN — LISINOPRIL 20 MG: 5 TABLET ORAL at 08:38

## 2020-01-01 RX ADMIN — FUROSEMIDE 40 MG: 10 INJECTION INTRAMUSCULAR; INTRAVENOUS at 12:01

## 2020-01-01 RX ADMIN — IPRATROPIUM BROMIDE AND ALBUTEROL SULFATE 3 ML: .5; 3 SOLUTION RESPIRATORY (INHALATION) at 08:29

## 2020-01-01 RX ADMIN — SENNOSIDES AND DOCUSATE SODIUM 1 TABLET: 8.6; 5 TABLET ORAL at 17:17

## 2020-01-01 RX ADMIN — Medication 5 ML: at 14:46

## 2020-01-01 RX ADMIN — AMLODIPINE BESYLATE 10 MG: 10 TABLET ORAL at 08:31

## 2020-01-01 RX ADMIN — AZITHROMYCIN DIHYDRATE 500 MG: 500 INJECTION, POWDER, LYOPHILIZED, FOR SOLUTION INTRAVENOUS at 03:00

## 2020-01-01 RX ADMIN — IPRATROPIUM BROMIDE AND ALBUTEROL SULFATE 3 ML: .5; 3 SOLUTION RESPIRATORY (INHALATION) at 13:37

## 2020-01-01 RX ADMIN — PERFLUTREN 1 ML: 6.52 INJECTION, SUSPENSION INTRAVENOUS at 16:43

## 2020-01-01 RX ADMIN — INSULIN LISPRO 4 UNITS: 100 INJECTION, SOLUTION INTRAVENOUS; SUBCUTANEOUS at 14:55

## 2020-01-01 RX ADMIN — FUROSEMIDE 40 MG: 10 INJECTION INTRAMUSCULAR; INTRAVENOUS at 17:36

## 2020-01-01 RX ADMIN — INSULIN LISPRO 4 UNITS: 100 INJECTION, SOLUTION INTRAVENOUS; SUBCUTANEOUS at 11:52

## 2020-01-01 RX ADMIN — FUROSEMIDE 40 MG: 10 INJECTION, SOLUTION INTRAMUSCULAR; INTRAVENOUS at 20:34

## 2020-01-01 RX ADMIN — FUROSEMIDE 20 MG: 10 INJECTION, SOLUTION INTRAMUSCULAR; INTRAVENOUS at 21:43

## 2020-01-01 RX ADMIN — Medication 10 ML: at 06:01

## 2020-01-01 RX ADMIN — Medication 2 G: at 08:27

## 2020-01-01 RX ADMIN — Medication 10 ML: at 06:00

## 2020-01-01 RX ADMIN — Medication 10 ML: at 21:41

## 2020-01-01 RX ADMIN — FUROSEMIDE 40 MG: 10 INJECTION INTRAMUSCULAR; INTRAVENOUS at 21:01

## 2020-01-01 RX ADMIN — MAGNESIUM HYDROXIDE 30 ML: 400 SUSPENSION ORAL at 08:34

## 2020-01-01 RX ADMIN — LACTULOSE 45 ML: 20 SOLUTION ORAL at 11:22

## 2020-01-01 RX ADMIN — POTASSIUM CHLORIDE 20 MEQ: 20 TABLET, EXTENDED RELEASE ORAL at 17:04

## 2020-01-01 RX ADMIN — ETOPOSIDE 199 MG: 20 INJECTION INTRAVENOUS at 17:56

## 2020-01-01 RX ADMIN — IPRATROPIUM BROMIDE AND ALBUTEROL SULFATE 3 ML: .5; 3 SOLUTION RESPIRATORY (INHALATION) at 21:02

## 2020-01-01 RX ADMIN — PREDNISONE 30 MG: 20 TABLET ORAL at 09:06

## 2020-01-01 RX ADMIN — POTASSIUM CHLORIDE 20 MEQ: 20 TABLET, EXTENDED RELEASE ORAL at 16:15

## 2020-01-01 RX ADMIN — POTASSIUM CHLORIDE 40 MEQ: 20 TABLET, EXTENDED RELEASE ORAL at 08:00

## 2020-01-01 RX ADMIN — METFORMIN HYDROCHLORIDE 1000 MG: 500 TABLET ORAL at 08:28

## 2020-01-01 RX ADMIN — INSULIN LISPRO 6 UNITS: 100 INJECTION, SOLUTION INTRAVENOUS; SUBCUTANEOUS at 17:35

## 2020-01-01 RX ADMIN — FUROSEMIDE 20 MG: 10 INJECTION, SOLUTION INTRAMUSCULAR; INTRAVENOUS at 20:22

## 2020-01-01 RX ADMIN — NYSTATIN 500000 UNITS: 500000 SUSPENSION ORAL at 08:58

## 2020-01-01 RX ADMIN — POTASSIUM CHLORIDE 20 MEQ: 20 TABLET, EXTENDED RELEASE ORAL at 11:04

## 2020-01-01 RX ADMIN — ETOPOSIDE 199 MG: 20 INJECTION INTRAVENOUS at 18:50

## 2020-01-01 RX ADMIN — SENNOSIDES AND DOCUSATE SODIUM 1 TABLET: 8.6; 5 TABLET ORAL at 17:30

## 2020-01-01 RX ADMIN — POTASSIUM CHLORIDE 20 MEQ: 20 TABLET, EXTENDED RELEASE ORAL at 17:36

## 2020-01-01 RX ADMIN — Medication 10 ML: at 06:14

## 2020-01-01 RX ADMIN — INSULIN LISPRO 6 UNITS: 100 INJECTION, SOLUTION INTRAVENOUS; SUBCUTANEOUS at 17:27

## 2020-01-01 RX ADMIN — LISINOPRIL 20 MG: 5 TABLET ORAL at 08:56

## 2020-01-01 RX ADMIN — CEFTRIAXONE SODIUM 1 G: 1 INJECTION, POWDER, FOR SOLUTION INTRAMUSCULAR; INTRAVENOUS at 01:06

## 2020-01-01 RX ADMIN — POLYETHYLENE GLYCOL 3350 17 G: 17 POWDER, FOR SOLUTION ORAL at 09:48

## 2020-01-01 RX ADMIN — MELOXICAM 7.5 MG: 7.5 TABLET ORAL at 11:05

## 2020-01-01 RX ADMIN — LORAZEPAM 0.5 MG: 2 INJECTION INTRAMUSCULAR; INTRAVENOUS at 16:02

## 2020-01-01 RX ADMIN — LISINOPRIL 20 MG: 20 TABLET ORAL at 08:34

## 2020-01-01 RX ADMIN — TRAMADOL HYDROCHLORIDE 50 MG: 50 TABLET, FILM COATED ORAL at 14:59

## 2020-01-01 RX ADMIN — BISACODYL 10 MG: 10 SUPPOSITORY RECTAL at 08:45

## 2020-01-01 RX ADMIN — FERROUS SULFATE TAB 325 MG (65 MG ELEMENTAL FE) 325 MG: 325 (65 FE) TAB at 07:57

## 2020-01-01 RX ADMIN — PIPERACILLIN AND TAZOBACTAM 3.38 G: 3; .375 INJECTION, POWDER, FOR SOLUTION INTRAVENOUS at 18:22

## 2020-01-01 RX ADMIN — INSULIN LISPRO 8 UNITS: 100 INJECTION, SOLUTION INTRAVENOUS; SUBCUTANEOUS at 12:29

## 2020-01-01 RX ADMIN — TAMSULOSIN HYDROCHLORIDE 0.4 MG: 0.4 CAPSULE ORAL at 13:10

## 2020-01-01 RX ADMIN — FUROSEMIDE 40 MG: 10 INJECTION INTRAMUSCULAR; INTRAVENOUS at 18:07

## 2020-01-01 RX ADMIN — DEXAMETHASONE SODIUM PHOSPHATE 6 MG: 4 INJECTION, SOLUTION INTRAMUSCULAR; INTRAVENOUS at 08:24

## 2020-01-01 RX ADMIN — Medication 2 G: at 18:59

## 2020-01-01 RX ADMIN — Medication 10 ML: at 14:44

## 2020-01-01 RX ADMIN — FUROSEMIDE 40 MG: 10 INJECTION INTRAMUSCULAR; INTRAVENOUS at 08:16

## 2020-01-01 RX ADMIN — SENNOSIDES AND DOCUSATE SODIUM 1 TABLET: 8.6; 5 TABLET ORAL at 18:22

## 2020-01-01 RX ADMIN — AMLODIPINE BESYLATE 10 MG: 10 TABLET ORAL at 09:50

## 2020-01-01 RX ADMIN — Medication 10 ML: at 14:23

## 2020-01-01 RX ADMIN — POTASSIUM CHLORIDE 20 MEQ: 20 TABLET, EXTENDED RELEASE ORAL at 09:23

## 2020-01-01 RX ADMIN — IPRATROPIUM BROMIDE AND ALBUTEROL SULFATE 3 ML: .5; 3 SOLUTION RESPIRATORY (INHALATION) at 09:05

## 2020-01-01 RX ADMIN — INSULIN LISPRO 4 UNITS: 100 INJECTION, SOLUTION INTRAVENOUS; SUBCUTANEOUS at 18:00

## 2020-01-01 RX ADMIN — POTASSIUM CHLORIDE 20 MEQ: 20 TABLET, EXTENDED RELEASE ORAL at 10:15

## 2020-01-01 RX ADMIN — HUMAN INSULIN 6 UNITS: 100 INJECTION, SOLUTION SUBCUTANEOUS at 17:21

## 2020-01-01 RX ADMIN — INSULIN LISPRO 4 UNITS: 100 INJECTION, SOLUTION INTRAVENOUS; SUBCUTANEOUS at 18:42

## 2020-01-01 RX ADMIN — POTASSIUM CHLORIDE 20 MEQ: 20 TABLET, EXTENDED RELEASE ORAL at 09:24

## 2020-01-01 RX ADMIN — Medication 5 ML: at 23:01

## 2020-01-01 RX ADMIN — SENNOSIDES AND DOCUSATE SODIUM 1 TABLET: 8.6; 5 TABLET ORAL at 17:12

## 2020-01-01 RX ADMIN — ENOXAPARIN SODIUM 40 MG: 40 INJECTION SUBCUTANEOUS at 19:10

## 2020-01-01 RX ADMIN — Medication 2 G: at 09:50

## 2020-01-01 RX ADMIN — INSULIN GLARGINE 8 UNITS: 100 INJECTION, SOLUTION SUBCUTANEOUS at 23:41

## 2020-01-01 RX ADMIN — Medication 10 ML: at 11:37

## 2020-01-01 RX ADMIN — INSULIN LISPRO 2 UNITS: 100 INJECTION, SOLUTION INTRAVENOUS; SUBCUTANEOUS at 11:57

## 2020-01-01 RX ADMIN — SENNOSIDES AND DOCUSATE SODIUM 1 TABLET: 8.6; 5 TABLET ORAL at 08:01

## 2020-01-01 RX ADMIN — MELOXICAM 7.5 MG: 7.5 TABLET ORAL at 08:44

## 2020-01-01 RX ADMIN — Medication 10 ML: at 17:01

## 2020-01-01 RX ADMIN — FUROSEMIDE 40 MG: 10 INJECTION INTRAMUSCULAR; INTRAVENOUS at 09:15

## 2020-01-01 RX ADMIN — GLIMEPIRIDE 4 MG: 4 TABLET ORAL at 08:28

## 2020-01-01 RX ADMIN — INSULIN LISPRO 8 UNITS: 100 INJECTION, SOLUTION INTRAVENOUS; SUBCUTANEOUS at 17:17

## 2020-01-01 RX ADMIN — NYSTATIN 500000 UNITS: 500000 SUSPENSION ORAL at 08:31

## 2020-01-01 RX ADMIN — INSULIN GLARGINE 8 UNITS: 100 INJECTION, SOLUTION SUBCUTANEOUS at 22:23

## 2020-01-01 RX ADMIN — POTASSIUM CHLORIDE 20 MEQ: 20 TABLET, EXTENDED RELEASE ORAL at 18:32

## 2020-01-01 RX ADMIN — INSULIN LISPRO 2 UNITS: 100 INJECTION, SOLUTION INTRAVENOUS; SUBCUTANEOUS at 22:32

## 2020-01-01 RX ADMIN — ALBUMIN (HUMAN) 12.5 G: 0.25 INJECTION, SOLUTION INTRAVENOUS at 05:06

## 2020-01-01 RX ADMIN — Medication 5 ML: at 23:47

## 2020-01-01 RX ADMIN — ETOPOSIDE 199 MG: 20 INJECTION INTRAVENOUS at 10:29

## 2020-01-01 RX ADMIN — FUROSEMIDE 20 MG: 10 INJECTION, SOLUTION INTRAMUSCULAR; INTRAVENOUS at 08:39

## 2020-01-01 RX ADMIN — IPRATROPIUM BROMIDE AND ALBUTEROL SULFATE 3 ML: .5; 3 SOLUTION RESPIRATORY (INHALATION) at 20:52

## 2020-01-01 RX ADMIN — HUMAN INSULIN 9 UNITS: 100 INJECTION, SOLUTION SUBCUTANEOUS at 18:37

## 2020-01-01 RX ADMIN — Medication 1 PACKET: at 12:37

## 2020-01-01 RX ADMIN — ENOXAPARIN SODIUM 40 MG: 40 INJECTION SUBCUTANEOUS at 09:14

## 2020-01-01 RX ADMIN — SENNOSIDES AND DOCUSATE SODIUM 1 TABLET: 8.6; 5 TABLET ORAL at 08:47

## 2020-01-01 RX ADMIN — PIPERACILLIN AND TAZOBACTAM 3.38 G: 3; .375 INJECTION, POWDER, FOR SOLUTION INTRAVENOUS at 09:51

## 2020-01-01 RX ADMIN — INSULIN LISPRO 4 UNITS: 100 INJECTION, SOLUTION INTRAVENOUS; SUBCUTANEOUS at 22:08

## 2020-01-01 RX ADMIN — SENNOSIDES AND DOCUSATE SODIUM 1 TABLET: 8.6; 5 TABLET ORAL at 17:36

## 2020-01-01 RX ADMIN — Medication 10 ML: at 06:15

## 2020-01-01 RX ADMIN — Medication 2 G: at 08:16

## 2020-01-01 RX ADMIN — AMLODIPINE BESYLATE 10 MG: 10 TABLET ORAL at 08:42

## 2020-01-01 RX ADMIN — MELOXICAM 7.5 MG: 7.5 TABLET ORAL at 08:01

## 2020-01-01 RX ADMIN — SENNOSIDES AND DOCUSATE SODIUM 1 TABLET: 8.6; 5 TABLET ORAL at 17:04

## 2020-01-01 RX ADMIN — INSULIN LISPRO 4 UNITS: 100 INJECTION, SOLUTION INTRAVENOUS; SUBCUTANEOUS at 17:17

## 2020-01-01 RX ADMIN — BISACODYL 10 MG: 10 SUPPOSITORY RECTAL at 09:13

## 2020-01-01 RX ADMIN — Medication 2 G: at 08:31

## 2020-01-01 RX ADMIN — IPRATROPIUM BROMIDE AND ALBUTEROL SULFATE 3 ML: .5; 3 SOLUTION RESPIRATORY (INHALATION) at 20:07

## 2020-01-01 RX ADMIN — ONDANSETRON 4 MG: 2 INJECTION INTRAMUSCULAR; INTRAVENOUS at 13:56

## 2020-01-01 RX ADMIN — TAMSULOSIN HYDROCHLORIDE 0.4 MG: 0.4 CAPSULE ORAL at 08:54

## 2020-01-01 RX ADMIN — FUROSEMIDE 40 MG: 10 INJECTION INTRAMUSCULAR; INTRAVENOUS at 09:13

## 2020-01-01 RX ADMIN — Medication 2 G: at 17:21

## 2020-01-01 RX ADMIN — PROCHLORPERAZINE EDISYLATE 10 MG: 5 INJECTION INTRAMUSCULAR; INTRAVENOUS at 09:55

## 2020-01-01 RX ADMIN — ONDANSETRON 4 MG: 2 INJECTION INTRAMUSCULAR; INTRAVENOUS at 20:18

## 2020-01-01 RX ADMIN — CEFEPIME HYDROCHLORIDE 2 G: 2 INJECTION, POWDER, FOR SOLUTION INTRAVENOUS at 12:00

## 2020-01-01 RX ADMIN — MELOXICAM 7.5 MG: 7.5 TABLET ORAL at 08:56

## 2020-01-01 RX ADMIN — INSULIN LISPRO 4 UNITS: 100 INJECTION, SOLUTION INTRAVENOUS; SUBCUTANEOUS at 12:18

## 2020-01-01 RX ADMIN — HYDROCODONE BITARTRATE AND ACETAMINOPHEN 1 TABLET: 5; 325 TABLET ORAL at 20:53

## 2020-01-01 RX ADMIN — INSULIN LISPRO 4 UNITS: 100 INJECTION, SOLUTION INTRAVENOUS; SUBCUTANEOUS at 13:38

## 2020-01-01 RX ADMIN — POTASSIUM CHLORIDE 20 MEQ: 20 TABLET, EXTENDED RELEASE ORAL at 21:37

## 2020-01-01 RX ADMIN — NYSTATIN 500000 UNITS: 500000 SUSPENSION ORAL at 08:44

## 2020-01-01 RX ADMIN — TAMSULOSIN HYDROCHLORIDE 0.4 MG: 0.4 CAPSULE ORAL at 08:57

## 2020-01-01 RX ADMIN — ENOXAPARIN SODIUM 30 MG: 30 INJECTION SUBCUTANEOUS at 20:21

## 2020-01-01 RX ADMIN — NYSTATIN 500000 UNITS: 500000 SUSPENSION ORAL at 21:58

## 2020-01-01 RX ADMIN — AZITHROMYCIN DIHYDRATE 500 MG: 500 INJECTION, POWDER, LYOPHILIZED, FOR SOLUTION INTRAVENOUS at 02:09

## 2020-01-01 RX ADMIN — SENNOSIDES AND DOCUSATE SODIUM 1 TABLET: 8.6; 5 TABLET ORAL at 10:14

## 2020-01-01 RX ADMIN — INSULIN GLARGINE 20 UNITS: 100 INJECTION, SOLUTION SUBCUTANEOUS at 22:14

## 2020-01-01 RX ADMIN — TRAMADOL HYDROCHLORIDE 50 MG: 50 TABLET, FILM COATED ORAL at 21:40

## 2020-01-01 RX ADMIN — FUROSEMIDE 40 MG: 10 INJECTION INTRAMUSCULAR; INTRAVENOUS at 17:04

## 2020-01-01 RX ADMIN — LISINOPRIL 20 MG: 5 TABLET ORAL at 09:03

## 2020-01-01 RX ADMIN — INSULIN GLARGINE 20 UNITS: 100 INJECTION, SOLUTION SUBCUTANEOUS at 21:54

## 2020-01-01 RX ADMIN — MAGNESIUM HYDROXIDE 30 ML: 2400 SUSPENSION ORAL at 16:05

## 2020-01-01 RX ADMIN — HUMAN INSULIN 3 UNITS: 100 INJECTION, SOLUTION SUBCUTANEOUS at 18:40

## 2020-01-01 RX ADMIN — CEFEPIME HYDROCHLORIDE 2 G: 2 INJECTION, POWDER, FOR SOLUTION INTRAVENOUS at 04:00

## 2020-03-26 PROBLEM — E22.2 SIADH (SYNDROME OF INAPPROPRIATE ADH PRODUCTION) (HCC): Status: ACTIVE | Noted: 2020-01-01

## 2020-03-26 NOTE — CONSULTS
Nephrology consult Admission Date: 
3/26/2020 Admission Diagnosis Hyponatremia [E87.1] History of Present Illness: This is a 80 yo M with a history of anal squamous carcinoma with neuroendocrine features as well as head and neck cancer who presents with weakness and fatigue to outpatient oncology. He wa foud to have a sodium level of 114 on his blood work. He does have a history of underlying hyponatremia with readings in the 120s-low 130s mainly. 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 His last sodium was 125 in December of last year. According to Dr. Rosemarie Macario, his cancer has likely progressed. Previous urine tests demonstrated elevated urine sodium levels of  along with elevated urine osms. Overall he feels weak and tired. No diarrhea, vomiting. No confusion. He drinks 2-3 bottles of water, 3 cups of coffee, milk and other things during the day. Past Medical History:  
Diagnosis Date  GERD (gastroesophageal reflux disease)   
 pt wife denies  Head and neck cancer ([facility] [address] 9/30/2015  
 radiation and chemo and and surgery  History of squamous cell carcinoma   
 to neck area- 38 radiation treatement and 8 chemo treatment  History of throat cancer 2015  
 peg tube for about 4 months  Hypercholesteremia   
 managed well with meds  Hypertension   
 managed well with meds  Rectal cancer ([address] 2018 28 radiation treatment and chemo pump  Type 2 diabetes mellitus ([address] oral agent only/AVG BS: /no s.s of hypoglycemia  Vomiting 2/23/2016  
 pt wife denies Past Surgical History:  
Procedure Laterality Date  HX COLONOSCOPY  05/2018  HX HEENT    
 sinus  HX HEENT  2015  
 surgery on right throat for squamous cell ca right ear wedge  HX LYMPH NODE DISSECTION    
 HX ORTHOPAEDIC Right 1966  
 right leg  HX OTHER SURGICAL  9/9/15 EXCISION OF RIGHT Neck and PARAPHARYNGEAL MASS/RIGHT EAR WEDGE RESECTION  
 HX OTHER SURGICAL    
 peg placed and removed  HX TONSILLECTOMY  HX VASCULAR ACCESS    
 placed and removed Current Facility-Administered Medications Medication Dose Route Frequency  [START ON 3/27/2020] amLODIPine (NORVASC) tablet 10 mg  10 mg Oral DAILY  docusate sodium (COLACE) capsule 100 mg  100 mg Oral PRN  
 [START ON 3/27/2020] lisinopriL (PRINIVIL, ZESTRIL) tablet 20 mg  20 mg Oral DAILY  [START ON 3/27/2020] metFORMIN (GLUCOPHAGE) tablet 1,000 mg  1,000 mg Oral DAILY  enoxaparin (LOVENOX) injection 40 mg  40 mg SubCUTAneous Q24H  
 ondansetron (ZOFRAN ODT) tablet 8 mg  8 mg Oral Q8H PRN  prochlorperazine (COMPAZINE) with saline injection 5 mg  5 mg IntraVENous Q6H PRN  
 HYDROcodone-acetaminophen (NORCO) 5-325 mg per tablet 1 Tab  1 Tab Oral Q6H PRN  
 morphine injection 1 mg  1 mg IntraVENous Q4H PRN No Known Allergies Social History Tobacco Use  Smoking status: Never Smoker  Smokeless tobacco: Never Used Substance Use Topics  Alcohol use: No  
  
Family History Problem Relation Age of Onset  Emphysema Father  Lung Disease Father  Cancer Brother Colon  Heart Disease Sister  Hypertension Sister  Heart Disease Sister  Hypertension Sister  Heart Disease Brother  Hypertension Brother  Heart Disease Brother  Hypertension Brother  Heart Disease Brother  Hypertension Brother  Heart Disease Brother  Hypertension Brother  Heart Disease Brother  Hypertension Brother Review of Systems Gen - no fever, no chills, appetite okay, +weakness HEENT - no sore throat, no decreased vision, no hearing loss Neck - no neck mass CV - no chest pain, no palpitation, no orthopnea Lung - no shortness of breath, +cough, no hemoptysis Abd - no tenderness, no nausea/vomiting, no bloody stool Ext - no edema, no clubbing, no cyanosis Musculoskeletal - no joint pain, no back pain Neurologic - no headaches, no dizziness, no seizures Psychiatric - no anxiety, no depression Skin - no rashes, no pupura Genitourinary - no decreased urine output, no hematuria, no foamy urine Objective:  
 
Vitals:  
 03/26/20 1728 03/26/20 1737 BP: 148/72 Pulse: 83 Resp: 16 Temp: 97.7 °F (36.5 °C) SpO2: 95% Weight: 78.3 kg (172 lb 9.6 oz) Height:  5' 11.5\" (1.816 m) No intake or output data in the 24 hours ending 03/26/20 1737 Physical Exam 
GEN :in no distress, alert and oriented HEENT: anicteric sclerae, eomi. Oropharynx without lesions. Mucous membranes are moist. 
Neck - supple without JVD, no thyromegaly. No lymphadenopathy. CV - regular rate and rhythm, no murmur, no rub Lung - clear bilaterally, lungs expand symmetrically Chest wall - normal appearance Abd - soft, nontender, bowel sounds present, no hepatosplenomegaly Ext - no clubbing, no cyanosis, no edema Neurologic - nonfocal 
Genitourinary - bladder nonpalpable Skin - no rashes, no purpura, no ecchymoses Psychiatric: Normal mood and affect. Data Review:  
Recent Labs  
  03/26/20 
1306 WBC 4.0* HGB 10.8* HCT 30.1*  
 Recent Labs  
  03/26/20 
1306 * K 4.1 CL 80* CO2 26 BUN 10  
CREA 0.95 * CA 9.2 No results for input(s): PH, PCO2, PO2, PCO2 in the last 72 hours. Problem List:  
 
Patient Active Problem List  
 Diagnosis Date Noted  SIADH (syndrome of inappropriate ADH production) ([address] 03/26/2020  Malignant neoplasm metastatic to bone ([address] 06/26/2019  Cellulitis of groin, left 09/03/2018  Postoperative seroma of subcutaneous tissue after non-dermatologic procedure 09/03/2018  Anal carcinoma ([address] 08/09/2018  Type 2 diabetes with nephropathy ([address] 07/03/2018  Primary malignant neoplasm of perianal skin 07/03/2018  Vomiting 02/23/2016  Diabetes mellitus due to underlying condition with hyperglycemia ([address] 12/21/2015  Mucositis due to radiation therapy 12/15/2015  Radiation esophagitis 12/15/2015  Difficulty in swallowing 12/15/2015  Encounter for palliative care 12/15/2015  Hyponatremia 11/23/2015  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 Acute on Chronic severe hyponatremia. His previous labs are consistent with SIADH - elevated urine sodium and osms. I suspect this is SIADH again. He is relatively asymptomatic right now except for his fatigue. I will recheck a stat sodium now and through the night, as well as urine electrolytes. If the next sodium is no better, or worse, will give some slow 3% saline overnight. May also benefit from LOHJA. 2. Head and neck cancer 3. Anal cancer Plan:  
-STAT labs 
-Fluid restriction 
-Possible 3% saline Thanks, Dr. Dany Sandhu, we will follow closely with you. Addendum: Sodium up to 118. Will recheck in a bit. Hold off on 3% saline at this point and see if he improves with fluid restriction.

## 2020-03-26 NOTE — H&P
Inpatient Hematology / Oncology History and Physical 
 
Reason for Admission:  metastatic anal cancer, hyponatremia, altered mental status History of Present Illness: Mr. Sajan Bucio is a 79 y.o. male admitted on 3/26/2020 with a primary diagnosis of hyponatremia, progressive cancer, and presumed SIADH. .   
 
Wilfredo Cloud is a patient well-known to me from a prior history of head and neck cancer as well as anal carcinoma. The details of these diagnoses are listed below. 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 The patient came to my office today stating that he was feeling significantly weak. In addition, he did have some difficulty processing our conversations although he was awake and alert. He had a history of mild hyponatremia with sodium levels usually in the high 120s and low 130s despite the use of salt tablets. As part of his pre-visit evaluation, he had undergone a PET scan which clearly showed evidence of disease progression. He had multiple bony lesions as well as progressive disease in the mediastinum and in the perianal area. He was markedly hyponatremic and a decision for admission was made. The patient had a prior solitary bone lesion which had been irradiated. This was biopsied and confirmed the presence of metastatic carcinoma most consistent with his prior anal carcinoma. As part of the immunohistochemistries, the tumor stained positively for synaptophysin. Suze Alford was first seen in our office in September 2015. He was referred for evaluation of a squamous carcinoma which involved his right neck. On August 25, 2015 he underwent a CT scan of the neck with contrast.  This demonstrated a large necrotic appearing mass in the right submandibular region measuring 5 x 4.1 cm. It was felt to potentially represent a necrotic lymph node.   There were no other lymph nodes evident of a worrisome nature. In addition, the studies showed no suspicious primary within the neck. On September 9, 2015 he underwent an excision of the right neck/parapharyngeal mass. A direct laryngoscopy was performed on that date as well. At surgery, the lesion was noted to extend almost to the base of the skull and could not be fully excised. There were no other palpable irregularities in the right neck. The right neck mass showed salivary glands, fibroadipose and lymphoid tissue containing well to moderately differentiated squamous cell carcinoma. .  The tumor focally extended to the margin of resection. A PET/CT scan performed on September 17, 2015 showed a low-density focus at the site of the previously described lymph node now measuring 3.1 x 2.6 cm. The FDG activity was an SUV of 6.5. Immunohistochemistries did not suggest HPV as an etiologic agent. In November 2015 he began a course of radiation therapy concurrent with weekly cisplatin. He completed therapy in December 2015. In July 2018, he related a history of having been treated for hemorrhoids over the prior 5 years with various topical preparations. Ultimately, because of persistence of symptoms he was referred to a gastroenterologist and a colonoscopy performed. Biopsies of the anal region showed  a poorly differentiated tumor with both glandular and neuroendocrine features. This was subsequently confirmed at the [facility] in [place]. He underwent a PET scan which showed some activity in the anal skin as is evident on exam.  There was also a 1.2 cm lymph node in the left inguinal region with some slight SUV activity of unclear significance. He began therapy with 5-FU mitomycin and concurrent radiation therapy in August 2018.   He completed his  therapy on September 27  A subsequent PET scan was performed showing  no worrisome uptake suggestive of persistent or recurrent malignancy in either of the previously treated primary locations. PET scan in April 2019 showed no evidence of recurrent tumor in the head neck or in the soft tissues of the pelvis. There was however an area of FDG uptake in the left iliac wing which was worrisome but of unclear significance. Because of the PET scan findings noted above, he underwent an MRI which also showed that possible solitary metastasis in the left iliac bone. Biopsy of this lesion confirmed the presence of metastatic carcinoma consistent with his anal primary. This was irradiated. .   
 
Review of Systems: 
Constitutional  Weak, see HPI HEENT See HPI Skin Denies lesions or rashes. Lungs Denies dyspnea, cough, sputum production or hemoptysis. Cardiovascular Denies chest pain, palpitations, or lower extremity edema. Gastrointestinal  limited appetite  Denies dysuria, frequency or hesitancy of urination. Neuro  having difficulty processing conversations Hematology Denies easy bruising or bleeding, denies gingival bleeding or epistaxis. Endo Denies heat/cold intolerance, denies diabetes or thyroid abnormalities. MSK Denies back pain, arthralgias, myalgias or frequent falls. Psychiatric/Behavioral  see above No Known Allergies Past Medical History:  
Diagnosis Date  GERD (gastroesophageal reflux disease)   
 pt wife denies  Head and neck cancer ([facility] [address] 9/30/2015  
 radiation and chemo and and surgery  History of squamous cell carcinoma   
 to neck area- 38 radiation treatement and 8 chemo treatment  History of throat cancer 2015  
 peg tube for about 4 months  Hypercholesteremia   
 managed well with meds  Hypertension   
 managed well with meds  Rectal cancer ([facility] [address] 2018 28 radiation treatment and chemo pump  Type 2 diabetes mellitus ([facility] [address] oral agent only/AVG BS: /no s.s of hypoglycemia  Vomiting 2/23/2016  
 pt wife denies Past Surgical History:  
Procedure Laterality Date  HX COLONOSCOPY  05/2018  HX HEENT    
 sinus  HX HEENT  2015  
 surgery on right throat for squamous cell ca right ear wedge  HX LYMPH NODE DISSECTION    
 HX ORTHOPAEDIC Right 1966  
 right leg  HX OTHER SURGICAL  9/9/15 EXCISION OF RIGHT Neck and PARAPHARYNGEAL MASS/RIGHT EAR WEDGE RESECTION  
 HX OTHER SURGICAL    
 peg placed and removed  HX TONSILLECTOMY  HX VASCULAR ACCESS    
 placed and removed Family History Problem Relation Age of Onset  Emphysema Father  Lung Disease Father  Cancer Brother Colon  Heart Disease Sister  Hypertension Sister  Heart Disease Sister  Hypertension Sister  Heart Disease Brother  Hypertension Brother  Heart Disease Brother  Hypertension Brother  Heart Disease Brother  Hypertension Brother  Heart Disease Brother  Hypertension Brother  Heart Disease Brother  Hypertension Brother Social History Socioeconomic History  Marital status:  Spouse name: Not on file  Number of children: Not on file  Years of education: Not on file  Highest education level: Not on file Occupational History  Not on file Social Needs  Financial resource strain: Not on file  Food insecurity Worry: Not on file Inability: Not on file  Transportation needs Medical: Not on file Non-medical: Not on file Tobacco Use  Smoking status: Never Smoker  Smokeless tobacco: Never Used Substance and Sexual Activity  Alcohol use: No  
 Drug use: No  
 Sexual activity: Not on file Lifestyle  Physical activity Days per week: Not on file Minutes per session: Not on file  Stress: Not on file Relationships  Social connections Talks on phone: Not on file Gets together: Not on file Attends Advent service: Not on file Active member of club or organization: Not on file Attends meetings of clubs or organizations: Not on file Relationship status: Not on file  Intimate partner violence Fear of current or ex partner: Not on file Emotionally abused: Not on file Physically abused: Not on file Forced sexual activity: Not on file Other Topics Concern  Not on file Social History Narrative  Not on file Current Outpatient Medications Medication Sig Dispense Refill  doxycycline (MONODOX) 100 mg capsule  TRUEPLUS LANCETS 28 gauge misc  traMADol (ULTRAM) 50 mg tablet Take 1 Tab by mouth every six (6) hours as needed for Pain for up to 30 days. Max Daily Amount: 200 mg. Indications: pain 90 Tab 0  
 sodium chloride 1 gram tablet Take 2 Tabs by mouth two (2) times a day. 180 Tab 3  
 ferrous sulfate 324 mg (65 mg iron) tablet Take  by mouth Daily (before breakfast).  docusate sodium (COLACE) 100 mg capsule Take 100 mg by mouth as needed for Constipation.  amLODIPine (NORVASC) 10 mg tablet Take 10 mg by mouth daily. In am    
 lisinopril (PRINIVIL, ZESTRIL) 20 mg tablet Take 20 mg by mouth daily. In am  Indications: hypertension  metFORMIN (GLUCOPHAGE) 1,000 mg tablet Take 1,000 mg by mouth daily. In am  Indications: type 2 diabetes mellitus OBJECTIVE: 
No data found. Temp (24hrs), Av °F (36.7 °C), Min:98 °F (36.7 °C), Max:98 °F (36.7 °C) No intake/output data recorded. See inpatient graphics. Physical Exam: 
Constitutional:  Mildly ill-appearing man in no acute distress HEENT: Normocephalic and atraumatic. Oropharynx is clear, mucous membranes are moist.  Pupils are equal, round, and reactive to light. Extraocular muscles are intact. Sclerae anicteric. Neck supple without JVD. No thyromegaly present. Lymph node No palpable submandibular, cervical, supraclavicular, axillary or inguinal lymph nodes. Skin Warm and dry. No bruising and no rash noted. No erythema. No pallor. Respiratory Lungs are clear to auscultation bilaterally without wheezes, rales or rhonchi, normal air exchange without accessory muscle use. CVS Normal rate, regular rhythm and normal S1 and S2. No murmurs, gallops, or rubs. Abdomen Soft, nontender and nondistended, normoactive bowel sounds. No palpable mass. No hepatosplenomegaly. Neuro  clearly not as sharp as his baseline. No other focality other than general weakness. MSK Normal range of motion in general.  No edema and no tenderness. Psych Appropriate mood and affect. Labs:   
Recent Results (from the past 24 hour(s)) CBC WITH AUTOMATED DIFF Collection Time: 03/26/20  1:06 PM  
Result Value Ref Range WBC 4.0 (L) 4.3 - 11.1 K/uL  
 RBC 3.48 (L) 4.23 - 5.67 M/uL  
 HGB 10.8 (L) 13.6 - 17.2 g/dL HCT 30.1 (L) 41.1 - 50.3 % MCV 86.5 79.6 - 97.8 FL  
 MCH 31.0 26.1 - 32.9 PG  
 MCHC 35.9 (H) 31.4 - 35.0 g/dL  
 RDW 13.4 11.9 - 14.6 % PLATELET 646 160 - 611 K/uL MPV 7.6 (L) 9.4 - 12.3 FL ABSOLUTE NRBC 0.00 0.0 - 0.2 K/uL  
 DF AUTOMATED NEUTROPHILS 68 43 - 78 % LYMPHOCYTES 16 13 - 44 % MONOCYTES 12 4.0 - 12.0 % EOSINOPHILS 2 0.5 - 7.8 % BASOPHILS 1 0.0 - 2.0 % IMMATURE GRANULOCYTES 1 0.0 - 5.0 %  
 ABS. NEUTROPHILS 2.7 1.7 - 8.2 K/UL  
 ABS. LYMPHOCYTES 0.7 0.5 - 4.6 K/UL  
 ABS. MONOCYTES 0.5 0.1 - 1.3 K/UL  
 ABS. EOSINOPHILS 0.1 0.0 - 0.8 K/UL  
 ABS. BASOPHILS 0.0 0.0 - 0.2 K/UL  
 ABS. IMM. GRANS. 0.0 0.0 - 0.5 K/UL METABOLIC PANEL, COMPREHENSIVE Collection Time: 03/26/20  1:06 PM  
Result Value Ref Range Sodium 114 (LL) 136 - 145 mmol/L Potassium 4.1 3.5 - 5.1 mmol/L Chloride 80 (L) 98 - 107 mmol/L  
 CO2 26 21 - 32 mmol/L Anion gap 8 7 - 16 mmol/L Glucose 289 (H) 65 - 100 mg/dL BUN 10 8 - 23 MG/DL Creatinine 0.95 0.8 - 1.5 MG/DL  
 GFR est AA >60 >60 ml/min/1.73m2 GFR est non-AA >60 >60 ml/min/1.73m2  Calcium 9.2 8.3 - 10.4 MG/DL  
 Bilirubin, total 0.3 0.2 - 1.1 MG/DL  
 ALT (SGPT) 25 12 - 65 U/L  
 AST (SGOT) 33 15 - 37 U/L Alk. phosphatase 316 (H) 50 - 136 U/L Protein, total 7.0 6.3 - 8.2 g/dL Albumin 3.2 3.2 - 4.6 g/dL Globulin 3.8 (H) 2.3 - 3.5 g/dL A-G Ratio 0.8 (L) 1.2 - 3.5 Imaging: No images are attached to the encounter. ASSESSMENT: 
Problem List  Date Reviewed: 12/27/2019 Codes Class Noted SIADH (syndrome of inappropriate ADH production) ([facility] ICD-10-CM: E22.2 ICD-9-CM: 253.6  3/26/2020 Malignant neoplasm metastatic to bone [facility]) ICD-10-CM: C79.51 
ICD-9-CM: 198.5  6/26/2019 Cellulitis of groin, left ICD-10-CM: F64.073 ICD-9-CM: 682.2  9/3/2018 Postoperative seroma of subcutaneous tissue after non-dermatologic procedure ICD-10-CM: L76.34 
ICD-9-CM: 998.13  9/3/2018 Anal carcinoma ([facility] ICD-10-CM: C21.0 ICD-9-CM: 154.3  8/9/2018 Type 2 diabetes with nephropathy ([facility] ICD-10-CM: E11.21 
ICD-9-CM: 250.40, 583.81  7/3/2018 Primary malignant neoplasm of perianal skin ICD-10-CM: C44.500 ICD-9-CM: 173.50  7/3/2018 Vomiting ICD-10-CM: R11.10 ICD-9-CM: 787.03  2/23/2016 Diabetes mellitus due to underlying condition with hyperglycemia ([facility] ICD-10-CM: P72.88 ICD-9-CM: 249.80  12/21/2015 Mucositis due to radiation therapy ICD-10-CM: K12.33 
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 
· The patient is admitted with severe hyponatremia superimposed on a milder degree of chronic hyponatremia. This is clearly worse now with evidence of progressive cancer and I suspect that this is SIADH related to the neuroendocrine characteristics of this tumor as noted histologically and with the positivity of synaptophysin. Nephrology should be consulted and his free water intake limited. His elevated blood sugar may trivially contribute to his hyponatremia but not enough to make a significant difference in approach. I defer to nephrology within the context of his mental status changes as to whether or not hypertonic saline would be appropriate. Tolvaptan might also be appropriate. · He clearly has evidence of progressive cancer at this point. As noted above, the neuroendocrine element of this tumor may be the most active at the present time. As such I would consider the use of etoposide and carboplatin as his next course of treatment given that presumption. I would consider giving his first cycle in the hospital since we will likely have little control over his sodium without effective tumor control. In addition, there could be an exacerbation of SIADH after his initial treatment and release of paraneoplastic peptides Javon Cline MD FACP Oncology and Hematology  [address] [phone] F [phone] 
[email or web address] 
 
Elements of this note have been dictated using speech recognition software. As a result, errors of speech recognition may have occurred.

## 2020-03-26 NOTE — PROGRESS NOTES
03/26/20 1730 Dual Skin Pressure Injury Assessment Dual Skin Pressure Injury Assessment [facility] Second Care Provider (Based on [address] [place] [address]) Guerita Farmer RN  
Pt with very dry skin. No open sores

## 2020-03-27 NOTE — PROGRESS NOTES
Problem: Falls - Risk of 
Goal: *Absence of Falls Description: Document Emanuelne Bunting Fall Risk and appropriate interventions in the flowsheet. Outcome: Progressing Towards Goal 
Note: Fall Risk Interventions: 
Mobility Interventions: Communicate number of staff needed for ambulation/transfer, Patient to call before getting OOB, Strengthening exercises (ROM-active/passive) Mentation Interventions: Adequate sleep, hydration, pain control Medication Interventions: Evaluate medications/consider consulting pharmacy Elimination Interventions: Call light in reach Problem: Patient Education: Go to Patient Education Activity Goal: Patient/Family Education Outcome: Progressing Towards Goal 
  
END OF SHIFT NOTE: 
 
Intake/Output No intake/output data recorded. Voiding: YES Catheter: NO 
Drain:   
Stool:  0 occurrences. Emesis:  0 occurrences. VITAL SIGNS Patient Vitals for the past 12 hrs: 
 Temp Pulse Resp BP SpO2  
03/27/20 1454  88 18 143/83 95 % 03/27/20 0836 98.1 °F (36.7 °C) 83 18 (!) 194/102 94 % Pain Assessment Pain 1 Pain Scale 1: Numeric (0 - 10) (03/27/20 1718) Pain Intensity 1: 7 (03/27/20 1718) Patient Stated Pain Goal: 0 (03/27/20 1718) Pain Reassessment 1: Yes (03/26/20 2230) Pain Onset 1: now (03/27/20 1718) Pain Location 1: Back (03/27/20 1718) Pain Orientation 1: Left (03/26/20 2139) Pain Description 1: Aching (03/27/20 1718) Pain Intervention(s) 1: Medication (see MAR) (03/27/20 1718) Ambulating Yes Additional Information:  
Pt is resting with no questions or concerns at this time. Shift report given to oncoming nurse Emilie Cranker RN at the bedside. Daryle Olmsted

## 2020-03-27 NOTE — PROGRESS NOTES
Inpatient Hematology / Oncology Daily Progress Note 24 Hour Events: 
Na 121 from 114 Water restrictions Start C1 Carbo/etoposide today ROS: 
Constitutional: Negative for fever, chills, weakness, malaise. CV: Negative for chest pain, palpitations, edema. Respiratory: PNegative for dyspnea, cough, wheezing. GI: Positive for constipation. Negative for nausea, abdominal pain. 10 point review of systems is otherwise negative with the exception of the elements mentioned above in the HPI. No Known Allergies Past Medical History:  
Diagnosis Date  GERD (gastroesophageal reflux disease)   
 pt wife denies  Head and neck cancer ([facility] 9/30/2015  
 radiation and chemo and and surgery  History of squamous cell carcinoma   
 to neck area- 38 radiation treatement and 8 chemo treatment  History of throat cancer 2015  
 peg tube for about 4 months  Hypercholesteremia   
 managed well with meds  Hypertension   
 managed well with meds  Rectal cancer ([facility] 2018 28 radiation treatment and chemo pump  Type 2 diabetes mellitus ([facility] oral agent only/AVG BS: /no s.s of hypoglycemia  Vomiting 2/23/2016  
 pt wife denies Past Surgical History:  
Procedure Laterality Date  HX COLONOSCOPY  05/2018  HX HEENT    
 sinus  HX HEENT  2015  
 surgery on right throat for squamous cell ca right ear wedge  HX LYMPH NODE DISSECTION    
 HX ORTHOPAEDIC Right 1966  
 right leg  HX OTHER SURGICAL  9/9/15 EXCISION OF RIGHT Neck and PARAPHARYNGEAL MASS/RIGHT EAR WEDGE RESECTION  
 HX OTHER SURGICAL    
 peg placed and removed  HX TONSILLECTOMY  HX VASCULAR ACCESS    
 placed and removed Family History Problem Relation Age of Onset  Emphysema Father  Lung Disease Father  Cancer Brother Colon  Heart Disease Sister  Hypertension Sister  Heart Disease Sister  Hypertension Sister  Heart Disease Brother  Hypertension Brother  Heart Disease Brother  Hypertension Brother  Heart Disease Brother  Hypertension Brother  Heart Disease Brother  Hypertension Brother  Heart Disease Brother  Hypertension Brother Social History Socioeconomic History  Marital status:  Spouse name: Not on file  Number of children: Not on file  Years of education: Not on file  Highest education level: Not on file Occupational History  Not on file Social Needs  Financial resource strain: Not on file  Food insecurity Worry: Not on file Inability: Not on file  Transportation needs Medical: Not on file Non-medical: Not on file Tobacco Use  Smoking status: Never Smoker  Smokeless tobacco: Never Used Substance and Sexual Activity  Alcohol use: No  
 Drug use: No  
 Sexual activity: Not on file Lifestyle  Physical activity Days per week: Not on file Minutes per session: Not on file  Stress: Not on file Relationships  Social connections Talks on phone: Not on file Gets together: Not on file Attends Zoroastrianism service: Not on file Active member of club or organization: Not on file Attends meetings of clubs or organizations: Not on file Relationship status: Not on file  Intimate partner violence Fear of current or ex partner: Not on file Emotionally abused: Not on file Physically abused: Not on file Forced sexual activity: Not on file Other Topics Concern  Not on file Social History Narrative  Not on file Current Facility-Administered Medications Medication Dose Route Frequency Provider Last Rate Last Dose  amLODIPine (NORVASC) tablet 10 mg  10 mg Oral DAILY AMBREEN Leslie   10 mg at 03/27/20 9115  
 docusate sodium (COLACE) capsule 100 mg  100 mg Oral PRN AMBREEN Leslie      
  lisinopriL (PRINIVIL, ZESTRIL) tablet 20 mg  20 mg Oral DAILY Juan Pine, FNP   20 mg at 20 9807  
 metFORMIN (GLUCOPHAGE) tablet 1,000 mg  1,000 mg Oral DAILY Juan Pine, FNP   1,000 mg at 20 0574  enoxaparin (LOVENOX) injection 40 mg  40 mg SubCUTAneous Q24H Juan Pine, FNP   40 mg at 20 1818  ondansetron (ZOFRAN ODT) tablet 8 mg  8 mg Oral Q8H PRN Juan Pine, FNP  prochlorperazine (COMPAZINE) with saline injection 5 mg  5 mg IntraVENous Q6H PRN Juan Pine, FNP      
 HYDROcodone-acetaminophen [facility]) 5-325 mg per tablet 1 Tab  1 Tab Oral Q6H PRN Juan Pine, FNP   1 Tab at 20 9218  morphine injection 1 mg  1 mg IntraVENous Q4H PRN Juan Pine, FNP  hydrALAZINE (APRESOLINE) 20 mg/mL injection 5 mg  5 mg IntraVENous Q6H PRN Juan Pine, FNP   5 mg at 20 4195 OBJECTIVE: 
Patient Vitals for the past 8 hrs: 
 BP Temp Pulse Resp SpO2  
20 0836 (!) 194/102 98.1 °F (36.7 °C) 83 18 94 % 20 0412 156/90 98.7 °F (37.1 °C) 84 16 94 % Temp (24hrs), Av.2 °F (36.8 °C), Min:97.7 °F (36.5 °C), Max:98.7 °F (37.1 °C) No intake/output data recorded. See inpatient graphics. Labs:   
Recent Results (from the past 24 hour(s)) CBC WITH AUTOMATED DIFF Collection Time: 20  1:06 PM  
Result Value Ref Range WBC 4.0 (L) 4.3 - 11.1 K/uL  
 RBC 3.48 (L) 4.23 - 5.67 M/uL  
 HGB 10.8 (L) 13.6 - 17.2 g/dL HCT 30.1 (L) 41.1 - 50.3 % MCV 86.5 79.6 - 97.8 FL  
 MCH 31.0 26.1 - 32.9 PG  
 MCHC 35.9 (H) 31.4 - 35.0 g/dL  
 RDW 13.4 11.9 - 14.6 % PLATELET 455 548 - 016 K/uL MPV 7.6 (L) 9.4 - 12.3 FL ABSOLUTE NRBC 0.00 0.0 - 0.2 K/uL  
 DF AUTOMATED NEUTROPHILS 68 43 - 78 % LYMPHOCYTES 16 13 - 44 % MONOCYTES 12 4.0 - 12.0 % EOSINOPHILS 2 0.5 - 7.8 % BASOPHILS 1 0.0 - 2.0 % IMMATURE GRANULOCYTES 1 0.0 - 5.0 %  
 ABS. NEUTROPHILS 2.7 1.7 - 8.2 K/UL ABS. LYMPHOCYTES 0.7 0.5 - 4.6 K/UL  
 ABS. MONOCYTES 0.5 0.1 - 1.3 K/UL  
 ABS. EOSINOPHILS 0.1 0.0 - 0.8 K/UL  
 ABS. BASOPHILS 0.0 0.0 - 0.2 K/UL  
 ABS. IMM. GRANS. 0.0 0.0 - 0.5 K/UL METABOLIC PANEL, COMPREHENSIVE Collection Time: 03/26/20  1:06 PM  
Result Value Ref Range Sodium 114 (LL) 136 - 145 mmol/L Potassium 4.1 3.5 - 5.1 mmol/L Chloride 80 (L) 98 - 107 mmol/L  
 CO2 26 21 - 32 mmol/L Anion gap 8 7 - 16 mmol/L Glucose 289 (H) 65 - 100 mg/dL BUN 10 8 - 23 MG/DL Creatinine 0.95 0.8 - 1.5 MG/DL  
 GFR est AA >60 >60 ml/min/1.73m2 GFR est non-AA >60 >60 ml/min/1.73m2 Calcium 9.2 8.3 - 10.4 MG/DL Bilirubin, total 0.3 0.2 - 1.1 MG/DL  
 ALT (SGPT) 25 12 - 65 U/L  
 AST (SGOT) 33 15 - 37 U/L Alk. phosphatase 316 (H) 50 - 136 U/L Protein, total 7.0 6.3 - 8.2 g/dL Albumin 3.2 3.2 - 4.6 g/dL Globulin 3.8 (H) 2.3 - 3.5 g/dL A-G Ratio 0.8 (L) 1.2 - 3.5 SODIUM, UR, RANDOM Collection Time: 03/26/20  6:46 PM  
Result Value Ref Range Sodium,urine random 40 MMOL/L  
MISC. LAB TEST Collection Time: 03/26/20  6:46 PM  
Result Value Ref Range Test Description: URINE OSMO Reference Lab: [facility] Results: RESULTS SCANNED IN [facility]    
SODIUM Collection Time: 03/26/20  6:47 PM  
Result Value Ref Range Sodium 118 (LL) 136 - 145 mmol/L  
MISC. LAB TEST Collection Time: 03/26/20  6:47 PM  
Result Value Ref Range Test Description: SERUM OSMO Reference Lab: [facility] Results: RESULTS SCANNED IN [facility]    
SODIUM Collection Time: 03/26/20 11:41 PM  
Result Value Ref Range Sodium 119 (LL) 136 - 145 mmol/L MAGNESIUM Collection Time: 03/27/20  3:20 AM  
Result Value Ref Range Magnesium 1.7 (L) 1.8 - 2.4 mg/dL CBC WITH AUTOMATED DIFF Collection Time: 03/27/20  3:20 AM  
Result Value Ref Range WBC 5.0 4.3 - 11.1 K/uL  
 RBC 3.41 (L) 4.23 - 5.6 M/uL  
 HGB 10.8 (L) 13.6 - 17.2 g/dL HCT 29.4 (L) 41.1 - 50.3 % MCV 86.2 79.6 - 97.8 FL  
 MCH 31.7 26.1 - 32.9 PG  
 MCHC 36.7 (H) 31.4 - 35.0 g/dL  
 RDW 13.3 11.9 - 14.6 % PLATELET 241 759 - 866 K/uL MPV 8.4 (L) 9.4 - 12.3 FL ABSOLUTE NRBC 0.00 0.0 - 0.2 K/uL  
 DF AUTOMATED NEUTROPHILS 68 43 - 78 % LYMPHOCYTES 15 13 - 44 % MONOCYTES 13 (H) 4.0 - 12.0 % EOSINOPHILS 2 0.5 - 7.8 % BASOPHILS 1 0.0 - 2.0 % IMMATURE GRANULOCYTES 1 0.0 - 5.0 %  
 ABS. NEUTROPHILS 3.4 1.7 - 8.2 K/UL  
 ABS. LYMPHOCYTES 0.7 0.5 - 4.6 K/UL  
 ABS. MONOCYTES 0.7 0.1 - 1.3 K/UL  
 ABS. EOSINOPHILS 0.1 0.0 - 0.8 K/UL  
 ABS. BASOPHILS 0.0 0.0 - 0.2 K/UL  
 ABS. IMM. GRANS. 0.1 0.0 - 0.5 K/UL METABOLIC PANEL, COMPREHENSIVE Collection Time: 03/27/20  3:20 AM  
Result Value Ref Range Sodium 121 (LL) 136 - 145 mmol/L Potassium 4.1 3.5 - 5.1 mmol/L Chloride 85 (L) 98 - 107 mmol/L  
 CO2 28 21 - 32 mmol/L Anion gap 8 7 - 16 mmol/L Glucose 164 (H) 65 - 100 mg/dL BUN 13 8 - 23 MG/DL Creatinine 0.78 (L) 0.8 - 1.5 MG/DL  
 GFR est AA >60 >60 ml/min/1.73m2 GFR est non-AA >60 >60 ml/min/1.73m2 Calcium 9.0 8.3 - 10.4 MG/DL Bilirubin, total 0.2 0.2 - 1.1 MG/DL  
 ALT (SGPT) 21 12 - 65 U/L  
 AST (SGOT) 40 (H) 15 - 37 U/L Alk. phosphatase 305 (H) 50 - 136 U/L Protein, total 6.6 6.3 - 8.2 g/dL Albumin 3.2 3.2 - 4.6 g/dL Globulin 3.4 2.3 - 3.5 g/dL A-G Ratio 0.9 (L) 1.2 - 3.5 Imaging: No images are attached to the encounter. ASSESSMENT: 
Problem List  Date Reviewed: 12/27/2019 Codes Class Noted SIADH (syndrome of inappropriate ADH production) [facility] ICD-10-CM: E22.2 ICD-9-CM: 253.6  3/26/2020 Malignant neoplasm metastatic to bone [facility]) ICD-10-CM: C79.51 
ICD-9-CM: 198.5  6/26/2019 Cellulitis of groin, left ICD-10-CM: I40.745 ICD-9-CM: 682.2  9/3/2018  Postoperative seroma of subcutaneous tissue after non-dermatologic procedure ICD-10-CM: L76.34 
 ICD-9-CM: 998.13  9/3/2018 Anal carcinoma ([facility] ICD-10-CM: C21.0 ICD-9-CM: 154.3  8/9/2018 Type 2 diabetes with nephropathy ([facility] ICD-10-CM: E11.21 
ICD-9-CM: 250.40, 583.81  7/3/2018 Primary malignant neoplasm of perianal skin ICD-10-CM: C44.500 ICD-9-CM: 173.50  7/3/2018 Vomiting ICD-10-CM: R11.10 ICD-9-CM: 787.03  2/23/2016 Diabetes mellitus due to underlying condition with hyperglycemia ([facility] ICD-10-CM: P62.86 ICD-9-CM: 249.80  12/21/2015 Mucositis due to radiation therapy ICD-10-CM: K12.33 
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 Jaqueline Zheng is a 79 y.o. male admitted on 3/26/2020 with a primary diagnosis of hyponatremia, progressive cancer, and presumed SIADH. .   
 
Rhonda Faustin is a patient well-known to me from a prior history of head and neck cancer as well as anal carcinoma. The details of these diagnoses are listed below. 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 The patient came to my office today stating that he was feeling significantly weak. In addition, he did have some difficulty processing our conversations although he was awake and alert. He had a history of mild hyponatremia with sodium levels usually in the high 120s and low 130s despite the use of salt tablets. As part of his pre-visit evaluation, he had undergone a PET scan which clearly showed evidence of disease progression.   He had multiple bony lesions as well as progressive disease in the mediastinum and in the perianal area. He was markedly hyponatremic and a decision for admission was made. The patient had a prior solitary bone lesion which had been irradiated. This was biopsied and confirmed the presence of metastatic carcinoma most consistent with his prior anal carcinoma. As part of the immunohistochemistries, the tumor stained positively for synaptophysin. Talha Malone was first seen in our office in September 2015. He was referred for evaluation of a squamous carcinoma which involved his right neck. On August 25, 2015 he underwent a CT scan of the neck with contrast.  This demonstrated a large necrotic appearing mass in the right submandibular region measuring 5 x 4.1 cm. It was felt to potentially represent a necrotic lymph node. There were no other lymph nodes evident of a worrisome nature. In addition, the studies showed no suspicious primary within the neck. On September 9, 2015 he underwent an excision of the right neck/parapharyngeal mass. A direct laryngoscopy was performed on that date as well. At surgery, the lesion was noted to extend almost to the base of the skull and could not be fully excised. There were no other palpable irregularities in the right neck. The right neck mass showed salivary glands, fibroadipose and lymphoid tissue containing well to moderately differentiated squamous cell carcinoma. .  The tumor focally extended to the margin of resection. A PET/CT scan performed on September 17, 2015 showed a low-density focus at the site of the previously described lymph node now measuring 3.1 x 2.6 cm. The FDG activity was an SUV of 6.5. Immunohistochemistries did not suggest HPV as an etiologic agent. In November 2015 he began a course of radiation therapy concurrent with weekly cisplatin. He completed therapy in December 2015.   In July 2018, he related a history of having been treated for hemorrhoids over the prior 5 years with various topical preparations. Ultimately, because of persistence of symptoms he was referred to a gastroenterologist and a colonoscopy performed. Biopsies of the anal region showed  a poorly differentiated tumor with both glandular and neuroendocrine features. This was subsequently confirmed at the [facility] in [place]. He underwent a PET scan which showed some activity in the anal skin as is evident on exam.  There was also a 1.2 cm lymph node in the left inguinal region with some slight SUV activity of unclear significance. He began therapy with 5-FU mitomycin and concurrent radiation therapy in August 2018. He completed his  therapy on September 27  A subsequent PET scan was performed showing  no worrisome uptake suggestive of persistent or recurrent malignancy in either of the previously treated primary locations. PET scan in April 2019 showed no evidence of recurrent tumor in the head neck or in the soft tissues of the pelvis. There was however an area of FDG uptake in the left iliac wing which was worrisome but of unclear significance. Because of the PET scan findings noted above, he underwent an MRI which also showed that possible solitary metastasis in the left iliac bone. Biopsy of this lesion confirmed the presence of metastatic carcinoma consistent with his anal primary. This was irradiated. IMPRESSION/PLAN: 
 
Anal Carcinoma, squamous with neuroendocrine features He clearly has evidence of progressive cancer at this point. As noted below, the neuroendocrine element of this tumor may be the most active at the present time. As such I would consider the use of etoposide and carboplatin as his next course of treatment given that presumption.   I would consider giving his first cycle in the hospital since we will likely have little control over his sodium without effective tumor control. In addition, there could be an exacerbation of SIADH after his initial treatment and release of paraneoplastic peptides 3/27 Start C1 carbo/etoposide today. 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 This is clearly worse now with evidence of progressive cancer and I suspect that this is SIADH related to the neuroendocrine characteristics of this tumor as noted histologically and with the positivity of synaptophysin. Nephrology should be consulted and his free water intake limited. His elevated blood sugar may trivially contribute to his hyponatremia but not enough to make a significant difference in approach. I defer to nephrology within the context of his mental status changes as to whether or not hypertonic saline would be appropriate. Tolvaptan might also be appropriate. 3/27 Nephrology consulted. Agreed with presumed SIADH. Serial Na measurements overnight. Serum Na improved from 114 to 121 with fluid restrictions. No pharmacological measures recommended per nephrology. Hypertension - Asymptomatic - On daily Norvasc, Lisinopril - Hydralazine 5mg IV q6 hrs PRN per parameters Diabetes - Metformin Continue home meds Rashmi SOPs DVT prophylaxis - Lovenox (hold plts <50) Poppy Lin, AMBREEN [facility] On[facility] [address] [address] Office : [phone] Fax : [phone]

## 2020-03-27 NOTE — PROGRESS NOTES
Reviewed notes of patient for spiritual concerns Noted history of multiple cancer sites Will follow up Richar Trevino, staff [address], [address]  /   [email or web address]

## 2020-03-27 NOTE — PROGRESS NOTES
CM has reviewed pt chart. Will continue to follow pt plan of care and assist if needs arise. Care Management Interventions PCP Verified by CM: Yes(Alondra Perez MD) Mode of Transport at Discharge: Other (see comment) Transition of Care Consult (CM Consult): Discharge Planning Discharge Durable Medical Equipment: No 
Physical Therapy Consult: No 
Occupational Therapy Consult: No 
Speech Therapy Consult: No 
Current Support Network: Own Home, Lives with Spouse Confirm Follow Up Transport: Family Discharge Location Discharge Placement: Unable to determine at this time

## 2020-03-27 NOTE — PROGRESS NOTES
[address] Nephrology Progress Note Follow-Up on: Hyponatremia ROS: 
Gen - no fever, no chills, appetite unchanged CV - no chest pain, no palpitation Lung - no shortness of breath, no cough Abd - no tenderness, no nausea/vomiting, no diarrhea Ext - no edema Exam: 
Vitals:  
 03/26/20 2250 03/27/20 9074 03/27/20 7761 03/27/20 3437 BP: (!) 191/94 153/86 156/90 (!) 194/102 Pulse: 90 92 84 83 Resp: 16  16 18 Temp: 98.5 °F (36.9 °C)  98.7 °F (37.1 °C) 98.1 °F (36.7 °C) SpO2: 97%  94% 94% Weight:      
Height:      
 
 
No intake or output data in the 24 hours ending 03/27/20 1037 Wt Readings from Last 3 Encounters:  
03/26/20 78.3 kg (172 lb 9.6 oz)  
03/26/20 77.6 kg (171 lb) 12/27/19 78.2 kg (172 lb 8 oz) GEN - in no distress CV - regular, no murmur, no rub Lung - clear bilaterally Abd - soft, nontender Ext - no edema Recent Labs  
  03/27/20 
0320 03/26/20 
1306 WBC 5.0 4.0* HGB 10.8* 10.8* HCT 29.4* 30.1*  
 174 Recent Labs  
  03/27/20 
0320 03/26/20 
2341 03/26/20 
1847 03/26/20 
1306 * 119* 118* 114* K 4.1  --   --  4.1 CL 85*  --   --  80* CO2 28  --   --  26 BUN 13  --   --  10  
CREA 0.78*  --   --  0.95 CA 9.0  --   --  9.2 *  --   --  289* MG 1.7*  --   --   --   
 
 
Assessment / Plan: Active Problems: Hyponatremia (11/23/2015) 1. Hyponatremia - with chronic component - previous Na mostly mid 120's to low 130's last several months - Admission Na 114 - presumed SIADH 
- Serum Na improving with fluid restriction so would recommend ongoing plan - no plans for pharmacologic intervention for now 2. 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

## 2020-03-27 NOTE — PROGRESS NOTES
Patient was hypertensive at shift change. A/O, asymptomatic. Hydralazine 5mg IV ordered. Patient didn't have venous access. Attempted to get IV access with multiple attempts. Hydralazine given. BP resolved with IV hydralazine. NP Jo-Ann Tripathi notified of events and change in patient's vs. 
 
Few sips of water with PO meds. Did not witness any output by patient. Sodium levels continue to improve, resolving on own without sodium replacement. Sodium level 121 -  up from 114. Continue to monitor vitals signs, sodium levels, I&Os.

## 2020-03-28 NOTE — PROGRESS NOTES
Inpatient Hematology / Oncology Daily Progress Note 24 Hour Events: 
Afeb, VSS Sitting up in bed eating breakfast 
Na 122 Water restrictions Tolerated D1C1 Carbo/etoposide well, proceed with D2 Constipation ongoing ROS: 
Constitutional: Negative for fever, chills, weakness, malaise. CV: Negative for chest pain, palpitations, edema. Respiratory: PNegative for dyspnea, cough, wheezing. GI: Positive for constipation. Negative for nausea, abdominal pain. 10 point review of systems is otherwise negative with the exception of the elements mentioned above in the HPI. No Known Allergies Past Medical History:  
Diagnosis Date  GERD (gastroesophageal reflux disease)   
 pt wife denies  Head and neck cancer ([facility] 9/30/2015  
 radiation and chemo and and surgery  History of squamous cell carcinoma   
 to neck area- 38 radiation treatement and 8 chemo treatment  History of throat cancer 2015  
 peg tube for about 4 months  Hypercholesteremia   
 managed well with meds  Hypertension   
 managed well with meds  Rectal cancer ([facility] 2018 28 radiation treatment and chemo pump  Type 2 diabetes mellitus ([facility] oral agent only/AVG BS: /no s.s of hypoglycemia  Vomiting 2/23/2016  
 pt wife denies Past Surgical History:  
Procedure Laterality Date  HX COLONOSCOPY  05/2018  HX HEENT    
 sinus  HX HEENT  2015  
 surgery on right throat for squamous cell ca right ear wedge  HX LYMPH NODE DISSECTION    
 HX ORTHOPAEDIC Right 1966  
 right leg  HX OTHER SURGICAL  9/9/15 EXCISION OF RIGHT Neck and PARAPHARYNGEAL MASS/RIGHT EAR WEDGE RESECTION  
 HX OTHER SURGICAL    
 peg placed and removed  HX TONSILLECTOMY  HX VASCULAR ACCESS    
 placed and removed Family History Problem Relation Age of Onset  Emphysema Father  Lung Disease Father  Cancer Brother Colon  Heart Disease Sister  Hypertension Sister  Heart Disease Sister  Hypertension Sister  Heart Disease Brother  Hypertension Brother  Heart Disease Brother  Hypertension Brother  Heart Disease Brother  Hypertension Brother  Heart Disease Brother  Hypertension Brother  Heart Disease Brother  Hypertension Brother Social History Socioeconomic History  Marital status:  Spouse name: Not on file  Number of children: Not on file  Years of education: Not on file  Highest education level: Not on file Occupational History  Not on file Social Needs  Financial resource strain: Not on file  Food insecurity Worry: Not on file Inability: Not on file  Transportation needs Medical: Not on file Non-medical: Not on file Tobacco Use  Smoking status: Never Smoker  Smokeless tobacco: Never Used Substance and Sexual Activity  Alcohol use: No  
 Drug use: No  
 Sexual activity: Not on file Lifestyle  Physical activity Days per week: Not on file Minutes per session: Not on file  Stress: Not on file Relationships  Social connections Talks on phone: Not on file Gets together: Not on file Attends Restoration service: Not on file Active member of club or organization: Not on file Attends meetings of clubs or organizations: Not on file Relationship status: Not on file  Intimate partner violence Fear of current or ex partner: Not on file Emotionally abused: Not on file Physically abused: Not on file Forced sexual activity: Not on file Other Topics Concern  Not on file Social History Narrative  Not on file Current Facility-Administered Medications Medication Dose Route Frequency Provider Last Rate Last Dose  urea (URE-NA) 15 gram packet 1 Packet  1 Packet Oral DAILY Erinn Pathak MD      
 senna-docusate (PERICOLACE) 8.6-50 mg per tablet 1 Tab  1 Tab Oral BID Clearance Quivers, NP      
 lactulose (CHRONULAC) 10 gram/15 mL solution 30 mL  20 g Oral PRN Clearance Quivers, NP      
 polyethylene glycol (MIRALAX) packet 17 g  17 g Oral DAILY Clearance Quivers, NP   17 g at 20 8646  amLODIPine (NORVASC) tablet 10 mg  10 mg Oral DAILY Travis Benne, FNP   10 mg at 20 5521  docusate sodium (COLACE) capsule 100 mg  100 mg Oral PRN Travis Benne, FNP  lisinopriL (PRINIVIL, ZESTRIL) tablet 20 mg  20 mg Oral DAILY Travis Benne, FNP   20 mg at 20 7647  metFORMIN (GLUCOPHAGE) tablet 1,000 mg  1,000 mg Oral DAILY Travis Benne, FNP   1,000 mg at 20 2978  enoxaparin (LOVENOX) injection 40 mg  40 mg SubCUTAneous Q24H Travis Benne, FNP   40 mg at 20 1715  ondansetron (ZOFRAN ODT) tablet 8 mg  8 mg Oral Q8H PRN Travis Benne, FNP  prochlorperazine (COMPAZINE) with saline injection 5 mg  5 mg IntraVENous Q6H PRN Travis Benne, FNP      
 HYDROcodone-acetaminophen [facility]) 5-325 mg per tablet 1 Tab  1 Tab Oral Q6H PRN Travis Benne, FNP   1 Tab at 20 1721  
 morphine injection 1 mg  1 mg IntraVENous Q4H PRN Travis Benne, FNP  hydrALAZINE (APRESOLINE) 20 mg/mL injection 5 mg  5 mg IntraVENous Q6H PRN Travis Benne, FNP   5 mg at 20 2255 OBJECTIVE: 
Patient Vitals for the past 8 hrs: 
 BP Temp Pulse Resp SpO2  
20 0740 129/78 97.9 °F (36.6 °C) 78 18 95 % 20 0300 159/84 97.9 °F (36.6 °C) 88 17 96 % Temp (24hrs), Av.8 °F (36.6 °C), Min:97.6 °F (36.4 °C), Max:97.9 °F (36.6 °C) 
 
701 - 1900 In: 1000 [P.O.:1000] Out: - See inpatient graphics. Labs:   
Recent Results (from the past 24 hour(s)) SODIUM Collection Time: 20 11:36 AM  
Result Value Ref Range Sodium 122 (LL) 136 - 145 mmol/L  
SODIUM Collection Time: 20  6:26 PM  
Result Value Ref Range  Sodium 121 (LL) 136 - 145 mmol/L  
SODIUM  
 Collection Time: 03/28/20 12:12 AM  
Result Value Ref Range Sodium 121 (LL) 136 - 145 mmol/L MAGNESIUM Collection Time: 03/28/20  3:27 AM  
Result Value Ref Range Magnesium 1.7 (L) 1.8 - 2.4 mg/dL CBC WITH AUTOMATED DIFF Collection Time: 03/28/20  3:27 AM  
Result Value Ref Range WBC 4.0 (L) 4.3 - 11.1 K/uL  
 RBC 3.46 (L) 4.23 - 5.6 M/uL  
 HGB 10.8 (L) 13.6 - 17.2 g/dL HCT 30.5 (L) 41.1 - 50.3 % MCV 88.2 79.6 - 97.8 FL  
 MCH 31.2 26.1 - 32.9 PG  
 MCHC 35.4 (H) 31.4 - 35.0 g/dL  
 RDW 13.4 11.9 - 14.6 % PLATELET 828 178 - 742 K/uL MPV 8.2 (L) 9.4 - 12.3 FL ABSOLUTE NRBC 0.00 0.0 - 0.2 K/uL  
 DF AUTOMATED NEUTROPHILS 84 (H) 43 - 78 % LYMPHOCYTES 11 (L) 13 - 44 % MONOCYTES 3 (L) 4.0 - 12.0 % EOSINOPHILS 0 (L) 0.5 - 7.8 % BASOPHILS 0 0.0 - 2.0 % IMMATURE GRANULOCYTES 2 0.0 - 5.0 %  
 ABS. NEUTROPHILS 3.4 1.7 - 8.2 K/UL  
 ABS. LYMPHOCYTES 0.5 0.5 - 4.6 K/UL  
 ABS. MONOCYTES 0.1 0.1 - 1.3 K/UL  
 ABS. EOSINOPHILS 0.0 0.0 - 0.8 K/UL  
 ABS. BASOPHILS 0.0 0.0 - 0.2 K/UL  
 ABS. IMM. GRANS. 0.1 0.0 - 0.5 K/UL METABOLIC PANEL, COMPREHENSIVE Collection Time: 03/28/20  3:27 AM  
Result Value Ref Range Sodium 122 (LL) 136 - 145 mmol/L Potassium 4.8 3.5 - 5.1 mmol/L Chloride 87 (L) 98 - 107 mmol/L  
 CO2 27 21 - 32 mmol/L Anion gap 8 7 - 16 mmol/L Glucose 269 (H) 65 - 100 mg/dL BUN 17 8 - 23 MG/DL Creatinine 0.86 0.8 - 1.5 MG/DL  
 GFR est AA >60 >60 ml/min/1.73m2 GFR est non-AA >60 >60 ml/min/1.73m2 Calcium 8.8 8.3 - 10.4 MG/DL Bilirubin, total 0.4 0.2 - 1.1 MG/DL  
 ALT (SGPT) 29 12 - 65 U/L  
 AST (SGOT) 56 (H) 15 - 37 U/L Alk. phosphatase 372 (H) 50 - 136 U/L Protein, total 6.4 6.3 - 8.2 g/dL Albumin 3.0 (L) 3.2 - 4.6 g/dL Globulin 3.4 2.3 - 3.5 g/dL A-G Ratio 0.9 (L) 1.2 - 3.5 Imaging: No images are attached to the encounter. ASSESSMENT: 
Problem List  Date Reviewed: 12/27/2019 Codes Class Noted SIADH (syndrome of inappropriate ADH production) ([facility] ICD-10-CM: E22.2 ICD-9-CM: 253.6  3/26/2020 Malignant neoplasm metastatic to bone [facility]) ICD-10-CM: C79.51 
ICD-9-CM: 198.5  6/26/2019 Cellulitis of groin, left ICD-10-CM: B21.873 ICD-9-CM: 682.2  9/3/2018 Postoperative seroma of subcutaneous tissue after non-dermatologic procedure ICD-10-CM: L76.34 
ICD-9-CM: 998.13  9/3/2018 Anal carcinoma ([facility] ICD-10-CM: C21.0 ICD-9-CM: 154.3  8/9/2018 Type 2 diabetes with nephropathy ([facility] ICD-10-CM: E11.21 
ICD-9-CM: 250.40, 583.81  7/3/2018 Primary malignant neoplasm of perianal skin ICD-10-CM: C44.500 ICD-9-CM: 173.50  7/3/2018 Vomiting ICD-10-CM: R11.10 ICD-9-CM: 787.03  2/23/2016 Diabetes mellitus due to underlying condition with hyperglycemia ([facility] ICD-10-CM: H54.79 ICD-9-CM: 249.80  12/21/2015 Mucositis due to radiation therapy ICD-10-CM: K12.33 
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 Rebecca Bruce is a 79 y.o. male admitted on 3/26/2020 with a primary diagnosis of hyponatremia, progressive cancer, and presumed SIADH. .   
 
Odell Powell is a patient well-known to me from a prior history of head and neck cancer as well as anal carcinoma. The details of these diagnoses are listed below. 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   The patient came to my office today stating that he was feeling significantly weak. In addition, he did have some difficulty processing our conversations although he was awake and alert. He had a history of mild hyponatremia with sodium levels usually in the high 120s and low 130s despite the use of salt tablets. As part of his pre-visit evaluation, he had undergone a PET scan which clearly showed evidence of disease progression. He had multiple bony lesions as well as progressive disease in the mediastinum and in the perianal area. He was markedly hyponatremic and a decision for admission was made. The patient had a prior solitary bone lesion which had been irradiated. This was biopsied and confirmed the presence of metastatic carcinoma most consistent with his prior anal carcinoma. As part of the immunohistochemistries, the tumor stained positively for synaptophysin. Suze Alford was first seen in our office in September 2015. He was referred for evaluation of a squamous carcinoma which involved his right neck. On August 25, 2015 he underwent a CT scan of the neck with contrast.  This demonstrated a large necrotic appearing mass in the right submandibular region measuring 5 x 4.1 cm. It was felt to potentially represent a necrotic lymph node. There were no other lymph nodes evident of a worrisome nature. In addition, the studies showed no suspicious primary within the neck. On September 9, 2015 he underwent an excision of the right neck/parapharyngeal mass. A direct laryngoscopy was performed on that date as well. At surgery, the lesion was noted to extend almost to the base of the skull and could not be fully excised. There were no other palpable irregularities in the right neck. The right neck mass showed salivary glands, fibroadipose and lymphoid tissue containing well to moderately differentiated squamous cell carcinoma. .  The tumor focally extended to the margin of resection. A PET/CT scan performed on September 17, 2015 showed a low-density focus at the site of the previously described lymph node now measuring 3.1 x 2.6 cm. The FDG activity was an SUV of 6.5. Immunohistochemistries did not suggest HPV as an etiologic agent. In November 2015 he began a course of radiation therapy concurrent with weekly cisplatin. He completed therapy in December 2015. In July 2018, he related a history of having been treated for hemorrhoids over the prior 5 years with various topical preparations. Ultimately, because of persistence of symptoms he was referred to a gastroenterologist and a colonoscopy performed. Biopsies of the anal region showed  a poorly differentiated tumor with both glandular and neuroendocrine features. This was subsequently confirmed at the [facility] in [place]. He underwent a PET scan which showed some activity in the anal skin as is evident on exam.  There was also a 1.2 cm lymph node in the left inguinal region with some slight SUV activity of unclear significance. He began therapy with 5-FU mitomycin and concurrent radiation therapy in August 2018. He completed his  therapy on September 27  A subsequent PET scan was performed showing  no worrisome uptake suggestive of persistent or recurrent malignancy in either of the previously treated primary locations. PET scan in April 2019 showed no evidence of recurrent tumor in the head neck or in the soft tissues of the pelvis. There was however an area of FDG uptake in the left iliac wing which was worrisome but of unclear significance. Because of the PET scan findings noted above, he underwent an MRI which also showed that possible solitary metastasis in the left iliac bone. Biopsy of this lesion confirmed the presence of metastatic carcinoma consistent with his anal primary. This was irradiated.   
 
 
IMPRESSION/PLAN: 
 
 Anal Carcinoma, squamous with neuroendocrine features He clearly has evidence of progressive cancer at this point. As noted below, the neuroendocrine element of this tumor may be the most active at the present time. As such I would consider the use of etoposide and carboplatin as his next course of treatment given that presumption. I would consider giving his first cycle in the hospital since we will likely have little control over his sodium without effective tumor control. In addition, there could be an exacerbation of SIADH after his initial treatment and release of paraneoplastic peptides 3/27 Start C1 carbo/etoposide today. 3/28 C1D2 today, he tolerated D1 well Hyponatremia, presumed SIADH The patient is admitted with severe hyponatremia superimposed on a milder degree of chronic hyponatremia. This is clearly worse now with evidence of progressive cancer and I suspect that this is SIADH related to the neuroendocrine characteristics of this tumor as noted histologically and with the positivity of synaptophysin. Nephrology should be consulted and his free water intake limited. His elevated blood sugar may trivially contribute to his hyponatremia but not enough to make a significant difference in approach. I defer to nephrology within the context of his mental status changes as to whether or not hypertonic saline would be appropriate. Tolvaptan might also be appropriate. 3/27 Nephrology consulted. Agreed with presumed SIADH. Serial Na measurements overnight. Serum Na improved from 114 to 121 with fluid restrictions. No pharmacological measures recommended per nephrology. 3/28 Na+ 122 Hypertension - Asymptomatic - On daily Norvasc, Lisinopril - Hydralazine 5mg IV q6 hrs PRN per parameters Diabetes - Metformin Constipation 3/28 increase walker-colace to BID, Miralax daily, lactulose prn 
 
Continue home meds Rashmi SOPs DVT prophylaxis - Lovenox (hold plts <50) Sheeba Scott NP [facility] Hematology & Oncology [address] [address] Office : [phone] Fax : [phone]

## 2020-03-28 NOTE — PROGRESS NOTES
END OF SHIFT NOTE: 
 
Patient rested well overnight. No c/o pain. No N/V/D. Sodium remains in the 121-122 range. Received chemo without difficulty, no reaction or adverse effects noted. Patient pleasant but intermittently confused. Bed alarm on throughout the shift. Intake/Output 
03/27 1901 - 03/28 0700 In: 2158 [P.O.:1000; I.V.:1158] Out: 1600 [Urine:1600] Voiding: YES Catheter: NO 
Drain:   
 
Stool:  0 occurrences. Emesis:  0 occurrences. VITAL SIGNS Patient Vitals for the past 12 hrs: 
 Temp Pulse Resp BP SpO2  
03/28/20 0300 97.9 °F (36.6 °C) 88 17 159/84 96 % 03/27/20 2339 97.6 °F (36.4 °C) 91 17 161/82 95 % 03/27/20 2022 97.8 °F (36.6 °C) 93 18 164/87 94 % Pain Assessment Pain 1 Pain Scale 1: Numeric (0 - 10) (03/28/20 0300) Pain Intensity 1: 0 (03/28/20 0300) Patient Stated Pain Goal: 0 (03/28/20 0300) Pain Reassessment 1: Yes (03/26/20 2230) Pain Onset 1: now (03/27/20 1718) Pain Location 1: Back (03/27/20 1718) Pain Orientation 1: Left (03/26/20 2139) Pain Description 1: Aching (03/27/20 1718) Pain Intervention(s) 1: Medication (see MAR) (03/27/20 1718) Ambulating Yes, with assistance Shift report given to oncoming nurse at the bedside. Eli Real

## 2020-03-28 NOTE — PROGRESS NOTES
Problem: Falls - Risk of 
Goal: *Absence of Falls Description: Document Tarik Avalos Fall Risk and appropriate interventions in the flowsheet. Outcome: Progressing Towards Goal 
Note: Fall Risk Interventions: 
Mobility Interventions: Bed/chair exit alarm, Communicate number of staff needed for ambulation/transfer, Patient to call before getting OOB, PT Consult for mobility concerns Mentation Interventions: Bed/chair exit alarm, Door open when patient unattended, More frequent rounding, Room close to nurse's station Medication Interventions: Bed/chair exit alarm, Patient to call before getting OOB, Teach patient to arise slowly Elimination Interventions: Bed/chair exit alarm, Call light in reach, Urinal in reach, Patient to call for help with toileting needs Problem: Patient Education: Go to Patient Education Activity Goal: Patient/Family Education Outcome: Progressing Towards Goal

## 2020-03-28 NOTE — PROGRESS NOTES
[address] Nephrology Progress Note Follow-Up on: Hyponatremia ROS: 
Gen - no fever, no chills, appetite unchanged CV - no chest pain, no palpitation Lung - no shortness of breath, no cough Abd - no tenderness, no nausea/vomiting, no diarrhea Ext - no edema Exam: 
Vitals:  
 03/27/20 2022 03/27/20 2339 03/28/20 0300 03/28/20 0740 BP: 164/87 161/82 159/84 129/78 Pulse: 93 91 88 78 Resp: 18 17 17 18 Temp: 97.8 °F (36.6 °C) 97.6 °F (36.4 °C) 97.9 °F (36.6 °C) 97.9 °F (36.6 °C) SpO2: 94% 95% 96% 95% Weight: 78.4 kg (172 lb 13.8 oz) Height:      
 
 
 
Intake/Output Summary (Last 24 hours) at 3/28/2020 1018 Last data filed at 3/28/2020 0900 Gross per 24 hour Intake 3158 ml Output 1600 ml Net 1558 ml Wt Readings from Last 3 Encounters:  
03/27/20 78.4 kg (172 lb 13.8 oz)  
03/26/20 77.6 kg (171 lb) 12/27/19 78.2 kg (172 lb 8 oz) GEN - in no distress CV - regular, no murmur, no rub Lung - clear bilaterally Abd - soft, nontender Ext - no edema Recent Labs  
  03/28/20 
0327 03/27/20 
0320 03/26/20 
1306 WBC 4.0* 5.0 4.0* HGB 10.8* 10.8* 10.8* HCT 30.5* 29.4* 30.1*  
 225 174 Recent Labs  
  03/28/20 
0327 03/28/20 
0012 03/27/20 
1826  03/27/20 
0320  03/26/20 
1306 * 121* 121*   < > 121*   < > 114* K 4.8  --   --   --  4.1  --  4.1 CL 87*  --   --   --  85*  --  80* CO2 27  --   --   --  28 --  26 BUN 17  --   --   --  13  --  10  
CREA 0.86  --   --   --  0.78*  --  0.95  
CA 8.8  --   --   --  9.0  --  9.2 *  --   --   --  164*  --  289* MG 1.7*  --   --   --  1.7*  --   --   
 < > = values in this interval not displayed. Assessment / Plan: Active Problems: Hyponatremia (11/23/2015) 1. Hyponatremia - with chronic component - previous Na mostly mid 120's to low 130's last several months - Admission Na 114 - presumed SIADH 
- Serum Na initially improved but not much in the last 24 hrs, start Urea 2. 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

## 2020-03-28 NOTE — PROGRESS NOTES
END OF SHIFT NOTE: 
 
Intake/Output 
03/28 0701 - 03/28 1900 In: 1000 [P.O.:1000] Out: 825 [Urine:825] Voiding: YES Catheter: NO 
Drain:   
 
 
 
 
 
Stool:  0 occurrences. Emesis:  0 occurrences. VITAL SIGNS Patient Vitals for the past 12 hrs: 
 Temp Pulse Resp BP SpO2  
03/28/20 1814 98.7 °F (37.1 °C) 83 18 138/78 95 % 03/28/20 1042 97.7 °F (36.5 °C) 78 18 160/89 98 % 03/28/20 0740 97.9 °F (36.6 °C) 78 18 129/78 95 % Pain Assessment Pain 1 Pain Scale 1: Numeric (0 - 10) (03/28/20 1644) Pain Intensity 1: 0 (03/28/20 1644) Patient Stated Pain Goal: 0 (03/28/20 1644) Pain Reassessment 1: Yes (03/28/20 1644) Pain Onset 1: now (03/27/20 1718) Pain Location 1: Back (03/27/20 1718) Pain Orientation 1: Left (03/26/20 2139) Pain Description 1: Aching (03/27/20 1718) Pain Intervention(s) 1: Medication (see MAR) (03/27/20 1718) Ambulating Yes Additional Information: Pt up to the bathroom with assistance. Pt made aware of moving to [address]. Day 2 complete with no issues. Tolerated well. Next dose tomorrow at 1700. No complaints noted at this time. Shift report given to Manjinder Hooper RN oncoming nurse at the bedside.  
 
Ike Nava RN

## 2020-03-29 NOTE — PROGRESS NOTES
[place] [facility] Progress Note Follow-Up on: Hyponatremia ROS: 
Gen - no fever, no chills, appetite unchanged CV - no chest pain, no palpitation Lung - no shortness of breath, no cough Abd - no tenderness, no nausea/vomiting, no diarrhea Ext - no edema Exam: 
Vitals:  
 03/28/20 3245 03/29/20 0225 03/29/20 7395 03/29/20 3033 BP: (!) 157/92 (!) 183/102 (!) 159/93 178/90 Pulse: 86 84  89 Resp: 18 16  16 Temp: 97.6 °F (36.4 °C) 97.5 °F (36.4 °C)  97.5 °F (36.4 °C) SpO2: 94% 92%  94% Weight:      
Height:      
 
 
 
Intake/Output Summary (Last 24 hours) at 3/29/2020 3382 Last data filed at 3/29/2020 0763 Gross per 24 hour Intake 480 ml Output 4050 ml Net -3570 ml Wt Readings from Last 3 Encounters:  
03/28/20 76.7 kg (169 lb 3.2 oz)  
03/26/20 77.6 kg (171 lb) 12/27/19 78.2 kg (172 lb 8 oz) GEN - in no distress CV - regular, no murmur, no rub Lung - clear bilaterally Abd - soft, nontender Ext - no edema Recent Labs  
  03/29/20 
0221 03/28/20 
0327 03/27/20 
0320 WBC 6.2 4.0* 5.0 HGB 10.8* 10.8* 10.8* HCT 29.5* 30.5* 29.4*  
 187 225 Recent Labs  
  03/29/20 
0837 03/29/20 
0221 03/28/20 
1901  03/28/20 
0327  03/27/20 
0320 * 122* 121*   < > 122*   < > 121* K  --  4.7  --   --  4.8  --  4.1 CL  --  87*  --   --  87*  --  85* CO2  --  27  --   --  27  --  28 BUN  --  23  --   --  17  --  13  
CREA  --  0.84  --   --  0.86  --  0.78* CA  --  8.8  --   --  8.8  --  9.0  
GLU  --  298*  --   --  269*  --  164* MG  --  1.7*  --   --  1.7*  --  1.7*  
 < > = values in this interval not displayed. Assessment / Plan: Active Problems: Hyponatremia (11/23/2015) 1. Hyponatremia - with chronic component - previous Na mostly mid 120's to low 130's last several months - Admission Na 114 - presumed SIADH 
- Serum Na initially improved but not much in the last 48 hrs, Urea started 3/38, will try another day before deciding on change in dose vs change in management 2. 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

## 2020-03-29 NOTE — PROGRESS NOTES
Patient is being transferred to [facility] for planned readmission. Patient is being transferred with L 20G FA and L 22 G in the [facility] [place]. Patient is being transported by [facility] ambulance. Discharge is complete at this time. Report called to staff at [facility].

## 2020-03-29 NOTE — PROGRESS NOTES
2211- pt complained of generalized pain. Gave norco po will continue to monitor. 0231- pt Bp 183/102 5 mg of apresoline given iv will continue to monitor. END OF SHIFT NOTE: 
 
Intake/Output 
03/28 1901 - 03/29 0700 In: -  
Out: 2625 Liliane Alvarado Voiding: YES Catheter: NO 
Drain:   
 
 
 
 
 
Stool:  0 occurrences. Emesis:  0 occurrences. VITAL SIGNS Patient Vitals for the past 12 hrs: 
 Temp Pulse Resp BP SpO2  
03/29/20 0557    (!) 159/93   
03/29/20 0225 97.5 °F (36.4 °C) 84 16 (!) 183/102 92 % 03/28/20 2347 97.6 °F (36.4 °C) 86 18 (!) 157/92 94 % Pain Assessment Pain 1 Pain Scale 1: Numeric (0 - 10) (03/29/20 0357) Pain Intensity 1: 0 (03/29/20 0357) Patient Stated Pain Goal: 0 (03/29/20 0357) Pain Reassessment 1: Yes (03/28/20 2347) Pain Onset 1: now (03/27/20 1718) Pain Location 1: Back (03/27/20 1718) Pain Orientation 1: Left (03/26/20 2139) Pain Description 1: Aching (03/27/20 1718) Pain Intervention(s) 1: Medication (see MAR) (03/27/20 1718) Ambulating No 
 
Additional Information:  
 
Shift report given to oncoming nurse at the bedside.  
 
Juan Francisco Lim RN

## 2020-03-29 NOTE — PROGRESS NOTES
1107-Primary Nurse Melany James and Nestor Elizabeth RN performed a dual skin assessment on this patient No impairment noted.

## 2020-03-29 NOTE — PROGRESS NOTES
Patient's current unit closing. Pt being transferred to [address] [place] [address] for continued care.

## 2020-03-29 NOTE — H&P
Inpatient Hematology / Oncology History and Physical 
 
Reason for Asmission:  Transfer from [facility] History of Present Illness: Mr. Mel Rosado is a 79 y.o. male admitted on 3/29/2020 with a primary diagnosis of Hyponatremia. He has been transferred to [place] from [facility] to protect from COVID-19 exposure.  is a patient well-known to me from a prior history of head and neck cancer as well as anal carcinoma.  The details of these diagnoses are listed below. 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  The patient came to my office today stating that he was feeling significantly weak.  In addition, he did have some difficulty processing our conversations although he was awake and alert. Tahira Richardson had a history of mild hyponatremia with sodium levels usually in the high 120s and low 130s despite the use of salt tablets.  As part of his pre-visit evaluation, he had undergone a PET scan which clearly showed evidence of disease progression.  He had multiple bony lesions as well as progressive disease in the mediastinum and in the perianal area.  He was markedly hyponatremic and a decision for admission was made.  The patient had a prior solitary bone lesion which had been irradiated.  This was biopsied and confirmed the presence of metastatic carcinoma most consistent with his prior anal carcinoma.  As part of the immunohistochemistries, the tumor stained positively for synaptophysin.   
Roxanne Tripathi was first seen in our office in September 2015. Tahira Richardson was referred for evaluation of a squamous carcinoma which involved his right neck.  On August 25, 2015 he underwent a CT scan of the neck with contrast.  This demonstrated a large necrotic appearing mass in the right submandibular region measuring 5 x 4.1 cm.  It was felt to potentially represent a necrotic lymph node.  There were no other lymph nodes evident of a worrisome nature.  In addition, the studies showed no suspicious primary within the neck.  On September 9, 2015 he underwent an excision of the right neck/parapharyngeal mass.   A direct laryngoscopy was performed on that date as well.   At surgery, the lesion was noted to extend almost to the base of the skull and could not be fully excised.  There were no other palpable irregularities in the right neck.   The right neck mass showed salivary glands, fibroadipose and lymphoid tissue containing well to moderately differentiated squamous cell carcinoma. Zunilda Castillo tumor focally extended to the margin of resection.  A PET/CT scan performed on September 17, 2015 showed a low-density focus at the site of the previously described lymph node now measuring 3.1 x 2.6 cm.  The FDG activity was an SUV of 6.5.   Immunohistochemistries did not suggest HPV as an etiologic agent. In November 2015 he began a course of radiation therapy concurrent with weekly cisplatin.  He completed therapy in December 2015.  In July 2018, he related a history of having been treated for hemorrhoids over the prior 5 years with various topical preparations.  Ultimately, because of persistence of symptoms he was referred to a gastroenterologist and a colonoscopy performed.  Biopsies of the anal region showed  a poorly differentiated tumor with both glandular and neuroendocrine features.  This was subsequently confirmed at the [facility] in [place] underwent a PET scan which showed some activity in the anal skin as is evident on exam. Tiffany Albrecht was also a 1.2 cm lymph node in the left inguinal region with some slight SUV activity of unclear significance.  He began therapy with 5-FU mitomycin and concurrent radiation therapy in August 2018. Ivelisse Butler completed his [place] on September 27  A subsequent PET scan was performed showing  no worrisome uptake suggestive of persistent or recurrent malignancy in either of the previously treated primary locations.  PET scan in April 2019 showed no evidence of recurrent tumor in the head neck or in the soft tissues of the pelvis. Andra Blakely was however an area of FDG uptake in the left iliac wing which was worrisome but of unclear significance.   Because of the PET scan findings noted above, he underwent an MRI which also showed that possible solitary metastasis in the left iliac bone.  Biopsy of this lesion confirmed the presence of metastatic carcinoma consistent with his anal primary. This was irradiated. No Known Allergies Past Medical History:  
Diagnosis Date  GERD (gastroesophageal reflux disease)   
 pt wife denies  Head and neck cancer ([facility] 9/30/2015  
 radiation and chemo and and surgery  History of squamous cell carcinoma   
 to neck area- 38 radiation treatement and 8 chemo treatment  History of throat cancer 2015  
 peg tube for about 4 months  Hypercholesteremia   
 managed well with meds  Hypertension   
 managed well with meds  Rectal cancer ([address] 2018 28 radiation treatment and chemo pump  Type 2 diabetes mellitus ([facility] oral agent only/AVG BS: /no s.s of hypoglycemia  Vomiting 2/23/2016  
 pt wife denies Past Surgical History:  
Procedure Laterality Date  HX COLONOSCOPY  05/2018  HX HEENT    
 sinus  HX HEENT  2015  
 surgery on right throat for squamous cell ca right ear wedge  HX LYMPH NODE DISSECTION    
 HX ORTHOPAEDIC Right 1966  
 right leg  HX OTHER SURGICAL  9/9/15 EXCISION OF RIGHT Neck and PARAPHARYNGEAL MASS/RIGHT EAR WEDGE RESECTION  
 HX OTHER SURGICAL    
 peg placed and removed  HX TONSILLECTOMY  HX VASCULAR ACCESS    
 placed and removed Family History Problem Relation Age of Onset  Emphysema Father  Lung Disease Father  Cancer Brother Colon  Heart Disease Sister  Hypertension Sister  Heart Disease Sister  Hypertension Sister  Heart Disease Brother  Hypertension Brother  Heart Disease Brother  Hypertension Brother  Heart Disease Brother  Hypertension Brother  Heart Disease Brother  Hypertension Brother  Heart Disease Brother  Hypertension Brother Social History Socioeconomic History  Marital status:  Spouse name: Not on file  Number of children: Not on file  Years of education: Not on file  Highest education level: Not on file Occupational History  Not on file Social Needs  Financial resource strain: Not on file  Food insecurity Worry: Not on file Inability: Not on file  Transportation needs Medical: Not on file Non-medical: Not on file Tobacco Use  Smoking status: Never Smoker  Smokeless tobacco: Never Used Substance and Sexual Activity  Alcohol use: No  
 Drug use: No  
 Sexual activity: Not on file Lifestyle  Physical activity Days per week: Not on file Minutes per session: Not on file  Stress: Not on file Relationships  Social connections Talks on phone: Not on file Gets together: Not on file Attends Yazidism service: Not on file Active member of club or organization: Not on file Attends meetings of clubs or organizations: Not on file Relationship status: Not on file  Intimate partner violence Fear of current or ex partner: Not on file Emotionally abused: Not on file Physically abused: Not on file Forced sexual activity: Not on file Other Topics Concern  Not on file Social History Narrative  Not on file Facility-Administered Medications Ordered in Other Encounters Medication Dose Route Frequency Provider Last Rate Last Dose  
 0.9% sodium chloride infusion  25 mL/hr IntraVENous CONTINUOUS Tanner Mckenzie MD      
 dexamethasone (DECADRON) 4 mg/mL injection 8 mg  8 mg IntraVENous ONCE Tanner Mckenzie MD      
  prochlorperazine (COMPAZINE) with saline injection 10 mg  10 mg IntraVENous ONCE PRN Richard Valles MD      
 LORazepam (ATIVAN) injection 0.5 mg  0.5 mg IntraVENous ONCE PRN Richard Valles MD      
 etoposide (VEPESID) 199 mg in 0.9% sodium chloride 500 mL chemo infusion  100 mg/m2 (Treatment Plan Recorded) IntraVENous ONCE Richard Valles MD      
 urea (URE-NA) 15 gram packet [facility] Ro Acosta MD   1 Packet at 03/29/20 0827  
 senna-docusate (PERICOLACE) 8.6-50 mg per tablet 1 Tab  1 Tab Oral BID Birdena Snide, NP   1 Tab at 03/29/20 1184  lactulose (CHRONULAC) 10 gram/15 mL solution 30 mL  20 g Oral PRN Birdena Snide, NP      
 polyethylene glycol (MIRALAX) packet 17 g  17 g Oral DAILY Birdena Snide, NP   17 g at 03/29/20 1735  amLODIPine (NORVASC) tablet 10 mg  10 mg Oral DAILY Milbert Hur, FNP   10 mg at 03/29/20 8800  docusate sodium (COLACE) capsule 100 mg  100 mg Oral PRN Adambert Colleen, FNP  lisinopriL (PRINIVIL, ZESTRIL) tablet 20 mg  20 mg Oral DAILY Milbert Hurst, FNP   20 mg at 03/29/20 9858  
 metFORMIN (GLUCOPHAGE) tablet 1,000 mg  1,000 mg Oral DAILY Milbert Hurst, FNP   1,000 mg at 03/29/20 6195  enoxaparin (LOVENOX) injection 40 mg  40 mg SubCUTAneous Q24H Guy Macias, FNP   40 mg at 03/28/20 1756  ondansetron (ZOFRAN ODT) tablet 8 mg  8 mg Oral Q8H PRN Guy Macias, FNP  prochlorperazine (COMPAZINE) with saline injection 5 mg  5 mg IntraVENous Q6H PRN Guy Macias, FNP      
 HYDROcodone-acetaminophen [facility]) 5-325 mg per tablet 1 Tab  1 Tab Oral Q6H PRN Guy Macias, FNP   1 Tab at 03/28/20 2211  morphine injection 1 mg  1 mg IntraVENous Q4H PRN Milbert Hurst, FNP  hydrALAZINE (APRESOLINE) 20 mg/mL injection 5 mg  5 mg IntraVENous Q6H PRN AMBREEN Kelly   5 mg at 03/29/20 0049 OBJECTIVE: 
No data found. Temp (24hrs), Av.8 °F (36.6 °C), Min:97.5 °F (36.4 °C), Max:98.7 °F (37.1 °C) 
 
701 -  1900 In: 480 [P.O.:480] Out: -  
 
 
Labs:   
Recent Results (from the past 24 hour(s)) SODIUM Collection Time: 20 11:53 AM  
Result Value Ref Range Sodium 122 (LL) 136 - 145 mmol/L  
SODIUM Collection Time: 20  7:01 PM  
Result Value Ref Range Sodium 121 (LL) 136 - 145 mmol/L MAGNESIUM Collection Time: 20  2:21 AM  
Result Value Ref Range Magnesium 1.7 (L) 1.8 - 2.4 mg/dL CBC WITH AUTOMATED DIFF Collection Time: 20  2:21 AM  
Result Value Ref Range WBC 6.2 4.3 - 11.1 K/uL  
 RBC 3.40 (L) 4.23 - 5.6 M/uL  
 HGB 10.8 (L) 13.6 - 17.2 g/dL HCT 29.5 (L) 41.1 - 50.3 % MCV 86.8 79.6 - 97.8 FL  
 MCH 31.8 26.1 - 32.9 PG  
 MCHC 36.6 (H) 31.4 - 35.0 g/dL  
 RDW 13.3 11.9 - 14.6 % PLATELET 682 506 - 998 K/uL MPV 8.0 (L) 9.4 - 12.3 FL ABSOLUTE NRBC 0.00 0.0 - 0.2 K/uL  
 DF AUTOMATED NEUTROPHILS 86 (H) 43 - 78 % LYMPHOCYTES 8 (L) 13 - 44 % MONOCYTES 5 4.0 - 12.0 % EOSINOPHILS 0 (L) 0.5 - 7.8 % BASOPHILS 0 0.0 - 2.0 % IMMATURE GRANULOCYTES 1 0.0 - 5.0 %  
 ABS. NEUTROPHILS 5.3 1.7 - 8.2 K/UL  
 ABS. LYMPHOCYTES 0.5 0.5 - 4.6 K/UL  
 ABS. MONOCYTES 0.3 0.1 - 1.3 K/UL  
 ABS. EOSINOPHILS 0.0 0.0 - 0.8 K/UL  
 ABS. BASOPHILS 0.0 0.0 - 0.2 K/UL  
 ABS. IMM. GRANS. 0.1 0.0 - 0.5 K/UL METABOLIC PANEL, COMPREHENSIVE Collection Time: 20  2:21 AM  
Result Value Ref Range Sodium 122 (LL) 136 - 145 mmol/L Potassium 4.7 3.5 - 5.1 mmol/L Chloride 87 (L) 98 - 107 mmol/L  
 CO2 27 21 - 32 mmol/L Anion gap 8 7 - 16 mmol/L Glucose 298 (H) 65 - 100 mg/dL BUN 23 8 - 23 MG/DL Creatinine 0.84 0.8 - 1.5 MG/DL  
 GFR est AA >60 >60 ml/min/1.73m2 GFR est non-AA >60 >60 ml/min/1.73m2 Calcium 8.8 8.3 - 10.4 MG/DL  Bilirubin, total 0.3 0.2 - 1.1 MG/DL  
 ALT (SGPT) 27 12 - 65 U/L  
 AST (SGOT) 43 (H) 15 - 37 U/L  
 Alk. phosphatase 367 (H) 50 - 136 U/L Protein, total 6.5 6.3 - 8.2 g/dL Albumin 3.0 (L) 3.2 - 4.6 g/dL Globulin 3.5 2.3 - 3.5 g/dL A-G Ratio 0.9 (L) 1.2 - 3.5 SODIUM Collection Time: 03/29/20  8:37 AM  
Result Value Ref Range Sodium 122 (LL) 136 - 145 mmol/L  
 
 
ASSESSMENT: 
Problem List  Date Reviewed: 12/27/2019 Codes Class Noted SIADH (syndrome of inappropriate ADH production) ([facility] ICD-10-CM: E22.2 ICD-9-CM: 253.6  3/26/2020 Malignant neoplasm metastatic to bone [facility]) ICD-10-CM: C79.51 
ICD-9-CM: 198.5  6/26/2019 Cellulitis of groin, left ICD-10-CM: B97.685 ICD-9-CM: 682.2  9/3/2018 Postoperative seroma of subcutaneous tissue after non-dermatologic procedure ICD-10-CM: L76.34 
ICD-9-CM: 998.13  9/3/2018 Anal carcinoma ([facility] ICD-10-CM: C21.0 ICD-9-CM: 154.3  8/9/2018 Type 2 diabetes with nephropathy ([facility] ICD-10-CM: E11.21 
ICD-9-CM: 250.40, 583.81  7/3/2018 Primary malignant neoplasm of perianal skin ICD-10-CM: C44.500 ICD-9-CM: 173.50  7/3/2018 Vomiting ICD-10-CM: R11.10 ICD-9-CM: 787.03  2/23/2016 Diabetes mellitus due to underlying condition with hyperglycemia ([facility] ICD-10-CM: K32.09 ICD-9-CM: 249.80  12/21/2015 Mucositis due to radiation therapy ICD-10-CM: K12.33 
ICD-9-CM: 528.09, E879.2  12/15/2015 Radiation esophagitis ICD-10-CM: K20.8 ICD-9-CM: 530.19, E926.9  12/15/2015 Difficulty in swallowing ICD-10-CM: R13.10 ICD-9-CM: 787.20  12/15/2015 Encounter for palliative care ICD-10-CM: Z51.5 ICD-9-CM: V66.7  12/15/2015 Hyponatremia ICD-10-CM: E87.1 ICD-9-CM: 276.1  11/23/2015 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 [facility]) ICD-10-CM: C79.89, C80.1 ICD-9-CM: 198.89, 199.1  10/14/2015 PLAN: 
Anal Carcinoma, squamous with neuroendocrine features He clearly has evidence of progressive cancer at this point.  As noted below, the neuroendocrine element of this tumor may be the most active at the present time.  As such I would consider the use of etoposide and carboplatin as his next course of treatment given that presumption.  I would consider giving his first cycle in the hospital since we will likely have little control over his sodium without effective tumor control.  In addition, there could be an exacerbation of SIADH after his initial treatment and release of paraneoplastic peptides 
  
3/27 Start C1 carbo/etoposide today. 3/28 C1D2 today, he tolerated D1 well  
3/29 C1D3 today, tolerating treatment very well  
  
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  This is clearly worse now with evidence of progressive cancer and I suspect that this is SIADH related to the neuroendocrine characteristics of this tumor as noted histologically and with the positivity of synaptophysin.  Nephrology should be consulted and his free water intake limited.  His elevated blood sugar may trivially contribute to his hyponatremia but not enough to make a significant difference in approach.  I defer to nephrology within the context of his mental status changes as to whether or not hypertonic saline would be appropriate.  Tolvaptan might also be appropriate. 
  
3/27 Nephrology consulted. Agreed with presumed SIADH. Serial Na measurements overnight. Serum Na improved from 114 to 121 with fluid restrictions. No pharmacological measures recommended per nephrology. 
  
3/28 Na+ 122 
  
3/28 Na+ 122, nephrology added urea  
  
Hypertension - Asymptomatic - On daily Norvasc, Lisinopril - Hydralazine 5mg IV q6 hrs PRN per parameters 
  
Diabetes - Metformin  
  
Constipation 3/28 increase walker-colace to BID, Miralax daily, lactulose prn 
3/29 ongoing, encouraged him to take dose of lactulose after transfer to [address] [place] [address] SOPs DVT prophylaxis - Lovenox (hold plts <50) 
  Andres Smith NP [facility] Hematology and Oncology [id number] [address] Office : [phone] Fax : [phone]

## 2020-03-29 NOTE — DISCHARGE SUMMARY
[facility]: Inpatient Hematology / Oncology Discharge Summary Note Patient ID: 
Nabil Notice [id number] 
66 y.o. 
1940 Admit Date: 3/26/2020 Discharge Date: 3/29/2020 Admission Diagnoses: Hyponatremia [E87.1] Discharge Diagnoses: 
Principal Diagnosis: <principal problem not specified> Active Problems: Hyponatremia (11/23/2015) Hospital Course: 
Aaron Melchor is a patient well-known to me from a prior history of head and neck cancer as well as anal carcinoma. The details of these diagnoses are listed below. 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 The patient came to my office today stating that he was feeling significantly weak. In addition, he did have some difficulty processing our conversations although he was awake and alert. He had a history of mild hyponatremia with sodium levels usually in the high 120s and low 130s despite the use of salt tablets. As part of his pre-visit evaluation, he had undergone a PET scan which clearly showed evidence of disease progression. He had multiple bony lesions as well as progressive disease in the mediastinum and in the perianal area. He was markedly hyponatremic and a decision for admission was made. The patient had a prior solitary bone lesion which had been irradiated. This was biopsied and confirmed the presence of metastatic carcinoma most consistent with his prior anal carcinoma. As part of the immunohistochemistries, the tumor stained positively for synaptophysin.   
Jina Rosales was first seen in our office in September 2015. Yodit Sutherland was referred for evaluation of a squamous carcinoma which involved his right neck.  On August 25, 2015 he underwent a CT scan of the neck with contrast.  This demonstrated a large necrotic appearing mass in the right submandibular region measuring 5 x 4.1 cm.  It was felt to potentially represent a necrotic lymph node.  There were no other lymph nodes evident of a worrisome nature.  In addition, the studies showed no suspicious primary within the neck.  On September 9, 2015 he underwent an excision of the right neck/parapharyngeal mass.   A direct laryngoscopy was performed on that date as well.   At surgery, the lesion was noted to extend almost to the base of the skull and could not be fully excised.  There were no other palpable irregularities in the right neck.   The right neck mass showed salivary glands, fibroadipose and lymphoid tissue containing well to moderately differentiated squamous cell carcinoma. Charu Leak tumor focally extended to the margin of resection.  A PET/CT scan performed on September 17, 2015 showed a low-density focus at the site of the previously described lymph node now measuring 3.1 x 2.6 cm.  The FDG activity was an SUV of 6.5.   Immunohistochemistries did not suggest HPV as an etiologic agent. In November 2015 he began a course of radiation therapy concurrent with weekly cisplatin.  He completed therapy in December 2015.  In July 2018, he related a history of having been treated for hemorrhoids over the prior 5 years with various topical preparations.  Ultimately, because of persistence of symptoms he was referred to a gastroenterologist and a colonoscopy performed.  Biopsies of the anal region showed  a poorly differentiated tumor with both glandular and neuroendocrine features.  This was subsequently confirmed at the [facility] in [place] underwent a PET scan which showed some activity in the anal skin as is evident on exam. Hemant Hernandez was also a 1.2 cm lymph node in the left inguinal region with some slight SUV activity of unclear significance.  He began therapy with 5-FU mitomycin and concurrent radiation therapy in August 2018. Sarwat Millard completed his Ardena Isaias on September 27  A subsequent PET scan was performed showing  no worrisome uptake suggestive of persistent or recurrent malignancy in either of the previously treated primary locations.  PET scan in April 2019 showed no evidence of recurrent tumor in the head neck or in the soft tissues of the pelvis. Merlin Drilling was however an area of FDG uptake in the left iliac wing which was worrisome but of unclear significance.   Because of the PET scan findings noted above, he underwent an MRI which also showed that possible solitary metastasis in the left iliac bone.  Biopsy of this lesion confirmed the presence of metastatic carcinoma consistent with his anal primary. This was irradiated.  
  
  
IMPRESSION/PLAN: 
  
Anal Carcinoma, squamous with neuroendocrine features He clearly has evidence of progressive cancer at this point. As noted below, the neuroendocrine element of this tumor may be the most active at the present time. As such I would consider the use of etoposide and carboplatin as his next course of treatment given that presumption. I would consider giving his first cycle in the hospital since we will likely have little control over his sodium without effective tumor control. In addition, there could be an exacerbation of SIADH after his initial treatment and release of paraneoplastic peptides 
  
3/27 Start C1 carbo/etoposide today. 3/28 C1D2 today, he tolerated D1 well 3/29 C1D3 today, tolerating treatment very well  
  
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 This is clearly worse now with evidence of progressive cancer and I suspect that this is SIADH related to the neuroendocrine characteristics of this tumor as noted histologically and with the positivity of synaptophysin. Nephrology should be consulted and his free water intake limited.   His elevated blood sugar may trivially contribute to his hyponatremia but not enough to make a significant difference in approach. I defer to nephrology within the context of his mental status changes as to whether or not hypertonic saline would be appropriate. Tolvaptan might also be appropriate. 
  
3/27 Nephrology consulted. Agreed with presumed SIADH. Serial Na measurements overnight. Serum Na improved from 114 to 121 with fluid restrictions. No pharmacological measures recommended per nephrology. 
  
3/28 Na+ 122 
 
3/28 Na+ 122, nephrology added urea  
  
Hypertension - Asymptomatic - On daily Norvasc, Lisinopril - Hydralazine 5mg IV q6 hrs PRN per parameters 
  
Diabetes - Metformin  
  
Constipation 3/28 increase walker-colace to BID, Miralax daily, lactulose prn 
3/29 ongoing, encouraged him to take dose of lactulose after transfer to [address] [place] [address] SOPs DVT prophylaxis - Lovenox (hold plts <50)   
  
 
Consults: 
IP CONSULT TO NEPHROLOGY Pertinent Diagnostic Studies:  
Labs:   
Recent Labs  
  03/29/20 
0221 03/28/20 
0327 03/27/20 
0320 WBC 6.2 4.0* 5.0 HGB 10.8* 10.8* 10.8*  187 225 ANEU 5.3 3.4 3.4 Recent Labs  
  03/29/20 
0221 03/28/20 
1901 03/28/20 
1153 03/28/20 
0327  03/27/20 
0320 * 121* 122* 122*   < > 121*  
K 4.7  --   --  4.8  --  4.1 CL 87*  --   --  87*  --  85* CO2 27  --   --  27  --  28  
*  --   --  269*  --  164* BUN 23  --   --  17  --  13  
CREA 0.84  --   --  0.86  --  0.78* CA 8.8  --   --  8.8  --  9.0 SGOT 43*  --   --  56*  --  40* *  --   --  372*  --  305* TP 6.5  --   --  6.4  --  6.6 ALB 3.0*  --   --  3.0*  --  3.2 MG 1.7*  --   --  1.7*  --  1.7*  
 < > = values in this interval not displayed. OBJECTIVE: 
Patient Vitals for the past 8 hrs: 
 BP Temp Pulse Resp SpO2  
03/29/20 0734 178/90 97.5 °F (36.4 °C) 89 16 94 % 03/29/20 0557 (!) 159/93      
03/29/20 0225 (!) 183/102 97.5 °F (36.4 °C) 84 16 92 % Temp (24hrs), Av.8 °F (36.6 °C), Min:97.5 °F (36.4 °C), Max:98.7 °F (37.1 °C) No intake/output data recorded. ASSESSMENT: 
 
Active Problems: Hyponatremia (2015) Carlos Gomez NP [facility] Hematology and Oncology [address] [place] [address] Office : [phone] Fax : [phone]

## 2020-03-30 NOTE — PROGRESS NOTES
Problem: Falls - Risk of 
Goal: *Absence of Falls Outcome: Progressing Towards Goal 
Note: Fall Risk Interventions: 
Mobility Interventions: Communicate number of staff needed for ambulation/transfer, Patient to call before getting OOB Mentation Interventions: Adequate sleep, hydration, pain control, Door open when patient unattended, Reorient patient Medication Interventions: Evaluate medications/consider consulting pharmacy, Patient to call before getting OOB, Teach patient to arise slowly Elimination Interventions: Call light in reach, Patient to call for help with toileting needs, Toilet paper/wipes in reach, Urinal in reach

## 2020-03-30 NOTE — PROGRESS NOTES
Problem: Falls - Risk of 
Goal: *Absence of Falls Description: Document Earma Priscila Fall Risk and appropriate interventions in the flowsheet. Outcome: Progressing Towards Goal 
Note: Fall Risk Interventions: 
Mobility Interventions: Communicate number of staff needed for ambulation/transfer, Patient to call before getting OOB Mentation Interventions: Adequate sleep, hydration, pain control, Door open when patient unattended, Reorient patient Medication Interventions: Evaluate medications/consider consulting pharmacy, Patient to call before getting OOB, Teach patient to arise slowly Elimination Interventions: Call light in reach, Patient to call for help with toileting needs, Toilet paper/wipes in reach, Urinal in reach

## 2020-03-30 NOTE — PROGRESS NOTES
20 gauge iv line in L wrist flushes well. Pt denies any burning and no redness or swelling noted IV fluid runs in well to gravity without pain or swelling. Samira RN noted that when vein is compressed above insertion site the gravity flow stops. This verifies IV in pace. Pt denies any discomfort. 1903 iv still in place not swelling or redness or tenderness. Flavia Odonnell RN noted that when the vein is compressed the gravity flow of IVF stops flowing.

## 2020-03-30 NOTE — PROGRESS NOTES
Pt called RN to room d/t bleeding at L wrist PIV site. Upon assessment, PIV catheter noted sitting on top of pt's skin but still under tegaderm and intact. PIV removed.

## 2020-03-30 NOTE — PROGRESS NOTES
[facility] Progress Note Follow-Up on: Hyponatremia Patient seen and examined on rounds, reports thirsty. Labs and chart reviewed- SNa 120 discussed increasing H2O intake while on tolvaptan ROS: 
Gen - no fever, no chills, appetite ok CV - no chest pain, no palpitation Lung - no shortness of breath, no cough Abd - no tenderness, no nausea/vomiting, no diarrhea Ext - no edema Exam: 
Vitals:  
 03/29/20 2210 03/30/20 2234 03/30/20 0411 03/30/20 0920 BP: 156/82 177/87 (!) 222/114 144/76 Pulse: 85 88 90 Resp: 19 17 18 Temp: 97.9 °F (36.6 °C) 97.5 °F (36.4 °C) 97.8 °F (36.6 °C) SpO2: 92% 93% 97% Weight:      
Height:      
 
 
 
Intake/Output Summary (Last 24 hours) at 3/30/2020 1305 Last data filed at 3/30/2020 1302 Gross per 24 hour Intake 1315 ml Output 1675 ml Net -360 ml Wt Readings from Last 3 Encounters:  
03/29/20 75.3 kg (165 lb 14.4 oz) 03/28/20 76.7 kg (169 lb 3.2 oz)  
03/26/20 77.6 kg (171 lb) GEN - in no distress, alert and oriented CV - regular rate, S1, S2,  no rub Lung - clear bilaterally, no wheezes Abd - soft, nontender, + BS Ext - no edema Recent Labs  
  03/30/20 
0232 03/29/20 0221 03/28/20 
0327 WBC 5.3 6.2 4.0* HGB 11.1* 10.8* 10.8* HCT 32.0* 29.5* 30.5*  208 187 Recent Labs  
  03/30/20 
0945 03/30/20 
0232 03/29/20 
2042 03/29/20 
0221 03/28/20 
0327 * 121* 119*   < > 122*   < > 122* K  --  4.5  --   --  4.7  --  4.8  
CL  --  84*  --   --  87*  --  87* CO2  --  26  --   --  27  --  27 BUN  --  26*  --   --  23  --  17  
CREA  --  0.88  --   --  0.84  --  0.86 CA  --  9.1  --   --  8.8  --  8.8 GLU  --  306*  --   --  298*  --  269* MG  --  1.7*  --   --  1.7*  --  1.7*  
 < > = values in this interval not displayed. Assessment / Plan: Active Problems: Hyponatremia (11/23/2015) 1.  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 
- Serum Na baseline 128, 120 today, continue serial SNa monitoring. 2. 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

## 2020-03-30 NOTE — PROGRESS NOTES
Inpatient Hematology / Oncology Daily Progress Note Reason for Asmission:  Hyponatremia [E87.1] 24 Hour Events: 
Completed C1 Carbo/etop 3/29 Tolerated treatment well Na 120 - starting Tolvaptan Ongoing hypertension ROS: 
Constitutional: Negative for fever, chills, weakness, malaise, fatigue. CV: Negative for chest pain, palpitations, edema. Respiratory: Negative for dyspnea, cough, wheezing. GI: +constipation. Negative for nausea, abdominal pain, diarrhea. 10 point review of systems is otherwise negative with the exception of the elements mentioned above in the HPI. No Known Allergies Past Medical History:  
Diagnosis Date  GERD (gastroesophageal reflux disease)   
 pt wife denies  Head and neck cancer ([facility] 9/30/2015  
 radiation and chemo and and surgery  History of squamous cell carcinoma   
 to neck area- 38 radiation treatement and 8 chemo treatment  History of throat cancer 2015  
 peg tube for about 4 months  Hypercholesteremia   
 managed well with meds  Hypertension   
 managed well with meds  Rectal cancer ([facility] 2018 28 radiation treatment and chemo pump  Type 2 diabetes mellitus ([facility] oral agent only/AVG BS: /no s.s of hypoglycemia  Vomiting 2/23/2016  
 pt wife denies Past Surgical History:  
Procedure Laterality Date  HX COLONOSCOPY  05/2018  HX HEENT    
 sinus  HX HEENT  2015  
 surgery on right throat for squamous cell ca right ear wedge  HX LYMPH NODE DISSECTION    
 HX ORTHOPAEDIC Right 1966  
 right leg  HX OTHER SURGICAL  9/9/15 EXCISION OF RIGHT Neck and PARAPHARYNGEAL MASS/RIGHT EAR WEDGE RESECTION  
 HX OTHER SURGICAL    
 peg placed and removed  HX TONSILLECTOMY  HX VASCULAR ACCESS    
 placed and removed Family History Problem Relation Age of Onset  Emphysema Father  Lung Disease Father  Cancer Brother Colon  Heart Disease Sister  Hypertension Sister  Heart Disease Sister  Hypertension Sister  Heart Disease Brother  Hypertension Brother  Heart Disease Brother  Hypertension Brother  Heart Disease Brother  Hypertension Brother  Heart Disease Brother  Hypertension Brother  Heart Disease Brother  Hypertension Brother Social History Socioeconomic History  Marital status:  Spouse name: Not on file  Number of children: Not on file  Years of education: Not on file  Highest education level: Not on file Occupational History  Not on file Social Needs  Financial resource strain: Not on file  Food insecurity Worry: Not on file Inability: Not on file  Transportation needs Medical: Not on file Non-medical: Not on file Tobacco Use  Smoking status: Never Smoker  Smokeless tobacco: Never Used Substance and Sexual Activity  Alcohol use: No  
 Drug use: No  
 Sexual activity: Not on file Lifestyle  Physical activity Days per week: Not on file Minutes per session: Not on file  Stress: Not on file Relationships  Social connections Talks on phone: Not on file Gets together: Not on file Attends Orthodox service: Not on file Active member of club or organization: Not on file Attends meetings of clubs or organizations: Not on file Relationship status: Not on file  Intimate partner violence Fear of current or ex partner: Not on file Emotionally abused: Not on file Physically abused: Not on file Forced sexual activity: Not on file Other Topics Concern  Not on file Social History Narrative  Not on file Current Facility-Administered Medications Medication Dose Route Frequency Provider Last Rate Last Dose  enoxaparin (LOVENOX) injection 40 mg  40 mg SubCUTAneous Q24H Jenni Eugene NP   40 mg at 03/29/20 1910  amLODIPine (NORVASC) tablet 10 mg  10 mg Oral DAILY Venus Bongo, NP   10 mg at 20 0936  hydrALAZINE (APRESOLINE) 20 mg/mL injection 5 mg  5 mg IntraVENous Q6H PRN Venus Bongo, NP   5 mg at 20 0802  
 HYDROcodone-acetaminophen (NORCO) 5-325 mg per tablet 1 Tab  1 Tab Oral Q6H PRN Venus Bongo, NP   1 Tab at 20 2139  
 lactulose (CHRONULAC) 10 gram/15 mL solution 30 mL  20 g Oral PRN Venus Bongo, NP   30 mL at 20 2139  lisinopriL (PRINIVIL, ZESTRIL) tablet 20 mg  20 mg Oral DAILY Venus Bongo, NP   20 mg at 20 6239  metFORMIN (GLUCOPHAGE) tablet 1,000 mg  1,000 mg Oral DAILY Venus Bongo, NP   1,000 mg at 20 5809  ondansetron (ZOFRAN ODT) tablet 8 mg  8 mg Oral Q8H PRN Venus Fauzia, NP      
 polyethylene glycol (MIRALAX) packet 17 g  17 g Oral DAILY Venus Bongo, NP   17 g at 20 0560  prochlorperazine (COMPAZINE) with saline injection 10 mg  10 mg IntraVENous Q6H PRN Venus Fauzia, NP      
 senna-docusate (PERICOLACE) 8.6-50 mg per tablet 1 Tab  1 Tab Oral BID Venus Bongo, NP   1 Tab at 20 9135  urea (URE-NA) 15 gram packet 1 Packet  1 Packet Oral DAILY Venus Bongo, NP   1 Packet at 20 4090  LORazepam (ATIVAN) injection 0.5 mg  0.5 mg IntraVENous ONCE PRN Hyacinth Zelaya MD      
 prochlorperazine (COMPAZINE) with saline injection 10 mg  10 mg IntraVENous ONCE PRN Hyacinth Zelaya MD      
 
 
OBJECTIVE: 
Patient Vitals for the past 8 hrs: 
 BP Temp Pulse Resp SpO2  
20 0755 (!) 222/114 97.8 °F (36.6 °C) 90 18 97 % 20 0237 177/87 97.5 °F (36.4 °C) 88 17 93 % Temp (24hrs), Av.9 °F (36.6 °C), Min:97.5 °F (36.4 °C), Max:98.4 °F (36.9 °C) No intake/output data recorded. Labs:   
Recent Results (from the past 24 hour(s)) SODIUM Collection Time: 20  2:25 PM  
Result Value Ref Range  Sodium 120 (LL) 136 - 145 mmol/L  
SODIUM  
 Collection Time: 03/29/20  8:42 PM  
Result Value Ref Range Sodium 119 (LL) 136 - 145 mmol/L  
CBC WITH AUTOMATED DIFF Collection Time: 03/30/20  2:32 AM  
Result Value Ref Range WBC 5.3 4.3 - 11.1 K/uL  
 RBC 3.61 (L) 4.23 - 5.6 M/uL  
 HGB 11.1 (L) 13.6 - 17.2 g/dL HCT 32.0 (L) 41.1 - 50.3 % MCV 88.6 79.6 - 97.8 FL  
 MCH 30.7 26.1 - 32.9 PG  
 MCHC 34.7 31.4 - 35.0 g/dL  
 RDW 13.6 11.9 - 14.6 % PLATELET 206 267 - 148 K/uL MPV 8.5 (L) 9.4 - 12.3 FL ABSOLUTE NRBC 0.00 0.0 - 0.2 K/uL  
 DF AUTOMATED NEUTROPHILS 86 (H) 43 - 78 % LYMPHOCYTES 8 (L) 13 - 44 % MONOCYTES 4 4.0 - 12.0 % EOSINOPHILS 1 0.5 - 7.8 % BASOPHILS 0 0.0 - 2.0 % IMMATURE GRANULOCYTES 1 0.0 - 5.0 %  
 ABS. NEUTROPHILS 4.5 1.7 - 8.2 K/UL  
 ABS. LYMPHOCYTES 0.4 (L) 0.5 - 4.6 K/UL  
 ABS. MONOCYTES 0.2 0.1 - 1.3 K/UL  
 ABS. EOSINOPHILS 0.1 0.0 - 0.8 K/UL  
 ABS. BASOPHILS 0.0 0.0 - 0.2 K/UL  
 ABS. IMM. GRANS. 0.1 0.0 - 0.5 K/UL MAGNESIUM Collection Time: 03/30/20  2:32 AM  
Result Value Ref Range Magnesium 1.7 (L) 1.8 - 2.4 mg/dL METABOLIC PANEL, COMPREHENSIVE Collection Time: 03/30/20  2:32 AM  
Result Value Ref Range Sodium 121 (LL) 136 - 145 mmol/L Potassium 4.5 3.5 - 5.1 mmol/L Chloride 84 (L) 98 - 107 mmol/L  
 CO2 26 21 - 32 mmol/L Anion gap 11 7 - 16 mmol/L Glucose 306 (H) 65 - 100 mg/dL BUN 26 (H) 8 - 23 MG/DL Creatinine 0.88 0.8 - 1.5 MG/DL  
 GFR est AA >60 >60 ml/min/1.73m2 GFR est non-AA >60 >60 ml/min/1.73m2 Calcium 9.1 8.3 - 10.4 MG/DL Bilirubin, total 0.4 0.2 - 1.1 MG/DL  
 ALT (SGPT) 27 12 - 65 U/L  
 AST (SGOT) 56 (H) 15 - 37 U/L Alk. phosphatase 382 (H) 50 - 136 U/L Protein, total 6.7 6.3 - 8.2 g/dL Albumin 3.1 (L) 3.2 - 4.6 g/dL Globulin 3.6 (H) 2.3 - 3.5 g/dL A-G Ratio 0.9 (L) 1.2 - 3.5 ASSESSMENT: 
Problem List  Date Reviewed: 12/27/2019 Codes Class Noted SIADH (syndrome of inappropriate ADH production) ([facility] ICD-10-CM: E22.2 ICD-9-CM: 253.6  3/26/2020 Malignant neoplasm metastatic to bone [facility]) ICD-10-CM: C79.51 
ICD-9-CM: 198.5  6/26/2019 Cellulitis of groin, left ICD-10-CM: X81.496 ICD-9-CM: 682.2  9/3/2018 Postoperative seroma of subcutaneous tissue after non-dermatologic procedure ICD-10-CM: L76.34 
ICD-9-CM: 998.13  9/3/2018 Anal carcinoma ([facility] ICD-10-CM: C21.0 ICD-9-CM: 154.3  8/9/2018 Type 2 diabetes with nephropathy ([facility] ICD-10-CM: E11.21 
ICD-9-CM: 250.40, 583.81  7/3/2018 Primary malignant neoplasm of perianal skin ICD-10-CM: C44.500 ICD-9-CM: 173.50  7/3/2018 Vomiting ICD-10-CM: R11.10 ICD-9-CM: 787.03  2/23/2016 Diabetes mellitus due to underlying condition with hyperglycemia ([facility] ICD-10-CM: Z18.46 ICD-9-CM: 249.80  12/21/2015 Mucositis due to radiation therapy ICD-10-CM: K12.33 
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 Gigi Tay is a 79 y.o. male admitted on 3/29/2020 with a primary diagnosis of Hyponatremia. He has been transferred to [address] from [facility] to protect from COVID-19 exposure.    
 
 is a patient well-known to me from a prior history of head and neck cancer as well as anal carcinoma.  The details of these diagnoses are listed below. Jo Ace, the patient's anal malignancy was noted to be a squamous carcinoma with neuroendocrine features.  The patient came to my office today stating that he was feeling significantly weak.  In addition, he did have some difficulty processing our conversations although he was awake and alert. Chilango Dumont had a history of mild hyponatremia with sodium levels usually in the high 120s and low 130s despite the use of salt tablets.  As part of his pre-visit evaluation, he had undergone a PET scan which clearly showed evidence of disease progression.  He had multiple bony lesions as well as progressive disease in the mediastinum and in the perianal area.  He was markedly hyponatremic and a decision for admission was made.  The patient had a prior solitary bone lesion which had been irradiated.  This was biopsied and confirmed the presence of metastatic carcinoma most consistent with his prior anal carcinoma.  As part of the immunohistochemistries, the tumor stained positively for synaptophysin. Virginia Kemp was first seen in our office in September 2015. Chilango Dumont was referred for evaluation of a squamous carcinoma which involved his right neck.  On August 25, 2015 he underwent a CT scan of the neck with contrast.  This demonstrated a large necrotic appearing mass in the right submandibular region measuring 5 x 4.1 cm.  It was felt to potentially represent a necrotic lymph node.  There were no other lymph nodes evident of a worrisome nature.  In addition, the studies showed no suspicious primary within the neck.  On September 9, 2015 he underwent an excision of the right neck/parapharyngeal mass.   A direct laryngoscopy was performed on that date as well.   At surgery, the lesion was noted to extend almost to the base of the skull and could not be fully excised.  There were no other palpable irregularities in the right neck.   The right neck mass showed salivary glands, fibroadipose and lymphoid tissue containing well to moderately differentiated squamous cell carcinoma. [place] tumor focally extended to the margin of resection.  A PET/CT scan performed on September 17, 2015 showed a low-density focus at the site of the previously described lymph node now measuring 3.1 x 2.6 cm.  The FDG activity was an SUV of 6.5.   Immunohistochemistries did not suggest HPV as an etiologic agent. In November 2015 he began a course of radiation therapy concurrent with weekly cisplatin. He completed therapy in December 2015.  In July 2018, he related a history of having been treated for hemorrhoids over the prior 5 years with various topical preparations.  Ultimately, because of persistence of symptoms he was referred to a gastroenterologist and a colonoscopy performed.  Biopsies of the anal region showed  a poorly differentiated tumor with both glandular and neuroendocrine features.  This was subsequently confirmed at the [facility] in [place] underwent a PET scan which showed some activity in the anal skin as is evident on exam. Amedeo Nails was also a 1.2 cm lymph node in the left inguinal region with some slight SUV activity of unclear significance.  He began therapy with 5-FU mitomycin and concurrent radiation therapy in August 2018. Abner Fernandes completed his Marcela Audi on September 27  A subsequent PET scan was performed showing  no worrisome uptake suggestive of persistent or recurrent malignancy in either of the previously treated primary locations.  PET scan in April 2019 showed no evidence of recurrent tumor in the head neck or in the soft tissues of the pelvis. Amedeo Nails was however an area of FDG uptake in the left iliac wing which was worrisome but of unclear significance.   Because of the PET scan findings noted above, he underwent an MRI which also showed that possible solitary metastasis in the left iliac bone.  Biopsy of this lesion confirmed the presence of metastatic carcinoma consistent with his anal primary. This was irradiated. PLAN: 
Anal Carcinoma, squamous with neuroendocrine features He clearly has evidence of progressive cancer at this point.  As noted below, the neuroendocrine element of this tumor may be the most active at the present time.  As such I would consider the use of etoposide and carboplatin as his next course of treatment given that presumption.  I would consider giving his first cycle in the hospital since we will likely have little control over his sodium without effective tumor control.  In addition, there could be an exacerbation of SIADH after his initial treatment and release of paraneoplastic peptides 
  
3/27 Start C1 carbo/etoposide today. 3/28 C1D2 today, he tolerated D1 well  
3/29 C1D3 today, tolerating treatment very well 3/30 Tolerated treatment well.  
  
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  This is clearly worse now with evidence of progressive cancer and I suspect that this is SIADH related to the neuroendocrine characteristics of this tumor as noted histologically and with the positivity of synaptophysin.  Nephrology should be consulted and his free water intake limited.  His elevated blood sugar may trivially contribute to his hyponatremia but not enough to make a significant difference in approach.  I defer to nephrology within the context of his mental status changes as to whether or not hypertonic saline would be appropriate.  Tolvaptan might also be appropriate. 
  
3/27 Nephrology consulted. Agreed with presumed SIADH. Serial Na measurements overnight. Serum Na improved from 114 to 121 with fluid restrictions. No pharmacological measures recommended per nephrology. 
  
3/28 Na+ 122 
  
3/28 Na+ 122, nephrology added urea 3/29 Na 120, on urea. Nephrology starting Tolvaptan. Per nephrology, DC fluid restrictions while on Tolvaptan.  
  
Hypertension - Asymptomatic - On daily Norvasc, Lisinopril - Hydralazine 5mg IV q6 hrs PRN per parameters 
  
 Diabetes - Metformin  
  
Constipation 3/28 increase walker-colace to BID, Miralax daily, lactulose prn 
3/29 ongoing, encouraged him to take dose of lactulose after transfer to HOS[facility] [address] 3/30 Minimal improvement. Re-eval tomorrow now that he has pericolace BID, daily miralax and lactulose.  
  
Continue home meds Rashmi SOPs DVT prophylaxis - Lovenox (hold plts <50) 
  
 
 
 
AMBREEN Chavez [facility] On[facility] [address] [place] [address] Office : [phone] Fax : [phone] I personally saw, exammed and counselled the patient, and discussed with NP, agree with above history/assessment/plan. 80 y.o.male anal cancer relapse with neuroendocrine feature, admitted for severe hyponatremia, received cycle 1 carbo/etop, hyponatremia persisted, tried urea and lab had been stable, instructed for fluis restriction, discuss with nephrology. Gracy Eid M.D. [address] Office : [phone] Fax : [phone]

## 2020-03-30 NOTE — PROGRESS NOTES
Problem: Falls - Risk of 
Goal: *Absence of Falls Description: Document Koi Cease Fall Risk and appropriate interventions in the flowsheet. Outcome: Progressing Towards Goal 
Note: Fall Risk Interventions: 
Mobility Interventions: Communicate number of staff needed for ambulation/transfer Mentation Interventions: Adequate sleep, hydration, pain control Medication Interventions: Evaluate medications/consider consulting pharmacy Elimination Interventions: Call light in reach

## 2020-03-31 NOTE — PROGRESS NOTES
Inpatient Hematology / Oncology Daily Progress Note Reason for Asmission:  Hyponatremia [E87.1] 24 Hour Events: 
Completed C1 Carbo/etop 3/29 Tolerated treatment well Na 123 - Tolvaptan continues Ongoing hypertension ROS: 
Constitutional: Negative for fever, chills, weakness, malaise, fatigue. CV: Negative for chest pain, palpitations, edema. Respiratory: Negative for dyspnea, cough, wheezing. GI: Negative for nausea, abdominal pain, diarrhea, constipation. 10 point review of systems is otherwise negative with the exception of the elements mentioned above in the HPI. No Known Allergies Past Medical History:  
Diagnosis Date  GERD (gastroesophageal reflux disease)   
 pt wife denies  Head and neck cancer ([facility] 9/30/2015  
 radiation and chemo and and surgery  History of squamous cell carcinoma   
 to neck area- 38 radiation treatement and 8 chemo treatment  History of throat cancer 2015  
 peg tube for about 4 months  Hypercholesteremia   
 managed well with meds  Hypertension   
 managed well with meds  Rectal cancer ([facility] 2018 28 radiation treatment and chemo pump  Type 2 diabetes mellitus ([facility] oral agent only/AVG BS: /no s.s of hypoglycemia  Vomiting 2/23/2016  
 pt wife denies Past Surgical History:  
Procedure Laterality Date  HX COLONOSCOPY  05/2018  HX HEENT    
 sinus  HX HEENT  2015  
 surgery on right throat for squamous cell ca right ear wedge  HX LYMPH NODE DISSECTION    
 HX ORTHOPAEDIC Right 1966  
 right leg  HX OTHER SURGICAL  9/9/15 EXCISION OF RIGHT Neck and PARAPHARYNGEAL MASS/RIGHT EAR WEDGE RESECTION  
 HX OTHER SURGICAL    
 peg placed and removed  HX TONSILLECTOMY  HX VASCULAR ACCESS    
 placed and removed Family History Problem Relation Age of Onset  Emphysema Father  Lung Disease Father  Cancer Brother Colon  Heart Disease Sister  Hypertension Sister  Heart Disease Sister  Hypertension Sister  Heart Disease Brother  Hypertension Brother  Heart Disease Brother  Hypertension Brother  Heart Disease Brother  Hypertension Brother  Heart Disease Brother  Hypertension Brother  Heart Disease Brother  Hypertension Brother Social History Socioeconomic History  Marital status:  Spouse name: Not on file  Number of children: Not on file  Years of education: Not on file  Highest education level: Not on file Occupational History  Not on file Social Needs  Financial resource strain: Not on file  Food insecurity Worry: Not on file Inability: Not on file  Transportation needs Medical: Not on file Non-medical: Not on file Tobacco Use  Smoking status: Never Smoker  Smokeless tobacco: Never Used Substance and Sexual Activity  Alcohol use: No  
 Drug use: No  
 Sexual activity: Not on file Lifestyle  Physical activity Days per week: Not on file Minutes per session: Not on file  Stress: Not on file Relationships  Social connections Talks on phone: Not on file Gets together: Not on file Attends Gnosticism service: Not on file Active member of club or organization: Not on file Attends meetings of clubs or organizations: Not on file Relationship status: Not on file  Intimate partner violence Fear of current or ex partner: Not on file Emotionally abused: Not on file Physically abused: Not on file Forced sexual activity: Not on file Other Topics Concern  Not on file Social History Narrative  Not on file Current Facility-Administered Medications Medication Dose Route Frequency Provider Last Rate Last Dose  enoxaparin (LOVENOX) injection 40 mg  40 mg SubCUTAneous Q24H Fay Montenegro NP   40 mg at 03/30/20 1754  amLODIPine (NORVASC) tablet 10 mg  10 mg Oral DAILY Maximiano Rodriguez, NP   10 mg at 20 5536  hydrALAZINE (APRESOLINE) 20 mg/mL injection 5 mg  5 mg IntraVENous Q6H PRN Maximiano Rodriguez, NP   5 mg at 20 0802  
 HYDROcodone-acetaminophen (NORCO) 5-325 mg per tablet 1 Tab  1 Tab Oral Q6H PRN Maximiano Rodriguez, NP   1 Tab at 20 1859  lactulose (CHRONULAC) 10 gram/15 mL solution 30 mL  20 g Oral PRN Maximiano Rodriguez, NP   30 mL at 20 2139  lisinopriL (PRINIVIL, ZESTRIL) tablet 20 mg  20 mg Oral DAILY Maximiano Rodriguez, NP   20 mg at 20 6734  metFORMIN (GLUCOPHAGE) tablet 1,000 mg  1,000 mg Oral DAILY Maximiano Rodriguez, NP   1,000 mg at 20 6673  ondansetron (ZOFRAN ODT) tablet 8 mg  8 mg Oral Q8H PRN Maximiano Rodriguez, NP      
 polyethylene glycol (MIRALAX) packet 17 g  17 g Oral DAILY Maximiano Rodriguez, NP   17 g at 20 8193  prochlorperazine (COMPAZINE) with saline injection 10 mg  10 mg IntraVENous Q6H PRN Maximiano Rodriguez, NP      
 senna-docusate (PERICOLACE) 8.6-50 mg per tablet 1 Tab  1 Tab Oral BID Maximiano Rodriguez, NP   1 Tab at 20 1759  urea (URE-NA) 15 gram packet 1 Packet  1 Packet Oral DAILY Maximiano Rodriguez, NP   1 Packet at 20 5247 OBJECTIVE: 
Patient Vitals for the past 8 hrs: 
 BP Temp Pulse Resp SpO2  
20 0236 136/78 98.1 °F (36.7 °C) 86 18 92 % Temp (24hrs), Av.7 °F (36.5 °C), Min:97.4 °F (36.3 °C), Max:98.1 °F (36.7 °C) No intake/output data recorded. Labs:   
Recent Results (from the past 24 hour(s)) SODIUM Collection Time: 20  9:45 AM  
Result Value Ref Range Sodium 120 (LL) 136 - 145 mmol/L  
SODIUM Collection Time: 20  2:17 PM  
Result Value Ref Range Sodium 119 (LL) 136 - 145 mmol/L  
SODIUM Collection Time: 20  8:53 PM  
Result Value Ref Range Sodium 118 (LL) 136 - 145 mmol/L  
CBC WITH AUTOMATED DIFF  Collection Time: 20  2:38 AM  
 Result Value Ref Range WBC 3.6 (L) 4.3 - 11.1 K/uL  
 RBC 3.34 (L) 4.23 - 5.6 M/uL  
 HGB 10.4 (L) 13.6 - 17.2 g/dL HCT 29.5 (L) 41.1 - 50.3 % MCV 88.3 79.6 - 97.8 FL  
 MCH 31.1 26.1 - 32.9 PG  
 MCHC 35.3 (H) 31.4 - 35.0 g/dL  
 RDW 13.4 11.9 - 14.6 % PLATELET 124 751 - 261 K/uL MPV 8.1 (L) 9.4 - 12.3 FL ABSOLUTE NRBC 0.00 0.0 - 0.2 K/uL  
 DF AUTOMATED NEUTROPHILS 84 (H) 43 - 78 % LYMPHOCYTES 11 (L) 13 - 44 % MONOCYTES 4 4.0 - 12.0 % EOSINOPHILS 1 0.5 - 7.8 % BASOPHILS 0 0.0 - 2.0 % IMMATURE GRANULOCYTES 1 0.0 - 5.0 %  
 ABS. NEUTROPHILS 3.0 1.7 - 8.2 K/UL  
 ABS. LYMPHOCYTES 0.4 (L) 0.5 - 4.6 K/UL  
 ABS. MONOCYTES 0.1 0.1 - 1.3 K/UL  
 ABS. EOSINOPHILS 0.0 0.0 - 0.8 K/UL  
 ABS. BASOPHILS 0.0 0.0 - 0.2 K/UL  
 ABS. IMM. GRANS. 0.0 0.0 - 0.5 K/UL MAGNESIUM Collection Time: 03/31/20  2:38 AM  
Result Value Ref Range Magnesium 1.7 (L) 1.8 - 2.4 mg/dL METABOLIC PANEL, COMPREHENSIVE Collection Time: 03/31/20  2:38 AM  
Result Value Ref Range Sodium 121 (LL) 136 - 145 mmol/L Potassium 4.3 3.5 - 5.1 mmol/L Chloride 87 (L) 98 - 107 mmol/L  
 CO2 29 21 - 32 mmol/L Anion gap 5 (L) 7 - 16 mmol/L Glucose 259 (H) 65 - 100 mg/dL BUN 24 (H) 8 - 23 MG/DL Creatinine 0.91 0.8 - 1.5 MG/DL  
 GFR est AA >60 >60 ml/min/1.73m2 GFR est non-AA >60 >60 ml/min/1.73m2 Calcium 8.9 8.3 - 10.4 MG/DL Bilirubin, total 0.4 0.2 - 1.1 MG/DL  
 ALT (SGPT) 21 12 - 65 U/L  
 AST (SGOT) 35 15 - 37 U/L Alk. phosphatase 359 (H) 50 - 136 U/L Protein, total 6.1 (L) 6.3 - 8.2 g/dL Albumin 2.9 (L) 3.2 - 4.6 g/dL Globulin 3.2 2.3 - 3.5 g/dL A-G Ratio 0.9 (L) 1.2 - 3.5 ASSESSMENT: 
Problem List  Date Reviewed: 12/27/2019 Codes Class Noted SIADH (syndrome of inappropriate ADH production) ([facility] ICD-10-CM: E22.2 ICD-9-CM: 253.6  3/26/2020 Malignant neoplasm metastatic to bone [facility]) ICD-10-CM: C79.51 
ICD-9-CM: 198.5  6/26/2019 Cellulitis of groin, left ICD-10-CM: F58.717 ICD-9-CM: 682.2  9/3/2018 Postoperative seroma of subcutaneous tissue after non-dermatologic procedure ICD-10-CM: L76.34 
ICD-9-CM: 998.13  9/3/2018 Anal carcinoma ([facility] ICD-10-CM: C21.0 ICD-9-CM: 154.3  8/9/2018 Type 2 diabetes with nephropathy ([facility] ICD-10-CM: E11.21 
ICD-9-CM: 250.40, 583.81  7/3/2018 Primary malignant neoplasm of perianal skin ICD-10-CM: C44.500 ICD-9-CM: 173.50  7/3/2018 Vomiting ICD-10-CM: R11.10 ICD-9-CM: 787.03  2/23/2016 Diabetes mellitus due to underlying condition with hyperglycemia ([facility] ICD-10-CM: E33.07 ICD-9-CM: 249.80  12/21/2015 Mucositis due to radiation therapy ICD-10-CM: K12.33 
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 Juan Alexis is a 79 y.o. male admitted on 3/29/2020 with a primary diagnosis of Hyponatremia. He has been transferred to [address] from [facility] to protect from COVID-19 exposure.    
 
 is a patient well-known to me from a prior history of head and neck cancer as well as anal carcinoma.  The details of these diagnoses are listed below. Julieth Henson, the patient's anal malignancy was noted to be a squamous carcinoma with neuroendocrine features.  The patient came to my office today stating that he was feeling significantly weak.  In addition, he did have some difficulty processing our conversations although he was awake and alert. Stefany Steinberg had a history of mild hyponatremia with sodium levels usually in the high 120s and low 130s despite the use of salt tablets.  As part of his pre-visit evaluation, he had undergone a PET scan which clearly showed evidence of disease progression.  He had multiple bony lesions as well as progressive disease in the mediastinum and in the perianal area.  He was markedly hyponatremic and a decision for admission was made.  The patient had a prior solitary bone lesion which had been irradiated.  This was biopsied and confirmed the presence of metastatic carcinoma most consistent with his prior anal carcinoma.  As part of the immunohistochemistries, the tumor stained positively for synaptophysin. Carlton Sanches was first seen in our office in September 2015. Madiha Nunez was referred for evaluation of a squamous carcinoma which involved his right neck.  On August 25, 2015 he underwent a CT scan of the neck with contrast.  This demonstrated a large necrotic appearing mass in the right submandibular region measuring 5 x 4.1 cm.  It was felt to potentially represent a necrotic lymph node.  There were no other lymph nodes evident of a worrisome nature.  In addition, the studies showed no suspicious primary within the neck.  On September 9, 2015 he underwent an excision of the right neck/parapharyngeal mass.   A direct laryngoscopy was performed on that date as well.   At surgery, the lesion was noted to extend almost to the base of the skull and could not be fully excised.  There were no other palpable irregularities in the right neck.   The right neck mass showed salivary glands, fibroadipose and lymphoid tissue containing well to moderately differentiated squamous cell carcinoma. Tabby Iowa tumor focally extended to the margin of resection.  A PET/CT scan performed on September 17, 2015 showed a low-density focus at the site of the previously described lymph node now measuring 3.1 x 2.6 cm.  The FDG activity was an SUV of 6.5.   Immunohistochemistries did not suggest HPV as an etiologic agent. In November 2015 he began a course of radiation therapy concurrent with weekly cisplatin. He completed therapy in December 2015.  In July 2018, he related a history of having been treated for hemorrhoids over the prior 5 years with various topical preparations.  Ultimately, because of persistence of symptoms he was referred to a gastroenterologist and a colonoscopy performed.  Biopsies of the anal region showed  a poorly differentiated tumor with both glandular and neuroendocrine features.  This was subsequently confirmed at [facility] [place] underwent a PET scan which showed some activity in the anal skin as is evident on exam. Dustin Spatz was also a 1.2 cm lymph node in the left inguinal region with some slight SUV activity of unclear significance.  He began therapy with 5-FU mitomycin and concurrent radiation therapy in August 2018. Tahira Richardson completed his Lawernce Shawl on September 27  A subsequent PET scan was performed showing  no worrisome uptake suggestive of persistent or recurrent malignancy in either of the previously treated primary locations.  PET scan in April 2019 showed no evidence of recurrent tumor in the head neck or in the soft tissues of the pelvis. Dustin Spatz was however an area of FDG uptake in the left iliac wing which was worrisome but of unclear significance.   Because of the PET scan findings noted above, he underwent an MRI which also showed that possible solitary metastasis in the left iliac bone.  Biopsy of this lesion confirmed the presence of metastatic carcinoma consistent with his anal primary. This was irradiated. PLAN: 
Anal Carcinoma, squamous with neuroendocrine features He clearly has evidence of progressive cancer at this point.  As noted below, the neuroendocrine element of this tumor may be the most active at the present time.  As such I would consider the use of etoposide and carboplatin as his next course of treatment given that presumption.  I would consider giving his first cycle in the hospital since we will likely have little control over his sodium without effective tumor control.  In addition, there could be an exacerbation of SIADH after his initial treatment and release of paraneoplastic peptides 
  
3/27 Start C1 carbo/etoposide today. 3/28 C1D2 today, he tolerated D1 well  
3/29 C1D3 today, tolerating treatment very well 3/30 Tolerated treatment well.  
  
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  This is clearly worse now with evidence of progressive cancer and I suspect that this is SIADH related to the neuroendocrine characteristics of this tumor as noted histologically and with the positivity of synaptophysin.  Nephrology should be consulted and his free water intake limited.  His elevated blood sugar may trivially contribute to his hyponatremia but not enough to make a significant difference in approach.  I defer to nephrology within the context of his mental status changes as to whether or not hypertonic saline would be appropriate.  Tolvaptan might also be appropriate. 
  
3/27 Nephrology consulted. Agreed with presumed SIADH. Serial Na measurements overnight. Serum Na improved from 114 to 121 with fluid restrictions. No pharmacological measures recommended per nephrology. 3/28 Na+ 122, nephrology added urea 3/30 Na 120, on urea. Nephrology starting Tolvaptan. Per nephrology, DC fluid restrictions while on Tolvaptan. 
3/31 Na 123, on urea. Tolvaptan continues.  
  
Hypertension - Asymptomatic - On daily Norvasc, Lisinopril - Hydralazine 5mg IV q6 hrs PRN per parameters 
  
Diabetes - Metformin  
  
Constipation 3/28 increase walker-colace to BID, Miralax daily, lactulose prn 
3/29 ongoing, encouraged him to take dose of lactulose after transfer to HOSPITAL [address] 3/30 Minimal improvement.  Re-eval tomorrow now that he has pericolace BID, daily miralax and lactulose. 3/31 Reports symptoms have improved. Continue home meds Rashmi SOPs DVT prophylaxis - Lovenox (hold plts <50) 
  Dispo Once Na+ stable; will likely need financial assistance if DC'd on Tolvaptan. AMBREEN [facility] Oncology [address] [place] [address] Office : [phone] Fax : [phone] I personally saw, exammed and counselled the patient, and discussed with NP, agree with above history/assessment/plan. 80 y.o.male anal cancer relapse with neuroendocrine feature, admitted for severe hyponatremia, received cycle 1 carbo/etop, hyponatremia persisted, tried urea and lab had been stable, Tolvaptan added and Na responded, appreciate nephrology. 
Yessenia Wong M.D. [facility] [id number] [place] [address] Office : [phone] Fax : [phone]

## 2020-03-31 NOTE — PROGRESS NOTES
Care Management Interventions Palliative Care Criteria Met (RRAT>21 & CHF Dx)?: No(Risk 14% Dx Hyponatremia ) Transition of Care Consult (CM Consult): Discharge Planning Discharge Durable Medical Equipment: No(none) Physical Therapy Consult: No 
Occupational Therapy Consult: No 
Speech Therapy Consult: No 
Current Support Network: Lives with Spouse Discharge Location Discharge Placement: Home Met with patient for d/c planning. Patient alert and oriented x 3, independent of ADL's and lives with his wife in one story home. He requires no DME and has transportation as needed. He has [facility] and obtains medications at [address] [place] [address]. He voices no concerns or needs for d/c. Current d/c plan is home with wife when medically stable. He does not think he needs home health but will f/u clinical progress. CM following.

## 2020-03-31 NOTE — PROGRESS NOTES
[facility] Progress Note Follow-Up on: Hyponatremia  No new complaints Labs and chart reviewed- SNa 123 after a dose of tolvaptan ROS: 
Gen - no fever, no chills, appetite ok CV - no chest pain, no palpitation Lung - no shortness of breath, no cough Abd - no tenderness, no nausea/vomiting, no diarrhea Ext - no edema Exam: 
Vitals:  
 03/30/20 2311 03/30/20 2330 03/31/20 0236 03/31/20 1440 BP: 187/88 158/84 136/78 138/75 Pulse: 85  86 86 Resp: 18  18 18 Temp: 97.4 °F (36.3 °C)  98.1 °F (36.7 °C) 98.3 °F (36.8 °C) SpO2: 94%  92% 95% Weight:      
Height:      
 
 
 
Intake/Output Summary (Last 24 hours) at 3/31/2020 5655 Last data filed at 3/30/2020 1759 Gross per 24 hour Intake 940 ml Output 200 ml Net 740 ml Wt Readings from Last 3 Encounters:  
03/30/20 75.8 kg (167 lb 3.2 oz)  
03/28/20 76.7 kg (169 lb 3.2 oz)  
03/26/20 77.6 kg (171 lb) GEN - in no distress, alert and oriented CV - regular rate, S1, S2,  no rub Lung - clear bilaterally, no wheezes Abd - soft, nontender, + BS Ext - no edema Recent Labs  
  03/31/20 
0238 03/30/20 
0232 03/29/20 
0221 WBC 3.6* 5.3 6.2 HGB 10.4* 11.1* 10.8* HCT 29.5* 32.0* 29.5*  
 216 208 Recent Labs  
  03/31/20 
7865 03/31/20 
1791 03/30/20 
2053  03/30/20 
0232  03/29/20 
0221 * 121* 118*   < > 121*   < > 122* K  --  4.3  --   --  4.5  --  4.7 CL  --  87*  --   --  84*  --  87* CO2  --  29  --   --  26  --  27 BUN  --  24*  --   --  26*  --  23  
CREA  --  0.91  --   --  0.88  --  0.84 CA  --  8.9  --   --  9.1  --  8.8 GLU  --  259*  --   --  306*  --  298* MG  --  1.7*  --   --  1.7*  --  1.7*  
 < > = values in this interval not displayed. Assessment / Plan: Active Problems: Hyponatremia (11/23/2015) 1. Hyponatremia - with chronic component - previous Na mostly mid 120's to low 130's last several months - Admission Na 114 - evidence of SIADH on urine studies Will continue tolvaptan 15 mg today and another 2 days, liberate H2O intake discussed with patient and nursing. 
- Serum Na baseline 128, 123 today, continue serial SNa monitoring. 2. 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

## 2020-03-31 NOTE — PROGRESS NOTES
END OF SHIFT NOTE: 
 
Intake/Output 03/31 0701 - 03/31 1900 In: 480 [P.O.:480] Out: -   
Voiding: YES Catheter: NO 
Drain:   
 
 
 
 
 
Stool:  0 occurrences. Emesis:  0 occurrences. VITAL SIGNS Patient Vitals for the past 12 hrs: 
 Temp Pulse Resp BP SpO2  
03/31/20 1306 97.6 °F (36.4 °C) 60 18 110/64 96 % 03/31/20 0755 98.3 °F (36.8 °C) 86 18 138/75 95 % Pain Assessment Pain 1 Pain Scale 1: Numeric (0 - 10) (03/31/20 0840) Pain Intensity 1: 2 (03/31/20 0840) Patient Stated Pain Goal: 0 (03/31/20 0840) Pain Reassessment 1: Yes (03/31/20 0840) Pain Onset 1: ongoing (03/29/20 2139) Pain Location 1: Arm (03/30/20 1915) Pain Orientation 1: Left (03/30/20 1915) Pain Description 1: Aching (03/30/20 1915) Pain Intervention(s) 1: Repositioned; Rest (03/31/20 0840) Ambulating Yes Additional Information: Na still low. Given medications. Shift report given to oncoming nurse at the bedside.  
 
Duyen Hubbard RN

## 2020-04-01 NOTE — PROGRESS NOTES
Inpatient Hematology / Oncology Daily Progress Note Reason for Asmission:  Hyponatremia [E87.1] 24 Hour Events: 
Completed C1 Carbo/etop 3/29 Tolerated treatment well Na 125 - Tolvaptan continues Intermittent HTN, VSS   
 
 
ROS: 
Constitutional: Negative for fever, chills, weakness, malaise, fatigue. CV: Negative for chest pain, palpitations, edema. Respiratory: Negative for dyspnea, cough, wheezing. GI: Negative for nausea, abdominal pain, diarrhea, constipation. 10 point review of systems is otherwise negative with the exception of the elements mentioned above in the HPI. No Known Allergies Past Medical History:  
Diagnosis Date  GERD (gastroesophageal reflux disease)   
 pt wife denies  Head and neck cancer ([facility] 9/30/2015  
 radiation and chemo and and surgery  History of squamous cell carcinoma   
 to neck area- 38 radiation treatement and 8 chemo treatment  History of throat cancer 2015  
 peg tube for about 4 months  Hypercholesteremia   
 managed well with meds  Hypertension   
 managed well with meds  Rectal cancer ([facility] 2018 28 radiation treatment and chemo pump  Type 2 diabetes mellitus ([facility] oral agent only/AVG BS: /no s.s of hypoglycemia  Vomiting 2/23/2016  
 pt wife denies Past Surgical History:  
Procedure Laterality Date  HX COLONOSCOPY  05/2018  HX HEENT    
 sinus  HX HEENT  2015  
 surgery on right throat for squamous cell ca right ear wedge  HX LYMPH NODE DISSECTION    
 HX ORTHOPAEDIC Right 1966  
 right leg  HX OTHER SURGICAL  9/9/15 EXCISION OF RIGHT Neck and PARAPHARYNGEAL MASS/RIGHT EAR WEDGE RESECTION  
 HX OTHER SURGICAL    
 peg placed and removed  HX TONSILLECTOMY  HX VASCULAR ACCESS    
 placed and removed Family History Problem Relation Age of Onset  Emphysema Father  Lung Disease Father  Cancer Brother Colon  Heart Disease Sister  Hypertension Sister  Heart Disease Sister  Hypertension Sister  Heart Disease Brother  Hypertension Brother  Heart Disease Brother  Hypertension Brother  Heart Disease Brother  Hypertension Brother  Heart Disease Brother  Hypertension Brother  Heart Disease Brother  Hypertension Brother Social History Socioeconomic History  Marital status:  Spouse name: Not on file  Number of children: Not on file  Years of education: Not on file  Highest education level: Not on file Occupational History  Not on file Social Needs  Financial resource strain: Not on file  Food insecurity Worry: Not on file Inability: Not on file  Transportation needs Medical: Not on file Non-medical: Not on file Tobacco Use  Smoking status: Never Smoker  Smokeless tobacco: Never Used Substance and Sexual Activity  Alcohol use: No  
 Drug use: No  
 Sexual activity: Not on file Lifestyle  Physical activity Days per week: Not on file Minutes per session: Not on file  Stress: Not on file Relationships  Social connections Talks on phone: Not on file Gets together: Not on file Attends Worship service: Not on file Active member of club or organization: Not on file Attends meetings of clubs or organizations: Not on file Relationship status: Not on file  Intimate partner violence Fear of current or ex partner: Not on file Emotionally abused: Not on file Physically abused: Not on file Forced sexual activity: Not on file Other Topics Concern  Not on file Social History Narrative  Not on file Current Facility-Administered Medications Medication Dose Route Frequency Provider Last Rate Last Dose  enoxaparin (LOVENOX) injection 40 mg  40 mg SubCUTAneous Q24H Evelin Perkins NP   40 mg at 03/31/20 1713  amLODIPine (NORVASC) tablet 10 mg  10 mg Oral DAILY [place] Flirt, NP   10 mg at 20 3737  hydrALAZINE (APRESOLINE) 20 mg/mL injection 5 mg  5 mg IntraVENous Q6H PRN [place] Flirt, NP   5 mg at 20 0802  
 HYDROcodone-acetaminophen (NORCO) 5-325 mg per tablet 1 Tab  1 Tab Oral Q6H PRN [place] Flirt, NP   1 Tab at 20 2207  lactulose (CHRONULAC) 10 gram/15 mL solution 30 mL  20 g Oral PRN [place] Flirt, NP   30 mL at 20 2139  lisinopriL (PRINIVIL, ZESTRIL) tablet 20 mg  20 mg Oral DAILY [place] Flirt, NP   20 mg at 20 6051  metFORMIN (GLUCOPHAGE) tablet 1,000 mg  1,000 mg Oral DAILY [place] Flirt, NP   1,000 mg at 20 7577  ondansetron (ZOFRAN ODT) tablet 8 mg  8 mg Oral Q8H PRN [place] Flirt, NP      
 polyethylene glycol (MIRALAX) packet 17 g  17 g Oral DAILY [place] Flirt, NP   17 g at 20 1107  prochlorperazine (COMPAZINE) with saline injection 10 mg  10 mg IntraVENous Q6H PRN [place] Flirt, NP      
 senna-docusate (PERICOLACE) 8.6-50 mg per tablet 1 Tab  1 Tab Oral BID [place] Flirt, NP   1 Tab at 20 9263  urea (URE-NA) 15 gram packet 1 Packet  1 Packet Oral DAILY [place] Flirt, NP   1 Packet at 20 1094 OBJECTIVE: 
Patient Vitals for the past 8 hrs: 
 BP Temp Pulse Resp SpO2  
20 0730 158/82 98.4 °F (36.9 °C) 85 16 93 % 20 0325 127/68 98.1 °F (36.7 °C) 76 19 94 % Temp (24hrs), Av.8 °F (36.6 °C), Min:96.4 °F (35.8 °C), Max:98.4 °F (36.9 °C) No intake/output data recorded. Labs:   
Recent Results (from the past 24 hour(s)) SODIUM Collection Time: 20  2:29 PM  
Result Value Ref Range Sodium 120 (LL) 136 - 145 mmol/L  
SODIUM Collection Time: 20  8:29 PM  
Result Value Ref Range Sodium 122 (LL) 136 - 145 mmol/L  
CBC WITH AUTOMATED DIFF Collection Time: 20  2:37 AM  
Result Value Ref Range WBC 2.5 (L) 4.3 - 11.1 K/uL RBC 3.12 (L) 4.23 - 5.6 M/uL HGB 9.6 (L) 13.6 - 17.2 g/dL HCT 28.0 (L) 41.1 - 50.3 % MCV 89.7 79.6 - 97.8 FL  
 MCH 30.8 26.1 - 32.9 PG  
 MCHC 34.3 31.4 - 35.0 g/dL  
 RDW 13.6 11.9 - 14.6 % PLATELET 931 (L) 864 - 450 K/uL MPV 8.2 (L) 9.4 - 12.3 FL ABSOLUTE NRBC 0.00 0.0 - 0.2 K/uL  
 DF AUTOMATED NEUTROPHILS 71 43 - 78 % LYMPHOCYTES 22 13 - 44 % MONOCYTES 6 4.0 - 12.0 % EOSINOPHILS 1 0.5 - 7.8 % BASOPHILS 0 0.0 - 2.0 % IMMATURE GRANULOCYTES 0 0.0 - 5.0 %  
 ABS. NEUTROPHILS 1.8 1.7 - 8.2 K/UL  
 ABS. LYMPHOCYTES 0.5 0.5 - 4.6 K/UL  
 ABS. MONOCYTES 0.2 0.1 - 1.3 K/UL  
 ABS. EOSINOPHILS 0.0 0.0 - 0.8 K/UL  
 ABS. BASOPHILS 0.0 0.0 - 0.2 K/UL  
 ABS. IMM. GRANS. 0.0 0.0 - 0.5 K/UL MAGNESIUM Collection Time: 04/01/20  2:37 AM  
Result Value Ref Range Magnesium 1.8 1.8 - 2.4 mg/dL METABOLIC PANEL, COMPREHENSIVE Collection Time: 04/01/20  2:37 AM  
Result Value Ref Range Sodium 125 (L) 136 - 145 mmol/L Potassium 4.4 3.5 - 5.1 mmol/L Chloride 89 (L) 98 - 107 mmol/L  
 CO2 32 21 - 32 mmol/L Anion gap 4 (L) 7 - 16 mmol/L Glucose 209 (H) 65 - 100 mg/dL BUN 25 (H) 8 - 23 MG/DL Creatinine 0.90 0.8 - 1.5 MG/DL  
 GFR est AA >60 >60 ml/min/1.73m2 GFR est non-AA >60 >60 ml/min/1.73m2 Calcium 8.8 8.3 - 10.4 MG/DL Bilirubin, total 0.4 0.2 - 1.1 MG/DL  
 ALT (SGPT) 21 12 - 65 U/L  
 AST (SGOT) 29 15 - 37 U/L Alk. phosphatase 325 (H) 50 - 136 U/L Protein, total 5.9 (L) 6.3 - 8.2 g/dL Albumin 2.8 (L) 3.2 - 4.6 g/dL Globulin 3.1 2.3 - 3.5 g/dL A-G Ratio 0.9 (L) 1.2 - 3.5 SODIUM Collection Time: 04/01/20  8:47 AM  
Result Value Ref Range Sodium 126 (L) 136 - 145 mmol/L  
 
 
ASSESSMENT: 
Problem List  Date Reviewed: 12/27/2019 Codes Class Noted SIADH (syndrome of inappropriate ADH production) ([facility] ICD-10-CM: E22.2 ICD-9-CM: 253.6  3/26/2020  Malignant neoplasm metastatic to bone [facility]) ICD-10-CM: C79.51 
 ICD-9-CM: 198.5  6/26/2019 Cellulitis of groin, left ICD-10-CM: Z35.408 ICD-9-CM: 682.2  9/3/2018 Postoperative seroma of subcutaneous tissue after non-dermatologic procedure ICD-10-CM: L76.34 
ICD-9-CM: 998.13  9/3/2018 Anal carcinoma ([facility] ICD-10-CM: C21.0 ICD-9-CM: 154.3  8/9/2018 Type 2 diabetes with nephropathy ([facility] ICD-10-CM: E11.21 
ICD-9-CM: 250.40, 583.81  7/3/2018 Primary malignant neoplasm of perianal skin ICD-10-CM: C44.500 ICD-9-CM: 173.50  7/3/2018 Vomiting ICD-10-CM: R11.10 ICD-9-CM: 787.03  2/23/2016 Diabetes mellitus due to underlying condition with hyperglycemia ([facility] ICD-10-CM: O78.02 ICD-9-CM: 249.80  12/21/2015 Mucositis due to radiation therapy ICD-10-CM: K12.33 
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) ICD-10-CM: C79.89, C80.1 ICD-9-CM: 198.89, 199.1  10/14/2015 Mr. Angelica Castorena is a 79 y.o. male admitted on 3/29/2020 with a primary diagnosis of Hyponatremia. He has been transferred to [address] from [facility] to protect from COVID-19 exposure.    
 
 is a patient well-known to me from a prior history of head and neck cancer as well as anal carcinoma.  The details of these diagnoses are listed below. Ryan Simons, the patient's anal malignancy was noted to be a squamous carcinoma with neuroendocrine features.  The patient came to my office today stating that he was feeling significantly weak.  In addition, he did have some difficulty processing our conversations although he was awake and alert. Rob Colbert had a history of mild hyponatremia with sodium levels usually in the high 120s and low 130s despite the use of salt tablets.  As part of his pre-visit evaluation, he had undergone a PET scan which clearly showed evidence of disease progression.  He had multiple bony lesions as well as progressive disease in the mediastinum and in the perianal area.  He was markedly hyponatremic and a decision for admission was made.  The patient had a prior solitary bone lesion which had been irradiated.  This was biopsied and confirmed the presence of metastatic carcinoma most consistent with his prior anal carcinoma.  As part of the immunohistochemistries, the tumor stained positively for synaptophysin. Kannan Jhaveri was first seen in our office in September 2015. Rob Colbert was referred for evaluation of a squamous carcinoma which involved his right neck.  On August 25, 2015 he underwent a CT scan of the neck with contrast.  This demonstrated a large necrotic appearing mass in the right submandibular region measuring 5 x 4.1 cm.  It was felt to potentially represent a necrotic lymph node.  There were no other lymph nodes evident of a worrisome nature.  In addition, the studies showed no suspicious primary within the neck.  On September 9, 2015 he underwent an excision of the right neck/parapharyngeal mass.   A direct laryngoscopy was performed on that date as well.   At surgery, the lesion was noted to extend almost to the base of the skull and could not be fully excised.  There were no other palpable irregularities in the right neck.   The right neck mass showed salivary glands, fibroadipose and lymphoid tissue containing well to moderately differentiated squamous cell carcinoma. Johnnette Earthly tumor focally extended to the margin of resection.  A PET/CT scan performed on September 17, 2015 showed a low-density focus at the site of the previously described lymph node now measuring 3.1 x 2.6 cm.  The FDG activity was an SUV of 6.5.   Immunohistochemistries did not suggest HPV as an etiologic agent. In November 2015 he began a course of radiation therapy concurrent with weekly cisplatin. He completed therapy in December 2015.  In July 2018, he related a history of having been treated for hemorrhoids over the prior 5 years with various topical preparations.  Ultimately, because of persistence of symptoms he was referred to a gastroenterologist and a colonoscopy performed.  Biopsies of the anal region showed  a poorly differentiated tumor with both glandular and neuroendocrine features.  This was subsequently [facility] [place] underwent a PET scan which showed some activity in the anal skin as is evident on exam. Fritz Cates was also a 1.2 cm lymph node in the left inguinal region with some slight SUV activity of unclear significance.  He began therapy with 5-FU mitomycin and concurrent radiation therapy in August 2018. Braxton Guadarrama completed his Debara Aw on September 27  A subsequent PET scan was performed showing  no worrisome uptake suggestive of persistent or recurrent malignancy in either of the previously treated primary locations.  PET scan in April 2019 showed no evidence of recurrent tumor in the head neck or in the soft tissues of the pelvis. Fritz Cates was however an area of FDG uptake in the left iliac wing which was worrisome but of unclear significance.   Because of the PET scan findings noted above, he underwent an MRI which also showed that possible solitary metastasis in the left iliac bone.  Biopsy of this lesion confirmed the presence of metastatic carcinoma consistent with his anal primary. This was irradiated. PLAN: 
Anal Carcinoma, squamous with neuroendocrine features He clearly has evidence of progressive cancer at this point.  As noted below, the neuroendocrine element of this tumor may be the most active at the present time.  As such I would consider the use of etoposide and carboplatin as his next course of treatment given that presumption.  I would consider giving his first cycle in the hospital since we will likely have little control over his sodium without effective tumor control.  In addition, there could be an exacerbation of SIADH after his initial treatment and release of paraneoplastic peptides 
  
3/27 Start C1 carbo/etoposide today. 3/28 C1D2 today, he tolerated D1 well  
3/29 C1D3 today, tolerating treatment very well 3/30 Tolerated treatment well.  
  
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  This is clearly worse now with evidence of progressive cancer and I suspect that this is SIADH related to the neuroendocrine characteristics of this tumor as noted histologically and with the positivity of synaptophysin.  Nephrology should be consulted and his free water intake limited.  His elevated blood sugar may trivially contribute to his hyponatremia but not enough to make a significant difference in approach.  I defer to nephrology within the context of his mental status changes as to whether or not hypertonic saline would be appropriate.  Tolvaptan might also be appropriate. 
  
3/27 Nephrology consulted. Agreed with presumed SIADH. Serial Na measurements overnight. Serum Na improved from 114 to 121 with fluid restrictions. No pharmacological measures recommended per nephrology. 3/28 Na+ 122, nephrology added urea 3/30 Na 120, on urea. Nephrology starting Tolvaptan. Per nephrology, DC fluid restrictions while on Tolvaptan. 
3/31 Na 123, on urea. Tolvaptan continues. 4/1 Na 125, on urea and continuing Tolvaptan. Nephrology following. Baseline Na mid 120 to low 130.  
  
Hypertension - Asymptomatic - On daily Norvasc, Lisinopril - Hydralazine 5mg IV q6 hrs PRN per parameters 
  
Diabetes - Metformin  
  
Constipation 3/28 increase walker-colace to BID, Miralax daily, lactulose prn 
3/29 ongoing, encouraged him to take dose of lactulose after transfer to [facility]OS[facility] [address] 3/30 Minimal improvement. Re-eval tomorrow now that he has pericolace BID, daily miralax and lactulose. 3/31 Reports symptoms have improved. Continue home meds Rashmi SOPs DVT prophylaxis - Lovenox (hold plts <50) 
  Dispo Once Na+ stable; will likely need financial assistance if DC'd on Tolvaptan. Anna Mcfarlane, [facility] Hematology and Oncology [address] [place] [address] Office : [phone] Fax : [phone] I personally saw, exammed and counselled the patient, and discussed with NP, agree with above history/assessment/plan. 80 y.o.male anal cancer relapse with neuroendocrine feature, admitted for severe hyponatremia, received cycle 1 carbo/etop, hyponatremia persisted, tried urea and lab had been stable, Tolvaptan added and Na responded, appreciate nephrology, work on placement. 
  
 
Skip Muhammad M.D. [facility] [address] [address] Office : [phone] Fax : [phone]

## 2020-04-01 NOTE — PROGRESS NOTES
Care Management Interventions Palliative Care Criteria Met (RRAT>21 & CHF Dx)?: No(Risk 14% Dx Hyponatremia ) Transition of Care Consult (CM Consult): Discharge Planning Discharge Durable Medical Equipment: No(none) Physical Therapy Consult: No 
Occupational Therapy Consult: No 
Speech Therapy Consult: No 
Current Support Network: Lives with Spouse Discharge Location Discharge Placement: Home Chart reviewed and per Dr. Case Grubbs w/Nephrology on 3/31, pt is to receive 2 more days of Tolvoptan. That would take pt to 4/2 and anticipated d/c is on 4/3. Which means pt will not be on this medication at time of d/c. Pt plans to return home @ d/c and has declined any HH services at this time. CM will continue to follow.

## 2020-04-02 NOTE — PROGRESS NOTES
Nutrition Reason for assessment: LOS Day 4 Assessment:  
Diet: DIET REGULAR Food/Nutrition Patient History:   
Pt is 78 yo male who has history of head and neck cancer. He recently completed C1 carbo/etop on 3/29. He currently is hyponatremic. PHM significant for DM type 2. Pt was eating breakfast at time of visit. Pt reports he is doing well. He states he is loving the food here and enjoys the room service aspect. He reports no nausea or vomiting. He states he is having no pain or difficulty swallowing. He reports no weight loss. Labs: Sodium: 126 (L) Anthropometrics: 
Height: 5' 11.5\" (181.6 cm), Weight: 77.1 kg (170 lb), Weight Source: Bed, Body mass index is 23.38 kg/m². BMI class of Normal weight. Macronutrient needs: (using CBW (Current body weight) bed scale 77.1 kg) EER: 1138-0907 kcal/day (20-25 kcals/kg ) EPR: 77-97 g/day (20% kcals ) Intake/Comparative Standards: Per patient, consuming % most meals. This meets % of kcal and protein needs Patient Vitals for the past 100 hrs: 
 % Diet Eaten 04/02/20 0954 100 % 04/01/20 1820 100 % 04/01/20 1352 75 % 04/01/20 0830 75 % 03/31/20 1306 75 % 03/31/20 0900 100 % 03/30/20 1759 75 % 03/30/20 1302 75 % 03/30/20 0856 75 %  
03/29/20 1245 100 % Nutrition Diagnosis: No nutrition diagnosis at this time. Nutrition Intervention: 
Meals and Snacks: Continue with current diet. Discharge Nutrition Plan: None at this time. Henrique Jenkins [address] [place] [address],   
[phone]

## 2020-04-02 NOTE — PROGRESS NOTES
Inpatient Hematology / Oncology Daily Progress Note Reason for Asmission:  Hyponatremia [E87.1] 24 Hour Events: 
Completed C1 Carbo/etop 3/29 Tolerated treatment well Na 126 Intermittent HTN, VSS   
 
 
ROS: 
Constitutional: Negative for fever, chills, weakness, malaise, fatigue. CV: Negative for chest pain, palpitations, edema. Respiratory: Negative for dyspnea, cough, wheezing. GI: Negative for nausea, abdominal pain, diarrhea, constipation. 10 point review of systems is otherwise negative with the exception of the elements mentioned above in the HPI. No Known Allergies Past Medical History:  
Diagnosis Date  GERD (gastroesophageal reflux disease)   
 pt wife denies  Head and neck cancer ([facility] 9/30/2015  
 radiation and chemo and and surgery  History of squamous cell carcinoma   
 to neck area- 38 radiation treatement and 8 chemo treatment  History of throat cancer 2015  
 peg tube for about 4 months  Hypercholesteremia   
 managed well with meds  Hypertension   
 managed well with meds  Rectal cancer ([facility] 2018 28 radiation treatment and chemo pump  Type 2 diabetes mellitus ([facility] oral agent only/AVG BS: /no s.s of hypoglycemia  Vomiting 2/23/2016  
 pt wife denies Past Surgical History:  
Procedure Laterality Date  HX COLONOSCOPY  05/2018  HX HEENT    
 sinus  HX HEENT  2015  
 surgery on right throat for squamous cell ca right ear wedge  HX LYMPH NODE DISSECTION    
 HX ORTHOPAEDIC Right 1966  
 right leg  HX OTHER SURGICAL  9/9/15 EXCISION OF RIGHT Neck and PARAPHARYNGEAL MASS/RIGHT EAR WEDGE RESECTION  
 HX OTHER SURGICAL    
 peg placed and removed  HX TONSILLECTOMY  HX VASCULAR ACCESS    
 placed and removed Family History Problem Relation Age of Onset  Emphysema Father  Lung Disease Father  Cancer Brother Colon  Heart Disease Sister  Hypertension Sister  Heart Disease Sister  Hypertension Sister  Heart Disease Brother  Hypertension Brother  Heart Disease Brother  Hypertension Brother  Heart Disease Brother  Hypertension Brother  Heart Disease Brother  Hypertension Brother  Heart Disease Brother  Hypertension Brother Social History Socioeconomic History  Marital status:  Spouse name: Not on file  Number of children: Not on file  Years of education: Not on file  Highest education level: Not on file Occupational History  Not on file Social Needs  Financial resource strain: Not on file  Food insecurity Worry: Not on file Inability: Not on file  Transportation needs Medical: Not on file Non-medical: Not on file Tobacco Use  Smoking status: Never Smoker  Smokeless tobacco: Never Used Substance and Sexual Activity  Alcohol use: No  
 Drug use: No  
 Sexual activity: Not on file Lifestyle  Physical activity Days per week: Not on file Minutes per session: Not on file  Stress: Not on file Relationships  Social connections Talks on phone: Not on file Gets together: Not on file Attends Muslim service: Not on file Active member of club or organization: Not on file Attends meetings of clubs or organizations: Not on file Relationship status: Not on file  Intimate partner violence Fear of current or ex partner: Not on file Emotionally abused: Not on file Physically abused: Not on file Forced sexual activity: Not on file Other Topics Concern  Not on file Social History Narrative  Not on file Current Facility-Administered Medications Medication Dose Route Frequency Provider Last Rate Last Dose  tolvaptan (SAMSCA) tablet 15 mg  15 mg Oral DAILY Adama Cali MD   15 mg at 04/02/20 1146  enoxaparin (LOVENOX) injection 40 mg  40 mg SubCUTAneous Q24H Le Caryn Aschoff, NP   40 mg at 20 1701  
 amLODIPine (NORVASC) tablet 10 mg  10 mg Oral DAILY Eriberto Duran NP   10 mg at 20 8778  hydrALAZINE (APRESOLINE) 20 mg/mL injection 5 mg  5 mg IntraVENous Q6H PRN Eriberto Duran NP   5 mg at 20 0802  
 HYDROcodone-acetaminophen (NORCO) 5-325 mg per tablet 1 Tab  1 Tab Oral Q6H PRN Eriberto Duran NP   1 Tab at 20 0836  
 lactulose (CHRONULAC) 10 gram/15 mL solution 30 mL  20 g Oral PRN Eriberto Duran NP   30 mL at 20 2139  lisinopriL (PRINIVIL, ZESTRIL) tablet 20 mg  20 mg Oral DAILY Eriberto Duran NP   20 mg at 20 0841  
 metFORMIN (GLUCOPHAGE) tablet 1,000 mg  1,000 mg Oral DAILY Eriberto Duran NP   1,000 mg at 20 3500  ondansetron (ZOFRAN ODT) tablet 8 mg  8 mg Oral Q8H PRN Eriberto Duran NP      
 polyethylene glycol (MIRALAX) packet 17 g  17 g Oral DAILY Eriberto Duran NP   Stopped at 20 0900  prochlorperazine (COMPAZINE) with saline injection 10 mg  10 mg IntraVENous Q6H PRN Eriberto Duran NP      
 senna-docusate (PERICOLACE) 8.6-50 mg per tablet 1 Tab  1 Tab Oral BID Eriberto Duran NP   1 Tab at 20 0836  
 urea (URE-NA) 15 gram packet 1 Packet  1 Packet Oral DAILY Eriberto Duran NP   1 Packet at 20 9950 OBJECTIVE: 
Patient Vitals for the past 8 hrs: 
 BP Temp Pulse Resp SpO2  
20 1122 129/72 97.7 °F (36.5 °C) 78 18 95 % 20 0745 124/66 98.2 °F (36.8 °C) 86 18 92 % Temp (24hrs), Av °F (36.7 °C), Min:97.3 °F (36.3 °C), Max:98.3 °F (36.8 °C) 
 
701 - 1900 In: 360 [P.O.:360] Out: -  
 
 
Labs:   
Recent Results (from the past 24 hour(s)) SODIUM Collection Time: 20  2:28 PM  
Result Value Ref Range Sodium 122 (LL) 136 - 145 mmol/L  
SODIUM Collection Time: 20  8:32 PM  
Result Value Ref Range Sodium 122 (LL) 136 - 145 mmol/L  
CBC WITH AUTOMATED DIFF  Collection Time: 20  2:38 AM  
 Result Value Ref Range WBC 2.1 (L) 4.3 - 11.1 K/uL  
 RBC 3.09 (L) 4.23 - 5.6 M/uL HGB 9.7 (L) 13.6 - 17.2 g/dL HCT 27.9 (L) 41.1 - 50.3 % MCV 90.3 79.6 - 97.8 FL  
 MCH 31.4 26.1 - 32.9 PG  
 MCHC 34.8 31.4 - 35.0 g/dL  
 RDW 13.5 11.9 - 14.6 % PLATELET 179 (L) 839 - 450 K/uL MPV 7.8 (L) 9.4 - 12.3 FL ABSOLUTE NRBC 0.00 0.0 - 0.2 K/uL  
 DF AUTOMATED NEUTROPHILS 70 43 - 78 % LYMPHOCYTES 23 13 - 44 % MONOCYTES 4 4.0 - 12.0 % EOSINOPHILS 1 0.5 - 7.8 % BASOPHILS 1 0.0 - 2.0 % IMMATURE GRANULOCYTES 1 0.0 - 5.0 %  
 ABS. NEUTROPHILS 1.5 (L) 1.7 - 8.2 K/UL  
 ABS. LYMPHOCYTES 0.5 0.5 - 4.6 K/UL  
 ABS. MONOCYTES 0.1 0.1 - 1.3 K/UL  
 ABS. EOSINOPHILS 0.0 0.0 - 0.8 K/UL  
 ABS. BASOPHILS 0.0 0.0 - 0.2 K/UL  
 ABS. IMM. GRANS. 0.0 0.0 - 0.5 K/UL MAGNESIUM Collection Time: 04/02/20  2:38 AM  
Result Value Ref Range Magnesium 1.9 1.8 - 2.4 mg/dL METABOLIC PANEL, COMPREHENSIVE Collection Time: 04/02/20  2:38 AM  
Result Value Ref Range Sodium 126 (L) 136 - 145 mmol/L Potassium 3.8 3.5 - 5.1 mmol/L Chloride 91 (L) 98 - 107 mmol/L  
 CO2 29 21 - 32 mmol/L Anion gap 6 (L) 7 - 16 mmol/L Glucose 206 (H) 65 - 100 mg/dL BUN 18 8 - 23 MG/DL Creatinine 0.76 (L) 0.8 - 1.5 MG/DL  
 GFR est AA >60 >60 ml/min/1.73m2 GFR est non-AA >60 >60 ml/min/1.73m2 Calcium 8.6 8.3 - 10.4 MG/DL Bilirubin, total 0.3 0.2 - 1.1 MG/DL  
 ALT (SGPT) 24 12 - 65 U/L  
 AST (SGOT) 27 15 - 37 U/L Alk. phosphatase 308 (H) 50 - 136 U/L Protein, total 6.0 (L) 6.3 - 8.2 g/dL Albumin 2.7 (L) 3.2 - 4.6 g/dL Globulin 3.3 2.3 - 3.5 g/dL A-G Ratio 0.8 (L) 1.2 - 3.5 SODIUM Collection Time: 04/02/20  9:15 AM  
Result Value Ref Range Sodium 125 (L) 136 - 145 mmol/L  
 
 
ASSESSMENT: 
Problem List  Date Reviewed: 12/27/2019 Codes Class Noted SIADH (syndrome of inappropriate ADH production) ([facility] ICD-10-CM: E22.2 ICD-9-CM: 253.6  3/26/2020 Malignant neoplasm metastatic to bone [facility]) ICD-10-CM: C79.51 
ICD-9-CM: 198.5  6/26/2019 Cellulitis of groin, left ICD-10-CM: Y12.372 ICD-9-CM: 682.2  9/3/2018 Postoperative seroma of subcutaneous tissue after non-dermatologic procedure ICD-10-CM: L76.34 
ICD-9-CM: 998.13  9/3/2018 Anal carcinoma ([facility] ICD-10-CM: C21.0 ICD-9-CM: 154.3  8/9/2018 Type 2 diabetes with nephropathy ([facility] ICD-10-CM: E11.21 
ICD-9-CM: 250.40, 583.81  7/3/2018 Primary malignant neoplasm of perianal skin ICD-10-CM: C44.500 ICD-9-CM: 173.50  7/3/2018 Vomiting ICD-10-CM: R11.10 ICD-9-CM: 787.03  2/23/2016 Diabetes mellitus due to underlying condition with hyperglycemia ([facility] ICD-10-CM: Y35.21 ICD-9-CM: 249.80  12/21/2015 Mucositis due to radiation therapy ICD-10-CM: K12.33 
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 Michael Gomez is a 79 y.o. male admitted on 3/29/2020 with a primary diagnosis of Hyponatremia. He has been transferred to [address] from [facility] to protect from COVID-19 exposure.    
 
 is a patient well-known to me from a prior history of head and neck cancer as well as anal carcinoma.  The details of these diagnoses are listed below. [facility], the patient's anal malignancy was noted to be a squamous carcinoma with neuroendocrine features.  The patient came to my office today stating that he was feeling significantly weak.  In addition, he did have some difficulty processing our conversations although he was awake and alert. Braxton Guadarrama had a history of mild hyponatremia with sodium levels usually in the high 120s and low 130s despite the use of salt tablets.  As part of his pre-visit evaluation, he had undergone a PET scan which clearly showed evidence of disease progression.  He had multiple bony lesions as well as progressive disease in the mediastinum and in the perianal area.  He was markedly hyponatremic and a decision for admission was made.  The patient had a prior solitary bone lesion which had been irradiated.  This was biopsied and confirmed the presence of metastatic carcinoma most consistent with his prior anal carcinoma.  As part of the immunohistochemistries, the tumor stained positively for synaptophysin. Jasson Cochran was first seen in our office in September 2015. Braxton Guadarrama was referred for evaluation of a squamous carcinoma which involved his right neck.  On August 25, 2015 he underwent a CT scan of the neck with contrast.  This demonstrated a large necrotic appearing mass in the right submandibular region measuring 5 x 4.1 cm.  It was felt to potentially represent a necrotic lymph node.  There were no other lymph nodes evident of a worrisome nature.  In addition, the studies showed no suspicious primary within the neck.  On September 9, 2015 he underwent an excision of the right neck/parapharyngeal mass.   A direct laryngoscopy was performed on that date as well.   At surgery, the lesion was noted to extend almost to the base of the skull and could not be fully excised.  There were no other palpable irregularities in the right neck.   The right neck mass showed salivary glands, fibroadipose and lymphoid tissue containing well to moderately differentiated squamous cell carcinoma. Shawna Major tumor focally extended to the margin of resection.  A PET/CT scan performed on September 17, 2015 showed a low-density focus at the site of the previously described lymph node now measuring 3.1 x 2.6 cm.  The FDG activity was an SUV of 6.5.   Immunohistochemistries did not suggest HPV as an etiologic agent. In November 2015 he began a course of radiation therapy concurrent with weekly cisplatin. He completed therapy in December 2015.  In July 2018, he related a history of having been treated for hemorrhoids over the prior 5 years with various topical preparations.  Ultimately, because of persistence of symptoms he was referred to a gastroenterologist and a colonoscopy performed.  Biopsies of the anal region showed  a poorly differentiated tumor with both glandular and neuroendocrine features.  This was subsequently confirmed at the [facility] in [place] underwent a PET scan which showed some activity in the anal skin as is evident on exam. Krish Gonzales was also a 1.2 cm lymph node in the left inguinal region with some slight SUV activity of unclear significance.  He began therapy with 5-FU mitomycin and concurrent radiation therapy in August 2018. Stefany Steinberg completed his Alcario Floras on September 27  A subsequent PET scan was performed showing  no worrisome uptake suggestive of persistent or recurrent malignancy in either of the previously treated primary locations.  PET scan in April 2019 showed no evidence of recurrent tumor in the head neck or in the soft tissues of the pelvis. Krish Gonzales was however an area of FDG uptake in the left iliac wing which was worrisome but of unclear significance.   Because of the PET scan findings noted above, he underwent an MRI which also showed that possible solitary metastasis in the left iliac bone.  Biopsy of this lesion confirmed the presence of metastatic carcinoma consistent with his anal primary. This was irradiated. PLAN: 
Anal Carcinoma, squamous with neuroendocrine features He clearly has evidence of progressive cancer at this point.  As noted below, the neuroendocrine element of this tumor may be the most active at the present time.  As such I would consider the use of etoposide and carboplatin as his next course of treatment given that presumption.  I would consider giving his first cycle in the hospital since we will likely have little control over his sodium without effective tumor control.  In addition, there could be an exacerbation of SIADH after his initial treatment and release of paraneoplastic peptides 
  
3/27 Start C1 carbo/etoposide today. 3/28 C1D2 today, he tolerated D1 well  
3/29 C1D3 today, tolerating treatment very well 3/30 Tolerated treatment well.  
  
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  This is clearly worse now with evidence of progressive cancer and I suspect that this is SIADH related to the neuroendocrine characteristics of this tumor as noted histologically and with the positivity of synaptophysin.  Nephrology should be consulted and his free water intake limited.  His elevated blood sugar may trivially contribute to his hyponatremia but not enough to make a significant difference in approach.  I defer to nephrology within the context of his mental status changes as to whether or not hypertonic saline would be appropriate.  Tolvaptan might also be appropriate. 
  
3/27 Nephrology consulted. Agreed with presumed SIADH. Serial Na measurements overnight. Serum Na improved from 114 to 121 with fluid restrictions. No pharmacological measures recommended per nephrology. 3/28 Na+ 122, nephrology added urea 3/30 Na 120, on urea. Nephrology starting Tolvaptan. Per nephrology, DC fluid restrictions while on Tolvaptan. 
3/31 Na 123, on urea. Tolvaptan continues. 4/1 Na 125, on urea and continuing Tolvaptan. Nephrology following. Baseline Na mid 120 to low 130. 4/2 Na 126, on urea. Nephrology following and recommend one more day in hospital to monitor. Did not receive Tolvaptan over the last couple days. Restarted today and anticipate DC with Tolvalptan and f/u with nephrology in 1-2 weeks with renal panel prior. Hopeful for DC tomorrow. 
  
Hypertension - Asymptomatic - On daily Norvasc, Lisinopril - Hydralazine 5mg IV q6 hrs PRN per parameters 
  
Diabetes - Metformin  
  
Constipation 3/28 increase walker-colace to BID, Miralax daily, lactulose prn 
3/29 ongoing, encouraged him to take dose of lactulose after transfer to HOSP[facility] [address] 3/30 Minimal improvement. Re-eval tomorrow now that he has pericolace BID, daily miralax and lactulose. 3/31 Reports symptoms have improved. Continue home meds Rashmi SOPs DVT prophylaxis - Lovenox (hold plts <50) 
  Dispo Once Na+ stable; will likely need financial assistance if DC'd on Tolvaptan. AMBREEN Arriaza [facility]ology [facility] On[facility] [address] [place] [address] Office : [phone] Fax : [phone] I personally saw, exammed and counselled the patient, and discussed with NP, agree with above history/assessment/plan. 80 y.o.male anal cancer relapse with neuroendocrine feature, admitted for severe hyponatremia, received cycle 1 carbo/etop, hyponatremia persisted, tried urea and lab had been stable, Tolvaptan added and Na responded, work on placement and follow nephrology. 
  
 
Andrew Stone M.D. [facility] [id number] [place] [address] Office : [phone] Fax : [phone]

## 2020-04-02 NOTE — PROGRESS NOTES
SETH met with pt to discuss which pharmacy he uses in order to verify if insurance will pay for Tolvaptan/Samsca. Pt uses [facility] in [place]. SW called to have a prescription run to check coverage, the pharmacist states this is a specialty drug and she has to have the actual prescription. Pharmacist states the prescription gets sent straight to specialty pharmacy who fills it and can ship it to pt or to [facility].  Pharmacists states she thinks insurance usually covers it but prior auth may be required. SETH will confirm with Dr. Jack Conley or nephrologist following on 4/3/20 if Samsca needed for home and if so fax prescription to [facility].   
SW following. ISMAEL CameronW

## 2020-04-02 NOTE — PROGRESS NOTES
1800-END OF SHIFT NOTE: 
 
-pt rested well throughout the shift 
-latest Na at 122 
-pt voiding, not measuring VSS, no needs voiced at this time Intake/Output 04/02 0701 - 04/02 1900 In: 360 [P.O.:360] Out: -   
Voiding: YES Catheter: NO 
Drain:   
 
 
 
Stool:  1 occurrences. Stool Assessment Stool Appearance: Formed (04/01/20 0856) Emesis:  0 occurrences. VITAL SIGNS Patient Vitals for the past 12 hrs: 
 Temp Pulse Resp BP SpO2  
04/02/20 1545 97.8 °F (36.6 °C) 79 18 175/82 96 % 04/02/20 1122 97.7 °F (36.5 °C) 78 18 129/72 95 % 04/02/20 0745 98.2 °F (36.8 °C) 86 18 124/66 92 % Pain Assessment Pain 1 Pain Scale 1: Numeric (0 - 10) (04/02/20 1430) Pain Intensity 1: 0 (04/02/20 1430) Patient Stated Pain Goal: 0 (04/02/20 1430) Pain Reassessment 1: Yes (04/02/20 0915) Pain Onset 1: chronic (04/02/20 0830) Pain Location 1: Back;Leg (04/02/20 0830) Pain Orientation 1: Upper (04/02/20 0830) Pain Description 1: Aching (04/02/20 0830) Pain Intervention(s) 1: Medication (see MAR) (04/02/20 0830) Ambulating Yes Additional Information:  
 
Shift report given to oncoming nurse at the bedside.  
 
Shahriar Aguila RN

## 2020-04-02 NOTE — PROGRESS NOTES
END OF SHIFT NOTE: 
 
Intake/Output 04/01 1901 - 04/02 0700 In: 720 [P.O.:720] Out: 300 [Urine:300] Voiding: YES Catheter: NO 
Drain:   
 
 
 
 
 
Stool:  1 occurrences. Stool Assessment Stool Appearance: Formed (04/01/20 0856) Emesis:  0 occurrences. VITAL SIGNS Patient Vitals for the past 12 hrs: 
 Temp Pulse Resp BP SpO2  
04/02/20 0245 98.1 °F (36.7 °C) 69 18 174/87 94 % 04/01/20 2322 98.3 °F (36.8 °C) 81 18 163/76 96 % 04/01/20 1940 97.3 °F (36.3 °C) 89 18 163/75 94 % Pain Assessment Pain 1 Pain Scale 1: Numeric (0 - 10) (04/02/20 0253) Pain Intensity 1: 0 (04/02/20 0253) Patient Stated Pain Goal: 0 (04/02/20 0253) Pain Reassessment 1: Yes (04/02/20 0253) Pain Onset 1: chronic (04/01/20 1931) Pain Location 1: Back (04/01/20 1931) Pain Orientation 1: Upper (04/01/20 1931) Pain Description 1: Aching (04/01/20 1931) Pain Intervention(s) 1: Medication (see MAR) (04/01/20 1931) Ambulating Yes Additional Information: uneventful night. Last . PRN pain medication given once. No other complaints Shift report given to oncoming nurse at the bedside.  
 
Elie Bullard RN

## 2020-04-03 NOTE — PROGRESS NOTES
Patient discharge complete. IV removed. Follow up appointments completed. Discharge instructions given to patient. Patient awaiting arrival of wife.

## 2020-04-03 NOTE — PROGRESS NOTES
Care Management Interventions Palliative Care Criteria Met (RRAT>21 & CHF Dx)?: No(Risk 14% Dx Hyponatremia ) Transition of Care Consult (CM Consult): Discharge Planning Discharge Durable Medical Equipment: No(none) Physical Therapy Consult: No 
Occupational Therapy Consult: No 
Speech Therapy Consult: No 
Current Support Network: Lives with Spouse Discharge Location Discharge Placement: Home Pt;s Rx for Tolvaptan, has been called in by the Nephrologists to [facility] in [facility]. They are to start pre-auth with the insurance company. This medication will be delivered via the Specialty Pharm[facility]. Pt can d/c home for a CM standpoint, no  Further needs at this time.

## 2020-04-03 NOTE — DISCHARGE INSTRUCTIONS
DISCHARGE SUMMARY from Nurse    PATIENT INSTRUCTIONS:      Report the following to your surgeon:  · Excessive pain, swelling, redness or odor of or around the surgical area  · Temperature over 100.5  · Nausea and vomiting lasting longer than 4 hours or if unable to take medications  · Any signs of decreased circulation or nerve impairment to extremity: change in color, persistent  numbness, tingling, coldness or increase pain  · Any questions    What to do at Home:  Recommended activity: Activity as tolerated    If you experience any of the following symptoms confusion, please follow up with ER or PCP. *  Please give a list of your current medications to your Primary Care Provider. *  Please update this list whenever your medications are discontinued, doses are      changed, or new medications (including over-the-counter products) are added. *  Please carry medication information at all times in case of emergency situations. These are general instructions for a healthy lifestyle:    No smoking/ No tobacco products/ Avoid exposure to second hand smoke  Surgeon General's Warning:  Quitting smoking now greatly reduces serious risk to your health. Obesity, smoking, and sedentary lifestyle greatly increases your risk for illness    A healthy diet, regular physical exercise & weight monitoring are important for maintaining a healthy lifestyle    You may be retaining fluid if you have a history of heart failure or if you experience any of the following symptoms:  Weight gain of 3 pounds or more overnight or 5 pounds in a week, increased swelling in our hands or feet or shortness of breath while lying flat in bed. Please call your doctor as soon as you notice any of these symptoms; do not wait until your next office visit. The discharge information has been reviewed with the {PATIENT PARENT GUARDIAN:[id number]}. The {PATIENT PARENT GUARDIAN:[id number]} verbalized understanding.   Discharge medications reviewed with the {Dishcarge meds reviewed TYQ} and appropriate educational materials and side effects teaching were provided.   ___________________________________________________________________________________________________________________________________

## 2020-04-03 NOTE — DISCHARGE SUMMARY
[facility]: Inpatient Hematology / Oncology Discharge Summary Note Patient ID: 
Britton Tyson [id number] 
89 y.o. 
1940 Admit Date: 3/29/2020 Discharge Date: 4/3/2020 Admission Diagnoses: Hyponatremia [E87.1] Discharge Diagnoses: 
Principal Diagnosis: <principal problem not specified> Active Problems: Hyponatremia (11/23/2015) Hospital Course: Mr. Seferino Gamboa is a 79 y.o. male admitted on 3/29/2020 with a primary diagnosis of Hyponatremia.  
  
He was admitted on 3/27 but transferred HOSPITAL [address] from [facility] on 3/29 to protect from COVID-19 exposure.   
  
Mr. 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 He completed Cis/XRT for head/neck cancer completed 12/2015. 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 He underwent 5-FU mitomycin and concurrent radiation in August 2018 and completed therapy September 27th. A PET in April 2019 showed an area of FDG uptake in left iliac wing. MRI showed possible solitary metastasis in left iliac bone. Biopsy confirmed presence of metastatic carcinoma consistent with anal carcinoma and immunohistochemistry stained positive for synaptophysin. This area was irradiated. The patient came to  on the day of admission stating that he was feeling significantly weak.  In addition, he did have some difficulty processing conversations although he was awake and alert. Ivelisse Butler has a history of mild hyponatremia with sodium levels usually in the high 120s and low 130s despite the use of salt tablets.  As part of his pre-visit evaluation, he had undergone a PET scan which clearly showed evidence of disease progression.  He had multiple bony lesions as well as progressive disease in the mediastinum and in the perianal area.  He was markedly hyponatremic and a decision for admission was made. 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 He started carbo/etoposide on 3/27 and tolerated treatment well. Nephrology was consulted and free water intake limited. He underwent serial Na measurements with mild improvement on fluid restrictions. He was then placed on urea with minimal improvement. Nephrology started Tolvaptan. He remained on Tolvaptan for several days. His sodium is now 127 which appears to be consistent with baseline Na. He will go home on Tolvaptan 15mg daily per Dr Ciera Crow and continue Urea until f/u. His admission was essentially unremarkable. He has a history of hypertension and remained hypertensive through his admission. He responded well to PRN hydralazine when BP>180. However, his blood pressure throughout his stay ranged from 935 systolic to 540 systolic and oscillated among those pressures. His systolic BP did exceed 843 several times and he was given PRN hydralazine. This morning his BP was rechecked and found to be in the 190s. We rechecked his BP after no intervention and it was 233 systolic. Some question as to accuracy of his BP readings while he has been admitted. We will increase his Lisinopril to 40mg. His HR remained stable in the 70s. He will need to go to his PCP or have a blood pressure check within the next couple days. His symptoms of fatigue have now improved. He is feeling better and is anxious for DC. He will need to f/u with Dr Nelson/nephrology in the next couple weeks and a renal panel prior. He is scheduled to start C2 of carbo/etoposide on 4/17. We will arrange f/u with Dr Rosalba Blanca and chemotherapy at that time.  
 
  
  
 
Consults: 
None Pertinent Diagnostic Studies:  
Labs:   
Recent Labs 04/03/20 
7846 04/02/20 
2894 04/01/20 
9576 WBC 2.0* 2.1* 2.5*  
 HGB 9.6* 9.7* 9.6* PLT 89* 118* 139* ANEU 1.3* 1.5* 1.8 Recent Labs 04/03/20 
0770 04/03/20 
0101 04/02/20 2031 04/02/20 0918  04/01/20 
6508 * 127* 124*   < > 126*   < > 125* K  --  4.1  --   --  3.8  --  4.4  
CL  --  93*  --   --  91*  --  89* CO2  --  29  --   --  29  --  32  
GLU  --  204*  --   --  206*  --  209* BUN  --  17  --   --  18  --  25* CREA  --  0.75*  --   --  0.76*  --  0.90 CA  --  8.8  --   --  8.6  --  8.8 SGOT  --  19  --   --  27  --  29  
AP  --  281*  --   --  308*  --  325* TP  --  5.9*  --   --  6.0*  --  5.9* ALB  --  2.8*  --   --  2.7*  --  2.8*  
MG  --  1.8  --   --  1.9  --  1.8  
 < > = values in this interval not displayed. Imaging: 
 
 
 
Current Discharge Medication List  
  
START taking these medications Details  
tolvaptan (SAMSCA) tab tablet Take 1 Tab by mouth daily for 30 days. Qty: 30 Tab, Refills: 0  
  
urea (URE-NA) 15 gram packet Take 1 Packet by mouth daily for 30 days. Qty: 30 Packet, Refills: 0 CONTINUE these medications which have CHANGED Details  
lisinopriL (PRINIVIL, ZESTRIL) 20 mg tablet Take 2 Tabs by mouth daily for 14 days. In am  Indications: high blood pressure Qty: 28 Tab, Refills: 0 CONTINUE these medications which have NOT CHANGED Details TRUEPLUS LANCETS 28 gauge misc   
  
traMADol (ULTRAM) 50 mg tablet Take 1 Tab by mouth every six (6) hours as needed for Pain for up to 30 days. Max Daily Amount: 200 mg. Indications: pain 
Qty: 90 Tab, Refills: 0 Associated Diagnoses: Anal carcinoma ([facility] [address]  
  
sodium chloride 1 gram tablet Take 2 Tabs by mouth two (2) times a day. Qty: 180 Tab, Refills: 3 Associated Diagnoses: Hyponatremia  
  
ferrous sulfate 324 mg (65 mg iron) tablet Take  by mouth Daily (before breakfast). docusate sodium (COLACE) 100 mg capsule Take 100 mg by mouth as needed for Constipation. amLODIPine (NORVASC) 10 mg tablet Take 10 mg by mouth daily. In am  
  
metFORMIN (GLUCOPHAGE) 1,000 mg tablet Take 1,000 mg by mouth daily. In am  Indications: type 2 diabetes mellitus STOP taking these medications  
  
 doxycycline (MONODOX) 100 mg capsule Comments:  
Reason for Stopping: OBJECTIVE: 
Patient Vitals for the past 8 hrs: 
 BP Temp Pulse Resp SpO2  
20 0949 146/73      
20 0806 (!) 198/91 97.8 °F (36.6 °C) 84 16 94 % 20 0254 161/82 98 °F (36.7 °C) 85 18 96 % Temp (24hrs), Av °F (36.7 °C), Min:97.7 °F (36.5 °C), Max:98.5 °F (36.9 °C) 
 
701 - 1900 In: 240 [P.O.:240] Out: - Physical Exam: 
Constitutional: Well developed, well nourished male in no acute distress, sitting comfortably in bed HEENT: Normocephalic and atraumatic. Oropharynx is clear, mucous membranes are moist. Extraocular muscles are intact. Sclerae anicteric. Neck supple without JVD. No thyromegaly present. Skin Warm and dry. No bruising and no rash noted. No erythema. No pallor. Respiratory Lungs are clear to auscultation bilaterally without wheezes, rales or rhonchi, normal air exchange without accessory muscle use. CVS Normal rate, regular rhythm and normal S1 and S2. No murmurs, gallops, or rubs. Abdomen Soft, nontender and nondistended, normoactive bowel sounds. No palpable mass. No hepatosplenomegaly. Neuro Grossly nonfocal with no obvious sensory or motor deficits. MSK Normal range of motion in general.  No edema and no tenderness. Psych Appropriate mood and affect. ASSESSMENT: 
 
Active Problems: Hyponatremia (2015) DISPOSITION: 
[unfilled] Over 30 minutes was spent in discharge planning and coordination of care. Lorraine Jimenez, AMBREEN [address] [place] [address] Hematology & Oncology [address] [place] [address] Office : [phone] Fax : [phone]

## 2020-04-03 NOTE — PROGRESS NOTES
[facility] Nephrology Progress Note Follow-Up on: Hyponatremia  No new complaints Labs and chart reviewed- SNa 127 after a few  doses of tolvaptan ROS: 
Gen - no fever, no chills, appetite ok CV - no chest pain, no palpitation Lung - no shortness of breath, no cough Abd - no tenderness, no nausea/vomiting, no diarrhea Ext - no edema Exam: 
Vitals:  
 04/02/20 2246 04/03/20 6463 04/03/20 8205 04/03/20 6790 BP: 111/63 161/82 (!) 198/91 146/73 Pulse: 83 85 84 Resp: 18 18 16 Temp: 98.2 °F (36.8 °C) 98 °F (36.7 °C) 97.8 °F (36.6 °C) SpO2:  96% 94% Weight:      
Height:      
 
 
 
Intake/Output Summary (Last 24 hours) at 4/3/2020 1007 Last data filed at 4/3/2020 0302 Gross per 24 hour Intake 840 ml Output  Net 840 ml Wt Readings from Last 3 Encounters:  
03/31/20 77.1 kg (170 lb)  
03/28/20 76.7 kg (169 lb 3.2 oz)  
03/26/20 77.6 kg (171 lb) GEN - in no distress, alert and oriented CV - regular rate, S1, S2,  no rub Lung - clear bilaterally, no wheezes Abd - soft, nontender, + BS Ext - no edema Recent Labs 04/03/20 
8594 04/02/20 
5280 04/01/20 
9866 WBC 2.0* 2.1* 2.5* HGB 9.6* 9.7* 9.6* HCT 27.6* 27.9* 28.0*  
PLT 89* 118* 139* Recent Labs 04/03/20 
9898 04/02/20 
2031 04/02/20 
1439  04/02/20 
4211  04/01/20 
4505 * 124* 122*   < > 126*   < > 125* K 4.1  --   --   --  3.8  --  4.4 CL 93*  --   --   --  91*  --  89* CO2 29  --   --   --  29  --  32 BUN 17  --   --   --  18  --  25* CREA 0.75*  --   --   --  0.76*  --  0.90  
CA 8.8  --   --   --  8.6  --  8.8 *  --   --   --  206*  --  209* MG 1.8  --   --   --  1.9  --  1.8  
 < > = values in this interval not displayed. Assessment / Plan: Active Problems: Hyponatremia (11/23/2015) 1. Hyponatremia - would go ahead and send him home on Tolvaptan 15mg/ day. We can follow up in our office in a week or two with labs beforehand. 2. 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

## 2020-04-03 NOTE — PROGRESS NOTES
1955-Pt Bp elevate at 200/94 prn Apresoline was given 5 mg IV per order. 2100- Bp rechecked at 201/95, provider notified and orders received. 2246- BP recheck now 111/63, will continue to monitor.

## 2020-04-05 NOTE — PROGRESS NOTES
I spoke with Lan Freedman, pt's wife and she stated Braxton Benavides was too expensive to purchase and did not know use of medication. I explained this was given for chronic low sodium levels and sent email to [facility] for assistance with medication costs.

## 2020-04-17 NOTE — PROGRESS NOTES
Arrived to the Infusion Center.  C2 Carbo/taxol completed. Patient tolerated without problems. PIV removed. Any issues or concerns during appointment: no.  Patient aware of next infusion appointment on 4/18/20 (date) at 300pm (time).   Discharged ambulatory

## 2020-04-17 NOTE — PROGRESS NOTES
4/17/20 saw pt today with Dr. Yaneth Ellis for hospital follow up and pre chemo cycle 2 carbo/etop. He is feeling well today. PO intake is good. Denies any issues. Proceed to infusion. Follow up in 3 weeks for cycle 3. Encouraged to call with any concerns. Navigation will continue to follow.

## 2020-04-18 NOTE — PROGRESS NOTES
Arrived to the [facility]. Assessment completed and labs reviewed. Pre meds and Vp16 infusion completed. Patient tolerated well. Any issues or concerns during appointment: none. Patient aware of next infusion appointment on 04/19/2020 at 0930. Discharged ambulatory.

## 2020-04-19 NOTE — PROGRESS NOTES
Arrived to the [facility]. Etoposide completed. Patient tolerated well. Any issues or concerns during appointment: no.  Patient aware of next infusion appointment on 5/8/2020 (date) at 9:30 am (time). Discharged via wheelchair.

## 2020-05-08 NOTE — PROGRESS NOTES
I saw patient today with Lio Jang prior to C3 Carbo/Taxol. Pt sodium level 124 today so we will continue with chemotherapy as scheduled. Pt will remain on Samsca therapy. All medical necessity requests for Samsca signed by Dr Rafael Teran and faxed to Specialty pharmacy for free drug. Poornima Jiménez is involved in process. Pt to visit with Nephology on 5-18-20 per wife. Mobic refilled at patient's request. Nurse navigation is following

## 2020-05-08 NOTE — PROGRESS NOTES
Arrived to the [facility]. Assessment complete, labs reviewed. Carboplatin and Etoposide completed. Patient tolerated without problems. Any issues or concerns during appointment: None. Patient aware of next infusion appointment on 5/9/2020 (date) at 80 (time). Discharged via wheelchair.

## 2020-05-09 NOTE — PROGRESS NOTES
Arrived to the [facility]. Assessment complete, labs reviewed. Etoposide completed. Patient tolerated without problems. Any issues or concerns during appointment: None. Patient aware of next infusion appointment on 5/10/2020 (date) at 0930 (time).   Discharged via wheelchair.

## 2020-05-10 NOTE — PROGRESS NOTES
Arrived via w/c to [facility]. IV established with good blood return. NS infusing. Pre med as ordered. Etoposide infused. Pt aware of next appt on 5/29/2020 at 1030. IV discontinued with tip intact. . Discharged via w/c.

## 2020-05-20 PROBLEM — E83.42 HYPOMAGNESEMIA: Status: ACTIVE | Noted: 2020-01-01

## 2020-05-20 NOTE — PROGRESS NOTES
I saw patient today for sick visit with Vicki Lujan NP. He is looking frail in wheelchair today stating he feels very weak. He does not even feel like getting in the car for a drive. He has lost 8 lbs since last visit 10 days ago. We spoke with he and wife on speakerphone and encouraged them to talk about goals of therapy. Pt is shaking his head stating he no longer wants to feel this way. He will obtain 1 liter of fluids with Mg+ today and try to increase protein shakes and return to speak about goals at next visit. Palliative Care to assist at next visit also. Nurse navigation is following    Wife requested in office for next visit.

## 2020-05-20 NOTE — PROGRESS NOTES
Arrived to the [facility]. Assessment completed and labs reviewed. 1L of NS and 2g of Magnesium infused. Patient tolerated well. Any issues or concerns during appointment: none. Patient aware of next infusion appointment on 05/29/2020 at 1130. Discharged via wheelchair.

## 2020-05-24 PROBLEM — J96.01 ACUTE RESPIRATORY FAILURE WITH HYPOXIA (HCC): Status: ACTIVE | Noted: 2020-01-01

## 2020-05-24 PROBLEM — J18.9 CAP (COMMUNITY ACQUIRED PNEUMONIA): Status: ACTIVE | Noted: 2020-01-01

## 2020-05-24 PROBLEM — J90 PLEURAL EFFUSION: Status: ACTIVE | Noted: 2020-01-01

## 2020-05-24 PROBLEM — L03.314 CELLULITIS OF GROIN, LEFT: Status: RESOLVED | Noted: 2018-09-03 | Resolved: 2020-01-01

## 2020-05-24 PROBLEM — G93.41 ACUTE METABOLIC ENCEPHALOPATHY: Status: ACTIVE | Noted: 2020-01-01

## 2020-05-24 PROBLEM — G93.41 ACUTE METABOLIC ENCEPHALOPATHY: Status: RESOLVED | Noted: 2020-01-01 | Resolved: 2020-01-01

## 2020-05-24 PROBLEM — D64.9 NORMOCYTIC ANEMIA: Status: ACTIVE | Noted: 2020-01-01

## 2020-05-24 NOTE — ACP (ADVANCE CARE PLANNING)
Advance Care Planning Advance Care Planning Clinical Specialist 
Conversation Note Date of ACP Conversation: 5/24/2020 Conversation Conducted with:   Patient with Decision Making Capacity Healthcare Decision Maker: Next of Kin by law (only applies in absence of above) (name) spouse Mandy Quintana Pt on bipap and going for CT. Spouse interviewed by phone. ACP Clinical Specialist: Stu Ritchie LMSW 
 
*When Decision Maker makes decisions on behalf of the incapacitated patient: Decision Maker is asked to consider and make decisions based on patient values, known preferences, or best interests. Health Care Decision Maker: 
 
Current Designated Health Care Decision Maker:  
(If there is a valid [place] named in the [address] [place] [address] Makers\" box in the ACP activity, but it is not visible above, be sure to open that field and then select the health care decision maker relationship (ie \"primary\") in the blank space to the right of the name.) Validate  this information as still accurate & up-to-date; edit [place] field as needed.) Note: Assess and validate information in current ACP documents, as indicated. If no Decision Maker listed above or available through scanned documents, then: 
 
If no Authorized Decision Maker has previously been identified, then patient chooses [place]: \"Who would you like to name as your primary health care decision-maker? \" Name: Mandy Quintana   Relationship: spouse Phone number: [phone] or [phone] \"Can this person be reached easily? \" Buffy Mohawk \"Who would you like to name as your back-up decision maker? \" Name: none   Relationship: n/a Phone number: n/a \"Can this person be reached easily? \" NO Note: If the relationship of these Decision-Makers to the patient does NOT follow your state's Next of Kin hierarchy, recommend that patient complete ACP document that meets state-specific requirements to allow them to act on the patient's behalf when appropriate. Care Preferences Ventilation: \"If you were in your present state of health and suddenly became very ill and were unable to breathe on your own, what would your preference be about the use of a ventilator (breathing machine) if it were available to you? \" If patient would desire the use of a ventilator (breathing machine), answer \"yes\", if not \"no\":yes \"If your health worsens and it becomes clear that your chance of recovery is unlikely, what would your preference be about the use of a ventilator (breathing machine) if it were available to you? \" If patient would desire the use of a ventilator (breathing machine), answer \"yes\", if not \"no\"YES Resuscitation \"CPR works best to restart the heart when there is a sudden event, like a heart attack, in someone who is otherwise healthy. Unfortunately, CPR does not typically restart the heart for people who have serious health conditions or who are very sick. \" \"In the event your heart stopped as a result of an underlying serious health condition, would you want attempts to be made to restart your heart (answer \"yes\" for attempt to resuscitate) or would you prefer a natural death (answer \"no\" for do not attempt to resuscitate)? \" yes NOTE: If the patient has a valid advance directive AND now provides care preference(s) that are inconsistent with that prior directive, advise the patient to consider either: creating a new advance directive that complies with state-specific requirements; or, if that is not possible, orally revoking that prior directive in accordance with state-specific requirements, which must be documented in the EHR. [x] Yes  [] No   Educated Patient / Efrem Amaya regarding differences between Advance Directives and portable DNR orders. Length of ACP Conversation in minutes:  25 
 
Conversation Outcomes: [x] ACP discussion completed, no follow up at this time 
[] Existing advance directive reviewed with patient; no changes to patient's previously recorded wishes 
 [] New Advance Directive completed 
 [] Portable Do Not Rescitate prepared for Provider review and signature 
[] POLST/POST/MOLST/MOST prepared for Provider review and signature Follow-up plan:   
[] Schedule follow-up conversation to continue planning 
[] Referred individual to Provider for additional questions/concerns  
[] Advised patient/agent/surrogate to review completed ACP document and update if needed with changes in condition, patient preferences or care setting  
 
[x] This note routed to one or more involved healthcare providers

## 2020-05-24 NOTE — ED TRIAGE NOTES
Wife states pt has been SOB a few days and is a cancer pt. States he got some fluids on Wednesday due to increased weakness. Pt given mask prior to triage. Pt not able to speak in complete sentences.

## 2020-05-24 NOTE — PROGRESS NOTES
Pharmacokinetic Consult to Pharmacist 
 
Madeleine Burk is a 79 y.o. male being treated for HAP with vanc/zosyn. Height: 6' (182.9 cm)  Weight: 74.4 kg (164 lb) Lab Results Component Value Date/Time BUN 19 05/24/2020 05:10 PM  
 Creatinine 0.77 (L) 05/24/2020 05:10 PM  
 WBC 2.8 (L) 05/24/2020 05:10 PM  
 Procalcitonin 3.2 09/02/2018 07:10 PM  
 Lactic acid 2.2 (HH) 05/24/2020 05:10 PM  
 Lactic Acid (POC) 2.9 (H) 09/02/2018 07:59 PM  
  
Estimated Creatinine Clearance: 81.9 mL/min (A) (based on SCr of 0.77 mg/dL (L)). CULTURES: 
CULTURE, BLOOD [[id number]] Collected: 05/24/20 1735 Order Status: Completed Specimen: Blood Updated: 05/24/20 1803 CULTURE, BLOOD [[id number]] Collected: 05/24/20 1735 Order Status: Completed Specimen: Blood Updated: 05/24/20 1803 EMERGENT DISEASE PANEL [[id number]] Order Status: Sent No results found for: Joaquín Mccormick Day 1 of vancomycin. Goal trough is 15-20mcg/ml. Will give 2gm vanc load and if pt admitted and continued on vanc, pharmacy to dose per protocol. Will continue to follow patient. Thank you for this consult, Manjinder Gusman, PharmD

## 2020-05-24 NOTE — ED PROVIDER NOTES
HPI: 
71-year-old male with history of diabetes, head and neck cancer currently on chemo and radiation status post surgery is here with feeling short of breath, chest discomfort that has been progressively worsening over the past couple days. Unable to catch his breath. Feels like he cannot breathe. No prior history of fluid overload per family member. No known metastases to the lungs. Is not on oxygen. Not a previous smoker or a current smoker. No known history of COPD. He denies any abdominal pain nausea vomiting or diarrhea. Has had a mild cough. No known exposure to coronavirus. He denies any recent antibiotics. ROS Constitutional: No fever, no chills Skin: no rash Eye: No vision changes ENMT: No sore throat Respiratory: + shortness of breath, + cough Cardiovascular: + chest pain, no palpitations Gastrointestinal: No vomiting, no nausea, no diarrhea, no abdominal pain : No dysuria MSK: No back pain, no muscle pain, no joint pain Neuro: No headache, no change in mental status, no numbness, no tingling, no weakness Psych:  
Endocrine:  
All other review of systems positive per history of present illness and the above otherwise negative or noncontributory. Visit Vitals BP (!) 200/102 Pulse (!) 110 Resp (!) 35 Ht 6' (1.829 m) SpO2 (!) 65% BMI 22.34 kg/m² Past Medical History:  
Diagnosis Date  GERD (gastroesophageal reflux disease)   
 pt wife denies  Head and neck cancer ([facility] [address] 9/30/2015  
 radiation and chemo and and surgery  History of squamous cell carcinoma   
 to neck area- 38 radiation treatement and 8 chemo treatment  History of throat cancer 2015  
 peg tube for about 4 months  Hypercholesteremia   
 managed well with meds  Hypertension   
 managed well with meds  Hypomagnesemia 5/20/2020  Rectal cancer ([address] 2018  
 29 radiation treatment and chemo pump  Type 2 diabetes mellitus ([address] oral agent only/AVG BS: /no s.s of hypoglycemia  Vomiting 2/23/2016  
 pt wife denies Past Surgical History:  
Procedure Laterality Date  HX COLONOSCOPY  05/2018  HX HEENT    
 sinus  HX HEENT  2015  
 surgery on right throat for squamous cell ca right ear wedge  HX LYMPH NODE DISSECTION    
 HX ORTHOPAEDIC Right 1966  
 right leg  HX OTHER SURGICAL  9/9/15 EXCISION OF RIGHT Neck and PARAPHARYNGEAL MASS/RIGHT EAR WEDGE RESECTION  
 HX OTHER SURGICAL    
 peg placed and removed  HX TONSILLECTOMY  HX VASCULAR ACCESS    
 placed and removed Prior to Admission Medications Prescriptions Last Dose Informant Patient Reported? Taking? TRUEPLUS LANCETS 28 gauge misc   Yes No  
amLODIPine (NORVASC) 10 mg tablet   Yes No  
Sig: Take 10 mg by mouth daily. In am  
azithromycin (ZITHROMAX) 250 mg tablet   No No  
Sig: Take 2 Tabs by mouth See Admin Instructions for 5 days. docusate sodium (COLACE) 100 mg capsule   Yes No  
Sig: Take 100 mg by mouth as needed for Constipation. ferrous sulfate 324 mg (65 mg iron) tablet   Yes No  
Sig: Take  by mouth Daily (before breakfast). meloxicam (MOBIC) 7.5 mg tablet   No No  
Sig: Take 1 Tab by mouth daily. Indications: arthralgias  
metFORMIN (GLUCOPHAGE) 1,000 mg tablet   Yes No  
Sig: Take 1,000 mg by mouth daily. In am  Indications: type 2 diabetes mellitus  
sodium chloride 1 gram tablet   No No  
Sig: TAKE 2 TABS BY MOUTH TWO (2) TIMES A DAY. traMADoL (ULTRAM) 50 mg tablet   No No  
Sig: Take 1 Tab by mouth every six (6) hours as needed for Pain for up to 30 days. Max Daily Amount: 200 mg. Indications: pain Facility-Administered Medications: None Adult Exam  
General: Awake, respiratory distress Head: normocephalic, atraumatic ENT: moist mucous membranes Neck: supple, non-tender; full range of motion Cardiovascular: Tachycardic, normal peripheral perfusion, no edema, equal pulses Respiratory: Tachypnea. Retractions noted. Nasal flaring. Severely diminished breath sound noted bilaterally. Mild scattered wheezes noted. Gastrointestinal: soft, non-tender; no rebound or guarding, no peritoneal signs, no distension Back: non-tender, full range of motion Musculoskeletal: normal range of motion, normal strength, no gross deformities Neurological: no gross focal deficits; normal speech Psychiatric: cooperative; appropriate mood and affect MDM: 
He is visibly short of breath. He was 65% on room air. Not on oxygen. Immediately brought back to the room and placed on 5 L nasal cannula which improved to 97%. Still complaining of shortness of breath even with improve oxygenation. There is no signs of fluid overload on exam.  He however he has severely diminished breath sounds throughout with some very minimal wheezing which likely is currently much more prominent ones were able to open them up a little more. Given his history of current cancer the concern is for pulmonary embolism. His blood pressure is also elevated 200/102. This could also be secondary to hypertensive emergency. Will give a dose of 20 mg of IV labetalol. Will give DuoNeb. Will place patient on CPAP. Will obtain an ABG, portable chest x-ray. Also swab for coronavirus given hypoxia, respiratory difficulty. No reported fever at home. Differential diagnoses include acute onset fluid overload, coronavirus, pneumonia, pneumothorax, ACS, pulmonary embolism, hypertensive emergency. EKG sinus tachycardia rate of 109 with normal axis. Normal interval.  PVC noted. No STEMI noted. 5:39 PM 
ABG with pH of 7.4. Relatively well compensated. Blood pressure now 431 systolic. He is on 3 L nasal cannula at 97% and appear much calmer. Albuterol has been given. We will hold on the labetalol. 
 
7:21 PM 
Chest x-ray with questionable right lower lobe density possible pneumonia. He is leukopenic. Will place on vancomycin and Zosyn. Cover testing initiated. CT chest negative for any PE. There is significant pleural effusion noted. He is appears significantly more comfortable at this time. He is not hypoxic. Given rather large bilateral pleural effusion with elevated BNP will also give Lasix. I spoke with the hospitalist at [facility] regarding admission. Patient has been accepted and is pending transfer. Recent Results (from the past 12 hour(s)) CBC WITH AUTOMATED DIFF Collection Time: 05/24/20  5:10 PM  
Result Value Ref Range WBC 2.8 (L) 4.3 - 11.1 K/uL  
 RBC 2.68 (L) 4.23 - 5.6 M/uL HGB 8.1 (L) 13.6 - 17.2 g/dL HCT 25.1 (L) 41.1 - 50.3 % MCV 93.7 79.6 - 97.8 FL  
 MCH 30.2 26.1 - 32.9 PG  
 MCHC 32.3 31.4 - 35.0 g/dL  
 RDW 17.3 (H) 11.9 - 14.6 % PLATELET 117 478 - 716 K/uL MPV 9.8 9.4 - 12.3 FL ABSOLUTE NRBC 0.03 0.0 - 0.2 K/uL  
 DF AUTOMATED NEUTROPHILS 42 (L) 43 - 78 % LYMPHOCYTES 19 13 - 44 % MONOCYTES 34 (H) 4.0 - 12.0 % EOSINOPHILS 0 (L) 0.5 - 7.8 % BASOPHILS 0 0.0 - 2.0 % IMMATURE GRANULOCYTES 4 0.0 - 5.0 %  
 ABS. NEUTROPHILS 1.2 (L) 1.7 - 8.2 K/UL  
 ABS. LYMPHOCYTES 0.5 0.5 - 4.6 K/UL  
 ABS. MONOCYTES 1.0 0.1 - 1.3 K/UL  
 ABS. EOSINOPHILS 0.0 0.0 - 0.8 K/UL  
 ABS. BASOPHILS 0.0 0.0 - 0.2 K/UL  
 ABS. IMM. GRANS. 0.1 0.0 - 0.5 K/UL METABOLIC PANEL, COMPREHENSIVE Collection Time: 05/24/20  5:10 PM  
Result Value Ref Range Sodium 123 (LL) 136 - 145 mmol/L Potassium 4.5 3.5 - 5.1 mmol/L Chloride 88 (L) 98 - 107 mmol/L  
 CO2 26 21 - 32 mmol/L Anion gap 9 7 - 16 mmol/L Glucose 307 (H) 65 - 100 mg/dL BUN 19 8 - 23 MG/DL Creatinine 0.77 (L) 0.8 - 1.5 MG/DL  
 GFR est AA >60 >60 ml/min/1.73m2 GFR est non-AA >60 >60 ml/min/1.73m2 Calcium 9.0 8.3 - 10.4 MG/DL  Bilirubin, total 0.3 0.2 - 1.1 MG/DL  
 ALT (SGPT) 17 12 - 65 U/L  
 AST (SGOT) 35 15 - 37 U/L  
 Alk. phosphatase 419 (H) 50 - 136 U/L Protein, total 6.6 6.3 - 8.2 g/dL Albumin 2.5 (L) 3.2 - 4.6 g/dL Globulin 4.1 (H) 2.3 - 3.5 g/dL A-G Ratio 0.6 (L) 1.2 - 3.5    
TROPONIN I Collection Time: 05/24/20  5:10 PM  
Result Value Ref Range Troponin-I, Qt. <0.02 (L) 0.02 - 0.05 NG/ML  
NT-PRO BNP Collection Time: 05/24/20  5:10 PM  
Result Value Ref Range NT pro-BNP 1,875 (H) <450 PG/ML  
LACTIC ACID Collection Time: 05/24/20  5:10 PM  
Result Value Ref Range Lactic acid 2.2 (HH) 0.4 - 2.0 MMOL/L  
POC G3 Collection Time: 05/24/20  5:26 PM  
Result Value Ref Range Device: NASAL CANNULA pH (POC) 7.408 7.35 - 7.45    
 pCO2 (POC) 39.0 35 - 45 MMHG  
 pO2 (POC) 76 75 - 100 MMHG  
 HCO3 (POC) 24.6 22 - 26 MMOL/L  
 sO2 (POC) 95 95 - 98 % Base excess (POC) 0 mmol/L Allens test (POC) NOT APPLICABLE Site RIGHT BRACHIAL Specimen type (POC) ARTERIAL Performed by Juan   
 CO2, POC 26 MMOL/L Flow rate (POC) 3.000 L/min Critical value read back 17:30   
 COLLECT TIME 1,720 Ct Chest W Cont Result Date: 5/24/2020 CT OF THE CHEST WITH CONTRAST, PULMONARY EMBOLUS PROTOCOL. CLINICAL INDICATION: Shortness of breath, hypoxia, chest pain, throat and rectal cancer, Covid rule out PROCEDURE: Serial thin section axial images are obtained from the thoracic inlet through the upper abdomen following the administration of intravenous contrast per a dedicated, institutional pulmonary embolus protocol. Coronal MIP reformatted images are generated. Radiation dose reduction techniques were used for this study. Our CT scanners use one or all of the following: Automated exposure control, adjusted of the mA and/or kV according to patient size, iterative reconstruction COMPARISON: PET/CT dated 3/24/2020 FINDINGS: The aorta is unremarkable.  There is adequate opacification of the central pulmonary arterial tree. No central intraluminal filling defect noted to indicate acute pulmonary embolus. Large bilateral pleural effusions are present that have increased significantly in size. There is no pneumothorax. Dependent atelectasis is present. Bilateral inflammatory appearing pulmonary nodules are noted in the bilateral upper lobes. Note, these are present on the prior PET/CT is well. Limited evaluation the upper abdomen shows adrenal glands to be normal. Multiple sclerotic foci appreciated in the ribs and spine. This is most in keeping with osseous metastatic disease. IMPRESSION: 1. No acute pulmonary emboli. 2. Large bilateral pleural effusions. . Inflammatory appearing nodularity in the bilateral upper lobes near the lung apices. 3. Multiple patchy sclerotic lesions throughout the thoracic spine and ribs. This is in keeping with the patient's history of osseous metastatic disease. Xr Chest [facility] Result Date: 5/24/2020 Portable chest x-ray CLINICAL INDICATION: Shortness of breath FINDINGS: Single AP view of the chest compared to a similar chest x-ray dated 9/2/2018 shows new airspace density in the right lower lung with a probable small right pleural effusion. Left lung is clear. The cardiac silhouette and mediastinum are unremarkable. IMPRESSION: New airspace density in the right lower lung with a probable small right pleural effusion. Pneumonia should be considered. Dragon voice recognition software was used to create this note. Although the note has been reviewed and corrected where necessary, additional errors may have been overlooked and remain in the text.

## 2020-05-25 PROBLEM — D70.9 NEUTROPENIA (HCC): Status: ACTIVE | Noted: 2020-01-01

## 2020-05-25 PROBLEM — J90 PLEURAL EFFUSION ON RIGHT: Status: ACTIVE | Noted: 2020-01-01

## 2020-05-25 NOTE — H&P (VIEW-ONLY)
CONSULT NOTE Rebeka Perez 5/25/2020 Date of Admission:  5/24/2020 The patient's chart is reviewed and the patient is discussed with the staff. Subjective: The patient is a 79 y.o.  male seen and evaluated at the request of Dr. Poppy Rodriguez for evaluation and management of pleural effusions. 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 He was last seen on 5/20 as follows: Javier Angry \"Jessica Pelletier was first seen in our office in September 2015. He was referred for evaluation of a squamous carcinoma which involved his right neck. In approximately July 2015 he began to note some swelling of his right neck and face. He was treated conservatively with antibiotics. Apparently a needle aspiration was performed and revealed no malignant cells. Ultimately, because of persistence of the mass, he underwent a more definitive evaluation. By the time of the evaluation, he described the lesion as measuring approximately the size of a golf ball. On August 25, 2015 he underwent a CT scan of the neck with contrast this demonstrated a large necrotic appearing mass in the right submandibular region measuring 5 x 4.1 cm. It was commented that the lesion appeared to be separate from the parotid gland and felt to arise extrinsic to the carotid sheath structures. It was felt to potentially represent a necrotic lymph node. There were no other lymph nodes evident of a worrisome nature. In addition, the studies showed no suspicious primary within the neck. On September 9, 2015 he underwent an excision of the right neck/parapharyngeal mass. A direct laryngoscopy was performed on that date as well. There was a  Malignant appearing lesion excised from the right auricle as well. At surgery, the lesion was noted to extend almost to the base of the skull and could not be fully excised.   There were no other palpable irregularities in the right neck. There were no concerning mucosal lesions on direct laryngoscopy. The lesion on the ear proved to be a basal cell carcinoma without positive margins. The right neck mass showed salivary glands, fibroadipose and lymphoid tissue containing well to moderately differentiated squamous cell carcinoma. .  The tumor focally extended to the margin of resection. A PET/CT scan performed on September 17, 2015 showed a low-density focus at the site of the previously described lymph node now measuring 3.1 x 2.6 cm. The FDG activity was an SUV of 6.5. There were no other findings on the PET scan. The patient is a lifelong nonsmoker and does not abuse alcohol. Immunohistochemistries did not suggest HPV as an etiologic agent. In November 2015 he began a course of radiation therapy concurrent with weekly cisplatin. He completed therapy in December 2015. In July 2018, he related a history of having been treated for hemorrhoids over the prior 5 years with various topical preparations. Ultimately, because of persistence of symptoms he was referred to a gastroenterologist and a colonoscopy performed. Biopsies of the anal region showed  a poorly differentiated tumor with both glandular and neuroendocrine features. This was subsequently confirmed at the [facility] in [place]. There were scattered cells in a pagetoid fashion within surrounding squamous epithelium. He underwent a PET scan which showed some activity in the anal skin as is evident on exam.  There was also a 1.2 cm lymph node in the left inguinal region with some slight SUV activity of unclear significance. There was no evidence of disease dissemination. He began therapy with 5-FU mitomycin and concurrent radiation therapy in August 2018.   He completed his  therapy on September 27  A subsequent PET scan was performed showing  no worrisome uptake suggestive of persistent or recurrent malignancy in either of the previously treated primary locations. PET scan in April 2019 showed no evidence of recurrent tumor in the head neck or in the soft tissues of the pelvis. There was however an area of FDG uptake in the left iliac wing which was worrisome but of unclear significance. Because of the PET scan findings noted above, he underwent an MRI which also shows that possible solitary metastasis in the left iliac bone. 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 This was irradiated.  
  
He returns today as a work in. He received cycle 3 Carbo/Etop 2 weeks ago. He has generalized weakness and fatigue. Nocturia hourly is keeping him up at night. He states he is eating and drinking well, however he has lost 5 pounds since last seen. Denies any falls at home. He has no nausea and constipation is controlled, no mucositis. Wife states having an occasional productive cough and exertional shortness of breath. He has continued with 2 salt talbs BID along with tolvaptan for SIADH. His nephrology appointment was moved out to June 5th. He deneis any fever, chills or other infectious symptoms. \" He presented to [facility] on 5/24 with increasing dyspnea and chest discomfort. A CT of the chest revealed large bilateral effusions and we are now asked to assist with management. He is hyponatremic and getting saline infustions and also getting PRBCs for anemia.  
  
 
Review of Systems Vin Negrete Denies: fevers, chills, sweats, fatigue, malaise, anorexia, weight loss Denies: blurry vision, loss of vision, eye pain, photophobia Denies: hearing loss, ringing in the ears, earache, epistaxis Denies: chest pain, palpitations, syncope, orthopnea, paroxysmal nocturnal dyspnea, claudication Denies: dysphagia, odynophagia, nausea, vomiting, diarrhea, constipation, abdominal pain, jaundice, melena Denies: frequency, dysuria, nocturia, urinary incontinence, stones, hematuria Denies: polydipsia/polyuria, skin changes, temperature intolerance, unexpected weight gain Denies: back pain, joint pain, joint swelling, muscle pain. Denies: bleeding problems, blood transfusions, bruising, pallor, swollen lymph nodes Denies: headache, dysarthria, blurred vision, diplopia,seizure, focal deficits. Admits to: dyspnea, weakness Patient Active Problem List  
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  
 Primary malignant neoplasm of perianal skin C44.500  Anal carcinoma ([facility] C21.0  Postoperative seroma of subcutaneous tissue after non-dermatologic procedure L76.34  
 Malignant neoplasm metastatic to bone ([facility]) C79.51  
 SIADH (syndrome of inappropriate ADH production) ([facility]) E22.2  Hypomagnesemia E83.42  
 Acute respiratory failure with hypoxia ([facility]) J96.01  
 Normocytic anemia D64.9  
 CAP (community acquired pneumonia) J18.9  Pleural effusion on left J90  
 Neutropenia ([facility]) D70.9  Pleural effusion on right J90 Prior to Admission Medications Prescriptions Last Dose Informant Patient Reported? Taking? TRUEPLUS LANCETS 28 gauge misc   Yes No  
amLODIPine (NORVASC) 10 mg tablet   Yes No  
Sig: Take 10 mg by mouth daily. In am  
azithromycin (ZITHROMAX) 250 mg tablet   No No  
Sig: Take 2 Tabs by mouth See Admin Instructions for 5 days. docusate sodium (COLACE) 100 mg capsule   Yes No  
Sig: Take 100 mg by mouth as needed for Constipation.   
ferrous sulfate 324 mg (65 mg iron) tablet   Yes No  
 Sig: Take  by mouth Daily (before breakfast). glimepiride (AMARYL) 4 mg tablet   Yes No  
Sig: Take 4 mg by mouth every morning. lisinopriL (PRINIVIL, ZESTRIL) 20 mg tablet   Yes No  
Sig: Take 20 mg by mouth daily. meloxicam (MOBIC) 7.5 mg tablet   No No  
Sig: Take 1 Tab by mouth daily. Indications: arthralgias  
metFORMIN (GLUCOPHAGE) 1,000 mg tablet   Yes No  
Sig: Take 1,000 mg by mouth daily. In am  Indications: type 2 diabetes mellitus  
sodium chloride 1 gram tablet   No No  
Sig: TAKE 2 TABS BY MOUTH TWO (2) TIMES A DAY. tolvaptan (Samsca) tab tablet   Yes No  
Sig: Take 15 mg by mouth daily. traMADoL (ULTRAM) 50 mg tablet   No No  
Sig: Take 1 Tab by mouth every six (6) hours as needed for Pain for up to 30 days. Max Daily Amount: 200 mg. Indications: pain Facility-Administered Medications: None Past Medical History:  
Diagnosis Date  GERD (gastroesophageal reflux disease)   
 pt wife denies  Head and neck cancer ([facility] [address] 9/30/2015  
 radiation and chemo and and surgery  History of squamous cell carcinoma   
 to neck area- 38 radiation treatement and 8 chemo treatment  History of throat cancer 2015  
 peg tube for about 4 months  Hypercholesteremia   
 managed well with meds  Hypertension   
 managed well with meds  Hypomagnesemia 5/20/2020  Rectal cancer ([facility] [address] 2018 28 radiation treatment and chemo pump  Type 2 diabetes mellitus ([facility] [address] oral agent only/AVG BS: /no s.s of hypoglycemia  Vomiting 2/23/2016  
 pt wife denies Past Surgical History:  
Procedure Laterality Date  HX COLONOSCOPY  05/2018  HX HEENT    
 sinus  HX HEENT  2015  
 surgery on right throat for squamous cell ca right ear wedge  HX LYMPH NODE DISSECTION    
 HX ORTHOPAEDIC Right 1966  
 right leg  HX OTHER SURGICAL  9/9/15 EXCISION OF RIGHT Neck and PARAPHARYNGEAL MASS/RIGHT EAR WEDGE RESECTION  
 HX OTHER SURGICAL    
 peg placed and removed  HX TONSILLECTOMY  HX VASCULAR ACCESS    
 placed and removed Social History Socioeconomic History  Marital status:  Spouse name: Not on file  Number of children: Not on file  Years of education: Not on file  Highest education level: Not on file Occupational History  Not on file Social Needs  Financial resource strain: Not on file  Food insecurity Worry: Not on file Inability: Not on file  Transportation needs Medical: Not on file Non-medical: Not on file Tobacco Use  Smoking status: Never Smoker  Smokeless tobacco: Never Used Substance and Sexual Activity  Alcohol use: No  
 Drug use: No  
 Sexual activity: Not on file Lifestyle  Physical activity Days per week: Not on file Minutes per session: Not on file  Stress: Not on file Relationships  Social connections Talks on phone: Not on file Gets together: Not on file Attends Nondenominational service: Not on file Active member of club or organization: Not on file Attends meetings of clubs or organizations: Not on file Relationship status: Not on file  Intimate partner violence Fear of current or ex partner: Not on file Emotionally abused: Not on file Physically abused: Not on file Forced sexual activity: Not on file Other Topics Concern  Not on file Social History Narrative  Not on file Family History Problem Relation Age of Onset  Emphysema Father  Lung Disease Father  Cancer Brother Colon  Heart Disease Sister  Hypertension Sister  Heart Disease Sister  Hypertension Sister  Heart Disease Brother  Hypertension Brother  Heart Disease Brother  Hypertension Brother  Heart Disease Brother  Hypertension Brother  Heart Disease Brother  Hypertension Brother  Heart Disease Brother  Hypertension Brother No Known Allergies Current Facility-Administered Medications Medication Dose Route Frequency  0.9% sodium chloride infusion 250 mL  250 mL IntraVENous PRN  
 amLODIPine (NORVASC) tablet 10 mg  10 mg Oral DAILY  docusate sodium (COLACE) capsule 100 mg  100 mg Oral PRN  
 ferrous sulfate tablet 325 mg  325 mg Oral DAILY  glimepiride (AMARYL) tablet 4 mg  4 mg Oral 7am  
 lisinopriL (PRINIVIL, ZESTRIL) tablet 20 mg  20 mg Oral DAILY  meloxicam (MOBIC) tablet 7.5 mg  7.5 mg Oral DAILY  sodium chloride tablet 2 g  2 g Oral BID  [Held by provider] tolvaptan (SAMSCA) tablet 15 mg  15 mg Oral ONCE  
 traMADoL (ULTRAM) tablet 50 mg  50 mg Oral Q6H PRN  
 sodium chloride (NS) flush 5-40 mL  5-40 mL IntraVENous Q8H  
 sodium chloride (NS) flush 5-40 mL  5-40 mL IntraVENous PRN  
 acetaminophen (TYLENOL) tablet 650 mg  650 mg Oral Q4H PRN  
 ondansetron (ZOFRAN) injection 4 mg  4 mg IntraVENous Q4H PRN  
 magnesium hydroxide (MILK OF MAGNESIA) 400 mg/5 mL oral suspension 30 mL  30 mL Oral DAILY PRN  
 insulin regular (NOVOLIN R, HUMULIN R) injection   SubCUTAneous AC&HS  
 0.9% sodium chloride infusion  125 mL/hr IntraVENous CONTINUOUS  
 [Held by provider] enoxaparin (LOVENOX) injection 40 mg  40 mg SubCUTAneous Q24H  cefTRIAXone (ROCEPHIN) 1 g in 0.9% sodium chloride (MBP/ADV) 50 mL  1 g IntraVENous Q24H  
 azithromycin (ZITHROMAX) 500 mg in 0.9% sodium chloride (MBP/ADV) 250 mL  500 mg IntraVENous Q24H Objective:  
 
Vitals:  
 05/25/20 4048 05/25/20 4046 05/25/20 0729 05/25/20 1119 BP: 151/76 165/84 164/85 156/81 Pulse: 99 (!) 104 (!) 104 100 Resp: 19 18 18 18 Temp: 98.1 °F (36.7 °C) 98.2 °F (36.8 °C) 98.7 °F (37.1 °C) 98.2 °F (36.8 °C) SpO2: 94% 92% 94% 92% PHYSICAL EXAM  
 
Constitutional:  the patient is well developed and in no acute distress [facility]  Sclera clear, pupils equal, oral mucosa moist 
Respiratory: decreased BS at bases with dullness Cardiovascular:  RRR without M,G,R 
Gastrointestinal: soft and non-tender; with positive bowel sounds. Musculoskeletal: warm without cyanosis. There is trace lower extremity edema. Skin:  no jaundice or rashes Neurologic: no gross neuro deficits Psychiatric:  alert and oriented x 3 CXR:   
 
Chest CT: 
 
 
 
FINDINGS:  
  
The aorta is unremarkable. There is adequate opacification of the central 
pulmonary arterial tree. No central intraluminal filling defect noted to 
indicate acute pulmonary embolus. Large bilateral pleural effusions are present 
that have increased significantly in size. There is no pneumothorax. Dependent 
atelectasis is present. Bilateral inflammatory appearing pulmonary nodules are 
noted in the bilateral upper lobes. Note, these are present on the prior PET/CT 
is well. 
  
Limited evaluation the upper abdomen shows adrenal glands to be normal. 
  
Multiple sclerotic foci appreciated in the ribs and spine. This is most in 
keeping with osseous metastatic disease. 
  
IMPRESSION: 
1. No acute pulmonary emboli. 2. Large bilateral pleural effusions. . Inflammatory appearing nodularity in the 
bilateral upper lobes near the lung apices. 3. Multiple patchy sclerotic lesions throughout the thoracic spine and ribs. This is in keeping with the patient's history of osseous metastatic disease. Recent Labs  
  05/25/20 
0339 05/24/20 
1710 WBC 2.2* 2.8* HGB 6.9* 8.1* HCT 21.2* 25.1*  
 167 Recent Labs  
  05/25/20 
9980 05/25/20 
0339 05/25/20 
0146 05/24/20 
1710 * 129* 130* 123* K  --  3.6  --  4.5  
CL  --  93*  --  88* GLU  --  176*  --  307* CO2  --  29  --  26 BUN  --  13  --  19  
CREA  --  0.70*  --  0.77* CA  --  8.7  --  9.0  
TROIQ  --   --   --  <0.02* ALB  --   --   --  2.5* TBILI  --   --   --  0.3 ALT  --   --   --  17 SGOT  --   --   --  35 Recent Labs  
  05/24/20 
1726 PHI 7.408 PCO2I 39.0 PO2I 76 HCO3I 24.6 Recent Labs  
  05/24/20 
1710 LAC 2.2* Assessment:  (Medical Decision Making) Hospital Problems  Date Reviewed: 5/20/2020 Codes Class Noted POA Neutropenia ([facility] ICD-10-CM: D70.9 ICD-9-CM: 288.00  5/25/2020 Unknown WBC 2.2 Pleural effusion on right ICD-10-CM: J90 ICD-9-CM: 511.9  5/25/2020 Unknown Large. Possibly malignant given underlying disease. * (Principal) Acute respiratory failure with hypoxia ([facility] ICD-10-CM: J96.01 
ICD-9-CM: 518.81  5/24/2020 Yes On NC Normocytic anemia ICD-10-CM: D64.9 ICD-9-CM: 285.9  5/24/2020 Yes Getting PRBC  
 CAP (community acquired pneumonia) ICD-10-CM: J18.9 ICD-9-CM: 144  5/24/2020 Yes Pleural effusion on left ICD-10-CM: J90 ICD-9-CM: 511.9  5/24/2020 Yes Large. Possibly malignant given underlying disease. Malignant neoplasm metastatic to bone [facility]) ICD-10-CM: C79.51 
ICD-9-CM: 198.5  6/26/2019 Yes Type 2 diabetes with nephropathy ([facility] ICD-10-CM: E11.21 
ICD-9-CM: 250.40, 583.81  7/3/2018 Yes Diabetes mellitus due to underlying condition with hyperglycemia ([facility] ICD-10-CM: N37.76 ICD-9-CM: 249.80  12/21/2015 Yes Encounter for laboratory testing for COVID-19 virus ICD-10-CM: Z11.59 
ICD-9-CM: V73.89  12/15/2015 Unknown Results pending Hyponatremia ICD-10-CM: E87.1 ICD-9-CM: 276.1  11/23/2015 Yes Hx SIADH Plan:  (Medical Decision Making) Thoracentesis today. Will try to do both sides if BP allows. Full analysis of fluid if appears other than serous. Await echo. Need to be careful with NSS infusion. If pleural fluid is watery, fluid restriction should be considered and IVF decreased. Haley Frizzle Low suspicion for COVID infection. -- 
 
More than 50% of the time documented was spent in face-to-face contact with the patient and in the care of the patient on the floor/unit where the patient is located.  
 
Thank you very much for this referral.  We appreciate the opportunity to participate in this patient's care. Will follow along with above stated plan.  
 
Isamar Blake MD

## 2020-05-25 NOTE — PROGRESS NOTES
Pt tolerating blood transfusion well at this time. Transfusion rate changed to 130 ml./hr. will continue to monitor.

## 2020-05-25 NOTE — ED NOTES
TRANSFER - OUT REPORT: 
 
Verbal report given to [facility] DAVID LEE on [employer]  [facility] for routine progression of care Report consisted of patients Situation, Background, Assessment and  
Recommendations(SBAR). Information from the following report(s) ED Summary, MAR and Recent Results was reviewed with the receiving nurse. Lines:  
Peripheral IV 05/24/20 Right Hand (Active) Site Assessment Clean, dry, & intact 5/24/2020  5:36 PM  
Phlebitis Assessment 0 5/24/2020  5:36 PM  
Infiltration Assessment 0 5/24/2020  5:36 PM  
Dressing Status Clean, dry, & intact 5/24/2020  5:36 PM  
Dressing Type Transparent 5/24/2020  5:36 PM  
   
Peripheral IV 05/24/20 Left Antecubital (Active) Opportunity for questions and clarification was provided. Patient transported with: 
 O2 @ 3 liters Tech

## 2020-05-25 NOTE — PROGRESS NOTES
Lab stated blood for transfusion in lab is not ready at this time. Stated they would call when ready.

## 2020-05-25 NOTE — PROGRESS NOTES
RNCM notified of pt ED JOSELINE, pt transfer from [facility] to [facility], will not open JOSELINE at this time as pt will be reassigned at discharge.

## 2020-05-25 NOTE — PROCEDURES
Thoracentesis Procedure Note Procedure:  R Thoracentesis with ultrasound guidance Indication:  R Pleural effusion Summary: After informed consent was obtained, the patient was positioned upright. Ultrasound was used to identify the optimal spot for pleural drainage. The lower R intercostal space was anesthetized with 1% Lidocaine to achieve adequate anesthesia. The pleural space was entered with watery fluid identified. Lidocaine was instilled within the pleural space as well. A small stab incision was made at the site of local anesthesia and the thoracentesis catheter was advanced into the pleural space. Approximately 1200 ml of fluid was obtained with ease. The procedure was terminated after pleural drainage stopped. Air was not aspirated during the procedure. A pressure dressing was placed over the incision after adequate hemostasis was achieved. A CXR is not pending as a follow up US revealed a + lung slide c/w no PTX. The pleural fluid will not be sent for analysis as it was c/w a transudate. EBL: negligible Post Procedure Dx: Pleural effusion on R  Signed By: Hattie Triana MD

## 2020-05-25 NOTE — PROGRESS NOTES
Hospitalist Note Admit Date:  2020 11:00 PM  
Name:  Aftab Cedillo Age:  79 y.o. 
:  1940 MRN:  [id number] PCP:  Josefina Flynn MD 
Treatment Team: Attending Provider: Prakash Guevara MD; Consulting Provider: Eugene Tubbs MD; Primary Nurse: Luz Marina Peguero, RN; Primary Nurse: Rudy Berry, RN; Utilization Review: Vikash Black RN 
 
HPI/Subjective:  
Mr. Hayder Love is a nice 80 y/o WM with a h/o head/neck cancer followed by [facility] who presented to [facility] on  with c/o progressive SOB x3-4 days. No chest pain, fevers, recent travel or sick contacts. Labs notable for chronic hyponatremia, LA 2.2, trop neg, BNP 1875. CXR showed RLL opacity with possible effusion. CT chest w contrast showed large b/l pleural effusions; negative for PE; and bone mets (known). : In bed, appears weak, mildly SOB. On NC but overall appears comfortable. No other complaints Objective:  
 
Patient Vitals for the past 24 hrs: 
 Temp Pulse Resp BP SpO2  
20 0729 98.7 °F (37.1 °C) (!) 104 18 164/85 94 % 20 0551 98.2 °F (36.8 °C) (!) 104 18 165/84 92 % 20 0304 98.1 °F (36.7 °C) 99 19 151/76 94 % 20 2303 99.2 °F (37.3 °C) (!) 106 19 160/85 96 % Oxygen Therapy O2 Sat (%): 94 % (20 0729) Estimated body mass index is 22.24 kg/m² as calculated from the following: 
  Height as of an earlier encounter on 20: 6' (1.829 m). Weight as of an earlier encounter on 20: 74.4 kg (164 lb). Intake/Output Summary (Last 24 hours) at 2020 0706 Last data filed at 2020 1453 Gross per 24 hour Intake 0 ml Output 1380 ml Net -1380 ml *Note that automatically entered I/Os may not be accurate; dependent on patient compliance with collection and accurate  by techs. General:    Well nourished but thin. Alert. CV:   RRR. No murmur, rub, or gallop. Lungs: Mild wheezes upper lobes; decreased BS in all other fields. 3L NC O2. No distress. Abdomen:   Soft, nontender, nondistended. Extremities: Warm and dry. No cyanosis or edema. Skin:     No rashes or jaundice. Neuro:  No gross focal deficits Data Review: 
I have reviewed all labs, meds, and studies from the last 24 hours: 
Recent Results (from the past 24 hour(s)) CBC WITH AUTOMATED DIFF Collection Time: 05/24/20  5:10 PM  
Result Value Ref Range WBC 2.8 (L) 4.3 - 11.1 K/uL  
 RBC 2.68 (L) 4.23 - 5.6 M/uL HGB 8.1 (L) 13.6 - 17.2 g/dL HCT 25.1 (L) 41.1 - 50.3 % MCV 93.7 79.6 - 97.8 FL  
 MCH 30.2 26.1 - 32.9 PG  
 MCHC 32.3 31.4 - 35.0 g/dL  
 RDW 17.3 (H) 11.9 - 14.6 % PLATELET 804 207 - 033 K/uL MPV 9.8 9.4 - 12.3 FL ABSOLUTE NRBC 0.03 0.0 - 0.2 K/uL  
 DF AUTOMATED NEUTROPHILS 42 (L) 43 - 78 % LYMPHOCYTES 19 13 - 44 % MONOCYTES 34 (H) 4.0 - 12.0 % EOSINOPHILS 0 (L) 0.5 - 7.8 % BASOPHILS 0 0.0 - 2.0 % IMMATURE GRANULOCYTES 4 0.0 - 5.0 %  
 ABS. NEUTROPHILS 1.2 (L) 1.7 - 8.2 K/UL  
 ABS. LYMPHOCYTES 0.5 0.5 - 4.6 K/UL  
 ABS. MONOCYTES 1.0 0.1 - 1.3 K/UL  
 ABS. EOSINOPHILS 0.0 0.0 - 0.8 K/UL  
 ABS. BASOPHILS 0.0 0.0 - 0.2 K/UL  
 ABS. IMM. GRANS. 0.1 0.0 - 0.5 K/UL METABOLIC PANEL, COMPREHENSIVE Collection Time: 05/24/20  5:10 PM  
Result Value Ref Range Sodium 123 (LL) 136 - 145 mmol/L Potassium 4.5 3.5 - 5.1 mmol/L Chloride 88 (L) 98 - 107 mmol/L  
 CO2 26 21 - 32 mmol/L Anion gap 9 7 - 16 mmol/L Glucose 307 (H) 65 - 100 mg/dL BUN 19 8 - 23 MG/DL Creatinine 0.77 (L) 0.8 - 1.5 MG/DL  
 GFR est AA >60 >60 ml/min/1.73m2 GFR est non-AA >60 >60 ml/min/1.73m2 Calcium 9.0 8.3 - 10.4 MG/DL Bilirubin, total 0.3 0.2 - 1.1 MG/DL  
 ALT (SGPT) 17 12 - 65 U/L  
 AST (SGOT) 35 15 - 37 U/L Alk. phosphatase 419 (H) 50 - 136 U/L Protein, total 6.6 6.3 - 8.2 g/dL Albumin 2.5 (L) 3.2 - 4.6 g/dL Globulin 4.1 (H) 2.3 - 3.5 g/dL A-G Ratio 0.6 (L) 1.2 - 3.5    
TROPONIN I Collection Time: 05/24/20  5:10 PM  
Result Value Ref Range Troponin-I, Qt. <0.02 (L) 0.02 - 0.05 NG/ML  
NT-PRO BNP Collection Time: 05/24/20  5:10 PM  
Result Value Ref Range NT pro-BNP 1,875 (H) <450 PG/ML  
LACTIC ACID Collection Time: 05/24/20  5:10 PM  
Result Value Ref Range Lactic acid 2.2 (HH) 0.4 - 2.0 MMOL/L  
POC G3 Collection Time: 05/24/20  5:26 PM  
Result Value Ref Range Device: NASAL CANNULA pH (POC) 7.408 7.35 - 7.45    
 pCO2 (POC) 39.0 35 - 45 MMHG  
 pO2 (POC) 76 75 - 100 MMHG  
 HCO3 (POC) 24.6 22 - 26 MMOL/L  
 sO2 (POC) 95 95 - 98 % Base excess (POC) 0 mmol/L Allens test (POC) NOT APPLICABLE Site RIGHT BRACHIAL Specimen type (POC) ARTERIAL Performed by Juan   
 CO2, POC 26 MMOL/L Flow rate (POC) 3.000 L/min Critical value read back 17:30   
 COLLECT TIME 1,720 GLUCOSE, POC Collection Time: 05/24/20  8:32 PM  
Result Value Ref Range Glucose (POC) 292 (H) 65 - 100 mg/dL SODIUM Collection Time: 05/25/20  1:46 AM  
Result Value Ref Range Sodium 130 (L) 136 - 145 mmol/L METABOLIC PANEL, BASIC Collection Time: 05/25/20  3:39 AM  
Result Value Ref Range Sodium 129 (L) 136 - 145 mmol/L Potassium 3.6 3.5 - 5.1 mmol/L Chloride 93 (L) 98 - 107 mmol/L  
 CO2 29 21 - 32 mmol/L Anion gap 7 7 - 16 mmol/L Glucose 176 (H) 65 - 100 mg/dL BUN 13 8 - 23 MG/DL Creatinine 0.70 (L) 0.8 - 1.5 MG/DL  
 GFR est AA >60 >60 ml/min/1.73m2 GFR est non-AA >60 >60 ml/min/1.73m2 Calcium 8.7 8.3 - 10.4 MG/DL  
CBC WITH AUTOMATED DIFF Collection Time: 05/25/20  3:39 AM  
Result Value Ref Range WBC 2.2 (L) 4.3 - 11.1 K/uL  
 RBC 2.31 (L) 4.23 - 5.6 M/uL HGB 6.9 (LL) 13.6 - 17.2 g/dL HCT 21.2 (L) 41.1 - 50.3 % MCV 91.8 79.6 - 97.8 FL  
 MCH 29.9 26.1 - 32.9 PG  
 MCHC 32.5 31.4 - 35.0 g/dL  
 RDW 17.0 (H) 11.9 - 14.6 % PLATELET 682 429 - 669 K/uL MPV 10.4 9.4 - 12.3 FL ABSOLUTE NRBC 0.03 0.0 - 0.2 K/uL NEUTROPHILS 37 (L) 43 - 78 % LYMPHOCYTES 20 13 - 44 % MONOCYTES 38 (H) 4.0 - 12.0 % EOSINOPHILS 1 0.5 - 7.8 % BASOPHILS 0 0.0 - 2.0 % IMMATURE GRANULOCYTES 4 0.0 - 5.0 %  
 ABS. NEUTROPHILS 0.9 (L) 1.7 - 8.2 K/UL  
 ABS. LYMPHOCYTES 0.4 (L) 0.5 - 4.6 K/UL  
 ABS. MONOCYTES 0.8 0.1 - 1.3 K/UL  
 ABS. EOSINOPHILS 0.0 0.0 - 0.8 K/UL  
 ABS. BASOPHILS 0.0 0.0 - 0.2 K/UL  
 ABS. IMM. GRANS. 0.1 0.0 - 0.5 K/UL  
 RBC COMMENTS OCCASIONAL 
POLYCHROMASIA 
    
 RBC COMMENTS MODERATE 
ANISOCYTOSIS + POIKILOCYTOSIS 
    
 WBC COMMENTS Result Confirmed By Smear PLATELET COMMENTS ADEQUATE    
 DF AUTOMATED    
SODIUM Collection Time: 05/25/20  6:52 AM  
Result Value Ref Range Sodium 128 (L) 136 - 145 mmol/L Current Meds: 
Current Facility-Administered Medications Medication Dose Route Frequency  0.9% sodium chloride infusion 250 mL  250 mL IntraVENous PRN  
 amLODIPine (NORVASC) tablet 10 mg  10 mg Oral DAILY  docusate sodium (COLACE) capsule 100 mg  100 mg Oral PRN  
 ferrous sulfate tablet 325 mg  325 mg Oral DAILY  glimepiride (AMARYL) tablet 4 mg  4 mg Oral 7am  
 lisinopriL (PRINIVIL, ZESTRIL) tablet 20 mg  20 mg Oral DAILY  meloxicam (MOBIC) tablet 7.5 mg  7.5 mg Oral DAILY  [START ON 5/27/2020] metFORMIN (GLUCOPHAGE) tablet 1,000 mg  1,000 mg Oral DAILY  sodium chloride tablet 2 g  2 g Oral BID  [Held by provider] tolvaptan (SAMSCA) tablet 15 mg  15 mg Oral ONCE  
 traMADoL (ULTRAM) tablet 50 mg  50 mg Oral Q6H PRN  
 sodium chloride (NS) flush 5-40 mL  5-40 mL IntraVENous Q8H  
 sodium chloride (NS) flush 5-40 mL  5-40 mL IntraVENous PRN  
 acetaminophen (TYLENOL) tablet 650 mg  650 mg Oral Q4H PRN  
 ondansetron (ZOFRAN) injection 4 mg  4 mg IntraVENous Q4H PRN  
 magnesium hydroxide (MILK OF MAGNESIA) 400 mg/5 mL oral suspension 30 mL  30 mL Oral DAILY PRN  
  insulin regular (NOVOLIN R, HUMULIN R) injection   SubCUTAneous AC&HS  
 0.9% sodium chloride infusion  125 mL/hr IntraVENous CONTINUOUS  
 enoxaparin (LOVENOX) injection 40 mg  40 mg SubCUTAneous Q24H  cefTRIAXone (ROCEPHIN) 1 g in 0.9% sodium chloride (MBP/ADV) 50 mL  1 g IntraVENous Q24H  
 azithromycin (ZITHROMAX) 500 mg in 0.9% sodium chloride (MBP/ADV) 250 mL  500 mg IntraVENous Q24H Other Studies: No results found for this visit on 05/24/20. Ct Chest W Cont Result Date: 5/24/2020 CT OF THE CHEST WITH CONTRAST, PULMONARY EMBOLUS PROTOCOL. CLINICAL INDICATION: Shortness of breath, hypoxia, chest pain, throat and rectal cancer, Covid rule out PROCEDURE: Serial thin section axial images are obtained from the thoracic inlet through the upper abdomen following the administration of intravenous contrast per a dedicated, institutional pulmonary embolus protocol. Coronal MIP reformatted images are generated. Radiation dose reduction techniques were used for this study. Our CT scanners use one or all of the following: Automated exposure control, adjusted of the mA and/or kV according to patient size, iterative reconstruction COMPARISON: PET/CT dated 3/24/2020 FINDINGS: The aorta is unremarkable. There is adequate opacification of the central pulmonary arterial tree. No central intraluminal filling defect noted to indicate acute pulmonary embolus. Large bilateral pleural effusions are present that have increased significantly in size. There is no pneumothorax. Dependent atelectasis is present. Bilateral inflammatory appearing pulmonary nodules are noted in the bilateral upper lobes. Note, these are present on the prior PET/CT is well. Limited evaluation the upper abdomen shows adrenal glands to be normal. Multiple sclerotic foci appreciated in the ribs and spine. This is most in keeping with osseous metastatic disease. IMPRESSION: 1. No acute pulmonary emboli.  2. Large bilateral pleural effusions. . Inflammatory appearing nodularity in the bilateral upper lobes near the lung apices. 3. Multiple patchy sclerotic lesions throughout the thoracic spine and ribs. This is in keeping with the patient's history of osseous metastatic disease. Xr Chest [facility] Result Date: 5/24/2020 Portable chest x-ray CLINICAL INDICATION: Shortness of breath FINDINGS: Single AP view of the chest compared to a similar chest x-ray dated 9/2/2018 shows new airspace density in the right lower lung with a probable small right pleural effusion. Left lung is clear. The cardiac silhouette and mediastinum are unremarkable. IMPRESSION: New airspace density in the right lower lung with a probable small right pleural effusion. Pneumonia should be considered. All Micro Results None SARS-CoV-2 Lab Results \"Novel Coronavirus\" Test: No results found for: COV2NT \"Emergent Disease\" Test: No results found for: EDPR \"SARS-COV-2\" Test: No results found for: XGCOVT As of: 8:21 AM on 5/25/2020 Assessment and Plan:  
 
Hospital Problems as of 5/25/2020 Date Reviewed: 5/20/2020 Codes Class Noted - Resolved POA Neutropenia ([facility].) ICD-10-CM: D70.9 ICD-9-CM: 288.00  5/25/2020 - Present Unknown * (Principal) Acute respiratory failure with hypoxia [facility]) ICD-10-CM: J96.01 
ICD-9-CM: 518.81  5/24/2020 - Present Yes Normocytic anemia ICD-10-CM: D64.9 ICD-9-CM: 285.9  5/24/2020 - Present Yes  
   
 CAP (community acquired pneumonia) ICD-10-CM: J18.9 ICD-9-CM: 875  5/24/2020 - Present Yes Pleural effusion ICD-10-CM: J90 ICD-9-CM: 511.9  5/24/2020 - Present Yes Malignant neoplasm metastatic to bone [facility]) ICD-10-CM: C79.51 
ICD-9-CM: 198.5  6/26/2019 - Present Yes Type 2 diabetes with nephropathy ([facility].) ICD-10-CM: E11.21 
ICD-9-CM: 250.40, 583.81  7/3/2018 - Present Yes  Diabetes mellitus due to underlying condition with hyperglycemia ([facility] ICD-10-CM: H40.08 
 ICD-9-CM: 249.80  12/21/2015 - Present Yes Encounter for laboratory testing for COVID-19 virus ICD-10-CM: Z11.59 
ICD-9-CM: V73.89  12/15/2015 - Present Unknown Hyponatremia ICD-10-CM: E87.1 ICD-9-CM: 276.1  11/23/2015 - Present Yes RESOLVED: Acute metabolic encephalopathy EVT-42-LE: G93.41 
ICD-9-CM: 348.31  5/24/2020 - 5/24/2020 Yes Plan: # Acute hypoxemic respiratory failure - 2/2 large bilateral pleural effusions. No clear consolidation, add on PCT, consider dc abx if neg. LA at admission was only 2.2. COVID swabbed but low suspicion based on presentation and imaging. TTE when/if COVID negative. Pulm consult. Wean O2 as able. # Acute anemia 
 - No clear blood loss. Baseline Hb 8-9. 6.9 this AM so 1U RBCs ordered. # Chronic hyponatremia/SIADH 
 - On [facility] outpatient but held per Pharmacy policy if Na <879. Con't salt tabs. Follow sodium. # DM2 
 - DC metformin since just got contrast yesterday. SSI, glimepiride. # HTN 
 - Norvasc, lisinopril DC planning/Dispo: Hopefully home when able. Diet:  DIET DIABETIC CONSISTENT CARB 
DVT ppx: Lovenox held pending possible thora; add SCDs Signed: 
Alesia Jacobs MD

## 2020-05-25 NOTE — CONSULTS
CONSULT NOTE Lukasz Becker 5/25/2020 Date of Admission:  5/24/2020 The patient's chart is reviewed and the patient is discussed with the staff. Subjective: The patient is a 79 y.o.  male seen and evaluated at the request of Dr. Manuel العراقي for evaluation and management of pleural effusions. 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 He was last seen on 5/20 as follows: Uma Richey \"Opal Newton was first seen in our office in September 2015. He was referred for evaluation of a squamous carcinoma which involved his right neck. In approximately July 2015 he began to note some swelling of his right neck and face. He was treated conservatively with antibiotics. Apparently a needle aspiration was performed and revealed no malignant cells. Ultimately, because of persistence of the mass, he underwent a more definitive evaluation. By the time of the evaluation, he described the lesion as measuring approximately the size of a golf ball. On August 25, 2015 he underwent a CT scan of the neck with contrast this demonstrated a large necrotic appearing mass in the right submandibular region measuring 5 x 4.1 cm. It was commented that the lesion appeared to be separate from the parotid gland and felt to arise extrinsic to the carotid sheath structures. It was felt to potentially represent a necrotic lymph node. There were no other lymph nodes evident of a worrisome nature. In addition, the studies showed no suspicious primary within the neck. On September 9, 2015 he underwent an excision of the right neck/parapharyngeal mass. A direct laryngoscopy was performed on that date as well. There was a  Malignant appearing lesion excised from the right auricle as well. At surgery, the lesion was noted to extend almost to the base of the skull and could not be fully excised.   There were no other palpable irregularities in the right neck. There were no concerning mucosal lesions on direct laryngoscopy. The lesion on the ear proved to be a basal cell carcinoma without positive margins. The right neck mass showed salivary glands, fibroadipose and lymphoid tissue containing well to moderately differentiated squamous cell carcinoma. .  The tumor focally extended to the margin of resection. A PET/CT scan performed on September 17, 2015 showed a low-density focus at the site of the previously described lymph node now measuring 3.1 x 2.6 cm. The FDG activity was an SUV of 6.5. There were no other findings on the PET scan. The patient is a lifelong nonsmoker and does not abuse alcohol. Immunohistochemistries did not suggest HPV as an etiologic agent. In November 2015 he began a course of radiation therapy concurrent with weekly cisplatin. He completed therapy in December 2015. In July 2018, he related a history of having been treated for hemorrhoids over the prior 5 years with various topical preparations. Ultimately, because of persistence of symptoms he was referred to a gastroenterologist and a colonoscopy performed. Biopsies of the anal region showed  a poorly differentiated tumor with both glandular and neuroendocrine features. This was subsequently confirmed at the [facility] in [place]. There were scattered cells in a pagetoid fashion within surrounding squamous epithelium. He underwent a PET scan which showed some activity in the anal skin as is evident on exam.  There was also a 1.2 cm lymph node in the left inguinal region with some slight SUV activity of unclear significance. There was no evidence of disease dissemination. He began therapy with 5-FU mitomycin and concurrent radiation therapy in August 2018.   He completed his  therapy on September 27  A subsequent PET scan was performed showing  no worrisome uptake suggestive of persistent or recurrent malignancy in either of the previously treated primary locations. PET scan in April 2019 showed no evidence of recurrent tumor in the head neck or in the soft tissues of the pelvis. There was however an area of FDG uptake in the left iliac wing which was worrisome but of unclear significance. Because of the PET scan findings noted above, he underwent an MRI which also shows that possible solitary metastasis in the left iliac bone. 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 This was irradiated.  
  
He returns today as a work in. He received cycle 3 Carbo/Etop 2 weeks ago. He has generalized weakness and fatigue. Nocturia hourly is keeping him up at night. He states he is eating and drinking well, however he has lost 5 pounds since last seen. Denies any falls at home. He has no nausea and constipation is controlled, no mucositis. Wife states having an occasional productive cough and exertional shortness of breath. He has continued with 2 salt talbs BID along with tolvaptan for SIADH. His nephrology appointment was moved out to June 5th. He deneis any fever, chills or other infectious symptoms. \" He presented to [facility] on 5/24 with increasing dyspnea and chest discomfort. A CT of the chest revealed large bilateral effusions and we are now asked to assist with management. He is hyponatremic and getting saline infustions and also getting PRBCs for anemia.  
  
 
Review of Systems Kadie Allen Denies: fevers, chills, sweats, fatigue, malaise, anorexia, weight loss Denies: blurry vision, loss of vision, eye pain, photophobia Denies: hearing loss, ringing in the ears, earache, epistaxis Denies: chest pain, palpitations, syncope, orthopnea, paroxysmal nocturnal dyspnea, claudication Denies: dysphagia, odynophagia, nausea, vomiting, diarrhea, constipation, abdominal pain, jaundice, melena Denies: frequency, dysuria, nocturia, urinary incontinence, stones, hematuria Denies: polydipsia/polyuria, skin changes, temperature intolerance, unexpected weight gain Denies: back pain, joint pain, joint swelling, muscle pain. Denies: bleeding problems, blood transfusions, bruising, pallor, swollen lymph nodes Denies: headache, dysarthria, blurred vision, diplopia,seizure, focal deficits. Admits to: dyspnea, weakness Patient Active Problem List  
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  
 Diabetes mellitus due to underlying condition with hyperglycemia ([facility] [address] E08.65  Type 2 diabetes with nephropathy ([facility]) E11.21  
 Primary malignant neoplasm of perianal skin C44.500  Anal carcinoma ([facility] [address] C21.0  Postoperative seroma of subcutaneous tissue after non-dermatologic procedure L76.34  
 Malignant neoplasm metastatic to bone ([facility]) C79.51  
 SIADH (syndrome of inappropriate ADH production) ([facility]) E22.2  Hypomagnesemia E83.42  
 Acute respiratory failure with hypoxia ([facility]) J96.01  
 Normocytic anemia D64.9  
 CAP (community acquired pneumonia) J18.9  Pleural effusion on left J90  
 Neutropenia ([facility]) D70.9  Pleural effusion on right J90 Prior to Admission Medications Prescriptions Last Dose Informant Patient Reported? Taking? TRUEPLUS LANCETS 28 gauge misc   Yes No  
amLODIPine (NORVASC) 10 mg tablet   Yes No  
Sig: Take 10 mg by mouth daily. In am  
azithromycin (ZITHROMAX) 250 mg tablet   No No  
Sig: Take 2 Tabs by mouth See Admin Instructions for 5 days. docusate sodium (COLACE) 100 mg capsule   Yes No  
Sig: Take 100 mg by mouth as needed for Constipation.   
ferrous sulfate 324 mg (65 mg iron) tablet   Yes No  
 Sig: Take  by mouth Daily (before breakfast). glimepiride (AMARYL) 4 mg tablet   Yes No  
Sig: Take 4 mg by mouth every morning. lisinopriL (PRINIVIL, ZESTRIL) 20 mg tablet   Yes No  
Sig: Take 20 mg by mouth daily. meloxicam (MOBIC) 7.5 mg tablet   No No  
Sig: Take 1 Tab by mouth daily. Indications: arthralgias  
metFORMIN (GLUCOPHAGE) 1,000 mg tablet   Yes No  
Sig: Take 1,000 mg by mouth daily. In am  Indications: type 2 diabetes mellitus  
sodium chloride 1 gram tablet   No No  
Sig: TAKE 2 TABS BY MOUTH TWO (2) TIMES A DAY. tolvaptan (Samsca) tab tablet   Yes No  
Sig: Take 15 mg by mouth daily. traMADoL (ULTRAM) 50 mg tablet   No No  
Sig: Take 1 Tab by mouth every six (6) hours as needed for Pain for up to 30 days. Max Daily Amount: 200 mg. Indications: pain Facility-Administered Medications: None Past Medical History:  
Diagnosis Date  GERD (gastroesophageal reflux disease)   
 pt wife denies  Head and neck cancer ([facility] [address] 9/30/2015  
 radiation and chemo and and surgery  History of squamous cell carcinoma   
 to neck area- 38 radiation treatement and 8 chemo treatment  History of throat cancer 2015  
 peg tube for about 4 months  Hypercholesteremia   
 managed well with meds  Hypertension   
 managed well with meds  Hypomagnesemia 5/20/2020  Rectal cancer ([facility] [address] 2018 28 radiation treatment and chemo pump  Type 2 diabetes mellitus ([facility] [address] oral agent only/AVG BS: /no s.s of hypoglycemia  Vomiting 2/23/2016  
 pt wife denies Past Surgical History:  
Procedure Laterality Date  HX COLONOSCOPY  05/2018  HX HEENT    
 sinus  HX HEENT  2015  
 surgery on right throat for squamous cell ca right ear wedge  HX LYMPH NODE DISSECTION    
 HX ORTHOPAEDIC Right 1966  
 right leg  HX OTHER SURGICAL  9/9/15 EXCISION OF RIGHT Neck and PARAPHARYNGEAL MASS/RIGHT EAR WEDGE RESECTION  
 HX OTHER SURGICAL    
 peg placed and removed  HX TONSILLECTOMY  HX VASCULAR ACCESS    
 placed and removed Social History Socioeconomic History  Marital status:  Spouse name: Not on file  Number of children: Not on file  Years of education: Not on file  Highest education level: Not on file Occupational History  Not on file Social Needs  Financial resource strain: Not on file  Food insecurity Worry: Not on file Inability: Not on file  Transportation needs Medical: Not on file Non-medical: Not on file Tobacco Use  Smoking status: Never Smoker  Smokeless tobacco: Never Used Substance and Sexual Activity  Alcohol use: No  
 Drug use: No  
 Sexual activity: Not on file Lifestyle  Physical activity Days per week: Not on file Minutes per session: Not on file  Stress: Not on file Relationships  Social connections Talks on phone: Not on file Gets together: Not on file Attends Zoroastrianism service: Not on file Active member of club or organization: Not on file Attends meetings of clubs or organizations: Not on file Relationship status: Not on file  Intimate partner violence Fear of current or ex partner: Not on file Emotionally abused: Not on file Physically abused: Not on file Forced sexual activity: Not on file Other Topics Concern  Not on file Social History Narrative  Not on file Family History Problem Relation Age of Onset  Emphysema Father  Lung Disease Father  Cancer Brother Colon  Heart Disease Sister  Hypertension Sister  Heart Disease Sister  Hypertension Sister  Heart Disease Brother  Hypertension Brother  Heart Disease Brother  Hypertension Brother  Heart Disease Brother  Hypertension Brother  Heart Disease Brother  Hypertension Brother  Heart Disease Brother  Hypertension Brother No Known Allergies Current Facility-Administered Medications Medication Dose Route Frequency  0.9% sodium chloride infusion 250 mL  250 mL IntraVENous PRN  
 amLODIPine (NORVASC) tablet 10 mg  10 mg Oral DAILY  docusate sodium (COLACE) capsule 100 mg  100 mg Oral PRN  
 ferrous sulfate tablet 325 mg  325 mg Oral DAILY  glimepiride (AMARYL) tablet 4 mg  4 mg Oral 7am  
 lisinopriL (PRINIVIL, ZESTRIL) tablet 20 mg  20 mg Oral DAILY  meloxicam (MOBIC) tablet 7.5 mg  7.5 mg Oral DAILY  sodium chloride tablet 2 g  2 g Oral BID  [Held by provider] tolvaptan (SAMSCA) tablet 15 mg  15 mg Oral ONCE  
 traMADoL (ULTRAM) tablet 50 mg  50 mg Oral Q6H PRN  
 sodium chloride (NS) flush 5-40 mL  5-40 mL IntraVENous Q8H  
 sodium chloride (NS) flush 5-40 mL  5-40 mL IntraVENous PRN  
 acetaminophen (TYLENOL) tablet 650 mg  650 mg Oral Q4H PRN  
 ondansetron (ZOFRAN) injection 4 mg  4 mg IntraVENous Q4H PRN  
 magnesium hydroxide (MILK OF MAGNESIA) 400 mg/5 mL oral suspension 30 mL  30 mL Oral DAILY PRN  
 insulin regular (NOVOLIN R, HUMULIN R) injection   SubCUTAneous AC&HS  
 0.9% sodium chloride infusion  125 mL/hr IntraVENous CONTINUOUS  
 [Held by provider] enoxaparin (LOVENOX) injection 40 mg  40 mg SubCUTAneous Q24H  cefTRIAXone (ROCEPHIN) 1 g in 0.9% sodium chloride (MBP/ADV) 50 mL  1 g IntraVENous Q24H  
 azithromycin (ZITHROMAX) 500 mg in 0.9% sodium chloride (MBP/ADV) 250 mL  500 mg IntraVENous Q24H Objective:  
 
Vitals:  
 05/25/20 2270 05/25/20 0307 05/25/20 0729 05/25/20 1119 BP: 151/76 165/84 164/85 156/81 Pulse: 99 (!) 104 (!) 104 100 Resp: 19 18 18 18 Temp: 98.1 °F (36.7 °C) 98.2 °F (36.8 °C) 98.7 °F (37.1 °C) 98.2 °F (36.8 °C) SpO2: 94% 92% 94% 92% PHYSICAL EXAM  
 
Constitutional:  the patient is well developed and in no acute distress [facility]  S[facility]era clear, pupils equal, oral mucosa moist 
Respiratory: decreased BS at bases with dullness Cardiovascular:  RRR without M,G,R 
Gastrointestinal: soft and non-tender; with positive bowel sounds. Musculoskeletal: warm without cyanosis. There is trace lower extremity edema. Skin:  no jaundice or rashes Neurologic: no gross neuro deficits Psychiatric:  alert and oriented x 3 CXR:   
 
Chest CT: 
 
 
 
FINDINGS:  
  
The aorta is unremarkable. There is adequate opacification of the central 
pulmonary arterial tree. No central intraluminal filling defect noted to 
indicate acute pulmonary embolus. Large bilateral pleural effusions are present 
that have increased significantly in size. There is no pneumothorax. Dependent 
atelectasis is present. Bilateral inflammatory appearing pulmonary nodules are 
noted in the bilateral upper lobes. Note, these are present on the prior PET/CT 
is well. 
  
Limited evaluation the upper abdomen shows adrenal glands to be normal. 
  
Multiple sclerotic foci appreciated in the ribs and spine. This is most in 
keeping with osseous metastatic disease. 
  
IMPRESSION: 
1. No acute pulmonary emboli. 2. Large bilateral pleural effusions. . Inflammatory appearing nodularity in the 
bilateral upper lobes near the lung apices. 3. Multiple patchy sclerotic lesions throughout the thoracic spine and ribs. This is in keeping with the patient's history of osseous metastatic disease. Recent Labs  
  05/25/20 
0339 05/24/20 
1710 WBC 2.2* 2.8* HGB 6.9* 8.1* HCT 21.2* 25.1*  
 167 Recent Labs  
  05/25/20 
0458 05/25/20 
0339 05/25/20 
0146 05/24/20 
1710 * 129* 130* 123* K  --  3.6  --  4.5  
CL  --  93*  --  88* GLU  --  176*  --  307* CO2  --  29  --  26 BUN  --  13  --  19  
CREA  --  0.70*  --  0.77* CA  --  8.7  --  9.0  
TROIQ  --   --   --  <0.02* ALB  --   --   --  2.5* TBILI  --   --   --  0.3 ALT  --   --   --  17 SGOT  --   --   --  35 Recent Labs  
  05/24/20 
1726 PHI 7.408 PCO2I 39.0 PO2I 76 HCO3I 24.6 Recent Labs  
  05/24/20 
1710 LAC 2.2* Assessment:  (Medical Decision Making) Hospital Problems  Date Reviewed: 5/20/2020 Codes Class Noted POA Neutropenia ([facility] ICD-10-CM: D70.9 ICD-9-CM: 288.00  5/25/2020 Unknown WBC 2.2 Pleural effusion on right ICD-10-CM: J90 ICD-9-CM: 511.9  5/25/2020 Unknown Large. Possibly malignant given underlying disease. * (Principal) Acute respiratory failure with hypoxia ([facility] ICD-10-CM: J96.01 
ICD-9-CM: 518.81  5/24/2020 Yes On NC Normocytic anemia ICD-10-CM: D64.9 ICD-9-CM: 285.9  5/24/2020 Yes Getting PRBC  
 CAP (community acquired pneumonia) ICD-10-CM: J18.9 ICD-9-CM: 636  5/24/2020 Yes Pleural effusion on left ICD-10-CM: J90 ICD-9-CM: 511.9  5/24/2020 Yes Large. Possibly malignant given underlying disease. Malignant neoplasm metastatic to bone [facility]) ICD-10-CM: C79.51 
ICD-9-CM: 198.5  6/26/2019 Yes Type 2 diabetes with nephropathy ([facility] ICD-10-CM: E11.21 
ICD-9-CM: 250.40, 583.81  7/3/2018 Yes Diabetes mellitus due to underlying condition with hyperglycemia ([facility] ICD-10-CM: D04.10 ICD-9-CM: 249.80  12/21/2015 Yes Encounter for laboratory testing for COVID-19 virus ICD-10-CM: Z11.59 
ICD-9-CM: V73.89  12/15/2015 Unknown Results pending Hyponatremia ICD-10-CM: E87.1 ICD-9-CM: 276.1  11/23/2015 Yes Hx SIADH Plan:  (Medical Decision Making) Thoracentesis today. Will try to do both sides if BP allows. Full analysis of fluid if appears other than serous. Await echo. Need to be careful with NSS infusion. If pleural fluid is watery, fluid restriction should be considered and IVF decreased. Haley Frizzle Low suspicion for COVID infection. -- 
 
More than 50% of the time documented was spent in face-to-face contact with the patient and in the care of the patient on the floor/unit where the patient is located.  
 
Thank you very much for this referral.  We appreciate the opportunity to participate in this patient's care. Will follow along with above stated plan.  
 
Ophelia Street MD

## 2020-05-25 NOTE — INTERVAL H&P NOTE
Update History & Physical 
 
The Patient's consultation from 5/25/20 was reviewed. .  There was no change. Plan:  The risk, benefits, expected outcome, and alternative to the recommended procedure have been discussed with the patient. Patient understands and wants to proceed with the procedure.  
 
Electronically signed by Davina Avalos MD on 5/25/2020 at 1:03 PM

## 2020-05-25 NOTE — PROGRESS NOTES
Called lab to inform them that pt needed a type and crossmatch done to receive 1 unit of blood that has been ordered. Spoke with guille and she states she will send someone up.

## 2020-05-25 NOTE — PROGRESS NOTES
Pt resting in bed comfortably at this time, alert and oriented times 3. No distress noted, respirations even and unlabored. Pt denies pain at this time. Ns infusing at 125 ml/hr. Pt instructed to call for assistance if needed, call light in place, will continue to monitor.

## 2020-05-25 NOTE — PROGRESS NOTES
Blood transfusion started at this time. Pt tolerated well. Will observe patient for 15 min at the bedside and obtain a set of vitals at 1300.

## 2020-05-25 NOTE — PROGRESS NOTES
Transfusion complete. Pt tolerated well. Will continue to monitor. Will give bedside shift report to [facility].

## 2020-05-25 NOTE — H&P
HOSPITALIST INITIAL HISTORY AND PHYSICAL 
 
NAME:  Ewa Roberts Age:  79 y.o. 
:   1940 MRN:   [id number] PCP: Veronica Claudio MD 
Consulting MD: Treatment Team: Attending Provider: Nile Jesus MD 
 
CHIEF COMPLAINT: SOB, weakness HISTORY OF PRESENT ILLNESS:  
Ewa Roberts is a 79 y.o. male with a past medical history of HEENT cancer undergoing chemo/radiation who presents to the [facility] ER with report of SOB and chest discomfort for the past several days. He also admits to mild cough but denies fevers or chills. Admits to feeling a little better and breathing a bit better since arrival. 
 
REVIEW OF SYSTEMS: Comprehensive ROS performed. Denies fevers, chills, nausea, vomiting, otherwise negative. Past Medical History:  
Diagnosis Date  GERD (gastroesophageal reflux disease)   
 pt wife denies  Head and neck cancer ([facility] 2015  
 radiation and chemo and and surgery  History of squamous cell carcinoma   
 to neck area- 38 radiation treatement and 8 chemo treatment  History of throat cancer   
 peg tube for about 4 months  Hypercholesteremia   
 managed well with meds  Hypertension   
 managed well with meds  Hypomagnesemia 2020  Rectal cancer ([address] 2018  
 28 radiation treatment and chemo pump  Type 2 diabetes mellitus ([address] oral agent only/AVG BS: /no s.s of hypoglycemia  Vomiting 2016  
 pt wife denies Past Surgical History:  
Procedure Laterality Date  HX COLONOSCOPY  2018  HX HEENT    
 sinus  HX HEENT    
 surgery on right throat for squamous cell ca right ear wedge  HX LYMPH NODE DISSECTION    
 HX ORTHOPAEDIC Right 1966  
 right leg  HX OTHER SURGICAL  9/9/15 EXCISION OF RIGHT Neck and PARAPHARYNGEAL MASS/RIGHT EAR WEDGE RESECTION  
 HX OTHER SURGICAL    
 peg placed and removed  HX TONSILLECTOMY  HX VASCULAR ACCESS    
 placed and removed Prior to Admission Medications Prescriptions Last Dose Informant Patient Reported? Taking? TRUEPLUS LANCETS 28 gauge misc   Yes No  
amLODIPine (NORVASC) 10 mg tablet   Yes No  
Sig: Take 10 mg by mouth daily. In am  
azithromycin (ZITHROMAX) 250 mg tablet   No No  
Sig: Take 2 Tabs by mouth See Admin Instructions for 5 days. docusate sodium (COLACE) 100 mg capsule   Yes No  
Sig: Take 100 mg by mouth as needed for Constipation. ferrous sulfate 324 mg (65 mg iron) tablet   Yes No  
Sig: Take  by mouth Daily (before breakfast). glimepiride (AMARYL) 4 mg tablet   Yes No  
Sig: Take 4 mg by mouth every morning. lisinopriL (PRINIVIL, ZESTRIL) 20 mg tablet   Yes No  
Sig: Take 20 mg by mouth daily. meloxicam (MOBIC) 7.5 mg tablet   No No  
Sig: Take 1 Tab by mouth daily. Indications: arthralgias  
metFORMIN (GLUCOPHAGE) 1,000 mg tablet   Yes No  
Sig: Take 1,000 mg by mouth daily. In am  Indications: type 2 diabetes mellitus  
sodium chloride 1 gram tablet   No No  
Sig: TAKE 2 TABS BY MOUTH TWO (2) TIMES A DAY. tolvaptan (Samsca) tab tablet   Yes No  
Sig: Take 15 mg by mouth daily. traMADoL (ULTRAM) 50 mg tablet   No No  
Sig: Take 1 Tab by mouth every six (6) hours as needed for Pain for up to 30 days. Max Daily Amount: 200 mg. Indications: pain Facility-Administered Medications: None No Known Allergies FAMILY HISTORY: Reviewed. Negative except Family History Problem Relation Age of Onset  Emphysema Father  Lung Disease Father  Cancer Brother Colon  Heart Disease Sister  Hypertension Sister  Heart Disease Sister  Hypertension Sister  Heart Disease Brother  Hypertension Brother  Heart Disease Brother  Hypertension Brother  Heart Disease Brother  Hypertension Brother  Heart Disease Brother  Hypertension Brother  Heart Disease Brother  Hypertension Brother Social History Tobacco Use  
  Smoking status: Never Smoker  Smokeless tobacco: Never Used Substance Use Topics  Alcohol use: No  
 
 
 
Objective: There were no vitals taken for this visit. Temp (24hrs), Av.6 °F (37 °C), Min:98.6 °F (37 °C), Max:98.6 °F (37 °C) Physical Exam: 
General:    The patient is a very pleasant elderly male in no acute distress. Head:   Normocephalic/atraumatic. Eyes:  No palpebral pallor or scleral icterus. ENT:  External auricular and nasal exam within normal limits. Mucous membranes are moist. 
Neck:  Supple, non-tender, no JVD. Lungs:   Clear to auscultation bilaterally without wheezes or crackles. No respiratory distress or accessory muscle use. Heart:   Regular rate and rhythm, without murmurs, rubs, or gallops. Abdomen:   Soft, non-tender, non-distended with normoactive bowel sounds. Genitourinary: No tenderness over the bladder or bilateral CVAs. Extremities: Without clubbing, cyanosis, or edema. Skin:     Normal color, texture, and turgor. No rashes, lesions, or jaundice. Pulses: Radial and dorsalis pedis pulses present 2+ bilaterally. Capillary refill <2s. Neurologic: CN II-XII grossly intact and symmetrical.  
  Moving all four extremities well with no focal deficits. Psychiatric: Pleasant demeanor, appropriate affect. Alert and oriented x 3 Data Review:  
Recent Results (from the past 24 hour(s)) CBC WITH AUTOMATED DIFF Collection Time: 20  5:10 PM  
Result Value Ref Range WBC 2.8 (L) 4.3 - 11.1 K/uL  
 RBC 2.68 (L) 4.23 - 5.6 M/uL HGB 8.1 (L) 13.6 - 17.2 g/dL HCT 25.1 (L) 41.1 - 50.3 % MCV 93.7 79.6 - 97.8 FL  
 MCH 30.2 26.1 - 32.9 PG  
 MCHC 32.3 31.4 - 35.0 g/dL  
 RDW 17.3 (H) 11.9 - 14.6 % PLATELET 519 455 - 977 K/uL MPV 9.8 9.4 - 12.3 FL ABSOLUTE NRBC 0.03 0.0 - 0.2 K/uL  
 DF AUTOMATED NEUTROPHILS 42 (L) 43 - 78 % LYMPHOCYTES 19 13 - 44 % MONOCYTES 34 (H) 4.0 - 12.0 % EOSINOPHILS 0 (L) 0.5 - 7.8 % BASOPHILS 0 0.0 - 2.0 % IMMATURE GRANULOCYTES 4 0.0 - 5.0 %  
 ABS. NEUTROPHILS 1.2 (L) 1.7 - 8.2 K/UL  
 ABS. LYMPHOCYTES 0.5 0.5 - 4.6 K/UL  
 ABS. MONOCYTES 1.0 0.1 - 1.3 K/UL  
 ABS. EOSINOPHILS 0.0 0.0 - 0.8 K/UL  
 ABS. BASOPHILS 0.0 0.0 - 0.2 K/UL  
 ABS. IMM. GRANS. 0.1 0.0 - 0.5 K/UL METABOLIC PANEL, COMPREHENSIVE Collection Time: 05/24/20  5:10 PM  
Result Value Ref Range Sodium 123 (LL) 136 - 145 mmol/L Potassium 4.5 3.5 - 5.1 mmol/L Chloride 88 (L) 98 - 107 mmol/L  
 CO2 26 21 - 32 mmol/L Anion gap 9 7 - 16 mmol/L Glucose 307 (H) 65 - 100 mg/dL BUN 19 8 - 23 MG/DL Creatinine 0.77 (L) 0.8 - 1.5 MG/DL  
 GFR est AA >60 >60 ml/min/1.73m2 GFR est non-AA >60 >60 ml/min/1.73m2 Calcium 9.0 8.3 - 10.4 MG/DL Bilirubin, total 0.3 0.2 - 1.1 MG/DL  
 ALT (SGPT) 17 12 - 65 U/L  
 AST (SGOT) 35 15 - 37 U/L Alk. phosphatase 419 (H) 50 - 136 U/L Protein, total 6.6 6.3 - 8.2 g/dL Albumin 2.5 (L) 3.2 - 4.6 g/dL Globulin 4.1 (H) 2.3 - 3.5 g/dL A-G Ratio 0.6 (L) 1.2 - 3.5    
TROPONIN I Collection Time: 05/24/20  5:10 PM  
Result Value Ref Range Troponin-I, Qt. <0.02 (L) 0.02 - 0.05 NG/ML  
NT-PRO BNP Collection Time: 05/24/20  5:10 PM  
Result Value Ref Range NT pro-BNP 1,875 (H) <450 PG/ML  
LACTIC ACID Collection Time: 05/24/20  5:10 PM  
Result Value Ref Range Lactic acid 2.2 (HH) 0.4 - 2.0 MMOL/L  
POC G3 Collection Time: 05/24/20  5:26 PM  
Result Value Ref Range Device: NASAL CANNULA pH (POC) 7.408 7.35 - 7.45    
 pCO2 (POC) 39.0 35 - 45 MMHG  
 pO2 (POC) 76 75 - 100 MMHG  
 HCO3 (POC) 24.6 22 - 26 MMOL/L  
 sO2 (POC) 95 95 - 98 % Base excess (POC) 0 mmol/L Allens test (POC) NOT APPLICABLE Site RIGHT BRACHIAL Specimen type (POC) ARTERIAL Performed by Juan   
 CO2, POC 26 MMOL/L Flow rate (POC) 3.000 L/min Critical value read back 17:30   
 COLLECT TIME 1,720 GLUCOSE, POC  
 Collection Time: 05/24/20  8:32 PM  
Result Value Ref Range Glucose (POC) 292 (H) 65 - 100 mg/dL Imaging /Procedures /Studies: 
Ct Chest W Cont Result Date: 5/24/2020 IMPRESSION: 1. No acute pulmonary emboli. 2. Large bilateral pleural effusions. . Inflammatory appearing nodularity in the bilateral upper lobes near the lung apices. 3. Multiple patchy sclerotic lesions throughout the thoracic spine and ribs. This is in keeping with the patient's history of osseous metastatic disease. Xr Chest [facility] Result Date: 5/24/2020 IMPRESSION: New airspace density in the right lower lung with a probable small right pleural effusion. Pneumonia should be considered. Assessment and Plan:  
 
Principal Problem: 
  Acute respiratory failure with hypoxia ([address] (5/24/2020) LIkely 2/2 CAP, although COVID test was obtaned and is pending. Wean O2 as tolerated. Active Problems: 
  CAP (community acquired pneumonia) (5/24/2020) IV Rocephin/Azithromycin. Blood cultures pending. Pleural effusion (5/24/2020) May need thoracentesis. Consult pulmonology Hyponatremia (11/23/2015) IV NS, follow BMP. Encounter for laboratory testing for COVID-19 virus (12/15/2015) COVID test pending. Normocytic anemia (5/24/2020) FOllow CBC Diabetes mellitus due to underlying condition with hyperglycemia ([address] (12/21/2015) Stable. Continue home meds. SSI Type 2 diabetes with nephropathy ([address] (7/3/2018) Stable. Malignant neoplasm metastatic to bone ([address] (6/26/2019) Oncology consult DVT Prophylaxis: Lovenox Code Status: FULL CODE Disposition: Admit to COVID unit for evaluation and treatment as per above. Anticipated discharge: 2-3 days Signed By: Jovany Lopez MD   
 May 24, 2020

## 2020-05-25 NOTE — PROGRESS NOTES
TRANSFER - IN REPORT: 
 
Verbal report received from [facility]) on [employer]  being received from [facility](unit) for routine progression of care Report consisted of patients Situation, Background, Assessment and  
Recommendations(SBAR). Information from the following report(s) SBAR was reviewed with the receiving nurse. Opportunity for questions and clarification was provided. Assessment completed upon patients arrival to unit and care assumed.

## 2020-05-25 NOTE — PROGRESS NOTES
Pt sat up on side of bed for thoracentesis. Consent obtained. Time out performed. Pts vitals monitored throughout procedure. Bilateral ultrasound done and pic taken of pleural fluid. ~1100 ml yellow pleural fluid from L.  ~1200 ml yellow pleural fluid from R.  Pt tolerated procedure well with no adverse rxn. Specimens sent to the lab x 3 and labeled appropriately from L only per md order. Sites dressed appropriately and report given to pts DAVID Steward who was at bedside throughout procedure.   Bilateral Lung sliding done and ultrasound findings reviewed by MD.

## 2020-05-25 NOTE — PROGRESS NOTES
05/24/20 2247 Dual Skin Pressure Injury Assessment Dual Skin Pressure Injury Assessment WDL Second Care Provider (Based on [address] [place] [address]) Usman Bass, [facility] Skin Integumentary Skin Integumentary (WDL) X Skin Color Ecchymosis (comment); Red 
(redness to bottom) Skin Condition/Temp Dry;Flaky;Fragile Skin Integrity Intact Turgor Epidermis thin w/ loss of subcut tissue Hair Growth Sparce Varicosities Absent Wound Prevention and Protection Methods Orientation of Wound Prevention Posterior Location of Wound Prevention Sacrum/Coccyx Wound Offloading (Prevention Methods) Bed, pressure reduction mattress Pt has redness noted to sacrum, no skin breakdown on sacrum or heels. Both feet are flaky and dry. Pt has toe slip on left foot between great toe and second toe to keep from touching. Ecchymosis noted to RUE. Alert and oriented to call light.

## 2020-05-25 NOTE — PROGRESS NOTES
Thoracentesis site bilateral clean dry and intact. No signs of bleeding noted. Will continue to monitor.

## 2020-05-25 NOTE — PROGRESS NOTES
Problem: Falls - Risk of 
Goal: *Absence of Falls Description: Document Luis Armando Aguero Fall Risk and appropriate interventions in the flowsheet. Outcome: Progressing Towards Goal 
Note: Fall Risk Interventions: 
Mobility Interventions: Patient to call before getting OOB Medication Interventions: Evaluate medications/consider consulting pharmacy, Patient to call before getting OOB, Teach patient to arise slowly Elimination Interventions: Call light in reach, Patient to call for help with toileting needs, Stay With Me (per policy), Toilet paper/wipes in reach, Toileting schedule/hourly rounds, Urinal in reach Problem: Patient Education: Go to Patient Education Activity Goal: Patient/Family Education Outcome: Progressing Towards Goal 
  
Problem: Breathing Pattern - Ineffective Goal: *Absence of hypoxia Outcome: Progressing Towards Goal 
Goal: *Use of effective breathing techniques Outcome: Progressing Towards Goal 
  
Problem: Patient Education: Go to Patient Education Activity Goal: Patient/Family Education Outcome: Progressing Towards Goal

## 2020-05-25 NOTE — PROCEDURES
Thoracentesis Procedure Note Procedure:  L Thoracentesis with ultrasound guidance Indication:  L Pleural effusion Summary: After informed consent was obtained, the patient was positioned upright. Ultrasound was used to identify the optimal spot for pleural drainage. The lower L intercostal space was anesthetized with 1% Lidocaine to achieve adequate anesthesia. The pleural space was entered with watery fluid identified. Lidocaine was instilled within the pleural space as well. A small stab incision was made at the site of local anesthesia and the thoracentesis catheter was advanced into the pleural space. Approximately 1100 ml of fluid was obtained with ease. The procedure was terminated after pleural drainage stopped. Air was not aspirated during the procedure. A pressure dressing was placed over the incision after adequate hemostasis was achieved. A CXR is not pending as a follow up US revealed a + lung slide c/w no PTX. The pleural fluid will be sent for protein, LDH, glucose, cell count with differential, Gram stain, culture and sensitivity, AFB smear and culture, fungal smear and culture, cytology and cell block. EBL: negligible Post Procedure Dx: Pleural effusion on L  Signed By: Von Mc MD

## 2020-05-25 NOTE — PROGRESS NOTES
Pt resting in bed comfortably at this time, alert and oriented times 3. No distress noted, respirations even and unlabored on 3 L NC. Blood transfusing, pt tolerating well. Pt instructed to call for assistance if needed, call light in place, will continue to monitor.

## 2020-05-25 NOTE — PROGRESS NOTES
Received report from [facility] AT [facility], [address] [facility]. Patient awake resting in bed. Respirations present. On 3 L NC. No signs of distress. AxO x3. No needs expressed. Bed low and locked. Call light within reach. Will continue to monitor.

## 2020-05-25 NOTE — PROGRESS NOTES
Notified Dr. Azucena Hyde at [phone] via [employer] that patient has arrived from [place].

## 2020-05-26 NOTE — PROGRESS NOTES
Problem: Falls - Risk of 
Goal: *Absence of Falls Description: Document Donata Carrel Fall Risk and appropriate interventions in the flowsheet. Outcome: Progressing Towards Goal 
Note: Fall Risk Interventions: 
Mobility Interventions: Bed/chair exit alarm, Patient to call before getting OOB Medication Interventions: Evaluate medications/consider consulting pharmacy Elimination Interventions: Call light in reach, Patient to call for help with toileting needs Problem: Breathing Pattern - Ineffective Goal: *Absence of hypoxia Outcome: Progressing Towards Goal

## 2020-05-26 NOTE — PROGRESS NOTES
covid 19 negative, Josette Ramirez RN 3rd shift spoke with Dr Bill Chavarria, order received to transfer, received this from [address] charge 3rd shift.

## 2020-05-26 NOTE — PROGRESS NOTES
Received report from Constance Camarena, [facility]. Patient awake resting in bed. Respirations present. On 3 L NC. No signs of distress. No needs expressed. Bed low and locked. Call light within reach. Will continue to monitor.

## 2020-05-26 NOTE — PROGRESS NOTES
Care Management Interventions PCP Verified by CM: Yes Physical Therapy Consult: No 
Occupational Therapy Consult: No 
Speech Therapy Consult: No 
Current Support Network: Lives with Spouse Confirm Follow Up Transport: Family Freedom of Choice List was Provided with Basic Dialogue that Supports the Patient's Individualized Plan of Care/Goals, Treatment Preferences and Shares the Quality Data Associated with the Providers?: Yes [employer] Information Provided?: No 
Discharge Location Discharge Placement: Assisted Living Patient is alert and oriented in all spheres. Lives with his spouse. Independent with ambulation and ADLs. No history of HH or rehab. No home 02. Confirmed patient has a pcp. CM is not anticipating any discharge needs but remains available.

## 2020-05-26 NOTE — PROGRESS NOTES
TRANSFER - IN REPORT: 
 
Verbal report received from Gail RN(name) on Tahira Yates  being received from 5th floor(unit) for routine progression of care Report consisted of patients Situation, Background, Assessment and  
Recommendations(SBAR). Information from the following report(s) SBAR, Kardex and Recent Results was reviewed with the receiving nurse. Opportunity for questions and clarification was provided. Assessment completed upon patients arrival to unit and care assumed.

## 2020-05-26 NOTE — PROGRESS NOTES
This RN called Lizeth Pugh in non-invasive cardiology to notify patient's [id number] test is negative and patient will be transferred to room 831. TRANSFER - OUT REPORT: 
 
Verbal report given to [facility]) on [employer]  being transferred to 831(unit) for routine progression of care Report consisted of patients Situation, Background, Assessment and  
Recommendations(SBAR). Information from the following report(s) SBAR was reviewed with the receiving nurse. Lines:  
Peripheral IV 05/24/20 Right Hand (Active) Site Assessment Clean, dry, & intact 5/26/2020  7:36 AM  
Phlebitis Assessment 0 5/26/2020  7:36 AM  
Infiltration Assessment 0 5/26/2020  7:36 AM  
Dressing Status Clean, dry, & intact 5/26/2020  7:36 AM  
Dressing Type Transparent 5/26/2020  7:36 AM  
Hub Color/Line Status Capped 5/26/2020  7:36 AM  
Alcohol Cap Used No 5/25/2020  2:55 AM  
   
Peripheral IV 05/24/20 Left Antecubital (Active) Site Assessment Clean, dry, & intact 5/26/2020  7:36 AM  
Phlebitis Assessment 0 5/26/2020  7:36 AM  
Infiltration Assessment 0 5/26/2020  7:36 AM  
Dressing Status Clean, dry, & intact 5/26/2020  7:36 AM  
Dressing Type Transparent 5/26/2020  7:36 AM  
Hub Color/Line Status Capped 5/26/2020  7:36 AM  
Alcohol Cap Used No 5/25/2020  2:55 AM  
  
 
Opportunity for questions and clarification was provided. Patient transported with: 
 O2 @ 3 liters

## 2020-05-26 NOTE — PROGRESS NOTES
Hospitalist Progress Note 2020 Admit Date: 2020 11:00 PM  
NAME: Elisa Scheuermann :  1940 MRN:  [id number] Attending: Elder Walker MD 
PCP:  Phillip Larson MD 
 
SUBJECTIVE:  
Mr. Julio Fernandes is a nice 78 y/o WM with a h/o head/neck cancer followed by [facility] who presented to [facility] on  with c/o progressive SOB x3-4 days. No chest pain, fevers, recent travel or sick contacts. Labs notable for chronic hyponatremia, LA 2.2, trop neg, BNP 1875. CXR showed RLL opacity with possible effusion. CT chest w contrast showed large b/l pleural effusions; negative for PE; and bone mets (known).  - feels better s/p thora on . No acute concerns. Review of Systems negative with exception of pertinent positives noted above PHYSICAL EXAM  
 
Visit Vitals /75 (BP 1 Location: Right arm, BP Patient Position: At rest) Pulse 96 Temp 98 °F (36.7 °C) Resp 17 SpO2 97% Temp (24hrs), Av.3 °F (36.8 °C), Min:98 °F (36.7 °C), Max:99 °F (37.2 °C) Oxygen Therapy O2 Sat (%): 97 % (20 1543) O2 Device: Nasal cannula (20 0935) O2 Flow Rate (L/min): 3 l/min (20 0935) Intake/Output Summary (Last 24 hours) at 2020 1925 Last data filed at 2020 4084 Gross per 24 hour Intake  Output 700 ml Net -700 ml General: No acute distress   
Lungs:  Faint scattered wheezing, diminished bilaterally Heart:  Regular rate and rhythm,  No murmur, rub, or gallop Abdomen: Soft, Non distended, Non tender, Positive bowel sounds Extremities: No cyanosis, clubbing or edema Neurologic:  No focal deficits ASSESSMENT Active Hospital Problems Diagnosis Date Noted  Neutropenia ([address] 2020  Pleural effusion on right 2020  Acute respiratory failure with hypoxia ([address] 2020  Normocytic anemia 2020  CAP (community acquired pneumonia) 2020  Pleural effusion on left 2020  Malignant neoplasm metastatic to bone ([facility] 06/26/2019  Type 2 diabetes with nephropathy ([facility] 07/03/2018  Diabetes mellitus due to underlying condition with hyperglycemia ([facility] 12/21/2015  Encounter for laboratory testing for COVID-19 virus 12/15/2015  Hyponatremia 11/23/2015 A/P 
  
Plan: # Acute hypoxemic respiratory failure - 2/2 large bilateral pleural effusions. S/p b/l thoracentesis 5/25. Follow for cytology and pleural fluid cx.  
 - covid negative -  TTE EF 55% 
  
# Acute anemia 
            - No clear blood loss. Baseline Hb 8-9. Transfused 1 unit prbc 5/25, now 8.8 
  
# Chronic hyponatremia/SIADH 
            - On [facility] outpatient but held per Pharmacy policy if Na <570. Con't salt tabs. Follow sodium. 
  
# DM2 
            - DC metformin since just got contrast yesterday. SSI, glimepiride. 
  
# HTN 
            - Norvasc, lisinopril 
  
DC planning/Dispo: Hopefully home when able. DVT Prophylaxis:  lovenox (held for procedure) Signed By: Olive Austin, DO May 26, 2020

## 2020-05-26 NOTE — PROGRESS NOTES
Shavonne García Admission Date: 5/24/2020 Daily Progress Note: 5/26/2020 The patient's chart is reviewed and the patient is discussed with the staff. The patient is a 79 y.o.  male seen and evaluated at the request of Dr. Jh Mccarthy for evaluation and management of pleural effusions. 
  
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 He was last seen on 5/20 as follows: Yelitza Kay \"Annelise Howard was first seen in our office in September 2015. Raj Spangler was referred for evaluation of a squamous carcinoma which involved his right neck.  In approximately July 2015 he began to note some swelling of his right neck and face.  He was treated conservatively with antibiotics.   Apparently a needle aspiration was performed and revealed no malignant cells.  Ultimately, because of persistence of the mass, he underwent a more definitive evaluation.  By the time of the evaluation, he described the lesion as measuring approximately the size of a golf ball.  On August 25, 2015 he underwent a CT scan of the neck with contrast this demonstrated a large necrotic appearing mass in the right submandibular region measuring 5 x 4.1 cm.  It was commented that the lesion appeared to be separate from the parotid gland and felt to arise extrinsic to the carotid sheath structures.  It was felt to potentially represent a necrotic lymph node.  There were no other lymph nodes evident of a worrisome nature.  In addition, the studies showed no suspicious primary within the neck.  On September 9, 2015 he underwent an excision of the right neck/parapharyngeal mass.   A direct laryngoscopy was performed on that date as well. Maynard Sandifer was a  Malignant appearing lesion excised from the right auricle as well.  At surgery, the lesion was noted to extend almost to the base of the skull and could not be fully excised. Chapind Sandifer were no other palpable irregularities in the right neck.  There were no concerning mucosal lesions on direct laryngoscopy.  The lesion on the ear proved to be a basal cell carcinoma without positive margins.  The right neck mass showed salivary glands, fibroadipose and lymphoid tissue containing well to moderately differentiated squamous cell carcinoma. Hardy Cheli tumor focally extended to the margin of resection.  A PET/CT scan performed on September 17, 2015 showed a low-density focus at the site of the previously described lymph node now measuring 3.1 x 2.6 cm.  The FDG activity was an SUV of 6.5.  There were no other findings on the PET scan.  The patient is a lifelong nonsmoker and does not abuse alcohol.  Immunohistochemistries did not suggest HPV as an etiologic agent. In November 2015 he began a course of radiation therapy concurrent with weekly cisplatin.  He completed therapy in December 2015.  In July 2018, he related a history of having been treated for hemorrhoids over the prior 5 years with various topical preparations.  Ultimately, because of persistence of symptoms he was referred to a gastroenterologist and a colonoscopy performed.  Biopsies of the anal region showed  a poorly differentiated tumor with both glandular and neuroendocrine features.  This was subsequently confirmed at the [facility] in [place] were scattered cells in a pagetoid fashion within surrounding squamous epithelium. Catalina Calderon underwent a PET scan which showed some activity in the anal skin as is evident on exam. Lazaro Shear was also a 1.2 cm lymph node in the left inguinal region with some slight SUV activity of unclear significance.  There was no evidence of disease dissemination. Catalina Calderon began therapy with 5-FU mitomycin and concurrent radiation therapy in August 2018. Catalina Calderon completed his Lenda Fang on September 27  A subsequent PET scan was performed showing  no worrisome uptake suggestive of persistent or recurrent malignancy in either of the previously treated primary locations.  PET scan in April 2019 showed no evidence of recurrent tumor in the head neck or in the soft tissues of the pelvis. Maynard Sandifer was however an area of FDG uptake in the left iliac wing which was worrisome but of unclear significance.   Because of the PET scan findings noted above, he underwent an MRI which also shows that possible solitary metastasis in the left iliac bone.  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 This was irradiated.  
  
He returns today as a work in. Raj Spangler received cycle 3 Carbo/Etop 2 weeks ago.  He has generalized weakness and fatigue.  Nocturia hourly is keeping him up at night. Raj Spangler states he is eating and drinking well, however he has lost 5 pounds since last seen.  Denies any falls at home. Raj Spangler has no nausea and constipation is controlled, no mucositis.  Wife states having an occasional productive cough and exertional shortness of breath.  He has continued with 2 salt talbs BID along with tolvaptan for SIADH.  His nephrology appointment was moved out to June 5th.  He deneis any fever, chills or other infectious symptoms. \" 
  
He presented to [facility] on 5/24 with increasing dyspnea and chest discomfort. A CT of the chest revealed large bilateral effusions and we are now asked to assist with management. He is hyponatremic and getting saline infusions and also getting PRBCs for anemia. Subjective:  
 
Bilateral thoracenteses performed yesterday. COVID negative and 5/24 and moved to 8th floor. IVF were stopped with labs suggesting SIADH and chemistry is stable, 2.8L negative with most of that being from [facility]. States breathing felt better after drainage. Feels about the same. States doesn't use O2 at home. Current Facility-Administered Medications Medication Dose Route Frequency  0.9% sodium chloride infusion 250 mL  250 mL IntraVENous PRN  
  amLODIPine (NORVASC) tablet 10 mg  10 mg Oral DAILY  docusate sodium (COLACE) capsule 100 mg  100 mg Oral PRN  
 ferrous sulfate tablet 325 mg  325 mg Oral DAILY  glimepiride (AMARYL) tablet 4 mg  4 mg Oral 7am  
 lisinopriL (PRINIVIL, ZESTRIL) tablet 20 mg  20 mg Oral DAILY  meloxicam (MOBIC) tablet 7.5 mg  7.5 mg Oral DAILY  sodium chloride tablet 2 g  2 g Oral BID  traMADoL (ULTRAM) tablet 50 mg  50 mg Oral Q6H PRN  
 sodium chloride (NS) flush 5-40 mL  5-40 mL IntraVENous Q8H  
 sodium chloride (NS) flush 5-40 mL  5-40 mL IntraVENous PRN  
 acetaminophen (TYLENOL) tablet 650 mg  650 mg Oral Q4H PRN  
 ondansetron (ZOFRAN) injection 4 mg  4 mg IntraVENous Q4H PRN  
 magnesium hydroxide (MILK OF MAGNESIA) 400 mg/5 mL oral suspension 30 mL  30 mL Oral DAILY PRN  
 insulin regular (NOVOLIN R, HUMULIN R) injection   SubCUTAneous AC&HS  [Held by provider] enoxaparin (LOVENOX) injection 40 mg  40 mg SubCUTAneous Q24H  cefTRIAXone (ROCEPHIN) 1 g in 0.9% sodium chloride (MBP/ADV) 50 mL  1 g IntraVENous Q24H  
 azithromycin (ZITHROMAX) 500 mg in 0.9% sodium chloride (MBP/ADV) 250 mL  500 mg IntraVENous Q24H Review of Systems Constitutional: negative for fever, chills, sweats Cardiovascular: negative for chest pain, palpitations, syncope, edema Gastrointestinal:  negative for dysphagia, reflux, vomiting, diarrhea, abdominal pain, or melena Neurologic:  negative for focal weakness, numbness, headache Objective:  
 
Vitals:  
 05/25/20 2345 05/26/20 6063 05/26/20 0341 05/26/20 7598 BP: 160/89  169/88 156/83 Pulse: 95 (!) 102  92 Resp: 20 18  18 Temp: 98 °F (36.7 °C) 98.2 °F (36.8 °C)  98.3 °F (36.8 °C) SpO2: 96% 96%  96% Intake/Output Summary (Last 24 hours) at 5/26/2020 9246 Last data filed at 5/26/2020 9729 Gross per 24 hour Intake 594.5 ml Output 3550 ml Net -2955.5 ml Physical Exam:  
Constitution: elderly, ill appearing EENMT:  Sclera clear, pupils equal, oral mucosa moist 
Respiratory: clear, 3L NC Cardiovascular:  RRR without M,G,R 
Gastrointestinal: soft and non-tender; with positive bowel sounds. Musculoskeletal: warm without cyanosis. There is no lower extremity edema. Skin:  no jaundice or rashes, no wounds Neurologic: no gross neuro deficits Psychiatric:  alert and oriented x 4 CT 5/24: bilateral effusions LAB Recent Labs  
  05/26/20 
0526 05/25/20 
2018 05/25/20 
1638 05/25/20 
1116 05/24/20 2032 GLUCPOC 104* 123* 129* 131* 292* Recent Labs  
  05/26/20 0315 05/25/20 
0339 05/24/20 
1710 WBC 3.2* 2.2* 2.8* HGB 8.8* 6.9* 8.1* HCT 27.3* 21.2* 25.1*  
* 154 167 Recent Labs  
  05/26/20 0315 05/25/20 
6963 05/25/20 0339 05/24/20 
1710 * 128* 129*   < > 123* K 3.9  --  3.6  --  4.5  
CL 93*  --  93*  --  88* CO2 28  --  29  --  26  
GLU 93  --  176*  --  307* BUN 11  --  13  --  19  
CREA 0.58*  --  0.70*  --  0.77* CA 8.9  --  8.7  --  9.0  
TROIQ  --   --   --   --  <0.02* ALB  --   --   --   --  2.5* TBILI  --   --   --   --  0.3 ALT  --   --   --   --  17 SGOT  --   --   --   --  35  
 < > = values in this interval not displayed. Recent Labs  
  05/24/20 
1726 PHI 7.408 PCO2I 39.0 PO2I 76 HCO3I 24.6 Recent Labs  
  05/24/20 
1710 LAC 2.2* Assessment:  (Medical Decision Making) Hospital Problems  Date Reviewed: 5/20/2020 Codes Class Noted POA Neutropenia ([facility] ICD-10-CM: D70.9 ICD-9-CM: 288.00  5/25/2020 Unknown Pleural effusion on right ICD-10-CM: J90 ICD-9-CM: 511.9  5/25/2020 Unknown * (Principal) Acute respiratory failure with hypoxia ([facility] ICD-10-CM: J96.01 
ICD-9-CM: 518.81  5/24/2020 Yes Normocytic anemia ICD-10-CM: D64.9 ICD-9-CM: 285.9  5/24/2020 Yes CAP (community acquired pneumonia) ICD-10-CM: J18.9 ICD-9-CM: 895  5/24/2020 Yes Pleural effusion on left ICD-10-CM: J90 ICD-9-CM: 511.9  5/24/2020 Yes Malignant neoplasm metastatic to bone [facility]) ICD-10-CM: C79.51 
ICD-9-CM: 198.5  6/26/2019 Yes Type 2 diabetes with nephropathy ([facility] ICD-10-CM: E11.21 
ICD-9-CM: 250.40, 583.81  7/3/2018 Yes Diabetes mellitus due to underlying condition with hyperglycemia ([facility] ICD-10-CM: D68.20 ICD-9-CM: 249.80  12/21/2015 Yes Encounter for laboratory testing for COVID-19 virus ICD-10-CM: Z11.59 
ICD-9-CM: V73.89  12/15/2015 Unknown Hyponatremia ICD-10-CM: E87.1 ICD-9-CM: 276.1  11/23/2015 Yes Plan:  (Medical Decision Making) --fluid analysis pending from left only, borderline exudate with cultures and cyto pending 
--repeat CXR in AM 
--wean O2 as able 
--mobility 
--FTT 2/2 metastatic cancer More than 50% of the time documented was spent in face-to-face contact with the patient and in the care of the patient on the floor/unit where the patient is located.  
 
Sujata Jordan MD

## 2020-05-27 NOTE — PROGRESS NOTES
Chano Sanchez Admission Date: 5/24/2020 Daily Progress Note: 5/27/2020 The patient's chart is reviewed and the patient is discussed with the staff. The patient is a 79 y.o.  male seen and evaluated at the request of Dr. Elizabeth Dillard for evaluation and management of pleural effusions. 
  
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 He was last seen on 5/20 as follows: Mat Ring \"Mariano Cheese was first seen in our office in September 2015. Faiza Cristobal was referred for evaluation of a squamous carcinoma which involved his right neck.  In approximately July 2015 he began to note some swelling of his right neck and face.  He was treated conservatively with antibiotics.   Apparently a needle aspiration was performed and revealed no malignant cells.  Ultimately, because of persistence of the mass, he underwent a more definitive evaluation.  By the time of the evaluation, he described the lesion as measuring approximately the size of a golf ball.  On August 25, 2015 he underwent a CT scan of the neck with contrast this demonstrated a large necrotic appearing mass in the right submandibular region measuring 5 x 4.1 cm.  It was commented that the lesion appeared to be separate from the parotid gland and felt to arise extrinsic to the carotid sheath structures.  It was felt to potentially represent a necrotic lymph node.  There were no other lymph nodes evident of a worrisome nature.  In addition, the studies showed no suspicious primary within the neck.  On September 9, 2015 he underwent an excision of the right neck/parapharyngeal mass.   A direct laryngoscopy was performed on that date as well. Latrice Herron was a  Malignant appearing lesion excised from the right auricle as well.  At surgery, the lesion was noted to extend almost to the base of the skull and could not be fully excised. Enedinasiddharthavadim Gopal were no other palpable irregularities in the right neck.  There were no concerning mucosal lesions on direct laryngoscopy.  The lesion on the ear proved to be a basal cell carcinoma without positive margins.  The right neck mass showed salivary glands, fibroadipose and lymphoid tissue containing well to moderately differentiated squamous cell carcinoma. Arleene Mention tumor focally extended to the margin of resection.  A PET/CT scan performed on September 17, 2015 showed a low-density focus at the site of the previously described lymph node now measuring 3.1 x 2.6 cm.  The FDG activity was an SUV of 6.5.  There were no other findings on the PET scan.  The patient is a lifelong nonsmoker and does not abuse alcohol.  Immunohistochemistries did not suggest HPV as an etiologic agent. In November 2015 he began a course of radiation therapy concurrent with weekly cisplatin.  He completed therapy in December 2015.  In July 2018, he related a history of having been treated for hemorrhoids over the prior 5 years with various topical preparations.  Ultimately, because of persistence of symptoms he was referred to a gastroenterologist and a colonoscopy performed.  Biopsies of the anal region showed  a poorly differentiated tumor with both glandular and neuroendocrine features.  This was subsequently confirmed at the [facility] in [place] were scattered cells in a pagetoid fashion within surrounding squamous epithelium. Fabiana Laazro underwent a PET scan which showed some activity in the anal skin as is evident on exam. Byron Alexander was also a 1.2 cm lymph node in the left inguinal region with some slight SUV activity of unclear significance.  There was no evidence of disease dissemination. Fabiana Lazaro began therapy with 5-FU mitomycin and concurrent radiation therapy in August 2018. Fabiana Lazaro completed his Khurram Mickey on September 27  A subsequent PET scan was performed showing  no worrisome uptake suggestive of persistent or recurrent malignancy in either of the previously treated primary locations.  PET scan in April 2019 showed no evidence of recurrent tumor in the head neck or in the soft tissues of the pelvis. Sumit Argueta was however an area of FDG uptake in the left iliac wing which was worrisome but of unclear significance.   Because of the PET scan findings noted above, he underwent an MRI which also shows that possible solitary metastasis in the left iliac bone.  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 This was irradiated.  
  
He returns today as a work in. Phil Martinez received cycle 3 Carbo/Etop 2 weeks ago.  He has generalized weakness and fatigue.  Nocturia hourly is keeping him up at night. Phil Martinez states he is eating and drinking well, however he has lost 5 pounds since last seen.  Denies any falls at home. Phil Martinez has no nausea and constipation is controlled, no mucositis.  Wife states having an occasional productive cough and exertional shortness of breath.  He has continued with 2 salt talbs BID along with tolvaptan for SIADH.  His nephrology appointment was moved out to June 5th.  He deneis any fever, chills or other infectious symptoms. \" 
  
He presented to [facility] on 5/24 with increasing dyspnea and chest discomfort. A CT of the chest revealed large bilateral effusions and we are now asked to assist with management. He is hyponatremic and getting saline infusions and also getting PRBCs for anemia. Subjective:  
 
Bilateral thoracenteses performed yesterday. COVID negative and 5/24 and moved to 8th floor. IVF were stopped with labs suggesting SIADH and chemistry is stable, 2.8L negative with most of that being from [facility]. States breathing felt better after drainage. Feels about the same. States doesn't use O2 at home. Current Facility-Administered Medications Medication Dose Route Frequency  0.9% sodium chloride infusion 250 mL  250 mL IntraVENous PRN  
  amLODIPine (NORVASC) tablet 10 mg  10 mg Oral DAILY  docusate sodium (COLACE) capsule 100 mg  100 mg Oral PRN  
 ferrous sulfate tablet 325 mg  325 mg Oral DAILY  glimepiride (AMARYL) tablet 4 mg  4 mg Oral 7am  
 lisinopriL (PRINIVIL, ZESTRIL) tablet 20 mg  20 mg Oral DAILY  meloxicam (MOBIC) tablet 7.5 mg  7.5 mg Oral DAILY  sodium chloride tablet 2 g  2 g Oral BID  traMADoL (ULTRAM) tablet 50 mg  50 mg Oral Q6H PRN  
 sodium chloride (NS) flush 5-40 mL  5-40 mL IntraVENous Q8H  
 sodium chloride (NS) flush 5-40 mL  5-40 mL IntraVENous PRN  
 acetaminophen (TYLENOL) tablet 650 mg  650 mg Oral Q4H PRN  
 ondansetron (ZOFRAN) injection 4 mg  4 mg IntraVENous Q4H PRN  
 magnesium hydroxide (MILK OF MAGNESIA) 400 mg/5 mL oral suspension 30 mL  30 mL Oral DAILY PRN  
 insulin regular (NOVOLIN R, HUMULIN R) injection   SubCUTAneous AC&HS  [Held by provider] enoxaparin (LOVENOX) injection 40 mg  40 mg SubCUTAneous Q24H  cefTRIAXone (ROCEPHIN) 1 g in 0.9% sodium chloride (MBP/ADV) 50 mL  1 g IntraVENous Q24H  
 azithromycin (ZITHROMAX) 500 mg in 0.9% sodium chloride (MBP/ADV) 250 mL  500 mg IntraVENous Q24H Review of Systems Constitutional: negative for fever, chills, sweats Cardiovascular: negative for chest pain, palpitations, syncope, edema Gastrointestinal:  negative for dysphagia, reflux, vomiting, diarrhea, abdominal pain, or melena Neurologic:  negative for focal weakness, numbness, headache Objective:  
 
Vitals:  
 05/26/20 6216 05/26/20 2359 05/27/20 6838 05/27/20 2659 BP: 149/90 155/86 145/85 (!) 172/100 Pulse: 96 100 91 (!) 101 Resp: 18 18 18 19 Temp: 98.8 °F (37.1 °C) 98.3 °F (36.8 °C) 97.9 °F (36.6 °C) 97.9 °F (36.6 °C) SpO2: 98% 95% 97% 98% Intake/Output Summary (Last 24 hours) at 5/27/2020 6402 Last data filed at 5/27/2020 9565 Gross per 24 hour Intake 380 ml Output 300 ml Net 80 ml Physical Exam:  
Constitution: elderly, ill appearing EENMT:  Sclera clear, pupils equal, oral mucosa moist 
Respiratory: clear, 3L NC Cardiovascular:  RRR without M,G,R 
Gastrointestinal: soft and non-tender; with positive bowel sounds. Musculoskeletal: warm without cyanosis. There is no lower extremity edema. Skin:  no jaundice or rashes, no wounds Neurologic: no gross neuro deficits Psychiatric:  alert and oriented x 4 CXR: 5/27: bilateral small effusions CT 5/24: bilateral effusions LAB Recent Labs  
  05/27/20 
0820 05/26/20 
2123 05/26/20 
1616 05/26/20 
1545 05/26/20 
1106 GLUCPOC 138* 103* 81 56* 83 Recent Labs  
  05/27/20 
0725 05/26/20 
0315 05/25/20 
0339 05/24/20 
1710 WBC 3.3* 3.2* 2.2* 2.8* HGB 9.0* 8.8* 6.9* 8.1* HCT 28.1* 27.3* 21.2* 25.1*  
* 141* 154 167 Recent Labs  
  05/27/20 
0725 05/26/20 0315 05/25/20 
2264 05/25/20 
0339  05/24/20 
1710 * 129* 128* 129*   < > 123* K 3.9 3.9  --  3.6  --  4.5  
CL 94* 93*  --  93*  --  88* CO2 29 28  --  29  --  26 * 93  --  176*  --  307* BUN 12 11  --  13  --  19  
CREA 0.57* 0.58*  --  0.70*  --  0.77* CA 8.8 8.9  --  8.7  --  9.0  
TROIQ  --   --   --   --   --  <0.02* ALB  --   --   --   --   --  2.5* TBILI  --   --   --   --   --  0.3 ALT  --   --   --   --   --  17  
 < > = values in this interval not displayed. Recent Labs  
  05/24/20 
1726 PHI 7.408 PCO2I 39.0 PO2I 76 HCO3I 24.6 Recent Labs  
  05/24/20 
1710 LAC 2.2* Assessment:  (Medical Decision Making) Hospital Problems  Date Reviewed: 5/20/2020 Codes Class Noted POA [facility]rop[facility] ([facility] ICD-10-CM: D70.9 ICD-9-CM: 288.00  5/25/2020 Unknown Pleural effusion on right ICD-10-CM: J90 ICD-9-CM: 511.9  5/25/2020 Unknown * (Principal) Acute respiratory failure with hypoxia ([facility] ICD-10-CM: J96.01 
ICD-9-CM: 518.81  5/24/2020 Yes Normocytic anemia ICD-10-CM: D64.9 ICD-9-CM: 285.9  5/24/2020 Yes CAP (community acquired pneumonia) ICD-10-CM: J18.9 ICD-9-CM: 971  5/24/2020 Yes Pleural effusion on left ICD-10-CM: J90 ICD-9-CM: 511.9  5/24/2020 Yes Malignant neoplasm metastatic to bone [facility]) ICD-10-CM: C79.51 
ICD-9-CM: 198.5  6/26/2019 Yes Type 2 diabetes with nephropathy ([facility] ICD-10-CM: E11.21 
ICD-9-CM: 250.40, 583.81  7/3/2018 Yes Diabetes mellitus due to underlying condition with hyperglycemia ([facility] ICD-10-CM: L15.15 ICD-9-CM: 249.80  12/21/2015 Yes Encounter for laboratory testing for COVID-19 virus ICD-10-CM: Z11.59 
ICD-9-CM: V73.89  12/15/2015 Unknown Hyponatremia ICD-10-CM: E87.1 ICD-9-CM: 276.1  11/23/2015 Yes Plan:  (Medical Decision Making) --fluid analysis pending from left only, borderline exudate with cultures and cyto pending 
--will add lasix 20mg BID with goal 1L negative 
--could add albumin if hypotension is an issue, but currently hypertensive. --wean O2 as able 
--mobility 
--FTT 2/2 metastatic cancer? More than 50% of the time documented was spent in face-to-face contact with the patient and in the care of the patient on the floor/unit where the patient is located.  
 
Charley Brink MD

## 2020-05-27 NOTE — PROGRESS NOTES
Shift assessment: 
Pt alert, oriented X4. Depressed. On 3 L O2 NC. Respirations even, unlabored. Lung sounds diminished. Crackles at the base of lungs bilaterally. No distress noted. HR regular. Abdomen soft, non tender. Bowel sounds active. Denies pain or other needs. Call light within each. Bed in low, locked position.

## 2020-05-27 NOTE — PROGRESS NOTES
Hospitalist Progress Note 2020 Admit Date: 2020 11:00 PM  
NAME: Milagros Phalen :  1940 MRN:  [id number] Attending: Bill Cuenca MD 
PCP:  Moe Gongora MD 
 
SUBJECTIVE:  
Mr. 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 No chest pain, fevers, recent travel or sick contacts. Labs notable for chronic hyponatremia, LA 2.2, trop neg, BNP 1875. CXR showed RLL opacity with possible effusion. CT chest w contrast showed large b/l pleural effusions; negative for PE; and bone mets (known).  - feels better s/p thora on . No acute concerns.  - sitting up in bedside chair. Eating lunch. Reports fatigue but no acute concerns. Appears weak but not in distress Review of Systems negative with exception of pertinent positives noted above PHYSICAL EXAM  
 
Visit Vitals /76 (BP 1 Location: Left arm, BP Patient Position: At rest) Pulse 95 Temp 98 °F (36.7 °C) Resp 18 SpO2 97% Temp (24hrs), Av.2 °F (36.8 °C), Min:97.9 °F (36.6 °C), Max:98.8 °F (37.1 °C) Oxygen Therapy O2 Sat (%): 97 % (20 1627) O2 Device: Nasal cannula (20 0935) O2 Flow Rate (L/min): 3 l/min (20 0935) Intake/Output Summary (Last 24 hours) at 2020 1843 Last data filed at 2020 1730 Gross per 24 hour Intake 1020 ml Output 1550 ml Net -530 ml General: No acute distress  appears fatigued/ weak Lungs:  diminished bilaterally Heart:  Regular rate and rhythm,  No murmur, rub, or gallop Abdomen: Soft, Non distended, Non tender, Positive bowel sounds Extremities: No cyanosis, clubbing or edema Neurologic:  No focal deficits ASSESSMENT Active Hospital Problems Diagnosis Date Noted  Neutropenia ([facility] 2020  Pleural effusion on right 2020  Acute respiratory failure with hypoxia ([facility] 05/24/2020  Normocytic anemia 05/24/2020  CAP (community acquired pneumonia) 05/24/2020  Pleural effusion on left 05/24/2020  Malignant neoplasm metastatic to bone ([facility] 06/26/2019  Type 2 diabetes with nephropathy ([facility] 07/03/2018  Diabetes mellitus due to underlying condition with hyperglycemia ([facility] 12/21/2015  Encounter for laboratory testing for COVID-19 virus 12/15/2015  Hyponatremia 11/23/2015 A/P 
  
Plan: # Acute hypoxemic respiratory failure - 2/2 large bilateral pleural effusions, CAP. S/p b/l thoracentesis 5/25. Follow for cytology and pleural fluid cx pending 
 - Continue empiric CAP coverage Rocephin/Azithromycin - covid negative -  TTE EF 55% - pulm following - added IV lasix 20mg bid today 
  
# Acute anemia 
            - No clear blood loss. Baseline Hb 8-9. Transfused 1 unit prbc 5/25 
  
# Chronic hyponatremia/SIADH 
            - On [facility] outpatient but held per Pharmacy policy if Na <[phone]. Con't salt tabs. Follow sodium closely with addition of diuretic 
 
  
# DM2 
            - holding metformin. cont SSI, glimepiride. 
  
# HTN 
            - Norvasc, lisinopril # Debility 
 - order PT/OT. Add PPD just in case.  
  
DC planning/Dispo: pending DVT Prophylaxis:  lovenox (held for procedure) SCDS Signed By: Rondal Schilder, DO May 27, 2020

## 2020-05-27 NOTE — PROGRESS NOTES
Received pt from [address] [place] [address] in stable condition. Pt in bed resting quietly. Resp even & unlabored at rest; no acute signs of distress noted. Bed low & locked; call light in reach; no needs voiced.

## 2020-05-27 NOTE — PROGRESS NOTES
Problem: Falls - Risk of 
Goal: *Absence of Falls Description: Document Yasmeen Villafuerte Fall Risk and appropriate interventions in the flowsheet. Outcome: Progressing Towards Goal 
Note: Fall Risk Interventions: 
Mobility Interventions: Bed/chair exit alarm Medication Interventions: Patient to call before getting OOB Elimination Interventions: Call light in reach Problem: Patient Education: Go to Patient Education Activity Goal: Patient/Family Education Outcome: Progressing Towards Goal 
  
Problem: Breathing Pattern - Ineffective Goal: *Absence of hypoxia Outcome: Progressing Towards Goal 
Goal: *Use of effective breathing techniques Outcome: Progressing Towards Goal 
  
Problem: Patient Education: Go to Patient Education Activity Goal: Patient/Family Education Outcome: Progressing Towards Goal 
  
Problem: Breathing Pattern - Ineffective Goal: *Absence of hypoxia Outcome: Progressing Towards Goal 
Goal: *Use of effective breathing techniques Outcome: Progressing Towards Goal 
Goal: *PALLIATIVE CARE:  Alleviation of Dyspnea Outcome: Progressing Towards Goal 
  
Problem: Patient Education: Go to Patient Education Activity Goal: Patient/Family Education Outcome: Progressing Towards Goal

## 2020-05-28 NOTE — PROGRESS NOTES
Am assessment completed. Pt is alert and oriented x 3-4. Verbalizes needs well. Takes pills whole with thin liquids. Up with 1P assist due to weakness. Denies pain currently.

## 2020-05-28 NOTE — PROGRESS NOTES
Hospitalist Progress Note 2020 Admit Date: 2020 11:00 PM  
NAME: Ladonna Negro :  1940 MRN:  [id number] Attending: Kirsten Deng MD 
PCP:  Mary Jo Diaz MD 
 
SUBJECTIVE:  
Mr. 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 No chest pain, fevers, recent travel or sick contacts. Labs notable for chronic hyponatremia, LA 2.2, trop neg, BNP 1875. CXR showed RLL opacity with possible effusion. CT chest w contrast showed large b/l pleural effusions; negative for PE; and bone mets (known). S/p bilateral thora on .  
 
 - appears more fatigued. Admits to some depression. Discussed possible need for rehab. Review of Systems negative with exception of pertinent positives noted above PHYSICAL EXAM  
 
Visit Vitals /83 (BP 1 Location: Left arm, BP Patient Position: At rest) Pulse 97 Temp 98.1 °F (36.7 °C) Resp 17 Wt 74.4 kg (164 lb) SpO2 95% BMI 22.24 kg/m² Temp (24hrs), Av.1 °F (36.7 °C), Min:97.6 °F (36.4 °C), Max:98.8 °F (37.1 °C) Oxygen Therapy O2 Sat (%): 95 % (20 1600) Pulse via Oximetry: 96 beats per minute (20 1025) O2 Device: Nasal cannula (20 1025) O2 Flow Rate (L/min): 2 l/min(3 decreased to 2) (20 1025) Intake/Output Summary (Last 24 hours) at 2020 [facility] Last data filed at 2020 0127 Gross per 24 hour Intake  Output 400 ml Net -400 ml General: No acute distress  appears fatigued/ weak Lungs:  diminished bilaterally Heart:  Regular rate and rhythm,  No murmur, rub, or gallop Abdomen: Soft, Non distended, Non tender, Positive bowel sounds Extremities: No cyanosis, clubbing or edema Neurologic:  No focal deficits ASSESSMENT Active Hospital Problems Diagnosis Date Noted  Neutropenia ([facility] 05/25/2020  Pleural effusion on right 05/25/2020  Acute respiratory failure with hypoxia ([facility] 05/24/2020  Normocytic anemia 05/24/2020  CAP (community acquired pneumonia) 05/24/2020  Pleural effusion on left 05/24/2020  Malignant neoplasm metastatic to bone ([facility] 06/26/2019  Type 2 diabetes with nephropathy ([facility] 07/03/2018  Diabetes mellitus due to underlying condition with hyperglycemia ([facility] 12/21/2015  Encounter for laboratory testing for COVID-19 virus 12/15/2015  Hyponatremia 11/23/2015 A/P 
  
Plan: # Acute hypoxemic respiratory failure - 2/2 large bilateral pleural effusions, CAP. S/p b/l thoracentesis 5/25. 
 - cyto with rare atypical cells - Continue empiric CAP coverage Rocephin/Azithromycin - covid negative -  TTE EF 55% - pulm following - added IV lasix 20mg bid today 
  
# Acute anemia 
            - No clear blood loss. Baseline Hb 8-9. Transfused 1 unit prbc 5/25 
  
# Chronic hyponatremia/SIADH 
            - On [facility] outpatient but held per Pharmacy policy if Na <[phone]. Con't salt tabs. Follow sodium closely with addition of diuretic 
 
 # DM2 
            - holding metformin. cont SSI, glimepiride. 
  
# HTN 
            - Norvasc, lisinopril # Debility 
 - order PT/OT. Add PPD just in case. # hypokalemia/ Hypomag 
 - replete and repeat in AM 
  
DC planning/Dispo: pending PT eval, add PPD DVT Prophylaxis:  lovenox (held for procedure) SCDS Spoke with patients wife May Wilmer and questions answered Signed By: Olive Austin, DO May 28, 2020

## 2020-05-28 NOTE — CONSULTS
[facility] Hematology & Oncology Inpatient Hematology / Oncology Consult Note Reason for Consult:  Acute metabolic encephalopathy [L53.66] Referring Physician:  Gabino Jin MD 
 
History of Present Illness: Mr. Kristie Zhou is a 79 y.o. male admitted on 5/24/2020 with a primary diagnosis of Diagnoses of Acute respiratory failure with hypoxia ([facility] Community acquired pneumonia, unspecified laterality, Hyponatremia, Malignant neoplasm metastatic to bone ([facility] Normocytic anemia, Pleural effusion on left, Pleural effusion on right, and Acute metabolic encephalopathy were pertinent to this visit. Dirk Bonilla Mr. Kristie Zhou has a PMH of DMII, HTN, HLD, and GERD. 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 He completed Cis/XRT for head/neck cancer in 12/2015. 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 He underwent 5-FU mitomycin and concurrent radiation in August 2018 and completed therapy September 2018. A surveillance PET in April 2019 showed an area of FDG uptake in left iliac wing. MRI showed possible solitary metastasis in left iliac bone. Biopsy confirmed presence of metastatic carcinoma consistent with anal carcinoma and immunohistochemistry stained positive for synaptophysin. This area was irradiated. Routine PET scan 3/2020 showed evidence of disease progression. He had multiple bony lesions as well as progressive disease in the mediastinum and in the perianal area. Giovanni Ragland was recently admitted for marked hyponatremia (has history of mild hyponatremia - on salt tablets), likely SIADH related to neuroendocrine tumor, and began Carbo/etoposide for anal carcinoma. He was discharged on Tolvaptan and followed by nephrology. He received C3 carbo/etoposide 5/8/2020.  He presented to the ED 5/24/2020 for SOB. CXR showed RLL opacity. He was started on Rocephin and Azithromycin. CT chest showed large bilateral pleural effusions. Thoracentesis done 5/25 showed rare atypical cells. He was started on Lasix. His Hgb dropped on 5/25 to 6.9 and he was given 1 U PRBC. We have been consulted for recommendations for our patient.     
  
 
 
 
Review of Systems: 
Constitutional +fatigue/somnolence. Denies fever, chills, weight loss, appetite changes, night sweats. HEENT Denies trauma, blurry vision, hearing loss, ear pain, nosebleeds, sore throat, neck pain and ear discharge. Skin Denies lesions or rashes. Lungs +SOB (improved). Denies dyspnea, cough, sputum production or hemoptysis. Cardiovascular Denies chest pain, palpitations, or lower extremity edema. Gastrointestinal Denies nausea, vomiting, changes in bowel habits, bloody or black stools, abdominal pain.  Denies dysuria, frequency or hesitancy of urination. Neuro Denies headaches, visual changes or ataxia. Denies dizziness, tingling, tremors, sensory change, speech change, focal weakness or headaches. Hematology Denies easy bruising or bleeding, denies gingival bleeding or epistaxis. Endo Denies heat/cold intolerance, denies diabetes or thyroid abnormalities. MSK Denies back pain, arthralgias, myalgias or frequent falls. Psychiatric/Behavioral Denies depression and substance abuse. The patient is not nervous/anxious. No Known Allergies Past Medical History:  
Diagnosis Date  GERD (gastroesophageal reflux disease)   
 pt wife denies  Head and neck cancer ([facility] 9/30/2015  
 radiation and chemo and and surgery  History of squamous cell carcinoma   
 to neck area- 38 radiation treatement and 8 chemo treatment  History of throat cancer 2015  
 peg tube for about 4 months  Hypercholesteremia   
 managed well with meds  Hypertension   
 managed well with meds  Hypomagnesemia 5/20/2020  Rectal cancer ([facility] 2018 28 radiation treatment and chemo pump  Type 2 diabetes mellitus ([facility] oral agent only/AVG BS: /no s.s of hypoglycemia  Vomiting 2/23/2016  
 pt wife denies Past Surgical History:  
Procedure Laterality Date  HX COLONOSCOPY  05/2018  HX HEENT    
 sinus  HX HEENT  2015  
 surgery on right throat for squamous cell ca right ear wedge  HX LYMPH NODE DISSECTION    
 HX ORTHOPAEDIC Right 1966  
 right leg  HX OTHER SURGICAL  9/9/15 EXCISION OF RIGHT Neck and PARAPHARYNGEAL MASS/RIGHT EAR WEDGE RESECTION  
 HX OTHER SURGICAL    
 peg placed and removed  HX TONSILLECTOMY  HX VASCULAR ACCESS    
 placed and removed Family History Problem Relation Age of Onset  Emphysema Father  Lung Disease Father  Cancer Brother Colon  Heart Disease Sister  Hypertension Sister  Heart Disease Sister  Hypertension Sister  Heart Disease Brother  Hypertension Brother  Heart Disease Brother  Hypertension Brother  Heart Disease Brother  Hypertension Brother  Heart Disease Brother  Hypertension Brother  Heart Disease Brother  Hypertension Brother Social History Socioeconomic History  Marital status:  Spouse name: Not on file  Number of children: Not on file  Years of education: Not on file  Highest education level: Not on file Occupational History  Not on file Social Needs  Financial resource strain: Not on file  Food insecurity Worry: Not on file Inability: Not on file  Transportation needs Medical: Not on file Non-medical: Not on file Tobacco Use  Smoking status: Never Smoker  Smokeless tobacco: Never Used Substance and Sexual Activity  Alcohol use: No  
 Drug use: No  
 Sexual activity: Not on file Lifestyle  Physical activity Days per week: Not on file Minutes per session: Not on file  Stress: Not on file Relationships  Social connections Talks on phone: Not on file Gets together: Not on file Attends [employer] witness service: Not on file Active member of club or organization: Not on file Attends meetings of clubs or organizations: Not on file Relationship status: Not on file  Intimate partner violence Fear of current or ex partner: Not on file Emotionally abused: Not on file Physically abused: Not on file Forced sexual activity: Not on file Other Topics Concern  Not on file Social History Narrative  Not on file Current Facility-Administered Medications Medication Dose Route Frequency Provider Last Rate Last Dose  [START ON 2020] azithromycin (ZITHROMAX) tablet 500 mg  500 mg Oral DAILY Nydia High,       
 potassium bicarb-citric acid (EFFER-K) tablet 20 mEq  20 mEq Oral BID Castillo Carrington MD      
 furosemide (LASIX) injection 20 mg  20 mg IntraVENous Q12H Castillo Carrington MD   20 mg at 20 0233  tuberculin injection 5 Units  5 Units IntraDERMal ONCE Roman High DO   5 Units at 20 1925  
 0.9% sodium chloride infusion 250 mL  250 mL IntraVENous PRN Cliff Torre MD      
 amLODIPine (NORVASC) tablet 10 mg  10 mg Oral DAILY Sandra Izaguirre MD   10 mg at 20 4310  docusate sodium (COLACE) capsule 100 mg  100 mg Oral PRN Sandra Izaguirre MD      
 ferrous sulfate tablet 325 mg  325 mg Oral DAILY Sandra Izaguirre MD   325 mg at 20 0757  
 glimepiride (AMARYL) tablet 4 mg  4 mg Oral 7am Sandra Izaguirre MD   4 mg at 20 9972  lisinopriL (PRINIVIL, ZESTRIL) tablet 20 mg  20 mg Oral DAILY Sandra Izaguirre MD   20 mg at 20 8404  meloxicam (MOBIC) tablet 7.5 mg  7.5 mg Oral DAILY Bernardo HERNANDEZ MD   7.5 mg at 20 8272  sodium chloride tablet 2 g  2 g Oral BID Sandra Izaguirre MD   2 g at 20 6578  traMADoL (ULTRAM) tablet 50 mg  50 mg Oral Q6H PRN Sandra Izaguirre MD   50 mg at 20 9474  sodium chloride (NS) flush 5-40 mL  5-40 mL IntraVENous Q8H Paulina HERNANDEZ MD   10 mL at 20 7541  sodium chloride (NS) flush 5-40 mL  5-40 mL IntraVENous PRN Connie Martinez MD      
 acetaminophen (TYLENOL) tablet 650 mg  650 mg Oral Q4H PRN Connie Martinez MD      
 ondansetron [facility]) injection 4 mg  4 mg IntraVENous Q4H PRN Connie Martinez MD      
 magnesium hydroxide (MILK OF MAGNESIA) 400 mg/5 mL oral suspension 30 mL  30 mL Oral DAILY PRN Connie Martinez MD      
 insulin regular (NOVOLIN R, HUMULIN R) injection   SubCUTAneous AC&HS Connie Martinez MD   Stopped at 20 5906  [Held by provider] enoxaparin (LOVENOX) injection 40 mg  40 mg SubCUTAneous Q24H Connie Martinez MD      
 cefTRIAXone (ROCEPHIN) 1 g in 0.9% sodium chloride (MBP/ADV) 50 mL  1 g IntraVENous Q24H Paulina HERNANDEZ  mL/hr at 20 0127 1 g at 20 0127 OBJECTIVE: 
Patient Vitals for the past 8 hrs: 
 BP Temp Pulse Resp SpO2  
20 0751 161/82 98 °F (36.7 °C) 97 16 93 % 20 0356 123/73 98.4 °F (36.9 °C) 94 18 94 % Temp (24hrs), Av.2 °F (36.8 °C), Min:97.6 °F (36.4 °C), Max:98.8 °F (37.1 °C) No intake/output data recorded. Physical Exam: 
Constitutional: Elderly, chronically-ill appearing male, +appears weak, in no acute distress, lying comfortably in the hospital bed. HEENT: Normocephalic and atraumatic. Oropharynx is clear, mucous membranes are moist. Extraocular muscles are intact. Sclerae anicteric. Neck supple without JVD. No thyromegaly present. Lymph node Deferred Skin Warm and dry. No bruising and no rash noted. No erythema. No pallor. Respiratory Lungs are clear to auscultation bilaterally without wheezes, rales or rhonchi, normal air exchange without accessory muscle use. CVS Normal rate, regular rhythm and normal S1 and S2. No murmurs, gallops, or rubs. Abdomen Soft, nontender and nondistended, normoactive bowel sounds.   No palpable mass. No hepatosplenomegaly. Neuro +somnolent. Grossly nonfocal with no obvious sensory or motor deficits. MSK Normal range of motion in general.  No edema and no tenderness. Psych Appropriate mood and affect. Labs:   
Recent Results (from the past 24 hour(s)) GLUCOSE, POC Collection Time: 05/27/20 11:27 AM  
Result Value Ref Range Glucose (POC) 184 (H) 65 - 100 mg/dL GLUCOSE, POC Collection Time: 05/27/20  4:19 PM  
Result Value Ref Range Glucose (POC) 140 (H) 65 - 100 mg/dL GLUCOSE, POC Collection Time: 05/27/20  9:19 PM  
Result Value Ref Range Glucose (POC) 161 (H) 65 - 100 mg/dL METABOLIC PANEL, BASIC Collection Time: 05/28/20  6:27 AM  
Result Value Ref Range Sodium 129 (L) 136 - 145 mmol/L Potassium 3.3 (L) 3.5 - 5.1 mmol/L Chloride 92 (L) 98 - 107 mmol/L  
 CO2 31 21 - 32 mmol/L Anion gap 6 (L) 7 - 16 mmol/L Glucose 106 (H) 65 - 100 mg/dL BUN 9 8 - 23 MG/DL Creatinine 0.51 (L) 0.8 - 1.5 MG/DL  
 GFR est AA >60 >60 ml/min/1.73m2 GFR est non-AA >60 >60 ml/min/1.73m2 Calcium 8.7 8.3 - 10.4 MG/DL MAGNESIUM Collection Time: 05/28/20  6:27 AM  
Result Value Ref Range Magnesium 1.6 (L) 1.8 - 2.4 mg/dL GLUCOSE, POC Collection Time: 05/28/20  8:17 AM  
Result Value Ref Range Glucose (POC) 130 (H) 65 - 100 mg/dL Imaging: 
[unfilled] ASSESSMENT: 
Problem List  Date Reviewed: 5/20/2020 Codes Class Noted Neutropenia ([facility] ICD-10-CM: D70.9 ICD-9-CM: 288.00  5/25/2020 Pleural effusion on right ICD-10-CM: J90 ICD-9-CM: 511.9  5/25/2020 * (Principal) Acute respiratory failure with hypoxia ([facility] ICD-10-CM: J96.01 
ICD-9-CM: 518.81  5/24/2020 Normocytic anemia ICD-10-CM: D64.9 ICD-9-CM: 285.9  5/24/2020 CAP (community acquired pneumonia) ICD-10-CM: J18.9 ICD-9-CM: 365  5/24/2020 Pleural effusion on left ICD-10-CM: J90 ICD-9-CM: 511.9  5/24/2020 Hypomagnesemia ICD-10-CM: R39.85 
ICD-9-CM: 275.2  5/20/2020 SIADH (syndrome of inappropriate ADH production) ([facility] ICD-10-CM: E22.2 ICD-9-CM: 253.6  3/26/2020 Malignant neoplasm metastatic to bone [facility]) ICD-10-CM: C79.51 
ICD-9-CM: 198.5  6/26/2019 Postoperative seroma of subcutaneous tissue after non-dermatologic procedure ICD-10-CM: L76.34 
ICD-9-CM: 998.13  9/3/2018 Anal carcinoma ([facility] ICD-10-CM: C21.0 ICD-9-CM: 154.3  8/9/2018 Type 2 diabetes with nephropathy ([facility] ICD-10-CM: E11.21 
ICD-9-CM: 250.40, 583.81  7/3/2018 Primary malignant neoplasm of perianal skin ICD-10-CM: C44.500 ICD-9-CM: 173.50  7/3/2018 Diabetes mellitus due to underlying condition with hyperglycemia ([facility] ICD-10-CM: W29.66 ICD-9-CM: 249.80  12/21/2015 Mucositis due to radiation therapy ICD-10-CM: K12.33 
ICD-9-CM: 528.09, E879.2  12/15/2015 Radiation esophagitis ICD-10-CM: K20.8 ICD-9-CM: 530.19, E926.9  12/15/2015 Encounter for laboratory testing for COVID-19 virus ICD-10-CM: Z11.59 
ICD-9-CM: V73.89  12/15/2015 Hyponatremia ICD-10-CM: E87.1 ICD-9-CM: 276.1  11/23/2015 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 [facility]) ICD-10-CM: C79.89, C80.1 ICD-9-CM: 198.89, 199.1  10/14/2015 RECOMMENDATIONS: 
 
Anal carcinoma - metastatic  
- s/p C3 Carbo/etoposide 5/8/2020 Hyponatremia 
- Stable; Na 129 
- Salt tablets Pleural effusions - Thoracentesis 5/25; pathology with rare atypical cells - Lasix - Rocephin/Azithromycin empiric CAP coverage - Pulmonology following and evaluating for need for repeat thoracentesis Lab studies and imaging studies were personally reviewed. Pertinent old records were reviewed. Thank you for allowing us to participate in the care of Mr. Hoa Javier. He is scheduled to start C4 carbo/etoposide tomorrow.  He will need to f/u in clinic with Dr Clementina Huff upon discharge to determine treatment plan moving forward given his current hospitalization and other medical issues. We will now sign off. Please do not hesitate to call with questions or concerns. AMBREEN Urias [facility] [address] [place] [address] Office : [phone] Fax : [phone] Attending Addendum: 
I have personally performed a face to face diagnostic evaluation on this patient. I have reviewed and agree with the care plan as documented by Rico Habermann, N.P. My findings are as follows:  He has metastatic anal cancer, appears anxious, heart rate regular without murmurs, abdomen is non-tender, bowel sounds are positive, we will arrange for him to follow-up in our clinic after discharge. Jennie Perez MD 
 
 
[facility] [address] [place] [address] Office : [phone] Fax : [phone]

## 2020-05-28 NOTE — PROGRESS NOTES
[facility] Admission Date: 5/24/2020 Daily Progress Note: 5/28/2020 The patient's chart is reviewed and the patient is discussed with the staff. The patient is a 79 y.o.  male seen and evaluated at the request of Dr. Ramy Mccormick for evaluation and management of pleural effusions. 
  
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 He was last seen on 5/20 as follows: Tiki Jara \" Dolly Wells was first seen in our office in September 2015. [facility] was referred for evaluation of a squamous carcinoma which involved his right neck.  In approximately July 2015 he began to note some swelling of his right neck and face.  He was treated conservatively with antibiotics.   Apparently a needle aspiration was performed and revealed no malignant cells.  Ultimately, because of persistence of the mass, he underwent a more definitive evaluation.  By the time of the evaluation, he described the lesion as measuring approximately the size of a golf ball.  On August 25, 2015 he underwent a CT scan of the neck with contrast this demonstrated a large necrotic appearing mass in the right submandibular region measuring 5 x 4.1 cm.  It was commented that the lesion appeared to be separate from the parotid gland and felt to arise extrinsic to the carotid sheath structures.  It was felt to potentially represent a necrotic lymph node.  There were no other lymph nodes evident of a worrisome nature.  In addition, the studies showed no suspicious primary within the neck.  On September 9, 2015 he underwent an excision of the right neck/parapharyngeal mass.   A direct laryngoscopy was performed on that date as well. Anika Masters was a  Malignant appearing lesion excised from the right auricle as well.  At surgery, the lesion was noted to extend almost to the base of the skull and could not be fully excised. Anika Masters were no other palpable irregularities in the right neck.  There were no concerning mucosal lesions on direct laryngoscopy.  The lesion on the ear proved to be a basal cell carcinoma without positive margins.  The right neck mass showed salivary glands, fibroadipose and lymphoid tissue containing well to moderately differentiated squamous cell carcinoma. T[place]anie [place] tumor focally extended to the margin of resection.  A PET/CT scan performed on September 17, 2015 showed a low-density focus at the site of the previously described lymph node now measuring 3.1 x 2.6 cm.  The FDG activity was an SUV of 6.5.  There were no other findings on the PET scan.  The patient is a lifelong nonsmoker and does not abuse alcohol.  Immunohistochemistries did not suggest HPV as an etiologic agent. In November 2015 he began a course of radiation therapy concurrent with weekly cisplatin.  He completed therapy in December 2015.  In July 2018, he related a history of having been treated for hemorrhoids over the prior 5 years with various topical preparations.  Ultimately, because of persistence of symptoms he was referred to a gastroenterologist and a colonoscopy performed.  Biopsies of the anal region showed  a poorly differentiated tumor with both glandular and neuroendocrine features.  This was subsequently confirmed at the [facility] in [place] were scattered cells in a pagetoid fashion within surrounding squamous epithelium. [facility] underwent a PET scan which showed some activity in the anal skin as is evident on exam. Bárbara Lat was also a 1.2 cm lymph node in the left inguinal region with some slight SUV activity of unclear significance.  There was no evidence of disease dissemination. [facility] began therapy with 5-FU mitomycin and concurrent radiation therapy in August 2018. [facility] completed his Ar Covert on September 27  A subsequent PET scan was performed showing  no worrisome uptake suggestive of persistent or recurrent malignancy in either of the previously treated primary locations.  PET scan in April 2019 showed no evidence of recurrent tumor in the head neck or in the soft tissues of the pelvis. Cooper Lowe was however an area of FDG uptake in the left iliac wing which was worrisome but of unclear significance.   Because of the PET scan findings noted above, he underwent an MRI which also shows that possible solitary metastasis in the left iliac bone.  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 This was irradiated.  
  
He returns today as a work in. [facility] received cycle 3 Carbo/Etop 2 weeks ago.  He has generalized weakness and fatigue.  Nocturia hourly is keeping him up at night. [facility] states he is eating and drinking well, however he has lost 5 pounds since last seen.  Denies any falls at home. [facility] has no nausea and constipation is controlled, no mucositis.  Wife states having an occasional productive cough and exertional shortness of breath.  He has continued with 2 salt talbs BID along with tolvaptan for SIADH.  His nephrology appointment was moved out to June 5th.  He deneis any fever, chills or other infectious symptoms. \" 
  
He presented to [facility] on 5/24 with increasing dyspnea and chest discomfort. A CT of the chest revealed large bilateral effusions and we are now asked to assist with management. He is hyponatremic and getting saline infusions and also getting PRBCs for anemia. Subjective:  
 
Started on lasix 20mg BID IV yesterday. 1L Negative. Lytes stable. Na 129. K 3.3. Feels better, but refused PT. States he didn't want to do therapy. Current Facility-Administered Medications Medication Dose Route Frequency  [START ON 5/29/2020] azithromycin (ZITHROMAX) tablet 500 mg  500 mg Oral DAILY  furosemide (LASIX) injection 20 mg  20 mg IntraVENous Q12H  
 tuberculin injection 5 Units  5 Units IntraDERMal ONCE  
  0.9% sodium chloride infusion 250 mL  250 mL IntraVENous PRN  
 amLODIPine (NORVASC) tablet 10 mg  10 mg Oral DAILY  docusate sodium (COLACE) capsule 100 mg  100 mg Oral PRN  
 ferrous sulfate tablet 325 mg  325 mg Oral DAILY  glimepiride (AMARYL) tablet 4 mg  4 mg Oral 7am  
 lisinopriL (PRINIVIL, ZESTRIL) tablet 20 mg  20 mg Oral DAILY  meloxicam (MOBIC) tablet 7.5 mg  7.5 mg Oral DAILY  sodium chloride tablet 2 g  2 g Oral BID  traMADoL (ULTRAM) tablet 50 mg  50 mg Oral Q6H PRN  
 sodium chloride (NS) flush 5-40 mL  5-40 mL IntraVENous Q8H  
 sodium chloride (NS) flush 5-40 mL  5-40 mL IntraVENous PRN  
 acetaminophen (TYLENOL) tablet 650 mg  650 mg Oral Q4H PRN  
 ondansetron (ZOFRAN) injection 4 mg  4 mg IntraVENous Q4H PRN  
 magnesium hydroxide (MILK OF MAGNESIA) 400 mg/5 mL oral suspension 30 mL  30 mL Oral DAILY PRN  
 insulin regular (NOVOLIN R, HUMULIN R) injection   SubCUTAneous AC&HS  [Held by provider] enoxaparin (LOVENOX) injection 40 mg  40 mg SubCUTAneous Q24H  cefTRIAXone (ROCEPHIN) 1 g in 0.9% sodium chloride (MBP/ADV) 50 mL  1 g IntraVENous Q24H Review of Systems Constitutional: negative for fever, chills, sweats Cardiovascular: negative for chest pain, palpitations, syncope, edema Gastrointestinal:  negative for dysphagia, reflux, vomiting, diarrhea, abdominal pain, or melena Neurologic:  negative for focal weakness, numbness, headache Objective:  
 
Vitals:  
 05/27/20 1923 05/27/20 2322 05/28/20 8391 05/28/20 9759 BP: (!) 156/92 136/69 123/73 161/82 Pulse: 99 92 94 97 Resp: 18 18 18 16 Temp: 98.8 °F (37.1 °C) 97.6 °F (36.4 °C) 98.4 °F (36.9 °C) 98 °F (36.7 °C) SpO2: 98% 94% 94% 93% Intake/Output Summary (Last 24 hours) at 5/28/2020 7340 Last data filed at 5/28/2020 0127 Gross per 24 hour Intake 490 ml Output 1650 ml Net -1160 ml Physical Exam:  
Constitution: elderly, ill appearing EENMT:  Sclera clear, pupils equal, oral mucosa moist 
Respiratory: clear, 3L NC Cardiovascular:  RRR without M,G,R 
Gastrointestinal: soft and non-tender; with positive bowel sounds. Musculoskeletal: warm without cyanosis. There is no lower extremity edema. Skin:  no jaundice or rashes, no wounds Neurologic: no gross neuro deficits Psychiatric:  alert and oriented x 4 CXR: 5/27: bilateral small effusions CT 5/24: bilateral effusions LAB Recent Labs  
  05/28/20 
5290 05/27/20 
2119 05/27/20 
1619 05/27/20 
1127 05/27/20 
0820 GLUCPOC 130* 161* 140* 184* 138* Recent Labs  
  05/27/20 
0725 05/26/20 
0315 WBC 3.3* 3.2* HGB 9.0* 8.8* HCT 28.1* 27.3*  
* 141* Recent Labs  
  05/28/20 
7533 05/27/20 
0725 05/26/20 
0315 * 130* 129*  
K 3.3* 3.9 3.9 CL 92* 94* 93* CO2 31 29 28 * 130* 93 BUN 9 12 11 CREA 0.51* 0.57* 0.58* MG 1.6*  --   --   
CA 8.7 8.8 8.9 No results for input(s): PH, PCO2, PO2, HCO3, PHI, PCO2I, PO2I, HCO3I in the last 72 hours. No results for input(s): LCAD, LAC in the last 72 hours. Assessment:  (Medical Decision Making) Hospital Problems  Date Reviewed: 5/20/2020 Codes Class Noted POA Neutropenia ([facility] [address] ICD-10-CM: D70.9 ICD-9-CM: 288.00  5/25/2020 Unknown Pleural effusion on right ICD-10-CM: J90 ICD-9-CM: 511.9  5/25/2020 Unknown * (Principal) Acute respiratory failure with hypoxia ([facility] [address] ICD-10-CM: J96.01 
ICD-9-CM: 518.81  5/24/2020 Yes Normocytic anemia ICD-10-CM: D64.9 ICD-9-CM: 285.9  5/24/2020 Yes CAP (community acquired pneumonia) ICD-10-CM: J18.9 ICD-9-CM: 925  5/24/2020 Yes Pleural effusion on left ICD-10-CM: J90 ICD-9-CM: 511.9  5/24/2020 Yes Malignant neoplasm metastatic to bone [facility]) ICD-10-CM: C79.51 
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 
ICD-9-CM: 250.40, 583.81  7/3/2018 Yes Diabetes mellitus due to underlying condition with hyperglycemia ([facility] [address] ICD-10-CM: D56.65 ICD-9-CM: 249.80  12/21/2015 Yes Encounter for laboratory testing for COVID-19 virus ICD-10-CM: Z11.59 
ICD-9-CM: V73.89  12/15/2015 Unknown Hyponatremia ICD-10-CM: E87.1 ICD-9-CM: 276.1  11/23/2015 Yes Plan:  (Medical Decision Making) --fluid analysis pending from left only, borderline exudate with cultures negative 
--cyto-rare atypical cells --continue lasix 20mg BID with goal 1L negative 
--scheduled K 20 BID x 4 doses 
--wean O2 as able 
--mobility, discussed he needed to work with PT if he wanted to get better 
--FTT 2/2 metastatic cancer? 
--will repeat ultrasound, possible thora tomorrow given rare atypical cells on cyto and see if effusion persists despite diuresis. More than 50% of the time documented was spent in face-to-face contact with the patient and in the care of the patient on the floor/unit where the patient is located.  
 
Michelle Ferrer MD

## 2020-05-28 NOTE — PROGRESS NOTES
Occupational Therapy Note: 
OT orders received, chart review initiated for evaluation. Patient refusing therapy at this time. Will check back as schedule permits and patient is available to initiate occupational therapy evaluation.   
Thank you for this consult,  
Bailey Blake, OTR/L

## 2020-05-28 NOTE — PROGRESS NOTES
Physical Therapy Note: Attempted to see pt for physical therapy evaluation this AM. Pt refused stating \"I am too weak\" and \"I don't want therapy. \" Pt was educated on benefits of therapy and care plan to improve weakness. Pt continued to refuse. Will attempt again as schedule allows.  
 
 
Thank you, 
Varun Adrian, PT, DPT

## 2020-05-29 NOTE — PROCEDURES
PROCEDURE: 
DIAGNOSTIC THORACENTESIS, THERAPEUTIC THORACENTESIS  
 
PRE-OP DIAGNOSIS: 
Right PLEURAL EFFUSION POST-OP DIAGNOSIS: 
Right PLEURAL EFFUSION 
 
VOLUME REMOVED: 
 
1300cc ANESTHESIA: 
 
LOCAL ANESTHESIA WITH 1% LIDOCAINE 10 CC TOTAL. CHEST ULTRASOUND FINDINGS: 
 
A Turbo-M, Sonosite ultrasound with a 5-16 mHz probe was used to image the chest and localize the pleural effusion on the right chest. 
 
A moderate anechoic space was seen on the right consistent with an uncomplicated pleural effusion. DESCRIPTION OF PROCEDURE: 
 
After obtaining informed consent and localizing the safest location for thoracentesis, the  8th intercostal space was marked with a blunt, plastic needle cap in the mid scapular line. An [place] AK-0100 Pleral-Seal thoracentesis kit was used to perform the procedure. The skin was cleansed with the supplied chlorhexidine swab and then draped in the usual fashion. Using the previously marked location as a guide, a 22 G 1.5 inch needle was used to inject 1% lidocaine into the skin and subcutaneous tissue, as well as onto the underlying rib and inter-costal muscles. Pleural fluid was aspirated to assure proper location and additional lidocaine was injected into the pleural space prior to removing the anesthesia needle. A 3mm incision was then made with the supplied scalpel in the usual fashion to facilitate the insertion of the thoracentesis needle. The needle with an 8 [place] thoracentesis catheter was then introduced into the chest through the previously made incision in the usual fashion, the rib localized with the needle, and the catheter then marched over the rib into the pleural space. After aspirating fluid, the thoracentesis catheter was then placed into the chest using the needle itself as a trocar. The needle was then removed and the catheter was attached to the supplied tubing without complication. 1300 cc of clear yellow fluid was aspirated and sent for analysis. Fluid was sent for the following tests: 
 
Cytology Post procedure US confirmed complete drainage of the effusion and presence of lung sliding, ruling out pneumothorax. ([id number]) EBL:  
 
1 drop COMPLICATIONS: 
 
None Demetrio Purcell MD

## 2020-05-29 NOTE — PROGRESS NOTES
Santiago Garcia Admission Date: 5/24/2020 Daily Progress Note: 5/29/2020 The patient's chart is reviewed and the patient is discussed with the staff. The patient is a 79 y.o.  male seen and evaluated at the request of Dr. Maria E Hernandez for evaluation and management of pleural effusions. 
  
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 He was last seen on 5/20 as follows: Viktor Ramos was first seen in our office in September 2015. Elie Mora was referred for evaluation of a squamous carcinoma which involved his right neck.  In approximately July 2015 he began to note some swelling of his right neck and face.  He was treated conservatively with antibiotics.   Apparently a needle aspiration was performed and revealed no malignant cells.  Ultimately, because of persistence of the mass, he underwent a more definitive evaluation.  By the time of the evaluation, he described the lesion as measuring approximately the size of a golf ball.  On August 25, 2015 he underwent a CT scan of the neck with contrast this demonstrated a large necrotic appearing mass in the right submandibular region measuring 5 x 4.1 cm.  It was commented that the lesion appeared to be separate from the parotid gland and felt to arise extrinsic to the carotid sheath structures.  It was felt to potentially represent a necrotic lymph node.  There were no other lymph nodes evident of a worrisome nature.  In addition, the studies showed no suspicious primary within the neck.  On September 9, 2015 he underwent an excision of the right neck/parapharyngeal mass.   A direct laryngoscopy was performed on that date as well. Latrell Martinez was a  Malignant appearing lesion excised from the right auricle as well.  At surgery, the lesion was noted to extend almost to the base of the skull and could not be fully excised. Latrell Martinez were no other palpable irregularities in the right neck.  There were no concerning mucosal lesions on direct laryngoscopy.  The lesion on the ear proved to be a basal cell carcinoma without positive margins.  The right neck mass showed salivary glands, fibroadipose and lymphoid tissue containing well to moderately differentiated squamous cell carcinoma. Mnajit Squire tumor focally extended to the margin of resection.  A PET/CT scan performed on September 17, 2015 showed a low-density focus at the site of the previously described lymph node now measuring 3.1 x 2.6 cm.  The FDG activity was an SUV of 6.5.  There were no other findings on the PET scan.  The patient is a lifelong nonsmoker and does not abuse alcohol.  Immunohistochemistries did not suggest HPV as an etiologic agent. In November 2015 he began a course of radiation therapy concurrent with weekly cisplatin.  He completed therapy in December 2015.  In July 2018, he related a history of having been treated for hemorrhoids over the prior 5 years with various topical preparations.  Ultimately, because of persistence of symptoms he was referred to a gastroenterologist and a colonoscopy performed.  Biopsies of the anal region showed  a poorly differentiated tumor with both glandular and neuroendocrine features.  This was subsequently confirmed at the [facility] in [place] were scattered cells in a pagetoid fashion within surrounding squamous epithelium. [facility] underwent a PET scan which showed some activity in the anal skin as is evident on exam. Wyonia Ellie was also a 1.2 cm lymph node in the left inguinal region with some slight SUV activity of unclear significance.  There was no evidence of disease dissemination. [facility] began therapy with 5-FU mitomycin and concurrent radiation therapy in August 2018. [facility] completed his Jeralene Deter on September 27  A subsequent PET scan was performed showing  no worrisome uptake suggestive of persistent or recurrent malignancy in either of the previously treated primary locations.  PET scan in April 2019 showed no evidence of recurrent tumor in the head neck or in the soft tissues of the pelvis. Shaina Bolanos was however an area of FDG uptake in the left iliac wing which was worrisome but of unclear significance.   Because of the PET scan findings noted above, he underwent an MRI which also shows that possible solitary metastasis in the left iliac bone.  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 This was irradiated.  
  
He returns today as a work in. Sarwat Penaloza received cycle 3 Carbo/Etop 2 weeks ago.  He has generalized weakness and fatigue.  Nocturia hourly is keeping him up at night. Sarwat Penaloza states he is eating and drinking well, however he has lost 5 pounds since last seen.  Denies any falls at home. Sarwat Penaloza has no nausea and constipation is controlled, no mucositis.  Wife states having an occasional productive cough and exertional shortness of breath.  He has continued with 2 salt talbs BID along with tolvaptan for SIADH.  His nephrology appointment was moved out to June 5th.  He deneis any fever, chills or other infectious symptoms. \" 
  
He presented to [facility] on 5/24 with increasing dyspnea and chest discomfort. A CT of the chest revealed large bilateral effusions and we are now asked to assist with management. He is hyponatremic and getting saline infusions and also getting PRBCs for anemia. Subjective:  
 
Still feeling shortness of breath. Poor motivation. Looking forward to wife being able to visit today. Current Facility-Administered Medications Medication Dose Route Frequency  azithromycin (ZITHROMAX) tablet 500 mg  500 mg Oral DAILY  potassium bicarb-citric acid (EFFER-K) tablet 20 mEq  20 mEq Oral BID  furosemide (LASIX) injection 20 mg  20 mg IntraVENous Q12H  
 0.9% sodium chloride infusion 250 mL  250 mL IntraVENous PRN  
  amLODIPine (NORVASC) tablet 10 mg  10 mg Oral DAILY  docusate sodium (COLACE) capsule 100 mg  100 mg Oral PRN  
 ferrous sulfate tablet 325 mg  325 mg Oral DAILY  glimepiride (AMARYL) tablet 4 mg  4 mg Oral 7am  
 lisinopriL (PRINIVIL, ZESTRIL) tablet 20 mg  20 mg Oral DAILY  meloxicam (MOBIC) tablet 7.5 mg  7.5 mg Oral DAILY  sodium chloride tablet 2 g  2 g Oral BID  traMADoL (ULTRAM) tablet 50 mg  50 mg Oral Q6H PRN  
 sodium chloride (NS) flush 5-40 mL  5-40 mL IntraVENous Q8H  
 sodium chloride (NS) flush 5-40 mL  5-40 mL IntraVENous PRN  
 acetaminophen (TYLENOL) tablet 650 mg  650 mg Oral Q4H PRN  
 ondansetron (ZOFRAN) injection 4 mg  4 mg IntraVENous Q4H PRN  
 magnesium hydroxide (MILK OF MAGNESIA) 400 mg/5 mL oral suspension 30 mL  30 mL Oral DAILY PRN  
 insulin regular (NOVOLIN R, HUMULIN R) injection   SubCUTAneous AC&HS  [Held by provider] enoxaparin (LOVENOX) injection 40 mg  40 mg SubCUTAneous Q24H  cefTRIAXone (ROCEPHIN) 1 g in 0.9% sodium chloride (MBP/ADV) 50 mL  1 g IntraVENous Q24H Review of Systems Constitutional: negative for fever, chills, sweats Cardiovascular: negative for chest pain, palpitations, syncope, edema Gastrointestinal:  negative for dysphagia, reflux, vomiting, diarrhea, abdominal pain, or melena Neurologic:  negative for focal weakness, numbness, headache Objective:  
 
Vitals:  
 05/28/20 1950 05/29/20 7670 05/29/20 2451 05/29/20 3235 BP: 169/90 135/68 132/79 135/71 Pulse: 100 90 96 96 Resp: 18 18 18 20 Temp: 98.1 °F (36.7 °C) 98.3 °F (36.8 °C) 97.4 °F (36.3 °C) 98.1 °F (36.7 °C) SpO2: 95% 93% 94% 95% Weight:      
 
 
Intake/Output Summary (Last 24 hours) at 5/29/2020 0044 Last data filed at 5/29/2020 9395 Gross per 24 hour Intake  Output 1175 ml Net -1175 ml Physical Exam:  
Constitution: elderly, ill appearing EENMT:  Sclera clear, pupils equal, oral mucosa moist 
Respiratory: diminished bases, 2L NC 
 Cardiovascular:  RRR without M,G,R 
Gastrointestinal: soft and non-tender; with positive bowel sounds. Musculoskeletal: warm without cyanosis. There is no lower extremity edema. Skin:  no jaundice or rashes, no wounds Neurologic: no gross neuro deficits Psychiatric:  alert and oriented x 4 CXR: 5/27: bilateral small effusions CT 5/24: bilateral effusions LAB Recent Labs  
  05/29/20 
5648 05/28/20 
2110 05/28/20 
1629 05/28/20 
1149 05/28/20 
9191 GLUCPOC 113* 161* 133* 132* 130* Recent Labs  
  05/27/20 
0725 WBC 3.3* HGB 9.0*  
HCT 28.1*  
* Recent Labs  
  05/29/20 
9252 05/28/20 
6453 05/27/20 
0725 * 129* 130*  
K 3.3* 3.3* 3.9 CL 90* 92* 94* CO2 29 31 29 * 106* 130* BUN 9 9 12 CREA 0.50* 0.51* 0.57* MG 1.6* 1.6*  --   
CA 8.6 8.7 8.8 No results for input(s): PH, PCO2, PO2, HCO3, PHI, PCO2I, PO2I, HCO3I in the last 72 hours. No results for input(s): LCAD, LAC in the last 72 hours. Assessment:  (Medical Decision Making) Hospital Problems  Date Reviewed: 5/20/2020 Codes Class Noted POA Neutropenia ([facility] [address] ICD-10-CM: D70.9 ICD-9-CM: 288.00  5/25/2020 Unknown Pleural effusion on right ICD-10-CM: J90 ICD-9-CM: 511.9  5/25/2020 Unknown * (Principal) Acute respiratory failure with hypoxia ([facility] [address] ICD-10-CM: J96.01 
ICD-9-CM: 518.81  5/24/2020 Yes Normocytic anemia ICD-10-CM: D64.9 ICD-9-CM: 285.9  5/24/2020 Yes CAP (community acquired pneumonia) ICD-10-CM: J18.9 ICD-9-CM: 967  5/24/2020 Yes Pleural effusion on left ICD-10-CM: J90 ICD-9-CM: 511.9  5/24/2020 Yes Malignant neoplasm metastatic to bone [facility]) ICD-10-CM: C79.51 
ICD-9-CM: 198.5  6/26/2019 Yes Type 2 diabetes with nephropathy ([facility] ICD-10-CM: E11.21 
ICD-9-CM: 250.40, 583.81  7/3/2018 Yes Diabetes mellitus due to underlying condition with hyperglycemia ([facility] ICD-10-CM: D98.85 ICD-9-CM: 249.80  12/21/2015 Yes Encounter for laboratory testing for COVID-19 virus ICD-10-CM: Z11.59 
ICD-9-CM: V73.89  12/15/2015 Unknown Hyponatremia ICD-10-CM: E87.1 ICD-9-CM: 276.1  11/23/2015 Yes Plan:  (Medical Decision Making) --cyto sent bilaterally today 
--cyto-rare atypical cells --continue lasix 20mg BID with goal 1L negative 
--scheduled K 20 BID x 4 doses 
--wean O2 as able 
--mobility, discussed he needed to work with PT if he wanted to get better 
--FTT 2/2 metastatic cancer? More than 50% of the time documented was spent in face-to-face contact with the patient and in the care of the patient on the floor/unit where the patient is located.  
 
Sujata Jordan MD

## 2020-05-29 NOTE — PROGRESS NOTES
Problem: Mobility Impaired (Adult and Pediatric) Goal: *Acute Goals and Plan of Care (Insert Text) Outcome: Progressing Towards Goal 
Note: LTG: 
(1.)Mr. Julio Fernandes will move from supine to sit and sit to supine , scoot up and down, and roll side to side in bed with INDEPENDENT within 7 treatment day(s). (2.)Mr. Julio Fernandes will transfer from bed to chair and chair to bed with MODIFIED INDEPENDENCE using the least restrictive device within 7 treatment day(s). (3.)Mr. Julio Fernandes will ambulate with MODIFIED INDEPENDENCE for 250 feet with the least restrictive device within 7 treatment day(s). (4.)Mr. Julio Fernandes will demonstrate good dynamic standing balance within 7 treatment days. ________________________________________________________________________________________________ PHYSICAL THERAPY: Initial Assessment, Daily Note, and AM 5/29/2020 INPATIENT: PT Visit Days : 1 Payor: [employer] Caceres / Plan: [address] [place] [address] HMO / Product Type: [employer] /   
  
NAME/AGE/GENDER: Elisa Scheuermann is a 79 y.o. male PRIMARY DIAGNOSIS: Acute metabolic encephalopathy [S64.29] Acute respiratory failure with hypoxia (HCC) Acute respiratory failure with hypoxia (HCC) Procedure(s) (LRB): 
THORACENTESIS (N/A) ULTRASOUND (Bilateral) Day of Surgery ICD-10: Treatment Diagnosis:  
 Generalized Muscle Weakness (M62.81) Other lack of cordination (R27.8) Difficulty in walking, Not elsewhere classified (R26.2) Other abnormalities of gait and mobility (R26.89) Low Back Pain (M54.5) Precaution/Allergies: 
Patient has no known allergies. ASSESSMENT:  
 
Mr. Julio Fernandes  is a 79year old male who has been admitted to hospital on 05/24 with above diagnosis and hx of cancer. Prior to hospital admission pt lives with wife in a 1 story home with 0 step(s) to enter with no railing. Pt endorses 0 falls in past 6 months.  Prior to admission No O2 usage at home, no assistance with ADLs and uses no DME for mobility. 05/29: Upon entering, pt supine finishing breakfast, agreeable to therapy. Nasal cannula present but not running any O2. 94% O2 measures and cannula removed. He reports 3/10 pain at rest due to recent thoracentesis. Mod I for rolling side to side and performing bed mobility. Sit > supine with Mod I and additional time. BLE strength is good (4/5 bilat). Sitting EOB with good static and dynamic sitting balance control. Mod I to scoot in bed ,Sit <> stand Min A. Pt attempted stand but was unable to initially due to weakness and poor mobility strategy. Pt educated on body position and weight shift and then was able to stand with CGA. Initial gait x10ft without DME required CGA, was poor balance, slow and increased risk of fall. Pt given trial with RW and gait steadiness and increased gait speed noted. Pt deemed much safer with walker. Agreeable to use walker following d/c. Pt amb 20 more feet with fair standing balance and SBA. Pt then resting in chair. Educated through multiple sit to stands on proper/safe standing and sitting strategies. At end of session pt resting in chair with all needs within reach, alarm present but not activated due to no alarm  box in room, RN notified. Pt presents as functioning below his baseline, with deficits in mobility including transfers, gait, balance, and activity tolerance. Treatment initiated in session to address activity tolerance, weakness, mobility, and poor balance. Pt will benefit from skilled therapy services to address stated deficits to promote return to highest level of function, independence, and safety. This section established at most recent assessment PROBLEM LIST (Impairments causing functional limitations): 
Decreased Strength Decreased ADL/Functional Activities Decreased Transfer Abilities Decreased Ambulation Ability/Technique Decreased Balance Increased Pain Decreased Activity Tolerance Increased Fatigue Decreased Flexibility/Joint Mobility Decreased [place] with Home Exercise Program 
 INTERVENTIONS PLANNED: (Benefits and precautions of physical therapy have been discussed with the patient.) Balance Exercise Bed Mobility Family Education Gait Training Heat Home Exercise Program (HEP) Manual Therapy Neuromuscular Re-education/Strengthening Range of Motion (ROM) Therapeutic Activites Therapeutic Exercise/Strengthening Transfer Training TREATMENT PLAN: Frequency/Duration: 3 times a week for duration of hospital stay Rehabilitation Potential For Stated Goals: Good REHAB RECOMMENDATIONS (at time of discharge pending progress):   
Placement: It is my opinion, based on this patient's performance to date, that Mr. Abhi Nathan may benefit from [address] after discharge due to the functional deficits listed above that are likely to improve with skilled rehabilitation because he/she has multiple medical issues that affect his/her functional mobility in the community. Equipment:  
Walkers, Type: Rolling Brant Campbell HISTORY:  
History of Present Injury/Illness (Reason for Referral): 
Per Physician Note: 
 
Amelia Diaz is a 79 y.o. male with a past medical history of HEENT cancer undergoing chemo/radiation who presents to the [facility] ER with report of SOB and chest discomfort for the past several days. He also admits to mild cough but denies fevers or chills.  Admits to feeling a little better and breathing a bit better since arrival. 
  
Past Medical History/Comorbidities:  
Mr. Abhi Nathan  has a past medical history of GERD (gastroesophageal reflux disease), Head and neck cancer ([facility] (9/30/2015), History of squamous cell carcinoma, History of throat cancer (2015), Hypercholesteremia, Hypertension, Hypomagnesemia (5/20/2020), Rectal cancer ([facility] (2018), Type 2 diabetes mellitus ([facility] and Vomiting (2/23/2016). Mr. Kim Vallecillo  has a past surgical history that includes hx orthopaedic (Right, 1966); hx other surgical (9/9/15); hx heent; hx tonsillectomy; hx heent (2015); hx colonoscopy (05/2018); hx vascular access; hx lymph node dissection; and hx other surgical. 
Social History/Living Environment:  
Home Environment: Private residence # Steps to Enter: 0 One/Two Story Residence: One story Living Alone: No 
Support Systems: Spouse/Significant Other/Partner Patient Expects to be Discharged to[de-identified] Private residence Current DME Used/Available at Home: None Prior Level of Function/Work/Activity: Mod I mobility and amb Number of Personal Factors/Comorbidities that affect the Plan of Care: 3+: HIGH COMPLEXITY EXAMINATION:  
Most Recent Physical Functioning:  
Gross Assessment: 
AROM: Generally decreased, functional 
Strength: Generally decreased, functional 
Coordination: Generally decreased, functional 
Tone: Normal 
Sensation: Intact Posture: 
Posture (WDL): Exceptions to [facility] Posture Assessment: Trunk flexion, Increased, Forward head, Cervical, Kyphosis, Rounded shoulders Balance: 
Sitting: Intact Standing: Impaired Standing - Static: Fair;Good Standing - Dynamic : Fair;Occasional Bed Mobility: 
Rolling: Modified independent Supine to Sit: Modified independent Scooting: Modified independent Wheelchair Mobility: 
  
Transfers: 
Sit to Stand: Minimum assistance Stand to Sit: Stand-by assistance Bed to Chair: Stand-by assistance Gait: 
  
Base of Support: Center of gravity altered;Narrowed Speed/Christina: Shuffled; Slow Step Length: Left shortened;Right shortened Gait Abnormalities: Altered arm swing;Decreased step clearance;Shuffling gait;Trunk sway increased Distance (ft): 30 Feet (ft) Assistive Device: Walker, rolling Ambulation - Level of Assistance: Stand-by assistance Interventions: Safety awareness training; Tactile cues; Verbal cues;Visual/Demos Body Structures Involved: 
Lungs Bones Joints Body Functions Affected: 
Sensory/Pain Movement Related Activities and Participation Affected: Mobility Self Care Interpersonal Interactions and Relationships Community, Social and [place] Number of elements that affect the Plan of Care: 4+: HIGH COMPLEXITY CLINICAL PRESENTATION:  
Presentation: Stable and uncomplicated: LOW COMPLEXITY CLINICAL DECISION MAKIN[address] [place] [address] AM-PAC 6 Clicks Basic Mobility Inpatient Short Form How much difficulty does the patient currently have. .. Unable A Lot A Little None 1. Turning over in bed (including adjusting bedclothes, sheets and blankets)? [] 1   [] 2   [] 3   [x] 4  
2. Sitting down on and standing up from a chair with arms ( e.g., wheelchair, bedside commode, etc.)   [] 1   [] 2   [x] 3   [] 4  
3. Moving from lying on back to sitting on the side of the bed? [] 1   [] 2   [] 3   [x] 4 How much help from another person does the patient currently need. .. Total A Lot A Little None 4. Moving to and from a bed to a chair (including a wheelchair)? [] 1   [] 2   [x] 3   [] 4  
5. Need to walk in hospital room? [] 1   [] 2   [] 3   [x] 4  
6. Climbing 3-5 steps with a railing? [] 1   [x] 2   [] 3   [] 4  
© , Trustees of [address] [place] [address], under license to [employer]. All rights reserved Score:  Initial: 20 Most Recent: X (Date: -- ) Interpretation of Tool:  Represents activities that are increasingly more difficult (i.e. Bed mobility, Transfers, Gait). Medical Necessity:    
Patient is expected to demonstrate progress in  
strength, range of motion, balance, coordination, and functional technique 
 to  
increase independence with ambulation and mobility Sayra Stanley Reason for Services/Other Comments: 
Patient continues to require skilled intervention due to  
Gross weakness, poor balance, increased risk of falls Sayra Stanley Use of outcome tool(s) and clinical judgement create a POC that gives a: Clear prediction of patient's progress: LOW COMPLEXITY  
  
 
 
 
TREATMENT:  
(In addition to Assessment/Re-Assessment sessions the following treatments were rendered) Pre-treatment Symptoms/Complaints:  \"ok, let's do it. \" Pain from recent thoracentesis Pain: Initial:  
Pain Intensity 1: 3  Post Session:  3/10 Therapeutic Activity: (    25): Therapeutic activities including Bed transfers, Chair transfers, Ambulation on level ground, standing balance and walker sequencing to improve mobility, strength, balance, and coordination. Required moderate Safety awareness training; Tactile cues; Verbal cues; Visual/Demos to promote static and dynamic balance in standing and promote coordination of bilateral, lower extremity(s). Braces/Orthotics/Lines/Etc:  
O2 Device: Room air Treatment/Session Assessment:   
Response to Treatment:  See Above Interdisciplinary Collaboration:  
Physical Therapist 
Registered Nurse After treatment position/precautions:  
Up in chair Bed/Chair-wheels locked Call light within reach RN notified Compliance with Program/Exercises: Will assess as treatment progresses Recommendations/Intent for next treatment session: \"Next visit will focus on advancements to more challenging activities\". Total Treatment Duration: PT Patient Time In/Time Out Time In: 4035 Time Out: 1015 Alie Coffey, PT, DPT

## 2020-05-29 NOTE — PROGRESS NOTES
Shift assessment completed. Pt. Is a/o X 4. Respirations are even and unlabored with lung sound diminished bilaterally. Heart sounds S1 and S2 heard on auscultation. Pt. c/o SOB after walking back from bathroom. Checked O2 and it was 95%, 2L NC. Abdomen is soft and non-tender. Bowel sounds active. IV is C. D. I. No signs of distress and denies any pain or needs at this time. Bed is low, locked, call light within reach. Bed rails X 2. Pt. Instructed to call for assistance.

## 2020-05-29 NOTE — PROGRESS NOTES
Problem: Self Care Deficits Care Plan (Adult) Goal: *Acute Goals and Plan of Care (Insert Text) Description: 1. Patient will complete total body bathing and dressing with modified indpendence and adaptive equipment as needed. 2. Patient will complete toileting with modified independence and adaptive equipment as needed. 3. Patient will tolerate 20 minutes of OT treatment with up to 2 rest breaks to increase activity tolerance for ADLs. 4. Patient will complete functional transfers with independence and adaptive equipment as needed. 5. Patient will complete functional mobility for ADLs with modified independence and adaptive equipment as needed. 6. Patient will verbalize 2 energy conservation techniques with no cues from therapist to increase safety and independence with ADLs. Timeframe: 7 visits Outcome: Progressing Towards Goal 
  
OCCUPATIONAL THERAPY: Initial Assessment, Daily Note, and PM 5/29/2020 INPATIENT: OT Visit Days: 1 Payor: Lakesha Fabian / Plan: [address] [place] [address] HMO / Product Type: "[employer] Care Medicare /  
  
NAME/AGE/GENDER: Lukasz Becker is a 79 y.o. male PRIMARY DIAGNOSIS:  Acute metabolic encephalopathy [G93.56] Acute respiratory failure with hypoxia (HCC) Acute respiratory failure with hypoxia (HCC) Procedure(s) (LRB): 
THORACENTESIS (N/A) ULTRASOUND (Bilateral) Day of Surgery ICD-10: Treatment Diagnosis:  
 Generalized Muscle Weakness (M62.81) Precautions/Allergies: 
   
 Patient has no known allergies. ASSESSMENT:  
 
Mr. Shari Peabody is a 79 y.o. male admitted with SOB, respiratory failure, hypoxia, acute metabolic encephalopathy. S/p thoracentesis. 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 At baseline pt lives with wife and reports independence with ADLs and ambulation. No hx falls. Upon arrival pt alert and agreeable to OT evaluation and treatment.  BUE assessment revealed AROM and strength generally decreased/functional in [place]. Pt completed bed mobility with SBA, demonstrates intact sitting balance. Pt practiced sock management with SBA, functional transfers with SBA, ambulation for ADLs with RW/CGA progressing to RW/SBA. Cues for RW management, posture. Pt toileted at [facility] with SBA, grooming in standing with SBA. Pt returned to supine with SBA, left supine in bed with head of bed raised, call bell within reach. Pt presents with deficits in strength, activity tolerance, balance, ambulation and transfers. Hodan Arita is currently functioning below baseline and would benefit from continued OT to increase safety and independence with ADLs. Will follow. This section established at most recent assessment PROBLEM LIST (Impairments causing functional limitations): 
Decreased Strength Decreased ADL/Functional Activities Decreased Transfer Abilities Decreased Ambulation Ability/Technique Decreased Balance Decreased Activity Tolerance Decreased Pacing Skills Decreased Work Simplification/Energy Conservation Techniques Increased Fatigue INTERVENTIONS PLANNED: (Benefits and precautions of occupational therapy have been discussed with the patient.) Activities of daily living training Adaptive equipment training Balance training Clothing management Donning&doffing training Hygiene training Neuromuscular re-eduation Re-evaluation Therapeutic activity Therapeutic exercise TREATMENT PLAN: Frequency/Duration: Follow patient 3x/week to address above goals. Rehabilitation Potential For Stated Goals: Good REHAB RECOMMENDATIONS (at time of discharge pending progress):   
Placement: It is my opinion, based on this patient's performance to date, that Mr. Benji Zimmerman may benefit from [address] after discharge due to the functional deficits listed above that are likely to improve with skilled rehabilitation because he/she has multiple medical issues that affect his/her functional mobility in the community. Equipment:  
RW, potentially BSC as shower chair OCCUPATIONAL PROFILE AND HISTORY:  
History of Present Injury/Illness (Reason for Referral): 
See H&P. Past Medical History/Comorbidities:  
Mr. Hayder Love  has a past medical history of GERD (gastroesophageal reflux disease), [facility] (9/30/2015), History of squamous cell carcinoma, History of throat cancer (2015), Hypercholesteremia, Hypertension, Hypomagnesemia (5/20/2020), Rectal cancer ([facility] (2018), Type 2 diabetes mellitus ([facility] and Vomiting (2/23/2016). Mr. Hayder Love  has a past surgical history that includes hx orthopaedic (Right, 1966); hx other surgical (9/9/15); hx heent; hx tonsillectomy; hx heent (2015); hx colonoscopy (05/2018); hx vascular access; hx lymph node dissection; and hx other surgical. 
Social History/Living Environment:  
Home Environment: Private residence # Steps to Enter: 0 One/Two Story Residence: One story Living Alone: No 
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 At baseline pt lives with wife and reports independence with ADLs and ambulation. No hx falls. Personal Factors:   
      Sex:  male Age:  79 y.o. Other factors that influence how disability is experienced by the patient:  Multiple co-morbidities Number of Personal Factors/Comorbidities that affect the Plan of Care: Expanded review of therapy/medical records (1-2):  MODERATE COMPLEXITY ASSESSMENT OF OCCUPATIONAL PERFORMANCE[de-identified]  
Activities of Daily Living:  
Basic ADLs (From Assessment) Complex ADLs (From Assessment) Feeding: Independent Oral Facial Hygiene/Grooming: Stand-by assistance Bathing: Minimum assistance Upper Body Dressing: Stand-by assistance Lower Body Dressing: Stand-by assistance Toileting: Stand by assistance Grooming/Bathing/Dressing Activities of Daily Living Grooming Washing Hands: Stand-by assistance Cognitive Retraining Safety/Judgement: Awareness of environment; Fall prevention; Insight into deficits Toileting Toileting Assistance: Stand-by assistance Bladder Hygiene: Stand-by assistance Bowel Hygiene: Stand-by assistance Clothing Management: Stand-by assistance Adaptive Equipment: [place] Bed/Mat Mobility Rolling: Stand-by assistance Supine to Sit: Stand-by assistance Sit to Supine: Stand-by assistance Sit to Stand: Stand-by assistance Stand to Sit: Stand-by assistance Bed to Chair: Stand-by assistance Scooting: Stand-by assistance Most Recent Physical Functioning:  
Gross Assessment: 
AROM: Generally decreased, functional(BUEs) Strength: Generally decreased, functional(BUEs) Coordination: Generally decreased, functional(BUEs) Tone: Normal(BUEs) Sensation: Impaired(Decreased sensation in BUE digits 4 & 5) Posture: 
Posture (WDL): Exceptions to [facility] Posture Assessment: Trunk flexion, Increased, Forward head, Cervical, Kyphosis, Rounded shoulders Balance: 
Sitting: Intact Standing: Impaired Standing - Static: Fair Standing - Dynamic : Fair;Constant support Bed Mobility: 
Rolling: Stand-by assistance Supine to Sit: Stand-by assistance Sit to Supine: Stand-by assistance Scooting: Stand-by assistance Wheelchair Mobility: 
  
Transfers: 
Sit to Stand: Stand-by assistance Stand to Sit: Stand-by assistance Bed to Chair: Stand-by assistance Patient Vitals for the past 6 hrs: 
 BP SpO2 O2 Flow Rate (L/min) Pulse 05/29/20 0855 143/77 97 % 2 l/min 100  
05/29/20 0905 128/64 99 % 2 l/min 98  
05/29/20 0915 120/65 99 % 2 l/min 97  
05/29/20 0925 113/60 96 %  96  
05/29/20 1026  95 % 05/29/20 1153 118/74 94 %  93 Mental Status Neurologic State: Alert Orientation Level: Appropriate for age Cognition: Appropriate for age attention/concentration, Follows commands Perception: Appears intact Perseveration: No perseveration noted Safety/Judgement: Awareness of environment, Fall prevention, Insight into deficits Physical Skills Involved: 
Range of Motion Balance Strength Activity Tolerance Gross Motor Control Cognitive Skills Affected (resulting in the inability to perform in a timely and safe manner): 
None  Psychosocial Skills Affected: 
Habits/Routines Environmental Adaptation Social Interaction Emotional Regulation Self-Awareness Awareness of Others Social Roles Number of elements that affect the Plan of Care: 5+:  HIGH COMPLEXITY CLINICAL DECISION MAKING:  
[facility] AM-PAC 6 Clicks Daily Activity Inpatient Short Form How much help from another person does the patient currently need. .. Total A Lot A Little None 1. Putting on and taking off regular lower body clothing? [] 1   [] 2   [x] 3   [] 4  
2. Bathing (including washing, rinsing, drying)? [] 1   [] 2   [x] 3   [] 4  
3. Toileting, which includes using toilet, bedpan or urinal?   [] 1   [] 2   [x] 3   [] 4  
4. Putting on and taking off regular upper body clothing? [] 1   [] 2   [x] 3   [] 4  
5. Taking care of personal grooming such as brushing teeth? [] 1   [] 2   [x] 3   [] 4  
6. Eating meals? [] 1   [] 2   [] 3   [x] 4  
© 2007, Trustees of [facility], under license to [employer]. All rights reserved Score:  Initial: 19 5/29/2020 Most Recent: X (Date: -- ) Interpretation of Tool:  Represents activities that are increasingly more difficult (i.e. Bed mobility, Transfers, Gait). Medical Necessity:    
Patient demonstrates  
good 
 rehab potential due to higher previous functional level.  
Reason for Services/Other Comments: 
Patient continues to require skilled intervention due to  
 Inability to complete ADLs at prior level of independence [place] Use of outcome tool(s) and clinical judgement create a POC that gives a: MODERATE COMPLEXITY  
 
 
 
TREATMENT:  
(In addition to Assessment/Re-Assessment sessions the following treatments were rendered) Pre-treatment Symptoms/Complaints:   
Pain: Initial:  
Pain Intensity 1: 0  Post Session:  same Self Care: (15 minutes): Procedure(s) (per grid) utilized to improve and/or restore self-care/home management as related to dressing, toileting, and grooming. Required minimal visual and verbal cueing to facilitate activities of daily living skills and compensatory activities. Pt practiced sock management with SBA, functional transfers with SBA, ambulation for ADLs with RW/CGA progressing to RW/SBA. Cues for RW management, posture. Pt toileted at [facility] with SBA, grooming in standing with SBA. Pt returned to supine with SBA Braces/Orthotics/Lines/Etc:  
O2 Device: Room air Treatment/Session Assessment:   
Response to Treatment:  Tolerated well Interdisciplinary Collaboration: Occupational Therapist 
Registered Nurse After treatment position/precautions:  
Supine in bed Bed/Chair-wheels locked Bed in low position Call light within reach Compliance with Program/Exercises: Compliant all of the time, Will assess as treatment progresses. Recommendations/Intent for next treatment session: \"Next visit will focus on advancements to more challenging activities and reduction in assistance provided\". Total Treatment Duration: OT Patient Time In/Time Out Time In: 1763 Time Out: 5446 Bailey Blake OTR/L

## 2020-05-29 NOTE — PROGRESS NOTES
Nutrition Reason for assessment: Consult for general nutrition and supplements (Kristie Gann DO) This assessment was completed remotely from [facility]. Patient consent was obtained for remote assessment. Assessment:  
Diet: DIET DIABETIC CONSISTENT CARB Regular DIET NUTRITIONAL SUPPLEMENTS All Meals; Ensure Enlive ( ) Food/Nutrition Patient History:  Patient is 80 yo male who presented with SOB and chest pain. PMH: HEENT cancer with mets to bone, DM2. Pt had cycle 3 Carbo/Etop may 8th with cycle 4 planned for this week but currently on hold. Patient had thoracentesis on 5/25 and 5/29. Called room to which patient's wife answered with him there. Wife did most of the talking at this point. She reports that he is eating less than normal here in the hospital and she thinks it is because he is a picky eater and does not eat a lot of meat normally. She reports prior to coming in he was eating very well. She did report he drank his ensure at lunch along with eating the chicken and beans. She says he will drink ensures or Glucerna often at home. She reports no nausea or vomiting. She also reports that he may have lost a few lbs in the last few weeks per the cancer center (per EMR about 6 lbs, not significant). Wife and patient had no other questions at this time. Labs: 
 5/29/2020 06:37 Sodium 128 (L) Potassium 3.3 (L) Glucose 105 (H) Creatinine 0.50 (L) Magnesium 1.6 (L) POC glucose: 133, 161, 269 Medications: ISS Anthropometrics: 
 Height 6' (1.829 m), Weight: 74.4 kg (164 lb),Weight Source: unknown, Body mass index is 22.24 kg/m². BMI class of Normal weight. Macronutrient needs: (using Listed body weight 74.4 kg) (cancer tx) EER: 3811-4923 kcal/day (25-30 kcals/kg ) EPR:  g/day (1-1.5 g/kg ) Intake/Comparative Standards: Per documentation, patient consuming average 40% of 4 recorded meals in 2 days. This meets 25-50% of kcal and protein need Patient Vitals for the past 100 hrs: % Diet Eaten 05/27/20 1730 50 % 05/27/20 1230 25 % 05/27/20 0830 75 % 05/25/20 1330 10 % Nutrition Diagnosis: 
Inadequate protein-energy intake related to inadequate protein-energy intake  as evidenced by intake 26-50%, weight loss and picky eater. Nutrition Intervention: 
Meals and Snacks: Continue with current diet. Commercial Beverages and Supplements: Change to Glucerna TID for better BG control. Also no chocolate flavor Coordination of nutrition care by a Nutrition Professional: Discussed with Eduarda Moody Discharge Nutrition Plan: Too soon to determine Henrique [address]in [address], [address] [place] [address],   
[phone]

## 2020-05-29 NOTE — PROGRESS NOTES
Administered PRN Milk of Magnesia 30 mL and PRN Docusate 100mg capsule PO Per pt. Stating \" has not had a bowel movement since 5/21/2020\". Pt usually takes both  Milk of mag and docusate together at home. Bed low, locked, call light within reach. Wife is at beside. Will continue to monitor.

## 2020-05-29 NOTE — PROGRESS NOTES
PT is recommending a walker and HH PT. CM spoke with the patient about [facility] services. Patient explained that his wife may not want HH but will discuss and let CM know what they decide.

## 2020-05-29 NOTE — PROGRESS NOTES
Occupational Therapy Note: 
OT orders received, chart reviewed, and evaluation attempted. Patient requesting OT check back later, pt eating breakfast at this time. Will check back as schedule permits and patient is available to initiate occupational therapy evaluation.   
Thank you for this consult,  
Bailey Blake, OTR/L

## 2020-05-29 NOTE — PROGRESS NOTES
Pt sat up on side of bed for thoracentesis. Consent obtained. Time out performed. Pts vitals monitored throughout procedure. Left ultrasound done and pic taken of pleural fluid.  ~800 ml yellow pleural fluid from L. Right ultrasound done and pic taken of pleural fluid. ~ 1300 ml of yellow pleural fluid from R.  Pt tolerated procedure well with no adverse rxn. Specimens sent to the lab x 2 (cytology each side) and labeled appropriately. Site dressed appropriately and report given to pts RN. Lung sliding done and ultrasound findings reviewed by MD. CXR ordered post procedure.

## 2020-05-29 NOTE — PROGRESS NOTES
Shift assessment completed via doc flow sheet. Patient is alert and oriented x 4. Respirations even and unlabored on 1 L nc. Lung sounds CTA bilaterally. Heart sounds S1, S2 auscultated and regular. Abdomen soft and non tender. Bowel sounds active to all 4 quadrants. Patient denies pain and other needs at this time. Bed is locked and in low position. Bed rails x 3. Patient is encouraged to call for assistance. Call light within reach. 98.1

## 2020-05-29 NOTE — PROGRESS NOTES
Hospitalist Progress Note 2020 Admit Date: 2020 11:00 PM  
NAME: Joyce Álvarez :  1940 MRN:  [id number] Attending: Dara Crow DO 
PCP:  Prema Rodney MD 
 
SUBJECTIVE:  
Mr. 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 No chest pain, fevers, recent travel or sick contacts. Labs notable for chronic hyponatremia, LA 2.2, trop neg, BNP 1875. CXR showed RLL opacity with possible effusion. CT chest w contrast showed large b/l pleural effusions; negative for PE; and bone mets (known). S/p bilateral thora on  and again .  
 
 - did well with PT today. Seems to be more motivated and feels better after thora Review of Systems negative with exception of pertinent positives noted above PHYSICAL EXAM  
 
Visit Vitals /80 Pulse 94 Temp 97.7 °F (36.5 °C) Resp 22 Wt 74.4 kg (164 lb) SpO2 93% BMI 22.24 kg/m² Temp (24hrs), Av.9 °F (36.6 °C), Min:97.4 °F (36.3 °C), Max:98.3 °F (36.8 °C) Oxygen Therapy O2 Sat (%): 93 % (20 1509) Pulse via Oximetry: 96 beats per minute (20 1025) O2 Device: Room air (20 1026) O2 Flow Rate (L/min): 2 l/min (20 0915) Intake/Output Summary (Last 24 hours) at 2020 [facility] Last data filed at 2020 1744 Gross per 24 hour Intake 240 ml Output 1375 ml Net -1135 ml General: No acute distress  appears fatigued/ weak Lungs:  diminished bilaterally Heart:  Regular rate and rhythm,  No murmur, rub, or gallop Abdomen: Soft, Non distended, Non tender, Positive bowel sounds Extremities: No cyanosis, clubbing or edema Neurologic:  No focal deficits ASSESSMENT Active Hospital Problems Diagnosis Date Noted  Neutropenia ([facility] 2020  Pleural effusion on right 2020  Acute respiratory failure with hypoxia ([facility] 05/24/2020  Normocytic anemia 05/24/2020  CAP (community acquired pneumonia) 05/24/2020  Pleural effusion on left 05/24/2020  Malignant neoplasm metastatic to bone ([facility] 06/26/2019  Type 2 diabetes with nephropathy ([facility] 07/03/2018  Diabetes mellitus due to underlying condition with hyperglycemia ([facility] 12/21/2015  Encounter for laboratory testing for COVID-19 virus 12/15/2015  Hyponatremia 11/23/2015 A/P 
  
Plan: # Acute hypoxemic respiratory failure - 2/2 large bilateral pleural effusions, CAP. S/p b/l thoracentesis 5/25, 5/28. - cyto with rare atypical cells from 5/25 thora. Repeat cyto pending 
 - Continue empiric CAP coverage Rocephin/Azithromycin EOT 5/31 
 - covid negative -  TTE EF 55% - pulm following - cont IV lasix 
  
# Acute anemia 
            - No clear blood loss. Baseline Hb 8-9. Transfused 1 unit prbc 5/25 
  
# Chronic hyponatremia/SIADH 
            - On [facility] outpatient but held per Pharmacy policy if Na <644. Con't salt tabs. Follow sodium closely with addition of diuretic 
 
 # DM2 
            - holding metformin. cont SSI, glimepiride. 
  
# HTN 
            - Norvasc, lisinopril # Debility 
 - order PT/OT. Add PPD just in case. # hypokalemia/ Hypomag 
 - replete and repeat in AM 
  
DC planning/Dispo: anticipate DC home with Sue Abrams in 1-2 days DVT Prophylaxis:  lovenox (held for procedure) SCDS Signed By: Myles Austin DO May 29, 2020

## 2020-05-29 NOTE — INTERVAL H&P NOTE
Update History & Physical 
 
The Patient's History and Physical of May 29, Thoracentesis was reviewed with the patient and I examined the patient. There was no change. The surgical site was confirmed by the patient and me. Plan:  The risk, benefits, expected outcome, and alternative to the recommended procedure have been discussed with the patient. Patient understands and wants to proceed with the procedure.  
 
Electronically signed by Murtaza Tamez MD on 5/29/2020 at 8:46 AM

## 2020-05-29 NOTE — PROGRESS NOTES
Patient received tramadol 50 mg for c/o back, and neck pain that he rated 8/10. Will monitor effectiveness.

## 2020-05-29 NOTE — PROGRESS NOTES
Physical Therapy Note: Attempted to see pt for physical therapy treatment. Thoracentesis being performed currently. Will check back when pt available if schedule permits.  
 
Thank you, 
Kevin Mccoy, PT, DPT

## 2020-05-29 NOTE — PROGRESS NOTES
Problem: Falls - Risk of 
Goal: *Absence of Falls Description: Document Nurys Whaley Fall Risk and appropriate interventions in the flowsheet. Outcome: Progressing Towards Goal 
Note: Fall Risk Interventions: 
Mobility Interventions: Bed/chair exit alarm Medication Interventions: Patient to call before getting OOB Elimination Interventions: Call light in reach, Elevated toilet seat Problem: Patient Education: Go to Patient Education Activity Goal: Patient/Family Education Outcome: Progressing Towards Goal 
  
Problem: Breathing Pattern - Ineffective Goal: *Absence of hypoxia Outcome: Progressing Towards Goal 
Goal: *Use of effective breathing techniques Outcome: Progressing Towards Goal 
  
Problem: Patient Education: Go to Patient Education Activity Goal: Patient/Family Education Outcome: Progressing Towards Goal 
  
Problem: Breathing Pattern - Ineffective Goal: *Absence of hypoxia Outcome: Progressing Towards Goal 
Goal: *Use of effective breathing techniques Outcome: Progressing Towards Goal 
Goal: *PALLIATIVE CARE:  Alleviation of Dyspnea Outcome: Progressing Towards Goal 
  
Problem: Patient Education: Go to Patient Education Activity Goal: Patient/Family Education Outcome: Progressing Towards Goal 
  
Problem: Patient Education: Go to Patient Education Activity Goal: Patient/Family Education Outcome: Progressing Towards Goal 
  
Problem: Patient Education: Go to Patient Education Activity Goal: Patient/Family Education Outcome: Progressing Towards Goal 
  
Problem: Nutrition Deficit Goal: *Optimize nutritional status Outcome: Progressing Towards Goal

## 2020-05-29 NOTE — PROCEDURES
PROCEDURE: 
DIAGNOSTIC THORACENTESIS, THERAPEUTIC THORACENTESIS  
 
PRE-OP DIAGNOSIS: 
Left PLEURAL EFFUSION POST-OP DIAGNOSIS: 
Left PLEURAL EFFUSION 
 
VOLUME REMOVED: 
 
800cc ANESTHESIA: 
 
LOCAL ANESTHESIA WITH 1% LIDOCAINE 10 CC TOTAL. CHEST ULTRASOUND FINDINGS: 
 
A Turbo-M, Sonosite ultrasound with a 5-16 mHz probe was used to image the chest and localize the pleural effusion on the left chest. 
 
A medium anechoic space was seen on the left consistent with an uncomplicated pleural effusion. DESCRIPTION OF PROCEDURE: 
 
After obtaining informed consent and localizing the safest location for thoracentesis, the  7th intercostal space was marked with a blunt, plastic needle cap in the mid scapular line. An SOLO AK-0100 Pleral-Seal thoracentesis kit was used to perform the procedure. The skin was cleansed with the supplied chlorhexidine swab and then draped in the usual fashion. Using the previously marked location as a guide, a 22 G 1.5 inch needle was used to inject 1% lidocaine into the skin and subcutaneous tissue, as well as onto the underlying rib and inter-costal muscles. Pleural fluid was aspirated to assure proper location and additional lidocaine was injected into the pleural space prior to removing the anesthesia needle. A 3mm incision was then made with the supplied scalpel in the usual fashion to facilitate the insertion of the thoracentesis needle. The needle with an 8 [place]e thoracentesis catheter was then introduced into the chest through the previously made incision in the usual fashion, the rib localized with the needle, and the catheter then marched over the rib into the pleural space. After aspirating fluid, the thoracentesis catheter was then placed into the chest using the needle itself as a trocar. The needle was then removed and the catheter was attached to the supplied tubing without complication. 800 cc of clear yellow fluid was aspirated and sent for analysis. Fluid was sent for the following tests: 
 
Cytology Post procedure US confirmed complete drainage of the effusion and presence of lung sliding, ruling out pneumothorax. ([id number]) EBL:  
 
1 drop COMPLICATIONS: 
 
None Vincenzo Fuentes MD

## 2020-05-30 NOTE — PROGRESS NOTES
Madelin Milligan Admission Date: 5/24/2020 Daily Progress Note: 5/30/2020 The patient's chart is reviewed and the patient is discussed with the staff. The patient is a 79 y.o.  male seen and evaluated at the request of Dr. Vivian Kerr for evaluation and management of pleural effusions. 
  
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 He was last seen on 5/20 as follows: Malika Estevez was first seen in our office in September 2015. Sarah Dodson was referred for evaluation of a squamous carcinoma which involved his right neck.  In approximately July 2015 he began to note some swelling of his right neck and face.  He was treated conservatively with antibiotics.   Apparently a needle aspiration was performed and revealed no malignant cells.  Ultimately, because of persistence of the mass, he underwent a more definitive evaluation.  By the time of the evaluation, he described the lesion as measuring approximately the size of a golf ball.  On August 25, 2015 he underwent a CT scan of the neck with contrast this demonstrated a large necrotic appearing mass in the right submandibular region measuring 5 x 4.1 cm.  It was commented that the lesion appeared to be separate from the parotid gland and felt to arise extrinsic to the carotid sheath structures.  It was felt to potentially represent a necrotic lymph node.  There were no other lymph nodes evident of a worrisome nature.  In addition, the studies showed no suspicious primary within the neck.  On September 9, 2015 he underwent an excision of the right neck/parapharyngeal mass.   A direct laryngoscopy was performed on that date as well. Yanely Toth was a  Malignant appearing lesion excised from the right auricle as well.  At surgery, the lesion was noted to extend almost to the base of the skull and could not be fully excised. Yanely Toth were no other palpable irregularities in the right neck.  There were no concerning mucosal lesions on direct laryngoscopy.  The lesion on the ear proved to be a basal cell carcinoma without positive margins.  The right neck mass showed salivary glands, fibroadipose and lymphoid tissue containing well to moderately differentiated squamous cell carcinoma. Hardy Cheli tumor focally extended to the margin of resection.  A PET/CT scan performed on September 17, 2015 showed a low-density focus at the site of the previously described lymph node now measuring 3.1 x 2.6 cm.  The FDG activity was an SUV of 6.5.  There were no other findings on the PET scan.  The patient is a lifelong nonsmoker and does not abuse alcohol.  Immunohistochemistries did not suggest HPV as an etiologic agent. In November 2015 he began a course of radiation therapy concurrent with weekly cisplatin.  He completed therapy in December 2015.  In July 2018, he related a history of having been treated for hemorrhoids over the prior 5 years with various topical preparations.  Ultimately, because of persistence of symptoms he was referred to a gastroenterologist and a colonoscopy performed.  Biopsies of the anal region showed  a poorly differentiated tumor with both glandular and neuroendocrine features.  This was subsequently confirmed at the [facility] in [place] were scattered cells in a pagetoid fashion within surrounding squamous epithelium. [facility] underwent a PET scan which showed some activity in the anal skin as is evident on exam. Lazaro Shear was also a 1.2 cm lymph node in the left inguinal region with some slight SUV activity of unclear significance.  There was no evidence of disease dissemination. [facility] began therapy with 5-FU mitomycin and concurrent radiation therapy in August 2018. [facility] completed his Lenda Fang on September 27  A subsequent PET scan was performed showing  no worrisome uptake suggestive of persistent or recurrent malignancy in either of the previously treated primary locations.  PET scan in April 2019 showed no evidence of recurrent tumor in the head neck or in the soft tissues of the pelvis. Luis Daniel Motts was however an area of FDG uptake in the left iliac wing which was worrisome but of unclear significance.   Because of the PET scan findings noted above, he underwent an MRI which also shows that possible solitary metastasis in the left iliac bone.  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 This was irradiated.  
  
He returns today as a work in. Joceline Vasquez received cycle 3 Carbo/Etop 2 weeks ago.  He has generalized weakness and fatigue.  Nocturia hourly is keeping him up at night. Joceline Vasquez states he is eating and drinking well, however he has lost 5 pounds since last seen.  Denies any falls at home. Joceline Vasquez has no nausea and constipation is controlled, no mucositis.  Wife states having an occasional productive cough and exertional shortness of breath.  He has continued with 2 salt talbs BID along with tolvaptan for SIADH.  His nephrology appointment was moved out to June 5th.  He deneis any fever, chills or other infectious symptoms. \" 
  
He presented to [facility] on 5/24 with increasing dyspnea and chest discomfort. A CT of the chest revealed large bilateral effusions and we are now asked to assist with management. He is hyponatremic and getting saline infusions and also getting PRBCs for anemia. Subjective:  
 
Feels better, on RA. Bilateral thora yesterday. STill on lasix. Feels better. Worked with PT yesterday. Current Facility-Administered Medications Medication Dose Route Frequency  potassium chloride (K-DUR, KLOR-CON) SR tablet 40 mEq  40 mEq Oral BID  
 azithromycin (ZITHROMAX) tablet 500 mg  500 mg Oral DAILY  furosemide (LASIX) injection 20 mg  20 mg IntraVENous Q12H  
 0.9% sodium chloride infusion 250 mL  250 mL IntraVENous PRN  
  amLODIPine (NORVASC) tablet 10 mg  10 mg Oral DAILY  docusate sodium (COLACE) capsule 100 mg  100 mg Oral PRN  
 ferrous sulfate tablet 325 mg  325 mg Oral DAILY  glimepiride (AMARYL) tablet 4 mg  4 mg Oral 7am  
 lisinopriL (PRINIVIL, ZESTRIL) tablet 20 mg  20 mg Oral DAILY  meloxicam (MOBIC) tablet 7.5 mg  7.5 mg Oral DAILY  sodium chloride tablet 2 g  2 g Oral BID  traMADoL (ULTRAM) tablet 50 mg  50 mg Oral Q6H PRN  
 sodium chloride (NS) flush 5-40 mL  5-40 mL IntraVENous Q8H  
 sodium chloride (NS) flush 5-40 mL  5-40 mL IntraVENous PRN  
 acetaminophen (TYLENOL) tablet 650 mg  650 mg Oral Q4H PRN  
 ondansetron (ZOFRAN) injection 4 mg  4 mg IntraVENous Q4H PRN  
 magnesium hydroxide (MILK OF MAGNESIA) 400 mg/5 mL oral suspension 30 mL  30 mL Oral DAILY PRN  
 insulin regular (NOVOLIN R, HUMULIN R) injection   SubCUTAneous AC&HS  [Held by provider] enoxaparin (LOVENOX) injection 40 mg  40 mg SubCUTAneous Q24H  cefTRIAXone (ROCEPHIN) 1 g in 0.9% sodium chloride (MBP/ADV) 50 mL  1 g IntraVENous Q24H Review of Systems Constitutional: negative for fever, chills, sweats Cardiovascular: negative for chest pain, palpitations, syncope, edema Gastrointestinal:  negative for dysphagia, reflux, vomiting, diarrhea, abdominal pain, or melena Neurologic:  negative for focal weakness, numbness, headache Objective:  
 
Vitals:  
 05/29/20 2103 05/30/20 0001 05/30/20 0408 05/30/20 6279 BP: 151/78 141/80 155/71 144/84 Pulse: 92 97 97 (!) 101 Resp:  20 20 18 Temp:  97.9 °F (36.6 °C) 98.7 °F (37.1 °C) 98.4 °F (36.9 °C) SpO2:  94% 92% 95% Weight:      
 
 
Intake/Output Summary (Last 24 hours) at 5/30/2020 0840 Last data filed at 5/30/2020 5171 Gross per 24 hour Intake 240 ml Output 400 ml Net -160 ml Physical Exam:  
Constitution: elderly, ill appearing EENMT:  Sclera clear, pupils equal, oral mucosa moist 
Respiratory: diminished bases, 2L NC 
 Cardiovascular:  RRR without M,G,R 
Gastrointestinal: soft and non-tender; with positive bowel sounds. Musculoskeletal: warm without cyanosis. There is no lower extremity edema. Skin:  no jaundice or rashes, no wounds Neurologic: no gross neuro deficits Psychiatric:  alert and oriented x 4 CXR: 5/27: bilateral small effusions CT 5/24: bilateral effusions LAB Recent Labs 05/30/20 
5296 05/29/20 
2102 05/29/20 
1654 05/29/20 
1130 05/29/20 
4838 GLUCPOC 145* 240* 327* 269* 113* No results for input(s): WBC, HGB, HCT, PLT, INR, HGBEXT, HCTEXT, PLTEXT, INREXT, HGBEXT, HCTEXT, PLTEXT, INREXT in the last 72 hours. Recent Labs  
  05/29/20 2112 05/28/20 
0684 * 129*  
K 3.3* 3.3*  
CL 90* 92* CO2 29 31 * 106* BUN 9 9  
CREA 0.50* 0.51* MG 1.6* 1.6*  
CA 8.6 8.7 No results for input(s): PH, PCO2, PO2, HCO3, PHI, PCO2I, PO2I, HCO3I in the last 72 hours. No results for input(s): LCAD, LAC in the last 72 hours. Assessment:  (Medical Decision Making) Hospital Problems  Date Reviewed: 5/20/2020 Codes Class Noted POA Neutropenia ([facility] ICD-10-CM: D70.9 ICD-9-CM: 288.00  5/25/2020 Unknown Pleural effusion on right ICD-10-CM: J90 ICD-9-CM: 511.9  5/25/2020 Unknown * (Principal) Acute respiratory failure with hypoxia ([facility] ICD-10-CM: J96.01 
ICD-9-CM: 518.81  5/24/2020 Yes Normocytic anemia ICD-10-CM: D64.9 ICD-9-CM: 285.9  5/24/2020 Yes CAP (community acquired pneumonia) ICD-10-CM: J18.9 ICD-9-CM: 354  5/24/2020 Yes Pleural effusion on left ICD-10-CM: J90 ICD-9-CM: 511.9  5/24/2020 Yes Malignant neoplasm metastatic to bone [facility]) ICD-10-CM: C79.51 
ICD-9-CM: 198.5  6/26/2019 Yes Type 2 diabetes with nephropathy ([facility] ICD-10-CM: E11.21 
ICD-9-CM: 250.40, 583.81  7/3/2018 Yes  Diabetes mellitus due to underlying condition with hyperglycemia ([facility] ICD-10-CM: T33.38 
 ICD-9-CM: 249.80  12/21/2015 Yes Encounter for laboratory testing for COVID-19 virus ICD-10-CM: Z11.59 
ICD-9-CM: V73.89  12/15/2015 Unknown Hyponatremia ICD-10-CM: E87.1 ICD-9-CM: 276.1  11/23/2015 Yes Plan:  (Medical Decision Making) --cyto sent bilaterally yesterday, pending 
--cyto-rare atypical cells 5/25 
--continue diuresis, could change to PO 
--wean O2 as able 
--mobility, discussed he needed to work with PT if he wanted to get better 
--FTT 2/2 metastatic cancer? Will see Monday if still admitted. More than 50% of the time documented was spent in face-to-face contact with the patient and in the care of the patient on the floor/unit where the patient is located.  
 
Rosa Hodges MD

## 2020-05-30 NOTE — PROGRESS NOTES
Patient having lower back and hip pain with pain scale at 7. Ultram 50mg po given at this time and will assess pain medication effectiveness.

## 2020-05-30 NOTE — PROGRESS NOTES
MD (Dr. Thomas Gould) request that the patient have [facility] for RN/PT. CM went to speak with the patient at bedside, patient ask that I contact his wife and ask her to chose the [facility] agency. CM spoke with the patient's wife, Sam Andino) who chose [facility]. CM faxed order to [facility]. Patient is up for discharge tomorrow. Care Management Interventions PCP Verified by CM: Yes Transition of Care Consult (CM Consult): Discharge Planning, Home Health([facility]) [employer] - INPATIENT: No 
Physical Therapy Consult: No 
Occupational Therapy Consult: No 
Speech Therapy Consult: No 
Current Support Network: Lives with Spouse Confirm Follow Up Transport: Family Freedom of Choice List was Provided with Basic Dialogue that Supports the Patient's Individualized Plan of Care/Goals, Treatment Preferences and Shares the Quality Data Associated with the Providers?: Yes [employer] Information Provided?: No 
Discharge Location Discharge Placement: Home with home health([facility])

## 2020-05-30 NOTE — DISCHARGE INSTRUCTIONS
DISCHARGE SUMMARY from Nurse    PATIENT INSTRUCTIONS:    After general anesthesia or intravenous sedation, for 24 hours or while taking prescription Narcotics:  · Limit your activities  · Do not drive and operate hazardous machinery  · Do not make important personal or business decisions  · Do  not drink alcoholic beverages  · If you have not urinated within 8 hours after discharge, please contact your surgeon on call. Report the following to your surgeon:  · Excessive pain, swelling, redness or odor of or around the surgical area  · Temperature over 100.5  · Nausea and vomiting lasting longer than 4 hours or if unable to take medications  · Any signs of decreased circulation or nerve impairment to extremity: change in color, persistent  numbness, tingling, coldness or increase pain  · Any questions    What to do at Home:  Recommended activity: Activity as tolerated. If you experience any of the following symptoms nausea, vomiting, chest pain, shortness of breath; please follow up with MD.    *  Please give a list of your current medications to your Primary Care Provider. *  Please update this list whenever your medications are discontinued, doses are      changed, or new medications (including over-the-counter products) are added. *  Please carry medication information at all times in case of emergency situations. These are general instructions for a healthy lifestyle:    No smoking/ No tobacco products/ Avoid exposure to second hand smoke  Surgeon General's Warning:  Quitting smoking now greatly reduces serious risk to your health.     Obesity, smoking, and sedentary lifestyle greatly increases your risk for illness    A healthy diet, regular physical exercise & weight monitoring are important for maintaining a healthy lifestyle    You may be retaining fluid if you have a history of heart failure or if you experience any of the following symptoms:  Weight gain of 3 pounds or more overnight or 5 pounds in a week, increased swelling in our hands or feet or shortness of breath while lying flat in bed. Please call your doctor as soon as you notice any of these symptoms; do not wait until your next office visit. The discharge information has been reviewed with the patient. The patient verbalized understanding. Discharge medications reviewed with the patient and appropriate educational materials and side effects teaching were provided.   ___________________________________________________________________________________________________________________________________

## 2020-05-30 NOTE — PROGRESS NOTES
Received pt from RN (Nicci Cota) in stable condition. Pt in bed resting quietly. Resp even & unlabored on room air; no acute signs of distress noted. Bed low & locked; call light in reach; no needs voiced.

## 2020-05-30 NOTE — PROGRESS NOTES
Patient with significant constipation. Given lactulose, colace. KUB with diffuse stool but no obstruction. Holding suppository w/ hx of anal cancer and surgeries pt doesn't think he could hold in suppository Will hold discharge at patient request. Likely home in AM if he can have BM.

## 2020-05-31 NOTE — PROGRESS NOTES
A.M assessment complete; pt in bed resting. Assisted to bedside commode. Still no BM overnight. Alert & oriented. Resp even & unlabored on room: air lungs diminished. HR regular. Abdomen semi-soft & non-tender with hypoactive bowel sounds. Skin intact. No c/o pain at this time. Bed low & locked; call light in reach; no needs voiced.

## 2020-05-31 NOTE — PROGRESS NOTES
Problem: Falls - Risk of 
Goal: *Absence of Falls Description: Document Augie Ye Fall Risk and appropriate interventions in the flowsheet. Outcome: Progressing Towards Goal 
Note: Fall Risk Interventions: 
Mobility Interventions: Patient to call before getting OOB Medication Interventions: Patient to call before getting OOB, Teach patient to arise slowly Elimination Interventions: Call light in reach Problem: Patient Education: Go to Patient Education Activity Goal: Patient/Family Education Outcome: Progressing Towards Goal 
  
Problem: Pain Goal: *Control of Pain Outcome: Progressing Towards Goal 
Goal: *PALLIATIVE CARE:  Alleviation of Pain Outcome: Progressing Towards Goal 
  
Problem: Patient Education: Go to Patient Education Activity Goal: Patient/Family Education Outcome: Progressing Towards Goal

## 2020-05-31 NOTE — PROGRESS NOTES
Pt was found on 85% room air. Pt placed on 3 liter nasal cannula. SAT's 96% on 3 liter nasal cannula.

## 2020-05-31 NOTE — PROGRESS NOTES
Shift assessment; Pt alert, oriented X4. Respirations even, unlabored. Lung sounds diminished bilaterally. No distress noted. HR regular, tachycardic. Abdomen semi soft, constipated. Bowel sounds active. C/o pain in back, abdomen 6/10. Call light within reach. Bed in low, locked position.

## 2020-05-31 NOTE — PROGRESS NOTES
Pt has been drinking Colyte for constipation (almost 4,000 mL). Lungs now sound coarse & pt is coughing more. MD Dr. Javan Landers notified; Cxray stat ordered. Pt in bed in stable condition at this time.

## 2020-05-31 NOTE — PROGRESS NOTES
Hospitalist Progress Note Admission Date: 2020 11:00 PM 
Reason for Admission/Hospital Course: Acute metabolic encephalopathy [E92.26] 24 Hour Events: 
Patient seen and examined at bedside this morning. Still constipated and unable to have a bowel movement. Otherwise, patient reports feeling well. Denies any shortness of breath, chest pain, abdominal pain, nausea, vomiting. He would like to go home today began but understands it is important for him to have a bowel movement. No other complaints at this time ROS:  10 point review of systems is otherwise negative with the exception of the elements mentioned above. No Known Allergies OBJECTIVE: 
Patient Vitals for the past 8 hrs: 
 BP Temp Pulse Resp SpO2  
20 1211 131/76 98.6 °F (37 °C) (!) 102 18 93 % Temp (24hrs), Av.8 °F (37.1 °C), Min:98.2 °F (36.8 °C), Max:99.4 °F (37.4 °C) 
 
 0701 -  1900 In: -  
Out: 500 [Urine:500] PHYSICAL EXAM: 
General:  Pleasant white male, no acute distress. Resting comfortably in bed. HEENT: PERRLA, moist oral mucosa, normocephalic, atraumatic CV:   RRR, no murmurs on my exam 
Lungs:   Decreased lung sounds at the bases bilaterally, no wheezing or rhonchi Abdomen:   Soft, nontender, nondistended. Normal bowel sounds appreciated. MSK:   No cyanosis or edema Skin:   Clean, dry, intact Neuro:  CN II through XII grossly intact Psych: Mood and affect are appropriate Labs: 
  No results for input(s): WBC, RBC, HGB, HCT, MCV, MCH, MCHC, RDW, PLT, GRANS, LYMPH, MONOS, EOS, BASOS, IG, DF, ANEU, ABL, ABM, APRIL, ABB, AIG, HGBEXT, HCTEXT, PLTEXT in the last 72 hours. No lab exists for component: MPV Recent Labs 20 
7197 20 
5759 20 
8752 * 128* 128* K 4.5 4.1 3.3*  
CL 90* 89* 90* CO2 30 32 29 AGAP 8 7 9 * 131* 105* BUN 10 11 9 CREA 0.64* 0.65* 0.50* GFRAA >60 >60 >60 GFRNA >60 >60 >60  
CA 8.8 9.3 8.6 MG 1.7* 1.7* 1.6* Recent Results (from the past 24 hour(s)) GLUCOSE, POC Collection Time: 05/30/20  4:46 PM  
Result Value Ref Range Glucose (POC) 206 (H) 65 - 100 mg/dL GLUCOSE, POC Collection Time: 05/30/20 11:22 PM  
Result Value Ref Range Glucose (POC) 189 (H) 65 - 100 mg/dL GLUCOSE, POC Collection Time: 05/31/20  7:35 AM  
Result Value Ref Range Glucose (POC) 149 (H) 65 - 100 mg/dL METABOLIC PANEL, BASIC Collection Time: 05/31/20  7:58 AM  
Result Value Ref Range Sodium 128 (L) 136 - 145 mmol/L Potassium 4.5 3.5 - 5.1 mmol/L Chloride 90 (L) 98 - 107 mmol/L  
 CO2 30 21 - 32 mmol/L Anion gap 8 7 - 16 mmol/L Glucose 135 (H) 65 - 100 mg/dL BUN 10 8 - 23 MG/DL Creatinine 0.64 (L) 0.8 - 1.5 MG/DL  
 GFR est AA >60 >60 ml/min/1.73m2 GFR est non-AA >60 >60 ml/min/1.73m2 Calcium 8.8 8.3 - 10.4 MG/DL MAGNESIUM Collection Time: 05/31/20  7:58 AM  
Result Value Ref Range Magnesium 1.7 (L) 1.8 - 2.4 mg/dL GLUCOSE, POC Collection Time: 05/31/20 11:34 AM  
Result Value Ref Range Glucose (POC) 200 (H) 65 - 100 mg/dL Current Facility-Administered Medications Medication Dose Route Frequency  potassium chloride (K-DUR, KLOR-CON) SR tablet 40 mEq  40 mEq Oral BID  furosemide (LASIX) injection 20 mg  20 mg IntraVENous Q12H  
 0.9% sodium chloride infusion 250 mL  250 mL IntraVENous PRN  
 amLODIPine (NORVASC) tablet 10 mg  10 mg Oral DAILY  docusate sodium (COLACE) capsule 100 mg  100 mg Oral PRN  
 ferrous sulfate tablet 325 mg  325 mg Oral DAILY  glimepiride (AMARYL) tablet 4 mg  4 mg Oral 7am  
 lisinopriL (PRINIVIL, ZESTRIL) tablet 20 mg  20 mg Oral DAILY  meloxicam (MOBIC) tablet 7.5 mg  7.5 mg Oral DAILY  sodium chloride tablet 2 g  2 g Oral BID  traMADoL (ULTRAM) tablet 50 mg  50 mg Oral Q6H PRN  
 sodium chloride (NS) flush 5-40 mL  5-40 mL IntraVENous Q8H  
  sodium chloride (NS) flush 5-40 mL  5-40 mL IntraVENous PRN  
 acetaminophen (TYLENOL) tablet 650 mg  650 mg Oral Q4H PRN  
 ondansetron (ZOFRAN) injection 4 mg  4 mg IntraVENous Q4H PRN  
 magnesium hydroxide (MILK OF MAGNESIA) 400 mg/5 mL oral suspension 30 mL  30 mL Oral DAILY PRN  
 insulin regular (NOVOLIN R, HUMULIN R) injection   SubCUTAneous AC&HS  [Held by provider] enoxaparin (LOVENOX) injection 40 mg  40 mg SubCUTAneous Q24H Imaging: Xr Chest Pa Lat Result Date: 5/27/2020 2 View Chest X-Ray 5/27/2020 7:44 AM INDICATION: Shortness of breath, evaluate for increased lung effusion or infiltrates. COMPARISON: Chest x-ray 5/24/2020 FINDINGS: Upright AP and Lateral views are submitted. Lung slightly better inflated. Cardiomediastinal silhouette stable. There does not appear to be increased vascular congestion or any pneumothorax. Bibasilar hazy densities still seen but stable on the left and may be slightly improved on the right. At the right upper lobe region there is persistent apical capping and increased hazy densities. IMPRESSION: 1. Moderate size bibasilar layering effusions and atelectasis, similar on the left, may be slightly improved on the right. As indicated correlate with directed chest ultrasound to assess for whether there is a drainable pleural effusion on either side. 2. Stable findings at the right upper lobe region as well, no acute developing abnormalities. Xr Abd (kub) Result Date: 5/30/2020 KUB supine views History:  ILEUS POSSIBLE, 79 years Male Comparison:  None available Findings: Stool is seen throughout the colon and rectum. No free air is evident. The bowel gas pattern is nonspecific and there is no evidence of ileus or obstruction. No suspicious renal or ureteral calculi are evident. The vertebral clips overlying the right pelvis. Visualized osseous structures unremarkable. Impression: No acute pathology identified. Stool throughout the colon and rectum. Ct Chest W Cont Result Date: 5/24/2020 CT OF THE CHEST WITH CONTRAST, PULMONARY EMBOLUS PROTOCOL. CLINICAL INDICATION: Shortness of breath, hypoxia, chest pain, throat and rectal cancer, Covid rule out PROCEDURE: Serial thin section axial images are obtained from the thoracic inlet through the upper abdomen following the administration of intravenous contrast per a dedicated, institutional pulmonary embolus protocol. Coronal MIP reformatted images are generated. Radiation dose reduction techniques were used for this study. Our CT scanners use one or all of the following: Automated exposure control, adjusted of the mA and/or kV according to patient size, iterative reconstruction COMPARISON: PET/CT dated 3/24/2020 FINDINGS: The aorta is unremarkable. There is adequate opacification of the central pulmonary arterial tree. No central intraluminal filling defect noted to indicate acute pulmonary embolus. Large bilateral pleural effusions are present that have increased significantly in size. There is no pneumothorax. Dependent atelectasis is present. Bilateral inflammatory appearing pulmonary nodules are noted in the bilateral upper lobes. Note, these are present on the prior PET/CT is well. Limited evaluation the upper abdomen shows adrenal glands to be normal. Multiple sclerotic foci appreciated in the ribs and spine. This is most in keeping with osseous metastatic disease. IMPRESSION: 1. No acute pulmonary emboli. 2. Large bilateral pleural effusions. . Inflammatory appearing nodularity in the bilateral upper lobes near the lung apices. 3. Multiple patchy sclerotic lesions throughout the thoracic spine and ribs. This is in keeping with the patient's history of osseous metastatic disease. Xr Chest [facility] Result Date: 5/24/2020 Portable chest x-ray CLINICAL INDICATION: Shortness of breath FINDINGS: Single AP view of the chest compared to a similar chest x-ray dated 9/2/2018 shows new airspace density in the right lower lung with a probable small right pleural effusion. Left lung is clear. The cardiac silhouette and mediastinum are unremarkable. IMPRESSION: New airspace density in the right lower lung with a probable small right pleural effusion. Pneumonia should be considered. ASSESSMENT: 
 
Problem List  Date Reviewed: 5/20/2020 Codes Class Noted Neutropenia ([facility] ICD-10-CM: D70.9 ICD-9-CM: 288.00  5/25/2020 Pleural effusion on right ICD-10-CM: J90 ICD-9-CM: 511.9  5/25/2020 * (Principal) Acute respiratory failure with hypoxia ([facility] ICD-10-CM: J96.01 
ICD-9-CM: 518.81  5/24/2020 Normocytic anemia ICD-10-CM: D64.9 ICD-9-CM: 285.9  5/24/2020 CAP (community acquired pneumonia) ICD-10-CM: J18.9 ICD-9-CM: 325  5/24/2020 Pleural effusion on left ICD-10-CM: J90 ICD-9-CM: 511.9  5/24/2020 Hypomagnesemia ICD-10-CM: T02.26 
ICD-9-CM: 275.2  5/20/2020 SIADH (syndrome of inappropriate ADH production) ([facility] ICD-10-CM: E22.2 ICD-9-CM: 253.6  3/26/2020 Malignant neoplasm metastatic to bone [facility]) ICD-10-CM: C79.51 
ICD-9-CM: 198.5  6/26/2019 Postoperative seroma of subcutaneous tissue after non-dermatologic procedure ICD-10-CM: L76.34 
ICD-9-CM: 998.13  9/3/2018 Anal carcinoma ([facility] ICD-10-CM: C21.0 ICD-9-CM: 154.3  8/9/2018 Type 2 diabetes with nephropathy ([facility] ICD-10-CM: E11.21 
ICD-9-CM: 250.40, 583.81  7/3/2018 Primary malignant neoplasm of perianal skin ICD-10-CM: C44.500 ICD-9-CM: 173.50  7/3/2018 Diabetes mellitus due to underlying condition with hyperglycemia ([facility] ICD-10-CM: K22.05 ICD-9-CM: 249.80  12/21/2015 Mucositis due to radiation therapy ICD-10-CM: K12.33 
ICD-9-CM: 528.09, E879.2  12/15/2015 Radiation esophagitis ICD-10-CM: K20.8 ICD-9-CM: 530.19, E926.9  12/15/2015 Encounter for laboratory testing for COVID-19 virus ICD-10-CM: Z11.59 
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 
 
Severe constipation Patient has not had bowel movement in over 7 days. A XR confirms constipation. Patient can be discharged once he has a bowel movement. GoLYTELY has been given Acute hypoxemic respiratory failure secondary to pleural effusions Currently on low-dose Lasix IV. Will transition to p.o. As per pulmonary recs Continue with diuresis, wean O2, continue with aggressive PT/OT, repeat CXR tomorrow a.m. 
  
Acute anemia Status post 1 unit transfusion this admission. Likely due to chronic disease or slow bleeding from his known cancer. Baseline hemoglobin between 8 and 9. 
Monitor, transfuse for hemoglobin less than 7, maintain active type and screen 
         
Chronic hyponatremia/SIADH On [place] as outpatient but held here. Continue with salt tabs and monitor DM2 Fingersticks between 150s and 200s. Continue with current sliding scale, p.o. meds. 
         
HTN Blood pressure is normal tensive. Continue with Norvasc and lisinopril 
  
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 Also very weak. Patient will need continued physical therapy when he returns home. Fluids: Not indicated Electrolytes: Replete as needed Nutrition: Diabetic diet with nutritional supplements CODE STATUS: Full DVT prophylaxis: Lovenox Patient to be [address] home once he has a bowel movement. Unlikely he will leave today as patient still has not had bowel movement and just completed GoLYTELY prep. Will plan for DC first thing in the morning.

## 2020-05-31 NOTE — PROGRESS NOTES
Ruiz Molina Admission Date: 5/24/2020 Daily Progress Note: 5/31/2020 The patient's chart is reviewed and the patient is discussed with the staff. The patient is a 79 y.o.  male seen and evaluated at the request of Dr. Kalina Lazo for evaluation and management of pleural effusions. 
  
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 He was last seen on 5/20 as follows: Nicci Christensen was first seen in our office in September 2015. [facility] was referred for evaluation of a squamous carcinoma which involved his right neck.  In approximately July 2015 he began to note some swelling of his right neck and face.  He was treated conservatively with antibiotics.   Apparently a needle aspiration was performed and revealed no malignant cells.  Ultimately, because of persistence of the mass, he underwent a more definitive evaluation.  By the time of the evaluation, he described the lesion as measuring approximately the size of a golf ball.  On August 25, 2015 he underwent a CT scan of the neck with contrast this demonstrated a large necrotic appearing mass in the right submandibular region measuring 5 x 4.1 cm.  It was commented that the lesion appeared to be separate from the parotid gland and felt to arise extrinsic to the carotid sheath structures.  It was felt to potentially represent a necrotic lymph node.  There were no other lymph nodes evident of a worrisome nature.  In addition, the studies showed no suspicious primary within the neck.  On September 9, 2015 he underwent an excision of the right neck/parapharyngeal mass.   A direct laryngoscopy was performed on that date as well. Byron Alexander was a  Malignant appearing lesion excised from the right auricle as well.  At surgery, the lesion was noted to extend almost to the base of the skull and could not be fully excised. Byron Alexander were no other palpable irregularities in the right neck.  There were no concerning mucosal lesions on direct laryngoscopy.  The lesion on the ear proved to be a basal cell carcinoma without positive margins.  The right neck mass showed salivary glands, fibroadipose and lymphoid tissue containing well to moderately differentiated squamous cell carcinoma. Thierry Carlos Enrique tumor focally extended to the margin of resection.  A PET/CT scan performed on September 17, 2015 showed a low-density focus at the site of the previously described lymph node now measuring 3.1 x 2.6 cm.  The FDG activity was an SUV of 6.5.  There were no other findings on the PET scan.  The patient is a lifelong nonsmoker and does not abuse alcohol.  Immunohistochemistries did not suggest HPV as an etiologic agent. In November 2015 he began a course of radiation therapy concurrent with weekly cisplatin.  He completed therapy in December 2015.  In July 2018, he related a history of having been treated for hemorrhoids over the prior 5 years with various topical preparations.  Ultimately, because of persistence of symptoms he was referred to a gastroenterologist and a colonoscopy performed.  Biopsies of the anal region showed  a poorly differentiated tumor with both glandular and neuroendocrine features.  This was subsequently confirmed at the [facility] in [place] were scattered cells in a pagetoid fashion within surrounding squamous epithelium. [facility] underwent a PET scan which showed some activity in the anal skin as is evident on exam. Cooper Lowe was also a 1.2 cm lymph node in the left inguinal region with some slight SUV activity of unclear significance.  There was no evidence of disease dissemination. [facility] began therapy with 5-FU mitomycin and concurrent radiation therapy in August 2018. [facility] completed his [place] [employer] on September 27  A subsequent PET scan was performed showing  no worrisome uptake suggestive of persistent or recurrent malignancy in either of the previously treated primary locations.  PET scan in April 2019 showed no evidence of recurrent tumor in the head neck or in the soft tissues of the pelvis. Ivan Parsons was however an area of FDG uptake in the left iliac wing which was worrisome but of unclear significance.   Because of the PET scan findings noted above, he underwent an MRI which also shows that possible solitary metastasis in the left iliac bone.  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 This was irradiated.  
  
He returns today as a work in. [facility] received cycle 3 Carbo/Etop 2 weeks ago.  He has generalized weakness and fatigue.  Nocturia hourly is keeping him up at night. [facility] states he is eating and drinking well, however he has lost 5 pounds since last seen.  Denies any falls at home. [facility] has no nausea and constipation is controlled, no mucositis.  Wife states having an occasional productive cough and exertional shortness of breath.  He has continued with 2 salt talbs BID along with tolvaptan for SIADH.  His nephrology appointment was moved out to June 5th.  He deneis any fever, chills or other infectious symptoms. \" 
  
He presented to [facility] on 5/24 with increasing dyspnea and chest discomfort. A CT of the chest revealed large bilateral effusions and we are now asked to assist with management. He is hyponatremic and getting saline infusions and also getting PRBCs for anemia. Subjective:  
 
Placed back on O2 overnight. This morning seen and not on O2 currently. States breathing feels good. No new symptoms. Minimally active. Minimal effort or interest in PT. Current Facility-Administered Medications Medication Dose Route Frequency  lactulose (CHRONULAC) 10 gram/15 mL solution 45 mL  45 mL Oral ONCE  potassium chloride (K-DUR, KLOR-CON) SR tablet 40 mEq  40 mEq Oral BID  furosemide (LASIX) injection 20 mg  20 mg IntraVENous Q12H  0.9% sodium chloride infusion 250 mL  250 mL IntraVENous PRN  
 amLODIPine (NORVASC) tablet 10 mg  10 mg Oral DAILY  docusate sodium (COLACE) capsule 100 mg  100 mg Oral PRN  
 ferrous sulfate tablet 325 mg  325 mg Oral DAILY  glimepiride (AMARYL) tablet 4 mg  4 mg Oral 7am  
 lisinopriL (PRINIVIL, ZESTRIL) tablet 20 mg  20 mg Oral DAILY  meloxicam (MOBIC) tablet 7.5 mg  7.5 mg Oral DAILY  sodium chloride tablet 2 g  2 g Oral BID  traMADoL (ULTRAM) tablet 50 mg  50 mg Oral Q6H PRN  
 sodium chloride (NS) flush 5-40 mL  5-40 mL IntraVENous Q8H  
 sodium chloride (NS) flush 5-40 mL  5-40 mL IntraVENous PRN  
 acetaminophen (TYLENOL) tablet 650 mg  650 mg Oral Q4H PRN  
 ondansetron (ZOFRAN) injection 4 mg  4 mg IntraVENous Q4H PRN  
 magnesium hydroxide (MILK OF MAGNESIA) 400 mg/5 mL oral suspension 30 mL  30 mL Oral DAILY PRN  
 insulin regular (NOVOLIN R, HUMULIN R) injection   SubCUTAneous AC&HS  [Held by provider] enoxaparin (LOVENOX) injection 40 mg  40 mg SubCUTAneous Q24H Review of Systems Constitutional: negative for fever, chills, sweats Cardiovascular: negative for chest pain, palpitations, syncope, edema Gastrointestinal:  negative for dysphagia, reflux, vomiting, diarrhea, abdominal pain, or melena Neurologic:  negative for focal weakness, numbness, headache Objective:  
 
Vitals:  
 05/31/20 0007 05/31/20 0321 05/31/20 1439 05/31/20 1936 BP: 112/59 133/72  114/71 Pulse: (!) 102 (!) 108  (!) 104 Resp: 18 18  18 Temp: 99.4 °F (37.4 °C) 98.4 °F (36.9 °C)  98.9 °F (37.2 °C) SpO2: 95% 98% 96% 94% Weight:      
 
 
Intake/Output Summary (Last 24 hours) at 5/31/2020 8792 Last data filed at 5/31/2020 6689 Gross per 24 hour Intake 375 ml Output 1710 ml Net -1335 ml Physical Exam:  
Constitution: elderly, ill appearing EENMT:  Sclera clear, pupils equal, oral mucosa moist 
Respiratory: diminished bases, 2L NC Cardiovascular:  RRR without M,G,R 
 Gastrointestinal: soft and non-tender; with positive bowel sounds. Musculoskeletal: warm without cyanosis. There is no lower extremity edema. Skin:  no jaundice or rashes, no wounds Neurologic: no gross neuro deficits Psychiatric:  alert and oriented x 4 CXR: 5/27: bilateral small effusions CT 5/24: bilateral effusions LAB Recent Labs 05/31/20 
4064 05/30/20 
2322 05/30/20 
1646 05/30/20 
1124 05/30/20 
5810 GLUCPOC 149* 189* 206* 214* 145* No results for input(s): WBC, HGB, HCT, PLT, INR, HGBEXT, HCTEXT, PLTEXT, INREXT, HGBEXT, HCTEXT, PLTEXT, INREXT in the last 72 hours. Recent Labs 05/31/20 
2537 05/30/20 
3598 05/29/20 
9000 * 128* 128* K 4.5 4.1 3.3*  
CL 90* 89* 90* CO2 30 32 29 * 131* 105* BUN 10 11 9 CREA 0.64* 0.65* 0.50* MG 1.7* 1.7* 1.6*  
CA 8.8 9.3 8.6 No results for input(s): PH, PCO2, PO2, HCO3, PHI, PCO2I, PO2I, HCO3I in the last 72 hours. No results for input(s): LCAD, LAC in the last 72 hours. Assessment:  (Medical Decision Making) Hospital Problems  Date Reviewed: 5/20/2020 Codes Class Noted POA Neutropenia ([facility] ICD-10-CM: D70.9 ICD-9-CM: 288.00  5/25/2020 Unknown Pleural effusion on right ICD-10-CM: J90 ICD-9-CM: 511.9  5/25/2020 Unknown * (Principal) Acute respiratory failure with hypoxia ([facility] ICD-10-CM: J96.01 
ICD-9-CM: 518.81  5/24/2020 Yes Normocytic anemia ICD-10-CM: D64.9 ICD-9-CM: 285.9  5/24/2020 Yes CAP (community acquired pneumonia) ICD-10-CM: J18.9 ICD-9-CM: 916  5/24/2020 Yes Pleural effusion on left ICD-10-CM: J90 ICD-9-CM: 511.9  5/24/2020 Yes Malignant neoplasm metastatic to bone [facility]) ICD-10-CM: C79.51 
ICD-9-CM: 198.5  6/26/2019 Yes Type 2 diabetes with nephropathy ([facility] ICD-10-CM: E11.21 
ICD-9-CM: 250.40, 583.81  7/3/2018 Yes  Diabetes mellitus due to underlying condition with hyperglycemia ([facility] ICD-10-CM: J24.89 
 ICD-9-CM: 249.80  12/21/2015 Yes Encounter for laboratory testing for COVID-19 virus ICD-10-CM: Z11.59 
ICD-9-CM: V73.89  12/15/2015 Unknown Hyponatremia ICD-10-CM: E87.1 ICD-9-CM: 276.1  11/23/2015 Yes Plan:  (Medical Decision Making) --cyto sent bilaterally yesterday, pending will probably return Monday 
--cyto-rare atypical cells 5/25 
--continue diuresis, could change to PO soon 
--wean O2 as able 
--mobility, discussed he needed to work with PT if he wanted to get better 
--FTT 2/2 metastatic cancer? Will repeat CXR tomorrow and see if fluid is returning. If malignant could consider PleurX, but ultimately feel is volume overload related. More than 50% of the time documented was spent in face-to-face contact with the patient and in the care of the patient on the floor/unit where the patient is located.  
 
Jade Art MD

## 2020-05-31 NOTE — PROGRESS NOTES
Patient is up for discharge home today. Patient will be getting discharged with [facility]. No other needs at this time. Care Management Interventions PCP Verified by CM: Yes Transition of Care Consult (CM Consult): Discharge Planning, Home Health([facility]) [facility] [place] - INPATIENT: No 
Physical Therapy Consult: No 
Occupational Therapy Consult: No 
Speech Therapy Consult: No 
Current Support Network: Lives with Spouse Confirm Follow Up Transport: Family Freedom of Choice List was Provided with Basic Dialogue that Supports the Patient's Individualized Plan of Care/Goals, Treatment Preferences and Shares the Quality Data Associated with the Providers?: Yes [employer] Information Provided?: No 
Discharge Location Discharge Placement: Home with home health([facility])

## 2020-05-31 NOTE — PROGRESS NOTES
Problem: Falls - Risk of 
Goal: *Absence of Falls Description: Document Yasmeen Laguerrencer Fall Risk and appropriate interventions in the flowsheet. Outcome: Progressing Towards Goal 
Note: Fall Risk Interventions: 
Mobility Interventions: Patient to call before getting OOB, Utilize walker, cane, or other assistive device Medication Interventions: Patient to call before getting OOB, Teach patient to arise slowly Elimination Interventions: Call light in reach, Patient to call for help with toileting needs, Toileting schedule/hourly rounds Problem: Pain Goal: *Control of Pain Outcome: Progressing Towards Goal

## 2020-06-01 PROBLEM — J69.0 ASPIRATION PNEUMONIA (HCC): Status: ACTIVE | Noted: 2020-01-01

## 2020-06-01 PROBLEM — K62.4 RECTAL OBSTRUCTION: Status: ACTIVE | Noted: 2020-01-01

## 2020-06-01 NOTE — PROGRESS NOTES
Hospitalist Progress Note Admission Date: 2020 11:00 PM 
Reason for Admission/Hospital Course: Acute metabolic encephalopathy [L97.95] 24 Hour Events: 
Patient seen and examined at bedside this morning. Patient had RRT overnight for worsening shortness of breath and hypoxia. Currently feeling better this morning and wife is at bedside. States that he still has some abdominal pain but his breathing has gotten better. Currently on 5 L by nasal cannula. Patient reported having 2 small bowel movements overnight but still feels uncomfortable. Denies any fevers, chills, abdominal pain, chest pain, nausea, vomiting. Plan of care was discussed extensively at bedside with patient and his wife. Currently pending surgical eval. 
 
ROS:  10 point review of systems is otherwise negative with the exception of the elements mentioned above. No Known Allergies OBJECTIVE: 
Patient Vitals for the past 8 hrs: 
 BP Temp Pulse Resp SpO2  
20 1438     93 % 20 1250     93 % 20 1230 98/50 98 °F (36.7 °C) (!) 111 24 94 % 20 0807     100 % Temp (24hrs), Av.1 °F (36.7 °C), Min:98 °F (36.7 °C), Max:98.3 °F (36.8 °C) 
 
 0701 -  1900 In: 800 [P.O.:200; I.V.:600] Out: - PHYSICAL EXAM: 
General:  Pleasant white male, no acute distress. Resting comfortably in bed. HEENT: PERRLA, moist oral mucosa, normocephalic, atraumatic CV:   RRR, no murmurs on my exam 
Lungs:   Decreased lung sounds at the bases bilaterally, rhonchorous breath sounds bilaterally Abdomen:   Soft, nontender, distended. Normal bowel sounds appreciated. MSK:   No cyanosis or edema Skin:   Clean, dry, intact Neuro:  CN II through XII grossly intact Psych: Mood and affect are appropriate Labs: 
   
Recent Labs 20 
2356 WBC 9.0  
RBC 3.48* HGB 10.3* HCT 31.7*  
MCV 91.1 MCH 29.6 MCHC 32.5  
RDW 15.7*  Recent Labs  
  20 9908 05/31/20 
9255 05/30/20 
0242 * 128* 128* K 3.9 4.5 4.1 CL 90* 90* 89* CO2 29 30 32 AGAP 12 8 7 * 135* 131* BUN 18 10 11 CREA 0.90 0.64* 0.65* GFRAA >60 >60 >60 GFRNA >60 >60 >60  
CA 8.6 8.8 9.3 MG 2.0 1.7* 1.7* Recent Results (from the past 24 hour(s)) GLUCOSE, POC Collection Time: 05/31/20  4:45 PM  
Result Value Ref Range Glucose (POC) 240 (H) 65 - 100 mg/dL POC G3 Collection Time: 05/31/20  9:17 PM  
Result Value Ref Range Device: High Flow Nasal Cannula    
 pH (POC) 7.444 7.35 - 7.45    
 pCO2 (POC) 40.5 35 - 45 MMHG  
 pO2 (POC) 58 (L) 75 - 100 MMHG  
 HCO3 (POC) 27.8 (H) 22 - 26 MMOL/L  
 sO2 (POC) 91 (L) 95 - 98 % Base excess (POC) 3 mmol/L Allens test (POC) YES Site LEFT RADIAL Specimen type (POC) ARTERIAL Performed by [employer] CO2, POC 29 MMOL/L Flow rate (POC) 10.000 L/min COLLECT TIME 2,115 GLUCOSE, POC Collection Time: 05/31/20  9:42 PM  
Result Value Ref Range Glucose (POC) 227 (H) 65 - 100 mg/dL URINALYSIS W/ RFLX MICROSCOPIC Collection Time: 05/31/20 11:46 PM  
Result Value Ref Range Color YELLOW Appearance CLEAR Specific gravity 1.016 1.001 - 1.023    
 pH (UA) 5.5 5.0 - 9.0 Protein 30 (A) NEG mg/dL Glucose Negative mg/dL Ketone TRACE (A) NEG mg/dL Bilirubin Negative NEG Blood Negative NEG Urobilinogen 0.2 0.2 - 1.0 EU/dL Nitrites Negative NEG Leukocyte Esterase Negative NEG    
 WBC 0-3 0 /hpf  
 RBC 0-3 0 /hpf Epithelial cells 0-3 0 /hpf Bacteria 0 0 /hpf  
CBC W/O DIFF Collection Time: 05/31/20 11:56 PM  
Result Value Ref Range WBC 9.0 4.3 - 11.1 K/uL  
 RBC 3.48 (L) 4.23 - 5.6 M/uL  
 HGB 10.3 (L) 13.6 - 17.2 g/dL HCT 31.7 (L) 41.1 - 50.3 % MCV 91.1 79.6 - 97.8 FL  
 MCH 29.6 26.1 - 32.9 PG  
 MCHC 32.5 31.4 - 35.0 g/dL  
 RDW 15.7 (H) 11.9 - 14.6 % PLATELET 804 759 - 866 K/uL  MPV 8.8 (L) 9.4 - 12.3 FL  
 ABSOLUTE NRBC 0.02 0.0 - 0.2 K/uL LACTIC ACID Collection Time: 05/31/20 11:56 PM  
Result Value Ref Range Lactic acid 2.8 (HH) 0.4 - 2.0 MMOL/L  
D DIMER Collection Time: 05/31/20 11:56 PM  
Result Value Ref Range D DIMER 14.25 (HH) <0.56 ug/ml(FEU) TROPONIN-HIGH SENSITIVITY Collection Time: 05/31/20 11:56 PM  
Result Value Ref Range Troponin-High Sensitivity 22.7 (H) 0 - 14 pg/mL METABOLIC PANEL, BASIC Collection Time: 06/01/20  6:24 AM  
Result Value Ref Range Sodium 131 (L) 136 - 145 mmol/L Potassium 3.9 3.5 - 5.1 mmol/L Chloride 90 (L) 98 - 107 mmol/L  
 CO2 29 21 - 32 mmol/L Anion gap 12 7 - 16 mmol/L Glucose 133 (H) 65 - 100 mg/dL BUN 18 8 - 23 MG/DL Creatinine 0.90 0.8 - 1.5 MG/DL  
 GFR est AA >60 >60 ml/min/1.73m2 GFR est non-AA >60 >60 ml/min/1.73m2 Calcium 8.6 8.3 - 10.4 MG/DL MAGNESIUM Collection Time: 06/01/20  6:24 AM  
Result Value Ref Range Magnesium 2.0 1.8 - 2.4 mg/dL LACTIC ACID Collection Time: 06/01/20  6:24 AM  
Result Value Ref Range Lactic acid 1.6 0.4 - 2.0 MMOL/L  
GLUCOSE, POC Collection Time: 06/01/20  7:39 AM  
Result Value Ref Range Glucose (POC) 177 (H) 65 - 100 mg/dL GLUCOSE, POC Collection Time: 06/01/20 11:28 AM  
Result Value Ref Range Glucose (POC) 199 (H) 65 - 100 mg/dL EKG, 12 LEAD, INITIAL Collection Time: 06/01/20  1:26 PM  
Result Value Ref Range Ventricular Rate 105 BPM  
 Atrial Rate 105 BPM  
 P-R Interval 128 ms QRS Duration 74 ms Q-T Interval 356 ms  
 QTC Calculation (Bezet) 470 ms Calculated P Axis 55 degrees Calculated R Axis 53 degrees Calculated T Axis 75 degrees Diagnosis Sinus tachycardia Otherwise normal ECG When compared with ECG of 03-SEP-2015 16:56, 
QT has lengthened Confirmed by SHAHIDA SAUNDERS (), Julieth Mansfield ([id number]) on 6/1/2020 2:15:02 PM 
  
 
 
Current Facility-Administered Medications Medication Dose Route Frequency  piperacillin-tazobactam (ZOSYN) 3.375 g in 0.9% sodium chloride (MBP/ADV) 100 mL  3.375 g IntraVENous Q8H  
 vancomycin (VANCOCIN) 1250 mg in  ml infusion  1,250 mg IntraVENous Q18H  polyethylene glycol (MIRALAX) powder 238 g  238 g Oral ONCE  hydrALAZINE (APRESOLINE) 20 mg/mL injection 10 mg  10 mg IntraVENous Q6H PRN  
 [Held by provider] furosemide (LASIX) injection 40 mg  40 mg IntraVENous Q12H  
 albuterol-ipratropium (DUO-NEB) 2.5 MG-0.5 MG/3 ML  3 mL Nebulization Q6H RT  
 magnesium hydroxide (MILK OF MAGNESIA) 400 mg/5 mL oral suspension 30 mL  30 mL Oral DAILY  senna-docusate (PERICOLACE) 8.6-50 mg per tablet 1 Tab  1 Tab Oral BID  
 bisacodyL (DULCOLAX) suppository 10 mg  10 mg Rectal DAILY  potassium chloride (K-DUR, KLOR-CON) SR tablet 40 mEq  40 mEq Oral BID  
 0.9% sodium chloride infusion 250 mL  250 mL IntraVENous PRN  
 amLODIPine (NORVASC) tablet 10 mg  10 mg Oral DAILY  docusate sodium (COLACE) capsule 100 mg  100 mg Oral PRN  
 ferrous sulfate tablet 325 mg  325 mg Oral DAILY  glimepiride (AMARYL) tablet 4 mg  4 mg Oral 7am  
 lisinopriL (PRINIVIL, ZESTRIL) tablet 20 mg  20 mg Oral DAILY  meloxicam (MOBIC) tablet 7.5 mg  7.5 mg Oral DAILY  sodium chloride tablet 2 g  2 g Oral BID  traMADoL (ULTRAM) tablet 50 mg  50 mg Oral Q6H PRN  
 sodium chloride (NS) flush 5-40 mL  5-40 mL IntraVENous Q8H  
 sodium chloride (NS) flush 5-40 mL  5-40 mL IntraVENous PRN  
 acetaminophen (TYLENOL) tablet 650 mg  650 mg Oral Q4H PRN  
 ondansetron (ZOFRAN) injection 4 mg  4 mg IntraVENous Q4H PRN  
 insulin regular (NOVOLIN R, HUMULIN R) injection   SubCUTAneous AC&HS  enoxaparin (LOVENOX) injection 40 mg  40 mg SubCUTAneous Q24H Imaging: Xr Chest Pa Lat Result Date: 5/27/2020 2 View Chest X-Ray 5/27/2020 7:44 AM INDICATION: Shortness of breath, evaluate for increased lung effusion or infiltrates.  COMPARISON: Chest x-ray 5/24/2020 FINDINGS: Upright AP and Lateral views are submitted. Lung slightly better inflated. Cardiomediastinal silhouette stable. There does not appear to be increased vascular congestion or any pneumothorax. Bibasilar hazy densities still seen but stable on the left and may be slightly improved on the right. At the right upper lobe region there is persistent apical capping and increased hazy densities. IMPRESSION: 1. Moderate size bibasilar layering effusions and atelectasis, similar on the left, may be slightly improved on the right. As indicated correlate with directed chest ultrasound to assess for whether there is a drainable pleural effusion on either side. 2. Stable findings at the right upper lobe region as well, no acute developing abnormalities. Xr Abd (kub) Result Date: 5/30/2020 KUB supine views History:  ILEUS POSSIBLE, 79 years Male Comparison:  None available Findings: Stool is seen throughout the colon and rectum. No free air is evident. The bowel gas pattern is nonspecific and there is no evidence of ileus or obstruction. No suspicious renal or ureteral calculi are evident. The vertebral clips overlying the right pelvis. Visualized osseous structures unremarkable. Impression: No acute pathology identified. Stool throughout the colon and rectum. Ct Chest W Cont Result Date: 5/24/2020 CT OF THE CHEST WITH CONTRAST, PULMONARY EMBOLUS PROTOCOL. CLINICAL INDICATION: Shortness of breath, hypoxia, chest pain, throat and rectal cancer, Covid rule out PROCEDURE: Serial thin section axial images are obtained from the thoracic inlet through the upper abdomen following the administration of intravenous contrast per a dedicated, institutional pulmonary embolus protocol. Coronal MIP reformatted images are generated. Radiation dose reduction techniques were used for this study.  Our CT scanners use one or all of the following: Automated exposure control, adjusted of the mA and/or kV according to patient size, iterative reconstruction COMPARISON: PET/CT dated 3/24/2020 FINDINGS: The aorta is unremarkable. There is adequate opacification of the central pulmonary arterial tree. No central intraluminal filling defect noted to indicate acute pulmonary embolus. Large bilateral pleural effusions are present that have increased significantly in size. There is no pneumothorax. Dependent atelectasis is present. Bilateral inflammatory appearing pulmonary nodules are noted in the bilateral upper lobes. Note, these are present on the prior PET/CT is well. Limited evaluation the upper abdomen shows adrenal glands to be normal. Multiple sclerotic foci appreciated in the ribs and spine. This is most in keeping with osseous metastatic disease. IMPRESSION: 1. No acute pulmonary emboli. 2. Large bilateral pleural effusions. . Inflammatory appearing nodularity in the bilateral upper lobes near the lung apices. 3. Multiple patchy sclerotic lesions throughout the thoracic spine and ribs. This is in keeping with the patient's history of osseous metastatic disease. Xr Chest [facility] Result Date: 5/24/2020 Portable chest x-ray CLINICAL INDICATION: Shortness of breath FINDINGS: Single AP view of the chest compared to a similar chest x-ray dated 9/2/2018 shows new airspace density in the right lower lung with a probable small right pleural effusion. Left lung is clear. The cardiac silhouette and mediastinum are unremarkable. IMPRESSION: New airspace density in the right lower lung with a probable small right pleural effusion. Pneumonia should be considered. ASSESSMENT: 
 
Problem List  Date Reviewed: 5/20/2020 Codes Class Noted Rectal obstruction ICD-10-CM: K62.4 ICD-9-CM: 569.2  6/1/2020 Aspiration pneumonia ([facility] ICD-10-CM: J69.0 ICD-9-CM: 507.0  6/1/2020 Neutropenia ([facility] ICD-10-CM: D70.9 ICD-9-CM: 288.00  5/25/2020 Pleural effusion on right ICD-10-CM: J90 ICD-9-CM: 511.9  5/25/2020 * (Principal) Acute respiratory failure with hypoxia ([facility] ICD-10-CM: J96.01 
ICD-9-CM: 518.81  5/24/2020 Normocytic anemia ICD-10-CM: D64.9 ICD-9-CM: 285.9  5/24/2020 CAP (community acquired pneumonia) ICD-10-CM: J18.9 ICD-9-CM: 177  5/24/2020 Pleural effusion on left ICD-10-CM: J90 ICD-9-CM: 511.9  5/24/2020 Hypomagnesemia ICD-10-CM: Z20.63 
ICD-9-CM: 275.2  5/20/2020 SIADH (syndrome of inappropriate ADH production) ([facility] ICD-10-CM: E22.2 ICD-9-CM: 253.6  3/26/2020 Malignant neoplasm metastatic to bone [facility]) ICD-10-CM: C79.51 
ICD-9-CM: 198.5  6/26/2019 Postoperative seroma of subcutaneous tissue after non-dermatologic procedure ICD-10-CM: L76.34 
ICD-9-CM: 998.13  9/3/2018 Anal carcinoma ([facility] ICD-10-CM: C21.0 ICD-9-CM: 154.3  8/9/2018 Type 2 diabetes with nephropathy ([facility] ICD-10-CM: E11.21 
ICD-9-CM: 250.40, 583.81  7/3/2018 Primary malignant neoplasm of perianal skin ICD-10-CM: C44.500 ICD-9-CM: 173.50  7/3/2018 Diabetes mellitus due to underlying condition with hyperglycemia ([facility] ICD-10-CM: C40.42 ICD-9-CM: 249.80  12/21/2015 Mucositis due to radiation therapy ICD-10-CM: K12.33 
ICD-9-CM: 528.09, E879.2  12/15/2015 Radiation esophagitis ICD-10-CM: K20.8 ICD-9-CM: 530.19, E926.9  12/15/2015 Encounter for laboratory testing for COVID-19 virus ICD-10-CM: Z11.59 
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  
   
  
81-year-old male with known anal carcinoma initially presented to the ER for shortness of breath and chest discomfort. Patient had large bilateral effusions and underwent thoracentesis.   Breathing improved, patient was pending discharge. However, patient has not had a bowel movement over 8 days. Concern on most recent imaging the patient has an obstruction versus stenosis of the anal canal.  Currently pending surgical eval for possible intervention. PLAN: 
 
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 CT imaging reveals distended bowel secondary to constipation. Concern for rectal stenosis. This is known and patient has been offered diverting colostomy in the past but has refused. Attempt to relieve constipation through medical management has proved to be ineffective. Follow-up surgical evaluation, continue with laxatives and stool softeners, monitor for bowel movements Follow-up GI recommendations Hospital-acquired pneumonia Acute hypoxemic respiratory failure secondary to pleural effusion Patient has new infiltrate noted on most recent chest x-ray and CT imaging. Likely causing his worsening hypoxia. Started on Vanco/Zosyn  Continue with antibiotics, O2 supplementation as needed, incentive spirometer Diurese as needed, continue with aggressive PT/OT 
  
Acute anemia Status post 1 unit transfusion this admission. Likely due to chronic disease or slow bleeding from his known cancer. Baseline hemoglobin between 8 and 9. 
Monitor, transfuse for hemoglobin less than 7, maintain active type and screen 
         
Chronic hyponatremia/SIADH On [facility] as outpatient but held here. Continue with salt tabs and monitor. DM2 Fingersticks between 150s and 200s. Continue with current sliding scale, p.o. meds. 
         
HTN Blood pressure is normotensive. Continue with Norvasc and lisinopril 
  
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 Also very weak. Patient will need continued physical therapy when he returns home. Fluids: Not indicated Electrolytes: Replete as needed Nutrition: Diabetic diet with nutritional supplements CODE STATUS: Full DVT prophylaxis: Lovenox Patient had RRT overnight. Currently not medically stable for discharge. Concern for rectal stenosis may require further intervention. No anticipated discharge date at this time.

## 2020-06-01 NOTE — PROGRESS NOTES
Interdisciplinary team rounds were held 6/1/2020 with the following team members:Care Management, Nursing and Physician. Plan of care discussed. See clinical pathway and/or care plan for interventions and desired outcomes.

## 2020-06-01 NOTE — PROGRESS NOTES
Problem: Mobility Impaired (Adult and Pediatric) Goal: *Acute Goals and Plan of Care (Insert Text) Outcome: Progressing Towards Goal 
Note: LTG: 
(1.)Mr. Carla Demarco will move from supine to sit and sit to supine , scoot up and down, and roll side to side in bed with INDEPENDENT within 7 treatment day(s). (2.)Mr. Carla Demarco will transfer from bed to chair and chair to bed with MODIFIED INDEPENDENCE using the least restrictive device within 7 treatment day(s). (3.)Mr. Carla Demarco will ambulate with MODIFIED INDEPENDENCE for 250 feet with the least restrictive device within 7 treatment day(s). (4.)Mr. Carla Demarco will demonstrate good dynamic standing balance within 7 treatment days. ________________________________________________________________________________________________ PHYSICAL THERAPY: Daily Note and AM 6/1/2020 INPATIENT: PT Visit Days : 2 Payor: Lisette Buerger / Plan: [address] [place] [address] HMO / Product Type: Managed Care Medicare /   
  
NAME/AGE/GENDER: Joyce Álvarez is a 79 y.o. male PRIMARY DIAGNOSIS: Acute metabolic encephalopathy [J26.10] Acute respiratory failure with hypoxia (HCC) Acute respiratory failure with hypoxia (HCC) Procedure(s) (LRB): 
THORACENTESIS (N/A) ULTRASOUND (Bilateral) 3 Days Post-Op ICD-10: Treatment Diagnosis:  
 · Generalized Muscle Weakness (M62.81) · Other lack of cordination (R27.8) · Difficulty in walking, Not elsewhere classified (R26.2) · Other abnormalities of gait and mobility (R26.89) · Low Back Pain (M54.5) Precaution/Allergies: 
Patient has no known allergies. ASSESSMENT:  
 
Mr. Carla Demarco  is a 79year old male who has been admitted to hospital on 05/24 with above diagnosis and hx of cancer. Prior to hospital admission pt lives with wife in a 1 story home with 0 step(s) to enter with no railing. Pt endorses 0 falls in past 6 months. Prior to admission No O2 usage at home, no assistance with ADLs and uses no DME for mobility. 05/29: Upon entering, pt supine finishing breakfast, agreeable to therapy. Nasal cannula present but not running any O2. 94% O2 measures and cannula removed. He reports 3/10 pain at rest due to recent thoracentesis. Mod I for rolling side to side and performing bed mobility. Sit > supine with Mod I and additional time. BLE strength is good (4/5 bilat). Sitting EOB with good static and dynamic sitting balance control. Mod I to scoot in bed ,Sit <> stand Min A. Pt attempted stand but was unable to initially due to weakness and poor mobility strategy. Pt educated on body position and weight shift and then was able to stand with CGA. Initial gait x10ft without DME required CGA, was poor balance, slow and increased risk of fall. Pt given trial with RW and gait steadiness and increased gait speed noted. Pt deemed much safer with walker. Agreeable to use walker following d/c. Pt amb 20 more feet with fair standing balance and SBA. Pt then resting in chair. Educated through multiple sit to stands on proper/safe standing and sitting strategies. At end of session pt resting in chair with all needs within reach, alarm present but not activated due to no alarm  box in room, RN notified. Pt presents as functioning below his baseline, with deficits in mobility including transfers, gait, balance, and activity tolerance. Treatment initiated in session to address activity tolerance, weakness, mobility, and poor balance. Pt will benefit from skilled therapy services to address stated deficits to promote return to highest level of function, independence, and safety. 06/01- Pt supine on contact with wife in room. Pt had eventful night with hypoxic incident. On HFNC 5L. Very tired with decreased motivation to participate, but agrees. Supine to sit with Stand by assist which is slightly more assistance than before. O2 sats remain at 90%. Pt sit to stand with CGA and cues for hand placement.  Stood for ~1min before feeling tired. Rest break then stand again. Pt active BM in stand. Amb to [facility]. Unable to cont BM. Stood for cleaning and then amb to chair. O2 Sats stable. Pt slow today and no progress noted towards goals, but probably due to events of night before. PT will cont efforts. This section established at most recent assessment PROBLEM LIST (Impairments causing functional limitations): 1. Decreased Strength 2. Decreased ADL/Functional Activities 3. Decreased Transfer Abilities 4. Decreased Ambulation Ability/Technique 5. Decreased Balance 6. Increased Pain 7. Decreased Activity Tolerance 8. Increased Fatigue 9. Decreased Flexibility/Joint Mobility 10. Decreased Grulla with Home Exercise Program 
 INTERVENTIONS PLANNED: (Benefits and precautions of physical therapy have been discussed with the patient.) 1. Balance Exercise 2. Bed Mobility 3. Family Education 4. Gait Training 5. Heat 6. Home Exercise Program (HEP) 7. Manual Therapy 8. Neuromuscular Re-education/Strengthening 9. Range of Motion (ROM) 10. Therapeutic Activites 11. Therapeutic Exercise/Strengthening 12. Transfer Training TREATMENT PLAN: Frequency/Duration: 3 times a week for duration of hospital stay Rehabilitation Potential For Stated Goals: Good REHAB RECOMMENDATIONS (at time of discharge pending progress):   
Placement: It is my opinion, based on this patient's performance to date, that Mr. Zach Mcmahon may benefit from intensive therapy at a [address] [place] [address] after discharge due to the functional deficits listed above that are likely to improve with skilled rehabilitation and concerns that he/she may be unsafe to be unsupervised at home due to multiple comorbidities and high level of intense care needed. . 
Equipment:  
? Walkers, Type: Delayne Glad HISTORY:  
History of Present Injury/Illness (Reason for Referral): 
Per Physician Note: Jeremie More is a 79 y.o. male with a past medical history of HEENT cancer undergoing chemo/radiation who presents to the [facility] ER with report of SOB and chest discomfort for the past several days. He also admits to mild cough but denies fevers or chills. Admits to feeling a little better and breathing a bit better since arrival. 
  
Past Medical History/Comorbidities:  
Mr. Sharad Amador  has a past medical history of GERD (gastroesophageal reflux disease), Head and neck cancer ([facility] (9/30/2015), History of squamous cell carcinoma, History of throat cancer (2015), Hypercholesteremia, Hypertension, Hypomagnesemia (5/20/2020), Rectal cancer ([facility] (2018), Type 2 diabetes mellitus ([facility] and Vomiting (2/23/2016). Mr. Sharad Amador  has a past surgical history that includes hx orthopaedic (Right, 1966); hx other surgical (9/9/15); hx heent; hx tonsillectomy; hx heent (2015); hx colonoscopy (05/2018); hx vascular access; hx lymph node dissection; and hx other surgical. 
Social History/Living Environment:  
Home Environment: Private residence # Steps to Enter: 0 One/Two Story Residence: One story Living Alone: No 
Support Systems: Spouse/Significant Other/Partner Patient Expects to be Discharged to[de-identified] Private residence Current DME Used/Available at Home: None Tub or Shower Type: Tub/Shower combination Prior Level of Function/Work/Activity: Mod I mobility and amb Number of Personal Factors/Comorbidities that affect the Plan of Care: 3+: HIGH COMPLEXITY EXAMINATION:  
Most Recent Physical Functioning:  
Gross Assessment: 
AROM: Generally decreased, functional 
Strength: Generally decreased, functional 
Coordination: Generally decreased, functional 
Tone: Normal 
Sensation: Intact Posture: 
Posture (WDL): Exceptions to [facility] Posture Assessment: Trunk flexion, Increased, Forward head, Cervical, Kyphosis, Rounded shoulders Balance: 
Sitting: Intact Standing: Impaired Standing - Static: Fair;Good Standing - Dynamic : Fair;Constant support Bed Mobility: 
Rolling: Stand-by assistance Supine to Sit: Stand-by assistance Scooting: Modified independent Wheelchair Mobility: 
  
Transfers: 
Sit to Stand: Contact guard assistance;Stand-by assistance Stand to Sit: Stand-by assistance Bed to Chair: Stand-by assistance Gait: 
  
Base of Support: Center of gravity altered;Narrowed Speed/Christina: Shuffled; Slow Step Length: Left shortened;Right shortened Gait Abnormalities: Altered arm swing;Decreased step clearance; Step to gait Distance (ft): 25 Feet (ft) Assistive Device: Walker, rolling Ambulation - Level of Assistance: Stand-by assistance Interventions: Safety awareness training; Tactile cues; Verbal cues; Visual/Demos Body Structures Involved: 1. Lungs 2. Bones 3. Joints Body Functions Affected: 1. Sensory/Pain 2. Movement Related Activities and Participation Affected: 1. Mobility 2. Self Care 3. Interpersonal Interactions and Relationships 4. Community, Social and [place] Number of elements that affect the Plan of Care: 4+: HIGH COMPLEXITY CLINICAL PRESENTATION:  
Presentation: Stable and uncomplicated: LOW COMPLEXITY CLINICAL DECISION MAKIN[address] [place] [address]-Merg[facility] with [facility] 6 Clicks Basic Mobility Inpatient Short Form How much difficulty does the patient currently have. .. Unable A Lot A Little None 1. Turning over in bed (including adjusting bedclothes, sheets and blankets)? [] 1   [] 2   [] 3   [x] 4  
2. Sitting down on and standing up from a chair with arms ( e.g., wheelchair, bedside commode, etc.)   [] 1   [] 2   [x] 3   [] 4  
3. Moving from lying on back to sitting on the side of the bed? [] 1   [] 2   [] 3   [x] 4 How much help from another person does the patient currently need. .. Total A Lot A Little None 4. Moving to and from a bed to a chair (including a wheelchair)?    [] 1   [] 2   [x] 3   [] 4  
 5.  Need to walk in hospital room? [] 1   [] 2   [] 3   [x] 4  
6. Climbing 3-5 steps with a railing? [] 1   [x] 2   [] 3   [] 4  
© 2007, Trustees of [address] [place] [address] [address] [address], under license to [employer]. All rights reserved Score:  Initial: 20 Most Recent: X (Date: -- ) Interpretation of Tool:  Represents activities that are increasingly more difficult (i.e. Bed mobility, Transfers, Gait). Medical Necessity:    
· Patient is expected to demonstrate progress in  
· strength, range of motion, balance, coordination, and functional technique ·  to  
· increase independence with ambulation and mobility · . Reason for Services/Other Comments: 
· Patient continues to require skilled intervention due to · Gross weakness, poor balance, increased risk of falls · . Use of outcome tool(s) and clinical judgement create a POC that gives a: Clear prediction of patient's progress: LOW COMPLEXITY  
  
 
 
 
TREATMENT:  
  
Pre-treatment Symptoms/Complaints:  \"I am tired. \" 
Pain: Initial:  
Pain Intensity 1: 4 Pain Location 1: Abdomen  Post Session:  4/10 Therapeutic Activity: (    40min):  Therapeutic activities including Bed transfers, Chair transfers, Ambulation on level ground, standing balance and walker sequencing to improve mobility, strength, balance, and coordination. Required moderate Safety awareness training; Tactile cues; Verbal cues; Visual/Demos to promote static and dynamic balance in standing and promote coordination of bilateral, lower extremity(s). Braces/Orthotics/Lines/Etc:  
· O2 Device: Room air Treatment/Session Assessment:   
· Response to Treatment:  See Above · Interdisciplinary Collaboration:  
o Physical Therapist 
o Registered Nurse · After treatment position/precautions:  
o Up in chair 
o Bed/Chair-wheels locked 
o Call light within reach 
o RN notified · Compliance with Program/Exercises: Will assess as treatment progresses · Recommendations/Intent for next treatment session: \"Next visit will focus on advancements to more challenging activities\". Total Treatment Duration: PT Patient Time In/Time Out Time In: 1115 Time Out: 1155 Maria Isabel Alcocer, PT, DPT

## 2020-06-01 NOTE — PROGRESS NOTES
Problem: Falls - Risk of 
Goal: *Absence of Falls Description: Document Kimberly Yadav Fall Risk and appropriate interventions in the flowsheet. Outcome: Progressing Towards Goal 
Note: Fall Risk Interventions: 
Mobility Interventions: Bed/chair exit alarm, Patient to call before getting OOB, PT Consult for mobility concerns Medication Interventions: Bed/chair exit alarm, Patient to call before getting OOB, Teach patient to arise slowly Elimination Interventions: Bed/chair exit alarm, Call light in reach, Patient to call for help with toileting needs, Toileting schedule/hourly rounds Problem: Pneumonia: Day 1 Goal: Activity/Safety Outcome: Progressing Towards Goal 
Goal: Consults, if ordered Outcome: Progressing Towards Goal 
Goal: Diagnostic Test/Procedures Outcome: Progressing Towards Goal 
Goal: Nutrition/Diet Outcome: Progressing Towards Goal 
Goal: Medications Outcome: Progressing Towards Goal 
Goal: Respiratory Outcome: Progressing Towards Goal 
Goal: Treatments/Interventions/Procedures Outcome: Progressing Towards Goal 
Goal: Psychosocial 
Outcome: Progressing Towards Goal 
Goal: *Oxygen saturation within defined limits Outcome: Progressing Towards Goal 
Goal: *Hemodynamically stable Outcome: Progressing Towards Goal 
Goal: *Demonstrates progressive activity Outcome: Progressing Towards Goal

## 2020-06-01 NOTE — PROGRESS NOTES
Shift assessment: 
Pt alert, oriented x4. On 2L of oxygen NC. Respirations even, labored. Dyspnea on exertion. Lung sounds diminished. Coarse sounds, crackles on auscultation bilaterally. No distress noted. HR regular. Tachycardic. Abdomen distended, constipated. Bowel sounds hypoactive. Wife at the bedside. Wife concerned about pt's condition. Pt denies pain. Pt states that he does not feel well. Call light within reach. Bed in low, locked position.

## 2020-06-01 NOTE — PROGRESS NOTES
Date of Outreach Update: 
Brilliant No was seen and assessed. Previous Outreach assessment has been reviewed. Patient resting in bed comfortably with wife at bedside. Currently on NRB-sat 99%. No distress or needs noted at this time. Respirations even/unlabored. Will continue to follow up per outreach protocol.  
 
Signed By:   Hedii Echavarria RN 
  June 1, 2020 5:59 AM

## 2020-06-01 NOTE — PROGRESS NOTES
RR team, MD, bedside nurse, charge nurse, RR  at the bedside. Non rebreather mask placed on pt. O2 sat 92%. MD placed the orders.

## 2020-06-01 NOTE — PROGRESS NOTES
Off non-rebreather, neb therapy completed, placed on 5L Nc, oxygen saturation 95% and holding, tachypnea in the high twenties, breath sounds coarse post neb.

## 2020-06-01 NOTE — PROGRESS NOTES
Rapid response called for hypoxia. Patient sat dropped to 40/50's per primary RN. Upon entering room patient sats in 80's on 15L NC and tachycardic. Placed on NRB, increased to 90's. Lung sounds coarse with crackles. New orders noted for IV lasix, traore, and labs. Dr. Cheri Escoto, RT, ICU charge, and house supervisor responded. Patients wife at bedside.   
 
Will add patient to outreach list.

## 2020-06-01 NOTE — PROGRESS NOTES
Pt in the room. Zofran given for nausea and vomiting. Placed on high flow O2 NC 10L. Portable CXR ordered.

## 2020-06-01 NOTE — PROGRESS NOTES
Shift assessment completed. Pt in bed resting quietly at this time. On non-rebreather at 15L. Dysneic on exertion. Pt denied pain. F/C patent, draining clear, yellow urine. Spouse at bedside. Bed in low and locked position. Call light within reach.

## 2020-06-01 NOTE — PROGRESS NOTES
Pt MEWS score of 5. Pt in bed in stable condition in bed with wife at bedside. MD aware of pt's condition.

## 2020-06-01 NOTE — CONSULTS
H&P/Consult Note/Progress Note/Office Note:  
Ashley Houser  MRN: [id number]  RTX:2/47/3043  Age:80 y.o. 
 
HPI: Ashley Houser is a 79 y.o. male who we are asked by Dr. Claudio Yi to see for concern for rectal obstruction. 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 The patient is well known to Dr. Eduarda Howell and was last seen in office 5/2/2019. At that time he had finished radiation and chemotherapy for anal carcinoma and had an EUA. This revealed a large fissure but no palpable nodules or gross disease. He continued to have pain and difficulty moving his bowels secondary to an anal stricture and was offered a diverting colostomy and referred to Dr. Vin Gudino for a second opinion. He was seen by Dr. Vin Gudino who did an anal dilation with some improvement He last saw Dr. Vin Gudino on 2/13/2020 and was doing well at that time with no recurrent anal stenosis and recommended close surveillance. He presented to the ER 5/24 with complaints of SOB and chest discomfort. CT chest showed large bilateral effusions. He is being followed by pulmonary. Repeat CT chest/AP 6/1 showed new marked constipation and distention of the entire colon, to the level of the lower rectum, where there is wall thickening which was PET avid on the prior study. This is suspect for rectal obstruction. No dilated small bowel loops are seen at this time. At present, the patient is resting comfortably. No n/v. He had BMx2 yesterday which was reported as loose. His abdomen is distended but is not tender. His wife reports he started a new medication for SIADH and she attributes the timing of this med with the start of his new symptoms. He was seen by GI who recommend repeat EUA with possible dilation. Past Medical History:  
Diagnosis Date  GERD (gastroesophageal reflux disease)   
 pt wife denies  Head and neck cancer ([facility] [address] 9/30/2015  
 radiation and chemo and and surgery  History of squamous cell carcinoma   
 to neck area- 38 radiation treatement and 8 chemo treatment  History of throat cancer 2015  
 peg tube for about 4 months  Hypercholesteremia   
 managed well with meds  Hypertension   
 managed well with meds  Hypomagnesemia 5/20/2020  Rectal cancer ([facility] [address] 2018  
 28 radiation treatment and chemo pump  Type 2 diabetes mellitus ([facility] oral agent only/AVG BS: /no s.s of hypoglycemia  Vomiting 2/23/2016  
 pt wife denies Past Surgical History:  
Procedure Laterality Date  HX COLONOSCOPY  05/2018  HX HEENT    
 sinus  HX HEENT  2015  
 surgery on right throat for squamous cell ca right ear wedge  HX LYMPH NODE DISSECTION    
 HX ORTHOPAEDIC Right 1966  
 right leg  HX OTHER SURGICAL  9/9/15 EXCISION OF RIGHT Neck and PARAPHARYNGEAL MASS/RIGHT EAR WEDGE RESECTION  
 HX OTHER SURGICAL    
 peg placed and removed  HX TONSILLECTOMY  HX VASCULAR ACCESS    
 placed and removed Current Facility-Administered Medications Medication Dose Route Frequency  piperacillin-tazobactam (ZOSYN) 3.375 g in 0.9% sodium chloride (MBP/ADV) 100 mL  3.375 g IntraVENous Q8H  
 vancomycin (VANCOCIN) 1750 mg in  ml infusion  1,750 mg IntraVENous ONCE  
 vancomycin (VANCOCIN) 1250 mg in  ml infusion  1,250 mg IntraVENous Q18H  
 hydrALAZINE (APRESOLINE) 20 mg/mL injection 10 mg  10 mg IntraVENous Q6H PRN  
 [Held by provider] furosemide (LASIX) injection 40 mg  40 mg IntraVENous Q12H  
 albuterol-ipratropium (DUO-NEB) 2.5 MG-0.5 MG/3 ML  3 mL Nebulization Q6H RT  
 magnesium hydroxide (MILK OF MAGNESIA) 400 mg/5 mL oral suspension 30 mL  30 mL Oral DAILY  senna-docusate (PERICOLACE) 8.6-50 mg per tablet 1 Tab  1 Tab Oral BID  
 bisacodyL (DULCOLAX) suppository 10 mg  10 mg Rectal DAILY  potassium chloride (K-DUR, KLOR-CON) SR tablet 40 mEq  40 mEq Oral BID  
 0.9% sodium chloride infusion 250 mL  250 mL IntraVENous PRN  
 amLODIPine (NORVASC) tablet 10 mg  10 mg Oral DAILY  docusate sodium (COLACE) capsule 100 mg  100 mg Oral PRN  
 ferrous sulfate tablet 325 mg  325 mg Oral DAILY  glimepiride (AMARYL) tablet 4 mg  4 mg Oral 7am  
 lisinopriL (PRINIVIL, ZESTRIL) tablet 20 mg  20 mg Oral DAILY  meloxicam (MOBIC) tablet 7.5 mg  7.5 mg Oral DAILY  sodium chloride tablet 2 g  2 g Oral BID  traMADoL (ULTRAM) tablet 50 mg  50 mg Oral Q6H PRN  
 sodium chloride (NS) flush 5-40 mL  5-40 mL IntraVENous Q8H  
 sodium chloride (NS) flush 5-40 mL  5-40 mL IntraVENous PRN  
 acetaminophen (TYLENOL) tablet 650 mg  650 mg Oral Q4H PRN  
 ondansetron (ZOFRAN) injection 4 mg  4 mg IntraVENous Q4H PRN  
 insulin regular (NOVOLIN R, HUMULIN R) injection   SubCUTAneous AC&HS  enoxaparin (LOVENOX) injection 40 mg  40 mg SubCUTAneous Q24H Patient has no known allergies. Social History Socioeconomic History  Marital status:  Spouse name: Not on file  Number of children: Not on file  Years of education: Not on file  Highest education level: Not on file Tobacco Use  Smoking status: Never Smoker  Smokeless tobacco: Never Used Substance and Sexual Activity  Alcohol use: No  
 Drug use: No  
 
Social History Tobacco Use Smoking Status Never Smoker Smokeless Tobacco Never Used Family History Problem Relation Age of Onset  Emphysema Father  Lung Disease Father  Cancer Brother Colon  Heart Disease Sister  Hypertension Sister  Heart Disease Sister  Hypertension Sister  Heart Disease Brother  Hypertension Brother  Heart Disease Brother  Hypertension Brother  Heart Disease Brother  Hypertension Brother  Heart Disease Brother  Hypertension Brother  Heart Disease Brother  Hypertension Brother ROS: The patient has no difficulty with chest pain or shortness of breath. No fever or chills. Comprehensive review of systems was otherwise unremarkable except as noted above. Physical Exam:  
Visit Vitals /68 (BP 1 Location: Right arm, BP Patient Position: At rest) Pulse (!) 108 Temp 98.1 °F (36.7 °C) Resp (!) 31 Wt 164 lb (74.4 kg) SpO2 100% BMI 22.24 kg/m² Constitutional: Alert, oriented, ill appearing, cooperative patient in no acute distress; appears stated age Eyes:Sclera are clear. EOMs intact ENMT: no external lesions gross hearing normal; no obvious neck masses, no ear or lip lesions, nares normal 
CV: RRR. Normal perfusion Resp: No JVD. Breathing is  non-labored; no audible wheezing. GI: distended, tympanic, +BS, non tender Musculoskeletal: unremarkable with normal function. No embolic signs or cyanosis. Neuro:  Oriented; moves all 4; no focal deficits Psychiatric: normal affect and mood, no memory impairment Recent vitals (if inpt): 
Patient Vitals for the past 24 hrs: 
 BP Temp Pulse Resp SpO2  
06/01/20 0807     100 % 06/01/20 0730 150/68 98.1 °F (36.7 °C) (!) 108 (!) 31 92 % 06/01/20 0330 139/68 98 °F (36.7 °C) (!) 116 20 100 % 06/01/20 0111 128/66 98.2 °F (36.8 °C) (!) 131 20 98 % 06/01/20 0049   (!) 138    
05/31/20 2055 140/75 98.2 °F (36.8 °C) (!) 120 20 90 % 05/31/20 2022 166/88 98.2 °F (36.8 °C) (!) 120 22 90 % 05/31/20 1849 153/88  (!) 117    
05/31/20 1646 (!) 197/98 98.3 °F (36.8 °C) (!) 114 18 92 % 05/31/20 1211 131/76 98.6 °F (37 °C) (!) 102 18 93 % Labs: 
Recent Labs  
  06/01/20 
4580 05/31/20 
2356 WBC  --  9.0 HGB  --  10.3* PLT  --  216 *  --   
K 3.9  --   
CL 90*  --   
CO2 29  --   
BUN 18  --   
CREA 0.90  --   
*  --   
 
 
Lab Results Component Value Date/Time  WBC 9.0 05/31/2020 11:56 PM  
 HGB 10.3 (L) 05/31/2020 11:56 PM  
 PLATELET 804 55/79/4945 11:56 PM  
 Sodium 131 (L) 06/01/2020 06:24 AM  
 Potassium 3.9 06/01/2020 06:24 AM  
 Chloride 90 (L) 06/01/2020 06:24 AM  
 CO2 29 06/01/2020 06:24 AM  
 BUN 18 06/01/2020 06:24 AM  
 Creatinine 0.90 06/01/2020 06:24 AM  
 Glucose 133 (H) 06/01/2020 06:24 AM  
 INR 1.1 07/27/2018 02:00 PM  
 aPTT 29.1 07/27/2018 02:00 PM  
 Bilirubin, total 0.3 05/24/2020 05:10 PM  
 ALT (SGPT) 17 05/24/2020 05:10 PM  
 Alk. phosphatase 419 (H) 05/24/2020 05:10 PM  
 Lipase 92 09/02/2018 07:10 PM  
 Troponin-I, Qt. <0.02 (L) 05/24/2020 05:10 PM  
 
 
I reviewed recent labs and recent radiologic studies. CT Results (most recent): 
Results from [place] Encounter encounter on 05/24/20 CT CHEST ABD PELV W CONT Narrative EXAM: CT Chest with IV contrast - PE protocol. INDICATION: Dyspnea. COMPARISON: Prior CT chest on May 24, 2020. TECHNIQUE: Axial CT images of the chest were obtained after the intravenous 
injection of 100 mL Isovue 370 CT contrast. Radiation dose reduction techniques 
were used for this study. Our CT scanners use one or all of the following: Automated exposure control, adjustment of the mA and/or kV according to patient 
size, iterative reconstruction. FINDINGS: 
- Pleura/pericardium: There are decreased small to moderate-sized bilateral 
pleural effusions. No pneumothorax or pericardial effusion is seen. - Lungs: There is progressed patchy edema or infiltrates in both lungs, worse in 
the right lower lobe. - Malina/Mediastinum: Within normal limits. - Tracheobronchial tree: Within normal limits. - Aorta/pulmonary arteries: Within normal limits. - Heart: Within normal limits. - Coronary arteries: There are coronary artery calcifications. - Chest wall: Within normal limits. - Spine/bones: Again noted are multiple sclerotic foci in the ribs and spine, 
consistent with osseous metastatic disease. 
- Additional comments: None.  
  
 Impression IMPRESSION:  
 1. No evidence of pulmonary embolism. 2. Decreased small to moderate bilateral pleural effusions. 3. Progressed patchy pulmonary edema or pneumonia in both lungs, worse in the 
right lower lobe. 4. Coronary artery disease. 5. Osseous metastatic disease. EXAM: CT abdomen and pelvis with IV contrast. 
 
INDICATION: Abdominal pain. History of rectal cancer. COMPARISON: Prior PET/CT scan on March 24, 2020. TECHNIQUE: Axial CT images of the abdomen and pelvis were obtained after the 
intravenous injection of 100 mL Isovue 370 CT contrast. Oral contrast was 
administered as well. Radiation dose reduction techniques were used for this 
study. Our CT scanners use one or all of the following:  Automated exposure 
control, adjustment of the mA or kV according to patient size, iterative 
reconstruction. FINDINGS: 
 
- Liver: Within normal limits. - Gallbladder and bile ducts: Within normal limits. - Spleen: Within normal limits. - Urinary tract: The kidneys are unremarkable. The urinary bladder is 
decompressed around a Juarez catheter. - Adrenals: A small right adrenal gland nodule is unchanged, which was PET avid 
on the prior study. The left adrenal gland is normal. 
- Pancreas: Within normal limits. - Gastrointestinal tract: There is marked constipation with distention of the 
entire colon. The cecum measures 12 cm in diameter. This is to the level of wall 
thickening in the lower rectum, which was PET avid on the prior study. There is 
sigmoid diverticulosis, without evidence of acute diverticulitis. The small 
bowel loops are within normal limits. - Retroperitoneum: There is no abdominal aortic aneurysm, dissection or 
retroperitoneal adenopathy. 
- Peritoneal cavity and abdominal wall: No free fluid or free air. 
- Pelvis: Within normal limits. - Spine/bones: Again noted are scattered sclerotic lesions in the spine and 
pelvic bones, consistent with osseous metastatic disease. - Other comments: None. IMPRESSION:  
1. New marked constipation and distention of the entire colon, to the level of 
the lower rectum, where there is wall thickening which was PET avid on the prior 
study. This is suspect for rectal obstruction. No dilated small bowel loops are 
seen at this time. 2. Unchanged right adrenal gland nodule and osseous metastatic disease. I independently reviewed radiology images for studies I described above or studies I have ordered. Admission date (for inpatients): 5/24/2020  
3 Days Post-Op  Procedure(s): 
THORACENTESIS 
ULTRASOUND ASSESSMENT/PLAN: 
Problem List  Date Reviewed: 5/20/2020 Codes Class Noted Rectal obstruction ICD-10-CM: K62.4 ICD-9-CM: 569.2  6/1/2020 Aspiration pneumonia ([facility] ICD-10-CM: J69.0 ICD-9-CM: 507.0  6/1/2020 Neutropenia ([facility] ICD-10-CM: D70.9 ICD-9-CM: 288.00  5/25/2020 Pleural effusion on right ICD-10-CM: J90 ICD-9-CM: 511.9  5/25/2020 * (Principal) Acute respiratory failure with hypoxia ([facility] ICD-10-CM: J96.01 
ICD-9-CM: 518.81  5/24/2020 Normocytic anemia ICD-10-CM: D64.9 ICD-9-CM: 285.9  5/24/2020 CAP (community acquired pneumonia) ICD-10-CM: J18.9 ICD-9-CM: 544  5/24/2020 Pleural effusion on left ICD-10-CM: J90 ICD-9-CM: 511.9  5/24/2020 Hypomagnesemia ICD-10-CM: Z23.20 
ICD-9-CM: 275.2  5/20/2020 SIADH (syndrome of inappropriate ADH production) ([facility] ICD-10-CM: E22.2 ICD-9-CM: 253.6  3/26/2020 Malignant neoplasm metastatic to bone [facility]) ICD-10-CM: C79.51 
ICD-9-CM: 198.5  6/26/2019 Postoperative seroma of subcutaneous tissue after non-dermatologic procedure ICD-10-CM: L76.34 
ICD-9-CM: 998.13  9/3/2018 Anal carcinoma ([facility] ICD-10-CM: C21.0 ICD-9-CM: 154.3  8/9/2018 Type 2 diabetes with nephropathy ([facility] ICD-10-CM: E11.21 
ICD-9-CM: 250.40, 583.81  7/3/2018 Primary malignant neoplasm of perianal skin ICD-10-CM: C44.500 ICD-9-CM: 173.50  7/3/2018 Diabetes mellitus due to underlying condition with hyperglycemia ([address] ICD-10-CM: O87.62 ICD-9-CM: 249.80  12/21/2015 Mucositis due to radiation therapy ICD-10-CM: K12.33 
ICD-9-CM: 528.09, E879.2  12/15/2015 Radiation esophagitis ICD-10-CM: K20.8 ICD-9-CM: 530.19, E926.9  12/15/2015 Encounter for laboratory testing for COVID-19 virus ICD-10-CM: Z11.59 
ICD-9-CM: V73.89  12/15/2015 Hyponatremia ICD-10-CM: E87.1 ICD-9-CM: 276.1  11/23/2015 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 [facility]) ICD-10-CM: C79.89, C80.1 ICD-9-CM: 198.89, 199.1  10/14/2015 Principal Problem: 
  Acute respiratory failure with hypoxia ([address] (5/24/2020) Active Problems: Hyponatremia (11/23/2015) Encounter for laboratory testing for COVID-19 virus (12/15/2015) Diabetes mellitus due to underlying condition with hyperglycemia ([address] (12/21/2015) Type 2 diabetes with nephropathy ([address] (7/3/2018) Malignant neoplasm metastatic to bone ([address] (6/26/2019) Normocytic anemia (5/24/2020) CAP (community acquired pneumonia) (5/24/2020) Pleural effusion on left (5/24/2020) Neutropenia ([address] (5/25/2020) Pleural effusion on right (5/25/2020) Rectal obstruction (6/1/2020) Aspiration pneumonia ([address] (6/1/2020) Plan pending Dr. Juan Carlos Suero Patient has been offered diverting colostomy in the past and declined He continues to decline colostomy Unsure if further anal dilation is a possibility and may need to return to Dr. Stevan Solares Signed:  Gwen Oscar NP

## 2020-06-01 NOTE — PROGRESS NOTES
GI--addendum--will hold on any further colon prep and check abdominal films (given the CT findings of the cecum). Will try a flex sigmoid tomorrow to evaluate the anorectal area and determine if it is stool or the anal stricture causing the obstipation.   Thanks Torito Hernandez MD

## 2020-06-01 NOTE — PROGRESS NOTES
Pharmacokinetic Consult to Pharmacist 
 
Shavonneguillermo García is a 79 y.o. male being treated for Asp Pneumonia with vancomycin and pip/tazo. Weight: 74.4 kg (164 lb) Lab Results Component Value Date/Time BUN 18 06/01/2020 06:24 AM  
 Creatinine 0.90 06/01/2020 06:24 AM  
 WBC 9.0 05/31/2020 11:56 PM  
 Procalcitonin 0.30 05/25/2020 06:52 AM  
 Lactic acid 1.6 06/01/2020 06:24 AM  
 Lactic Acid (POC) 2.9 (H) 09/02/2018 07:59 PM  
  
Estimated Creatinine Clearance: 70 mL/min (based on SCr of 0.9 mg/dL). CULTURES: 
None recently No results found for: Christian Gallegos Day 1 of vancomycin. Goal trough is 15-20. Will load with 1750 mg and then initiate 1250 mg every 18 hours for now. Pharmacy to order levels and adjust the dose as appropriate. Will continue to follow patient. Thank you, Abigail Negro, PharmD, BCPS Clinical Pharmacy Specialist 
[phone]

## 2020-06-01 NOTE — PROGRESS NOTES
Attempted to transfer pt to the stretcher for chest XR. Pt vomited. O2 sat dropped to 72%. RT called. Cancelled CXR. Contacted the MD about the order for portable CXR.

## 2020-06-01 NOTE — CONSULTS
[facility] Consult Note Primary GI Physician:  
 
Referring Provider:  Dr Nhi Villegas Consult Date:  6/1/2020 Admit Date:  5/24/2020 Chief Complaint:  Obstipation Subjective:  
 
History of Present Illness:  Patient is a 79 y.o. male, admitted 5/24 by the hospitalist service after presenting to the ER with increased dyspnea and chest pain. CT chest revealed large pleural effusions and he was admitted for management. He is seen in consultation at the request of Dr. Nhi Villegas today for evaluation of obstipation. Patient has diagnosis of anal cancer 8/2018, treated with chemo-radation Indra Gell protocol). He subsequently developed anal stricture/stenosis. He underwent EUA 3/5181 with Dr Juan David Rizzo, revealing extensive excoriation of he perianal skin as well as erythema of the rectum and anus, along with anal fissure. He was seen by Dr Fredrick Lau with anal dilatation 5/22/19, with improvement of rectal pain, last seen by Dr Fredrick Lau in February of this year at which time he was doing well. He has complained during admission of abdominal pain, worse in the pelvis, along with abdominal distention. Rapid response was called 5/31 for hypoxia, at which time CT C/A/P was ordered/  Exam today revealed colonic distention with stool to the level of the rectum. He was also found to have bilateral pleural effusions and infiltrates, worse in the RLL. He has been placed on PRN stool softeners which he has received the last 2 days; Milk of Magnesia and Dulcolax was ordered today in addition. He reports the rectal pain and difficulty having a bowel movement is chronic but seems to be getting worse, similar to what he has experienced in the past prior to rectal dilation. Patient additionally has hx of squamous cell cancer of the neck/throat. CT C/A/P  W Contrast 6/1/2020 IMPRESSION:  
1. New marked constipation and distention of the entire colon, to the level of 
 the lower rectum, where there is wall thickening which was PET avid on the prior 
study. This is suspect for rectal obstruction. No dilated small bowel loops are 
seen at this time. 2. Unchanged right adrenal gland nodule and osseous metastatic disease. PMH: 
Past Medical History:  
Diagnosis Date  GERD (gastroesophageal reflux disease)   
 pt wife denies  Head and neck cancer ([facility] [facility] 9/30/2015  
 radiation and chemo and and surgery  History of squamous cell carcinoma   
 to neck area- 38 radiation treatement and 8 chemo treatment  History of throat cancer 2015  
 peg tube for about 4 months  Hypercholesteremia   
 managed well with meds  Hypertension   
 managed well with meds  Hypomagnesemia 5/20/2020  Rectal cancer ([facility] [address] 2018 28 radiation treatment and chemo pump  Type 2 diabetes mellitus ([facility] [address] oral agent only/AVG BS: /no s.s of hypoglycemia  Vomiting 2/23/2016  
 pt wife denies PSH: 
Past Surgical History:  
Procedure Laterality Date  HX COLONOSCOPY  05/2018  HX HEENT    
 sinus  HX HEENT  2015  
 surgery on right throat for squamous cell ca right ear wedge  HX LYMPH NODE DISSECTION    
 HX ORTHOPAEDIC Right 1966  
 right leg  HX OTHER SURGICAL  9/9/15 EXCISION OF RIGHT Neck and PARAPHARYNGEAL MASS/RIGHT EAR WEDGE RESECTION  
 HX OTHER SURGICAL    
 peg placed and removed  HX TONSILLECTOMY  HX VASCULAR ACCESS    
 placed and removed Allergies: 
No Known Allergies Home Medications: 
Prior to Admission medications Medication Sig Start Date End Date Taking? Authorizing Provider  
potassium chloride (K-DUR, KLOR-CON) 20 mEq tablet Take 2 Tabs by mouth two (2) times a day. 5/30/20  Yes Nydia High, DO  
magnesium oxide (MAG-OX) 400 mg tablet Take 1 Tab by mouth daily. 5/30/20  Yes Nydia High,   
furosemide (LASIX) 20 mg tablet Take 1 Tab by mouth two (2) times a day.  5/30/20  Yes Olvin Bridges,   
 lisinopriL (PRINIVIL, ZESTRIL) 20 mg tablet Take 20 mg by mouth daily. Other, MD [place]  
glimepiride (AMARYL) 4 mg tablet Take 4 mg by mouth every morning. Other, MD [place]  
tolvaptan ([place]a) tab tablet Take 15 mg by mouth daily. OtherAugusta MD  
traMADoL (ULTRAM) 50 mg tablet Take 1 Tab by mouth every six (6) hours as needed for Pain for up to 30 days. Max Daily Amount: 200 mg. Indications: pain 5/1/20 5/31/20  Jose E Crowder MD  
meloxicam (MOBIC) 7.5 mg tablet Take 1 Tab by mouth daily. Indications: arthralgias 4/17/20   Pilar Contreras MD  
sodium chloride 1 gram tablet TAKE 2 TABS BY MOUTH TWO (2) TIMES A DAY. 4/17/20   Jose E Crowder MD  
TRUEPLUS LANCETS 28 gauge misc  11/6/19   Provider, Historical  
ferrous sulfate 324 mg (65 mg iron) tablet Take  by mouth Daily (before breakfast). Provider, Historical  
docusate sodium (COLACE) 100 mg capsule Take 100 mg by mouth as needed for Constipation. Provider, Historical  
amLODIPine (NORVASC) 10 mg tablet Take 10 mg by mouth daily. In am    Provider, Historical  
metFORMIN (GLUCOPHAGE) 1,000 mg tablet Take 1,000 mg by mouth daily. In am  Indications: type 2 diabetes mellitus    Provider, Historical  
 
 
Hospital Medications: 
Current Facility-Administered Medications Medication Dose Route Frequency  piperacillin-tazobactam (ZOSYN) 3.375 g in 0.9% sodium chloride (MBP/ADV) 100 mL  3.375 g IntraVENous Q8H  
 vancomycin (VANCOCIN) 1750 mg in  ml infusion  1,750 mg IntraVENous ONCE  
 vancomycin (VANCOCIN) 1250 mg in  ml infusion  1,250 mg IntraVENous Q18H  
 hydrALAZINE (APRESOLINE) 20 mg/mL injection 10 mg  10 mg IntraVENous Q6H PRN  
 [Held by provider] furosemide (LASIX) injection 40 mg  40 mg IntraVENous Q12H  
 albuterol-ipratropium (DUO-NEB) 2.5 MG-0.5 MG/3 ML  3 mL Nebulization Q6H RT  
 magnesium hydroxide (MILK OF MAGNESIA) 400 mg/5 mL oral suspension 30 mL  30 mL Oral DAILY  senna-docusate (PERICOLACE) 8.6-50 mg per tablet 1 Tab  1 Tab Oral BID  
 bisacodyL (DULCOLAX) suppository 10 mg  10 mg Rectal DAILY  potassium chloride (K-DUR, KLOR-CON) SR tablet 40 mEq  40 mEq Oral BID  
 0.9% sodium chloride infusion 250 mL  250 mL IntraVENous PRN  
 amLODIPine (NORVASC) tablet 10 mg  10 mg Oral DAILY  docusate sodium (COLACE) capsule 100 mg  100 mg Oral PRN  
 ferrous sulfate tablet 325 mg  325 mg Oral DAILY  glimepiride (AMARYL) tablet 4 mg  4 mg Oral 7am  
 lisinopriL (PRINIVIL, ZESTRIL) tablet 20 mg  20 mg Oral DAILY  meloxicam (MOBIC) tablet 7.5 mg  7.5 mg Oral DAILY  sodium chloride tablet 2 g  2 g Oral BID  traMADoL (ULTRAM) tablet 50 mg  50 mg Oral Q6H PRN  
 sodium chloride (NS) flush 5-40 mL  5-40 mL IntraVENous Q8H  
 sodium chloride (NS) flush 5-40 mL  5-40 mL IntraVENous PRN  
 acetaminophen (TYLENOL) tablet 650 mg  650 mg Oral Q4H PRN  
 ondansetron (ZOFRAN) injection 4 mg  4 mg IntraVENous Q4H PRN  
 insulin regular (NOVOLIN R, HUMULIN R) injection   SubCUTAneous AC&HS  enoxaparin (LOVENOX) injection 40 mg  40 mg SubCUTAneous Q24H Social History: 
Social History Tobacco Use  Smoking status: Never Smoker  Smokeless tobacco: Never Used Substance Use Topics  Alcohol use: No  
 
 
 
Family History: 
Family History Problem Relation Age of Onset  Emphysema Father  Lung Disease Father  Cancer Brother Colon  Heart Disease Sister  Hypertension Sister  Heart Disease Sister  Hypertension Sister  Heart Disease Brother  Hypertension Brother  Heart Disease Brother  Hypertension Brother  Heart Disease Brother  Hypertension Brother  Heart Disease Brother  Hypertension Brother  Heart Disease Brother  Hypertension Brother Review of Systems: A detailed 10 system ROS is obtained, with pertinent positives as listed above. All others are negative.  
 
Diet:  NPO 
 
 Objective:  
 
Physical Exam: 
Vitals: 
Visit Vitals /68 (BP 1 Location: Right arm, BP Patient Position: At rest) Pulse (!) 108 Temp 98.1 °F (36.7 °C) Resp (!) 31 Wt 74.4 kg (164 lb) SpO2 100% BMI 22.24 kg/m² Gen:  Pt is alert, cooperative, no acute distress Skin:  Extremities and face reveal no rashes. Pale. HEENT: Sclerae anicteric. Extra-occular muscles are intact. No oral ulcers. No abnormal pigmentation of the lips. The neck is supple. Cardiovascular: Regular rate and rhythm. No murmurs, gallops, or rubs. Respiratory:  Comfortable breathing with no accessory muscle use. Clear but diminished breath sounds anteriorly with no wheezes, rales, or rhonchi. GI:  Abdomen soft but full and mildly uncomfortable to palpation. Active bowel sounds. No enlargement of the liver or spleen. No masses palpable. Rectal:  Deferred Musculoskeletal:  No pitting edema of the lower legs. Neurological:  Gross memory appears intact. Patient is alert and oriented. Psychiatric:  Mood appears appropriate with judgement intact. Lymphatic:  No cervical or supraclavicular adenopathy. Laboratory:   
Recent Labs  
  06/01/20 
5695 05/31/20 
2356 05/31/20 
6895 05/30/20 
8377 WBC  --  9.0  --   --   
HGB  --  10.3*  --   --   
HCT  --  31.7*  --   --   
PLT  --  216  --   -- MCV  --  91.1  --   --   
*  --  128* 128* K 3.9  --  4.5 4.1 CL 90*  --  90* 89* CO2 29  --  30 32 BUN 18  --  10 11 CREA 0.90  --  0.64* 0.65* CA 8.6  --  8.8 9.3 MG 2.0  --  1.7* 1.7*  
*  --  135* 131* Assessment:  
 
Principal Problem: 
  Acute respiratory failure with hypoxia ([facility] (5/24/2020) Active Problems: Hyponatremia (11/23/2015) Encounter for laboratory testing for COVID-19 virus (12/15/2015) Diabetes mellitus due to underlying condition with hyperglycemia ([facility] (12/21/2015) Type 2 diabetes with nephropathy ([facility] [address] (7/3/2018) Malignant neoplasm metastatic to bone ([address] (6/26/2019) Normocytic anemia (5/24/2020) CAP (community acquired pneumonia) (5/24/2020) Pleural effusion on left (5/24/2020) Neutropenia ([address] (5/25/2020) Pleural effusion on right (5/25/2020) Rectal obstruction (6/1/2020) Aspiration pneumonia ([address] (6/1/2020) Plan:  
 
78 yo male with hx of squamous cell cancer of the neck and anal cancer is seen today for evaluation of obstipation. He was admitted 5/24 for dyspnea and chest pain with large pleural effusions noted on imaging. He had rapid response called yesterday with CT C/A/P today revealing a stool-filled colon to the level of the rectum. Patient underwent chemo-radiation of anal cancer in 2018 with subsequent development of anal stenosis/stricture. Last dilation was 5/2019 by Dr Meeta Bearden. He reports daily difficulty having a bowel movement secondary to discomfort but notes recently this has become worse, similar to how it has been previously before dilation. 1.  Patient would benefit from repeat EUA and possible dilation of anal stricture. He and spouse reports prior treatment with Dr Vandana Perez and would like for him to be involved in current plan of care. 2.  Surgical consult for possible rectal dilation 3. Continue bowel program for constipation to keep stools loose, easier to pass 4. We will follow pending above Patient is seen and examined in collaboration with Dr. Pia Henriquez. Assessment and plan as per Dr. Dian Padilla. Batsheva Hwang NP Patient seen and examined. Agree with above. May need a bowel prep to help initiate good stools. Abdomen distended but soft; no N/V. Will follow.   Thanks Castillo Fitzgerald MD

## 2020-06-01 NOTE — PROGRESS NOTES
Wife calls to nurse's station for help. She states that pt's O2 dropped to 47%. HOD in [place]. Pt educated to breathe in  through the nose and breathe out through the mouth. RR called.

## 2020-06-01 NOTE — PROGRESS NOTES
Received pt from RN Ez Camarena) in stable condition. Pt in bed resting quietly. Resp even & unlabored on 15L NRB mask; no acute signs of distress noted. Wife at bedside; very concerned about pt's declining health. Bed low & locked; call light in reach; no needs voiced.

## 2020-06-01 NOTE — PROGRESS NOTES
[facility] Admission Date: 5/24/2020 Daily Progress Note: 6/1/2020 The patient's chart is reviewed and the patient is discussed with the staff. The patient is a 79 y.o.  male seen and evaluated at the request of Dr. Demarcus Claudio for evaluation and management of pleural effusions. 
  
He has a hx ofSquamous cell CA of R neck s/p resection in 8/2015 and anal CA (poorly differentiated tumor with glandular and neuroendocrine features) and has been followed by oncology. 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 He received carbo/etoposide 2 weeks ago and has also had radiation for anal cancer. He presented to [facility] on 5/24 with increasing dyspnea and chest discomfort. A CT of the chest revealed large bilateral effusions and we are now asked to assist with management. He is hyponatremic and getting saline infusions and also getting PRBCs for anemia. Subjective:  
 
Yesterday was stable on room air, but overnight had worsened abdominal distention and around 11:30pm a rapid response was called for hypoxia (sat reported 40-50%) on 15lpm NC and was placed on NRB. Lasix, Juarez, Labs were ordered. Early this morning a CT C/A/P was performed revealing colonic distention and constipation to level of rectum. There is also a R>LL lung infiltrate. levaquin administered this morning. Now describes 8/10 abd pain, worse in pelvis. He is on 5lpm during current attempt to wean NRB, sat 95-96% Current Facility-Administered Medications Medication Dose Route Frequency  levoFLOXacin (LEVAQUIN) 750 mg in D5W IVPB  750 mg IntraVENous Q24H  hydrALAZINE (APRESOLINE) 20 mg/mL injection 10 mg  10 mg IntraVENous Q6H PRN  
 furosemide (LASIX) injection 40 mg  40 mg IntraVENous Q12H  
 albuterol-ipratropium (DUO-NEB) 2.5 MG-0.5 MG/3 ML  3 mL Nebulization Q6H RT  
 magnesium hydroxide (MILK OF MAGNESIA) 400 mg/5 mL oral suspension 30 mL 30 mL Oral DAILY  senna-docusate (PERICOLACE) 8.6-50 mg per tablet 1 Tab  1 Tab Oral BID  
 bisacodyL (DULCOLAX) suppository 10 mg  10 mg Rectal DAILY  potassium chloride (K-DUR, KLOR-CON) SR tablet 40 mEq  40 mEq Oral BID  
 0.9% sodium chloride infusion 250 mL  250 mL IntraVENous PRN  
 amLODIPine (NORVASC) tablet 10 mg  10 mg Oral DAILY  docusate sodium (COLACE) capsule 100 mg  100 mg Oral PRN  
 ferrous sulfate tablet 325 mg  325 mg Oral DAILY  glimepiride (AMARYL) tablet 4 mg  4 mg Oral 7am  
 lisinopriL (PRINIVIL, ZESTRIL) tablet 20 mg  20 mg Oral DAILY  meloxicam (MOBIC) tablet 7.5 mg  7.5 mg Oral DAILY  sodium chloride tablet 2 g  2 g Oral BID  traMADoL (ULTRAM) tablet 50 mg  50 mg Oral Q6H PRN  
 sodium chloride (NS) flush 5-40 mL  5-40 mL IntraVENous Q8H  
 sodium chloride (NS) flush 5-40 mL  5-40 mL IntraVENous PRN  
 acetaminophen (TYLENOL) tablet 650 mg  650 mg Oral Q4H PRN  
 ondansetron (ZOFRAN) injection 4 mg  4 mg IntraVENous Q4H PRN  
 insulin regular (NOVOLIN R, HUMULIN R) injection   SubCUTAneous AC&HS  enoxaparin (LOVENOX) injection 40 mg  40 mg SubCUTAneous Q24H Review of Systems Constitutional: negative for fever, chills, sweats Cardiovascular: negative for chest pain, palpitations, syncope, edema Gastrointestinal:  negative for dysphagia, reflux, vomiting, diarrhea, abdominal pain, or melena Neurologic:  negative for focal weakness, numbness, headache Objective:  
 
Vitals:  
 05/31/20 2055 06/01/20 0049 06/01/20 0111 06/01/20 0330 BP: 140/75  128/66 139/68 Pulse: (!) 120 (!) 138 (!) 131 (!) 116 Resp: 20 20 20 Temp: 98.2 °F (36.8 °C)  98.2 °F (36.8 °C) 98 °F (36.7 °C) SpO2: 90%  98% 100% Weight:      
 
 
Intake/Output Summary (Last 24 hours) at 6/1/2020 3201 Last data filed at 6/1/2020 7872 Gross per 24 hour Intake 2275 ml Output 775 ml Net 1500 ml Physical Exam:  
Constitution: elderly, ill appearing EENMT:  Sclera clear, pupils equal, oral mucosa moist 
Respiratory:decreased in bases Cardiovascular:  RRR without M,G,R 
Gastrointestinal: firm, distended Musculoskeletal: warm without cyanosis. There is no lower extremity edema. Skin:  no jaundice or rashes, no wounds Neurologic: no gross neuro deficits Psychiatric:  alert and oriented x 4 CXR: 5/27: bilateral small effusions CT C/A/P 6/1/20: 
chest-FINDINGS: 
- Pleura/pericardium: There are decreased small to moderate-sized bilateral 
pleural effusions. No pneumothorax or pericardial effusion is seen. - Lungs: There is progressed patchy edema or infiltrates in both lungs, worse in 
the right lower lobe. - Malina/Mediastinum: Within normal limits. - Tracheobronchial tree: Within normal limits. - Aorta/pulmonary arteries: Within normal limits. - Heart: Within normal limits. - Coronary arteries: There are coronary artery calcifications. - Chest wall: Within normal limits. - Spine/bones: Again noted are multiple sclerotic foci in the ribs and spine, 
consistent with osseous metastatic disease. 
- Additional comments Abdomen /pelvis: 
IMPRESSION:  
1. New marked constipation and distention of the entire colon, to the level of 
the lower rectum, where there is wall thickening which was PET avid on the prior 
study. This is suspect for rectal obstruction. No dilated small bowel loops are 
seen at this time. 2. Unchanged right adrenal gland nodule and osseous metastatic disease. CT 5/24: bilateral effusions LAB Recent Labs 05/31/20 
2142 05/31/20 
1645 05/31/20 
1134 05/31/20 
0735 05/30/20 
2322 GLUCPOC 227* 240* 200* 149* 189* Recent Labs 05/31/20 
2356 WBC 9.0 HGB 10.3* HCT 31.7*  Recent Labs  
  06/01/20 
1659 05/31/20 
0758 05/30/20 
2827 * 128* 128* K 3.9 4.5 4.1 CL 90* 90* 89* CO2 29 30 32 * 135* 131* BUN 18 10 11 CREA 0.90 0.64* 0.65* MG 2.0 1.7* 1.7*  
 CA 8.6 8.8 9.3 Recent Labs 05/31/20 
2117 PHI 7.444 PCO2I 40.5 PO2I 58* HCO3I 27.8* Recent Labs  
  06/01/20 
0624 05/31/20 
2356 LAC 1.6 2.8* Assessment:  (Medical Decision Making) Hospital Problems  Date Reviewed: 5/20/2020 Codes Class Noted POA Neutropenia ([facility] ICD-10-CM: D70.9 ICD-9-CM: 288.00  5/25/2020 Unknown Pleural effusion on right ICD-10-CM: J90 ICD-9-CM: 511.9  5/25/2020 Unknown * (Principal) Acute respiratory failure with hypoxia ([facility] ICD-10-CM: J96.01 
ICD-9-CM: 518.81  5/24/2020 Yes Normocytic anemia ICD-10-CM: D64.9 ICD-9-CM: 285.9  5/24/2020 Yes CAP (community acquired pneumonia) ICD-10-CM: J18.9 ICD-9-CM: 494  5/24/2020 Yes Pleural effusion on left ICD-10-CM: J90 ICD-9-CM: 511.9  5/24/2020 Yes Malignant neoplasm metastatic to bone [facility]) ICD-10-CM: C79.51 
ICD-9-CM: 198.5  6/26/2019 Yes Type 2 diabetes with nephropathy ([facility] ICD-10-CM: E11.21 
ICD-9-CM: 250.40, 583.81  7/3/2018 Yes Diabetes mellitus due to underlying condition with hyperglycemia ([facility] ICD-10-CM: N70.19 ICD-9-CM: 249.80  12/21/2015 Yes Encounter for laboratory testing for COVID-19 virus ICD-10-CM: Z11.59 
ICD-9-CM: V73.89  12/15/2015 Unknown Hyponatremia ICD-10-CM: E87.1 ICD-9-CM: 276.1  11/23/2015 Yes Plan:  (Medical Decision Making) Will consult with GI/Surgery to further evaluate rectal obstruction. Will start zosyn for aspiration pneumonia in RLL. NPO for possible procedure. Hold lasix for now. No thoracentesis intended but will support with oxygen. More than 50% of the time documented was spent in face-to-face contact with the patient and in the care of the patient on the floor/unit where the patient is located. 35 minutes spent this morning assessing patient and coordinating care.  
 
Ajay Heard MD

## 2020-06-01 NOTE — PROGRESS NOTES
OVERNIGHT HOSPITALIST RRT NOTE Called to bedside by nursing staff due to concern for shortness of breath and hypoxia. Patient was noted to have episode of desaturation to the 40s/50s. Patient is in hospital due to severe constipation and impaction. He has not responded to bowel movements. He was given a 2 L dose of COLYTE with still no bowel movement. Patient is afebrile but tachycardic to the 120s to 40s. He is otherwise hemodynamically stable. His abdomen is distended but nontender. Lungs demonstrate coarse crackles and right-sided end expiratory wheezing. Normal mental status and no acute distress. ABG sent resulted 7.4/40.5/58 on nasal cannula. Chest x-ray obtained shows progressing right lung base infiltrate. Concern for mild pulmonary edema versus infection. Place Juarez catheter and send urinalysis. Repeat CBC and lactate. Check d-dimer and troponin. Give increased dose of Lasix IV. Give duo nebs. Start antibiotics if evidence of sepsis evidenced by leukocytosis or lactic acidosis. Continue supplementary oxygen, titrate for O2 sat greater than 92%. Give aggressive bowel regimen with Kelli-Colace twice daily, Dulcolax MA daily, and milk of magnesia. Monitor for effect. Patrick Hernández MD 
 
 
 
RRT FOLLOW-UP NOTE Overnight labs resulted. Hemoglobin stable and no leukocytosis. Urinalysis clear. Slight lactic acidosis of 2.8. High-sensitivity troponin mildly elevated. D-dimer markedly elevated at 14.25.  CT chest with contrast PE protocol performed revealing no PE but bilateral patchy infiltrates concerning for pulmonary edema versus pneumonia. Continue diuresis with IV Lasix and will add on Levaquin empirically to cover possible pneumonia. Obtain EKG. CT abdomen/pelvis also performed while in scan given abdominal discomfort and distention. Market stool constipation and distention redemonstrated with rectal obstruction. Continue aggressive bowel regimen. Consider general surgery consult. We will continue to monitor overnight.  
 
Jackie Anthony MD

## 2020-06-02 NOTE — PROGRESS NOTES
Hospitalist Progress Note Patient: Lopez Mayfield MRN: [id number]  SSN: [id number] YOB: 1940  Age: 79 y.o. Sex: male Admit Date: 5/24/2020 LOS: 9 days Subjective:  
 
79 y.o. male with a past medical history of HEENT cancer undergoing chemo/radiation admitted due to acute respiratory failure due to large bilateral pleural effusions. He had a thoracentesis on 5/25/20. He has a history of rectal stricture but declined surgery. He went into fluid overload while taking GoLytely. He was diruresed. Pneumonia was also found so he was started on antibiotics. On 6/2 he went for a flex sig with fecal disimpaction. 6/2 - He feels much improved after flex sig. Feels mildly SOB. Denies CP. Denies F/C/N/V. Review of systems negative except stated above. Objective:  
 
Visit Vitals /69 Pulse (!) 105 Temp 98.6 °F (37 °C) Resp 14 Ht 6' (1.829 m) Wt 76.2 kg (168 lb) SpO2 99% BMI 22.78 kg/m² Oxygen Therapy O2 Sat (%): 99 % (06/02/20 1508) Pulse via Oximetry: 106 beats per minute (06/02/20 1508) O2 Device: Hi flow nasal cannula (06/02/20 1508) O2 Flow Rate (L/min): 8 l/min (06/02/20 1458) O2 Temperature: 46.4 °F (8 °C) (06/02/20 1508) FIO2 (%): 32 % (05/31/20 0323) ETCO2 (mmHg): 93 mmHg (06/01/20 1624) Intake and Output:  
 
Intake/Output Summary (Last 24 hours) at 6/2/2020 1649 Last data filed at 6/2/2020 1427 Gross per 24 hour Intake 250 ml Output 1250 ml Net -1000 ml Physical Exam:  
GENERAL: alert, cooperative, no distress, appears stated age EYE: conjunctivae/corneas clear. PERRL. THROAT & NECK: normal and no erythema or exudates noted. LUNG: scattered rhochi HEART: tachy, reg rhythm, S1S2, no murmur, no JVD ABDOMEN: soft, mildly tender, non-distended. Bowel sounds normal.  
EXTREMITIES:  No edema, 2+ pedal/radial pulses bilaterally SKIN: no rash or abnormalities NEUROLOGIC: Alert. Cranial nerves 2-12 grossly intact. Lab/Data Review: 
Recent Results (from the past 24 hour(s)) GLUCOSE, POC Collection Time: 06/01/20  9:23 PM  
Result Value Ref Range Glucose (POC) 165 (H) 65 - 100 mg/dL CBC WITH AUTOMATED DIFF Collection Time: 06/02/20  7:36 AM  
Result Value Ref Range WBC 11.2 (H) 4.3 - 11.1 K/uL  
 RBC 2.93 (L) 4.23 - 5.6 M/uL HGB 8.7 (L) 13.6 - 17.2 g/dL HCT 27.3 (L) 41.1 - 50.3 % MCV 93.2 79.6 - 97.8 FL  
 MCH 29.7 26.1 - 32.9 PG  
 MCHC 31.9 31.4 - 35.0 g/dL  
 RDW 16.2 (H) 11.9 - 14.6 % PLATELET 366 841 - 905 K/uL MPV 10.0 9.4 - 12.3 FL ABSOLUTE NRBC 0.00 0.0 - 0.2 K/uL NEUTROPHILS 84 % LYMPHOCYTES 5 % MONOCYTES 9 % EOSINOPHILS 0 % BASOPHILS 0 % IMMATURE GRANULOCYTES 2 %  
 ABS. NEUTROPHILS 9.4 (H) 1.7 - 8.2 K/UL  
 ABS. LYMPHOCYTES 0.6 0.5 - 4.6 K/UL  
 ABS. MONOCYTES 1.0 0.1 - 1.3 K/UL  
 ABS. EOSINOPHILS 0.0 0.0 - 0.8 K/UL  
 ABS. BASOPHILS 0.0 0.0 - 0.2 K/UL  
 ABS. IMM. GRANS. 0.2 K/UL  
 RBC COMMENTS SLIGHT 
ANISOCYTOSIS + POIKILOCYTOSIS 
    
 WBC COMMENTS Result Confirmed By Smear PLATELET COMMENTS ADEQUATE    
 DF AUTOMATED METABOLIC PANEL, BASIC Collection Time: 06/02/20  7:36 AM  
Result Value Ref Range Sodium 133 (L) 136 - 145 mmol/L Potassium 3.9 3.5 - 5.1 mmol/L Chloride 95 (L) 98 - 107 mmol/L  
 CO2 29 21 - 32 mmol/L Anion gap 9 7 - 16 mmol/L Glucose 114 (H) 65 - 100 mg/dL BUN 19 8 - 23 MG/DL Creatinine 0.85 0.8 - 1.5 MG/DL  
 GFR est AA >60 >60 ml/min/1.73m2 GFR est non-AA >60 >60 ml/min/1.73m2 Calcium 9.0 8.3 - 10.4 MG/DL MAGNESIUM Collection Time: 06/02/20  7:36 AM  
Result Value Ref Range Magnesium 2.2 1.8 - 2.4 mg/dL GLUCOSE, POC Collection Time: 06/02/20  7:42 AM  
Result Value Ref Range Glucose (POC) 134 (H) 65 - 100 mg/dL GLUCOSE, POC Collection Time: 06/02/20 11:40 AM  
Result Value Ref Range Glucose (POC) 157 (H) 65 - 100 mg/dL GLUCOSE, POC Collection Time: 06/02/20  1:01 PM  
Result Value Ref Range Glucose (POC) 165 (H) 65 - 100 mg/dL Imaging: Xr Chest Sngl V Result Date: 6/1/2020 CHEST X-RAY, one view. HISTORY:  Shortness breath and pleural effusion, follow-up. TECHNIQUE:  AP upright portable view COMPARISON: Yesterday's exam FINDINGS:   -The lungs: Increasing infiltrate right lung, specially at the base base. There may be a faint infiltrate left lung base. Left upper lung zones are clear. . -The costophrenic angles: Indistinct on the right. -The heart size: is normal. -The pulmonary vasculature: is unremarkable. -Included portion of the upper abdomen: is unremarkable. -Bones: No gross bony lesions. -Other: None. IMPRESSION:  Increasing infiltrate right lung suspicious for pneumonia. Xr Chest Sngl V Result Date: 5/31/2020 EXAM: Chest x-ray. INDICATION: Coarse lung sounds. COMPARISON: Prior chest x-ray on May 27, 2020. TECHNIQUE: Frontal view chest x-ray. FINDINGS: There is progressed infiltrate in the right lung base. Previous left lung base infiltrate or atelectasis has improved, with minimal residual. Small bilateral pleural effusions are unchanged. The cardiac size is within normal limits. No pneumothorax is identified. IMPRESSION: 1. Progressed right lung base infiltrate. 2. Significantly improved left lung base infiltrate or atelectasis, with minimal residual. 3. Unchanged small pleural effusions. Xr Chest Pa Lat Result Date: 5/27/2020 2 View Chest X-Ray 5/27/2020 7:44 AM INDICATION: Shortness of breath, evaluate for increased lung effusion or infiltrates. COMPARISON: Chest x-ray 5/24/2020 FINDINGS: Upright AP and Lateral views are submitted. Lung slightly better inflated. Cardiomediastinal silhouette stable. There does not appear to be increased vascular congestion or any pneumothorax.  Bibasilar hazy densities still seen but stable on the left and may be slightly improved on the right. At the right upper lobe region there is persistent apical capping and increased hazy densities. IMPRESSION: 1. Moderate size bibasilar layering effusions and atelectasis, similar on the left, may be slightly improved on the right. As indicated correlate with directed chest ultrasound to assess for whether there is a drainable pleural effusion on either side. 2. Stable findings at the right upper lobe region as well, no acute developing abnormalities. Xr Abd (kub) Result Date: 5/30/2020 KUB supine views History:  ILEUS POSSIBLE, 79 years Male Comparison:  None available Findings: Stool is seen throughout the colon and rectum. No free air is evident. The bowel gas pattern is nonspecific and there is no evidence of ileus or obstruction. No suspicious renal or ureteral calculi are evident. The vertebral clips overlying the right pelvis. Visualized osseous structures unremarkable. Impression: No acute pathology identified. Stool throughout the colon and rectum. Xr Abd Acute W 1 V Chest 
 
Result Date: 6/1/2020 Abdominal Series CLINICAL INDICATION:  Acute generalize moderate to severe abdominal distention, constipation. Metabolic encephalopathy, hypoxia and respiratory failure. Reported history of rectal cancer. COMPARISON: Chest radiograph as well as CT chest, abdomen, and pelvis performed earlier today, also KUB 5/30/2020 TECHNIQUE: A frontal upright view of the chest and supine and upright views of the abdomen were obtained. Detail throughout is somewhat limited given prominent soft tissues. FINDINGS: The lungs are well inflated again demonstrated are extensive groundglass and partially consolidative opacities throughout the majority of the right lung, and to a lesser extent the left lower lobe. I basilar pleural effusions are small and not definitely changed. No pneumothorax. Stable mediastinal and hilar contours.  Bones demonstrate no acute abnormality or interval change as partially seen. Flat and upright views of the abdomen show no free air. There is persisting gaseous distention of multiple large bowel loops, overall very similar to the CT performed earlier today allowing for differences in positioning and imaging technique. Constipation again evident. No evidence of pneumatosis. There is catheter tubing projecting over the urinary bladder similar to prior, compatible with a Juarez catheter. IMPRESSION:  1. Distended large bowel similar to CT earlier today. Constipation. 2. Right greater than left lung infiltrates and small pleural effusions remain. Ct Chest W Cont Result Date: 5/24/2020 CT OF THE CHEST WITH CONTRAST, PULMONARY EMBOLUS PROTOCOL. CLINICAL INDICATION: Shortness of breath, hypoxia, chest pain, throat and rectal cancer, Covid rule out PROCEDURE: Serial thin section axial images are obtained from the thoracic inlet through the upper abdomen following the administration of intravenous contrast per a dedicated, institutional pulmonary embolus protocol. Coronal MIP reformatted images are generated. Radiation dose reduction techniques were used for this study. Our CT scanners use one or all of the following: Automated exposure control, adjusted of the mA and/or kV according to patient size, iterative reconstruction COMPARISON: PET/CT dated 3/24/2020 FINDINGS: The aorta is unremarkable. There is adequate opacification of the central pulmonary arterial tree. No central intraluminal filling defect noted to indicate acute pulmonary embolus. Large bilateral pleural effusions are present that have increased significantly in size. There is no pneumothorax. Dependent atelectasis is present. Bilateral inflammatory appearing pulmonary nodules are noted in the bilateral upper lobes. Note, these are present on the prior PET/CT is well.  Limited evaluation the upper abdomen shows adrenal glands to be normal. Multiple sclerotic foci appreciated in the ribs and spine. This is most in keeping with osseous metastatic disease. IMPRESSION: 1. No acute pulmonary emboli. 2. Large bilateral pleural effusions. . Inflammatory appearing nodularity in the bilateral upper lobes near the lung apices. 3. Multiple patchy sclerotic lesions throughout the thoracic spine and ribs. This is in keeping with the patient's history of osseous metastatic disease. Ct Chest Abd Pelv W Cont Result Date: 6/1/2020 EXAM: CT Chest with IV contrast - PE protocol. INDICATION: Dyspnea. COMPARISON: Prior CT chest on May 24, 2020. TECHNIQUE: Axial CT images of the chest were obtained after the intravenous injection of 100 mL Isovue 370 CT contrast. Radiation dose reduction techniques were used for this study. Our CT scanners use one or all of the following: Automated exposure control, adjustment of the mA and/or kV according to patient size, iterative reconstruction. FINDINGS: - Pleura/pericardium: There are decreased small to moderate-sized bilateral pleural effusions. No pneumothorax or pericardial effusion is seen. - Lungs: There is progressed patchy edema or infiltrates in both lungs, worse in the right lower lobe. - Malina/Mediastinum: Within normal limits. - Tracheobronchial tree: Within normal limits. - Aorta/pulmonary arteries: Within normal limits. - Heart: Within normal limits. - Coronary arteries: There are coronary artery calcifications. - Chest wall: Within normal limits. - Spine/bones: Again noted are multiple sclerotic foci in the ribs and spine, consistent with osseous metastatic disease. - Additional comments: None. IMPRESSION: 1. No evidence of pulmonary embolism. 2. Decreased small to moderate bilateral pleural effusions. 3. Progressed patchy pulmonary edema or pneumonia in both lungs, worse in the right lower lobe. 4. Coronary artery disease. 5. Osseous metastatic disease.  EXAM: CT abdomen and pelvis with IV contrast. INDICATION: Abdominal pain. History of rectal cancer. COMPARISON: Prior PET/CT scan on March 24, 2020. TECHNIQUE: Axial CT images of the abdomen and pelvis were obtained after the intravenous injection of 100 mL Isovue 370 CT contrast. Oral contrast was administered as well. Radiation dose reduction techniques were used for this study. Our CT scanners use one or all of the following:  Automated exposure control, adjustment of the mA or kV according to patient size, iterative reconstruction. FINDINGS: - Liver: Within normal limits. - Gallbladder and bile ducts: Within normal limits. - Spleen: Within normal limits. - Urinary tract: The kidneys are unremarkable. The urinary bladder is decompressed around a Juarez catheter. - Adrenals: A small right adrenal gland nodule is unchanged, which was PET avid on the prior study. The left adrenal gland is normal. - Pancreas: Within normal limits. - Gastrointestinal tract: There is marked constipation with distention of the entire colon. The cecum measures 12 cm in diameter. This is to the level of wall thickening in the lower rectum, which was PET avid on the prior study. There is sigmoid diverticulosis, without evidence of acute diverticulitis. The small bowel loops are within normal limits. - Retroperitoneum: There is no abdominal aortic aneurysm, dissection or retroperitoneal adenopathy. - Peritoneal cavity and abdominal wall: No free fluid or free air. - Pelvis: Within normal limits. - Spine/bones: Again noted are scattered sclerotic lesions in the spine and pelvic bones, consistent with osseous metastatic disease. - Other comments: None. IMPRESSION: 1. New marked constipation and distention of the entire colon, to the level of the lower rectum, where there is wall thickening which was PET avid on the prior study. This is suspect for rectal obstruction. No dilated small bowel loops are seen at this time.  2. Unchanged right adrenal gland nodule and osseous metastatic disease. Xr Chest [facility] Result Date: 2020 Portable chest x-ray CLINICAL INDICATION: Shortness of breath FINDINGS: Single AP view of the chest compared to a similar chest x-ray dated 2018 shows new airspace density in the right lower lung with a probable small right pleural effusion. Left lung is clear. The cardiac silhouette and mediastinum are unremarkable. IMPRESSION: New airspace density in the right lower lung with a probable small right pleural effusion. Pneumonia should be considered. Results for orders placed or performed during the hospital encounter of 20  
2D ECHO COMPLETE ADULT (TTE) W OR WO CONTR Narrative [facility] [address] [place], [address] [facility] 
[phone] Transthoracic Echocardiogram 
2D, M-mode, Doppler, and Color Doppler Patient: Alysa Carrera 
MR #: [id number] : [id number] Age: 79 years Gender: Male Study date: 26-May-2020 Account #: [de-identified] Height: 72 in 72 in 
Weight: 164 lb 163.7 lb 
BSA: 1.96 mï¾² 1.96 mï¾² Status:Routine Location: 83 BP: 169/ 88 Allergies: NO KNOWN ALLERGENS Sonographer:  Pavan Olmos [facility] Group:  [facility] Referring Physician:  Gladys Peña MD 
Reading Physician:  Abisai Mccabe. MD [facility]ara [facility] INDICATIONS: Bilateral pleural effusions *Pt. scanned supine. Unable to turn LLD. PROCEDURE: This was a routine study. A transthoracic echocardiogram was 
performed. The study included complete 2D imaging, M-mode, complete spectral 
Doppler, and color Doppler. Intravenous contrast (Definity, 1 ml) was 
administered to opacify the left ventricle. Image quality was adequate. LEFT VENTRICLE: Size was normal. Systolic function was normal. Ejection 
fraction was estimated in the range of 60 % to 65 %. There were no regional 
wall motion abnormalities. Wall thickness was normal. Left ventricular diastolic function parameters were normal. E/e' av.46. RIGHT VENTRICLE: The size was normal. Systolic function was normal. 
 
LEFT ATRIUM: Size was normal. 
 
RIGHT ATRIUM: Size was normal. 
 
SYSTEMIC VEINS: IVC: The inferior vena cava was normal in size and course. AORTIC VALVE: The valve was structurally normal, tri-commissural. There was  
no 
evidence for stenosis. There was no insufficiency. MITRAL VALVE: Valve structure was normal. There was no evidence for stenosis. There was no regurgitation. TRICUSPID VALVE: The valve structure was normal. There was no evidence for 
stenosis. There was trivial regurgitation. PULMONIC VALVE: Not well visualized. There was no evidence for stenosis. There 
was no insufficiency. PERICARDIUM: There was no pericardial effusion. AORTA: The root exhibited normal size. SUMMARY: 
 
-  Left ventricle: Systolic function was normal. Ejection fraction was 
estimated in the range of 60 % to 65 %. There were no regional wall motion 
abnormalities. -  Pericardium: There was no pericardial effusion. SYSTEM MEASUREMENT TABLES 
 
2D Ao Diam: 3.2 cm 
LA Diam: 2.6 cm 
LAEDV Index (A-L): 25.9 ml/m2 %FS: 34.9 % IVSd: 1.1 cm 
LVIDd: 3.7 cm 
LVIDs: 2.4 cm 
LVOT Diam: 2.1 cm 
LVPWd: 1.2 cm Prepared and signed by 
 
Zainab Jameson. MD Nara [facility] Signed 26-May-2020 16:55:26 Cultures: All Micro Results Procedure Component Value Units Date/Time FUNGUS CULTURE AND SMEAR [[id number]] Collected:  20 1315 Order Status:  Completed Specimen:  Miscellaneous sample Updated:  20 1536 Source LEFT Comment: Pleural Fluid Specimen Fungus stain Direct Inoculation Fungus (Mycology) Culture Other source received Comment: (NOTE) Performed At: [address] [id number] Angel Luis Mujica MD W  AFB CULTURE + SMEAR W/RFLX ID FROM CULTURE [[id number]] Collected: 05/25/20 1315 Order Status:  Completed Specimen:  Miscellaneous sample Updated:  05/27/20 1537 Source LEFT Comment: Pleural Fluid Specimen AFB Specimen processing Concentration Acid Fast Smear Negative Comment: (NOTE) Performed At: [address] [id number] Leticia Pruitt MD FA:[id number] Acid Fast Culture PENDING  
 CULTURE, BODY FLUID W Luis Diss [[id number]] Collected:  05/25/20 1315 Order Status:  Completed Specimen:  Left Updated:  05/27/20 8282 Special Requests: NO SPECIAL REQUESTS     
  GRAM STAIN 0 TO 1 WBC'S/OIF  
   NO DEFINITE ORGANISM SEEN Culture result: NO GROWTH 2 DAYS Assessment/Plan:  
 
Principal Problem: 
  Acute respiratory failure with hypoxia ([address] (5/24/2020) - Multifactorial - effusions + pneumonia - Slow to improve - Continue Vanc + Zosyn 
- Holding Lasix - Continue aerosols - Wean oxygen as appropriate - Appreciate Pulmonary's input and assistance Active Problems: 
  Aspiration pneumonia ([address] (6/1/2020) - Continue Vanc + Zosyn Rectal obstruction (6/1/2020) - Fecal obstruction - S/P flex sig with disimpaction on 6/2 
- Feels much improved - Declines surgery Hyponatremia (11/23/2015) - Stable Pleural effusion on left (5/24/2020) - S/P thoracentesis Pleural effusion on right (5/25/2020) - S/P thoracentesis Diabetes mellitus due to underlying condition with hyperglycemia ([address] (12/21/2015) - Stable - Continue Novolin SSI Neutropenia ([address] (5/25/2020) - Resolved - WBC 11.2 today COVID-19 ruled out (12/15/2015) Malignant neoplasm metastatic to bone ([address] (6/26/2019) Normocytic anemia (5/24/2020) Today's Plan: Continue antibiotics. IRC eval. 
 
DIET NUTRITIONAL SUPPLEMENTS All Meals; Glucerna Shake ( ) DIET NPO Except Meds DVT Prophylaxis: Lovenox Discharge Plan: IRC to evaluate Signed By: Latoya Pate, DO   
 June 2, 2020

## 2020-06-02 NOTE — PROGRESS NOTES
H&P/Consult Note/Progress Note/Office Note:  
Kevyn Todd  MRN: [id number]  DWT:6/50[birth year]95  Age:80 y.o. 
 
HPI: Kevyn Todd is a 79 y.o. male who we are asked by Dr. Mayur Booth to see for concern for rectal obstruction. 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 The patient is well known to Dr. Emanuel Hui and was last seen in office 5/2/2019. At that time he had finished radiation and chemotherapy for anal carcinoma and had an EUA. This revealed a large fissure but no palpable nodules or gross disease. He continued to have pain and difficulty moving his bowels secondary to an anal stricture and was offered a diverting colostomy and referred to Dr. Sheril Hashimoto for a second opinion. He was seen by Dr. Sheril Hashimoto who did an anal dilation with some improvement He last saw Dr. Sheril Hashimoto on 2/13/2020 and was doing well at that time with no recurrent anal stenosis and recommended close surveillance. He presented to the ER 5/24 with complaints of SOB and chest discomfort. CT chest showed large bilateral effusions. He is being followed by pulmonary. Repeat CT chest/AP 6/1 showed new marked constipation and distention of the entire colon, to the level of the lower rectum, where there is wall thickening which was PET avid on the prior study. This is suspect for rectal obstruction. No dilated small bowel loops are seen at this time. At present, the patient is resting comfortably. No n/v. He had BMx2 yesterday which was reported as loose. His abdomen is distended but is not tender. His wife reports he started a new medication for SIADH and she attributes the timing of this med with the start of his new symptoms. He was seen by GI who recommend repeat EUA with possible dilation. 6/2/2020: No change, BMx5 documented. GI planning flex sig today Past Medical History:  
Diagnosis Date  GERD (gastroesophageal reflux disease)   
 pt wife denies  Head and neck cancer ([facility] [place] [address] 9/30/2015  
 radiation and chemo and and surgery  History of squamous cell carcinoma   
 to neck area- 38 radiation treatement and 8 chemo treatment  History of throat cancer 2015  
 peg tube for about 4 months  Hypercholesteremia   
 managed well with meds  Hypertension   
 managed well with meds  Hypomagnesemia 5/20/2020  Rectal cancer ([facility] [place] [address] 2018  
 28 radiation treatment and chemo pump  Type 2 diabetes mellitus ([facility] [place] [address] oral agent only/AVG BS: /no s.s of hypoglycemia  Vomiting 2/23/2016  
 pt wife denies Past Surgical History:  
Procedure Laterality Date  HX COLONOSCOPY  05/2018  HX HEENT    
 sinus  HX HEENT  2015  
 surgery on right throat for squamous cell ca right ear wedge  HX LYMPH NODE DISSECTION    
 HX ORTHOPAEDIC Right 1966  
 right leg  HX OTHER SURGICAL  9/9/15 EXCISION OF RIGHT Neck and PARAPHARYNGEAL MASS/RIGHT EAR WEDGE RESECTION  
 HX OTHER SURGICAL    
 peg placed and removed  HX TONSILLECTOMY  HX VASCULAR ACCESS    
 placed and removed Current Facility-Administered Medications Medication Dose Route Frequency  piperacillin-tazobactam (ZOSYN) 3.375 g in 0.9% sodium chloride (MBP/ADV) 100 mL  3.375 g IntraVENous Q8H  
 vancomycin (VANCOCIN) 1250 mg in  ml infusion  1,250 mg IntraVENous Q18H  
 hydrALAZINE (APRESOLINE) 20 mg/mL injection 10 mg  10 mg IntraVENous Q6H PRN  
 [Held by provider] furosemide (LASIX) injection 40 mg  40 mg IntraVENous Q12H  
 albuterol-ipratropium (DUO-NEB) 2.5 MG-0.5 MG/3 ML  3 mL Nebulization Q6H RT  
 magnesium hydroxide (MILK OF MAGNESIA) 400 mg/5 mL oral suspension 30 mL  30 mL Oral DAILY  senna-docusate (PERICOLACE) 8.6-50 mg per tablet 1 Tab  1 Tab Oral BID  
 bisacodyL (DULCOLAX) suppository 10 mg  10 mg Rectal DAILY  potassium chloride (K-DUR, KLOR-CON) SR tablet 40 mEq  40 mEq Oral BID  
 0.9% sodium chloride infusion 250 mL  250 mL IntraVENous PRN  
 amLODIPine (NORVASC) tablet 10 mg  10 mg Oral DAILY  docusate sodium (COLACE) capsule 100 mg  100 mg Oral PRN  
 ferrous sulfate tablet 325 mg  325 mg Oral DAILY  glimepiride (AMARYL) tablet 4 mg  4 mg Oral 7am  
 lisinopriL (PRINIVIL, ZESTRIL) tablet 20 mg  20 mg Oral DAILY  meloxicam (MOBIC) tablet 7.5 mg  7.5 mg Oral DAILY  sodium chloride tablet 2 g  2 g Oral BID  traMADoL (ULTRAM) tablet 50 mg  50 mg Oral Q6H PRN  
 sodium chloride (NS) flush 5-40 mL  5-40 mL IntraVENous Q8H  
 sodium chloride (NS) flush 5-40 mL  5-40 mL IntraVENous PRN  
 acetaminophen (TYLENOL) tablet 650 mg  650 mg Oral Q4H PRN  
 ondansetron (ZOFRAN) injection 4 mg  4 mg IntraVENous Q4H PRN  
 insulin regular (NOVOLIN R, HUMULIN R) injection   SubCUTAneous AC&HS  enoxaparin (LOVENOX) injection 40 mg  40 mg SubCUTAneous Q24H Patient has no known allergies. Social History Socioeconomic History  Marital status:  Spouse name: Not on file  Number of children: Not on file  Years of education: Not on file  Highest education level: Not on file Tobacco Use  Smoking status: Never Smoker  Smokeless tobacco: Never Used Substance and Sexual Activity  Alcohol use: No  
 Drug use: No  
 
Social History Tobacco Use Smoking Status Never Smoker Smokeless Tobacco Never Used Family History Problem Relation Age of Onset  Emphysema Father  Lung Disease Father  Cancer Brother Colon  Heart Disease Sister  Hypertension Sister  Heart Disease Sister  Hypertension Sister  Heart Disease Brother  Hypertension Brother  Heart Disease Brother  Hypertension Brother  Heart Disease Brother  Hypertension Brother  Heart Disease Brother  Hypertension Brother  Heart Disease Brother  Hypertension Brother ROS: The patient has no difficulty with chest pain or shortness of breath. No fever or chills. Comprehensive review of systems was otherwise unremarkable except as noted above. Physical Exam:  
Visit Vitals /67 (BP 1 Location: Left arm, BP Patient Position: At rest;Head of bed elevated (Comment degrees)) Pulse (!) 106 Temp 98.5 °F (36.9 °C) Resp 18 Wt 164 lb (74.4 kg) SpO2 93% BMI 22.24 kg/m² Constitutional: Alert, oriented, ill appearing, cooperative patient in no acute distress; appears stated age Eyes:Sclera are clear. EOMs intact ENMT: no external lesions gross hearing normal; no obvious neck masses, no ear or lip lesions, nares normal 
CV: RRR. Normal perfusion Resp: No JVD. Breathing is  non-labored; no audible wheezing. GI: distended, tympanic, +BS, non tender Musculoskeletal: unremarkable with normal function. No embolic signs or cyanosis. Neuro:  Oriented; moves all 4; no focal deficits Psychiatric: normal affect and mood, no memory impairment Recent vitals (if inpt): 
Patient Vitals for the past 24 hrs: 
 BP Temp Pulse Resp SpO2  
06/02/20 0347 131/67 98.5 °F (36.9 °C) (!) 106 18 93 % 06/02/20 0128     93 % 06/01/20 2335 114/57 98.5 °F (36.9 °C) (!) 108 18 94 % 06/01/20 2108     93 % 06/01/20 2102     93 % 06/01/20 2019 142/73 99.2 °F (37.3 °C) (!) 110 20 91 % 06/01/20 1624 125/59 98.5 °F (36.9 °C) (!) 106 21 93 % 06/01/20 1438     93 % 06/01/20 1250     93 % 06/01/20 1230 98/50 98 °F (36.7 °C) (!) 111 24 94 % Labs: 
Recent Labs  
  06/01/20 
1560 05/31/20 
2356 WBC  --  9.0 HGB  --  10.3* PLT  --  216 *  --   
K 3.9  --   
CL 90*  --   
CO2 29  --   
BUN 18  --   
CREA 0.90  --   
*  --   
 
 
Lab Results Component Value Date/Time  WBC 9.0 05/31/2020 11:56 PM  
 HGB 10.3 (L) 05/31/2020 11:56 PM  
 PLATELET 386 81/84/1357 11:56 PM  
 Sodium 131 (L) 06/01/2020 06:24 AM  
 Potassium 3.9 06/01/2020 06:24 AM  
 Chloride 90 (L) 06/01/2020 06:24 AM  
 CO2 29 06/01/2020 06:24 AM  
 BUN 18 06/01/2020 06:24 AM  
 Creatinine 0.90 06/01/2020 06:24 AM  
 Glucose 133 (H) 06/01/2020 06:24 AM  
 INR 1.1 07/27/2018 02:00 PM  
 aPTT 29.1 07/27/2018 02:00 PM  
 Bilirubin, total 0.3 05/24/2020 05:10 PM  
 ALT (SGPT) 17 05/24/2020 05:10 PM  
 Alk. phosphatase 419 (H) 05/24/2020 05:10 PM  
 Lipase 92 09/02/2018 07:10 PM  
 Troponin-I, Qt. <0.02 (L) 05/24/2020 05:10 PM  
 
 
I reviewed recent labs and recent radiologic studies. CT Results (most recent): 
Results from [facility] Encounter encounter on 05/24/20 CT CHEST ABD PELV W CONT Narrative EXAM: CT Chest with IV contrast - PE protocol. INDICATION: Dyspnea. COMPARISON: Prior CT chest on May 24, 2020. TECHNIQUE: Axial CT images of the chest were obtained after the intravenous 
injection of 100 mL Isovue 370 CT contrast. Radiation dose reduction techniques 
were used for this study. Our CT scanners use one or all of the following: Automated exposure control, adjustment of the mA and/or kV according to patient 
size, iterative reconstruction. FINDINGS: 
- Pleura/pericardium: There are decreased small to moderate-sized bilateral 
pleural effusions. No pneumothorax or pericardial effusion is seen. - Lungs: There is progressed patchy edema or infiltrates in both lungs, worse in 
the right lower lobe. - Malina/Mediastinum: Within normal limits. - Tracheobronchial tree: Within normal limits. - Aorta/pulmonary arteries: Within normal limits. - Heart: Within normal limits. - Coronary arteries: There are coronary artery calcifications. - Chest wall: Within normal limits. - Spine/bones: Again noted are multiple sclerotic foci in the ribs and spine, 
consistent with osseous metastatic disease. 
- Additional comments: None. Impression IMPRESSION:  
1. No evidence of pulmonary embolism. 2. Decreased small to moderate bilateral pleural effusions. 3. Progressed patchy pulmonary edema or pneumonia in both lungs, worse in the 
right lower lobe. 4. Coronary artery disease. 5. Osseous metastatic disease. EXAM: CT abdomen and pelvis with IV contrast. 
 
INDICATION: Abdominal pain. History of rectal cancer. COMPARISON: Prior PET/CT scan on March 24, 2020. TECHNIQUE: Axial CT images of the abdomen and pelvis were obtained after the 
intravenous injection of 100 mL Isovue 370 CT contrast. Oral contrast was 
administered as well. Radiation dose reduction techniques were used for this 
study. Our CT scanners use one or all of the following:  Automated exposure 
control, adjustment of the mA or kV according to patient size, iterative 
reconstruction. FINDINGS: 
 
- Liver: Within normal limits. - Gallbladder and bile ducts: Within normal limits. - Spleen: Within normal limits. - Urinary tract: The kidneys are unremarkable. The urinary bladder is 
decompressed around a Juarez catheter. - Adrenals: A small right adrenal gland nodule is unchanged, which was PET avid 
on the prior study. The left adrenal gland is normal. 
- Pancreas: Within normal limits. - Gastrointestinal tract: There is marked constipation with distention of the 
entire colon. The cecum measures 12 cm in diameter. This is to the level of wall 
thickening in the lower rectum, which was PET avid on the prior study. There is 
sigmoid diverticulosis, without evidence of acute diverticulitis. The small 
bowel loops are within normal limits. - Retroperitoneum: There is no abdominal aortic aneurysm, dissection or 
retroperitoneal adenopathy. 
- Peritoneal cavity and abdominal wall: No free fluid or free air. 
- Pelvis: Within normal limits. - Spine/bones: Again noted are scattered sclerotic lesions in the spine and 
pelvic bones, consistent with osseous metastatic disease. 
- Other comments: None.  
 
IMPRESSION:  
 1. New marked constipation and distention of the entire colon, to the level of 
the lower rectum, where there is wall thickening which was PET avid on the prior 
study. This is suspect for rectal obstruction. No dilated small bowel loops are 
seen at this time. 2. Unchanged right adrenal gland nodule and osseous metastatic disease. I independently reviewed radiology images for studies I described above or studies I have ordered. Admission date (for inpatients): 5/24/2020  
3 Days Post-Op  Procedure(s): 
THORACENTESIS 
ULTRASOUND ASSESSMENT/PLAN: 
Problem List  Date Reviewed: 5/20/2020 Codes Class Noted Rectal obstruction ICD-10-CM: K62.4 ICD-9-CM: 569.2  6/1/2020 Aspiration pneumonia ([facility] ICD-10-CM: J69.0 ICD-9-CM: 507.0  6/1/2020 Neutropenia ([facility] ICD-10-CM: D70.9 ICD-9-CM: 288.00  5/25/2020 Pleural effusion on right ICD-10-CM: J90 ICD-9-CM: 511.9  5/25/2020 * (Principal) Acute respiratory failure with hypoxia ([facility] ICD-10-CM: J96.01 
ICD-9-CM: 518.81  5/24/2020 Normocytic anemia ICD-10-CM: D64.9 ICD-9-CM: 285.9  5/24/2020 CAP (community acquired pneumonia) ICD-10-CM: J18.9 ICD-9-CM: 704  5/24/2020 Pleural effusion on left ICD-10-CM: J90 ICD-9-CM: 511.9  5/24/2020 Hypomagnesemia ICD-10-CM: W64.82 
ICD-9-CM: 275.2  5/20/2020 SIADH (syndrome of inappropriate ADH production) ([facility] ICD-10-CM: E22.2 ICD-9-CM: 253.6  3/26/2020 Malignant neoplasm metastatic to bone [facility]) ICD-10-CM: C79.51 
ICD-9-CM: 198.5  6/26/2019 Postoperative seroma of subcutaneous tissue after non-dermatologic procedure ICD-10-CM: L76.34 
ICD-9-CM: 998.13  9/3/2018 Anal carcinoma ([facility] ICD-10-CM: C21.0 ICD-9-CM: 154.3  8/9/2018 Type 2 diabetes with nephropathy ([facility] ICD-10-CM: E11.21 
ICD-9-CM: 250.40, 583.81  7/3/2018 Primary malignant neoplasm of perianal skin ICD-10-CM: C44.500 ICD-9-CM: 173.50  7/3/2018 Diabetes mellitus due to underlying condition with hyperglycemia ([facility] [address] ICD-10-CM: D75.24 ICD-9-CM: 249.80  12/21/2015 Mucositis due to radiation therapy ICD-10-CM: K12.33 
ICD-9-CM: 528.09, E879.2  12/15/2015 Radiation esophagitis ICD-10-CM: K20.8 ICD-9-CM: 530.19, E926.9  12/15/2015 Encounter for laboratory testing for COVID-19 virus ICD-10-CM: Z11.59 
ICD-9-CM: V73.89  12/15/2015 Hyponatremia ICD-10-CM: E87.1 ICD-9-CM: 276.1  11/23/2015 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 [facility]) ICD-10-CM: C79.89, C80.1 ICD-9-CM: 198.89, 199.1  10/14/2015 Principal Problem: 
  Acute respiratory failure with hypoxia ([address] (5/24/2020) Active Problems: Hyponatremia (11/23/2015) Encounter for laboratory testing for COVID-19 virus (12/15/2015) Diabetes mellitus due to underlying condition with hyperglycemia ([address] (12/21/2015) Type 2 diabetes with nephropathy ([address] (7/3/2018) Malignant neoplasm metastatic to bone ([address] (6/26/2019) Normocytic anemia (5/24/2020) CAP (community acquired pneumonia) (5/24/2020) Pleural effusion on left (5/24/2020) Neutropenia ([address] (5/25/2020) Pleural effusion on right (5/25/2020) Rectal obstruction (6/1/2020) Aspiration pneumonia ([address] (6/1/2020) Plan pending Dr. Juan Carlos Suero Patient has been offered diverting colostomy in the past and declined He continues to decline colostomy Unsure if further anal dilation is a possibility and may need to return to Dr. Stevan Solares Signed:  Gwen Oscar NP  
H&P/Consult Note/Progress Note/Office Note:  
Lopez Mayfield  MRN: [id number]  ZCL:0[birth year]01/[birth year]  Age:80 y.o. 
 
HPI: Lopez Mayfield is a 79 y.o. male who we are asked by Dr. Nina Underwood to see for concern for rectal obstruction.   
 
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 The patient is well known to Dr. Blake Mccrary and was last seen in office 5/2/2019. At that time he had finished radiation and chemotherapy for anal carcinoma and had an EUA. This revealed a large fissure but no palpable nodules or gross disease. He continued to have pain and difficulty moving his bowels secondary to an anal stricture and was offered a diverting colostomy and referred to Dr. Oralia Espinosa for a second opinion. He was seen by Dr. Oralia Espinosa who did an anal dilation with some improvement He last saw Dr. Oralia Epsinosa on 2/13/2020 and was doing well at that time with no recurrent anal stenosis and recommended close surveillance. He presented to the ER 5/24 with complaints of SOB and chest discomfort. CT chest showed large bilateral effusions. He is being followed by pulmonary. Repeat CT chest/AP 6/1 showed new marked constipation and distention of the entire colon, to the level of the lower rectum, where there is wall thickening which was PET avid on the prior study. This is suspect for rectal obstruction. No dilated small bowel loops are seen at this time. At present, the patient is resting comfortably. No n/v. He had BMx2 yesterday which was reported as loose. His abdomen is distended but is not tender. His wife reports he started a new medication for SIADH and she attributes the timing of this med with the start of his new symptoms. He was seen by GI who recommend repeat EUA with possible dilation. Past Medical History:  
Diagnosis Date  GERD (gastroesophageal reflux disease)   
 pt wife denies  Head and neck cancer ([facility] [address] 9/30/2015  
 radiation and chemo and and surgery  History of squamous cell carcinoma   
 to neck area- 38 radiation treatement and 8 chemo treatment  History of throat cancer 2015  
 peg tube for about 4 months  Hypercholesteremia   
 managed well with meds  Hypertension managed well with meds  Hypomagnesemia 5/20/2020  Rectal cancer ([facility] [address] 2018  
 28 radiation treatment and chemo pump  Type 2 diabetes mellitus ([facility] [address] oral agent only/AVG BS: /no s.s of hypoglycemia  Vomiting 2/23/2016  
 pt wife denies Past Surgical History:  
Procedure Laterality Date  HX COLONOSCOPY  05/2018  HX HEENT    
 sinus  HX HEENT  2015  
 surgery on right throat for squamous cell ca right ear wedge  HX LYMPH NODE DISSECTION    
 HX ORTHOPAEDIC Right 1966  
 right leg  HX OTHER SURGICAL  9/9/15 EXCISION OF RIGHT Neck and PARAPHARYNGEAL MASS/RIGHT EAR WEDGE RESECTION  
 HX OTHER SURGICAL    
 peg placed and removed  HX TONSILLECTOMY  HX VASCULAR ACCESS    
 placed and removed Current Facility-Administered Medications Medication Dose Route Frequency  piperacillin-tazobactam (ZOSYN) 3.375 g in 0.9% sodium chloride (MBP/ADV) 100 mL  3.375 g IntraVENous Q8H  
 vancomycin (VANCOCIN) 1250 mg in  ml infusion  1,250 mg IntraVENous Q18H  
 hydrALAZINE (APRESOLINE) 20 mg/mL injection 10 mg  10 mg IntraVENous Q6H PRN  
 [Held by provider] furosemide (LASIX) injection 40 mg  40 mg IntraVENous Q12H  
 albuterol-ipratropium (DUO-NEB) 2.5 MG-0.5 MG/3 ML  3 mL Nebulization Q6H RT  
 magnesium hydroxide (MILK OF MAGNESIA) 400 mg/5 mL oral suspension 30 mL  30 mL Oral DAILY  senna-docusate (PERICOLACE) 8.6-50 mg per tablet 1 Tab  1 Tab Oral BID  
 bisacodyL (DULCOLAX) suppository 10 mg  10 mg Rectal DAILY  potassium chloride (K-DUR, KLOR-CON) SR tablet 40 mEq  40 mEq Oral BID  
 0.9% sodium chloride infusion 250 mL  250 mL IntraVENous PRN  
 amLODIPine (NORVASC) tablet 10 mg  10 mg Oral DAILY  docusate sodium (COLACE) capsule 100 mg  100 mg Oral PRN  
 ferrous sulfate tablet 325 mg  325 mg Oral DAILY  glimepiride (AMARYL) tablet 4 mg  4 mg Oral 7am  
 lisinopriL (PRINIVIL, ZESTRIL) tablet 20 mg  20 mg Oral DAILY  meloxicam (MOBIC) tablet 7.5 mg  7.5 mg Oral DAILY  sodium chloride tablet 2 g  2 g Oral BID  traMADoL (ULTRAM) tablet 50 mg  50 mg Oral Q6H PRN  
 sodium chloride (NS) flush 5-40 mL  5-40 mL IntraVENous Q8H  
 sodium chloride (NS) flush 5-40 mL  5-40 mL IntraVENous PRN  
 acetaminophen (TYLENOL) tablet 650 mg  650 mg Oral Q4H PRN  
 ondansetron (ZOFRAN) injection 4 mg  4 mg IntraVENous Q4H PRN  
 insulin regular (NOVOLIN R, HUMULIN R) injection   SubCUTAneous AC&HS  enoxaparin (LOVENOX) injection 40 mg  40 mg SubCUTAneous Q24H Patient has no known allergies. Social History Socioeconomic History  Marital status:  Spouse name: Not on file  Number of children: Not on file  Years of education: Not on file  Highest education level: Not on file Tobacco Use  Smoking status: Never Smoker  Smokeless tobacco: Never Used Substance and Sexual Activity  Alcohol use: No  
 Drug use: No  
 
Social History Tobacco Use Smoking Status Never Smoker Smokeless Tobacco Never Used Family History Problem Relation Age of Onset  Emphysema Father  Lung Disease Father  Cancer Brother Colon  Heart Disease Sister  Hypertension Sister  Heart Disease Sister  Hypertension Sister  Heart Disease Brother  Hypertension Brother  Heart Disease Brother  Hypertension Brother  Heart Disease Brother  Hypertension Brother  Heart Disease Brother  Hypertension Brother  Heart Disease Brother  Hypertension Brother ROS: The patient has no difficulty with chest pain or shortness of breath. No fever or chills. Comprehensive review of systems was otherwise unremarkable except as noted above. Physical Exam:  
Visit Vitals /67 (BP 1 Location: Left arm, BP Patient Position: At rest;Head of bed elevated (Comment degrees)) Pulse (!) 106 Temp 98.5 °F (36.9 °C) Resp 18 Wt 164 lb (74.4 kg) SpO2 93% BMI 22.24 kg/m² Constitutional: Alert, oriented, ill appearing, cooperative patient in no acute distress; appears stated age Eyes:Sclera are clear. EOMs intact ENMT: no external lesions gross hearing normal; no obvious neck masses, no ear or lip lesions, nares normal 
CV: RRR. Normal perfusion Resp: No JVD. Breathing is  non-labored; no audible wheezing. GI: distended, tympanic, +BS, non tender Musculoskeletal: unremarkable with normal function. No embolic signs or cyanosis. Neuro:  Oriented; moves all 4; no focal deficits Psychiatric: normal affect and mood, no memory impairment Recent vitals (if inpt): 
Patient Vitals for the past 24 hrs: 
 BP Temp Pulse Resp SpO2  
06/02/20 0347 131/67 98.5 °F (36.9 °C) (!) 106 18 93 % 06/02/20 0128     93 % 06/01/20 2335 114/57 98.5 °F (36.9 °C) (!) 108 18 94 % 06/01/20 2108     93 % 06/01/20 2102     93 % 06/01/20 2019 142/73 99.2 °F (37.3 °C) (!) 110 20 91 % 06/01/20 1624 125/59 98.5 °F (36.9 °C) (!) 106 21 93 % 06/01/20 1438     93 % 06/01/20 1250     93 % 06/01/20 1230 98/50 98 °F (36.7 °C) (!) 111 24 94 % Labs: 
Recent Labs  
  06/01/20 
2565 05/31/20 
2356 WBC  --  9.0 HGB  --  10.3* PLT  --  216 *  --   
K 3.9  --   
CL 90*  --   
CO2 29  --   
BUN 18  --   
CREA 0.90  --   
*  --   
 
 
Lab Results Component Value Date/Time WBC 9.0 05/31/2020 11:56 PM  
 HGB 10.3 (L) 05/31/2020 11:56 PM  
 PLATELET 691 51/82/5006 11:56 PM  
 Sodium 131 (L) 06/01/2020 06:24 AM  
 Potassium 3.9 06/01/2020 06:24 AM  
 Chloride 90 (L) 06/01/2020 06:24 AM  
 CO2 29 06/01/2020 06:24 AM  
 BUN 18 06/01/2020 06:24 AM  
 Creatinine 0.90 06/01/2020 06:24 AM  
 Glucose 133 (H) 06/01/2020 06:24 AM  
 INR 1.1 07/27/2018 02:00 PM  
 aPTT 29.1 07/27/2018 02:00 PM  
 Bilirubin, total 0.3 05/24/2020 05:10 PM  
 ALT (SGPT) 17 05/24/2020 05:10 PM  
 Alk.  phosphatase 419 (H) 05/24/2020 05:10 PM  
 Lipase 92 09/02/2018 07:10 PM  
 Troponin-I, Qt. <0.02 (L) 05/24/2020 05:10 PM  
 
 
I reviewed recent labs and recent radiologic studies. CT Results (most recent): 
Results from [facility] Encounter encounter on 05/24/20 CT CHEST ABD PELV W CONT Narrative EXAM: CT Chest with IV contrast - PE protocol. INDICATION: Dyspnea. COMPARISON: Prior CT chest on May 24, 2020. TECHNIQUE: Axial CT images of the chest were obtained after the intravenous 
injection of 100 mL Isovue 370 CT contrast. Radiation dose reduction techniques 
were used for this study. Our CT scanners use one or all of the following: Automated exposure control, adjustment of the mA and/or kV according to patient 
size, iterative reconstruction. FINDINGS: 
- Pleura/pericardium: There are decreased small to moderate-sized bilateral 
pleural effusions. No pneumothorax or pericardial effusion is seen. - Lungs: There is progressed patchy edema or infiltrates in both lungs, worse in 
the right lower lobe. - Malina/Mediastinum: Within normal limits. - Tracheobronchial tree: Within normal limits. - Aorta/pulmonary arteries: Within normal limits. - Heart: Within normal limits. - Coronary arteries: There are coronary artery calcifications. - Chest wall: Within normal limits. - Spine/bones: Again noted are multiple sclerotic foci in the ribs and spine, 
consistent with osseous metastatic disease. 
- Additional comments: None. Impression IMPRESSION:  
1. No evidence of pulmonary embolism. 2. Decreased small to moderate bilateral pleural effusions. 3. Progressed patchy pulmonary edema or pneumonia in both lungs, worse in the 
right lower lobe. 4. Coronary artery disease. 5. Osseous metastatic disease. EXAM: CT abdomen and pelvis with IV contrast. 
 
INDICATION: Abdominal pain. History of rectal cancer. COMPARISON: Prior PET/CT scan on March 24, 2020.  
 
TECHNIQUE: Axial CT images of the abdomen and pelvis were obtained after the 
intravenous injection of 100 mL Isovue 370 CT contrast. Oral contrast was 
administered as well. Radiation dose reduction techniques were used for this 
study. Our CT scanners use one or all of the following:  Automated exposure 
control, adjustment of the mA or kV according to patient size, iterative 
reconstruction. FINDINGS: 
 
- Liver: Within normal limits. - Gallbladder and bile ducts: Within normal limits. - Spleen: Within normal limits. - Urinary tract: The kidneys are unremarkable. The urinary bladder is 
decompressed around a Juarez catheter. - Adrenals: A small right adrenal gland nodule is unchanged, which was PET avid 
on the prior study. The left adrenal gland is normal. 
- Pancreas: Within normal limits. - Gastrointestinal tract: There is marked constipation with distention of the 
entire colon. The cecum measures 12 cm in diameter. This is to the level of wall 
thickening in the lower rectum, which was PET avid on the prior study. There is 
sigmoid diverticulosis, without evidence of acute diverticulitis. The small 
bowel loops are within normal limits. - Retroperitoneum: There is no abdominal aortic aneurysm, dissection or 
retroperitoneal adenopathy. 
- Peritoneal cavity and abdominal wall: No free fluid or free air. 
- Pelvis: Within normal limits. - Spine/bones: Again noted are scattered sclerotic lesions in the spine and 
pelvic bones, consistent with osseous metastatic disease. 
- Other comments: None. IMPRESSION:  
1. New marked constipation and distention of the entire colon, to the level of 
the lower rectum, where there is wall thickening which was PET avid on the prior 
study. This is suspect for rectal obstruction. No dilated small bowel loops are 
seen at this time. 2. Unchanged right adrenal gland nodule and osseous metastatic disease. I independently reviewed radiology images for studies I described above or studies I have ordered. Admission date (for inpatients): 5/24/2020  
3 Days Post-Op  Procedure(s): 
THORACENTESIS 
ULTRASOUND ASSESSMENT/PLAN: 
Problem List  Date Reviewed: 5/20/2020 Codes Class Noted Rectal obstruction ICD-10-CM: K62.4 ICD-9-CM: 569.2  6/1/2020 Aspiration pneumonia ([facility] ICD-10-CM: J69.0 ICD-9-CM: 507.0  6/1/2020 Neutropenia ([facility] ICD-10-CM: D70.9 ICD-9-CM: 288.00  5/25/2020 Pleural effusion on right ICD-10-CM: J90 ICD-9-CM: 511.9  5/25/2020 * (Principal) Acute respiratory failure with hypoxia ([facility] ICD-10-CM: J96.01 
ICD-9-CM: 518.81  5/24/2020 Normocytic anemia ICD-10-CM: D64.9 ICD-9-CM: 285.9  5/24/2020 CAP (community acquired pneumonia) ICD-10-CM: J18.9 ICD-9-CM: 300  5/24/2020 Pleural effusion on left ICD-10-CM: J90 ICD-9-CM: 511.9  5/24/2020 Hypomagnesemia ICD-10-CM: K41.15 
ICD-9-CM: 275.2  5/20/2020 SIADH (syndrome of inappropriate ADH production) ([facility] ICD-10-CM: E22.2 ICD-9-CM: 253.6  3/26/2020 Malignant neoplasm metastatic to bone [facility]) ICD-10-CM: C79.51 
ICD-9-CM: 198.5  6/26/2019 Postoperative seroma of subcutaneous tissue after non-dermatologic procedure ICD-10-CM: L76.34 
ICD-9-CM: 998.13  9/3/2018 Anal carcinoma ([facility] ICD-10-CM: C21.0 ICD-9-CM: 154.3  8/9/2018 Type 2 diabetes with nephropathy ([facility] ICD-10-CM: E11.21 
ICD-9-CM: 250.40, 583.81  7/3/2018 Primary malignant neoplasm of perianal skin ICD-10-CM: C44.500 ICD-9-CM: 173.50  7/3/2018 Diabetes mellitus due to underlying condition with hyperglycemia ([facility] ICD-10-CM: D86.99 ICD-9-CM: 249.80  12/21/2015 Mucositis due to radiation therapy ICD-10-CM: K12.33 
ICD-9-CM: 528.09, E879.2  12/15/2015 Radiation esophagitis ICD-10-CM: K20.8 ICD-9-CM: 530.19, E926.9  12/15/2015 Encounter for laboratory testing for COVID-19 virus ICD-10-CM: Z11.59 
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 
  Acute respiratory failure with hypoxia ([address] (5/24/2020) Active Problems: Hyponatremia (11/23/2015) Encounter for laboratory testing for COVID-19 virus (12/15/2015) Diabetes mellitus due to underlying condition with hyperglycemia ([address] (12/21/2015) Type 2 diabetes with nephropathy ([address] (7/3/2018) Malignant neoplasm metastatic to bone ([address] (6/26/2019) Normocytic anemia (5/24/2020) CAP (community acquired pneumonia) (5/24/2020) Pleural effusion on left (5/24/2020) Neutropenia ([address] (5/25/2020) Pleural effusion on right (5/25/2020) Rectal obstruction (6/1/2020) Aspiration pneumonia ([address] (6/1/2020) Patient has been offered diverting colostomy in the past and declined He continues to decline colostomy; if able to move bowels patient or spouse do not want surgery Await flex sig Signed:  Beatriz Cline NP

## 2020-06-02 NOTE — ANESTHESIA POSTPROCEDURE EVALUATION
Procedure(s): SIGMOIDOSCOPY FLEXIBLE. total IV anesthesia Anesthesia Post Evaluation Patient location during evaluation: PACU Patient participation: complete - patient participated Level of consciousness: awake Pain management: satisfactory to patient Airway patency: patent Anesthetic complications: no 
Cardiovascular status: hemodynamically stable Respiratory status: spontaneous ventilation Hydration status: euvolemic Post anesthesia nausea and vomiting:  none HR near baseline. INITIAL Post-op Vital signs:  
Vitals Value Taken Time /58 6/2/2020  2:58 PM  
Temp Pulse 104 6/2/2020  2:59 PM  
Resp 12 6/2/2020  2:47 PM  
SpO2 99 % 6/2/2020  2:59 PM  
Vitals shown include unvalidated device data.

## 2020-06-02 NOTE — PROGRESS NOTES
TRANSFER - OUT REPORT: 
 
Verbal report given to Inocencio Briscoe RN on [facility]  being transferred to GI Lab for ordered procedure Report consisted of patients Situation, Background, Assessment and  
Recommendations(SBAR). Information from the following report(s) SBAR, ED Summary and MAR was reviewed with the receiving nurse. Lines:  
Peripheral IV 05/24/20 Left Antecubital (Active) Site Assessment Clean, dry, & intact 6/2/2020  3:47 AM  
Phlebitis Assessment 0 6/2/2020  3:47 AM  
Infiltration Assessment 0 6/2/2020  3:47 AM  
Dressing Status Clean, dry, & intact 6/2/2020  3:47 AM  
Dressing Type Tape;Transparent 6/2/2020  3:47 AM  
Hub Color/Line Status Patent; Infusing 6/2/2020  3:47 AM  
Alcohol Cap Used No 5/30/2020  4:29 PM  
   
Peripheral IV 06/01/20 (Active) Opportunity for questions and clarification was provided. Patient transported with: 
 O2 @ 3 liters

## 2020-06-02 NOTE — PROGRESS NOTES
Zenon Mean Admission Date: 5/24/2020 Daily Progress Note: 6/2/2020 The patient's chart is reviewed and the patient is discussed with the staff. The patient is a 79 y.o.  male seen and evaluated at the request of Dr. Chip Stacy for evaluation and management of pleural effusions. 
  
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 He received carbo/etoposide 2 weeks ago and has also had radiation for anal cancer. He has required anal dilation in past.  
 
He presented to [facility] on 5/24 with increasing dyspnea and chest discomfort. A CT of the chest revealed large bilateral effusions and we are now asked to assist with management. He is hyponatremic and getting saline infusions and also getting PRBCs for anemia. Had nausea/vomiting, aspiration event on 5/31 with hypoxemia. CT at that time suggested rectal obstruction. Subjective:  
GI/surgery now following and pt declines diverting colostomy and GI considering flex sig today. Currently 93% on 7lpm and Tm 99.2. WBC rising. BP stable. Reports bowel movement and some improvement in distention. Current Facility-Administered Medications Medication Dose Route Frequency  piperacillin-tazobactam (ZOSYN) 3.375 g in 0.9% sodium chloride (MBP/ADV) 100 mL  3.375 g IntraVENous Q8H  
 vancomycin (VANCOCIN) 1250 mg in  ml infusion  1,250 mg IntraVENous Q18H  
 hydrALAZINE (APRESOLINE) 20 mg/mL injection 10 mg  10 mg IntraVENous Q6H PRN  
 [Held by provider] furosemide (LASIX) injection 40 mg  40 mg IntraVENous Q12H  
 albuterol-ipratropium (DUO-NEB) 2.5 MG-0.5 MG/3 ML  3 mL Nebulization Q6H RT  
 magnesium hydroxide (MILK OF MAGNESIA) 400 mg/5 mL oral suspension 30 mL  30 mL Oral DAILY  senna-docusate (PERICOLACE) 8.6-50 mg per tablet 1 Tab  1 Tab Oral BID  
 bisacodyL (DULCOLAX) suppository 10 mg  10 mg Rectal DAILY  potassium chloride (K-DUR, KLOR-CON) SR tablet 40 mEq  40 mEq Oral BID  
 0.9% sodium chloride infusion 250 mL  250 mL IntraVENous PRN  
 amLODIPine (NORVASC) tablet 10 mg  10 mg Oral DAILY  docusate sodium (COLACE) capsule 100 mg  100 mg Oral PRN  
 ferrous sulfate tablet 325 mg  325 mg Oral DAILY  glimepiride (AMARYL) tablet 4 mg  4 mg Oral 7am  
 lisinopriL (PRINIVIL, ZESTRIL) tablet 20 mg  20 mg Oral DAILY  meloxicam (MOBIC) tablet 7.5 mg  7.5 mg Oral DAILY  sodium chloride tablet 2 g  2 g Oral BID  traMADoL (ULTRAM) tablet 50 mg  50 mg Oral Q6H PRN  
 sodium chloride (NS) flush 5-40 mL  5-40 mL IntraVENous Q8H  
 sodium chloride (NS) flush 5-40 mL  5-40 mL IntraVENous PRN  
 acetaminophen (TYLENOL) tablet 650 mg  650 mg Oral Q4H PRN  
 ondansetron (ZOFRAN) injection 4 mg  4 mg IntraVENous Q4H PRN  
 insulin regular (NOVOLIN R, HUMULIN R) injection   SubCUTAneous AC&HS  enoxaparin (LOVENOX) injection 40 mg  40 mg SubCUTAneous Q24H Review of Systems Constitutional: negative for fever, chills, sweats Cardiovascular: negative for chest pain, palpitations, syncope, edema Gastrointestinal:  negative for dysphagia, reflux, vomiting, diarrhea, abdominal pain, or melena Neurologic:  negative for focal weakness, numbness, headache Objective:  
 
Vitals:  
 06/01/20 2108 06/01/20 2335 06/02/20 0128 06/02/20 2424 BP:  114/57  131/67 Pulse:  (!) 108  (!) 106 Resp:  18  18 Temp:  98.5 °F (36.9 °C)  98.5 °F (36.9 °C) SpO2: 93% 94% 93% 93% Weight:      
 
 
Intake/Output Summary (Last 24 hours) at 6/2/2020 1956 Last data filed at 6/2/2020 6237 Gross per 24 hour Intake 800 ml Output 1800 ml Net -1000 ml Physical Exam:  
Constitution: elderly, ill appearing [facility]  Sclera clear, pupils equal, oral mucosa moist 
 Respiratory:decreased in bases Cardiovascular:  RRR without M,G,R 
Gastrointestinal: firm, distended Musculoskeletal: warm without cyanosis. There is no lower extremity edema. Skin:  no jaundice or rashes, no wounds Neurologic: no gross neuro deficits Psychiatric:  alert and oriented x 4 CXR: 5/27: bilateral small effusions CT C/A/P 6/1/20: 
chest-FINDINGS: 
- Pleura/pericardium: There are decreased small to moderate-sized bilateral 
pleural effusions. No pneumothorax or pericardial effusion is seen. - Lungs: There is progressed patchy edema or infiltrates in both lungs, worse in 
the right lower lobe. - Malina/Mediastinum: Within normal limits. - Tracheobronchial tree: Within normal limits. - Aorta/pulmonary arteries: Within normal limits. - Heart: Within normal limits. - Coronary arteries: There are coronary artery calcifications. - Chest wall: Within normal limits. - Spine/bones: Again noted are multiple sclerotic foci in the ribs and spine, 
consistent with osseous metastatic disease. 
- Additional comments Abdomen /pelvis: 
IMPRESSION:  
1. New marked constipation and distention of the entire colon, to the level of 
the lower rectum, where there is wall thickening which was PET avid on the prior 
study. This is suspect for rectal obstruction. No dilated small bowel loops are 
seen at this time. 2. Unchanged right adrenal gland nodule and osseous metastatic disease. CT 5/24: bilateral effusions LAB Recent Labs  
  06/01/20 
2123 06/01/20 
1553 06/01/20 
1128 06/01/20 
0739 05/31/20 
2142 GLUCPOC 165* 214* 199* 177* 227* Recent Labs 05/31/20 
2356 WBC 9.0 HGB 10.3* HCT 31.7*  Recent Labs  
  06/01/20 1990 05/31/20 
3532 * 128* K 3.9 4.5  
CL 90* 90* CO2 29 30 * 135* BUN 18 10 CREA 0.90 0.64* MG 2.0 1.7*  
CA 8.6 8.8 Recent Labs 05/31/20 
2117 PHI 7.444 PCO2I 40.5 PO2I 58* HCO3I 27.8* Recent Labs 06/01/20 
9443 05/31/20 
2356 LAC 1.6 2.8* Assessment:  (Medical Decision Making) Hospital Problems  Date Reviewed: 5/20/2020 Codes Class Noted POA Rectal obstruction ICD-10-CM: K62.4 ICD-9-CM: 569.2  6/1/2020 Unknown Aspiration pneumonia ([place] [address].) ICD-10-CM: J69.0 ICD-9-CM: 507.0  6/1/2020 Unknown Neutropenia ([facility] ICD-10-CM: D70.9 ICD-9-CM: 288.00  5/25/2020 Unknown Pleural effusion on right ICD-10-CM: J90 ICD-9-CM: 511.9  5/25/2020 Unknown * (Principal) Acute respiratory failure with hypoxia ([place] [address].) ICD-10-CM: J96.01 
ICD-9-CM: 518.81  5/24/2020 Yes Normocytic anemia ICD-10-CM: D64.9 ICD-9-CM: 285.9  5/24/2020 Yes CAP (community acquired pneumonia) ICD-10-CM: J18.9 ICD-9-CM: 911  5/24/2020 Yes Pleural effusion on left ICD-10-CM: J90 ICD-9-CM: 511.9  5/24/2020 Yes Malignant neoplasm metastatic to bone [facility]) ICD-10-CM: C79.51 
ICD-9-CM: 198.5  6/26/2019 Yes Type 2 diabetes with nephropathy ([place] [address].) ICD-10-CM: E11.21 
ICD-9-CM: 250.40, 583.81  7/3/2018 Yes Diabetes mellitus due to underlying condition with hyperglycemia ([facility] ICD-10-CM: E62.40 ICD-9-CM: 249.80  12/21/2015 Yes Encounter for laboratory testing for COVID-19 virus ICD-10-CM: Z11.59 
ICD-9-CM: V73.89  12/15/2015 Unknown Hyponatremia ICD-10-CM: E87.1 ICD-9-CM: 276.1  11/23/2015 Yes Plan:  (Medical Decision Making) --Continue day 2 vanco/zosyn. Can likely narrow to levaquin tomorrow if improving. --Flex sig per GI 
--will resume lasix, monitoring electrolytes, since pleural effusions have been such an issue. --cxr tomorrow 
--IS, wean oxygen as tolerated. More than 50% of the time documented was spent in face-to-face contact with the patient and in the care of the patient on the floor/unit where the patient is located. 25 minutes spent this morning assessing patient and coordinating care.  
 
Tiago Ely MD

## 2020-06-02 NOTE — PROGRESS NOTES
GI--Flex. Sigmoidoscopy to ~15 cm. Only performed with minimal difficulty. Findings 1) Fecal impaction 2) No anal stricture identified (EGD scope used). 3) Disimpaction performed with evacuation of significant amount of stool and flatus. . Patient tolerated the procedure well. Recommend 1) Recheck films tomorrow. 2) Daily Mag citrate/Dulcolax 3) Gastrograffin enema if necessary.  4) Follow up with Rectal surgeons Jackie Zavala) who have seen/dilated the patient in the past.  Thanks Inocencia Blanco MD

## 2020-06-02 NOTE — PROGRESS NOTES
Patient is scheduled for sigmoid colonoscopy this am. Held PO medications at this time. Held Lovenox for procedure.

## 2020-06-02 NOTE — PROGRESS NOTES
Occupational Therapy Note: 
Chart review initiated for OT treatment. Patient off the floor to GI lab at this time per chart. Will check back as schedule permits and patient is available to initiate occupational therapy treatment.   
Thank you,  
Ly Kelley OTR/L

## 2020-06-02 NOTE — ROUTINE PROCESS
VSS. No complaints noted. Report called to receiving nurse. Pt transported back to inpatient room via stretcher with transport staff.

## 2020-06-02 NOTE — PROGRESS NOTES
Assessment complete via flow sheet. Pt A&Ox4. Respirations even and unlabored. S1 S2 auscultated. Bowel sounds active, abdomen distended and semi-soft. Denies other needs. Bed in lowest position, side rails up x3, call bell in reach. Instructed to call for assistance. Pt verbalized understanding. Plan of care reviewed with patient.

## 2020-06-02 NOTE — PROGRESS NOTES
Date of Surgery Update: 
Lopez Mayfield was seen and examined. History and physical has been reviewed. The patient has been examined. There have been no significant clinical changes since the completion of the originally dated History and Physical.  Given the CT findings (with a dilated cecum) I decided to not give a full prep last night. Abdominal films noted last night. He has been passing flatus and stools per wife. Will proceed with an attempt at a flex. Sigmoidoscopy today. Risks (bleed, perforation, infection, untoward CV or resp. Event, mortality, etc.), benefits and alternatives were discussed with the patient who agrees to proceed.   Thanks Brittany Zavala MD 
 
 
Signed By: Brittany Zavala MD   
 June 2, 2020 1:27 PM

## 2020-06-02 NOTE — PROGRESS NOTES
TRANSFER - IN REPORT: 
 
Verbal report received from [facility] on [employer]  being received from GI Lab for routine post - op Report consisted of patients Situation, Background, Assessment and  
Recommendations(SBAR). Information from the following report(s) Procedure Summary and Recent Results was reviewed with the receiving nurse. Opportunity for questions and clarification was provided. Assessment to be completed upon patients arrival to unit and care will be assumed.

## 2020-06-02 NOTE — PROGRESS NOTES
Date of Outreach Update: 
Schuyler Peters was seen and assessed. MEWS Score: 2 (06/01/20 2019) Vitals:  
 06/01/20 2019 06/01/20 2102 06/01/20 2108 06/01/20 2335 BP: 142/73   114/57 Pulse: (!) 110   (!) 108 Resp: 20   18 Temp: 99.2 °F (37.3 °C)   98.5 °F (36.9 °C) SpO2: 91% 93% 93% 94% Weight:      
  
 
 Pain Assessment Pain Intensity 1: 4 (06/01/20 1250) Pain Location 1: Abdomen Pain Intervention(s) 1: Medication (see MAR) Patient Stated Pain Goal: 0 Previous Outreach assessment has been reviewed. There have been no significant clinical changes since the completion of the last dated Outreach assessment. Patient AAO x 4 with wife at bedside. Patient currently on 7 lpm NC with O2 sat 94%. No needs at this time. Will monitor Will continue to follow up per outreach protocol.  
 
Signed By:   Dinora Rinaldi RN 
  June 2, 2020 12:07 AM

## 2020-06-02 NOTE — PROGRESS NOTES
TRANSFER - IN REPORT: 
 
Verbal report received from Faby Diggs RN(name) on [facility]  being received from 831(unit) for ordered procedure Report consisted of patients Situation, Background, Assessment and  
Recommendations(SBAR). Information from the following report(s) SBAR, Intake/Output, MAR, Recent Results, Med Rec Status and Pre Procedure Checklist was reviewed with the receiving nurse. Opportunity for questions and clarification was provided.

## 2020-06-02 NOTE — PROGRESS NOTES
Physical Therapy Note: 
Chart review initiated for PT treatment. Patient off the floor to GI lab at this time per chart. Will check back as schedule permits and patient is available to initiate physical therapy treatment.   
Thank you,  
Darion Chacon, PT, DPT

## 2020-06-02 NOTE — ANESTHESIA PREPROCEDURE EVALUATION
Anesthetic History No history of anesthetic complications Review of Systems / Medical History Pertinent labs reviewed Pulmonary Within defined limits Neuro/Psych Within defined limits Cardiovascular Hypertension Exercise tolerance: >4 METS Comments: unremarkable echo 5/20. GI/Hepatic/Renal 
  
GERD: well controlled Comments: Poss large bowel obstruction. Endo/Other Diabetes: type 2 Cancer (rectal, oral) Other Findings Physical Exam 
 
Airway Mallampati: II 
TM Distance: 4 - 6 cm Neck ROM: normal range of motion Mouth opening: Normal 
 
Comments: Asymmetric mouth opening from previous right neck surgery. Cardiovascular Regular rate and rhythm,  S1 and S2 normal,  no murmur, click, rub, or gallop Dental 
No notable dental hx Pulmonary Breath sounds clear to auscultation Abdominal 
GI exam deferred Other Findings Anesthetic Plan ASA: 3 Anesthesia type: total IV anesthesia Induction: Intravenous Anesthetic plan and risks discussed with: Patient

## 2020-06-02 NOTE — ROUTINE PROCESS
Patient arrived to prep in stable condition on 8L high flow nasal cannula. Pt voices no complaints or concerns.

## 2020-06-03 NOTE — PROGRESS NOTES
[facility] Progress Note Admit Date:  5/24/2020 Today's Date:  6/3/2020 CC:  Constipation, hx anal cancer Subjective:  
 
Patient S/P flex sig 6/2 as below. Reports decreased abdominal discomfort with continued stools overnight. Abdominal films ordered for today not yet obtained. GI--Flex 6/2/2020 Jimmie Inoue Sigmoidoscopy to ~15 cm. Only performed with minimal difficulty. Findings 1) Fecal impaction 2) No anal stricture identified (EGD scope used). 3) Disimpaction performed with evacuation of significant amount of stool and flatus. . Patient tolerated the procedure well. Recommend 1) Recheck films tomorrow. 2) Daily Mag citrate/Dulcolax 3) Gastrograffin enema if necessary. 4) Follow up with Rectal surgeons Macho Encinas) who have seen/dilated the patient in the past.   
Thanks Sg Cardenas MD  
  
 
 
 
 
Medications:  
Current Facility-Administered Medications Medication Dose Route Frequency  vancomycin (VANCOCIN) 1250 mg in  ml infusion  1,250 mg IntraVENous Q12H  
 lactated Ringers infusion  100 mL/hr IntraVENous CONTINUOUS  
 albuterol-ipratropium (DUO-NEB) 2.5 MG-0.5 MG/3 ML  3 mL Nebulization Q6HWA RT  
 piperacillin-tazobactam (ZOSYN) 3.375 g in 0.9% sodium chloride (MBP/ADV) 100 mL  3.375 g IntraVENous Q8H  
 hydrALAZINE (APRESOLINE) 20 mg/mL injection 10 mg  10 mg IntraVENous Q6H PRN  
 furosemide (LASIX) injection 40 mg  40 mg IntraVENous Q12H  
 magnesium hydroxide (MILK OF MAGNESIA) 400 mg/5 mL oral suspension 30 mL  30 mL Oral DAILY  senna-docusate (PERICOLACE) 8.6-50 mg per tablet 1 Tab  1 Tab Oral BID  
 bisacodyL (DULCOLAX) suppository 10 mg  10 mg Rectal DAILY  potassium chloride (K-DUR, KLOR-CON) SR tablet 40 mEq  40 mEq Oral BID  
 0.9% sodium chloride infusion 250 mL  250 mL IntraVENous PRN  
 amLODIPine (NORVASC) tablet 10 mg  10 mg Oral DAILY  docusate sodium (COLACE) capsule 100 mg  100 mg Oral PRN  
  glimepiride (AMARYL) tablet 4 mg  4 mg Oral 7am  
 lisinopriL (PRINIVIL, ZESTRIL) tablet 20 mg  20 mg Oral DAILY  meloxicam (MOBIC) tablet 7.5 mg  7.5 mg Oral DAILY  sodium chloride tablet 2 g  2 g Oral BID  traMADoL (ULTRAM) tablet 50 mg  50 mg Oral Q6H PRN  
 sodium chloride (NS) flush 5-40 mL  5-40 mL IntraVENous Q8H  
 sodium chloride (NS) flush 5-40 mL  5-40 mL IntraVENous PRN  
 acetaminophen (TYLENOL) tablet 650 mg  650 mg Oral Q4H PRN  
 ondansetron (ZOFRAN) injection 4 mg  4 mg IntraVENous Q4H PRN  
 insulin regular (NOVOLIN R, HUMULIN R) injection   SubCUTAneous AC&HS  enoxaparin (LOVENOX) injection 40 mg  40 mg SubCUTAneous Q24H Review of Systems: ROS was obtained, with pertinent positives as listed above. No chest pain or SOB. Diet:  NPO except meds Objective:  
Vitals: 
Visit Vitals /83 Pulse (!) 106 Temp 98.2 °F (36.8 °C) Resp 18 Ht 6' (1.829 m) Wt 76.2 kg (168 lb) SpO2 (!) 84% Comment: increased to 12L with sats of 93% BMI 22.78 kg/m² Intake/Output: 
No intake/output data recorded. 06/01 1901 - 06/03 0700 In: 250 [I.V.:250] Out: 5570 [[id number]] Exam: 
General appearance: alert, cooperative, no distress Lungs: clear to auscultation bilaterally anteriorly Heart: regular rate and rhythm Abdomen: softer and less distended, decreased tenderness. Bowel sounds normal. No masses, no organomegaly Extremities: extremities normal, atraumatic, no cyanosis or edema Neuro:  alert and oriented Data Review (Labs):   
Recent Labs  
  06/03/20 
8821 06/02/20 
0736 06/01/20 
5511 05/31/20 
2356 WBC  --  11.2*  --  9.0 HGB  --  8.7*  --  10.3* HCT  --  27.3*  --  31.7*  
PLT  --  208  --  216 MCV  --  93.2  --  91.1 * 133* 131*  --   
K 3.7 3.9 3.9  --   
CL 95* 95* 90*  --   
CO2 29 29 29  --   
BUN 17 19 18  --   
CREA 0.79* 0.85 0.90  --   
CA 9.0 9.0 8.6  --   
MG 2.1 2.2 2.0  --   
* 114* 133*  --   
 
  
 CT C/A/P  W Contrast 6/1/2020 IMPRESSION:  
1. New marked constipation and distention of the entire colon, to the level of 
the lower rectum, where there is wall thickening which was PET avid on the prior 
study. This is suspect for rectal obstruction. No dilated small bowel loops are 
seen at this time. 2. Unchanged right adrenal gland nodule and osseous metastatic disease. Assessment:  
 
Principal Problem: 
  Acute respiratory failure with hypoxia ([address] (5/24/2020) Active Problems: Hyponatremia (11/23/2015) COVID-19 ruled out (12/15/2015) Diabetes mellitus due to underlying condition with hyperglycemia ([address] (12/21/2015) Type 2 diabetes with nephropathy ([address] (7/3/2018) Malignant neoplasm metastatic to bone ([address] (6/26/2019) Normocytic anemia (5/24/2020) Pleural effusion on left (5/24/2020) Neutropenia ([address] (5/25/2020) Pleural effusion on right (5/25/2020) Rectal obstruction (6/1/2020) Aspiration pneumonia ([address] (6/1/2020) Plan:  
 
78 yo male with hx of squamous cell cancer of the neck and anal cancer is seen for evaluation of obstipation. He was admitted 5/24 for dyspnea and chest pain with large pleural effusions noted on imaging. He had rapid response called with CT C/A/P revealing a stool-filled colon to the level of the rectum. Patient underwent chemo-radiation of anal cancer in 2018 with subsequent development of anal stenosis/stricture. Last dilation was 5/2019 by Dr Ramsey Ureña. He reports daily difficulty having a bowel movement secondary to discomfort but notes recently this has become worse, similar to how it has been previously before dilation. Flex sig 6/2 with no evidence of stricture but stool impaction noted. 1.  Continue Milk of Magnesia and Pericolace for constipation 2. Today's abd films not yet obtained 3. NPO until imaging reviewed 4.   Discontinue oral iron supplementation; recommend consideration for IV iron infusion 5. Plan followup with Dr Jordi Nuno ([facility]) at discharge Patient is seen and examined in collaboration with Dr. Yanira Feng. Assessment and plan as per Dr. Yanira Feng. Odalys Nuno NP 
 
GI --Patient seen and examined. Agree with above. Abdominal pain much improved. Abdominal films show no ileus or obstruction. No further evaluation planned. Recommend IV Iron instead of oral Iron b/o history of obstipation. Will sign off and see back in the office. Please call if needed.   Thanks Eden Mendoza MD

## 2020-06-03 NOTE — PROGRESS NOTES
Occupational Therapy Note: 
Chart review initiated for OT treatment. Will hold OT treatment as patient with decreased respiratory status requiring stat chest x-ray at this time per chart. Will check back as schedule permits and patient is available to initiate occupational therapy treatment.   
Thank you,  
Evelyn Paz, OTR/L

## 2020-06-03 NOTE — PROGRESS NOTES
Pharmacokinetic Consult to Pharmacist 
 
Ania Correa is a 79 y.o. male being treated for Asp Pneumonia with vancomycin and pip/tazo. Height: 6' (182.9 cm)  Weight: 76.2 kg (168 lb) Lab Results Component Value Date/Time BUN 17 06/03/2020 05:58 AM  
 Creatinine 0.79 (L) 06/03/2020 05:58 AM  
 WBC 11.6 (H) 06/03/2020 01:22 PM  
 Procalcitonin 0.30 05/25/2020 06:52 AM  
 Lactic acid 1.6 06/01/2020 06:24 AM  
 Lactic Acid (POC) 2.9 (H) 09/02/2018 07:59 PM  
  
Estimated Creatinine Clearance: 81.7 mL/min (A) (based on SCr of 0.79 mg/dL (L)). CULTURES: 
None recently Lab Results Component Value Date/Time Vancomycin,trough 8.6 06/03/2020 08:06 AM  
 
 
Day 3 of vancomycin. Goal trough is 15-20. Trough subtherapeutic today at 8.6, dose adjusted to 1250 mg every 12 hours for now. Pharmacy to order levels and adjust the dose as appropriate. Will continue to follow patient. Thank you, Daiana Meneses, PharmD, BCPS Clinical Pharmacy Specialist 
[phone]

## 2020-06-03 NOTE — PROGRESS NOTES
Patient found to have oxygen saturation at 63% on 10 liters O2. Placed on non rebreather and he rallied to 93%. Left on high flow O2 at 15L. Notified Dr. Colin Faust and an order for stat chest x-ray placed. Respiratory therapy notified.

## 2020-06-03 NOTE — PROGRESS NOTES
[facility] Admission Date: 5/24/2020 Daily Progress Note: 6/3/2020 The patient's chart is reviewed and the patient is discussed with the staff. The patient is a 79 y.o. male seen and evaluated at the request of Dr. Emma Adrian for evaluation and management of pleural effusions. 
  
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 He received carbo/etoposide 2 weeks ago and has also had radiation for anal cancer. He has required anal dilation in past.  
 
He presented to [facility] on 5/24 with increasing dyspnea and chest discomfort. A CT of the chest revealed large bilateral effusions and we are now asked to assist with management. He is hyponatremic and getting saline infusions and also getting PRBCs for anemia. Had nausea/vomiting, aspiration event on 5/31 with hypoxemia. CT at that time suggested rectal obstruction. Flex sig on 6/2 with no anal stricture and stool impaction. Subjective:  
Remains on 7lpm.  A bit more dyspneic. Abdomen is much less tender/distended. Current Facility-Administered Medications Medication Dose Route Frequency  lactated Ringers infusion  100 mL/hr IntraVENous CONTINUOUS  Vancomycin Trough Level Reminder   Other ONCE  
 albuterol-ipratropium (DUO-NEB) 2.5 MG-0.5 MG/3 ML  3 mL Nebulization Q6HWA RT  
 piperacillin-tazobactam (ZOSYN) 3.375 g in 0.9% sodium chloride (MBP/ADV) 100 mL  3.375 g IntraVENous Q8H  
 vancomycin (VANCOCIN) 1250 mg in  ml infusion  1,250 mg IntraVENous Q18H  
 hydrALAZINE (APRESOLINE) 20 mg/mL injection 10 mg  10 mg IntraVENous Q6H PRN  
 [Held by provider] furosemide (LASIX) injection 40 mg  40 mg IntraVENous Q12H  
 magnesium hydroxide (MILK OF MAGNESIA) 400 mg/5 mL oral suspension 30 mL 30 mL Oral DAILY  senna-docusate (PERICOLACE) 8.6-50 mg per tablet 1 Tab  1 Tab Oral BID  
 bisacodyL (DULCOLAX) suppository 10 mg  10 mg Rectal DAILY  potassium chloride (K-DUR, KLOR-CON) SR tablet 40 mEq  40 mEq Oral BID  
 0.9% sodium chloride infusion 250 mL  250 mL IntraVENous PRN  
 amLODIPine (NORVASC) tablet 10 mg  10 mg Oral DAILY  docusate sodium (COLACE) capsule 100 mg  100 mg Oral PRN  
 ferrous sulfate tablet 325 mg  325 mg Oral DAILY  glimepiride (AMARYL) tablet 4 mg  4 mg Oral 7am  
 lisinopriL (PRINIVIL, ZESTRIL) tablet 20 mg  20 mg Oral DAILY  meloxicam (MOBIC) tablet 7.5 mg  7.5 mg Oral DAILY  sodium chloride tablet 2 g  2 g Oral BID  traMADoL (ULTRAM) tablet 50 mg  50 mg Oral Q6H PRN  
 sodium chloride (NS) flush 5-40 mL  5-40 mL IntraVENous Q8H  
 sodium chloride (NS) flush 5-40 mL  5-40 mL IntraVENous PRN  
 acetaminophen (TYLENOL) tablet 650 mg  650 mg Oral Q4H PRN  
 ondansetron (ZOFRAN) injection 4 mg  4 mg IntraVENous Q4H PRN  
 insulin regular (NOVOLIN R, HUMULIN R) injection   SubCUTAneous AC&HS  enoxaparin (LOVENOX) injection 40 mg  40 mg SubCUTAneous Q24H Review of Systems Constitutional: negative for fever, chills, sweats Cardiovascular: negative for chest pain, palpitations, syncope, edema Gastrointestinal:  negative for dysphagia, reflux, vomiting, diarrhea, abdominal pain, or melena Neurologic:  negative for focal weakness, numbness, headache Objective:  
 
Vitals:  
 06/02/20 2028 06/02/20 2333 06/03/20 0319 06/03/20 1010 BP:  141/67 149/80 155/83 Pulse:  (!) 112 (!) 110 (!) 106 Resp:  18 18 18 Temp:  98.1 °F (36.7 °C) 98.4 °F (36.9 °C) 98.2 °F (36.8 °C) SpO2: 96% 94% 95% (!) 89% Weight:      
Height:      
 
 
Intake/Output Summary (Last 24 hours) at 6/3/2020 0830 Last data filed at 6/3/2020 5687 Gross per 24 hour Intake 250 ml Output 850 ml Net -600 ml Physical Exam:  
Constitution: elderly, ill appearing EENMT:  Sclera clear, pupils equal, oral mucosa moist 
Respiratory:decreased in bases Cardiovascular:  RRR without M,G,R 
Gastrointestinal: firm, distended Musculoskeletal: warm without cyanosis. There is no lower extremity edema. Skin:  no jaundice or rashes, no wounds Neurologic: no gross neuro deficits Psychiatric:  alert and oriented x 4 CXR 6/1/20 CXR: 5/27: bilateral small effusions CT C/A/P 6/1/20: 
chest-FINDINGS: 
- Pleura/pericardium: There are decreased small to moderate-sized bilateral 
pleural effusions. No pneumothorax or pericardial effusion is seen. - Lungs: There is progressed patchy edema or infiltrates in both lungs, worse in 
the right lower lobe. - Malina/Mediastinum: Within normal limits. - Tracheobronchial tree: Within normal limits. - Aorta/pulmonary arteries: Within normal limits. - Heart: Within normal limits. - Coronary arteries: There are coronary artery calcifications. - Chest wall: Within normal limits. - Spine/bones: Again noted are multiple sclerotic foci in the ribs and spine, 
consistent with osseous metastatic disease. 
- Additional comments Abdomen /pelvis: 
IMPRESSION:  
1. New marked constipation and distention of the entire colon, to the level of 
the lower rectum, where there is wall thickening which was PET avid on the prior 
study. This is suspect for rectal obstruction. No dilated small bowel loops are 
seen at this time. 2. Unchanged right adrenal gland nodule and osseous metastatic disease. CT 5/24: bilateral effusions LAB Recent Labs  
  06/03/20 
0739 06/02/20 
2121 06/02/20 
1647 06/02/20 
1301 06/02/20 
1140 GLUCPOC 223* 133* 161* 165* 157* Recent Labs  
  06/02/20 
0736 05/31/20 
2356 WBC 11.2* 9.0 HGB 8.7* 10.3* HCT 27.3* 31.7*  216 Recent Labs  
  06/03/20 
0558 06/02/20 
0736 06/01/20 
0541 * 133* 131*  
K 3.7 3.9 3.9 CL 95* 95* 90* CO2 29 29 29 * 114* 133* BUN 17 19 18  
 CREA 0.79* 0.85 0.90  
MG 2.1 2.2 2.0  
CA 9.0 9.0 8.6 Recent Labs 05/31/20 
2117 PHI 7.444 PCO2I 40.5 PO2I 58* HCO3I 27.8* Recent Labs  
  06/01/20 
0624 05/31/20 
2356 LAC 1.6 2.8* Assessment:  (Medical Decision Making) Hospital Problems  Date Reviewed: 5/20/2020 Codes Class Noted POA Rectal obstruction ICD-10-CM: K62.4 ICD-9-CM: 569.2  6/1/2020 Yes Aspiration pneumonia ([facility] ICD-10-CM: J69.0 ICD-9-CM: 507.0  6/1/2020 Yes Neutropenia ([facility] ICD-10-CM: D70.9 ICD-9-CM: 288.00  5/25/2020 Yes Pleural effusion on right ICD-10-CM: J90 ICD-9-CM: 511.9  5/25/2020 Yes * (Principal) Acute respiratory failure with hypoxia ([facility] ICD-10-CM: J96.01 
ICD-9-CM: 518.81  5/24/2020 Yes Normocytic anemia ICD-10-CM: D64.9 ICD-9-CM: 285.9  5/24/2020 Yes Pleural effusion on left ICD-10-CM: J90 ICD-9-CM: 511.9  5/24/2020 Yes Malignant neoplasm metastatic to bone [facility]) ICD-10-CM: C79.51 
ICD-9-CM: 198.5  6/26/2019 Yes Type 2 diabetes with nephropathy ([facility] ICD-10-CM: E11.21 
ICD-9-CM: 250.40, 583.81  7/3/2018 Yes Diabetes mellitus due to underlying condition with hyperglycemia ([facility] ICD-10-CM: L76.81 ICD-9-CM: 249.80  12/21/2015 Yes COVID-19 ruled out ICD-10-CM: Z03.818 ICD-9-CM: V71.83  12/15/2015 Yes Hyponatremia ICD-10-CM: E87.1 ICD-9-CM: 276.1  11/23/2015 Yes Plan:  (Medical Decision Making) --Continue day 3 vanco/zosyn. Could d/c vanco from my perspective. --will resume lasix, monitoring electrolytes, since pleural effusions have been such an issue. --reassess pleural effusion on left today for thoracentesis. --IS, wean oxygen as tolerated. More than 50% of the time documented was spent in face-to-face contact with the patient and in the care of the patient on the floor/unit where the patient is located. 25 minutes spent this morning assessing patient and coordinating care. Ajay Heard MD

## 2020-06-03 NOTE — INTERVAL H&P NOTE
Update History & Physical 
 
The Patient's History and Physical of Mehreen 3,  
2020 was reviewed with the patient and I examined the patient. There was no change. The surgical site was confirmed by the patient and me. Plan:  The risk, benefits, expected outcome, and alternative to the recommended procedure have been discussed with the patient. Patient understands and wants to proceed with the procedure.  
 
Electronically signed by Carlos Kang MD on 6/3/2020 at 12:06 PM

## 2020-06-03 NOTE — PROGRESS NOTES
Interdisciplinary team rounds were held 6/3/2020 with the following team members:Care Management, Nursing and Physician. Plan of care discussed. See clinical pathway and/or care plan for interventions and desired outcomes.

## 2020-06-03 NOTE — PROGRESS NOTES
Date of Outreach Update: 
Elisa Scheuermann was seen and assessed. MEWS Score: 2 (06/03/20 0319) Pain Assessment Pain Intensity 1: 0 (06/02/20 1933) Pain Location 1: Abdomen Pain Intervention(s) 1: Medication (see MAR) Patient Stated Pain Goal: 0 Previous Outreach assessment has been reviewed. There have been no significant clinical changes since the completion of the last dated Outreach assessment. Will continue to follow up per outreach protocol.  
 
Signed By:   Selam Saunders RN 
  Mehreen 3, 2020 5:30 AM

## 2020-06-03 NOTE — PROCEDURES
THORACENTESIS  
06/03/20 Indication/Preprocedure diagnosis: RIGHT  Pleural effusion Volume of fluid withdrawn: 800ml Postprocedural Diagnosis:  Pleural effusion After obtaining informed consent the patient was placed sitting up on the side of the bed and using ultrasound guidance the pleural fluid was located on the RIGHT side  and marked. The diaphragm and atelectatic lung were clearly visualized. The patient's skin was cleaned with ChloraPrep. Using sterile technique the skin was anesthetized with 10mL of 1% Xylocaine and a stab wound made and a catheter inserted into the pleural space with 800 cc of yellow-appearing fluid was removed. The patient tolerated the procedure well. Ultrasound revealed no evidence of pneumothorax. There where no complications and negligible blood loss. No studies sent, since previously done Malia Busby MD

## 2020-06-03 NOTE — PROGRESS NOTES
CM met with the patient and his wife. PT is now recommending STR. CM discussed this with them. Patient's wife feels that he's doing much better now that he's been given abx. [facility] is following. CM will meet with the patient's wife tomorrow to discuss if she still wants [facility] or if she thinks patient needs to discharge to rehab. Patient is current with [place] [facility] [place] office).

## 2020-06-03 NOTE — PROGRESS NOTES
Assessed pt at bedside for low O2 sats. Found pt on 15L with sats of 91%, placed on Airvo of 50L and 70% per Dr. Edi Lua.

## 2020-06-03 NOTE — OP NOTES
[facility] 
OPERATIVE REPORT Name:  Ramsey Nguyen 
MR#:  [id number] :  1940 ACCOUNT #:  [de-identified] DATE OF SERVICE:  2020 PREOPERATIVE DIAGNOSIS:  Obstipation - history of anal cancer and radiation therapy as well as stricture. POSTOPERATIVE DIAGNOSIS:  Fecal impaction. PROCEDURES PERFORMED: 
1. Sigmoidoscopy. 2.  Disimpaction. PRIMARY GASTROENTEROLOGIST:  Sera Pickett MD 
 
SURGEON:  None. ASSISTANT:  None. ANESTHESIA:  As per MAC anesthesia. COMPLICATIONS:  None. SPECIMENS TO LABORATORY:  None. IMPLANTS:  None. ESTIMATED BLOOD LOSS:  Zero. INSTRUMENT:  GIF-H190 upper endoscope. SUBJECTIVE:  A 71-year-old male who is undergoing a sigmoidoscopy because of the abdominal distention and obstipation with a history of anal cancer (had radiation therapy in the past). He has had apparently radiation stricture in the past requiring dilatation. Because the patient could not tolerate a full prep, this is to be a sigmoidoscopy only after partial prep. We are using the upper (GIF-H190) endoscope. The risks and benefits were explained to the patient who agrees to proceed. PROCEDURE IN DETAIL:  The patient was anesthetized and positioned. Rectal examination was performed, which actually revealed a rather patent anal canal.  There was significant debris in the anal canal via rectal examination. The upper endoscope was advanced through the anal canal in the rectal vault. We were able to proceed a total of approximately 14-15 cm, stopped at this point in time secondary to solid debris. The endoscope was removed from the distal rectum and the patient. The mucosa that could be seen was unremarkable. I could not identify any definite lesion or obstructing mass to 15 cm. The patient tolerated the procedure well and remained in the endoscopy suite in stable condition.  
 
IMPRESSION: 
 1.  Study only at 15 cm and stopped secondary to impaction/formed stool. 2.  No anal stricture identified (EGD scope used). 3.  Following the procedure, the patient was disimpacted with evacuation of a large amount of stool and flatus. PLAN: 
Diagnostic: 
1. Abdominal series with  mag citrate daily. 2.  Dulcolax continued. 3.  Gastrografin enema if necessary. Marilee Mitchell MD 
 
 
[id number] 
D:  06/02/2020 14:51 T:  06/03/2020 1:17 JOB #:  [id number] CC:  [facility] Hospitalist Service      MD Paul Olsen MD

## 2020-06-03 NOTE — PROGRESS NOTES
Date of Outreach Update: 
Elisa Scheuermann was seen and assessed. MEWS Score: 2 (06/02/20 1933) Pain Assessment Pain Intensity 1: 0 (06/02/20 1933) Pain Location 1: Abdomen Pain Intervention(s) 1: Medication (see MAR) Patient Stated Pain Goal: 0 Previous Outreach assessment has been reviewed. There have been no significant clinical changes since the completion of the last dated Outreach assessment. Patient A/O x 4 with wife at bedside. States he is feeling much better after disimpaction today. Currently on 7L NC, sat 100%. Resp even/unlabored. No distress or needs stated. Will follow. Will continue to follow up per outreach protocol.  
 
Signed By:   Selam Saunders RN 
  June 2, 2020 11:13 PM

## 2020-06-03 NOTE — PROGRESS NOTES
Problem: Falls - Risk of 
Goal: *Absence of Falls Description: Document Emerita Hernandez Fall Risk and appropriate interventions in the flowsheet. Outcome: Progressing Towards Goal 
Note: Fall Risk Interventions: 
Mobility Interventions: Communicate number of staff needed for ambulation/transfer Mentation Interventions: Adequate sleep, hydration, pain control, Door open when patient unattended Medication Interventions: Bed/chair exit alarm, Patient to call before getting OOB, Teach patient to arise slowly, Evaluate medications/consider consulting pharmacy Elimination Interventions: Bed/chair exit alarm, Call light in reach, Patient to call for help with toileting needs, Toileting schedule/hourly rounds Problem: Patient Education: Go to Patient Education Activity Goal: Patient/Family Education Outcome: Progressing Towards Goal 
  
Problem: Pain Goal: *Control of Pain Outcome: Progressing Towards Goal 
Goal: *PALLIATIVE CARE:  Alleviation of Pain Outcome: Progressing Towards Goal 
  
Problem: Patient Education: Go to Patient Education Activity Goal: Patient/Family Education Outcome: Progressing Towards Goal 
  
Problem: Pneumonia: Day 1 Goal: Off Pathway (Use only if patient is Off Pathway) Outcome: Progressing Towards Goal 
Goal: Activity/Safety Outcome: Progressing Towards Goal 
Goal: Consults, if ordered Outcome: Progressing Towards Goal 
Goal: Diagnostic Test/Procedures Outcome: Progressing Towards Goal 
Goal: Nutrition/Diet Outcome: Progressing Towards Goal 
Goal: Medications Outcome: Progressing Towards Goal 
Goal: Respiratory Outcome: Progressing Towards Goal 
Goal: Treatments/Interventions/Procedures Outcome: Progressing Towards Goal 
Goal: Psychosocial 
Outcome: Progressing Towards Goal 
Goal: *Oxygen saturation within defined limits Outcome: Progressing Towards Goal 
Goal: *Influenza vaccine administered (October-March) Outcome: Progressing Towards Goal 
 Goal: *Pneumoccocal vaccine administered Outcome: Progressing Towards Goal 
Goal: *Hemodynamically stable Outcome: Progressing Towards Goal 
Goal: *Demonstrates progressive activity Outcome: Progressing Towards Goal 
Goal: *Tolerating diet Outcome: Progressing Towards Goal 
  
Problem: Pneumonia: Day 2 Goal: Off Pathway (Use only if patient is Off Pathway) Outcome: Progressing Towards Goal 
Goal: Activity/Safety Outcome: Progressing Towards Goal 
Goal: Consults, if ordered Outcome: Progressing Towards Goal 
Goal: Diagnostic Test/Procedures Outcome: Progressing Towards Goal 
Goal: Nutrition/Diet Outcome: Progressing Towards Goal 
Goal: Discharge Planning Outcome: Progressing Towards Goal 
Goal: Medications Outcome: Progressing Towards Goal 
Goal: Respiratory Outcome: Progressing Towards Goal 
Goal: Treatments/Interventions/Procedures Outcome: Progressing Towards Goal 
Goal: Psychosocial 
Outcome: Progressing Towards Goal 
Goal: *Oxygen saturation within defined limits Outcome: Progressing Towards Goal 
Goal: *Hemodynamically stable Outcome: Progressing Towards Goal 
Goal: *Demonstrates progressive activity Outcome: Progressing Towards Goal 
Goal: *Tolerating diet Outcome: Progressing Towards Goal 
Goal: *Optimal pain control at patient's stated goal 
Outcome: Progressing Towards Goal 
  
Problem: Pneumonia: Day 3 Goal: Off Pathway (Use only if patient is Off Pathway) Outcome: Progressing Towards Goal 
Goal: Activity/Safety Outcome: Progressing Towards Goal 
Goal: Consults, if ordered Outcome: Progressing Towards Goal 
Goal: Diagnostic Test/Procedures Outcome: Progressing Towards Goal 
Goal: Nutrition/Diet Outcome: Progressing Towards Goal 
Goal: Discharge Planning Outcome: Progressing Towards Goal 
Goal: Medications Outcome: Progressing Towards Goal 
Goal: Respiratory Outcome: Progressing Towards Goal 
Goal: Treatments/Interventions/Procedures Outcome: Progressing Towards Goal 
 Goal: Psychosocial 
Outcome: Progressing Towards Goal 
Goal: *Oxygen saturation within defined limits Outcome: Progressing Towards Goal 
Goal: *Hemodynamically stable Outcome: Progressing Towards Goal 
Goal: *Demonstrates progressive activity Outcome: Progressing Towards Goal 
Goal: *Tolerating diet Outcome: Progressing Towards Goal 
Goal: *Describes available resources and support systems Outcome: Progressing Towards Goal 
Goal: *Optimal pain control at patient's stated goal 
Outcome: Progressing Towards Goal 
  
Problem: Pneumonia: Day 4 Goal: Off Pathway (Use only if patient is Off Pathway) Outcome: Progressing Towards Goal 
Goal: Activity/Safety Outcome: Progressing Towards Goal 
Goal: Nutrition/Diet Outcome: Progressing Towards Goal 
Goal: Discharge Planning Outcome: Progressing Towards Goal 
Goal: Medications Outcome: Progressing Towards Goal 
Goal: Respiratory Outcome: Progressing Towards Goal 
Goal: Treatments/Interventions/Procedures Outcome: Progressing Towards Goal 
Goal: Psychosocial 
Outcome: Progressing Towards Goal 
  
Problem: Pneumonia: Discharge Outcomes Goal: *Demonstrates progressive activity Outcome: Progressing Towards Goal 
Goal: *Describes follow-up/return visits to physicians Outcome: Progressing Towards Goal 
Goal: *Tolerating diet Outcome: Progressing Towards Goal 
Goal: *Verbalizes name, dosage, time, side effects, and number of days to continue medications Outcome: Progressing Towards Goal 
Goal: *Influenza immunization Outcome: Progressing Towards Goal 
Goal: *Pneumococcal immunization Outcome: Progressing Towards Goal 
Goal: *Respiratory status at baseline Outcome: Progressing Towards Goal 
Goal: *Vital signs within defined limits Outcome: Progressing Towards Goal 
Goal: *Describes available resources and support systems Outcome: Progressing Towards Goal 
Goal: *Optimal pain control at patient's stated goal 
Outcome: Progressing Towards Goal

## 2020-06-03 NOTE — PROGRESS NOTES
Hospitalist Progress Note Patient: Santiago Garcia MRN: [id number]  SSN: [id number] YOB: 1940  Age: 79 y.o. Sex: male Admit Date: 5/24/2020 LOS: 10 days Subjective:  
 
79 y.o. male with a past medical history of HEENT cancer undergoing chemo/radiation admitted due to acute respiratory failure due to large bilateral pleural effusions. He had a thoracentesis on 5/25/20. He has a history of rectal stricture but declined surgery. He went into fluid overload while taking GoLytely. He was diruresed. Pneumonia was also found so he was started on antibiotics. On 6/2 he went for a flex sig with fecal disimpaction. He had a thoracentesis with 800ml off on 6/3/20. 
 
6/3 - He feels less SOB after thoracentesis. Abdomen feels improved. Denies CP. Denies F/C/N/V. Review of systems negative except stated above. Objective:  
 
Visit Vitals /66 Pulse (!) 112 Temp 97.9 °F (36.6 °C) Resp 18 Ht 6' (1.829 m) Wt 76.2 kg (168 lb) SpO2 93% BMI 22.78 kg/m² Oxygen Therapy O2 Sat (%): 93 % (06/03/20 1148) Pulse via Oximetry: 94 beats per minute (06/03/20 1012) O2 Device: Hi flow nasal cannula (06/03/20 1148) O2 Flow Rate (L/min): 12 l/min (06/03/20 1148) O2 Temperature: 46.4 °F (8 °C) (06/02/20 1508) FIO2 (%): 32 % (05/31/20 0323) ETCO2 (mmHg): 93 mmHg (06/01/20 1624) Intake and Output:  
 
Intake/Output Summary (Last 24 hours) at 6/3/2020 1149 Last data filed at 6/3/2020 3536 Gross per 24 hour Intake 250 ml Output 850 ml Net -600 ml Physical Exam:  
GENERAL: alert, cooperative, no distress, appears stated age EYE: conjunctivae/corneas clear. PERRL. THROAT & NECK: normal and no erythema or exudates noted. LUNG: scattered rhochi HEART: tachy, reg rhythm, S1S2, no murmur, no JVD ABDOMEN: soft, mildly tender, non-distended. Bowel sounds normal.  
EXTREMITIES:  No edema, 2+ pedal/radial pulses bilaterally SKIN: no rash or abnormalities NEUROLOGIC: Alert. Cranial nerves 2-12 grossly intact. Lab/Data Review: 
Recent Results (from the past 24 hour(s)) GLUCOSE, POC Collection Time: 06/02/20  1:01 PM  
Result Value Ref Range Glucose (POC) 165 (H) 65 - 100 mg/dL GLUCOSE, POC Collection Time: 06/02/20  4:47 PM  
Result Value Ref Range Glucose (POC) 161 (H) 65 - 100 mg/dL GLUCOSE, POC Collection Time: 06/02/20  9:21 PM  
Result Value Ref Range Glucose (POC) 133 (H) 65 - 100 mg/dL METABOLIC PANEL, BASIC Collection Time: 06/03/20  5:58 AM  
Result Value Ref Range Sodium 133 (L) 136 - 145 mmol/L Potassium 3.7 3.5 - 5.1 mmol/L Chloride 95 (L) 98 - 107 mmol/L  
 CO2 29 21 - 32 mmol/L Anion gap 9 7 - 16 mmol/L Glucose 219 (H) 65 - 100 mg/dL BUN 17 8 - 23 MG/DL Creatinine 0.79 (L) 0.8 - 1.5 MG/DL  
 GFR est AA >60 >60 ml/min/1.73m2 GFR est non-AA >60 >60 ml/min/1.73m2 Calcium 9.0 8.3 - 10.4 MG/DL MAGNESIUM Collection Time: 06/03/20  5:58 AM  
Result Value Ref Range Magnesium 2.1 1.8 - 2.4 mg/dL GLUCOSE, POC Collection Time: 06/03/20  7:39 AM  
Result Value Ref Range Glucose (POC) 223 (H) 65 - 100 mg/dL Joshua Orozco Collection Time: 06/03/20  8:06 AM  
Result Value Ref Range Vancomycin,trough 8.6 5 - 20 ug/mL GLUCOSE, POC Collection Time: 06/03/20 11:15 AM  
Result Value Ref Range Glucose (POC) 211 (H) 65 - 100 mg/dL Imaging: Xr Chest Sngl V Result Date: 6/1/2020 CHEST X-RAY, one view. HISTORY:  Shortness breath and pleural effusion, follow-up. TECHNIQUE:  AP upright portable view COMPARISON: Yesterday's exam FINDINGS:   -The lungs: Increasing infiltrate right lung, specially at the base base. There may be a faint infiltrate left lung base. Left upper lung zones are clear. . -The costophrenic angles:  Indistinct on the right. -The heart size: is normal. -The pulmonary vasculature: is unremarkable. -Included portion of the upper abdomen: is unremarkable. -Bones: No gross bony lesions. -Other: None. IMPRESSION:  Increasing infiltrate right lung suspicious for pneumonia. Xr Chest Sngl V Result Date: 5/31/2020 EXAM: Chest x-ray. INDICATION: Coarse lung sounds. COMPARISON: Prior chest x-ray on May 27, 2020. TECHNIQUE: Frontal view chest x-ray. FINDINGS: There is progressed infiltrate in the right lung base. Previous left lung base infiltrate or atelectasis has improved, with minimal residual. Small bilateral pleural effusions are unchanged. The cardiac size is within normal limits. No pneumothorax is identified. IMPRESSION: 1. Progressed right lung base infiltrate. 2. Significantly improved left lung base infiltrate or atelectasis, with minimal residual. 3. Unchanged small pleural effusions. Xr Chest Pa Lat Result Date: 5/27/2020 2 View Chest X-Ray 5/27/2020 7:44 AM INDICATION: Shortness of breath, evaluate for increased lung effusion or infiltrates. COMPARISON: Chest x-ray 5/24/2020 FINDINGS: Upright AP and Lateral views are submitted. Lung slightly better inflated. Cardiomediastinal silhouette stable. There does not appear to be increased vascular congestion or any pneumothorax. Bibasilar hazy densities still seen but stable on the left and may be slightly improved on the right. At the right upper lobe region there is persistent apical capping and increased hazy densities. IMPRESSION: 1. Moderate size bibasilar layering effusions and atelectasis, similar on the left, may be slightly improved on the right. As indicated correlate with directed chest ultrasound to assess for whether there is a drainable pleural effusion on either side. 2. Stable findings at the right upper lobe region as well, no acute developing abnormalities. Xr Abd (kub) Result Date: 5/30/2020 KUB supine views History:  ILEUS POSSIBLE, 80 years Male Comparison:  None available Findings: Stool is seen throughout the colon and rectum. No free air is evident. The bowel gas pattern is nonspecific and there is no evidence of ileus or obstruction. No suspicious renal or ureteral calculi are evident. The vertebral clips overlying the right pelvis. Visualized osseous structures unremarkable. Impression: No acute pathology identified. Stool throughout the colon and rectum. Xr Abd Acute W 1 V Chest 
 
Result Date: 6/1/2020 Abdominal Series CLINICAL INDICATION:  Acute generalize moderate to severe abdominal distention, constipation. Metabolic encephalopathy, hypoxia and respiratory failure. Reported history of rectal cancer. COMPARISON: Chest radiograph as well as CT chest, abdomen, and pelvis performed earlier today, also KUB 5/30/2020 TECHNIQUE: A frontal upright view of the chest and supine and upright views of the abdomen were obtained. Detail throughout is somewhat limited given prominent soft tissues. FINDINGS: The lungs are well inflated again demonstrated are extensive groundglass and partially consolidative opacities throughout the majority of the right lung, and to a lesser extent the left lower lobe. I basilar pleural effusions are small and not definitely changed. No pneumothorax. Stable mediastinal and hilar contours. Bones demonstrate no acute abnormality or interval change as partially seen. Flat and upright views of the abdomen show no free air. There is persisting gaseous distention of multiple large bowel loops, overall very similar to the CT performed earlier today allowing for differences in positioning and imaging technique. Constipation again evident. No evidence of pneumatosis. There is catheter tubing projecting over the urinary bladder similar to prior, compatible with a Juarez catheter. IMPRESSION:  1. Distended large bowel similar to CT earlier today. Constipation. 2. Right greater than left lung infiltrates and small pleural effusions remain. Ct Chest W Cont Result Date: 5/24/2020 CT OF THE CHEST WITH CONTRAST, PULMONARY EMBOLUS PROTOCOL. CLINICAL INDICATION: Shortness of breath, hypoxia, chest pain, throat and rectal cancer, Covid rule out PROCEDURE: Serial thin section axial images are obtained from the thoracic inlet through the upper abdomen following the administration of intravenous contrast per a dedicated, institutional pulmonary embolus protocol. Coronal MIP reformatted images are generated. Radiation dose reduction techniques were used for this study. Our CT scanners use one or all of the following: Automated exposure control, adjusted of the mA and/or kV according to patient size, iterative reconstruction COMPARISON: PET/CT dated 3/24/2020 FINDINGS: The aorta is unremarkable. There is adequate opacification of the central pulmonary arterial tree. No central intraluminal filling defect noted to indicate acute pulmonary embolus. Large bilateral pleural effusions are present that have increased significantly in size. There is no pneumothorax. Dependent atelectasis is present. Bilateral inflammatory appearing pulmonary nodules are noted in the bilateral upper lobes. Note, these are present on the prior PET/CT is well. Limited evaluation the upper abdomen shows adrenal glands to be normal. Multiple sclerotic foci appreciated in the ribs and spine. This is most in keeping with osseous metastatic disease. IMPRESSION: 1. No acute pulmonary emboli. 2. Large bilateral pleural effusions. . Inflammatory appearing nodularity in the bilateral upper lobes near the lung apices. 3. Multiple patchy sclerotic lesions throughout the thoracic spine and ribs. This is in keeping with the patient's history of osseous metastatic disease. Ct Chest Abd Pelv W Cont Result Date: 6/1/2020 EXAM: CT Chest with IV contrast - PE protocol. INDICATION: Dyspnea. COMPARISON: Prior CT chest on May 24, 2020.  TECHNIQUE: Axial CT images of the chest were obtained after the intravenous injection of 100 mL Isovue 370 CT contrast. Radiation dose reduction techniques were used for this study. Our CT scanners use one or all of the following: Automated exposure control, adjustment of the mA and/or kV according to patient size, iterative reconstruction. FINDINGS: - Pleura/pericardium: There are decreased small to moderate-sized bilateral pleural effusions. No pneumothorax or pericardial effusion is seen. - Lungs: There is progressed patchy edema or infiltrates in both lungs, worse in the right lower lobe. - Malina/Mediastinum: Within normal limits. - Tracheobronchial tree: Within normal limits. - Aorta/pulmonary arteries: Within normal limits. - Heart: Within normal limits. - Coronary arteries: There are coronary artery calcifications. - Chest wall: Within normal limits. - Spine/bones: Again noted are multiple sclerotic foci in the ribs and spine, consistent with osseous metastatic disease. - Additional comments: None. IMPRESSION: 1. No evidence of pulmonary embolism. 2. Decreased small to moderate bilateral pleural effusions. 3. Progressed patchy pulmonary edema or pneumonia in both lungs, worse in the right lower lobe. 4. Coronary artery disease. 5. Osseous metastatic disease. EXAM: CT abdomen and pelvis with IV contrast. INDICATION: Abdominal pain. History of rectal cancer. COMPARISON: Prior PET/CT scan on March 24, 2020. TECHNIQUE: Axial CT images of the abdomen and pelvis were obtained after the intravenous injection of 100 mL Isovue 370 CT contrast. Oral contrast was administered as well. Radiation dose reduction techniques were used for this study. Our CT scanners use one or all of the following:  Automated exposure control, adjustment of the mA or kV according to patient size, iterative reconstruction. FINDINGS: - Liver: Within normal limits. - Gallbladder and bile ducts: Within normal limits. - Spleen: Within normal limits.  - Urinary tract: The kidneys are unremarkable. The urinary bladder is decompressed around a Juarez catheter. - Adrenals: A small right adrenal gland nodule is unchanged, which was PET avid on the prior study. The left adrenal gland is normal. - Pancreas: Within normal limits. - Gastrointestinal tract: There is marked constipation with distention of the entire colon. The cecum measures 12 cm in diameter. This is to the level of wall thickening in the lower rectum, which was PET avid on the prior study. There is sigmoid diverticulosis, without evidence of acute diverticulitis. The small bowel loops are within normal limits. - Retroperitoneum: There is no abdominal aortic aneurysm, dissection or retroperitoneal adenopathy. - Peritoneal cavity and abdominal wall: No free fluid or free air. - Pelvis: Within normal limits. - Spine/bones: Again noted are scattered sclerotic lesions in the spine and pelvic bones, consistent with osseous metastatic disease. - Other comments: None. IMPRESSION: 1. New marked constipation and distention of the entire colon, to the level of the lower rectum, where there is wall thickening which was PET avid on the prior study. This is suspect for rectal obstruction. No dilated small bowel loops are seen at this time. 2. Unchanged right adrenal gland nodule and osseous metastatic disease. Xr Chest [facility] Result Date: 5/24/2020 Portable chest x-ray CLINICAL INDICATION: Shortness of breath FINDINGS: Single AP view of the chest compared to a similar chest x-ray dated 9/2/2018 shows new airspace density in the right lower lung with a probable small right pleural effusion. Left lung is clear. The cardiac silhouette and mediastinum are unremarkable. IMPRESSION: New airspace density in the right lower lung with a probable small right pleural effusion. Pneumonia should be considered. Results for orders placed or performed during the hospital encounter of 05/24/20 2D ECHO COMPLETE ADULT (TTE) W OR WO CONTR Narrative Chantelle One [address] [place], [address] [facility] 
[phone] Transthoracic Echocardiogram 
2D, M-mode, Doppler, and Color Doppler Patient: Osmel Guzman 
MR #: [id number] : [id number] Age: 79 years Gender: Male Study date: 26-May-2020 Account #: [de-identified] Height: 72 in 72 in 
Weight: 164 lb 163.7 lb 
BSA: 1.96 mï¾² 1.96 mï¾² Status:Routine Location: 831 BP: 169/ 88 Allergies: NO KNOWN ALLERGENS Sonographer:  Yaneli Cervantes [facility] Group:  [facility] Referring Physician:  Darnell Corrigan MD 
Reading Physician:  Sridhar Bains [place] [facility] [place]S: Bilateral pleural effusions *Pt. scanned supine. Unable to turn LLD. PROCEDURE: This was a routine study. A transthoracic echocardiogram was 
performed. The study included complete 2D imaging, M-mode, complete spectral 
Doppler, and color Doppler. Intravenous contrast (Definity, 1 ml) was 
administered to opacify the left ventricle. Image quality was adequate. LEFT VENTRICLE: Size was normal. Systolic function was normal. Ejection 
fraction was estimated in the range of 60 % to 65 %. There were no regional 
wall motion abnormalities. Wall thickness was normal. Left ventricular 
diastolic function parameters were normal. E/e' av.46. RIGHT VENTRICLE: The size was normal. Systolic function was normal. 
 
LEFT ATRIUM: Size was normal. 
 
RIGHT ATRIUM: Size was normal. 
 
SYSTEMIC VEINS: IVC: The inferior vena cava was normal in size and course. AORTIC VALVE: The valve was structurally normal, tri-commissural. There was  
no 
evidence for stenosis. There was no insufficiency. MITRAL VALVE: Valve structure was normal. There was no evidence for stenosis. There was no regurgitation. TRICUSPID VALVE: The valve structure was normal. There was no evidence for 
stenosis. There was trivial regurgitation. PULMONIC VALVE: Not well visualized. There was no evidence for stenosis. There 
was no insufficiency. PERICARDIUM: There was no pericardial effusion. AORTA: The root exhibited normal size. SUMMARY: 
 
-  Left ventricle: Systolic function was normal. Ejection fraction was 
estimated in the range of 60 % to 65 %. There were no regional wall motion 
abnormalities. -  Pericardium: There was no pericardial effusion. SYSTEM MEASUREMENT TABLES 
 
2D Ao Diam: 3.2 cm 
LA Diam: 2.6 cm 
LAEDV Index (A-L): 25.9 ml/m2 %FS: 34.9 % IVSd: 1.1 cm 
LVIDd: 3.7 cm 
LVIDs: 2.4 cm 
LVOT Diam: 2.1 cm 
LVPWd: 1.2 cm Prepared and signed by 
 
Vin Khan. MD [facility] Signed 26-May-2020 16:55:26 Cultures: All Micro Results Procedure Component Value Units Date/Time FUNGUS CULTURE AND SMEAR [[id number]] Collected:  05/25/20 1315 Order Status:  Completed Specimen:  Miscellaneous sample Updated:  05/28/20 1536 Source LEFT Comment: Pleural Fluid Specimen Fungus stain Direct Inoculation Fungus (Mycology) Culture Other source received Comment: (NOTE) Performed At: [address] [id number] Melita Marshall MD QP:[id number] AFB CULTURE + SMEAR W/RFLX ID FROM CULTURE [[id number]] Collected:  05/25/20 1315 Order Status:  Completed Specimen:  Miscellaneous sample Updated:  05/27/20 1537 Source LEFT Comment: Pleural Fluid Specimen AFB Specimen processing Concentration Acid Fast Smear Negative Comment: (NOTE) Performed At: [address] [id number] Melita Marshall MD [id number] Acid Fast Culture PENDING  
 CULTURE, BODY FLUID [place] Setting [[id number]] Collected:  05/25/20 1315 Order Status:  Completed Specimen:  Left Updated:  05/27/20 0555   Special Requests: NO SPECIAL REQUESTS     
  GRAM STAIN 0 TO 1 WBC'S/OIF  
   NO DEFINITE ORGANISM SEEN     
 Culture result: NO GROWTH 2 DAYS Assessment/Plan:  
 
Principal Problem: 
  Acute respiratory failure with hypoxia ([address] (5/24/2020) - Multifactorial - effusions + pneumonia - Slow to improve - Continue Vanc + Zosyn for today - Restart Lasix - Continue aerosols - Wean oxygen as appropriate - Appreciate Pulmonary's input and assistance Active Problems: 
  Aspiration pneumonia ([address] (6/1/2020) - Continue Vanc + Zosyn Rectal obstruction (6/1/2020) - Fecal obstruction - S/P flex sig with disimpaction on 6/2 
- Feels much improved - Declines surgery Hyponatremia (11/23/2015) - Stable Pleural effusion on left (5/24/2020) - S/P thoracentesis Pleural effusion on right (5/25/2020) - S/P thoracentesis with 800ml on 6/3 Diabetes mellitus due to underlying condition with hyperglycemia ([address] (12/21/2015) - Stable - Continue Novolin SSI Neutropenia ([address] (5/25/2020) - Resolved - WBC 11.2 today 
- CBC pending today COVID-19 ruled out (12/15/2015) Malignant neoplasm metastatic to bone ([address] (6/26/2019) Normocytic anemia (5/24/2020) Today's Plan: Continue antibiotics. Check CBC. DIET NUTRITIONAL SUPPLEMENTS All Meals; Trish Del Angel ( ) DIET FULL LIQUID DVT Prophylaxis: Lovenox Discharge Plan: TBD Signed By: Rigoberto Castañeda DO   
 Mehreen 3, 2020

## 2020-06-03 NOTE — PROGRESS NOTES
Pt sat up on side of bed for thoracentesis. Consent obtained. Time out performed. Pts vitals monitored throughout procedure. Left ultrasound done and pic taken of pleural fluid.  ~800 ml yellow pleural fluid from L. Pt tolerated procedure well with no adverse rxn. Site dressed appropriately and report given to pts RN.    Lung sliding done and ultrasound findings reviewed by MD.

## 2020-06-03 NOTE — PROGRESS NOTES
Problem: Mobility Impaired (Adult and Pediatric) Goal: *Acute Goals and Plan of Care (Insert Text) Outcome: Progressing Towards Goal 
Note: LTG: 
(1.)Mr. Genoveva Zuniga will move from supine to sit and sit to supine , scoot up and down, and roll side to side in bed with INDEPENDENT within 7 treatment day(s). (2.)Mr. Genoveva Zuniga will transfer from bed to chair and chair to bed with MODIFIED INDEPENDENCE using the least restrictive device within 7 treatment day(s). (3.)Mr. Genoveva Zuniga will ambulate with MODIFIED INDEPENDENCE for 250 feet with the least restrictive device within 7 treatment day(s). (4.)Mr. Genoveva Zuniga will demonstrate good dynamic standing balance within 7 treatment days. ________________________________________________________________________________________________ PHYSICAL THERAPY: Daily Note and AM 6/3/2020 INPATIENT: PT Visit Days : 3 Payor: Estela Mora / Plan: [address] [place] [address] HMO / Product Type: Managed Care Medicare /   
  
NAME/AGE/GENDER: Breanna Dudley is a 79 y.o. male PRIMARY DIAGNOSIS: Acute metabolic encephalopathy [H16.86] Acute respiratory failure with hypoxia (HCC) Acute respiratory failure with hypoxia (HCC) Procedure(s) (LRB): 
SIGMOIDOSCOPY FLEXIBLE (N/A) 1 Day Post-Op ICD-10: Treatment Diagnosis:  
 · Generalized Muscle Weakness (M62.81) · Other lack of cordination (R27.8) · Difficulty in walking, Not elsewhere classified (R26.2) · Other abnormalities of gait and mobility (R26.89) · Low Back Pain (M54.5) Precaution/Allergies: 
Patient has no known allergies. ASSESSMENT:  
 
Mr. Genoveva Zuniga  is a 79year old male who has been admitted to hospital on 05/24 with above diagnosis and hx of cancer. Prior to hospital admission pt lives with wife in a 1 story home with 0 step(s) to enter with no railing. Pt endorses 0 falls in past 6 months. Prior to admission No O2 usage at home, no assistance with ADLs and uses no DME for mobility. 06/03- Pt supine on contact with wife in room. Pt had hypoxic event earlier and on 12L HFNC. Found to be soiled and RN to assist with rolling for brief change. Good participation even though fatigued after rolling. Supine to sit with SBA and increased time. Slow mobility and cues for hand placements. O2 sats remain at 93%. Pt sit to stand with CGA and cues for hand placement. Not retaining safe stand strategies and constantly cued to utilize stable surface to push from. Then amb to transport bed to be taken for Xray. Slow gait and cues for step through. Noted desat to 83% on 10L, increased to 15L and pt at 93. O2 Sats stable on transport bed. Pt slow today but some progress noted towards goals, PT will cont efforts. This section established at most recent assessment PROBLEM LIST (Impairments causing functional limitations): 1. Decreased Strength 2. Decreased ADL/Functional Activities 3. Decreased Transfer Abilities 4. Decreased Ambulation Ability/Technique 5. Decreased Balance 6. Increased Pain 7. Decreased Activity Tolerance 8. Increased Fatigue 9. Decreased Flexibility/Joint Mobility 10. Decreased [place] with Home Exercise Program 
 INTERVENTIONS PLANNED: (Benefits and precautions of physical therapy have been discussed with the patient.) 1. Balance Exercise 2. Bed Mobility 3. Family Education 4. Gait Training 5. Heat 6. Home Exercise Program (HEP) 7. Manual Therapy 8. Neuromuscular Re-education/Strengthening 9. Range of Motion (ROM) 10. Therapeutic Activites 11. Therapeutic Exercise/Strengthening 12. Transfer Training TREATMENT PLAN: Frequency/Duration: 3 times a week for duration of hospital stay Rehabilitation Potential For Stated Goals: Good REHAB RECOMMENDATIONS (at time of discharge pending progress):   
Placement: It is my opinion, based on this patient's performance to date, that Mr. Nicki Madrid may benefit from intensive therapy at [address] after discharge due to the functional deficits listed above that are likely to improve with skilled rehabilitation and concerns that he/she may be unsafe to be unsupervised at home due to multiple comorbidities and high level of intense care needed. . 
Equipment:  
? Walkers, Type: Cheryl Perez HISTORY:  
History of Present Injury/Illness (Reason for Referral): 
Per Physician Note: 
 
Tahira Yates is a 79 y.o. male with a past medical history of HEENT cancer undergoing chemo/radiation who presents to the [facility] ER with report of SOB and chest discomfort for the past several days. He also admits to mild cough but denies fevers or chills. Admits to feeling a little better and breathing a bit better since arrival. 
  
Past Medical History/Comorbidities:  
Mr. Hoa Javier  has a past medical history of GERD (gastroesophageal reflux disease), Head and neck cancer ([facility] (9/30/2015), History of squamous cell carcinoma, History of throat cancer (2015), Hypercholesteremia, Hypertension, Hypomagnesemia (5/20/2020), Rectal cancer ([facility] (2018), Type 2 diabetes mellitus ([facility] [address] and Vomiting (2/23/2016). Mr. Hoa Javier  has a past surgical history that includes hx orthopaedic (Right, 1966); hx other surgical (9/9/15); hx heent; hx tonsillectomy; hx heent (2015); hx colonoscopy (05/2018); hx vascular access; hx lymph node dissection; hx other surgical; and flexible sigmoidoscopy (N/A, 6/2/2020). Social History/Living Environment:  
Home Environment: Private residence # Steps to Enter: 0 One/Two Story Residence: One story Living Alone: No 
Support Systems: Spouse/Significant Other/Partner Patient Expects to be Discharged to[de-identified] Private residence Current DME Used/Available at Home: None Tub or Shower Type: Tub/Shower combination Prior Level of Function/Work/Activity: Mod I mobility and amb Number of Personal Factors/Comorbidities that affect the Plan of Care: 3+: HIGH COMPLEXITY EXAMINATION:  
 Most Recent Physical Functioning:  
Gross Assessment: 
AROM: Generally decreased, functional 
Strength: Generally decreased, functional 
Coordination: Generally decreased, functional 
Tone: Normal 
Sensation: Intact Posture: 
Posture (WDL): Exceptions to [facility] Posture Assessment: Trunk flexion, Increased, Forward head, Cervical, Kyphosis, Rounded shoulders Balance: 
Sitting: Intact Standing: Impaired Standing - Static: Good Standing - Dynamic : Fair;Constant support Bed Mobility: 
Rolling: Minimum assistance Supine to Sit: Stand-by assistance Scooting: Modified independent Wheelchair Mobility: 
  
Transfers: 
Sit to Stand: Contact guard assistance Stand to Sit: Stand-by assistance Bed to Chair: Stand-by assistance Gait: 
  
Base of Support: Center of gravity altered;Narrowed Speed/Christina: Shuffled; Slow Step Length: Left shortened;Right shortened Gait Abnormalities: Altered arm swing;Decreased step clearance; Step to gait Distance (ft): 30 Feet (ft) Assistive Device: Walker, rolling Ambulation - Level of Assistance: Stand-by assistance Interventions: Safety awareness training; Tactile cues; Verbal cues; Visual/Demos Body Structures Involved: 1. Lungs 2. Bones 3. Joints Body Functions Affected: 1. Sensory/Pain 2. Movement Related Activities and Participation Affected: 1. Mobility 2. Self Care 3. Interpersonal Interactions and Relationships 4. Community, Social and [address] [place] [address] Number of elements that affect the Plan of Care: 4+: HIGH COMPLEXITY CLINICAL PRESENTATION:  
Presentation: Stable and uncomplicated: LOW COMPLEXITY CLINICAL DECISION MAKIN [facility] [address] [id number] AM-PAC 6 Clicks Basic Mobility Inpatient Short Form How much difficulty does the patient currently have. .. Unable A Lot A Little None 1. Turning over in bed (including adjusting bedclothes, sheets and blankets)? [] 1   [] 2   [] 3   [x] 4 2.  Sitting down on and standing up from a chair with arms ( e.g., wheelchair, bedside commode, etc.)   [] 1   [] 2   [x] 3   [] 4  
3. Moving from lying on back to sitting on the side of the bed? [] 1   [] 2   [] 3   [x] 4 How much help from another person does the patient currently need. .. Total A Lot A Little None 4. Moving to and from a bed to a chair (including a wheelchair)? [] 1   [] 2   [x] 3   [] 4  
5. Need to walk in hospital room? [] 1   [] 2   [] 3   [x] 4  
6. Climbing 3-5 steps with a railing? [] 1   [x] 2   [] 3   [] 4  
© 2007, Trustees of [facility] [employer], under license to [employer]. All rights reserved Score:  Initial: 20 Most Recent: X (Date: -- ) Interpretation of Tool:  Represents activities that are increasingly more difficult (i.e. Bed mobility, Transfers, Gait). Medical Necessity:    
· Patient is expected to demonstrate progress in  
· strength, range of motion, balance, coordination, and functional technique ·  to  
· increase independence with ambulation and mobility · . Reason for Services/Other Comments: 
· Patient continues to require skilled intervention due to · Gross weakness, poor balance, increased risk of falls · . Use of outcome tool(s) and clinical judgement create a POC that gives a: Clear prediction of patient's progress: LOW COMPLEXITY  
  
 
 
 
TREATMENT:  
  
Pre-treatment Symptoms/Complaints: Pt reports he is sore/ hurting \"all over\" Pain: Initial:  
Pain Intensity 1: 3 Pain Location 1: Abdomen  Post Session:  3/10 Therapeutic Activity: (    30min):  Therapeutic activities including Bed transfers, Chair transfers, Ambulation on level ground, standing balance and walker sequencing to improve mobility, strength, balance, and coordination. Required moderate Safety awareness training; Tactile cues; Verbal cues; Visual/Demos to promote static and dynamic balance in standing and promote coordination of bilateral, lower extremity(s). Braces/Orthotics/Lines/Etc:  
· O2 Device: Room air Treatment/Session Assessment:   
· Response to Treatment:  See Above · Interdisciplinary Collaboration:  
o Physical Therapist 
o Registered Nurse · After treatment position/precautions:  
o Supine in bed 
o RN notified 
o Pt with transport · Compliance with Program/Exercises: Will assess as treatment progresses · Recommendations/Intent for next treatment session: \"Next visit will focus on advancements to more challenging activities\". Total Treatment Duration: PT Patient Time In/Time Out Time In: 0947 Time Out: 1005 Darion Chacon, PT, DPT

## 2020-06-04 NOTE — PROGRESS NOTES
CM met with the patient's spouse. She's agreeable to patient discharging to [facility]. Patient is currently on high flow 02. CM will wait until tomorrow to start [address] if patient is off high flow.

## 2020-06-04 NOTE — PROGRESS NOTES
Zenon Mean Admission Date: 5/24/2020 Daily Progress Note: 6/4/2020 The patient's chart is reviewed and the patient is discussed with the staff. The patient is a 79 y.o. male seen and evaluated at the request of Dr. Chip Stacy for evaluation and management of pleural effusions. 
  
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 He received carbo/etoposide 2 weeks ago and has also had radiation for anal cancer. He has required anal dilation in past.  
 
He presented to [facility] on 5/24 with increasing dyspnea and chest discomfort. A CT of the chest revealed large bilateral effusions and we are now asked to assist with management. He is hyponatremic and getting saline infusions and also getting PRBCs for anemia. Had nausea/vomiting, aspiration event on 5/31 with hypoxemia. CT at that time suggested rectal obstruction. Flex sig on 6/2 with no anal stricture and stool impaction. S/p tap or Right chest with 800 removed on 6/3 Subjective:  
 
Patient is sitting up on Airvo today. Working with PT and did just have large BM. No dyspnea. No cough. Current Facility-Administered Medications Medication Dose Route Frequency  vancomycin (VANCOCIN) 1250 mg in  ml infusion  1,250 mg IntraVENous Q12H  
 lactated Ringers infusion  100 mL/hr IntraVENous CONTINUOUS  
 albuterol-ipratropium (DUO-NEB) 2.5 MG-0.5 MG/3 ML  3 mL Nebulization Q6HWA RT  
 piperacillin-tazobactam (ZOSYN) 3.375 g in 0.9% sodium chloride (MBP/ADV) 100 mL  3.375 g IntraVENous Q8H  
 hydrALAZINE (APRESOLINE) 20 mg/mL injection 10 mg  10 mg IntraVENous Q6H PRN  
 furosemide (LASIX) injection 40 mg  40 mg IntraVENous Q12H  
 magnesium hydroxide (MILK OF MAGNESIA) 400 mg/5 mL oral suspension 30 mL 30 mL Oral DAILY  senna-docusate (PERICOLACE) 8.6-50 mg per tablet 1 Tab  1 Tab Oral BID  
 bisacodyL (DULCOLAX) suppository 10 mg  10 mg Rectal DAILY  potassium chloride (K-DUR, KLOR-CON) SR tablet 40 mEq  40 mEq Oral BID  
 0.9% sodium chloride infusion 250 mL  250 mL IntraVENous PRN  
 amLODIPine (NORVASC) tablet 10 mg  10 mg Oral DAILY  docusate sodium (COLACE) capsule 100 mg  100 mg Oral PRN  
 glimepiride (AMARYL) tablet 4 mg  4 mg Oral 7am  
 lisinopriL (PRINIVIL, ZESTRIL) tablet 20 mg  20 mg Oral DAILY  meloxicam (MOBIC) tablet 7.5 mg  7.5 mg Oral DAILY  sodium chloride tablet 2 g  2 g Oral BID  traMADoL (ULTRAM) tablet 50 mg  50 mg Oral Q6H PRN  
 sodium chloride (NS) flush 5-40 mL  5-40 mL IntraVENous Q8H  
 sodium chloride (NS) flush 5-40 mL  5-40 mL IntraVENous PRN  
 acetaminophen (TYLENOL) tablet 650 mg  650 mg Oral Q4H PRN  
 ondansetron (ZOFRAN) injection 4 mg  4 mg IntraVENous Q4H PRN  
 insulin regular (NOVOLIN R, HUMULIN R) injection   SubCUTAneous AC&HS  enoxaparin (LOVENOX) injection 40 mg  40 mg SubCUTAneous Q24H Review of Systems Constitutional: negative for fever, chills, sweats Cardiovascular: negative for chest pain, palpitations, syncope, edema Gastrointestinal:  negative for dysphagia, reflux, vomiting, diarrhea, abdominal pain, or melena Neurologic:  negative for focal weakness, numbness, headache Objective:  
 
Vitals:  
 06/04/20 0548 06/04/20 0330 06/04/20 9826 06/04/20 6748 BP:  146/71  140/75 Pulse:  (!) 101  100 Resp:  18  20 Temp:  98.2 °F (36.8 °C)  98.3 °F (36.8 °C) SpO2: 93% 97% 94% 96% Weight:      
Height:      
 
 
Intake/Output Summary (Last 24 hours) at 6/4/2020 1056 Last data filed at 6/4/2020 2086 Gross per 24 hour Intake  Output 6450 ml Net -6450 ml Physical Exam:  
Constitution: elderly, ill appearing EENMT:  Sclera clear, pupils equal, oral mucosa moist 
 Respiratory:decreased in bases on airvo at 45 L and 65% Cardiovascular:  RRR without M,G,R 
Gastrointestinal: firm, distended and BS decreased Musculoskeletal: warm without cyanosis. There is no lower extremity edema. Skin:  no jaundice or rashes, no wounds Neurologic: no gross neuro deficits Psychiatric:  alert and oriented x 4 CXR 6/4/20 noted b/l effusion and slightly more in left base CXR 6/1/20 CXR: 5/27: bilateral small effusions CT C/A/P 6/1/20: 
chest-FINDINGS: 
- Pleura/pericardium: There are decreased small to moderate-sized bilateral 
pleural effusions. No pneumothorax or pericardial effusion is seen. - Lungs: There is progressed patchy edema or infiltrates in both lungs, worse in 
the right lower lobe. - Malina/Mediastinum: Within normal limits. - Tracheobronchial tree: Within normal limits. - Aorta/pulmonary arteries: Within normal limits. - Heart: Within normal limits. - Coronary arteries: There are coronary artery calcifications. - Chest wall: Within normal limits. - Spine/bones: Again noted are multiple sclerotic foci in the ribs and spine, 
consistent with osseous metastatic disease. 
- Additional comments Abdomen /pelvis: 
IMPRESSION:  
1. New marked constipation and distention of the entire colon, to the level of 
the lower rectum, where there is wall thickening which was PET avid on the prior 
study. This is suspect for rectal obstruction. No dilated small bowel loops are 
seen at this time. 2. Unchanged right adrenal gland nodule and osseous metastatic disease. CT 5/24: bilateral effusions LAB Recent Labs  
  06/04/20 
0743 06/04/20 
0733 06/03/20 
2121 06/03/20 
1643 06/03/20 
1115 GLUCPOC 174* 192* 140* 223* 211* Recent Labs  
  06/04/20 
0702 06/03/20 
1517 06/03/20 
1322 06/02/20 
0736 WBC 8.3 9.7 11.6* 11.2* HGB 8.7* 8.4* 8.4* 8.7* HCT 27.6* 26.9* 26.1* 27.3*  
 189 188 208 Recent Labs  
  06/04/20 
0702 06/03/20 3842 06/02/20 1968  133* 133* K 3.4* 3.7 3.9 CL 96* 95* 95* CO2 34* 29 29 * 219* 114* BUN 11 17 19 CREA 0.70* 0.79* 0.85  
MG 1.8 2.1 2.2 CA 8.9 9.0 9.0 No results for input(s): PH, PCO2, PO2, HCO3, PHI, PCO2I, PO2I, HCO3I in the last 72 hours. No results for input(s): LCAD, LAC in the last 72 hours. Assessment:  (Medical Decision Making) Hospital Problems  Date Reviewed: 5/20/2020 Codes Class Noted POA Rectal obstruction ICD-10-CM: K62.4 ICD-9-CM: 569.2  6/1/2020 Yes Aspiration pneumonia ([facility] ICD-10-CM: J69.0 ICD-9-CM: 507.0  6/1/2020 Yes Neutropenia ([facility] ICD-10-CM: D70.9 ICD-9-CM: 288.00  5/25/2020 Yes Pleural effusion on right ICD-10-CM: J90 ICD-9-CM: 511.9  5/25/2020 Yes * (Principal) Acute respiratory failure with hypoxia ([facility] ICD-10-CM: J96.01 
ICD-9-CM: 518.81  5/24/2020 Yes Normocytic anemia ICD-10-CM: D64.9 ICD-9-CM: 285.9  5/24/2020 Yes Pleural effusion on left ICD-10-CM: J90 ICD-9-CM: 511.9  5/24/2020 Yes Malignant neoplasm metastatic to bone [facility]) ICD-10-CM: C79.51 
ICD-9-CM: 198.5  6/26/2019 Yes Type 2 diabetes with nephropathy ([facility] ICD-10-CM: E11.21 
ICD-9-CM: 250.40, 583.81  7/3/2018 Yes Diabetes mellitus due to underlying condition with hyperglycemia ([facility] ICD-10-CM: Q63.11 ICD-9-CM: 249.80  12/21/2015 Yes COVID-19 ruled out ICD-10-CM: Z03.818 ICD-9-CM: V71.83  12/15/2015 Yes Hyponatremia ICD-10-CM: E87.1 ICD-9-CM: 276.1  11/23/2015 Yes Plan:  (Medical Decision Making) --Continue day 4  zosyn. Noted cultures are negative. --taper oxygen on airvo at 45 L and 65%. Told nurse to tell respiratory. --cxr today noted no significant change. Continue diuresis on lasix 40 q12 and marked diureses over 5.6 L. Will change to daily and given mag replacement. Already getting k+ replacement. --continue MOM, abdomen still enlarged and likely restricting him taking deep breaths 
--continue IS 
--cxr note and may need future taps  
--continue remaining treatment. Spoke with patient's wife as well. More than 50% of the time documented was spent in face-to-face contact with the patient and in the care of the patient on the floor/unit where the patient is located.   
 
Vinnie Landau, MD

## 2020-06-04 NOTE — PROGRESS NOTES
Problem: Self Care Deficits Care Plan (Adult) Goal: *Acute Goals and Plan of Care (Insert Text) Description: 1. Patient will complete total body bathing and dressing with modified indpendence and adaptive equipment as needed. 2. Patient will complete toileting with modified independence and adaptive equipment as needed. 3. Patient will tolerate 20 minutes of OT treatment with up to 2 rest breaks to increase activity tolerance for ADLs. PROGRESSING 6/4/2020 4. Patient will complete functional transfers with independence and adaptive equipment as needed. 5. Patient will complete functional mobility for ADLs with modified independence and adaptive equipment as needed. PROGRESSING 6/4/2020 6. Patient will verbalize 2 energy conservation techniques with no cues from therapist to increase safety and independence with ADLs. Timeframe: 7 visits Outcome: Progressing Towards Goal 
  
OCCUPATIONAL THERAPY: Daily Note and AM 6/4/2020 INPATIENT: OT Visit Days: 2 Payor: Katherine Diaz / Plan: [address] [place] [address] HMO / Product Type: Managed Care Medicare /  
  
NAME/AGE/GENDER: Zenon Srivastava is a 79 y.o. male PRIMARY DIAGNOSIS:  Acute metabolic encephalopathy [V92.24] Acute respiratory failure with hypoxia (HCC) Acute respiratory failure with hypoxia (HCC) Procedure(s) (LRB): 
THORACENTESIS (Left) ULTRASOUND (Left) 1 Day Post-Op ICD-10: Treatment Diagnosis:  
 · Generalized Muscle Weakness (M62.81) Precautions/Allergies: 
   
 Patient has no known allergies. ASSESSMENT:  
Per Initial Assessment: 
Mr. Mani Marshall is a 79 y.o. male admitted with SOB, respiratory failure, hypoxia, acute metabolic encephalopathy. S/p thoracentesis. 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 At baseline pt lives with wife and reports independence with ADLs and ambulation. No hx falls.   
 
6/4/2020: Pt found supine in bed upon arrival, alert and agreeable to OT treatment. Pt with aspiration and hypoxemia 5/31, is now on Airvo with O2 sats 99%. Pt's brief noted to be soiled, pt practiced rolling in supine with CGA-SBA for brief change with total assist for hygiene/clothing management. Pt practiced supine to sit with SBA/additional time, demonstrates intact sitting balance. Pt practiced sitting tolerance for ADLs with supervision, sit > stand trials with ModA/cues for technique progressing to Luigi. Stand > sit with Luigi/cues progressing to CGA. Pt left seated in chair with call bell within reach. Pt is making progress towards goals. See above. Continue OT POC. This section established at most recent assessment PROBLEM LIST (Impairments causing functional limitations): 1. Decreased Strength 2. Decreased ADL/Functional Activities 3. Decreased Transfer Abilities 4. Decreased Ambulation Ability/Technique 5. Decreased Balance 6. Decreased Activity Tolerance 7. Decreased Pacing Skills 8. Decreased Work Simplification/Energy Conservation Techniques 9. Increased Fatigue INTERVENTIONS PLANNED: (Benefits and precautions of occupational therapy have been discussed with the patient.) 1. Activities of daily living training 2. Adaptive equipment training 3. Balance training 4. Clothing management 5. Donning&doffing training 6. Hygiene training 7. Neuromuscular re-eduation 8. Re-evaluation 9. Therapeutic activity 10. Therapeutic exercise TREATMENT PLAN: Frequency/Duration: Follow patient 3x/week to address above goals. Rehabilitation Potential For Stated Goals: Good REHAB RECOMMENDATIONS (at time of discharge pending progress):   
Placement: It is my opinion, based on this patient's performance to date, that Mr. Jackelin Carrasquillo may benefit from intensive therapy at a [address] [place] [address] after discharge due to the functional deficits listed above that are likely to improve with skilled rehabilitation and concerns that he/she may be unsafe to be unsupervised at home due to impaired strength and activity tolerance impacting function. Equipment:  
? RW, potentially BSC as shower chair OCCUPATIONAL PROFILE AND HISTORY:  
History of Present Injury/Illness (Reason for Referral): 
See H&P. Past Medical History/Comorbidities:  
Mr. Benji Zimmerman  has a past medical history of GERD (gastroesophageal reflux disease), Head and neck cancer ([facility] (9/30/2015), History of squamous cell carcinoma, History of throat cancer (2015), Hypercholesteremia, Hypertension, Hypomagnesemia (5/20/2020), Rectal cancer ([facility] (2018), Type 2 diabetes mellitus ([facility] and Vomiting (2/23/2016). Mr. Benji Zimmerman  has a past surgical history that includes hx orthopaedic (Right, 1966); hx other surgical (9/9/15); hx heent; hx tonsillectomy; hx heent (2015); hx colonoscopy (05/2018); hx vascular access; hx lymph node dissection; hx other surgical; and flexible sigmoidoscopy (N/A, 6/2/2020). Social History/Living Environment:  
Home Environment: Private residence # Steps to Enter: 0 One/Two Story Residence: One story Living Alone: No 
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 At baseline pt lives with wife and reports independence with ADLs and ambulation. No hx falls. Personal Factors:   
      Sex:  male Age:  79 y.o. Other factors that influence how disability is experienced by the patient:  Multiple co-morbidities Number of Personal Factors/Comorbidities that affect the Plan of Care: Expanded review of therapy/medical records (1-2):  MODERATE COMPLEXITY ASSESSMENT OF OCCUPATIONAL PERFORMANCE[de-identified]  
Activities of Daily Living:  
Basic ADLs (From Assessment) Complex ADLs (From Assessment) Feeding: Independent Oral Facial Hygiene/Grooming: Stand-by assistance Bathing: Minimum assistance Upper Body Dressing: Stand-by assistance Lower Body Dressing: Stand-by assistance Toileting: Stand by assistance Grooming/Bathing/Dressing Activities of Daily Living Cognitive Retraining Safety/Judgement: Awareness of environment;Decreased awareness of need for assistance; Fall prevention Toileting Bowel Hygiene: Total assistance (dependent) Clothing Management: Total assistance (dependent) Bed/Mat Mobility Rolling: Contact guard assistance;Stand-by assistance Supine to Sit: Stand-by assistance; Additional time Sit to Stand: Minimum assistance; Moderate assistance Stand to Sit: Contact guard assistance Bed to Chair: Contact guard assistance Scooting: Stand-by assistance Most Recent Physical Functioning:  
Gross Assessment: 
AROM: Generally decreased, functional(BUEs) Strength: Generally decreased, functional(BUEs) Coordination: Generally decreased, functional(BUEs) Tone: Normal(BUEs) Sensation: Impaired(Decreased sensation in BUE digits 4 & 5) Posture: 
Posture (WDL): Exceptions to [facility] Posture Assessment: Trunk flexion, Increased, Forward head, Cervical, Kyphosis, Rounded shoulders Balance: 
Sitting: Intact Standing: Impaired Standing - Static: Fair;Good;Constant support Standing - Dynamic : Fair;Constant support Bed Mobility: 
Rolling: Contact guard assistance;Stand-by assistance Supine to Sit: Stand-by assistance; Additional time Scooting: Stand-by assistance Wheelchair Mobility: 
  
Transfers: 
Sit to Stand: Minimum assistance; Moderate assistance Stand to Sit: Contact guard assistance Bed to Chair: Contact guard assistance Patient Vitals for the past 6 hrs: 
 BP SpO2 O2 Flow Rate (L/min) Pulse 06/04/20 0812  94 % 45 l/min   
06/04/20 0833 140/75 96 %  100  
06/04/20 1050  99 %    
06/04/20 1158 122/69 99 %  100 Mental Status Neurologic State: Alert Orientation Level: Appropriate for age, Oriented X4 Cognition: Appropriate decision making, Appropriate for age attention/concentration, Follows commands Perception: Appears intact Perseveration: No perseveration noted Safety/Judgement: Awareness of environment, Decreased awareness of need for assistance, Fall prevention Physical Skills Involved: 1. Range of Motion 2. Balance 3. Strength 4. Activity Tolerance 5. Gross Motor Control Cognitive Skills Affected (resulting in the inability to perform in a timely and safe manner): 1. None  Psychosocial Skills Affected: 1. Habits/Routines 2. Environmental Adaptation 3. Social Interaction 4. Emotional Regulation 5. Self-Awareness 6. Awareness of Others 7. Social Roles Number of elements that affect the Plan of Care: 5+:  HIGH COMPLEXITY CLINICAL DECISION MAKIN[address] [place] [id number] AM-PAC 6 Clicks Daily Activity Inpatient Short Form How much help from another person does the patient currently need. .. Total A Lot A Little None 1. Putting on and taking off regular lower body clothing? [] 1   [] 2   [x] 3   [] 4  
2. Bathing (including washing, rinsing, drying)? [] 1   [] 2   [x] 3   [] 4  
3. Toileting, which includes using toilet, bedpan or urinal?   [] 1   [] 2   [x] 3   [] 4  
4. Putting on and taking off regular upper body clothing? [] 1   [] 2   [x] 3   [] 4  
5. Taking care of personal grooming such as brushing teeth? [] 1   [] 2   [x] 3   [] 4  
6. Eating meals? [] 1   [] 2   [] 3   [x] 4  
© , Trustees of [address] [place], [place] [id number], under license to [employer]. All rights reserved Score:  Initial: 19 2020 Most Recent: X (Date: -- ) Interpretation of Tool:  Represents activities that are increasingly more difficult (i.e. Bed mobility, Transfers, Gait). Medical Necessity:    
· Patient demonstrates · good ·  rehab potential due to higher previous functional level. Reason for Services/Other Comments: 
· Patient continues to require skilled intervention due to  
· Inability to complete ADLs at prior level of independence · . Use of outcome tool(s) and clinical judgement create a POC that gives a: MODERATE COMPLEXITY  
 
 
 
TREATMENT:  
(In addition to Assessment/Re-Assessment sessions the following treatments were rendered) Pre-treatment Symptoms/Complaints:   
Pain: Initial:  
Pain Intensity 1: 0  Post Session:  same Self Care: (10 minutes): Procedure(s) (per grid) utilized to improve and/or restore self-care/home management as related to toileting. Required minimal to maximal visual, verbal and manual cueing to facilitate activities of daily living skills and compensatory activities. Pt practiced rolling in supine with CGA-SBA for brief change with total assist for hygiene/clothing management. Therapeutic Activity: (    30 minutes): Therapeutic activities including Bed transfers, Chair transfers, Ambulation on level ground and RW management to improve mobility, strength, balance and activity tolerance. Required minimal   to promote dynamic balance in standing and promote coordination of bilateral, upper extremity(s), lower extremity(s). Pt practiced supine to sit with SBA/additional time, demonstrates intact sitting balance. Pt practiced sitting tolerance for ADLs with supervision, sit > stand trials with ModA/cues for technique progressing to Luigi. Stand > sit with Luigi/cues progressing to CGA. Braces/Orthotics/Lines/Etc:  
· O2 Device: Room air Treatment/Session Assessment:   
· Response to Treatment:  Tolerated well · Interdisciplinary Collaboration:  
o Occupational Therapist 
o Registered Nurse 
o Physician 
o Certified Nursing Assistant/Patient Care Technician · After treatment position/precautions:  
o Up in chair 
o Bed/Chair-wheels locked 
o Bed in low position o Call light within reach 
o RN notified · Compliance with Program/Exercises: Compliant all of the time, Will assess as treatment progresses. · Recommendations/Intent for next treatment session: \"Next visit will focus on advancements to more challenging activities and reduction in assistance provided\". Total Treatment Duration: OT Patient Time In/Time Out Time In: 1050 Time Out: 1130 Bailey Blake OTR/L

## 2020-06-04 NOTE — PROGRESS NOTES
PT attempted treatment this afternoon but the patient politely refused and stated he did not have the energy to get up again with PT as he worked with OT earlier today. PT will attempt again tomorrow.  
 
ROSELIA FofanaT

## 2020-06-04 NOTE — PROGRESS NOTES
Problem: Falls - Risk of 
Goal: *Absence of Falls Description: Document Bebeto Billpriya Fall Risk and appropriate interventions in the flowsheet. Outcome: Progressing Towards Goal 
Note: Fall Risk Interventions: 
Mobility Interventions: Bed/chair exit alarm Mentation Interventions: Bed/chair exit alarm Medication Interventions: Bed/chair exit alarm Elimination Interventions: Elevated toilet seat Problem: Pain Goal: *Control of Pain Outcome: Progressing Towards Goal 
  
Problem: Constipation - Risk of 
Goal: *Prevention of constipation Outcome: Progressing Towards Goal 
  
Problem: Pressure Injury - Risk of 
Goal: *Prevention of pressure injury Description: Document Jude Scale and appropriate interventions in the flowsheet. Outcome: Progressing Towards Goal 
Note: Pressure Injury Interventions: 
  
 
Moisture Interventions: Absorbent underpads, Apply protective barrier, creams and emollients Activity Interventions: Increase time out of bed Mobility Interventions: Pressure redistribution bed/mattress (bed type) Nutrition Interventions: Offer support with meals,snacks and hydration Friction and Shear Interventions: Apply protective barrier, creams and emollients

## 2020-06-04 NOTE — PROGRESS NOTES
Interdisciplinary team rounds were held 6/4/2020 with the following team members:Care Management, Nursing and Physician . Plan of care discussed. See clinical pathway and/or care plan for interventions and desired outcomes.

## 2020-06-04 NOTE — PROGRESS NOTES
Date of Outreach Update: 
Lopez Mayfield was seen and assessed. MEWS Score: 2 (06/03/20 0319) Previous Outreach assessment has been reviewed. There have been no significant clinical changes since the completion of the last dated Outreach assessment. Patient asleep in bed, easy to arouse. Sats 92% on airvo 50/50. Resp even/unlabored. No signs of distress. No needs stated. Will continue to follow up per outreach protocol.  
 
Signed By:   Dasha Leon RN 
  June 4, 2020 1:26 AM

## 2020-06-04 NOTE — PROGRESS NOTES
Date of Outreach Update: 
Eben Pu was seen and assessed. MEWS Score: 2 (06/04/20 0330) Previous Outreach assessment has been reviewed. There have been no significant clinical changes since the completion of the last dated Outreach assessment. Patient awake in bed, wife at bedside. Looks tired this morning, but states he rested ok overnight. O2 sat 97% . Currently on airo 50L/60%. No distress or needs noted. Will continue to follow up per outreach protocol.  
 
Signed By:   Rikki Ibanez RN 
  June 4, 2020 5:07 AM

## 2020-06-04 NOTE — PROGRESS NOTES
Shift assessment complete. Pt resting in bed with wife at bedside. A&Ox3. Respirations present, even, with dyspnea on exertion. Lung sounds coarse to auscultation. Airvo in place. HR irregular. IV capped and patent. Juarez draining yellow clear urine without difficulty. Abdomen soft with active bowel sounds in all 4 quadrants. Pt denies pain at this time. Bed in lowest position, bed alarm in place, side rails x3. Encouraged to call for help when needed.

## 2020-06-05 NOTE — PROGRESS NOTES
Problem: Mobility Impaired (Adult and Pediatric) Goal: *Acute Goals and Plan of Care (Insert Text) Outcome: Progressing Towards Goal 
Note: LTG: 
(1.)Mr. Kim Vallecillo will move from supine to sit and sit to supine , scoot up and down, and roll side to side in bed with INDEPENDENT within 7 treatment day(s). (2.)Mr. Kim Vallecillo will transfer from bed to chair and chair to bed with MODIFIED INDEPENDENCE using the least restrictive device within 7 treatment day(s). (3.)Mr. Kim Vallecillo will ambulate with MODIFIED INDEPENDENCE for 250 feet with the least restrictive device within 7 treatment day(s). (4.)Mr. Kim Vallecillo will demonstrate good dynamic standing balance within 7 treatment days. ________________________________________________________________________________________________ PHYSICAL THERAPY: Daily Note and PM 6/5/2020 INPATIENT: PT Visit Days : 3 Payor: Rosario uK / Plan: [address] [place] [address] HMO / Product Type: Managed Care Medicare /   
  
NAME/AGE/GENDER: Ladonna Negro is a 79 y.o. male PRIMARY DIAGNOSIS: Acute metabolic encephalopathy [M20.15] Acute respiratory failure with hypoxia (HCC) Acute respiratory failure with hypoxia (HCC) Procedure(s) (LRB): 
THORACENTESIS (Left) ULTRASOUND (Left) 2 Days Post-Op ICD-10: Treatment Diagnosis:  
 · Generalized Muscle Weakness (M62.81) · Other lack of cordination (R27.8) · Difficulty in walking, Not elsewhere classified (R26.2) · Other abnormalities of gait and mobility (R26.89) · Low Back Pain (M54.5) Precaution/Allergies: 
Patient has no known allergies. ASSESSMENT:  
 
Mr. Kim Vallecillo  is a 79year old male who has been admitted to hospital on 05/24 with above diagnosis and hx of cancer. Prior to hospital admission pt lives with wife in a 1 story home with 0 step(s) to enter with no railing. Pt endorses 0 falls in past 6 months. Prior to admission No O2 usage at home, no assistance with ADLs and uses no DME for mobility. 6/4 - Patient presents in supine with his wife at the bedside. He was agreeable to have therapy today. He remains on Airvo 50L 70% with resting sats at 99%. He participated in BLE AROM strength exercises in supine and sitting. Supine to sit was CGA with additional time needed as he moves slow. He sat on the EOB independently. Sit to stand was minimal assist with use of bed elevation. Static standing was steady using the r/walker for UE support. He ambulated in place 5-10 steps and then transitioned over to the bedside chair. He sat to rest.  He stood a 2nd time and stepped in place 15 times using the r/walker for UE support with SBA. He stood extra time to have his brief changed from some slightly loose stool. He sat and was positioned for comfort seated in the bedside chair. His table was placed close with his personal items in reach and his wife and the CNA remained at the bedside. He was kind and cooperative with therapy. This section established at most recent assessment PROBLEM LIST (Impairments causing functional limitations): 1. Decreased Strength 2. Decreased ADL/Functional Activities 3. Decreased Transfer Abilities 4. Decreased Ambulation Ability/Technique 5. Decreased Balance 6. Increased Pain 7. Decreased Activity Tolerance 8. Increased Fatigue 9. Decreased Flexibility/Joint Mobility 10. Decreased [place] with Home Exercise Program 
 INTERVENTIONS PLANNED: (Benefits and precautions of physical therapy have been discussed with the patient.) 1. Balance Exercise 2. Bed Mobility 3. Family Education 4. Gait Training 5. Heat 6. Home Exercise Program (HEP) 7. Manual Therapy 8. Neuromuscular Re-education/Strengthening 9. Range of Motion (ROM) 10. Therapeutic Activites 11. Therapeutic Exercise/Strengthening 12. Transfer Training TREATMENT PLAN: Frequency/Duration: 3 times a week for duration of hospital stay Rehabilitation Potential For Stated Goals: Good REHAB RECOMMENDATIONS (at time of discharge pending progress):   
Placement: It is my opinion, based on this patient's performance to date, that Mr. Dejah Ribera may benefit from intensive therapy at a [address] after discharge due to the functional deficits listed above that are likely to improve with skilled rehabilitation and concerns that he/she may be unsafe to be unsupervised at home due to multiple comorbidities and high level of intense care needed. . 
Equipment:  
? Walkers, Type: Multispectral Imaging [place] HISTORY:  
History of Present Injury/Illness (Reason for Referral): 
Per Physician Note: 
 
Schuyler Peters is a 79 y.o. male with a past medical history of HEENT cancer undergoing chemo/radiation who presents to the [facility] ER with report of SOB and chest discomfort for the past several days. He also admits to mild cough but denies fevers or chills. Admits to feeling a little better and breathing a bit better since arrival. 
  
Past Medical History/Comorbidities:  
Mr. Dejah Ribera  has a past medical history of GERD (gastroesophageal reflux disease), Head and neck cancer ([address] (9/30/2015), History of squamous cell carcinoma, History of throat cancer (2015), Hypercholesteremia, Hypertension, Hypomagnesemia (5/20/2020), Rectal cancer ([address] (2018), Type 2 diabetes mellitus ([address] and Vomiting (2/23/2016). Mr. Dejah Ribera  has a past surgical history that includes hx orthopaedic (Right, 1966); hx other surgical (9/9/15); hx heent; hx tonsillectomy; hx heent (2015); hx colonoscopy (05/2018); hx vascular access; hx lymph node dissection; hx other surgical; and flexible sigmoidoscopy (N/A, 6/2/2020). Social History/Living Environment:  
Home Environment: Private residence # Steps to Enter: 0 One/Two Story Residence: One story Living Alone: No 
Support Systems: Spouse/Significant Other/Partner Patient Expects to be Discharged to[de-identified] Private residence Current DME Used/Available at Home: None Tub or Shower Type: Tub/Shower combination Prior Level of Function/Work/Activity: Mod I mobility and amb Number of Personal Factors/Comorbidities that affect the Plan of Care: 3+: HIGH COMPLEXITY EXAMINATION:  
Most Recent Physical Functioning:  
Gross Assessment: 
  
         
  
Posture: 
  
Balance: 
Sitting: Intact Standing: Impaired Standing - Static: Fair;Good;Constant support Standing - Dynamic : Fair;Constant support Bed Mobility: 
Rolling: Contact guard assistance;Stand-by assistance Supine to Sit: Contact Guard Assist;Additional time Scooting: Stand-by assistance Wheelchair Mobility: 
  
Transfers: 
Sit to Stand: Minimum assistance; Moderate assistance Stand to Sit: Contact guard assistance Bed to Chair: Contact guard assistance Gait: 
  
Base of Support: Narrowed; Center of gravity altered Speed/Christina: Slow;Shuffled Step Length: Left shortened;Right shortened Distance (ft): 15 Feet (ft)(5-10 steps during each standing period) Assistive Device: Walker, rolling Ambulation - Level of Assistance: Contact guard assistance Interventions: Safety awareness training Body Structures Involved: 1. Lungs 2. Bones 3. Joints Body Functions Affected: 1. Sensory/Pain 2. Movement Related Activities and Participation Affected: 1. Mobility 2. Self Care 3. Interpersonal Interactions and Relationships 4. Community, Social and [place] Number of elements that affect the Plan of Care: 4+: HIGH COMPLEXITY CLINICAL PRESENTATION:  
Presentation: Stable and uncomplicated: LOW COMPLEXITY CLINICAL DECISION MAKIN [facility] [address] [address] AM-PAC 6 Clicks Basic Mobility Inpatient Short Form How much difficulty does the patient currently have. .. Unable A Lot A Little None 1. Turning over in bed (including adjusting bedclothes, sheets and blankets)? [] 1   [] 2   [] 3   [x] 4  
2.   Sitting down on and standing up from a chair with arms ( e.g., wheelchair, bedside commode, etc.)   [] 1   [] 2   [x] 3   [] 4  
3. Moving from lying on back to sitting on the side of the bed? [] 1   [] 2   [] 3   [x] 4 How much help from another person does the patient currently need. .. Total A Lot A Little None 4. Moving to and from a bed to a chair (including a wheelchair)? [] 1   [] 2   [x] 3   [] 4  
5. Need to walk in hospital room? [] 1   [] 2   [] 3   [x] 4  
6. Climbing 3-5 steps with a railing? [] 1   [x] 2   [] 3   [] 4  
© 2007, Trustees of [address] [place] [address] [address] [address], under license to [employer]. All rights reserved Score:  Initial: 20 Most Recent: X (Date: -- ) Interpretation of Tool:  Represents activities that are increasingly more difficult (i.e. Bed mobility, Transfers, Gait). Medical Necessity:    
· Patient is expected to demonstrate progress in  
· strength, range of motion, balance, coordination, and functional technique ·  to  
· increase independence with ambulation and mobility · . Reason for Services/Other Comments: 
· Patient continues to require skilled intervention due to · Gross weakness, poor balance, increased risk of falls · . Use of outcome tool(s) and clinical judgement create a POC that gives a: Clear prediction of patient's progress: LOW COMPLEXITY  
  
 
 
 
TREATMENT:  
  
Pre-treatment Symptoms/Complaints: Patient had no complaints of pain during therapy today Pain: Initial:  
Pain Intensity 1: 0  Post Session:  0/10 Therapeutic Activity: (  25 mins): Therapeutic activities including Bed transfers, Chair transfers, Ambulation on level ground, standing balance and walker sequencing to improve mobility, strength, balance, and coordination. Required moderate Safety awareness training to promote static and dynamic balance in standing and promote coordination of bilateral, lower extremity(s). BLE AROM strength exercises in sitting and supine. Date: 
6/5/20 Date: 
 Date: Activity/Exercise Parameters Parameters Parameters Ankle pumps 20 Heel slides 2 x 10 Leg slides (hip abd/add) 2 x 10 Seated knee extension 10 B Seated hip flexion 10 B Braces/Orthotics/Lines/Etc:  
· IV 
· traore catheter · O2 monitor · O2 Device: Room air Treatment/Session Assessment:   
· Response to Treatment:  See Above · Interdisciplinary Collaboration:  
o Physical Therapist 
o Registered Nurse · After treatment position/precautions:  
o Up in chair 
o Bed/Chair-wheels locked 
o RN notified 
o Family at bedside · Compliance with Program/Exercises: Will assess as treatment progresses · Recommendations/Intent for next treatment session: \"Next visit will focus on advancements to more challenging activities\". Total Treatment Duration: PT Patient Time In/Time Out Time In: 1330 Time Out: 1355 Jose Puga, PT, PT, DPT

## 2020-06-05 NOTE — PROGRESS NOTES
[facility] OUTREACH NURSE PROGRESS REPORT SUBJECTIVE: Called to assess patient secondary to MD concern. MEWS Score: 2 (06/04/20 1515) Vitals:  
 06/04/20 1515 06/04/20 2019 06/04/20 2033 06/04/20 2248 BP: 157/77 157/80 Pulse: (!) 104 (!) 105 Resp: 20 20 Temp: 97.2 °F (36.2 °C) 98.2 °F (36.8 °C) SpO2: 95% 100% 99% 94% Weight:      
Height:      
  
 
LAB DATA: 
 
Recent Labs  
  06/04/20 
0702 06/03/20 
0558 06/02/20 
0736  133* 133* K 3.4* 3.7 3.9 CL 96* 95* 95* CO2 34* 29 29 AGAP 6* 9 9 * 219* 114* BUN 11 17 19 CREA 0.70* 0.79* 0.85 GFRAA >60 >60 >60 GFRNA >60 >60 >60  
CA 8.9 9.0 9.0 MG 1.8 2.1 2.2 Recent Labs  
  06/04/20 
0702 06/03/20 
1517 06/03/20 
1322 WBC 8.3 9.7 11.6* HGB 8.7* 8.4* 8.4* HCT 27.6* 26.9* 26.1*  
 189 188 OBJECTIVE: On arrival to room, I found patient to be resting quietly in bed, wife at bedside. ASSESSMENT:  Pt awakens to voice. SpO2 80%  on Airvo 40L 35%, RT called to bedside. Airvo settings increased to 50L 70%. SpO2 improved to 93% . Lung sounds with scattered wheezing, few crackles in the bases. Pt denies SOB or CP. Discussed findings and interventions with primary RN. VS, labs, and progress notes reviewed. Primary RN without any additional needs/concerns at this time. PLAN: Will continue to follow per outreach protocol.

## 2020-06-05 NOTE — PROGRESS NOTES
Date of Outreach Update: 
Amelia Diaz was seen and assessed. MEWS Score: 1 (06/05/20 0418) Vitals:  
 06/04/20 2033 06/04/20 2248 06/04/20 2343 06/05/20 8044 BP:   178/90 149/78 Pulse:   (!) 107 98 Resp:   18 20 Temp:   98 °F (36.7 °C) 98.5 °F (36.9 °C) SpO2: 99% 94% 97% 100% Weight:      
Height:      
  
 
 Pain Assessment Pain Intensity 1: 0 (06/04/20 1943) Pain Location 1: Abdomen Pain Intervention(s) 1: Medication (see MAR) Patient Stated Pain Goal: 0 Pt resting quietly in bed, SpO2 99% on Airvo 50L 70%. Respirations even and unlabored. Previous Outreach assessment has been reviewed. There have been no significant clinical changes since the completion of the last dated Outreach assessment. VS, labs, and progress notes reviewed. Primary RN without any additonal needs/concerns at this time. Will continue to follow up per outreach protocol. Signed By:   Deep Bangura   June 5, 2020 4:42 AM

## 2020-06-05 NOTE — PROGRESS NOTES
Patient O2 saturation dropped to 80-81% consistently. O2 flow increased from 40L to 50L and O2 FiO2 percentage increased from 35% to 70%. Patient states no SOB. BS still coarse with crackles in the bases. No distress or discomfort currently noted. Will continue to monitor. 06/04/20 2248 Oxygen Therapy O2 Sat (%) 94 % Pulse via Oximetry 103 beats per minute O2 Device Heated; Hi flow nasal cannula O2 Flow Rate (L/min) 50 l/min O2 Temperature 87.8 °F (31 °C) FIO2 (%) 70 %

## 2020-06-05 NOTE — PROGRESS NOTES
Hospitalist Progress Note 2020 Admit Date: 2020 11:00 PM  
NAME: Fernando Lui :  1940 MRN:  [id number] Attending: Sean Regalado DO 
PCP:  Hue Valles MD 
 
SUBJECTIVE:  
Patient y. o. male with a past medical history of HEENT cancer undergoing chemo/radiation admitted due to acute respiratory failure due to large bilateral pleural effusions. He had a thoracentesis on 20. He has a history of rectal stricture but declined surgery. He went into fluid overload while taking GoLytely. He was diruresed. Pneumonia was also found so he was started on antibiotics. On  he went for a flex sig with fecal disimpaction. He had a thoracentesis with 800ml off on 6/3/20. 
  
 - pt in bedside chair. Denies nausea, vomiting. Does have productive cough with witnessed tan sputum production. Wife said he had soft BM today. Review of Systems negative with exception of pertinent positives noted above PHYSICAL EXAM  
 
Visit Vitals /77 Pulse (!) 104 Temp 97.2 °F (36.2 °C) Resp 20 Ht 6' (1.829 m) Wt 76.2 kg (168 lb) SpO2 95% BMI 22.78 kg/m² Temp (24hrs), Av °F (36.7 °C), Min:97.2 °F (36.2 °C), Max:98.8 °F (37.1 °C) Oxygen Therapy O2 Sat (%): 95 % (20 1515) Pulse via Oximetry: 100 beats per minute (20 3121) O2 Device: Heated; Hi flow nasal cannula (20 1050) O2 Flow Rate (L/min): 45 l/min (20 0812) O2 Temperature: 87.8 °F (31 °C) (20 0158) FIO2 (%): 65 % (20 0812) ETCO2 (mmHg): 93 mmHg (20 1624) Intake/Output Summary (Last 24 hours) at 2020 Last data filed at 2020 8444 Gross per 24 hour Intake 720 ml Output 4700 ml Net -3980 ml General: No acute distress   
Lungs:  CTA Bilaterally. Heart:  Regular rate and rhythm,  No murmur, rub, or gallop Abdomen: Soft, Non distended, Non tender, Positive bowel sounds Extremities: No cyanosis, clubbing or edema Neurologic:  No focal deficits ASSESSMENT Active Hospital Problems Diagnosis Date Noted  Rectal obstruction 06/01/2020  Aspiration pneumonia ([facility] 06/01/2020  Neutropenia ([address] 05/25/2020  Pleural effusion on right 05/25/2020  Acute respiratory failure with hypoxia ([address] 05/24/2020  Normocytic anemia 05/24/2020  Pleural effusion on left 05/24/2020  Malignant neoplasm metastatic to bone ([address] 06/26/2019  Type 2 diabetes with nephropathy ([address] 07/03/2018  Diabetes mellitus due to underlying condition with hyperglycemia ([facility] 12/21/2015  COVID-19 ruled out 12/15/2015  Hyponatremia 11/23/2015 A/p Principal Problem: 
  Acute respiratory failure with hypoxia ([address] (5/24/2020) - Multifactorial - effusions + pneumonia - s/p thora on 6/3 
- Slow to improve - Continue Vanc + Zosyn for today 
- continue daily lasix - Continue aerosols - Wean oxygen as appropriate - Appreciate Pulmonary's input and assistance 
  
Active Problems: 
  Aspiration pneumonia ([address] (6/1/2020) - Continue Vanc + Zosyn 
  
  Rectal obstruction (6/1/2020) - Fecal obstruction - S/P flex sig with disimpaction on 6/2 
- Feels much improved 
- continue bowel regimen - Declines surgery 
  
  Hyponatremia (11/23/2015) - improved 
  
  Pleural effusion on left (5/24/2020) - S/P thoracentesis 
  
  Pleural effusion on right (5/25/2020) - S/P thoracentesis with 800ml on 6/3 
  
  Diabetes mellitus due to underlying condition with hyperglycemia ([facility] (12/21/2015) - Stable - Continue Novolin SSI 
  
  Neutropenia ([address] (5/25/2020) - Resolved - WBC 11.2 today 
- CBC pending today 
  
  COVID-19 ruled out (12/15/2015) 
  
  Malignant neoplasm metastatic to bone ([facility] (6/26/2019) 
  
  Normocytic anemia (5/24/2020) DVT Prophylaxis: lovenox Dispo - likely to need rehab when medically stable. Declined PT today. CM following. Signed By: Jailene Churchill DO   
 June 4, 2020

## 2020-06-05 NOTE — PROGRESS NOTES
Shavonne García Admission Date: 5/24/2020 Daily Progress Note: 6/5/2020 The patient's chart is reviewed and the patient is discussed with the staff. The patient is a 79 y.o. male seen and evaluated at the request of Dr. Jh Mccarthy for evaluation and management of pleural effusions. 
  
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 He received carbo/etoposide 2 weeks ago and has also had radiation for anal cancer. He has required anal dilation in past.  
 
He presented to [facility] on 5/24 with increasing dyspnea and chest discomfort. A CT of the chest revealed large bilateral effusions and we are now asked to assist with management. He is hyponatremic and getting saline infusions and also getting PRBCs for anemia. Had nausea/vomiting, aspiration event on 5/31 with hypoxemia. CT at that time suggested rectal obstruction. Flex sig on 6/2 with no anal stricture and stool impaction. S/p tap or Right chest with 800 removed on 6/3 Subjective:  
 
Patient is lying in bed and on Airvo at 50 L and 70%. No chest pain. Tired today. Abdomen is sore. Current Facility-Administered Medications Medication Dose Route Frequency  furosemide (LASIX) injection 40 mg  40 mg IntraVENous DAILY  insulin glargine (LANTUS) injection 5 Units  5 Units SubCUTAneous QHS  vancomycin (VANCOCIN) 1250 mg in  ml infusion  1,250 mg IntraVENous Q12H  
 lactated Ringers infusion  100 mL/hr IntraVENous CONTINUOUS  
 albuterol-ipratropium (DUO-NEB) 2.5 MG-0.5 MG/3 ML  3 mL Nebulization Q6HWA RT  
 piperacillin-tazobactam (ZOSYN) 3.375 g in 0.9% sodium chloride (MBP/ADV) 100 mL  3.375 g IntraVENous Q8H  
 hydrALAZINE (APRESOLINE) 20 mg/mL injection 10 mg  10 mg IntraVENous Q6H PRN  
  magnesium hydroxide (MILK OF MAGNESIA) 400 mg/5 mL oral suspension 30 mL  30 mL Oral DAILY  senna-docusate (PERICOLACE) 8.6-50 mg per tablet 1 Tab  1 Tab Oral BID  
 bisacodyL (DULCOLAX) suppository 10 mg  10 mg Rectal DAILY  potassium chloride (K-DUR, KLOR-CON) SR tablet 40 mEq  40 mEq Oral BID  
 0.9% sodium chloride infusion 250 mL  250 mL IntraVENous PRN  
 amLODIPine (NORVASC) tablet 10 mg  10 mg Oral DAILY  docusate sodium (COLACE) capsule 100 mg  100 mg Oral PRN  
 glimepiride (AMARYL) tablet 4 mg  4 mg Oral 7am  
 lisinopriL (PRINIVIL, ZESTRIL) tablet 20 mg  20 mg Oral DAILY  meloxicam (MOBIC) tablet 7.5 mg  7.5 mg Oral DAILY  sodium chloride tablet 2 g  2 g Oral BID  traMADoL (ULTRAM) tablet 50 mg  50 mg Oral Q6H PRN  
 sodium chloride (NS) flush 5-40 mL  5-40 mL IntraVENous Q8H  
 sodium chloride (NS) flush 5-40 mL  5-40 mL IntraVENous PRN  
 acetaminophen (TYLENOL) tablet 650 mg  650 mg Oral Q4H PRN  
 ondansetron (ZOFRAN) injection 4 mg  4 mg IntraVENous Q4H PRN  
 insulin regular (NOVOLIN R, HUMULIN R) injection   SubCUTAneous AC&HS  enoxaparin (LOVENOX) injection 40 mg  40 mg SubCUTAneous Q24H Review of Systems Constitutional: negative for fever, chills, sweats Cardiovascular: negative for chest pain, palpitations, syncope, edema Gastrointestinal:  negative for dysphagia, reflux, vomiting, diarrhea, abdominal pain, or melena Neurologic:  negative for focal weakness, numbness, headache Objective:  
 
Vitals:  
 06/04/20 2343 06/05/20 4098 06/05/20 7525 06/05/20 0049 BP: 178/90 149/78  176/88 Pulse: (!) 107 98  (!) 102 Resp: 18 20  16 Temp: 98 °F (36.7 °C) 98.5 °F (36.9 °C)  98.9 °F (37.2 °C) SpO2: 97% 100% 98% 96% Weight:      
Height:      
 
 
Intake/Output Summary (Last 24 hours) at 6/5/2020 1023 Last data filed at 6/5/2020 2547 Gross per 24 hour Intake 600 ml Output 1750 ml Net -1150 ml Physical Exam: Constitution: elderly, ill appearing EENMT:  Sclera clear, pupils equal, oral mucosa moist 
Respiratory:decreased in bases on airvo at 50 L and 80% on 100% FIO2 Cardiovascular:  RRR without M,G,R 
Gastrointestinal: abdomen is foft today. Musculoskeletal: warm without cyanosis. There is no lower extremity edema. Skin:  no jaundice or rashes, no wounds Neurologic: no gross neuro deficits Psychiatric:  alert and oriented x 4 CXR 6/4/20 noted b/l effusion and slightly more in left base CXR 6/1/20 CXR: 5/27: bilateral small effusions CT C/A/P 6/1/20: 
chest-FINDINGS: 
- Pleura/pericardium: There are decreased small to moderate-sized bilateral 
pleural effusions. No pneumothorax or pericardial effusion is seen. - Lungs: There is progressed patchy edema or infiltrates in both lungs, worse in 
the right lower lobe. - Malina/Mediastinum: Within normal limits. - Tracheobronchial tree: Within normal limits. - Aorta/pulmonary arteries: Within normal limits. - Heart: Within normal limits. - Coronary arteries: There are coronary artery calcifications. - Chest wall: Within normal limits. - Spine/bones: Again noted are multiple sclerotic foci in the ribs and spine, 
consistent with osseous metastatic disease. 
- Additional comments Abdomen /pelvis: 
IMPRESSION:  
1. New marked constipation and distention of the entire colon, to the level of 
the lower rectum, where there is wall thickening which was PET avid on the prior 
study. This is suspect for rectal obstruction. No dilated small bowel loops are 
seen at this time. 2. Unchanged right adrenal gland nodule and osseous metastatic disease. CT 5/24: bilateral effusions LAB Recent Labs  
  06/05/20 
0815 06/04/20 
2359 06/04/20 
1616 06/04/20 
1138 06/04/20 
1122 GLUCPOC 108* 114* 355* 253* 174* Recent Labs  
  06/05/20 
0711 06/04/20 
0702 06/03/20 
1517 06/03/20 
1322 WBC 7.9 8.3 9.7 11.6* HGB 8.6* 8.7* 8.4* 8.4*  
 HCT 27.5* 27.6* 26.9* 26.1*  
 187 189 188 Recent Labs  
  06/05/20 
9173 06/04/20 
1139 06/03/20 
3204 * 136 133* K 3.2* 3.4* 3.7 CL 97* 96* 95* CO2 30 34* 29 * 171* 219* BUN 12 11 17 CREA 0.66* 0.70* 0.79* MG 2.0 1.8 2.1 CA 8.8 8.9 9.0 No results for input(s): PH, PCO2, PO2, HCO3, PHI, PCO2I, PO2I, HCO3I in the last 72 hours. No results for input(s): LCAD, LAC in the last 72 hours. Assessment:  (Medical Decision Making) Hospital Problems  Date Reviewed: 5/20/2020 Codes Class Noted POA Rectal obstruction ICD-10-CM: K62.4 ICD-9-CM: 569.2  6/1/2020 Yes Aspiration [facility] ICD-10-CM: J69.0 ICD-9-CM: 507.0  6/1/2020 Yes Neutropenia ([facility] ICD-10-CM: D70.9 ICD-9-CM: 288.00  5/25/2020 Yes Pleural effusion on right ICD-10-CM: J90 ICD-9-CM: 511.9  5/25/2020 Yes * (Principal) Acute respiratory failure with hypoxia ([facility] ICD-10-CM: J96.01 
ICD-9-CM: 518.81  5/24/2020 Yes Normocytic anemia ICD-10-CM: D64.9 ICD-9-CM: 285.9  5/24/2020 Yes Pleural effusion on left ICD-10-CM: J90 ICD-9-CM: 511.9  5/24/2020 Yes Malignant neoplasm metastatic to bone [facility]) ICD-10-CM: C79.51 
ICD-9-CM: 198.5  6/26/2019 Yes Type 2 diabetes with nephropathy ([facility] ICD-10-CM: E11.21 
ICD-9-CM: 250.40, 583.81  7/3/2018 Yes Diabetes mellitus due to underlying condition with hyperglycemia ([address] ICD-10-CM: E14.64 ICD-9-CM: 249.80  12/21/2015 Yes COVID-19 ruled out ICD-10-CM: Z03.818 ICD-9-CM: V71.83  12/15/2015 Yes Hyponatremia ICD-10-CM: E87.1 ICD-9-CM: 276.1  11/23/2015 Yes Plan:  (Medical Decision Making) --Continue day 5 zosyn. Noted cultures are negative. --taper oxygen on airvo on 80% and monitor if 100%. Told nurse to tell respiratory now 
--continue diureses down  Down by 1.5 L negative total. F/u x-ray on Monday and may need more taps. --continue MOM, abdomen softer today --continue IS 
--continue remaining treatment. Spoke with patient's wife as well. More than 50% of the time documented was spent in face-to-face contact with the patient and in the care of the patient on the floor/unit where the patient is located.   
 
Sujata Spann MD

## 2020-06-05 NOTE — PROGRESS NOTES
Nutrition Assessment for:  
Follow up Initial assessment for: Consult for general nutrition and supplements (DO [place]) Assessment: Patient presented with SOB and chest pain. PMH: H/N cancer with mets to bone, DM2. Pt had cycle 3 Carbo/Etop may 8th with cycle 4 planned for this week but currently on hold. Patient had thoracentesis on 5/25 and 5/29. Repeat thoracentesis 6/3. Flex sig on 6/2 for fecal impaction. He is still requiring Airov. Rehab pending once stable. Patient seen today. He has been on FLD since 6/3 and is unsure why. His wife states that he has been eating 100% of FLD. She states that he has been drinking Ensure Enlive and Glucerna. She states that he drinks Ensure at home. She states that he does not like some foods. She states that no one has been taking his order. Discussed with both of them that Glucerna is currently more appropriate to help maintain blood glucose. Observed noon tray, 100% consumed. POC: 114-355 over last 24 hrs DIET NUTRITIONAL SUPPLEMENTS All Meals; Glucerna Shake ( ) DIET REGULAR Per nursing charting, 0-50% of meals consumed. Anthropometrics: 
Height: 6' (182.9 cm), Weight: 76.2 kg (168 lb), Weight Source: Patient stated, Body mass index is 22.78 kg/m². BMI class of Normal weight. Macronutrient needs: (using Listed body weight 74.4 kg) (cancer tx) EER: 2985-0603 kcal/day (25-30 kcals/kg ) EPR:  g/day (1-1.5 g/kg ) Nutrition Intervention: 
Meals and snacks: Continue current diet. Will monitor blood glucose for potential CCHO. Added food preferences to tray ticket. PDA to see for selections. Medical food supplement therapy: Continue Glucerna TID until PO trends established Coordination of nutrition care by a Nutrition Professional: Discussed with Dr. Gabrielle Kenyon - she upgraded diet prior to RD charting. Discharge Nutrition Plan: Too soon to determine. [address] [place] [address], [address], [address] [facility]

## 2020-06-05 NOTE — PROGRESS NOTES
1840-END OF SHIFT NOTE: 
 
-pt rested well throughout the shift in recliner/bed 
-weaned O2 Allevyn placed 
-wife at bedside throughout day  
-vss, no needs voiced at this time Intake/Output 06/05 0701 - 06/05 1900 In: 4000 [P.O.:500; I.V.:3500] Out: 1500 [Urine:1500] Voiding: YES Catheter: YES Drain:   
 
 
Stool:  1 occurrences. Stool Assessment Stool Color: Jeanna Stalker (06/04/20 1305) Stool Appearance: Mucous (06/04/20 1305) Stool Amount: Small (06/04/20 1305) Stool Source/Status: Rectum (06/02/20 2333) Emesis:  0 occurrences. VITAL SIGNS Patient Vitals for the past 12 hrs: 
 Temp Pulse Resp BP SpO2  
06/05/20 1558     95 % 06/05/20 1554     96 % 06/05/20 1547     100 % 06/05/20 1527 98 °F (36.7 °C) (!) 105 18 161/81 100 % 06/05/20 1125 98.3 °F (36.8 °C) 96 15 136/62 100 % 06/05/20 0813 98.9 °F (37.2 °C) (!) 102 16 176/88 96 % 06/05/20 0723     98 % Pain Assessment Pain 1 Pain Scale 1: Numeric (0 - 10) (06/05/20 1300) Pain Intensity 1: 0 (06/05/20 1300) Patient Stated Pain Goal: 0 (06/05/20 0845) Pain Reassessment 1: Patient resting w/respiratory rate greater than 10 (06/02/20 1508) Pain Location 1: Abdomen (06/01/20 1250) Pain Orientation 1: Lower (05/30/20 2103) Pain Description 1: Aching (05/30/20 2103) Pain Intervention(s) 1: Medication (see MAR) (05/30/20 2103) Ambulating Yes Additional Information:  
 
Shift report given to oncoming nurse at the bedside.  
 
Clemente Medina RN

## 2020-06-05 NOTE — PROGRESS NOTES
Hospitalist Progress Note 2020 Admit Date: 2020 11:00 PM  
NAME: Alisson Britton :  1940 MRN:  [id number] Attending: Roger Nash DO 
PCP:  Edward Putnam MD 
 
SUBJECTIVE:  
Patient y. o. male with a past medical history of HEENT cancer and anal cancer undergoing chemo/radiation admitted due to acute respiratory failure due to large bilateral pleural effusions. He had a bilateral thoracentesis on  20. He has a history of rectal stricture but declined surgery. He went into fluid overload while taking GoLytely. He was diruresed. Pneumonia was also found so he was started on antibiotics. On  he went for a flex sig with fecal disimpaction. He had a another right thoracentesis with 800ml off on 6/3/20. 
  
 - pt laying in bed. Wife at bedside. Pt remains on Airvo. Asking for increase in diet (?why he is on full liquids and will advance). 3 Bm's reported yesterday. Review of Systems negative with exception of pertinent positives noted above PHYSICAL EXAM  
 
Visit Vitals /81 (BP 1 Location: Right arm, BP Patient Position: At rest) Pulse (!) 105 Temp 98 °F (36.7 °C) Resp 18 Ht 6' (1.829 m) Wt 76.2 kg (168 lb) SpO2 95% BMI 22.78 kg/m² Temp (24hrs), Av.3 °F (36.8 °C), Min:98 °F (36.7 °C), Max:98.9 °F (37.2 °C) Oxygen Therapy O2 Sat (%): 95 % (20 1558) Pulse via Oximetry: 105 beats per minute (20 1558) O2 Device: Heated; Hi flow nasal cannula (20 1547) O2 Flow Rate (L/min): 45 l/min (20 1558) O2 Temperature: 87.8 °F (31 °C) (20 1558) FIO2 (%): 45 % (20 1558) ETCO2 (mmHg): 93 mmHg (20 1624) Intake/Output Summary (Last 24 hours) at 2020 1844 Last data filed at 2020 3042 Gross per 24 hour Intake 4000 ml Output 2350 ml Net 1650 ml General: No acute distress   
Lungs:  CTA Bilaterally. Heart:  Regular rate and rhythm,  No murmur, rub, or gallop Abdomen: Soft, Non distended, Non tender, Positive bowel sounds Extremities: No cyanosis, clubbing or edema Neurologic:  No focal deficits ASSESSMENT Active Hospital Problems Diagnosis Date Noted  Rectal obstruction 06/01/2020  Aspiration pneumonia ([address] 06/01/2020  Neutropenia ([address] 05/25/2020  Pleural effusion on right 05/25/2020  Acute respiratory failure with hypoxia ([address] 05/24/2020  Normocytic anemia 05/24/2020  Pleural effusion on left 05/24/2020  Malignant neoplasm metastatic to bone ([address] 06/26/2019  Type 2 diabetes with nephropathy ([address] 07/03/2018  Diabetes mellitus due to underlying condition with hyperglycemia ([address] 12/21/2015  COVID-19 ruled out 12/15/2015  Hyponatremia 11/23/2015 A/p Principal Problem: 
  Acute respiratory failure with hypoxia ([address] (5/24/2020) - Multifactorial - effusions + pneumonia - s/p bilateral thora 5/25 and right thoracentesis 6/3 
- Slow to improve - Continue zosyn D5 
- continue daily IV lasix - Continue aerosols - Wean oxygen as appropriate - Appreciate Pulmonary's input and assistance 
  
Active Problems: 
  Aspiration pneumonia ([address] (6/1/2020) - Continue Zosyn D5 
  
  Rectal obstruction (6/1/2020) - Fecal obstruction - S/P flex sig with disimpaction on 6/2 
- Feels much improved 
- continue bowel regimen - Declines surgery 
  
  Hyponatremia (11/23/2015) - improved 
  
  Pleural effusion on left (5/24/2020) - S/P thoracentesis 
  
  Pleural effusion on right (5/25/2020) - S/P thoracentesis with 800ml on 6/3 
  
  Diabetes mellitus due to underlying condition with hyperglycemia ([address] (12/21/2015) - Stable - Continue Novolin SSI 
  
  Neutropenia ([address] (5/25/2020) - Resolved 
  
  COVID-19 ruled out (12/15/2015) 
  
  Malignant neoplasm metastatic to bone ([address] (6/26/2019) 
  
  Normocytic anemia (5/24/2020) Stable hgb 8's DVT Prophylaxis: lovenox Dispo - likely to need rehab when medically stable. CM following. Signed By: Betty Stacy DO   
 June 5, 2020

## 2020-06-05 NOTE — PROGRESS NOTES
Pharmacokinetic Consult to Pharmacist 
 
Eben Rosanna is a 79 y.o. male being treated for Asp Pneumonia with vancomycin and pip/tazo. Height: 6' (182.9 cm)  Weight: 76.2 kg (168 lb) Lab Results Component Value Date/Time BUN 12 06/05/2020 07:11 AM  
 Creatinine 0.66 (L) 06/05/2020 07:11 AM  
 WBC 7.9 06/05/2020 07:11 AM  
 Procalcitonin 0.30 05/25/2020 06:52 AM  
 Lactic acid 1.6 06/01/2020 06:24 AM  
 Lactic Acid (POC) 2.9 (H) 09/02/2018 07:59 PM  
  
Estimated Creatinine Clearance: 92.2 mL/min (A) (by C-G formula based on SCr of 0.66 mg/dL (L)). CULTURES: 
None recently Lab Results Component Value Date/Time Vancomycin,trough 15.2 06/04/2020 09:00 PM  
 
 
Day 5 of vancomycin. Goal trough is 15-20. Trough from 6/4 was therapeutic, continue 1250 mg every 12 hours for now. Pharmacy to order levels and adjust the dose as appropriate. Will continue to follow patient. Thank you, Rissa Daniel, PharmD, [facility] Clinical [facility] Special[facility] 
[phone]

## 2020-06-05 NOTE — PROGRESS NOTES
Interdisciplinary team rounds were held 6/5/2020 with the following team members:Care Management, Nursing and Physician. Plan of care discussed. See clinical pathway and/or care plan for interventions and desired outcomes.

## 2020-06-05 NOTE — PROGRESS NOTES
Patient is still on high flow 02. When patient gets off high flow CM will start [address] Marcio Beck will accept patient when medically stable and has insurance auth.

## 2020-06-06 NOTE — PROGRESS NOTES
Hodan Arita Admission Date: 5/24/2020 Daily Progress Note: 6/6/2020 The patient's chart is reviewed and the patient is discussed with the staff. The patient is a 79 y.o. male seen and evaluated at the request of Dr. Montserrat Mora for evaluation and management of pleural effusions. 
  
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 He received carbo/etoposide 2 weeks ago and has also had radiation for anal cancer. He has required anal dilation in past.  
 
He presented to [facility] on 5/24 with increasing dyspnea and chest discomfort. A CT of the chest revealed large bilateral effusions and we are now asked to assist with management. He is hyponatremic and getting saline infusions and also getting PRBCs for anemia. Had nausea/vomiting, aspiration event on 5/31 with hypoxemia. CT at that time suggested rectal obstruction. Flex sig on 6/2 with no anal stricture and stool impaction. S/p tap or Right chest with 800 removed on 6/3 Subjective:  
 
Patient is lying in bed and on Airvo at 50 L and 80%. Weak still. Wants help to sit up to eat. Wife with him. Current Facility-Administered Medications Medication Dose Route Frequency  potassium chloride (K-DUR, KLOR-CON) SR tablet 20 mEq  20 mEq Oral TID  furosemide (LASIX) injection 40 mg  40 mg IntraVENous DAILY  insulin glargine (LANTUS) injection 5 Units  5 Units SubCUTAneous QHS  lactated Ringers infusion  100 mL/hr IntraVENous CONTINUOUS  
 albuterol-ipratropium (DUO-NEB) 2.5 MG-0.5 MG/3 ML  3 mL Nebulization Q6HWA RT  
 piperacillin-tazobactam (ZOSYN) 3.375 g in 0.9% sodium chloride (MBP/ADV) 100 mL  3.375 g IntraVENous Q8H  
 hydrALAZINE (APRESOLINE) 20 mg/mL injection 10 mg  10 mg IntraVENous Q6H PRN  
  magnesium hydroxide (MILK OF MAGNESIA) 400 mg/5 mL oral suspension 30 mL  30 mL Oral DAILY  senna-docusate (PERICOLACE) 8.6-50 mg per tablet 1 Tab  1 Tab Oral BID  
 bisacodyL (DULCOLAX) suppository 10 mg  10 mg Rectal DAILY  0.9% sodium chloride infusion 250 mL  250 mL IntraVENous PRN  
 amLODIPine (NORVASC) tablet 10 mg  10 mg Oral DAILY  docusate sodium (COLACE) capsule 100 mg  100 mg Oral PRN  
 glimepiride (AMARYL) tablet 4 mg  4 mg Oral 7am  
 lisinopriL (PRINIVIL, ZESTRIL) tablet 20 mg  20 mg Oral DAILY  meloxicam (MOBIC) tablet 7.5 mg  7.5 mg Oral DAILY  sodium chloride tablet 2 g  2 g Oral BID  traMADoL (ULTRAM) tablet 50 mg  50 mg Oral Q6H PRN  
 sodium chloride (NS) flush 5-40 mL  5-40 mL IntraVENous Q8H  
 sodium chloride (NS) flush 5-40 mL  5-40 mL IntraVENous PRN  
 acetaminophen (TYLENOL) tablet 650 mg  650 mg Oral Q4H PRN  
 ondansetron (ZOFRAN) injection 4 mg  4 mg IntraVENous Q4H PRN  
 insulin regular (NOVOLIN R, HUMULIN R) injection   SubCUTAneous AC&HS  enoxaparin (LOVENOX) injection 40 mg  40 mg SubCUTAneous Q24H Review of Systems Constitutional: negative for fever, chills, sweats Cardiovascular: negative for chest pain, palpitations, syncope, edema Gastrointestinal:  negative for dysphagia, reflux, vomiting, diarrhea, abdominal pain, or melena Neurologic:  negative for focal weakness, numbness, headache Objective:  
 
Vitals:  
 06/05/20 2131 06/05/20 2307 06/05/20 2315 06/06/20 0235 BP:  152/79  146/77 Pulse:  (!) 108  100 Resp:  26  30 Temp:  98 °F (36.7 °C)  98.1 °F (36.7 °C) SpO2: 93% 90% 98% 100% Weight:  173 lb (78.5 kg) Height:      
 
 
Intake/Output Summary (Last 24 hours) at 6/6/2020 0820 Last data filed at 6/6/2020 4480 Gross per 24 hour Intake 4000 ml Output 2525 ml Net 1475 ml Physical Exam:  
Constitution: elderly, ill appearing, but not in distress EENMT:  Sclera clear, pupils equal, oral mucosa moist 
 Respiratory:decreased in bases on airvo at 50 L and 80% on 100% FIO2 Cardiovascular:  RRR without M,G,R 
Gastrointestinal: abdomen is foft today. Musculoskeletal: warm without cyanosis. There is no lower extremity edema. Skin:  no jaundice or rashes, no wounds Neurologic: no gross neuro deficits Psychiatric:  alert and oriented x 4 CXR 6/4/20 noted b/l effusion and slightly more in left base CXR 6/1/20 CXR: 5/27: bilateral small effusions CT C/A/P 6/1/20: 
chest-FINDINGS: 
- Pleura/pericardium: There are decreased small to moderate-sized bilateral 
pleural effusions. No pneumothorax or pericardial effusion is seen. - Lungs: There is progressed patchy edema or infiltrates in both lungs, worse in 
the right lower lobe. - Malina/Mediastinum: Within normal limits. - Tracheobronchial tree: Within normal limits. - Aorta/pulmonary arteries: Within normal limits. - Heart: Within normal limits. - Coronary arteries: There are coronary artery calcifications. - Chest wall: Within normal limits. - Spine/bones: Again noted are multiple sclerotic foci in the ribs and spine, 
consistent with osseous metastatic disease. 
- Additional comments Abdomen /pelvis: 
IMPRESSION:  
1. New marked constipation and distention of the entire colon, to the level of 
the lower rectum, where there is wall thickening which was PET avid on the prior 
study. This is suspect for rectal obstruction. No dilated small bowel loops are 
seen at this time. 2. Unchanged right adrenal gland nodule and osseous metastatic disease. CT 5/24: bilateral effusions LAB Recent Labs  
  06/06/20 
0715 06/05/20 
2135 06/05/20 
1634 06/05/20 
1058 06/05/20 
0815 GLUCPOC 95 162* 229* 212* 108* Recent Labs  
  06/06/20 
0626 06/05/20 
0711 06/04/20 
0702 06/03/20 
1517 WBC 8.8 7.9 8.3 9.7 HGB 8.6* 8.6* 8.7* 8.4* HCT 27.0* 27.5* 27.6* 26.9*  
 177 187 189 Recent Labs  
  06/06/20 
0626 06/05/20 6590 06/04/20 
3627 * 135* 136  
K 3.2* 3.2* 3.4*  
CL 97* 97* 96* CO2 29 30 34* GLU 83 120* 171* BUN 12 12 11 CREA 0.61* 0.66* 0.70* MG 2.0 2.0 1.8  
CA 8.5 8.8 8.9 No results for input(s): PH, PCO2, PO2, HCO3, PHI, PCO2I, PO2I, HCO3I in the last 72 hours. No results for input(s): LCAD, LAC in the last 72 hours. Assessment:  (Medical Decision Making) Hospital Problems  Date Reviewed: 5/20/2020 Codes Class Noted POA Rectal obstruction ICD-10-CM: K62.4 ICD-9-CM: 569.2  6/1/2020 Yes Aspiration pneumonia ([facility] ICD-10-CM: J69.0 ICD-9-CM: 507.0  6/1/2020 Yes Neutropenia ([facility] ICD-10-CM: D70.9 ICD-9-CM: 288.00  5/25/2020 Yes Pleural effusion on right ICD-10-CM: J90 ICD-9-CM: 511.9  5/25/2020 Yes * (Principal) Acute respiratory failure with hypoxia ([facility] ICD-10-CM: J96.01 
ICD-9-CM: 518.81  5/24/2020 Yes Normocytic anemia ICD-10-CM: D64.9 ICD-9-CM: 285.9  5/24/2020 Yes Pleural effusion on left ICD-10-CM: J90 ICD-9-CM: 511.9  5/24/2020 Yes Malignant neoplasm metastatic to bone [facility]) ICD-10-CM: C79.51 
ICD-9-CM: 198.5  6/26/2019 Yes Type 2 diabetes with nephropathy ([facility] ICD-10-CM: E11.21 
ICD-9-CM: 250.40, 583.81  7/3/2018 Yes Diabetes mellitus due to underlying condition with hyperglycemia ([facility] ICD-10-CM: I95.39 ICD-9-CM: 249.80  12/21/2015 Yes COVID-19 ruled out ICD-10-CM: Z03.818 ICD-9-CM: V71.83  12/15/2015 Yes Hyponatremia ICD-10-CM: E87.1 ICD-9-CM: 276.1  11/23/2015 Yes Plan:  (Medical Decision Making) --Continue day 6 zosyn. Noted cultures are negative. --taper oxygen on airvo on 80% and monitor if 100%. Told nurse to tell respiratory now and I called from desk. --continue diureses and noted in positive balance today. May need to adjust if still positive balance. F/u x-ray on Monday and may need more taps.  
--continue MOM, abdomen softer today 
--continue IS 
 --given slow rate of recovery will likely need LTACH. Will put request for  --continue remaining treatment. Spoke with patient's wife as well. Just spoke with Dr. Pagan as well and agrees may be a candidate for [facility]. More than 50% of the time documented was spent in face-to-face contact with the patient and in the care of the patient on the floor/unit where the patient is located.   
 
Mallory Bailon MD

## 2020-06-06 NOTE — PROGRESS NOTES
Hospitalist Progress Note 2020 Admit Date: 2020 11:00 PM  
NAME: Madelin Milligan :  1940 MRN:  [id number] Attending: Brittaney Linares DO 
PCP:  Blanquita Rosas MD 
 
SUBJECTIVE:  
Patient y. o. male with a past medical history of HEENT cancer and anal cancer undergoing chemo/radiation admitted due to acute respiratory failure due to large bilateral pleural effusions. He had a bilateral thoracentesis on  20. He has a history of rectal stricture but declined surgery. He went into fluid overload while taking GoLytely. He was diruresed. Pneumonia was also found so he was started on antibiotics. On  he went for a flex sig with fecal disimpaction. He had a another right thoracentesis with 800ml off on 6/3/20. 
  
 - pulm weaning oxygen. No acute concerns today. Wife at bedside. Still appears weak/fatigued. Review of Systems negative with exception of pertinent positives noted above PHYSICAL EXAM  
 
Visit Vitals /72 (BP 1 Location: Right arm, BP Patient Position: At rest;Head of bed elevated (Comment degrees)) Pulse 100 Temp 98.1 °F (36.7 °C) Resp 24 Ht 6' (1.829 m) Wt 78.5 kg (173 lb) SpO2 94% BMI 23.46 kg/m² Temp (24hrs), Av.2 °F (36.8 °C), Min:98 °F (36.7 °C), Max:98.9 °F (37.2 °C) Oxygen Therapy O2 Sat (%): 94 % (20 1607) Pulse via Oximetry: 103 beats per minute (20 1506) O2 Device: Hi flow nasal cannula (20 1506) O2 Flow Rate (L/min): 50 l/min (20 1506) O2 Temperature: 87.8 °F (31 °C) (20 1506) FIO2 (%): 50 % (20 1506) ETCO2 (mmHg): 93 mmHg (20 1624) Intake/Output Summary (Last 24 hours) at 2020 1901 Last data filed at 2020 1609 Gross per 24 hour Intake  Output 2825 ml Net -2825 ml General: No acute distress   
Lungs:  Diminished w/ bibasilar crackles Heart:  Regular rate and rhythm,  No murmur, rub, or gallop Abdomen: Soft, Non distended, Non tender, Positive bowel sounds Extremities: No cyanosis, clubbing or edema Neurologic:  No focal deficits ASSESSMENT Active Hospital Problems Diagnosis Date Noted  Rectal obstruction 06/01/2020  Aspiration pneumonia ([address] 06/01/2020  Neutropenia ([address] 05/25/2020  Pleural effusion on right 05/25/2020  Acute respiratory failure with hypoxia ([address] 05/24/2020  Normocytic anemia 05/24/2020  Pleural effusion on left 05/24/2020  Malignant neoplasm metastatic to bone ([address] 06/26/2019  Type 2 diabetes with nephropathy ([address] 07/03/2018  Diabetes mellitus due to underlying condition with hyperglycemia ([address] 12/21/2015  COVID-19 ruled out 12/15/2015  Hyponatremia 11/23/2015 A/p Principal Problem: 
  Acute respiratory failure with hypoxia ([address] (5/24/2020) - Multifactorial - effusions + pneumonia - s/p bilateral thora 5/25 and right thoracentesis 6/3 
- Slow to improve - Continue zosyn D6 
- continue daily IV lasix - Continue aerosols - Wean oxygen as appropriate - Appreciate Pulmonary's input and assistance 
  
Active Problems: 
  Aspiration pneumonia ([address] (6/1/2020) - Continue Zosyn D6 
  
  Rectal obstruction (6/1/2020) - Fecal obstruction - S/P flex sig with disimpaction on 6/2 
- Feels much improved 
- continue bowel regimen - Declines surgery 
  
  Hyponatremia (11/23/2015) - improved 
  
  Pleural effusion on left (5/24/2020) - S/P thoracentesis 
  
  Pleural effusion on right (5/25/2020) - S/P thoracentesis with 800ml on 6/3 
  
  Diabetes mellitus due to underlying condition with hyperglycemia ([address] (12/21/2015) - Stable - Continue Novolin SSI 
  
  Neutropenia ([address] (5/25/2020) - Resolved 
  
  COVID-19 ruled out (12/15/2015) 
  
  Malignant neoplasm metastatic to bone ([address] (6/26/2019) 
  
  Normocytic anemia (5/24/2020) Stable hgb 8's DVT Prophylaxis: lovenox Dispo - ?regency. CM following Signed By: Sergey Fitzpatrick, DO   
 June 6, 2020

## 2020-06-06 NOTE — PROGRESS NOTES
Report received from [place], [place]. PM assessment completed. PT is AAO x 4 with LEBRON on [employer] [place]. PT denies pain at this time. IV is CDI and infusing. Bed is low and locked with call light in reach, will continue to monitor.

## 2020-06-07 NOTE — PROGRESS NOTES
Grace Conn Admission Date: 5/24/2020 Daily Progress Note: 6/7/2020 The patient's chart is reviewed and the patient is discussed with the staff. The patient is a 79 y.o. male seen and evaluated at the request of Dr. Sharon Stout for evaluation and management of pleural effusions. 
  
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 He received carbo/etoposide 2 weeks ago and has also had radiation for anal cancer. He has required anal dilation in past.  
 
He presented to [facility] on 5/24 with increasing dyspnea and chest discomfort. A CT of the chest revealed large bilateral effusions and we are now asked to assist with management. He is hyponatremic and getting saline infusions and also getting PRBCs for anemia. Had nausea/vomiting, aspiration event on 5/31 with hypoxemia. CT at that time suggested rectal obstruction. Flex sig on 6/2 with no anal stricture and stool impaction. S/p tap or Right chest with 800 removed on 6/3 Subjective:  
 
Patient is lying in bed and on Airvo at 50 L and 50%. Weak still. Diursesd well and down by 2.4 L Current Facility-Administered Medications Medication Dose Route Frequency  potassium chloride (K-DUR, KLOR-CON) SR tablet 20 mEq  20 mEq Oral TID  furosemide (LASIX) injection 40 mg  40 mg IntraVENous DAILY  insulin glargine (LANTUS) injection 5 Units  5 Units SubCUTAneous QHS  albuterol-ipratropium (DUO-NEB) 2.5 MG-0.5 MG/3 ML  3 mL Nebulization Q6HWA RT  
 piperacillin-tazobactam (ZOSYN) 3.375 g in 0.9% sodium chloride (MBP/ADV) 100 mL  3.375 g IntraVENous Q8H  
 hydrALAZINE (APRESOLINE) 20 mg/mL injection 10 mg  10 mg IntraVENous Q6H PRN  
 magnesium hydroxide (MILK OF MAGNESIA) 400 mg/5 mL oral suspension 30 mL 30 mL Oral DAILY  senna-docusate (PERICOLACE) 8.6-50 mg per tablet 1 Tab  1 Tab Oral BID  
 bisacodyL (DULCOLAX) suppository 10 mg  10 mg Rectal DAILY  0.9% sodium chloride infusion 250 mL  250 mL IntraVENous PRN  
 amLODIPine (NORVASC) tablet 10 mg  10 mg Oral DAILY  docusate sodium (COLACE) capsule 100 mg  100 mg Oral PRN  
 glimepiride (AMARYL) tablet 4 mg  4 mg Oral 7am  
 lisinopriL (PRINIVIL, ZESTRIL) tablet 20 mg  20 mg Oral DAILY  meloxicam (MOBIC) tablet 7.5 mg  7.5 mg Oral DAILY  sodium chloride tablet 2 g  2 g Oral BID  traMADoL (ULTRAM) tablet 50 mg  50 mg Oral Q6H PRN  
 sodium chloride (NS) flush 5-40 mL  5-40 mL IntraVENous Q8H  
 sodium chloride (NS) flush 5-40 mL  5-40 mL IntraVENous PRN  
 acetaminophen (TYLENOL) tablet 650 mg  650 mg Oral Q4H PRN  
 ondansetron (ZOFRAN) injection 4 mg  4 mg IntraVENous Q4H PRN  
 insulin regular (NOVOLIN R, HUMULIN R) injection   SubCUTAneous AC&HS  enoxaparin (LOVENOX) injection 40 mg  40 mg SubCUTAneous Q24H Review of Systems Constitutional: negative for fever, chills, sweats Cardiovascular: negative for chest pain, palpitations, syncope, edema Gastrointestinal:  negative for dysphagia, reflux, vomiting, diarrhea, abdominal pain, or melena Neurologic:  negative for focal weakness, numbness, headache Objective:  
 
Vitals:  
 06/06/20 2135 06/06/20 2230 06/07/20 2676 06/07/20 9688 BP:  159/83 152/80 151/72 Pulse:  (!) 108 (!) 102 97 Resp:  28 28 28 Temp:  98.9 °F (37.2 °C) 98.1 °F (36.7 °C) 98.3 °F (36.8 °C) SpO2: 95% 97% 92% 96% Weight:      
Height:      
 
 
Intake/Output Summary (Last 24 hours) at 6/7/2020 0801 Last data filed at 6/7/2020 9408 Gross per 24 hour Intake  Output 2400 ml Net -2400 ml Physical Exam:  
Constitution: elderly, ill appearing, but not in distress EENMT:  Sclera clear, pupils equal, oral mucosa moist 
Respiratory:decreased in bases on airvo at 50 L and 50% on 96% FIO2 Cardiovascular:  RRR without M,G,R 
Gastrointestinal: abdomen is foft today. Musculoskeletal: warm without cyanosis. There is no lower extremity edema. Skin:  no jaundice or rashes, no wounds Neurologic: no gross neuro deficits Psychiatric:  alert and oriented x 4 CXR 6/4/20 noted b/l effusion and slightly more in left base CXR 6/1/20 CXR: 5/27: bilateral small effusions CT C/A/P 6/1/20: 
chest-FINDINGS: 
- Pleura/pericardium: There are decreased small to moderate-sized bilateral 
pleural effusions. No pneumothorax or pericardial effusion is seen. - Lungs: There is progressed patchy edema or infiltrates in both lungs, worse in 
the right lower lobe. - Malina/Mediastinum: Within normal limits. - Tracheobronchial tree: Within normal limits. - Aorta/pulmonary arteries: Within normal limits. - Heart: Within normal limits. - Coronary arteries: There are coronary artery calcifications. - Chest wall: Within normal limits. - Spine/bones: Again noted are multiple sclerotic foci in the ribs and spine, 
consistent with osseous metastatic disease. 
- Additional comments Abdomen /pelvis: 
IMPRESSION:  
1. New marked constipation and distention of the entire colon, to the level of 
the lower rectum, where there is wall thickening which was PET avid on the prior 
study. This is suspect for rectal obstruction. No dilated small bowel loops are 
seen at this time. 2. Unchanged right adrenal gland nodule and osseous metastatic disease. CT 5/24: bilateral effusions LAB Recent Labs  
  06/06/20 
2134 06/06/20 
1751 06/06/20 
1146 06/06/20 
0715 06/05/20 
2135 GLUCPOC 255* 313* 138* 95 162* Recent Labs  
  06/07/20 
3116 06/06/20 
3954 06/05/20 
8924 WBC 7.1 8.8 7.9 HGB 7.8* 8.6* 8.6* HCT 24.0* 27.0* 27.5*  
 161 177 Recent Labs  
  06/07/20 
1732 06/06/20 
3752 06/05/20 
9339 * 135* 135* K 3.5 3.2* 3.2*  
CL 97* 97* 97* CO2 30 29 30  
GLU 94 83 120* BUN 12 12 12 CREA 0.61* 0.61* 0.66* MG 2.0 2.0 2.0  
CA 8.8 8.5 8.8 No results for input(s): PH, PCO2, PO2, HCO3, PHI, PCO2I, PO2I, HCO3I in the last 72 hours. No results for input(s): LCAD, LAC in the last 72 hours. Assessment:  (Medical Decision Making) Hospital Problems  Date Reviewed: 5/20/2020 Codes Class Noted POA Rectal obstruction ICD-10-CM: K62.4 ICD-9-CM: 569.2  6/1/2020 Yes Aspiration pneumonia ([email or web address] ICD-10-CM: J69.0 ICD-9-CM: 507.0  6/1/2020 Yes Neutropenia ([facility] [email or web address] ICD-10-CM: D70.9 ICD-9-CM: 288.00  5/25/2020 Yes Pleural effusion on right ICD-10-CM: J90 ICD-9-CM: 511.9  5/25/2020 Yes * (Principal) Acute respiratory failure with hypoxia ([email or web address] ICD-10-CM: J96.01 
ICD-9-CM: 518.81  5/24/2020 Yes Normocytic anemia ICD-10-CM: D64.9 ICD-9-CM: 285.9  5/24/2020 Yes Pleural effusion on left ICD-10-CM: J90 ICD-9-CM: 511.9  5/24/2020 Yes Malignant neoplasm metastatic to bone [facility]) ICD-10-CM: C79.51 
ICD-9-CM: 198.5  6/26/2019 Yes Type 2 diabetes with nephropathy ([email or web address] ICD-10-CM: E11.21 
ICD-9-CM: 250.40, 583.81  7/3/2018 Yes Diabetes mellitus due to underlying condition with hyperglycemia ([email or web address] ICD-10-CM: C91.77 ICD-9-CM: 249.80  12/21/2015 Yes COVID-19 ruled out ICD-10-CM: Z03.818 ICD-9-CM: V71.83  12/15/2015 Yes Hyponatremia ICD-10-CM: E87.1 ICD-9-CM: 276.1  11/23/2015 Yes Plan:  (Medical Decision Making) --Continue day7 zosyn. Noted cultures are negative. --taper oxygen on airvo on 50 L and 50% told nursing to tell respiratory to taper further 
--suction setup since has cough but not able to expectorate 
--continue diureses and noted in positive balance today. May need to adjust if still positive balance. F/u x-ray on Monday and may need more taps. --continue MOM, abdomen softer today 
--continue IS 
--f/u labs tomorrow since hg is down --given slow rate of recovery will likely need LTACH. Will put request for  --continue remaining treatment. Spoke with patient's wife as well. More than 50% of the time documented was spent in face-to-face contact with the patient and in the care of the patient on the floor/unit where the patient is located.   
 
Jeremie Baldwin MD

## 2020-06-08 NOTE — PROGRESS NOTES
Problem: Mobility Impaired (Adult and Pediatric) Goal: *Acute Goals and Plan of Care (Insert Text) Outcome: Progressing Towards Goal 
Note: LTG: 
(1.)Mr. Savita Khanna will move from supine to sit and sit to supine , scoot up and down, and roll side to side in bed with INDEPENDENT within 7 treatment day(s). (2.)Mr. Savita Khanna will transfer from bed to chair and chair to bed with MODIFIED INDEPENDENCE using the least restrictive device within 7 treatment day(s). (3.)Mr. Savita Khanna will ambulate with MODIFIED INDEPENDENCE for 250 feet with the least restrictive device within 7 treatment day(s). (4.)Mr. Savita Khanna will demonstrate good dynamic standing balance within 7 treatment days. ________________________________________________________________________________________________ PHYSICAL THERAPY: Daily Note and PM 6/8/2020 INPATIENT: PT Visit Days : 4 Payor: Eliceo íDaz / Plan: [address] [place] [address] HMO / Product Type: [employer] Medicare /   
  
NAME/AGE/GENDER: Milagros Phalen is a 79 y.o. male PRIMARY DIAGNOSIS: Acute metabolic encephalopathy [I21.03] Acute respiratory failure with hypoxia (HCC) Acute respiratory failure with hypoxia (HCC) Procedure(s) (LRB): 
THORACENTESIS (Left) ULTRASOUND (Left) 5 Days Post-Op ICD-10: Treatment Diagnosis:  
 · Generalized Muscle Weakness (M62.81) · Other lack of cordination (R27.8) · Difficulty in walking, Not elsewhere classified (R26.2) · Other abnormalities of gait and mobility (R26.89) · Low Back Pain (M54.5) Precaution/Allergies: 
Patient has no known allergies. ASSESSMENT:  
 
Mr. Savita Khanna  is a 79year old male who has been admitted to hospital on 05/24 with above diagnosis and hx of cancer. Prior to hospital admission pt lives with wife in a 1 story home with 0 step(s) to enter with no railing. Pt endorses 0 falls in past 6 months. Prior to admission No O2 usage at home, no assistance with ADLs and uses no DME for mobility. Pt supine with his wife at the bedside on contact and agreeable to work with therapy. He required mod a and additional time to sit to EOB. He sat several minutes. Sit to stand x 2 to RW with min a to stand. He had a BM. Sat to EOB. PCT was called to room. Pt sat at EOB and performed bilateral LE ex as listed below until PCT and RN came to room. Mod x 2 (PCT assisted) to get pt back to bed. He required rests between each activity. Good effort just very weak. Did not make progress today. He required more assistance with mobility. Will continue working toward goals. This section established at most recent assessment PROBLEM LIST (Impairments causing functional limitations): 1. Decreased Strength 2. Decreased ADL/Functional Activities 3. Decreased Transfer Abilities 4. Decreased Ambulation Ability/Technique 5. Decreased Balance 6. Increased Pain 7. Decreased Activity Tolerance 8. Increased Fatigue 9. Decreased Flexibility/Joint Mobility 10. Decreased [place] with Home Exercise Program 
 INTERVENTIONS PLANNED: (Benefits and precautions of physical therapy have been discussed with the patient.) 1. Balance Exercise 2. Bed Mobility 3. Family Education 4. Gait Training 5. Heat 6. Home Exercise Program (HEP) 7. Manual Therapy 8. Neuromuscular Re-education/Strengthening 9. Range of Motion (ROM) 10. Therapeutic Activites 11. Therapeutic Exercise/Strengthening 12. Transfer Training TREATMENT PLAN: Frequency/Duration: 3 times a week for duration of hospital stay Rehabilitation Potential For Stated Goals: Good REHAB RECOMMENDATIONS (at time of discharge pending progress):   
Placement: It is my opinion, based on this patient's performance to date, that Mr. Gautam El may benefit from intensive therapy at a [address] [place] [address] after discharge due to the functional deficits listed above that are likely to improve with skilled rehabilitation and concerns that he/she may be unsafe to be unsupervised at home due to multiple comorbidities and high level of intense care needed. . 
Equipment:  
? Walkers, Type: Enrrique Los HISTORY:  
History of Present Injury/Illness (Reason for Referral): 
Per Physician Note: 
 
Kellie Crowder is a 79 y.o. male with a past medical history of HEENT cancer undergoing chemo/radiation who presents to the [facility] ER with report of SOB and chest discomfort for the past several days. He also admits to mild cough but denies fevers or chills. Admits to feeling a little better and breathing a bit better since arrival. 
  
Past Medical History/Comorbidities:  
Mr. Gautam El  has a past medical history of GERD (gastroesophageal reflux disease), Head and neck cancer ([facility] (9/30/2015), History of squamous cell carcinoma, History of throat cancer (2015), Hypercholesteremia, Hypertension, Hypomagnesemia (5/20/2020), Rectal cancer ([facility] (2018), Type 2 diabetes mellitus ([facility] and Vomiting (2/23/2016). Mr. Gautam El  has a past surgical history that includes hx orthopaedic (Right, 1966); hx other surgical (9/9/15); hx heent; hx tonsillectomy; hx heent (2015); hx colonoscopy (05/2018); hx vascular access; hx lymph node dissection; hx other surgical; and flexible sigmoidoscopy (N/A, 6/2/2020). Social History/Living Environment:  
Home Environment: Private residence # Steps to Enter: 0 One/Two Story Residence: One story Living Alone: No 
Support Systems: Spouse/Significant Other/Partner Patient Expects to be Discharged to[de-identified] Private residence Current DME Used/Available at Home: None Tub or Shower Type: Tub/Shower combination Prior Level of Function/Work/Activity: Mod I mobility and amb Number of Personal Factors/Comorbidities that affect the Plan of Care: 3+: HIGH COMPLEXITY EXAMINATION:  
Most Recent Physical Functioning:  
Gross Assessment: 
  
         
  
Posture: 
Posture Assessment: Forward head, Trunk flexion Balance: Sitting: Intact Standing: Impaired Standing - Static: Fair Standing - Dynamic : Fair Bed Mobility: 
Rolling: Contact guard assistance;Stand-by assistance Supine to Sit: Contact Guard Assist;Additional time Scooting: Stand-by assistance Wheelchair Mobility: 
  
Transfers: 
Sit to Stand: Minimum assistance Stand to Sit: Contact guard assistance Gait: 
  
   
  
Body Structures Involved: 1. Lungs 2. Bones 3. Joints Body Functions Affected: 1. Sensory/Pain 2. Movement Related Activities and Participation Affected: 1. Mobility 2. Self Care 3. Interpersonal Interactions and Relationships 4. Community, Social and [place] Number of elements that affect the Plan of Care: 4+: HIGH COMPLEXITY CLINICAL PRESENTATION:  
Presentation: Stable and uncomplicated: LOW COMPLEXITY CLINICAL DECISION MAKIN[address] [place] [id number] AM-PAC 6 Clicks Basic Mobility Inpatient Short Form How much difficulty does the patient currently have. .. Unable A Lot A Little None 1. Turning over in bed (including adjusting bedclothes, sheets and blankets)? [] 1   [] 2   [] 3   [x] 4  
2. Sitting down on and standing up from a chair with arms ( e.g., wheelchair, bedside commode, etc.)   [] 1   [] 2   [x] 3   [] 4  
3. Moving from lying on back to sitting on the side of the bed? [] 1   [] 2   [] 3   [x] 4 How much help from another person does the patient currently need. .. Total A Lot A Little None 4. Moving to and from a bed to a chair (including a wheelchair)? [] 1   [] 2   [x] 3   [] 4  
5. Need to walk in hospital room? [] 1   [] 2   [] 3   [x] 4  
6. Climbing 3-5 steps with a railing? [] 1   [x] 2   [] 3   [] 4  
© 2007, Trustees of [address] [place] [address] [address] [address], under license to [employer]. All rights reserved Score:  Initial: 20 Most Recent: X (Date: -- ) Interpretation of Tool:  Represents activities that are increasingly more difficult (i.e. Bed mobility, Transfers, Gait). Medical Necessity: · Patient is expected to demonstrate progress in  
· strength, range of motion, balance, coordination, and functional technique ·  to  
· increase independence with ambulation and mobility · . Reason for Services/Other Comments: 
· Patient continues to require skilled intervention due to · Gross weakness, poor balance, increased risk of falls · . Use of outcome tool(s) and clinical judgement create a POC that gives a: Clear prediction of patient's progress: LOW COMPLEXITY  
  
 
 
 
TREATMENT:  
  
Pre-treatment Symptoms/Complaints: Tired Pain: Initial:  
  0/10 Post Session:  0/10 Therapeutic Activity: (  25 mins): Therapeutic activities including Bed transfers, STS, standing balance and LE ex to improve mobility, strength, balance, and coordination. Required moderate   to promote static and dynamic balance in standing and promote coordination of bilateral, lower extremity(s). BLE AROM strength exercises in sitting and supine. Date: 
6/5/20 Date: 
6/8/20 Date: Activity/Exercise Parameters Parameters Parameters Ankle pumps 20 X 20 B Heel slides 2 x 10 Leg slides (hip abd/add) 2 x 10 Seated knee extension 10 B X 15 B Seated hip flexion 10 B X 15 B Braces/Orthotics/Lines/Etc:  
· IV 
· traore catheter · O2 monitor · O2 Device: Room air Treatment/Session Assessment:   
· Response to Treatment:  Easily fatigued · Interdisciplinary Collaboration:  
o Physical Therapy Assistant 
o Registered Nurse · After treatment position/precautions:  
o Supine in bed 
o Bed/Chair-wheels locked 
o RN and PCT in room 
o Family at bedside · Compliance with Program/Exercises: Will assess as treatment progresses · Recommendations/Intent for next treatment session: \"Next visit will focus on advancements to more challenging activities\". Total Treatment Duration: PT Patient Time In/Time Out Time In: 1722 Time Out: 1410 Sayda Nuno PTA

## 2020-06-08 NOTE — PROGRESS NOTES
Patient in bed resting quietly. Alert and oriented x 4. Airvo and functioning properly. Denies any pain. Bowel sounds active. Juarez draining clear yellow urine. No distress observed. Call light in reach. Bed in low position. Side rails up x 2. Bed alarm on. Instructed to call with any needs, patient verbalized understanding.

## 2020-06-08 NOTE — PROGRESS NOTES
Hospitalist Progress Note 2020 Admit Date: 2020 11:00 PM  
NAME: Wenona Collet :  1940 MRN:  [id number] Attending: Aidan Pritchett DO 
PCP:  Heaven Simon MD 
 
SUBJECTIVE:  
Patient y. o. male with a past medical history of HEENT cancer and anal cancer undergoing chemo/radiation admitted due to acute respiratory failure due to large bilateral pleural effusions. He had a bilateral thoracentesis on  20. He has a history of rectal stricture but declined surgery. He went into fluid overload while taking GoLytely. He was diruresed. Pneumonia was also found so he was started on antibiotics. On  he went for a flex sig with fecal disimpaction. He had a another right thoracentesis with 800ml off on 6/3/20. 
  
 - resting on arrival. Ate well today. Remains weak. No acute concerns. Review of Systems negative with exception of pertinent positives noted above PHYSICAL EXAM  
 
Visit Vitals /69 (BP 1 Location: Right arm) Pulse 97 Temp 98.1 °F (36.7 °C) Resp 22 Ht 6' (1.829 m) Wt 78.5 kg (173 lb) SpO2 94% BMI 23.46 kg/m² Temp (24hrs), Av.3 °F (36.8 °C), Min:98 °F (36.7 °C), Max:98.9 °F (37.2 °C) Oxygen Therapy O2 Sat (%): 94 % (20) Pulse via Oximetry: 111 beats per minute (20) O2 Device: Heated; Hi flow nasal cannula (20) O2 Flow Rate (L/min): 50 l/min (20) O2 Temperature: 87.8 °F (31 °C) (20 1448) FIO2 (%): 35 % (20) ETCO2 (mmHg): 93 mmHg (20 162) Intake/Output Summary (Last 24 hours) at 2020 Last data filed at 2020 1520 Gross per 24 hour Intake 240 ml Output 1400 ml Net -1160 ml General: No acute distress   
Lungs:  Diminished w/ bibasilar crackles Heart:  Regular rate and rhythm,  No murmur, rub, or gallop Abdomen: Soft, Non distended, Non tender, Positive bowel sounds Extremities: No cyanosis, clubbing or edema Neurologic:  No focal deficits ASSESSMENT Active Hospital Problems Diagnosis Date Noted  Rectal obstruction 06/01/2020  Aspiration pneumonia ([address] 06/01/2020  Neutropenia ([address] 05/25/2020  Pleural effusion on right 05/25/2020  Acute respiratory failure with hypoxia ([address] 05/24/2020  Normocytic anemia 05/24/2020  Pleural effusion on left 05/24/2020  Malignant neoplasm metastatic to bone ([address] 06/26/2019  Type 2 diabetes with nephropathy ([address] 07/03/2018  Diabetes mellitus due to underlying condition with hyperglycemia ([address] 12/21/2015  COVID-19 ruled out 12/15/2015  Hyponatremia 11/23/2015 A/p Principal Problem: 
  Acute respiratory failure with hypoxia ([address] (5/24/2020) - Multifactorial - effusions + pneumonia - s/p bilateral thora 5/25 and right thoracentesis 6/3 
- Slow to improve - Continue zosyn D7 
- continue daily IV lasix - Continue aerosols - Wean oxygen as appropriate 
- repeat CXR for 6/8 
  
Active Problems: 
  Aspiration pneumonia ([address] (6/1/2020) - Continue Zosyn D7 
  
  Rectal obstruction (6/1/2020) - Fecal obstruction - S/P flex sig with disimpaction on 6/2 
- Feels much improved 
- continue bowel regimen - Declines surgery 
  
  Hyponatremia (11/23/2015) - improved 
  
  Pleural effusion on left (5/24/2020) - S/P thoracentesis 
  
  Pleural effusion on right (5/25/2020) - S/P thoracentesis with 800ml on 6/3 
  
  Diabetes mellitus due to underlying condition with hyperglycemia ([address] (12/21/2015) - Stable - Continue Novolin SSI 
  
  Neutropenia ([address] (5/25/2020) - Resolved 
  
  COVID-19 ruled out (12/15/2015) 
  
  Malignant neoplasm metastatic to bone ([address] (6/26/2019) 
  
  Normocytic anemia (5/24/2020) 
- hemoglobin trending down. Check nutritional studies DVT Prophylaxis: lovenox Dispo - ?regency. CM following Signed By: Silvestre Julien DO   
 June 7, 2020

## 2020-06-08 NOTE — PROGRESS NOTES
HOB elevated, drop in O2 sat 88 , respiratory notified, SOB on exertion and at rest, wife at bedside, continuous airflow, oral suctioning by patient.

## 2020-06-08 NOTE — PROGRESS NOTES
Tahira Yates Admission Date: 5/24/2020 Daily Progress Note: 6/8/2020 The patient's chart is reviewed and the patient is discussed with the staff. The patient is a 79 y.o. male seen and evaluated at the request of Dr. Adriana Martinez for evaluation and management of pleural effusions. 
  
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 He received carbo/etoposide 2 weeks ago and has also had radiation for anal cancer. He has required anal dilation in past.  
 
He presented to [facility] on 5/24 with increasing dyspnea and chest discomfort. A CT of the chest revealed large bilateral effusions and we are now asked to assist with management. He is hyponatremic and getting saline infusions and also getting PRBCs for anemia. Had nausea/vomiting, aspiration event on 5/31 with hypoxemia. CT at that time suggested rectal obstruction. Flex sig on 6/2 with no anal stricture and stool impaction. S/p tap or Right chest with 800 removed on 6/3 Subjective:  
 
Still on airvo Current Facility-Administered Medications Medication Dose Route Frequency  potassium chloride (K-DUR, KLOR-CON) SR tablet 20 mEq  20 mEq Oral TID  furosemide (LASIX) injection 40 mg  40 mg IntraVENous DAILY  insulin glargine (LANTUS) injection 5 Units  5 Units SubCUTAneous QHS  albuterol-ipratropium (DUO-NEB) 2.5 MG-0.5 MG/3 ML  3 mL Nebulization Q6HWA RT  
 piperacillin-tazobactam (ZOSYN) 3.375 g in 0.9% sodium chloride (MBP/ADV) 100 mL  3.375 g IntraVENous Q8H  
 hydrALAZINE (APRESOLINE) 20 mg/mL injection 10 mg  10 mg IntraVENous Q6H PRN  
 magnesium hydroxide (MILK OF MAGNESIA) 400 mg/5 mL oral suspension 30 mL  30 mL Oral DAILY  senna-docusate (PERICOLACE) 8.6-50 mg per tablet 1 Tab  1 Tab Oral BID  
  bisacodyL (DULCOLAX) suppository 10 mg  10 mg Rectal DAILY  0.9% sodium chloride infusion 250 mL  250 mL IntraVENous PRN  
 amLODIPine (NORVASC) tablet 10 mg  10 mg Oral DAILY  docusate sodium (COLACE) capsule 100 mg  100 mg Oral PRN  
 glimepiride (AMARYL) tablet 4 mg  4 mg Oral 7am  
 lisinopriL (PRINIVIL, ZESTRIL) tablet 20 mg  20 mg Oral DAILY  meloxicam (MOBIC) tablet 7.5 mg  7.5 mg Oral DAILY  sodium chloride tablet 2 g  2 g Oral BID  traMADoL (ULTRAM) tablet 50 mg  50 mg Oral Q6H PRN  
 sodium chloride (NS) flush 5-40 mL  5-40 mL IntraVENous Q8H  
 sodium chloride (NS) flush 5-40 mL  5-40 mL IntraVENous PRN  
 acetaminophen (TYLENOL) tablet 650 mg  650 mg Oral Q4H PRN  
 ondansetron (ZOFRAN) injection 4 mg  4 mg IntraVENous Q4H PRN  
 insulin regular (NOVOLIN R, HUMULIN R) injection   SubCUTAneous AC&HS  enoxaparin (LOVENOX) injection 40 mg  40 mg SubCUTAneous Q24H Review of Systems Constitutional: negative for fever, chills, sweats Cardiovascular: negative for chest pain, palpitations, syncope, edema Gastrointestinal:  negative for dysphagia, reflux, vomiting, diarrhea, abdominal pain, or melena Neurologic:  negative for focal weakness, numbness, headache Objective:  
 
Vitals:  
 06/08/20 2815 06/08/20 6625 06/08/20 2489 06/08/20 1122 BP: 157/60 120/76  128/64 Pulse: (!) 104 96  97 Resp: 18 20  20 Temp: 97.9 °F (36.6 °C) 98.5 °F (36.9 °C)  98.1 °F (36.7 °C) SpO2: 94% 93% 99% 96% Weight:      
Height:      
 
 
Intake/Output Summary (Last 24 hours) at 6/8/2020 1340 Last data filed at 6/8/2020 1331 Gross per 24 hour Intake 360 ml Output 1425 ml Net -1065 ml Physical Exam:  
Constitution: elderly, ill appearing, but not in distress EENMT:  Sclera clear, pupils equal, oral mucosa moist 
Respiratory:decreased in bases on airvo at 50 L and 50% on 96% FIO2 Cardiovascular:  RRR without M,G,R 
Gastrointestinal: abdomen is foft today. Musculoskeletal: warm without cyanosis. There is no lower extremity edema. Skin:  no jaundice or rashes, no wounds Neurologic: no gross neuro deficits Psychiatric:  alert and oriented x 4 CXR 6/4/20 noted b/l effusion and slightly more in left base CXR 6/1/20 CXR: 5/27: bilateral small effusions CT C/A/P 6/1/20: 
chest-FINDINGS: 
- Pleura/pericardium: There are decreased small to moderate-sized bilateral 
pleural effusions. No pneumothorax or pericardial effusion is seen. - Lungs: There is progressed patchy edema or infiltrates in both lungs, worse in 
the right lower lobe. - Malina/Mediastinum: Within normal limits. - Tracheobronchial tree: Within normal limits. - Aorta/pulmonary arteries: Within normal limits. - Heart: Within normal limits. - Coronary arteries: There are coronary artery calcifications. - Chest wall: Within normal limits. - Spine/bones: Again noted are multiple sclerotic foci in the ribs and spine, 
consistent with osseous metastatic disease. 
- Additional comments Abdomen /pelvis: 
IMPRESSION:  
1. New marked constipation and distention of the entire colon, to the level of 
the lower rectum, where there is wall thickening which was PET avid on the prior 
study. This is suspect for rectal obstruction. No dilated small bowel loops are 
seen at this time. 2. Unchanged right adrenal gland nodule and osseous metastatic disease. CT 5/24: bilateral effusions LAB Recent Labs  
  06/08/20 
1107 06/08/20 
0709 06/07/20 
2122 06/07/20 
1706 06/07/20 
1131 GLUCPOC 206* 110* 215* 178* 204* Recent Labs  
  06/08/20 
6124 06/07/20 
5757 06/06/20 
4691 WBC 6.6 7.1 8.8 HGB 7.6* 7.8* 8.6* HCT 23.1* 24.0* 27.0*  
 162 161 Recent Labs  
  06/08/20 
3271 06/07/20 
8955 06/06/20 
6685 * 134* 135* K 4.1 3.5 3.2*  
CL 97* 97* 97* CO2 31 30 29 GLU 97 94 83 BUN 13 12 12 CREA 0.66* 0.61* 0.61* MG 2.0 2.0 2.0  
CA 8.5 8.8 8.5 No results for input(s): PH, PCO2, PO2, HCO3, PHI, PCO2I, PO2I, HCO3I in the last 72 hours. No results for input(s): LCAD, LAC in the last 72 hours. Assessment:  (Medical Decision Making) Hospital Problems  Date Reviewed: 5/20/2020 Codes Class Noted POA Rectal obstruction ICD-10-CM: K62.4 ICD-9-CM: 569.2  6/1/2020 Yes Aspiration pneumonia ([facility] ICD-10-CM: J69.0 ICD-9-CM: 507.0  6/1/2020 Yes Neutropenia ([facility] ICD-10-CM: D70.9 ICD-9-CM: 288.00  5/25/2020 Yes Pleural effusion on right ICD-10-CM: J90 ICD-9-CM: 511.9  5/25/2020 Yes * (Principal) Acute respiratory failure with hypoxia ([place] [address].) ICD-10-CM: J96.01 
ICD-9-CM: 518.81  5/24/2020 Yes Normocytic anemia ICD-10-CM: D64.9 ICD-9-CM: 285.9  5/24/2020 Yes Pleural effusion on left ICD-10-CM: J90 ICD-9-CM: 511.9  5/24/2020 Yes Malignant neoplasm metastatic to bone [facility]) ICD-10-CM: C79.51 
ICD-9-CM: 198.5  6/26/2019 Yes Type 2 diabetes with nephropathy ([facility] ICD-10-CM: E11.21 
ICD-9-CM: 250.40, 583.81  7/3/2018 Yes Diabetes mellitus due to underlying condition with hyperglycemia ([facility] ICD-10-CM: A98.69 ICD-9-CM: 249.80  12/21/2015 Yes COVID-19 ruled out ICD-10-CM: Z03.818 ICD-9-CM: V71.83  12/15/2015 Yes Hyponatremia ICD-10-CM: E87.1 ICD-9-CM: 276.1  11/23/2015 Yes Plan:  (Medical Decision Making) --stop zosyn -finished 8 days 
-recheck CXR 
-wean airvo as tolerated  
--given slow rate of recovery will likely need LTACH. Will put request for  --continue remaining treatment. More than 50% of the time documented was spent in face-to-face contact with the patient and in the care of the patient on the floor/unit where the patient is located.   
 
Kylah Pastor MD

## 2020-06-08 NOTE — PROGRESS NOTES
Problem: Falls - Risk of 
Goal: *Absence of Falls Description: Document Geno Ramirez Fall Risk and appropriate interventions in the flowsheet. Outcome: Progressing Towards Goal 
Note: Fall Risk Interventions: 
Mobility Interventions: Bed/chair exit alarm Mentation Interventions: Adequate sleep, hydration, pain control, Door open when patient unattended, Evaluate medications/consider consulting pharmacy Medication Interventions: Bed/chair exit alarm, Evaluate medications/consider consulting pharmacy Elimination Interventions: Bed/chair exit alarm

## 2020-06-08 NOTE — PROGRESS NOTES
06/08/20 0407 Pain 1 Pain Scale 1 Numeric (0 - 10) Pain Intensity 1 7 Pain Location 1 Abdomen Pain Orientation 1 Anterior Pain Description 1 Aching;Cramping Pain Intervention(s) 1 Medication (see MAR) Ultram 50 mg po given 7607: pt in bed resting visual pain score 0/10

## 2020-06-08 NOTE — PROGRESS NOTES
Hospitalist Note Admit Date:  2020 11:00 PM  
Name:  Aftab Cedillo Age:  79 y.o. 
:  1940 MRN:  [id number] PCP:  Josefina Flynn MD 
Treatment Team: Attending Provider: Jaren Zimmerman MD; Consulting Provider: Eugene Tubbs MD; Utilization Review: Vikash Black RN; Care Manager: Luis Cheema; Physical Therapy Assistant: Nilo Guadarrama PTA; Occupational Therapist: Archana German OTR/JOSE 
 
HPI/Subjective:  
 
Mr. Hayder Love is a 80 yo male with PMH of oral and anal cancers admitted with acute hypoxic respiratory failure due to bilateral pleural effusions. He has been seen by pulm s/p bilateral thoracentesis , right thoracentesis 6-3 and diuresis. He has compelted 8 days zosyn. He has been seen by GI s/p 6- flex sig with disimpaction. Discharge plans pending. 20 wife present, asking about repeat CXR, still on optiflow, ROS limited due to patients interactions, just woke up Objective:  
 
Patient Vitals for the past 24 hrs: 
 Temp Pulse Resp BP SpO2  
20 1437     100 % 20 1427 98.1 °F (36.7 °C) 100 20 136/45 99 % 20 1122 98.1 °F (36.7 °C) 97 20 128/64 96 % 20 0843     99 % 20 0614 98.5 °F (36.9 °C) 96 20 120/76 93 % 20 0346 97.9 °F (36.6 °C) (!) 104 18 157/60 94 % 20 2345 98.4 °F (36.9 °C) (!) 110 18 152/81 96 % 20 2049 98.2 °F (36.8 °C) (!) 105 20 150/73 95 % 20 1826     94 % Oxygen Therapy O2 Sat (%): 100 % (20) Pulse via Oximetry: 98 beats per minute (20) O2 Device: Heated; Hi flow nasal cannula (20) O2 Flow Rate (L/min): 45 l/min (20) O2 Temperature: 87.8 °F (31 °C) (20) FIO2 (%): 48 % (20) ETCO2 (mmHg): 93 mmHg (20) Estimated body mass index is 23.46 kg/m² as calculated from the following: 
  Height as of this encounter: 6' (1.829 m). Weight as of this encounter: 78.5 kg (173 lb). Intake/Output Summary (Last 24 hours) at 6/8/2020 1601 Last data filed at 6/8/2020 1331 Gross per 24 hour Intake 360 ml Output 625 ml Net -265 ml *Note that automatically entered I/Os may not be accurate; dependent on patient compliance with collection and accurate  by techs. General:    Sleepy, elderly, no distress. CV:   RRR. No murmur, rub, or gallop. No edema Lungs:   CTAB. No wheezing, rhonchi, or rales. anterior Abdomen:   Soft, nontender, nondistended. Extremities: Warm and dry. Skin:     No rashes or jaundice. Neuro:  No gross focal deficits Data Review: 
I have reviewed all labs, meds, and studies from the last 24 hours: 
Recent Results (from the past 24 hour(s)) GLUCOSE, POC Collection Time: 06/07/20  5:06 PM  
Result Value Ref Range Glucose (POC) 178 (H) 65 - 100 mg/dL GLUCOSE, POC Collection Time: 06/07/20  9:22 PM  
Result Value Ref Range Glucose (POC) 215 (H) 65 - 100 mg/dL GLUCOSE, POC Collection Time: 06/08/20  7:09 AM  
Result Value Ref Range Glucose (POC) 110 (H) 65 - 100 mg/dL FERRITIN Collection Time: 06/08/20  7:11 AM  
Result Value Ref Range Ferritin 1,630 (H) 8 - 388 NG/ML  
TRANSFERRIN SATURATION Collection Time: 06/08/20  7:11 AM  
Result Value Ref Range Iron 25 (L) 35 - 150 ug/dL TIBC 127 (L) 250 - 450 ug/dL Transferrin Saturation 20 (L) >20 % VITAMIN B12 Collection Time: 06/08/20  7:11 AM  
Result Value Ref Range Vitamin B12 1,333 (H) 193 - 986 pg/mL FOLATE Collection Time: 06/08/20  7:11 AM  
Result Value Ref Range Folate 9.8 3.1 - 17.5 ng/mL METABOLIC PANEL, BASIC Collection Time: 06/08/20  7:11 AM  
Result Value Ref Range Sodium 134 (L) 136 - 145 mmol/L Potassium 4.1 3.5 - 5.1 mmol/L Chloride 97 (L) 98 - 107 mmol/L  
 CO2 31 21 - 32 mmol/L Anion gap 6 (L) 7 - 16 mmol/L Glucose 97 65 - 100 mg/dL BUN 13 8 - 23 MG/DL Creatinine 0.66 (L) 0.8 - 1.5 MG/DL  
 GFR est AA >60 >60 ml/min/1.73m2 GFR est non-AA >60 >60 ml/min/1.73m2 Calcium 8.5 8.3 - 10.4 MG/DL MAGNESIUM Collection Time: 06/08/20  7:11 AM  
Result Value Ref Range Magnesium 2.0 1.8 - 2.4 mg/dL CBC WITH AUTOMATED DIFF Collection Time: 06/08/20  7:11 AM  
Result Value Ref Range WBC 6.6 4.3 - 11.1 K/uL  
 RBC 2.53 (L) 4.23 - 5.6 M/uL HGB 7.6 (L) 13.6 - 17.2 g/dL HCT 23.1 (L) 41.1 - 50.3 % MCV 91.3 79.6 - 97.8 FL  
 MCH 30.0 26.1 - 32.9 PG  
 MCHC 32.9 31.4 - 35.0 g/dL  
 RDW 15.9 (H) 11.9 - 14.6 % PLATELET 107 783 - 513 K/uL MPV 10.1 9.4 - 12.3 FL ABSOLUTE NRBC 0.00 0.0 - 0.2 K/uL NEUTROPHILS 69 43 - 78 % LYMPHOCYTES 9 (L) 13 - 44 % MONOCYTES 13 (H) 4.0 - 12.0 % EOSINOPHILS 1 0.5 - 7.8 % BASOPHILS 0 0.0 - 2.0 % IMMATURE GRANULOCYTES 8 (H) 0.0 - 5.0 %  
 ABS. NEUTROPHILS 4.5 1.7 - 8.2 K/UL  
 ABS. LYMPHOCYTES 0.6 0.5 - 4.6 K/UL  
 ABS. MONOCYTES 0.9 0.1 - 1.3 K/UL  
 ABS. EOSINOPHILS 0.1 0.0 - 0.8 K/UL  
 ABS. BASOPHILS 0.0 0.0 - 0.2 K/UL  
 ABS. IMM. GRANS. 0.5 0.0 - 0.5 K/UL  
 RBC COMMENTS ANISOCYTOSIS + POIKILOCYTOSIS    
 RBC COMMENTS SLIGHT 
HYPOCHROMIA 
    
 RBC COMMENTS OCCASIONAL 
TEARDROP CELLS 
    
 WBC COMMENTS Result Confirmed By Smear PLATELET COMMENTS ADEQUATE    
 DF AUTOMATED    
GLUCOSE, POC Collection Time: 06/08/20 11:07 AM  
Result Value Ref Range Glucose (POC) 206 (H) 65 - 100 mg/dL Current Meds: 
Current Facility-Administered Medications Medication Dose Route Frequency  potassium chloride (K-DUR, KLOR-CON) SR tablet 20 mEq  20 mEq Oral TID  furosemide (LASIX) injection 40 mg  40 mg IntraVENous DAILY  insulin glargine (LANTUS) injection 5 Units  5 Units SubCUTAneous QHS  albuterol-ipratropium (DUO-NEB) 2.5 MG-0.5 MG/3 ML  3 mL Nebulization Q6HWA RT  
 hydrALAZINE (APRESOLINE) 20 mg/mL injection 10 mg  10 mg IntraVENous Q6H PRN  
  magnesium hydroxide (MILK OF MAGNESIA) 400 mg/5 mL oral suspension 30 mL  30 mL Oral DAILY  senna-docusate (PERICOLACE) 8.6-50 mg per tablet 1 Tab  1 Tab Oral BID  
 bisacodyL (DULCOLAX) suppository 10 mg  10 mg Rectal DAILY  0.9% sodium chloride infusion 250 mL  250 mL IntraVENous PRN  
 amLODIPine (NORVASC) tablet 10 mg  10 mg Oral DAILY  docusate sodium (COLACE) capsule 100 mg  100 mg Oral PRN  
 glimepiride (AMARYL) tablet 4 mg  4 mg Oral 7am  
 lisinopriL (PRINIVIL, ZESTRIL) tablet 20 mg  20 mg Oral DAILY  meloxicam (MOBIC) tablet 7.5 mg  7.5 mg Oral DAILY  sodium chloride tablet 2 g  2 g Oral BID  traMADoL (ULTRAM) tablet 50 mg  50 mg Oral Q6H PRN  
 sodium chloride (NS) flush 5-40 mL  5-40 mL IntraVENous Q8H  
 sodium chloride (NS) flush 5-40 mL  5-40 mL IntraVENous PRN  
 acetaminophen (TYLENOL) tablet 650 mg  650 mg Oral Q4H PRN  
 ondansetron (ZOFRAN) injection 4 mg  4 mg IntraVENous Q4H PRN  
 insulin regular (NOVOLIN R, HUMULIN R) injection   SubCUTAneous AC&HS  enoxaparin (LOVENOX) injection 40 mg  40 mg SubCUTAneous Q24H Other Studies: 
Results for orders placed or performed during the hospital encounter of 20  
2D ECHO COMPLETE ADULT (TTE) W OR WO CONTR Narrative [facility] [address] [place] [address], [facility] 
[phone] Transthoracic Echocardiogram 
2D, M-mode, Doppler, and Color Doppler Patient: Summer Dinero 
MR #: [id number] : [id number] Age: 79 years Gender: Male Study date: 26-May-2020 Account #: [de-identified] Height: 72 in 72 in 
Weight: 164 lb 163.7 lb 
BSA: 1.96 mï¾² 1.96 mï¾² Status:Routine Location: 831 BP: 169/ 88 Allergies: NO KNOWN ALLERGENS Sonographer:  Andra Ashley [facility] Group:  [facility] Referring Physician:  Alesia Jacobs MD 
Reading Physician:  Sujata Evans. MD Nara [facility] [place] INDICATIONS: Bilateral pleural effusions *Pt. scanned supine. Unable to turn LLD. PROCEDURE: This was a routine study. A transthoracic echocardiogram was 
performed. The study included complete 2D imaging, M-mode, complete spectral 
Doppler, and color Doppler. Intravenous contrast (Definity, 1 ml) was 
administered to opacify the left ventricle. Image quality was adequate. LEFT VENTRICLE: Size was normal. Systolic function was normal. Ejection 
fraction was estimated in the range of 60 % to 65 %. There were no regional 
wall motion abnormalities. Wall thickness was normal. Left ventricular 
diastolic function parameters were normal. E/e' av.46. RIGHT VENTRICLE: The size was normal. Systolic function was normal. 
 
LEFT ATRIUM: Size was normal. 
 
RIGHT ATRIUM: Size was normal. 
 
SYSTEMIC VEINS: IVC: The inferior vena cava was normal in size and course. AORTIC VALVE: The valve was structurally normal, tri-commissural. There was  
no 
evidence for stenosis. There was no insufficiency. MITRAL VALVE: Valve structure was normal. There was no evidence for stenosis. There was no regurgitation. TRICUSPID VALVE: The valve structure was normal. There was no evidence for 
stenosis. There was trivial regurgitation. PULMONIC VALVE: Not well visualized. There was no evidence for stenosis. There 
was no insufficiency. PERICARDIUM: There was no pericardial effusion. AORTA: The root exhibited normal size. SUMMARY: 
 
-  Left ventricle: Systolic function was normal. Ejection fraction was 
estimated in the range of 60 % to 65 %. There were no regional wall motion 
abnormalities. -  Pericardium: There was no pericardial effusion. SYSTEM MEASUREMENT TABLES 
 
2D Ao Diam: 3.2 cm 
LA Diam: 2.6 cm 
LAEDV Index (A-L): 25.9 ml/m2 %FS: 34.9 % IVSd: 1.1 cm 
LVIDd: 3.7 cm 
LVIDs: 2.4 cm 
LVOT Diam: 2.1 cm 
LVPWd: 1.2 cm Prepared and signed by 
 
Doran Najjar. MD Nara [facility] Signed 26-May-2020 16:55:26 No results found. All Micro Results Procedure Component Value Units Date/Time FUNGUS CULTURE AND SMEAR [[id number]] Collected:  05/25/20 1315 Order Status:  Completed Specimen:  Miscellaneous sample Updated:  05/28/20 1536 Source LEFT Comment: Pleural Fluid Specimen Fungus stain Direct Inoculation Fungus (Mycology) Culture Other source received Comment: (NOTE) Performed At: [address] [id number] Jaylen Ragsdale MD [id number] AFB CULTURE + SMEAR W/RFLX ID FROM CULTURE [[id number]] Collected:  05/25/20 1315 Order Status:  Completed Specimen:  Miscellaneous sample Updated:  05/27/20 1537 Source LEFT Comment: Pleural Fluid Specimen AFB Specimen processing Concentration Acid Fast Smear Negative Comment: (NOTE) Performed At: [address] [id number] Jaylen Ragsdale MD [id number] Acid Fast Culture PENDING  
 CULTURE, BODY FLUID W [place] [[id number]] Collected:  05/25/20 1315 Order Status:  Completed Specimen:  Left Updated:  05/27/20 9166 Special Requests: NO SPECIAL REQUESTS     
  GRAM STAIN 0 TO 1 WBC'S/OIF  
   NO DEFINITE ORGANISM SEEN Culture result: NO GROWTH 2 DAYS     
  
 
 
SARS-CoV-2 Lab Results \"Novel Coronavirus\" Test: No results found for: COV2NT \"Emergent Disease\" Test:  
Lab Results Component Value Date/Time EDPR Not detected 05/24/2020 06:22 PM  
 
\"SARS-COV-2\" Test: No results found for: XGCOVT As of: 4:01 PM on 6/8/2020 Assessment and Plan:  
 
Hospital Problems as of 6/8/2020 Date Reviewed: 5/20/2020 Codes Class Noted - Resolved POA Rectal obstruction ICD-10-CM: K62.4 ICD-9-CM: 569.2  6/1/2020 - Present Yes Aspiration pneumonia ([facility] ICD-10-CM: J69.0 ICD-9-CM: 507.0  6/1/2020 - Present Yes Neutropenia ([facility] ICD-10-CM: D70.9 ICD-9-CM: 288.00  5/25/2020 - Present Yes Pleural effusion on right ICD-10-CM: J90 ICD-9-CM: 511.9  5/25/2020 - Present Yes * (Principal) Acute respiratory failure with hypoxia [facility]) ICD-10-CM: J96.01 
ICD-9-CM: 518.81  5/24/2020 - Present Yes Normocytic anemia ICD-10-CM: D64.9 ICD-9-CM: 285.9  5/24/2020 - Present Yes  
   
 CAP (community acquired pneumonia) ICD-10-CM: J18.9 ICD-9-CM: 717  5/24/2020 - Present Pleural effusion on left ICD-10-CM: J90 ICD-9-CM: 511.9  5/24/2020 - Present Yes Malignant neoplasm metastatic to bone [facility]) ICD-10-CM: C79.51 
ICD-9-CM: 198.5  6/26/2019 - Present Yes Type 2 diabetes with nephropathy ([facility].) ICD-10-CM: E11.21 
ICD-9-CM: 250.40, 583.81  7/3/2018 - Present Yes Diabetes mellitus due to underlying condition with hyperglycemia ([facility] ICD-10-CM: M93.15 ICD-9-CM: 249.80  12/21/2015 - Present Yes COVID-19 ruled out ICD-10-CM: Z03.818 ICD-9-CM: V71.83  12/15/2015 - Present Yes Hyponatremia ICD-10-CM: E87.1 ICD-9-CM: 276.1  11/23/2015 - Present Yes RESOLVED: Acute metabolic encephalopathy DXI-18-PP: G93.41 
ICD-9-CM: 348.31  5/24/2020 - 5/24/2020 Plan: · Acute hypoxic respiratory failure, bilateral effusions: 
· Weaning O2 as tolerant · [facility] referral  
· CXR tomorrow · Antibiotics stopped by pulmonary · Continued IV lasix · Anal cancer with stricture and rectal obstruction: · Declines surgery · continued bowel regimen · HTN:  
· continued norvasc, lisinopril · DM2: 
· Continued amaryl, lantus and SSI · Anemia: 
· followup CBC, transfuse HGB < 7 
 
DC planning/Dispo:  pending Diet:  DIET NUTRITIONAL SUPPLEMENTS 
DIET REGULAR 
DVT ppx:  SCD Signed: Manuela Leger MD

## 2020-06-09 NOTE — PROGRESS NOTES
Pt sat up on side of bed for thoracentesis. Consent obtained. Time out performed. Pts vitals monitored throughout procedure. Bilateral ultrasound done and pic taken of pleural fluid.  ~1000 ml yellow pleural fluid from L , 1100 from right. Pt tolerated procedure well with no adverse rxn. Specimens sent to the lab x 3 and labeled appropriately. Site dressed appropriately and report given to pts RN.    Lung sliding done and ultrasound findings reviewed by MD.

## 2020-06-09 NOTE — PROCEDURES
PROCEDURE: 
 
DIAGNOSTIC/THERAPEUTIC THORACENTESIS 
 
 
 
PRE-OP DIAGNOSIS: 
 
HEIDI PLEURAL EFFUSION POST-OP DIAGNOSIS: 
 
HEIDI PLEURAL EFFUSION 
 
 
ANESTHESIA: 
 
LOCAL ANESTHESIA WITH 1% LIDOCAINE 10 CC TOTAL. CHEST ULTRASOUND FINDINGS: 
 
A Turbo-M, Sonosite ultrasound with a 5-16 mHz probe was used to image the chest and localize the pleural effusion on the Left and Right chest. 
 
A moderate anechoic space was seen on the Left and Right consistent with an uncomplicated pleural effusion. DESCRIPTION OF PROCEDURE: 
 
After obtaining informed consent and localizing the safest location for thoracentesis, the  9  intercostal space was marked with a blunt, plastic needle cap in the mid scapular line. An [employer] [id number] Pleral-Seal thoracentesis kit was used to perform the procedure. The skin was  cleansed with the supplied  chlorhexididne swab and then draped in the usual fasion. Using the previously marked location as a giude, a 22 G 1.5 inch needle was used to inject 10 cc of 1% lidocaine into the skin and subcutaneous tissue, as well as onto the underlying rib and inter-costal muscles, pleural fluid was aspirated to assure proper location, prior to removing the anesthesia needle. A 3mm  incision was then made, with the supplied scalpel in the usual fashion to facilitate the insertiopn of the thoracentesis needle. The needle with an 8French thoracentesis catheter was then introduced into the chest through the previously made incision in the usual fashion, the rib localized with the needle, and the catheter then marched over the rib into the pleural space. After aspirating fluid, the thoracentesis catheter was then placed into the chest using the needle itself as a trocar. The needle was then removed and the catheter was attached to the supplied tubing without complication.  
 
1000 cc  of serosanguinous fluid, was aspirated on R. 
 1100 cc of clear yellow was aspirated on L. Fluid was sent for the following tests: 
 
Cell count with differential 
LDH Glucose Total protein Cytology Routine culture and Gram stain Post procedure US confirmed complete drainage of the effusion. EBL:  
 
1 drop COMPLICATIONS: 
 
None 
 
Brittaney Schwartz MD

## 2020-06-09 NOTE — PROGRESS NOTES
Schuyler Peters Admission Date: 5/24/2020 Daily Progress Note: 6/9/2020 The patient's chart is reviewed and the patient is discussed with the staff. The patient is a 79 y.o. male seen and evaluated at the request of Dr. Tash Gerber for evaluation and management of pleural effusions. 
  
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 He received carbo/etoposide 2 weeks ago and has also had radiation for anal cancer. He has required anal dilation in past.  
 
He presented to [facility] on 5/24 with increasing dyspnea and chest discomfort. A CT of the chest revealed large bilateral effusions and we are now asked to assist with management. He is hyponatremic and getting saline infusions and also getting PRBCs for anemia. Had nausea/vomiting, aspiration event on 5/31 with hypoxemia. CT at that time suggested rectal obstruction. Flex sig on 6/2 with no anal stricture and stool impaction. S/p tap or Right chest with 800 removed on 6/3 Subjective:  
 
Still on airvo Current Facility-Administered Medications Medication Dose Route Frequency  potassium chloride (K-DUR, KLOR-CON) SR tablet 20 mEq  20 mEq Oral TID  furosemide (LASIX) injection 40 mg  40 mg IntraVENous DAILY  insulin glargine (LANTUS) injection 5 Units  5 Units SubCUTAneous QHS  albuterol-ipratropium (DUO-NEB) 2.5 MG-0.5 MG/3 ML  3 mL Nebulization Q6HWA RT  
 hydrALAZINE (APRESOLINE) 20 mg/mL injection 10 mg  10 mg IntraVENous Q6H PRN  
 magnesium hydroxide (MILK OF MAGNESIA) 400 mg/5 mL oral suspension 30 mL  30 mL Oral DAILY  senna-docusate (PERICOLACE) 8.6-50 mg per tablet 1 Tab  1 Tab Oral BID  
 bisacodyL (DULCOLAX) suppository 10 mg  10 mg Rectal DAILY  0.9% sodium chloride infusion 250 mL  250 mL IntraVENous PRN  
 amLODIPine (NORVASC) tablet 10 mg  10 mg Oral DAILY  docusate sodium (COLACE) capsule 100 mg  100 mg Oral PRN  
 glimepiride (AMARYL) tablet 4 mg  4 mg Oral 7am  
 lisinopriL (PRINIVIL, ZESTRIL) tablet 20 mg  20 mg Oral DAILY  meloxicam (MOBIC) tablet 7.5 mg  7.5 mg Oral DAILY  sodium chloride tablet 2 g  2 g Oral BID  traMADoL (ULTRAM) tablet 50 mg  50 mg Oral Q6H PRN  
 sodium chloride (NS) flush 5-40 mL  5-40 mL IntraVENous Q8H  
 sodium chloride (NS) flush 5-40 mL  5-40 mL IntraVENous PRN  
 acetaminophen (TYLENOL) tablet 650 mg  650 mg Oral Q4H PRN  
 ondansetron (ZOFRAN) injection 4 mg  4 mg IntraVENous Q4H PRN  
 insulin regular (NOVOLIN R, HUMULIN R) injection   SubCUTAneous AC&HS  enoxaparin (LOVENOX) injection 40 mg  40 mg SubCUTAneous Q24H Review of Systems Constitutional: negative for fever, chills, sweats Cardiovascular: negative for chest pain, palpitations, syncope, edema Gastrointestinal:  negative for dysphagia, reflux, vomiting, diarrhea, abdominal pain, or melena Neurologic:  negative for focal weakness, numbness, headache Objective:  
 
Vitals:  
 06/09/20 1005 06/09/20 1011 06/09/20 1016 06/09/20 1021 BP: 135/69 108/54 99/49 106/54 Pulse: (!) 101 97 96 97 Resp: 22 20 22 22 Temp:      
SpO2: 94% 95% 96% 98% Weight:      
Height:      
 
 
Intake/Output Summary (Last 24 hours) at 6/9/2020 1036 Last data filed at 6/9/2020 1019 Gross per 24 hour Intake 340 ml Output 4100 ml Net -3760 ml Physical Exam:  
Constitution: elderly, ill appearing, but not in distress EENMT:  Sclera clear, pupils equal, oral mucosa moist 
Respiratory:decreased in bases on airvo at 50 L and 50% on 96% FIO2 Cardiovascular:  RRR without M,G,R 
Gastrointestinal: abdomen is foft today. Musculoskeletal: warm without cyanosis. There is no lower extremity edema. Skin:  no jaundice or rashes, no wounds Neurologic: no gross neuro deficits Psychiatric:  alert and oriented x 4 CXR 6/4/20 noted b/l effusion and slightly more in left base CXR 6/1/20 CXR: 5/27: bilateral small effusions CT C/A/P 6/1/20: 
chest-FINDINGS: 
- Pleura/pericardium: There are decreased small to moderate-sized bilateral 
pleural effusions. No pneumothorax or pericardial effusion is seen. - Lungs: There is progressed patchy edema or infiltrates in both lungs, worse in 
the right lower lobe. - Malina/Mediastinum: Within normal limits. - Tracheobronchial tree: Within normal limits. - Aorta/pulmonary arteries: Within normal limits. - Heart: Within normal limits. - Coronary arteries: There are coronary artery calcifications. - Chest wall: Within normal limits. - Spine/bones: Again noted are multiple sclerotic foci in the ribs and spine, 
consistent with osseous metastatic disease. 
- Additional comments Abdomen /pelvis: 
IMPRESSION:  
1. New marked constipation and distention of the entire colon, to the level of 
the lower rectum, where there is wall thickening which was PET avid on the prior 
study. This is suspect for rectal obstruction. No dilated small bowel loops are 
seen at this time. 2. Unchanged right adrenal gland nodule and osseous metastatic disease. CT 5/24: bilateral effusions LAB Recent Labs  
  06/09/20 
9745 06/08/20 
2120 06/08/20 
1610 06/08/20 
1107 06/08/20 
4589 GLUCPOC 90 214* 217* 206* 110* Recent Labs  
  06/09/20 
8255 06/08/20 
8153 06/07/20 
6135 WBC 6.3 6.6 7.1 HGB 7.6* 7.6* 7.8* HCT 23.4* 23.1* 24.0*  
 183 162 Recent Labs  
  06/09/20 
7866 06/08/20 
5241 06/07/20 
3285 * 134* 134* K 3.9 4.1 3.5 CL 95* 97* 97* CO2 31 31 30 GLU 85 97 94 BUN 11 13 12 CREA 0.63* 0.66* 0.61* MG 2.0 2.0 2.0  
CA 8.6 8.5 8.8 No results for input(s): PH, PCO2, PO2, HCO3, PHI, PCO2I, PO2I, HCO3I in the last 72 hours. No results for input(s): LCAD, LAC in the last 72 hours. Assessment:  (Medical Decision Making) Hospital Problems  Date Reviewed: 5/20/2020 Codes Class Noted POA Rectal obstruction ICD-10-CM: K62.4 ICD-9-CM: 569.2  6/1/2020 Yes Aspiration pneumonia ([facility] ICD-10-CM: J69.0 ICD-9-CM: 507.0  6/1/2020 Yes Neutropenia ([facility] ICD-10-CM: D70.9 ICD-9-CM: 288.00  5/25/2020 Yes Pleural effusion on right ICD-10-CM: J90 ICD-9-CM: 511.9  5/25/2020 Yes * (Principal) Acute respiratory failure with hypoxia ([facility] ICD-10-CM: J96.01 
ICD-9-CM: 518.81  5/24/2020 Yes Normocytic anemia ICD-10-CM: D64.9 ICD-9-CM: 285.9  5/24/2020 Yes Pleural effusion on left ICD-10-CM: J90 ICD-9-CM: 511.9  5/24/2020 Yes Malignant neoplasm metastatic to bone [facility]) ICD-10-CM: C79.51 
ICD-9-CM: 198.5  6/26/2019 Yes Type 2 diabetes with nephropathy ([facility] ICD-10-CM: E11.21 
ICD-9-CM: 250.40, 583.81  7/3/2018 Yes Diabetes mellitus due to underlying condition with hyperglycemia ([facility] ICD-10-CM: Q42.34 ICD-9-CM: 249.80  12/21/2015 Yes COVID-19 ruled out ICD-10-CM: Z03.818 ICD-9-CM: V71.83  12/15/2015 Yes Hyponatremia ICD-10-CM: E87.1 ICD-9-CM: 276.1  11/23/2015 Yes Plan:  (Medical Decision Making) - US and thoracentesis  
-wean airvo as tolerated  
-[facility]aiting [facility] More than 50% of the time documented was spent in face-to-face contact with the patient and in the care of the patient on the floor/unit where the patient is located.   
 
Esther Noonan MD

## 2020-06-09 NOTE — H&P
Date of Surgery Update: 
Jeremie More was seen and examined. History and physical has been reviewed. The patient has been examined.  There have been no significant clinical changes since the completion of the originally dated History and Physical. 
 
Signed By: Stephanie Cain MD   
 June 9, 2020 10:38 AM

## 2020-06-09 NOTE — PROGRESS NOTES
Hospitalist Note Admit Date:  2020 11:00 PM  
Name:  Ladonna Negro Age:  79 y.o. 
:  1940 MRN:  [id number] PCP:  Mary Jo Diaz MD 
Treatment Team: Attending Provider: Cristino Montes De Oca MD; Consulting Provider: Charley Miranda MD; Utilization Review: Celine Peña RN; Care Manager: Madhu Cifuentes 
 
HPI/Subjective:  
 
Mr. Kim Vallecillo is a 80 yo male with PMH of oral and anal cancers admitted with acute hypoxic respiratory failure due to bilateral pleural effusions. He has been seen by pulm s/p bilateral thoracentesis , right thoracentesis 6-3 and diuresis. He has compelted 8 days zosyn. He has been seen by GI s/p 6-2 flex sig with disimpaction. Discharge plans pending to [facility]. 20 wife present, s/p bilateral thoracentesis today, ate some, ongoing weakness, ROS difficult Objective:  
 
Patient Vitals for the past 24 hrs: 
 Temp Pulse Resp BP SpO2  
20 1430 98.4 °F (36.9 °C) 95 20 127/73 95 % 20 1349     97 % 20 1050 97.8 °F (36.6 °C) 92 20 120/66 93 % 20 1033  96 22 127/59 97 % 20 1027  96 22 111/56 99 % 20 1021  97 22 106/54 98 % 20 1016  96 22 99/49 96 % 20 1011  97 20 108/54 95 % 20 1005  (!) 101 22 135/69 94 % 20 0852     97 % 20 0719 98.2 °F (36.8 °C) 96 22 149/70 91 % 20 0401 98.2 °F (36.8 °C) 98 20 134/70 96 % 20 0049 98.4 °F (36.9 °C) (!) 106 20 168/77 94 % 20 2115     98 % 20 1958 98.6 °F (37 °C) 100 20 150/72 94 % Oxygen Therapy O2 Sat (%): 95 % (20 1430) Pulse via Oximetry: 96 beats per minute (20 134) O2 Device: Heated; Hi flow nasal cannula;Humidifier (20 134) O2 Flow Rate (L/min): 45 l/min (20 134) O2 Temperature: 93.2 °F (34 °C) (20 134) FIO2 (%): 45 % (20 134) ETCO2 (mmHg): 93 mmHg (20 1624) Estimated body mass index is 23.46 kg/m² as calculated from the following: 
  Height as of this encounter: 6' (1.829 m). Weight as of this encounter: 78.5 kg (173 lb). Intake/Output Summary (Last 24 hours) at 6/9/2020 1630 Last data filed at 6/9/2020 1202 Gross per 24 hour Intake 120 ml Output 3900 ml Net -3780 ml *Note that automatically entered I/Os may not be accurate; dependent on patient compliance with collection and accurate  by techs. General:    Sleepy, elderly, no distress. CV:   RRR. No murmur, rub, or gallop. No edema Lungs:   CTAB. No wheezing, rhonchi, or rales. anterior Abdomen:   Soft, nontender, nondistended. Extremities: Warm and dry. Skin:     No rashes or jaundice. Neuro:  No gross focal deficits, sleepy Data Review: 
I have reviewed all labs, meds, and studies from the last 24 hours: 
Recent Results (from the past 24 hour(s)) GLUCOSE, POC Collection Time: 06/08/20  9:20 PM  
Result Value Ref Range Glucose (POC) 214 (H) 65 - 100 mg/dL GLUCOSE, POC Collection Time: 06/09/20  6:52 AM  
Result Value Ref Range Glucose (POC) 90 65 - 100 mg/dL METABOLIC PANEL, BASIC Collection Time: 06/09/20  8:35 AM  
Result Value Ref Range Sodium 133 (L) 136 - 145 mmol/L Potassium 3.9 3.5 - 5.1 mmol/L Chloride 95 (L) 98 - 107 mmol/L  
 CO2 31 21 - 32 mmol/L Anion gap 7 7 - 16 mmol/L Glucose 85 65 - 100 mg/dL BUN 11 8 - 23 MG/DL Creatinine 0.63 (L) 0.8 - 1.5 MG/DL  
 GFR est AA >60 >60 ml/min/1.73m2 GFR est non-AA >60 >60 ml/min/1.73m2 Calcium 8.6 8.3 - 10.4 MG/DL MAGNESIUM Collection Time: 06/09/20  8:35 AM  
Result Value Ref Range Magnesium 2.0 1.8 - 2.4 mg/dL CBC W/O DIFF Collection Time: 06/09/20  8:35 AM  
Result Value Ref Range WBC 6.3 4.3 - 11.1 K/uL  
 RBC 2.55 (L) 4.23 - 5.6 M/uL HGB 7.6 (L) 13.6 - 17.2 g/dL HCT 23.4 (L) 41.1 - 50.3 %  MCV 91.8 79.6 - 97.8 FL  
 MCH 29.8 26.1 - 32.9 PG  
 MCHC 32.5 31.4 - 35.0 g/dL  
 RDW 15.6 (H) 11.9 - 14.6 % PLATELET 783 006 - 854 K/uL MPV 9.1 (L) 9.4 - 12.3 FL ABSOLUTE NRBC 0.00 0.0 - 0.2 K/uL CULTURE, BODY FLUID W GRAM STAIN Collection Time: 06/09/20 10:29 AM  
Result Value Ref Range Special Requests: NO SPECIAL REQUESTS    
 GRAM STAIN 0 TO 1 WBC'S SEEN PER OIF   
 GRAM STAIN NO DEFINITE ORGANISM SEEN Culture result: PENDING   
CELL COUNT, BODY FLUID Collection Time: 06/09/20 10:29 AM  
Result Value Ref Range BODY FLUID TYPE LEFT    
 FLUID COLOR STRAW    
 FLUID APPEARANCE CLEAR    
 FLUID RBC CT. <1,000 /cu mm FLUID WBC COUNT <100 /cu mm GLUCOSE, FLUID Collection Time: 06/09/20 10:29 AM  
Result Value Ref Range Fluid Type: LEFT Glucose, body fld. 103 MG/DL  
LDH, BODY FLUID Collection Time: 06/09/20 10:29 AM  
Result Value Ref Range Fluid Type: LEFT    
 LD, body fld. 135 U/L  
PROTEIN TOTAL, FLUID Collection Time: 06/09/20 10:29 AM  
Result Value Ref Range Fluid Type: LEFT Protein total, body fld. 2.5 g/dL GLUCOSE, POC Collection Time: 06/09/20 10:50 AM  
Result Value Ref Range Glucose (POC) 146 (H) 65 - 100 mg/dL GLUCOSE, POC Collection Time: 06/09/20  4:09 PM  
Result Value Ref Range Glucose (POC) 216 (H) 65 - 100 mg/dL Current Meds: 
Current Facility-Administered Medications Medication Dose Route Frequency  potassium chloride (K-DUR, KLOR-CON) SR tablet 20 mEq  20 mEq Oral TID  furosemide (LASIX) injection 40 mg  40 mg IntraVENous DAILY  insulin glargine (LANTUS) injection 5 Units  5 Units SubCUTAneous QHS  albuterol-ipratropium (DUO-NEB) 2.5 MG-0.5 MG/3 ML  3 mL Nebulization Q6HWA RT  
 hydrALAZINE (APRESOLINE) 20 mg/mL injection 10 mg  10 mg IntraVENous Q6H PRN  
 magnesium hydroxide (MILK OF MAGNESIA) 400 mg/5 mL oral suspension 30 mL  30 mL Oral DAILY  senna-docusate (PERICOLACE) 8.6-50 mg per tablet 1 Tab  1 Tab Oral BID  
  bisacodyL (DULCOLAX) suppository 10 mg  10 mg Rectal DAILY  0.9% sodium chloride infusion 250 mL  250 mL IntraVENous PRN  
 amLODIPine (NORVASC) tablet 10 mg  10 mg Oral DAILY  docusate sodium (COLACE) capsule 100 mg  100 mg Oral PRN  
 glimepiride (AMARYL) tablet 4 mg  4 mg Oral 7am  
 lisinopriL (PRINIVIL, ZESTRIL) tablet 20 mg  20 mg Oral DAILY  meloxicam (MOBIC) tablet 7.5 mg  7.5 mg Oral DAILY  sodium chloride tablet 2 g  2 g Oral BID  traMADoL (ULTRAM) tablet 50 mg  50 mg Oral Q6H PRN  
 sodium chloride (NS) flush 5-40 mL  5-40 mL IntraVENous Q8H  
 sodium chloride (NS) flush 5-40 mL  5-40 mL IntraVENous PRN  
 acetaminophen (TYLENOL) tablet 650 mg  650 mg Oral Q4H PRN  
 ondansetron (ZOFRAN) injection 4 mg  4 mg IntraVENous Q4H PRN  
 insulin regular (NOVOLIN R, HUMULIN R) injection   SubCUTAneous AC&HS  enoxaparin (LOVENOX) injection 40 mg  40 mg SubCUTAneous Q24H Other Studies: 
Results for orders placed or performed during the hospital encounter of 20  
2D ECHO COMPLETE ADULT (TTE) W OR WO CONTR Narrative Chantelle [address] [place], [address] [facility] 
[phone] Transthoracic Echocardiogram 
2D, M-mode, Doppler, and Color Doppler Patient: Thad Butterfield 
MR #: [id number] : [id number] Age: 79 years Gender: Male Study date: 26-May-2020 Account #: [de-identified] Height: 72 in 72 in 
Weight: 164 lb 163.7 lb 
BSA: 1.96 mï¾² 1.96 mï¾² Status:Routine Location: 831 BP: 169/ 88 Allergies: NO KNOWN ALLERGENS Sonographer:  Edward Wade [facility] Group:  [facility] Cardiology Referring Physician:  Kev Cruz MD 
Reading Physician:  Keerthi Brennan. MD Nara [facility] [place] INDICATIONS: Bilateral pleural effusions *Pt. scanned supine. Unable to turn LLD. PROCEDURE: This was a routine study. A transthoracic echocardiogram was 
performed.  The study included complete 2D imaging, M-mode, complete spectral 
 Doppler, and color Doppler. Intravenous contrast (Definity, 1 ml) was 
administered to opacify the left ventricle. Image quality was adequate. LEFT VENTRICLE: Size was normal. Systolic function was normal. Ejection 
fraction was estimated in the range of 60 % to 65 %. There were no regional 
wall motion abnormalities. Wall thickness was normal. Left ventricular 
diastolic function parameters were normal. E/e' av.46. RIGHT VENTRICLE: The size was normal. Systolic function was normal. 
 
LEFT ATRIUM: Size was normal. 
 
RIGHT ATRIUM: Size was normal. 
 
SYSTEMIC VEINS: IVC: The inferior vena cava was normal in size and course. AORTIC VALVE: The valve was structurally normal, tri-commissural. There was  
no 
evidence for stenosis. There was no insufficiency. MITRAL VALVE: Valve structure was normal. There was no evidence for stenosis. There was no regurgitation. TRICUSPID VALVE: The valve structure was normal. There was no evidence for 
stenosis. There was trivial regurgitation. PULMONIC VALVE: Not well visualized. There was no evidence for stenosis. There 
was no insufficiency. PERICARDIUM: There was no pericardial effusion. AORTA: The root exhibited normal size. SUMMARY: 
 
-  Left ventricle: Systolic function was normal. Ejection fraction was 
estimated in the range of 60 % to 65 %. There were no regional wall motion 
abnormalities. -  Pericardium: There was no pericardial effusion. SYSTEM MEASUREMENT TABLES 
 
2D Ao Diam: 3.2 cm 
LA Diam: 2.6 cm 
LAEDV Index (A-L): 25.9 ml/m2 %FS: 34.9 % IVSd: 1.1 cm 
LVIDd: 3.7 cm 
LVIDs: 2.4 cm 
LVOT Diam: 2.1 cm 
LVPWd: 1.2 cm Prepared and signed by 
 
Ayana Mead. MD Nara [facility] Signed 26-May-2020 16:55:26 Xr Chest Sngl V Result Date: 2020 Portable view of the chest COMPARISON: 2020 CLINICAL HISTORY: Hypoxia.  FINDINGS: Diffuse interstitial and alveolar opacities in the right lung. There are bilateral pleural effusions. There is no pneumothorax. Heart is enlarged. Mediastinal contour is within normal limits. IMPRESSION: 1. Diffuse airspace disease in the right lung, likely aspiration and/or pneumonia. Bilateral pleural effusions. 2. No significant change compared to prior exam. 
 
 
All Micro Results Procedure Component Value Units Date/Time CULTURE, BODY FLUID Lilia Calderón [[id number]] Collected:  06/09/20 1029 Order Status:  Completed Specimen:  Left Updated:  06/09/20 1426 Special Requests: NO SPECIAL REQUESTS     
  GRAM STAIN 0 TO 1 WBC'S SEEN PER OIF  
   NO DEFINITE ORGANISM SEEN Culture result: PENDING  
 AFB CULTURE + SMEAR W/RFLX ID FROM CULTURE [[id number]] Collected:  06/09/20 1029 Order Status:  Completed Updated:  06/09/20 1119 FUNGUS CULTURE AND SMEAR [[id number]] Collected:  06/09/20 1029 Order Status:  Completed Updated:  06/09/20 1118 FUNGUS CULTURE AND SMEAR [[id number]] Collected:  05/25/20 1315 Order Status:  Completed Specimen:  Miscellaneous sample Updated:  05/28/20 1536 Source LEFT Comment: Pleural Fluid Specimen Fungus stain Direct Inoculation Fungus (Mycology) Culture Other source received Comment: (NOTE) Performed At: [address] [id number] Wilfredo Vines MD [id number] AFB CULTURE + SMEAR W/RFLX ID FROM CULTURE [[id number]] Collected:  05/25/20 1315 Order Status:  Completed Specimen:  Miscellaneous sample Updated:  05/27/20 1537 Source LEFT Comment: Pleural Fluid Specimen AFB Specimen processing Concentration Acid Fast Smear Negative Comment: (NOTE) Performed At: [address] [id number] Wilfredo Vines MD [id number] Acid Fast Culture PENDING  
 CULTURE, BODY FLUID W Hernan Valero [[id number]] Collected:  05/25/20 1315 Order Status:  Completed Specimen:  Left Updated:  05/27/20 3330 Special Requests: NO SPECIAL REQUESTS     
  GRAM STAIN 0 TO 1 WBC'S/OIF  
   NO DEFINITE ORGANISM SEEN Culture result: NO GROWTH 2 DAYS     
  
 
 
SARS-CoV-2 Lab Results \"Novel Coronavirus\" Test: No results found for: COV2NT \"Emergent Disease\" Test:  
Lab Results Component Value Date/Time EDPR Not detected 05/24/2020 06:22 PM  
 
\"SARS-COV-2\" Test: No results found for: XGCOVT As of: 4:01 PM on 6/9/2020 Assessment and Plan:  
 
Hospital Problems as of 6/9/2020 Date Reviewed: 5/20/2020 Codes Class Noted - Resolved POA Rectal obstruction ICD-10-CM: K62.4 ICD-9-CM: 569.2  6/1/2020 - Present Yes Aspiration pneumonia ([facility] ICD-10-CM: J69.0 ICD-9-CM: 507.0  6/1/2020 - Present Yes Neutropenia ([facility] ICD-10-CM: D70.9 ICD-9-CM: 288.00  5/25/2020 - Present Yes Pleural effusion on right ICD-10-CM: J90 ICD-9-CM: 511.9  5/25/2020 - Present Yes * (Principal) Acute respiratory failure with hypoxia [facility]) ICD-10-CM: J96.01 
ICD-9-CM: 518.81  5/24/2020 - Present Yes Normocytic anemia ICD-10-CM: D64.9 ICD-9-CM: 285.9  5/24/2020 - Present Yes  
   
 CAP (community acquired pneumonia) ICD-10-CM: J18.9 ICD-9-CM: 901  5/24/2020 - Present Pleural effusion on left ICD-10-CM: J90 ICD-9-CM: 511.9  5/24/2020 - Present Yes Malignant neoplasm metastatic to bone [facility]) ICD-10-CM: C79.51 
ICD-9-CM: 198.5  6/26/2019 - Present Yes Type 2 diabetes with nephropathy ([facility] ICD-10-CM: E11.21 
ICD-9-CM: 250.40, 583.81  7/3/2018 - Present Yes Diabetes mellitus due to underlying condition with hyperglycemia ([facility] ICD-10-CM: D72.02 ICD-9-CM: 249.80  12/21/2015 - Present Yes COVID-19 ruled out ICD-10-CM: Z03.818 ICD-9-CM: V71.83  12/15/2015 - Present Yes Hyponatremia ICD-10-CM: E87.1 ICD-9-CM: 276.1  11/23/2015 - Present Yes RESOLVED: Acute metabolic encephalopathy HPI-93-HG: G93.41 
ICD-9-CM: 348.31  5/24/2020 - 5/24/2020 Plan: · Acute hypoxic respiratory failure, bilateral effusions: 
· Weaning O2 as tolerant · [facility] referral pending · Antibiotics stopped by pulmonary · Continued IV lasix · Discussed with Dr. 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  
· continued norvasc, lisinopril · DM2: 
· Continued amaryl, lantus and SSI · Anemia: 
· followup CBC, transfuse HGB < 7 
 
DC planning/Dispo:  pending [facility] Diet:  DIET NUTRITIONAL SUPPLEMENTS 
DIET REGULAR 
DVT ppx:  SCD Signed: Lida Hickman MD

## 2020-06-09 NOTE — PROGRESS NOTES
Report given to oncoming RN. Bilat thoracentesis done at bedside this shift. Pt resting quietly; spouse at bedside.

## 2020-06-09 NOTE — PROGRESS NOTES
[facility] completed. PT is AAO x 4 with respirations present on airvo. PT denies pain or other needs at this time. IV site is CDI. Bed is low and locked with call light in reach, will continue to monitor.

## 2020-06-10 NOTE — PROGRESS NOTES
Problem: Falls - Risk of 
Goal: *Absence of Falls Description: Document Yan Willoughby Fall Risk and appropriate interventions in the flowsheet. Outcome: Progressing Towards Goal 
Note: Fall Risk Interventions: 
Mobility Interventions: Bed/chair exit alarm, Communicate number of staff needed for ambulation/transfer, Mechanical lift, Patient to call before getting OOB, PT Consult for mobility concerns, PT Consult for assist device competence Mentation Interventions: Door open when patient unattended, Bed/chair exit alarm, Eyeglasses and hearing aids, Familiar objects from home, Gait belt with transfers/ambulation, HELP ([address]) if available, More frequent rounding, Reorient patient, Room close to nurse's station, Self-releasing belt, Toileting rounds Medication Interventions: Bed/chair exit alarm, Evaluate medications/consider consulting pharmacy, Patient to call before getting OOB, Teach patient to arise slowly Elimination Interventions: Call light in reach, Elevated toilet seat, Stay With Me (per policy), Toilet paper/wipes in reach, Toileting schedule/hourly rounds Problem: Patient Education: Go to Patient Education Activity Goal: Patient/Family Education Outcome: Progressing Towards Goal 
  
Problem: Pain Goal: *Control of Pain Outcome: Progressing Towards Goal 
Goal: *PALLIATIVE CARE:  Alleviation of Pain Outcome: Progressing Towards Goal 
  
Problem: Patient Education: Go to Patient Education Activity Goal: Patient/Family Education Outcome: Progressing Towards Goal 
  
Problem: Pneumonia: Day 1 Goal: Off Pathway (Use only if patient is Off Pathway) Outcome: Progressing Towards Goal 
Goal: Activity/Safety Outcome: Progressing Towards Goal 
Goal: Consults, if ordered Outcome: Progressing Towards Goal 
Goal: Diagnostic Test/Procedures Outcome: Progressing Towards Goal 
Goal: Nutrition/Diet Outcome: Progressing Towards Goal 
Goal: Medications Outcome: Progressing Towards Goal 
Goal: Respiratory Outcome: Progressing Towards Goal 
Goal: Treatments/Interventions/Procedures Outcome: Progressing Towards Goal 
Goal: Psychosocial 
Outcome: Progressing Towards Goal 
Goal: *Oxygen saturation within defined limits Outcome: Progressing Towards Goal 
Goal: *Influenza vaccine administered (October-March) Outcome: Progressing Towards Goal 
Goal: *Pneumoccocal vaccine administered Outcome: Progressing Towards Goal 
Goal: *Hemodynamically stable Outcome: Progressing Towards Goal 
Goal: *Demonstrates progressive activity Outcome: Progressing Towards Goal 
Goal: *Tolerating diet Outcome: Progressing Towards Goal 
  
Problem: Pneumonia: Day 2 Goal: Off Pathway (Use only if patient is Off Pathway) Outcome: Progressing Towards Goal 
Goal: Activity/Safety Outcome: Progressing Towards Goal 
Goal: Consults, if ordered Outcome: Progressing Towards Goal 
Goal: Diagnostic Test/Procedures Outcome: Progressing Towards Goal 
Goal: Nutrition/Diet Outcome: Progressing Towards Goal 
Goal: Discharge Planning Outcome: Progressing Towards Goal 
Goal: Medications Outcome: Progressing Towards Goal 
Goal: Respiratory Outcome: Progressing Towards Goal 
Goal: Treatments/Interventions/Procedures Outcome: Progressing Towards Goal 
Goal: Psychosocial 
Outcome: Progressing Towards Goal 
Goal: *Oxygen saturation within defined limits Outcome: Progressing Towards Goal 
Goal: *Hemodynamically stable Outcome: Progressing Towards Goal 
Goal: *Demonstrates progressive activity Outcome: Progressing Towards Goal 
Goal: *Tolerating diet Outcome: Progressing Towards Goal 
Goal: *Optimal pain control at patient's stated goal 
Outcome: Progressing Towards Goal 
  
Problem: Pneumonia: Day 3 Goal: Off Pathway (Use only if patient is Off Pathway) Outcome: Progressing Towards Goal 
Goal: Activity/Safety Outcome: Progressing Towards Goal 
Goal: Consults, if ordered Outcome: Progressing Towards Goal 
Goal: Diagnostic Test/Procedures Outcome: Progressing Towards Goal 
Goal: Nutrition/Diet Outcome: Progressing Towards Goal 
Goal: Discharge Planning Outcome: Progressing Towards Goal 
Goal: Medications Outcome: Progressing Towards Goal 
Goal: Respiratory Outcome: Progressing Towards Goal 
Goal: Treatments/Interventions/Procedures Outcome: Progressing Towards Goal 
Goal: Psychosocial 
Outcome: Progressing Towards Goal 
Goal: *Oxygen saturation within defined limits Outcome: Progressing Towards Goal 
Goal: *Hemodynamically stable Outcome: Progressing Towards Goal 
Goal: *Demonstrates progressive activity Outcome: Progressing Towards Goal 
Goal: *Tolerating diet Outcome: Progressing Towards Goal 
Goal: *Describes available resources and support systems Outcome: Progressing Towards Goal 
Goal: *Optimal pain control at patient's stated goal 
Outcome: Progressing Towards Goal 
  
Problem: Pneumonia: Day 4 Goal: Off Pathway (Use only if patient is Off Pathway) Outcome: Progressing Towards Goal 
Goal: Activity/Safety Outcome: Progressing Towards Goal 
Goal: Nutrition/Diet Outcome: Progressing Towards Goal 
Goal: Discharge Planning Outcome: Progressing Towards Goal 
Goal: Medications Outcome: Progressing Towards Goal 
Goal: Respiratory Outcome: Progressing Towards Goal 
Goal: Treatments/Interventions/Procedures Outcome: Progressing Towards Goal 
Goal: Psychosocial 
Outcome: Progressing Towards Goal 
  
Problem: Pneumonia: Discharge Outcomes Goal: *Demonstrates progressive activity Outcome: Progressing Towards Goal 
Goal: *Describes follow-up/return visits to physicians Outcome: Progressing Towards Goal 
Goal: *Tolerating diet Outcome: Progressing Towards Goal 
Goal: *Verbalizes name, dosage, time, side effects, and number of days to continue medications Outcome: Progressing Towards Goal 
Goal: *Influenza immunization Outcome: Progressing Towards Goal 
 Goal: *Pneumococcal immunization Outcome: Progressing Towards Goal 
Goal: *Respiratory status at baseline Outcome: Progressing Towards Goal 
Goal: *Vital signs within defined limits Outcome: Progressing Towards Goal 
Goal: *Describes available resources and support systems Outcome: Progressing Towards Goal 
Goal: *Optimal pain control at patient's stated goal 
Outcome: Progressing Towards Goal 
  
Problem: Nutrition Deficit Goal: *Optimize nutritional status Outcome: Progressing Towards Goal

## 2020-06-10 NOTE — PROGRESS NOTES
Problem: Self Care Deficits Care Plan (Adult) Goal: *Acute Goals and Plan of Care (Insert Text) Description: 1. Patient will complete total body bathing and dressing with modified indpendence and adaptive equipment as needed. 2. Patient will complete toileting with modified independence and adaptive equipment as needed. PROGRESSING 6/10/2020 3. Patient will tolerate 20 minutes of OT treatment with up to 2 rest breaks to increase activity tolerance for ADLs. PROGRESSING 6/10/2020 4. Patient will complete functional transfers with independence and adaptive equipment as needed. PROGRESSING 6/10/2020 5. Patient will complete functional mobility for ADLs with modified independence and adaptive equipment as needed. PROGRESSING 6/4/2020 6. Patient will verbalize 2 energy conservation techniques with no cues from therapist to increase safety and independence with ADLs. Timeframe: 7 visits Outcome: Progressing Towards Goal 
  
OCCUPATIONAL THERAPY: Daily Note and PM 6/10/2020 INPATIENT: OT Visit Days: 3 Payor: Donnell Curling / Plan: [address] [place] [address] HMO / Product Type: [employer] Medicare /  
  
NAME/AGE/GENDER: Santiago Garcia is a 79 y.o. male PRIMARY DIAGNOSIS:  Acute metabolic encephalopathy [E17.56] Acute respiratory failure with hypoxia (HCC) Acute respiratory failure with hypoxia (HCC) Procedure(s) (LRB): 
THORACENTESIS (N/A) ULTRASOUND (Bilateral) 1 Day Post-Op ICD-10: Treatment Diagnosis:  
 · Generalized Muscle Weakness (M62.81) Precautions/Allergies: 
   
 Patient has no known allergies. ASSESSMENT:  
Per Initial Assessment: 
Mr. Helene Horton is a 79 y.o. male admitted with SOB, respiratory failure, hypoxia, acute metabolic encephalopathy. S/p thoracentesis. 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 At baseline pt lives with wife and reports independence with ADLs and ambulation. No hx falls.   
 
6/10/2020: Pt found supine in bed upon arrival, alert and agreeable to OT treatment. O2 sats 96% throughout session on 2L O2 HFNC. Pt reports he feels terrible and does not want to get up but he will. Pt practiced rolling R with Luigi/cues for technique, sidelying > sit with Luigi/cueing. Sitting balance intact. Pt practiced scooting to edge of bed with ModA progressing to [address] Sit > stand with Luigi/cues for technique. Brief found to be soiled. Pt tolerated standing in RW for several minutes for brief change. Total assist for clothing management, Mod-MaxA for bowel hygiene. Pt practiced step to head of bed with RW/CGA, sat with Luigi-CGA. Lateral scooting to head of bed with CGA. Pt returned to supine with Luigi, cleaned hands with wipe with set up. Pt left supine in bed with call bell within reach. Pt is making slow progress towards goals, appears to have become more debilitated throughout hospital stay. See above. Continue OT POC. This section established at most recent assessment PROBLEM LIST (Impairments causing functional limitations): 1. Decreased Strength 2. Decreased ADL/Functional Activities 3. Decreased Transfer Abilities 4. Decreased Ambulation Ability/Technique 5. Decreased Balance 6. Decreased Activity Tolerance 7. Decreased Pacing Skills 8. Decreased Work Simplification/Energy Conservation Techniques 9. Increased Fatigue INTERVENTIONS PLANNED: (Benefits and precautions of occupational therapy have been discussed with the patient.) 1. Activities of daily living training 2. Adaptive equipment training 3. Balance training 4. Clothing management 5. Donning&doffing training 6. Hygiene training 7. Neuromuscular re-eduation 8. Re-evaluation 9. Therapeutic activity 10. Therapeutic exercise TREATMENT PLAN: Frequency/Duration: Follow patient 3x/week to address above goals. Rehabilitation Potential For Stated Goals: Good REHAB RECOMMENDATIONS (at time of discharge pending progress):   
Placement: It is my opinion, based on this patient's performance to date, that Mr. Pradip Pearl may benefit from intensive therapy at a [address] [place] [address] after discharge due to the functional deficits listed above that are likely to improve with skilled rehabilitation and concerns that he/she may be unsafe to be unsupervised at home due to impaired strength and activity tolerance impacting function. Equipment:  
? RW, potentially BSC as shower chair OCCUPATIONAL PROFILE AND HISTORY:  
History of Present Injury/Illness (Reason for Referral): 
See H&P. Past Medical History/Comorbidities:  
Mr. Pradip Pearl  has a past medical history of GERD (gastroesophageal reflux disease), Head and neck cancer ([facility] [address] (9/30/2015), History of squamous cell carcinoma, History of throat cancer (2015), Hypercholesteremia, Hypertension, Hypomagnesemia (5/20/2020), Rectal cancer ([facility] [address] (2018), Type 2 diabetes mellitus ([facility] and Vomiting (2/23/2016). Mr. Pradip Pearl  has a past surgical history that includes hx orthopaedic (Right, 1966); hx other surgical (9/9/15); hx heent; hx tonsillectomy; hx heent (2015); hx colonoscopy (05/2018); hx vascular access; hx lymph node dissection; hx other surgical; and flexible sigmoidoscopy (N/A, 6/2/2020). Social History/Living Environment:  
Home Environment: Private residence # Steps to Enter: 0 One/Two Story Residence: One story Living Alone: No 
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 At baseline pt lives with wife and reports independence with ADLs and ambulation. No hx falls. Personal Factors:   
      Sex:  male Age:  79 y.o. Other factors that influence how disability is experienced by the patient:  Multiple co-morbidities Number of Personal Factors/Comorbidities that affect the Plan of Care: Expanded review of therapy/medical records (1-2):  MODERATE COMPLEXITY ASSESSMENT OF OCCUPATIONAL PERFORMANCE[de-identified]  
Activities of Daily Living:  
Basic ADLs (From Assessment) Complex ADLs (From Assessment) Feeding: Independent Oral Facial Hygiene/Grooming: Stand-by assistance Bathing: Minimum assistance Upper Body Dressing: Stand-by assistance Lower Body Dressing: Stand-by assistance Toileting: Stand by assistance Grooming/Bathing/Dressing Activities of Daily Living Toileting Bowel Hygiene: Moderate assistance Clothing Management: Total assistance (dependent) Adaptive Equipment: [address] Bed/Mat Mobility Rolling: Minimum assistance Supine to Sit: Minimum assistance Sit to Supine: Minimum assistance Sit to Stand: Minimum assistance Stand to Sit: Minimum assistance Bed to Chair: Contact guard assistance;Minimum assistance;Assist x1 Scooting: Minimum assistance Most Recent Physical Functioning:  
Gross Assessment: 
AROM: Generally decreased, functional(BUEs) Strength: Generally decreased, functional(BUEs) Coordination: Generally decreased, functional(BUEs) Tone: Normal(BUEs) Sensation: Impaired(Decreased sensation in BUE digits 4 & 5) Posture: 
Posture (WDL): Exceptions to [facility] Posture Assessment: Forward head, Trunk flexion Balance: 
Sitting: Intact Standing: Impaired Standing - Static: Fair;Good;Constant support Standing - Dynamic : Fair;Constant support Bed Mobility: 
Rolling: Minimum assistance Supine to Sit: Minimum assistance Sit to Supine: Minimum assistance Scooting: Minimum assistance Wheelchair Mobility: 
  
Transfers: 
Sit to Stand: Minimum assistance Stand to Sit: Minimum assistance Bed to Chair: Contact guard assistance;Minimum assistance;Assist x1 Patient Vitals for the past 6 hrs: 
 BP SpO2 O2 Flow Rate (L/min) Pulse 06/10/20 1204 151/78 98 %  92  
06/10/20 1514  99 % 45 l/min   
06/10/20 1518 176/73 96 % 2 l/min 96  
06/10/20 1548  96 %   Mental Status Neurologic State: Alert Orientation Level: Appropriate for age Cognition: Appropriate decision making, Appropriate for age attention/concentration, Appropriate safety awareness Perception: Appears intact Perseveration: No perseveration noted Safety/Judgement: Awareness of environment, Decreased awareness of need for assistance, Fall prevention Physical Skills Involved: 1. Range of Motion 2. Balance 3. Strength 4. Activity Tolerance 5. Gross Motor Control Cognitive Skills Affected (resulting in the inability to perform in a timely and safe manner): 1. None  Psychosocial Skills Affected: 1. Habits/Routines 2. Environmental Adaptation 3. Social Interaction 4. Emotional Regulation 5. Self-Awareness 6. Awareness of Others 7. Social Roles Number of elements that affect the Plan of Care: 5+:  HIGH COMPLEXITY CLINICAL DECISION MAKIN[address] [place] [id number] AM-PAC 6 Clicks Daily Activity Inpatient Short Form How much help from another person does the patient currently need. .. Total A Lot A Little None 1. Putting on and taking off regular lower body clothing? [] 1   [] 2   [x] 3   [] 4  
2. Bathing (including washing, rinsing, drying)? [] 1   [] 2   [x] 3   [] 4  
3. Toileting, which includes using toilet, bedpan or urinal?   [] 1   [] 2   [x] 3   [] 4  
4. Putting on and taking off regular upper body clothing? [] 1   [] 2   [x] 3   [] 4  
5. Taking care of personal grooming such as brushing teeth? [] 1   [] 2   [x] 3   [] 4  
6. Eating meals? [] 1   [] 2   [] 3   [x] 4  
© , Trustees of [address] [place] [address] [address] [address], under license to [employer]. All rights reserved Score:  Initial: 19 2020 Most Recent: X (Date: -- ) Interpretation of Tool:  Represents activities that are increasingly more difficult (i.e. Bed mobility, Transfers, Gait). Medical Necessity:    
· Patient demonstrates · good ·  rehab potential due to higher previous functional level. Reason for Services/Other Comments: 
· Patient continues to require skilled intervention due to  
· Inability to complete ADLs at prior level of independence · . Use of outcome tool(s) and clinical judgement create a POC that gives a: MODERATE COMPLEXITY  
 
 
 
TREATMENT:  
(In addition to Assessment/Re-Assessment sessions the following treatments were rendered) Pre-treatment Symptoms/Complaints:   
Pain: Initial:  
Pain Intensity 1: 0  Post Session:  same Self Care: (24 minutes): Procedure(s) (per grid) utilized to improve and/or restore self-care/home management as related to toileting and grooming. Required minimal to maximal visual, verbal and manual cueing to facilitate activities of daily living skills and compensatory activities. Pt practiced rolling R with Luigi/cues for technique, sidelying > sit with Luigi/cueing. Sitting balance intact. Pt practiced scooting to edge of bed with ModA progressing to [address] Sit > stand with Luigi/cues for technique. Brief found to be soiled. Pt tolerated standing in RW for several minutes for brief change. Total assist for clothing management, Mod-MaxA for bowel hygiene. Pt practiced step to head of bed with RW/CGA, sat with Luigi-CGA. Lateral scooting to head of bed with CGA. Pt returned to supine with Luigi. Braces/Orthotics/Lines/Etc:  
· O2 Device: HFNC Treatment/Session Assessment:   
· Response to Treatment:  Tolerated well · Interdisciplinary Collaboration:  
o Occupational Therapist 
· After treatment position/precautions:  
o Supine in bed 
o Bed/Chair-wheels locked 
o Bed in low position 
o Call light within reach · Compliance with Program/Exercises: Compliant all of the time, Will assess as treatment progresses. · Recommendations/Intent for next treatment session:   \"Next visit will focus on advancements to more challenging activities and reduction in assistance provided\". Total Treatment Duration: OT Patient Time In/Time Out Time In: 7242 Time Out: 4392 Bailey Blake, OTR/L

## 2020-06-10 NOTE — PROGRESS NOTES
[facility] can't accept patient because he doesn't have the codes necessary to be admitted. GAEL shared this with patient's spouse. Patient's wife is upset and concerned that patient won't get the necessary care he needs at rehab. She did choose [facility] but is very worried about the care he will get.

## 2020-06-10 NOTE — PROGRESS NOTES
Talked with patient's spouse on telephone and informed her that due to [employer] testing for SNF placement visitors are not allowed. Wife is upset but said she will comply. She is terribly concerned that he will decline if she is not allowed to visit and he is too weak to call her. I suggested that she call and ask for his nurse and have the nurse call wife on patient's cell phone. This pleased the wife.

## 2020-06-10 NOTE — PROGRESS NOTES
Problem: Mobility Impaired (Adult and Pediatric) Goal: *Acute Goals and Plan of Care (Insert Text) Outcome: Progressing Towards Goal 
Note: LTG: 
(1.)Mr. Dejah Ribera will move from supine to sit and sit to supine , scoot up and down, and roll side to side in bed with INDEPENDENT within 7 treatment day(s). (2.)Mr. Dejah Ribera will transfer from bed to chair and chair to bed with MODIFIED INDEPENDENCE using the least restrictive device within 7 treatment day(s). (3.)Mr. Dejah Ribera will ambulate with MODIFIED INDEPENDENCE for 250 feet with the least restrictive device within 7 treatment day(s). (4.)Mr. Dejah Ribera will demonstrate good dynamic standing balance within 7 treatment days. ________________________________________________________________________________________________ PHYSICAL THERAPY: Daily Note and PM 6/10/2020 INPATIENT: PT Visit Days : 5 Payor: Daniela Cobian / Plan: [address] [place] [address] HMO / Product Type: [employer] Medicare /   
  
NAME/AGE/GENDER: Schuyler Peters is a 79 y.o. male PRIMARY DIAGNOSIS: Acute metabolic encephalopathy [Z94.15] Acute respiratory failure with hypoxia (HCC) Acute respiratory failure with hypoxia (HCC) Procedure(s) (LRB): 
THORACENTESIS (N/A) ULTRASOUND (Bilateral) 1 Day Post-Op ICD-10: Treatment Diagnosis:  
 · Generalized Muscle Weakness (M62.81) · Other lack of cordination (R27.8) · Difficulty in walking, Not elsewhere classified (R26.2) · Other abnormalities of gait and mobility (R26.89) · Low Back Pain (M54.5) Precaution/Allergies: 
Patient has no known allergies. ASSESSMENT:  
 
Mr. Dejah Ribera  is a 79year old male who has been admitted to hospital on 05/24 with above diagnosis and hx of cancer. Prior to hospital admission pt lives with wife in a 1 story home with 0 step(s) to enter with no railing. Pt endorses 0 falls in past 6 months. Prior to admission No O2 usage at home, no assistance with ADLs and uses no DME for mobility. 6/10-Mr. Orourke Pain was received in supine. O2 sats 99% on airvo. He was agreeable to participate. Did therex throughout treatment but spaced it out. A few in supine, a few in sitting. He has a flat affect the entire treatment. He doesn't say much and appears down. Extra time for activity and clear instructions. Rolling and supine to sit with min to mod assist.  Good sitting balance. Sit to stand and bed to chair with min assist and RW 5 ft to chair. After rest break sit to stand with RW with min to mod assist.  Marching in standing for maybe 30 sec. Then seated again a few exercises. Stood o 1 more time but did march. Asked him to but he just didn't. [place] he was worn out. O2 sats 98-99 entire treatment. Encouraged to stay up in the chair for a little while. Tried to shift off buttocks because they were sore. Still no progress made but not really going backwards. He lays in that room that has to have the door closed. Wont let me open the shades and not even the TV on. He needs a change of scenery. This section established at most recent assessment PROBLEM LIST (Impairments causing functional limitations): 1. Decreased Strength 2. Decreased ADL/Functional Activities 3. Decreased Transfer Abilities 4. Decreased Ambulation Ability/Technique 5. Decreased Balance 6. Increased Pain 7. Decreased Activity Tolerance 8. Increased Fatigue 9. Decreased Flexibility/Joint Mobility 10. Decreased [place] with Home Exercise Program 
 INTERVENTIONS PLANNED: (Benefits and precautions of physical therapy have been discussed with the patient.) 1. Balance Exercise 2. Bed Mobility 3. Family Education 4. Gait Training 5. Heat 6. Home Exercise Program (HEP) 7. Manual Therapy 8. Neuromuscular Re-education/Strengthening 9. Range of Motion (ROM) 10. Therapeutic Activites 11. Therapeutic Exercise/Strengthening 12. Transfer Training TREATMENT PLAN: Frequency/Duration: 3 times a week for duration of hospital stay Rehabilitation Potential For Stated Goals: Good REHAB RECOMMENDATIONS (at time of discharge pending progress):   
Placement: It is my opinion, based on this patient's performance to date, that Mr. Mani Marshall may [facility] due to the functional deficits listed above that are likely to improve with skilled rehabilitation and concerns that he/she may be unsafe to be unsupervised at home due to multiple comorbidities and high level of intense care needed. . 
Equipment:  
? Walkers, Type: [place] HISTORY:  
History of Present Injury/Illness (Reason for Referral): 
Per Physician Note: 
 
Zenon Srivastava is a 79 y.o. male with a past medical history of HEENT cancer undergoing chemo/radiation who presents to the [facility] ER with report of SOB and chest discomfort for the past several days. He also admits to mild cough but denies fevers or chills. Admits to feeling a little better and breathing a bit better since arrival. 
  
Past Medical History/Comorbidities:  
Mr. Mani Marshall  has a past medical history of GERD (gastroesophageal reflux disease), Head and neck cancer ([facility] [address] (9/30/2015), History of squamous cell carcinoma, History of throat cancer (2015), Hypercholesteremia, Hypertension, Hypomagnesemia (5/20/2020), Rectal cancer ([facility] [address] (2018), Type 2 diabetes mellitus ([facility] [address] and Vomiting (2/23/2016). Mr. Mani Marshall  has a past surgical history that includes hx orthopaedic (Right, 1966); hx other surgical (9/9/15); hx heent; hx tonsillectomy; hx heent (2015); hx colonoscopy (05/2018); hx vascular access; hx lymph node dissection; hx other surgical; and flexible sigmoidoscopy (N/A, 6/2/2020). Social History/Living Environment:  
Home Environment: Private residence # Steps to Enter: 0 One/Two Story Residence: One story Living Alone: No 
 Support Systems: Spouse/Significant Other/Partner Patient Expects to be Discharged to[de-identified] Private residence Current DME Used/Available at Home: None Tub or Shower Type: Tub/Shower combination Prior Level of Function/Work/Activity: Mod I mobility and amb Number of Personal Factors/Comorbidities that affect the Plan of Care: 3+: HIGH COMPLEXITY EXAMINATION:  
Most Recent Physical Functioning:  
Gross Assessment: 
  
         
  
Posture: 
  
Balance: 
Sitting: Intact Standing: Impaired; With support Standing - Static: Fair Standing - Dynamic : Constant support; Fair Bed Mobility: 
Rolling: Contact guard assistance;Stand-by assistance Supine to Sit: Contact Guard Assist;Additional time Scooting: Stand-by assistance Wheelchair Mobility: 
  
Transfers: 
Sit to Stand: Minimum assistance; Moderate assistance;Assist x1 Stand to Sit: Minimum assistance Bed to Chair: Contact guard assistance;Minimum assistance;Assist x1 Gait: 
  
Base of Support: Narrowed Speed/Christina: Slow Step Length: Right shortened;Left shortened Distance (ft): 5 Feet (ft) Assistive Device: Walker, rolling Ambulation - Level of Assistance: Minimal assistance;Assist x1 Interventions: Verbal cues Body Structures Involved: 1. Lungs 2. Bones 3. Joints Body Functions Affected: 1. Sensory/Pain 2. Movement Related Activities and Participation Affected: 1. Mobility 2. Self Care 3. Interpersonal Interactions and Relationships 4. Community, Social and [place] Number of elements that affect the Plan of Care: 4+: HIGH COMPLEXITY CLINICAL PRESENTATION:  
Presentation: Stable and uncomplicated: LOW COMPLEXITY CLINICAL DECISION MAKIN [facility] [address] [address] AM-PAC 6 Clicks Basic Mobility Inpatient Short Form How much difficulty does the patient currently have. .. Unable A Lot A Little None 1. Turning over in bed (including adjusting bedclothes, sheets and blankets)? [] 1   [] 2   [] 3   [x] 4 2.  Sitting down on and standing up from a chair with arms ( e.g., wheelchair, bedside commode, etc.)   [] 1   [] 2   [x] 3   [] 4  
3. Moving from lying on back to sitting on the side of the bed? [] 1   [] 2   [] 3   [x] 4 How much help from another person does the patient currently need. .. Total A Lot A Little None 4. Moving to and from a bed to a chair (including a wheelchair)? [] 1   [] 2   [x] 3   [] 4  
5. Need to walk in hospital room? [] 1   [] 2   [] 3   [x] 4  
6. Climbing 3-5 steps with a railing? [] 1   [x] 2   [] 3   [] 4  
© 2007, Trustees of [address] [place] [address] [address] [address], under license to [employer]. All rights reserved Score:  Initial: 20 Most Recent: X (Date: -- ) Interpretation of Tool:  Represents activities that are increasingly more difficult (i.e. Bed mobility, Transfers, Gait). Medical Necessity:    
· Patient is expected to demonstrate progress in  
· strength, range of motion, balance, coordination, and functional technique ·  to  
· increase independence with ambulation and mobility · . Reason for Services/Other Comments: 
· Patient continues to require skilled intervention due to · Gross weakness, poor balance, increased risk of falls · . Use of outcome tool(s) and clinical judgement create a POC that gives a: Clear prediction of patient's progress: LOW COMPLEXITY  
  
 
 
 
TREATMENT:  
  
Pre-treatment Symptoms/Complaints: flat Pain: Initial:  
Pain Intensity 1: 0 Pain Location 1: Buttocks Pain Intervention(s) 1: Position 0/10 Post Session: flat Therapeutic Activity: (    17): Therapeutic activities including Bed transfers and Chair transfers to improve mobility. Required moderate Verbal cues to promote motor control of bilateral, upper extremity(s), lower extremity(s). Therapeutic Exercise: (  8):  Exercises per grid below to improve strength.   Required moderate verbal and tactile cues to promote proper body mechanics. Progressed repetitions as indicated. Date: 
6/5/20 Date: 
6/8/20 Date: 
6/10 Activity/Exercise Parameters Parameters Parameters Ankle pumps 20 X 20 B X 10 Heel slides 2 x 10  X 10 aa Leg slides (hip abd/add) 2 x 10  X 10 aa Seated knee extension 10 B X 15 B Seated hip flexion 10 B X 15 B X 5 Standing marching   X 5 Braces/Orthotics/Lines/Etc:  
· IV 
· traore catheter · O2 monitor · O2 Device: Room air Treatment/Session Assessment:   
· Response to Treatment:  flat · Interdisciplinary Collaboration:  
o Physical Therapist 
o Registered Nurse · After treatment position/precautions:  
o Up in the chair. o Bed/Chair-wheels locked 
o Call light in hand 
o Family at bedside · Compliance with Program/Exercises: Will assess as treatment progresses · Recommendations/Intent for next treatment session: \"Next visit will focus on advancements to more challenging activities\". Total Treatment Duration: PT Patient Time In/Time Out Time In: 5364 Time Out: 9443 Milton Silveira, PT

## 2020-06-10 NOTE — PROGRESS NOTES
Rebeka Perez Admission Date: 5/24/2020 Daily Progress Note: 6/10/2020 The patient's chart is reviewed and the patient is discussed with the staff. The patient's chart is reviewed and the patient is discussed with the staff. 
  
The patient is a 77 y. o. male seen and evaluated at the request of Dr. Poppy Rodriguez for evaluation and management of pleural effusions. 
  
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 He received carbo/etoposide 2 weeks ago and has also had radiation for anal cancer. He has required anal dilation in past.  
  
He presented to [address] [place] [address] on 5/24 with increasing dyspnea and chest discomfort. A CT of the chest revealed large bilateral effusions and we are now asked to assist with management. He is hyponatremic and getting saline infusions and also getting PRBCs for anemia. Had nausea/vomiting, aspiration event on 5/31 with hypoxemia. CT at that time suggested rectal obstruction. Flex sig on 6/2 with no anal stricture and stool impaction. 
  
S/p tap or Right chest with 800 removed on 6/3 S/p thoracentesis on L( 1100 cc ) and R( 1000 cc) removed   On 6/10 Subjective:  
 
Feels better after thoracentesis yesterday but still on airvo Talking more Current Facility-Administered Medications Medication Dose Route Frequency  potassium chloride (K-DUR, KLOR-CON) SR tablet 20 mEq  20 mEq Oral TID  furosemide (LASIX) injection 40 mg  40 mg IntraVENous DAILY  insulin glargine (LANTUS) injection 5 Units  5 Units SubCUTAneous QHS  albuterol-ipratropium (DUO-NEB) 2.5 MG-0.5 MG/3 ML  3 mL Nebulization Q6HWA RT  
 hydrALAZINE (APRESOLINE) 20 mg/mL injection 10 mg  10 mg IntraVENous Q6H PRN  
  magnesium hydroxide (MILK OF MAGNESIA) 400 mg/5 mL oral suspension 30 mL  30 mL Oral DAILY  senna-docusate (PERICOLACE) 8.6-50 mg per tablet 1 Tab  1 Tab Oral BID  
 bisacodyL (DULCOLAX) suppository 10 mg  10 mg Rectal DAILY  0.9% sodium chloride infusion 250 mL  250 mL IntraVENous PRN  
 amLODIPine (NORVASC) tablet 10 mg  10 mg Oral DAILY  docusate sodium (COLACE) capsule 100 mg  100 mg Oral PRN  
 glimepiride (AMARYL) tablet 4 mg  4 mg Oral 7am  
 lisinopriL (PRINIVIL, ZESTRIL) tablet 20 mg  20 mg Oral DAILY  meloxicam (MOBIC) tablet 7.5 mg  7.5 mg Oral DAILY  sodium chloride tablet 2 g  2 g Oral BID  traMADoL (ULTRAM) tablet 50 mg  50 mg Oral Q6H PRN  
 sodium chloride (NS) flush 5-40 mL  5-40 mL IntraVENous Q8H  
 sodium chloride (NS) flush 5-40 mL  5-40 mL IntraVENous PRN  
 acetaminophen (TYLENOL) tablet 650 mg  650 mg Oral Q4H PRN  
 ondansetron (ZOFRAN) injection 4 mg  4 mg IntraVENous Q4H PRN  
 insulin regular (NOVOLIN R, HUMULIN R) injection   SubCUTAneous AC&HS  enoxaparin (LOVENOX) injection 40 mg  40 mg SubCUTAneous Q24H Review of Systems Constitutional: negative for fever, chills, sweats Cardiovascular: negative for chest pain, palpitations, syncope, edema Gastrointestinal:  negative for dysphagia, reflux, vomiting, diarrhea, abdominal pain, or melena Neurologic:  negative for focal weakness, numbness, headache Objective:  
 
Vitals:  
 06/09/20 2029 06/09/20 2300 06/10/20 1171 06/10/20 1925 BP:  149/64 135/69 148/74 Pulse:  99 93 92 Resp:  18 18 18 Temp:  97.5 °F (36.4 °C) 98.6 °F (37 °C) 98 °F (36.7 °C) SpO2: 99% 96% 97% 93% Weight:      
Height:      
 
 
 
Intake/Output Summary (Last 24 hours) at 6/10/2020 0746 Last data filed at 6/9/2020 1801 Gross per 24 hour Intake  Output 2400 ml Net -2400 ml Physical Exam:  
Constitution:  the patient is well developed and in no acute distress EENMT:  Sclera clear, pupils equal, oral mucosa moist 
Respiratory: clear Cardiovascular:  RRR without M,G,R 
Gastrointestinal: soft and non-tender; with positive bowel sounds. Musculoskeletal: warm without cyanosis. There is no lower extremity edema. Skin:  no jaundice or rashes, no wounds Neurologic: no gross neuro deficits Psychiatric:  alert and oriented x 3 CXR: none today LAB Recent Labs  
  06/10/20 
0711 06/09/20 
2119 06/09/20 
1609 06/09/20 
1050 06/09/20 
6919 GLUCPOC 78 148* 216* 146* 90 Recent Labs  
  06/10/20 
0636 06/09/20 
0824 06/08/20 
3073 WBC 6.3 6.3 6.6 HGB 7.4* 7.6* 7.6* HCT 23.3* 23.4* 23.1*  
 157 183 Recent Labs  
  06/10/20 
0636 06/09/20 
4164 06/08/20 
8971 * 133* 134* K 4.3 3.9 4.1 CL 97* 95* 97* CO2 31 31 31 GLU 74 85 97 BUN 10 11 13 CREA 0.60* 0.63* 0.66* MG 2.1 2.0 2.0  
CA 8.6 8.6 8.5 No results for input(s): PH, PCO2, PO2, HCO3, PHI, PCO2I, PO2I, HCO3I in the last 72 hours. No results for input(s): LCAD, LAC in the last 72 hours. Assessment:  (Medical Decision Making) Hospital Problems  Date Reviewed: 5/20/2020 Codes Class Noted POA Rectal obstruction ICD-10-CM: K62.4 ICD-9-CM: 569.2  6/1/2020 Yes Aspiration pneumonia ([facility] ICD-10-CM: J69.0 ICD-9-CM: 507.0  6/1/2020 Yes Neutropenia ([facility] ICD-10-CM: D70.9 ICD-9-CM: 288.00  5/25/2020 Yes Pleural effusion on right ICD-10-CM: J90 ICD-9-CM: 511.9  5/25/2020 Yes * (Principal) Acute respiratory failure with hypoxia ([facility] ICD-10-CM: J96.01 
ICD-9-CM: 518.81  5/24/2020 Yes Normocytic anemia ICD-10-CM: D64.9 ICD-9-CM: 285.9  5/24/2020 Yes Pleural effusion on left ICD-10-CM: J90 ICD-9-CM: 511.9  5/24/2020 Yes Malignant neoplasm metastatic to bone [facility]) ICD-10-CM: C79.51 
ICD-9-CM: 198.5  6/26/2019 Yes  Type 2 diabetes with nephropathy (HCC) ICD-10-CM: E11.21 
 ICD-9-CM: 250.40, 583.81  7/3/2018 Yes Diabetes mellitus due to underlying condition [facility] ICD-10-CM: X62.10 ICD-9-CM: 249.80  12/21/2015 Yes COVID-19 ruled out ICD-10-CM: Z03.818 ICD-9-CM: V71.83  12/15/2015 Yes Hyponatremia ICD-10-CM: E87.1 ICD-9-CM: 276.1  11/23/2015 Yes Plan:  (Medical Decision Making)  
 
--wean airvo to NC 
- PT Mobilize More than 50% of the time documented was spent in face-to-face contact with the patient and in the care of the patient on the floor/unit where the patient is located.  
 
Dotty Lomeli MD

## 2020-06-10 NOTE — PROGRESS NOTES
Hospitalist Note Admit Date:  2020 11:00 PM  
Name:  Victor M Medina Age:  79 y.o. 
:  1940 MRN:  [id number] PCP:  Shiva Epps MD 
Treatment Team: Attending Provider: Ward Gallagher MD; Consulting Provider: Ericka Chapman MD; Utilization Review: Komal Melchor RN; Care Manager: Lady Chavez.; Physical Therapist: Serg Justice PT; Occupational Therapist: Mayur Weiner, OTR/JOSE 
 
HPI/Subjective:  
 
Mr. Amanuel Madrid is a 78 yo male with PMH of oral and anal cancers admitted with acute hypoxic respiratory failure due to bilateral pleural effusions. He has been seen by pulm s/p bilateral thoracentesis , right thoracentesis 6-3 and diuresis. He has compelted 8 days zosyn. He has been seen by GI s/p 6-2 flex sig with disimpaction. Discharge plans pending . Bryan Rein 6-10-20 wife present, wants to try to eat lunch, uncomfortable,  ROS difficult Objective:  
 
Patient Vitals for the past 24 hrs: 
 Temp Pulse Resp BP SpO2  
06/10/20 1518 98 °F (36.7 °C) 96 18 176/73 99 % 06/10/20 1514     99 % 06/10/20 1204 98.1 °F (36.7 °C) 92 18 151/78 98 % 06/10/20 0805     97 % 06/10/20 0712 98 °F (36.7 °C) 92 18 148/74 93 % 06/10/20 0412 98.6 °F (37 °C) 93 18 135/69 97 % 20 2300 97.5 °F (36.4 °C) 99 18 149/64 96 % 20 2029     99 % 20 1911 98.4 °F (36.9 °C) 98 18 143/70 96 % 20 1704     96 % Oxygen Therapy O2 Sat (%): 99 % (06/10/20 1518) Pulse via Oximetry: 96 beats per minute (06/10/20 151) O2 Device: Heated; Hi flow nasal cannula (06/10/20 1514) O2 Flow Rate (L/min): 45 l/min (06/10/20 1514) O2 Temperature: 87.8 °F (31 °C) (06/10/20 1514) FIO2 (%): 40 % (06/10/20 1514) ETCO2 (mmHg): 93 mmHg (20) Estimated body mass index is 23.46 kg/m² as calculated from the following: 
  Height as of this encounter: 6' (1.829 m). Weight as of this encounter: 78.5 kg (173 lb). Intake/Output Summary (Last 24 hours) at 6/10/2020 1519 Last data filed at 6/10/2020 1256 Gross per 24 hour Intake  Output 2150 ml Net -2150 ml *Note that automatically entered I/Os may not be accurate; dependent on patient compliance with collection and accurate  by techs. General:    Sleepy, elderly, no distress. CV:   RRR. No murmur, rub, or gallop. No edema Lungs:   CTAB. No wheezing, rhonchi, or rales. anterior Abdomen:   Soft, nontender, nondistended. Decreased BS Extremities: Warm and dry. Skin:     No rashes or jaundice. Neuro:  No gross focal deficits, sleepy Data Review: 
I have reviewed all labs, meds, and studies from the last 24 hours: 
Recent Results (from the past 24 hour(s)) GLUCOSE, POC Collection Time: 06/09/20  4:09 PM  
Result Value Ref Range Glucose (POC) 216 (H) 65 - 100 mg/dL GLUCOSE, POC Collection Time: 06/09/20  9:19 PM  
Result Value Ref Range Glucose (POC) 148 (H) 65 - 100 mg/dL METABOLIC PANEL, BASIC Collection Time: 06/10/20  6:36 AM  
Result Value Ref Range Sodium 135 (L) 136 - 145 mmol/L Potassium 4.3 3.5 - 5.1 mmol/L Chloride 97 (L) 98 - 107 mmol/L  
 CO2 31 21 - 32 mmol/L Anion gap 7 7 - 16 mmol/L Glucose 74 65 - 100 mg/dL BUN 10 8 - 23 MG/DL Creatinine 0.60 (L) 0.8 - 1.5 MG/DL  
 GFR est AA >60 >60 ml/min/1.73m2 GFR est non-AA >60 >60 ml/min/1.73m2 Calcium 8.6 8.3 - 10.4 MG/DL MAGNESIUM Collection Time: 06/10/20  6:36 AM  
Result Value Ref Range Magnesium 2.1 1.8 - 2.4 mg/dL CBC W/O DIFF Collection Time: 06/10/20  6:36 AM  
Result Value Ref Range WBC 6.3 4.3 - 11.1 K/uL  
 RBC 2.50 (L) 4.23 - 5.6 M/uL HGB 7.4 (L) 13.6 - 17.2 g/dL HCT 23.3 (L) 41.1 - 50.3 % MCV 93.2 79.6 - 97.8 FL  
 MCH 29.6 26.1 - 32.9 PG  
 MCHC 31.8 31.4 - 35.0 g/dL  
 RDW 15.7 (H) 11.9 - 14.6 % PLATELET 979 186 - 749 K/uL MPV 9.8 9.4 - 12.3 FL ABSOLUTE NRBC 0.00 0.0 - 0.2 K/uL GLUCOSE, POC Collection Time: 06/10/20  7:11 AM  
Result Value Ref Range Glucose (POC) 78 65 - 100 mg/dL GLUCOSE, POC Collection Time: 06/10/20 12:02 PM  
Result Value Ref Range Glucose (POC) 141 (H) 65 - 100 mg/dL SARS-COV-2 Collection Time: 06/10/20 12:03 PM  
Result Value Ref Range SARS-CoV-2 WRONG TEST ORDERED Specimen source NASAL    
 COVID-19 rapid test Not detected NOTD COVID-19 WRONG TEST ORDERED Current Meds: 
Current Facility-Administered Medications Medication Dose Route Frequency  potassium chloride (K-DUR, KLOR-CON) SR tablet 20 mEq  20 mEq Oral TID  furosemide (LASIX) injection 40 mg  40 mg IntraVENous DAILY  insulin glargine (LANTUS) injection 5 Units  5 Units SubCUTAneous QHS  albuterol-ipratropium (DUO-NEB) 2.5 MG-0.5 MG/3 ML  3 mL Nebulization Q6HWA RT  
 hydrALAZINE (APRESOLINE) 20 mg/mL injection 10 mg  10 mg IntraVENous Q6H PRN  
 magnesium hydroxide (MILK OF MAGNESIA) 400 mg/5 mL oral suspension 30 mL  30 mL Oral DAILY  senna-docusate (PERICOLACE) 8.6-50 mg per tablet 1 Tab  1 Tab Oral BID  
 bisacodyL (DULCOLAX) suppository 10 mg  10 mg Rectal DAILY  0.9% sodium chloride infusion 250 mL  250 mL IntraVENous PRN  
 amLODIPine (NORVASC) tablet 10 mg  10 mg Oral DAILY  docusate sodium (COLACE) capsule 100 mg  100 mg Oral PRN  
 [Held by provider] glimepiride (AMARYL) tablet 4 mg  4 mg Oral 7am  
 lisinopriL (PRINIVIL, ZESTRIL) tablet 20 mg  20 mg Oral DAILY  meloxicam (MOBIC) tablet 7.5 mg  7.5 mg Oral DAILY  sodium chloride tablet 2 g  2 g Oral BID  traMADoL (ULTRAM) tablet 50 mg  50 mg Oral Q6H PRN  
 sodium chloride (NS) flush 5-40 mL  5-40 mL IntraVENous Q8H  
 sodium chloride (NS) flush 5-40 mL  5-40 mL IntraVENous PRN  
 acetaminophen (TYLENOL) tablet 650 mg  650 mg Oral Q4H PRN  
 ondansetron (ZOFRAN) injection 4 mg  4 mg IntraVENous Q4H PRN  
  insulin regular (NOVOLIN R, HUMULIN R) injection   SubCUTAneous AC&HS  enoxaparin (LOVENOX) injection 40 mg  40 mg SubCUTAneous Q24H Other Studies: 
Results for orders placed or performed during the hospital encounter of 20  
2D ECHO COMPLETE ADULT (TTE) W OR WO CONTR Narrative [address] [facility] [address] [place], [address] [facility] 
[phone] Transthoracic Echocardiogram 
2D, M-mode, Doppler, and Color Doppler Patient: Khurram Navarro 
MR #: [id number] : [id number] Age: 79 years Gender: Male Study date: 26-May-2020 Account #: [de-identified] Height: 72 in 72 in 
Weight: 164 lb 163.7 lb 
BSA: 1.96 mï¾² 1.96 mï¾² Status:Routine Location: 831 BP: 169/ 88 Allergies: NO KNOWN ALLERGENS Sonographer:  Erma Jovel [facility] Group:  [facility] Referring Physician:  Lucie Colbert MD 
Reading Physician:  Raul Gomez. MD [facility] INDICATIONS: Bilateral pleural effusions *Pt. scanned supine. Unable to turn LLD. PROCEDURE: This was a routine study. A transthoracic echocardiogram was 
performed. The study included complete 2D imaging, M-mode, complete spectral 
Doppler, and color Doppler. Intravenous contrast (Definity, 1 ml) was 
administered to opacify the left ventricle. Image quality was adequate. LEFT VENTRICLE: Size was normal. Systolic function was normal. Ejection 
fraction was estimated in the range of 60 % to 65 %. There were no regional 
wall motion abnormalities. Wall thickness was normal. Left ventricular 
diastolic function parameters were normal. E/e' av.46. RIGHT VENTRICLE: The size was normal. Systolic function was normal. 
 
LEFT ATRIUM: Size was normal. 
 
RIGHT ATRIUM: Size was normal. 
 
SYSTEMIC VEINS: IVC: The inferior vena cava was normal in size and course. AORTIC VALVE: The valve was structurally normal, tri-commissural. There was  
no evidence for stenosis. There was no insufficiency. MITRAL VALVE: Valve structure was normal. There was no evidence for stenosis. There was no regurgitation. TRICUSPID VALVE: The valve structure was normal. There was no evidence for 
stenosis. There was trivial regurgitation. PULMONIC VALVE: Not well visualized. There was no evidence for stenosis. There 
was no insufficiency. PERICARDIUM: There was no pericardial effusion. AORTA: The root exhibited normal size. SUMMARY: 
 
-  Left ventricle: Systolic function was normal. Ejection fraction was 
estimated in the range of 60 % to 65 %. There were no regional wall motion 
abnormalities. -  Pericardium: There was no pericardial effusion. SYSTEM MEASUREMENT TABLES 
 
2D Ao Diam: 3.2 cm 
LA Diam: 2.6 cm 
LAEDV Index (A-L): 25.9 ml/m2 %FS: 34.9 % IVSd: 1.1 cm 
LVIDd: 3.7 cm 
LVIDs: 2.4 cm 
LVOT Diam: 2.1 cm 
LVPWd: 1.2 cm Prepared and signed by 
 
Melissa Bains MD [facility] Signed 26-May-2020 16:55:26 No results found. All Micro Results Procedure Component Value Units Date/Time EMERGENT DISEASE PANEL [[id number]] Collected:  06/10/20 1203 Order Status:  Completed Updated:  06/10/20 1241 CULTURE, BODY FLUID Amol Snowball [[id number]] Collected:  06/09/20 1029 Order Status:  Completed Specimen:  Left Updated:  06/10/20 0820 Special Requests: NO SPECIAL REQUESTS     
  GRAM STAIN 0 TO 1 WBC'S SEEN PER OIF  
   NO DEFINITE ORGANISM SEEN Culture result: NO GROWTH 1 DAY     
 AFB CULTURE + SMEAR W/RFLX ID FROM CULTURE [[id number]] Collected:  06/09/20 1029 Order Status:  Completed Updated:  06/09/20 1119 FUNGUS CULTURE AND SMEAR [[id number]] Collected:  06/09/20 1029 Order Status:  Completed Updated:  06/09/20 1118 FUNGUS CULTURE AND SMEAR [[id number]] Collected:  05/25/20 1315 Order Status:  Completed Specimen:  Miscellaneous sample Updated:  05/28/20 1536   Source LEFT     
 Comment: Pleural Fluid Specimen Fungus stain Direct Inoculation Fungus (Mycology) Culture Other source received Comment: (NOTE) Performed At: [address] [id number] Gianni Zheng MD :[id number] AFB CULTURE + SMEAR W/RFLX ID FROM CULTURE [[id number]] Collected:  05/25/20 1315 Order Status:  Completed Specimen:  Miscellaneous sample Updated:  05/27/20 1537 Source LEFT Comment: Pleural Fluid Specimen AFB Specimen processing Concentration Acid Fast Smear Negative Comment: (NOTE) Performed At: [address] [id number] Gianni Zheng MD [id number] Acid Fast Culture PENDING  
 CULTURE, BODY FLUID W Kandy Mota [[id number]] Collected:  05/25/20 1315 Order Status:  Completed Specimen:  Left Updated:  05/27/20 7239 Special Requests: NO SPECIAL REQUESTS     
  GRAM STAIN 0 TO 1 WBC'S/OIF  
   NO DEFINITE ORGANISM SEEN Culture result: NO GROWTH 2 DAYS     
  
 
 
SARS-CoV-2 Lab Results \"Novel Coronavirus\" Test:  
Lab Results Component Value Date/Time COV2NT WRONG TEST ORDERED 06/10/2020 12:03 PM  
  
\"Emergent Disease\" Test:  
Lab Results Component Value Date/Time EDPR Not detected 05/24/2020 06:22 PM  
 
\"SARS-COV-2\" Test:  
Lab Results Component Value Date/Time XGCOVT WRONG TEST ORDERED 06/10/2020 12:03 PM  
 
As of: 4:01 PM on 6/10/2020 Assessment and Plan:  
 
Hospital Problems as of 6/10/2020 Date Reviewed: 5/20/2020 Codes Class Noted - Resolved POA Rectal obstruction ICD-10-CM: K62.4 ICD-9-CM: 569.2  6/1/2020 - Present Yes Aspiration pneumonia ([facility] ICD-10-CM: J69.0 ICD-9-CM: 507.0  6/1/2020 - Present Yes Neutropenia ([facility] ICD-10-CM: D70.9 ICD-9-CM: 288.00  5/25/2020 - Present Yes Pleural effusion on right ICD-10-CM: J90 ICD-9-CM: 511.9  5/25/2020 - Present Yes * (Principal) Acute respiratory failure with hypoxia [facility]) ICD-10-CM: J96.01 
ICD-9-CM: 518.81  5/24/2020 - Present Yes Normocytic anemia ICD-10-CM: D64.9 ICD-9-CM: 285.9  5/24/2020 - Present Yes  
   
 CAP (community acquired pneumonia) ICD-10-CM: J18.9 ICD-9-CM: 042  5/24/2020 - Present Pleural effusion on left ICD-10-CM: J90 ICD-9-CM: 511.9  5/24/2020 - Present Yes Malignant neoplasm metastatic to bone [facility]) ICD-10-CM: C79.51 
ICD-9-CM: 198.5  6/26/2019 - Present Yes Type 2 diabetes with nephropathy ([facility] ICD-10-CM: E11.21 
ICD-9-CM: 250.40, 583.81  7/3/2018 - Present Yes Diabetes mellitus due to underlying condition with hyperglycemia ([facility] ICD-10-CM: Q61.60 ICD-9-CM: 249.80  12/21/2015 - Present Yes COVID-19 ruled out ICD-10-CM: Z03.818 ICD-9-CM: V71.83  12/15/2015 - Present Yes Hyponatremia ICD-10-CM: E87.1 ICD-9-CM: 276.1  11/23/2015 - Present Yes RESOLVED: Acute metabolic encephalopathy [facility]-OW: G93.41 
ICD-9-CM: 348.31  5/24/2020 - 5/24/2020 Plan: · Acute hypoxic respiratory failure, bilateral effusions: 
· Weaning O2 as tolerant · [facility] referral pending · Antibiotics stopped by pulmonary · Continued IV lasix · Anal cancer with stricture and rectal obstruction: · Declines surgery · continued bowel regimen · HTN:  
· continued norvasc, lisinopril · DM2: 
· hold amaryl · Continued  lantus and SSI · Anemia: 
· followup CBC, transfuse HGB < 7 
 
DC planning/Dispo:  pending Diet:  DIET NUTRITIONAL SUPPLEMENTS 
DIET REGULAR 
DVT ppx:  SCD Signed: Ko Colindres MD

## 2020-06-11 NOTE — PROGRESS NOTES
Lukasz Becker Admission Date: 5/24/2020 Daily Progress Note: 6/11/2020 The patient's chart is reviewed and the patient is discussed with the staff. The patient's chart is reviewed and the patient is discussed with the staff. 
  
The patient is a 77 y. o. male seen and evaluated at the request of Dr. Manuel العراقي for evaluation and management of pleural effusions. 
  
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 He received carbo/etoposide 2 weeks ago and has also had radiation for anal cancer. He has required anal dilation in past.  
  
He presented to [facility] on 5/24 with increasing dyspnea and chest discomfort. A CT of the chest revealed large bilateral effusions and we are now asked to assist with management. He is hyponatremic and getting saline infusions and also getting PRBCs for anemia. Had nausea/vomiting, aspiration event on 5/31 with hypoxemia. CT at that time suggested rectal obstruction. Flex sig on 6/2 with no anal stricture and stool impaction. 
  
S/p tap or Right chest with 800 removed on 6/3 S/p thoracentesis on L( 1100 cc ) and R( 1000 cc) removed   On 6/10 Subjective:  
 
Looks ill, feels better, transitioned to O2 via NC at 4L Current Facility-Administered Medications Medication Dose Route Frequency  potassium chloride (K-DUR, KLOR-CON) SR tablet 20 mEq  20 mEq Oral TID  furosemide (LASIX) injection 40 mg  40 mg IntraVENous DAILY  insulin glargine (LANTUS) injection 5 Units  5 Units SubCUTAneous QHS  albuterol-ipratropium (DUO-NEB) 2.5 MG-0.5 MG/3 ML  3 mL Nebulization Q6HWA RT  
 hydrALAZINE (APRESOLINE) 20 mg/mL injection 10 mg  10 mg IntraVENous Q6H PRN  
 magnesium hydroxide (MILK OF MAGNESIA) 400 mg/5 mL oral suspension 30 mL 30 mL Oral DAILY  senna-docusate (PERICOLACE) 8.6-50 mg per tablet 1 Tab  1 Tab Oral BID  
 bisacodyL (DULCOLAX) suppository 10 mg  10 mg Rectal DAILY  0.9% sodium chloride infusion 250 mL  250 mL IntraVENous PRN  
 amLODIPine (NORVASC) tablet 10 mg  10 mg Oral DAILY  docusate sodium (COLACE) capsule 100 mg  100 mg Oral PRN  
 [Held by provider] glimepiride (AMARYL) tablet 4 mg  4 mg Oral 7am  
 lisinopriL (PRINIVIL, ZESTRIL) tablet 20 mg  20 mg Oral DAILY  meloxicam (MOBIC) tablet 7.5 mg  7.5 mg Oral DAILY  sodium chloride tablet 2 g  2 g Oral BID  traMADoL (ULTRAM) tablet 50 mg  50 mg Oral Q6H PRN  
 sodium chloride (NS) flush 5-40 mL  5-40 mL IntraVENous Q8H  
 sodium chloride (NS) flush 5-40 mL  5-40 mL IntraVENous PRN  
 acetaminophen (TYLENOL) tablet 650 mg  650 mg Oral Q4H PRN  
 ondansetron (ZOFRAN) injection 4 mg  4 mg IntraVENous Q4H PRN  
 insulin regular (NOVOLIN R, HUMULIN R) injection   SubCUTAneous AC&HS  enoxaparin (LOVENOX) injection 40 mg  40 mg SubCUTAneous Q24H Review of Systems Constitutional: negative for fever, chills, sweats Cardiovascular: negative for chest pain, palpitations, syncope, edema Gastrointestinal:  negative for dysphagia, reflux, vomiting, diarrhea, abdominal pain, or melena Neurologic:  negative for focal weakness, numbness, headache Objective:  
 
Vitals:  
 06/10/20 1954 06/10/20 2333 06/11/20 0423 06/11/20 9215 BP:  158/76 160/70 153/79 Pulse:  98 100 95 Resp:  20 20 21 Temp:  97.5 °F (36.4 °C) 98.4 °F (36.9 °C) 98.5 °F (36.9 °C) SpO2: 99% 90% 92% 95% Weight:      
Height:      
 
 
 
Intake/Output Summary (Last 24 hours) at 6/11/2020 0379 Last data filed at 6/11/2020 8386 Gross per 24 hour Intake  Output 2000 ml Net -2000 ml Physical Exam:  
Constitution:  the patient is well developed and in no acute distress EENMT:  Sclera clear, pupils equal, oral mucosa moist 
Respiratory: clear Cardiovascular:  RRR without M,G,R 
Gastrointestinal: soft and non-tender; with positive bowel sounds. Musculoskeletal: warm without cyanosis. There is no lower extremity edema. Skin:  no jaundice or rashes, no wounds Neurologic: no gross neuro deficits Psychiatric:  alert and oriented x 3 CXR: none today LAB Recent Labs  
  06/11/20 
5590 06/10/20 
2105 06/10/20 
2102 06/10/20 
1202 06/10/20 
7995 GLUCPOC 80 220* 243* 141* 78 Recent Labs  
  06/11/20 
0742 06/10/20 
0636 06/09/20 
9900 WBC 7.2 6.3 6.3 HGB 8.3* 7.4* 7.6* HCT 25.2* 23.3* 23.4*  
 156 157 Recent Labs  
  06/11/20 
0742 06/10/20 
0636 06/09/20 
8617 * 135* 133* K 4.6 4.3 3.9 CL 96* 97* 95* CO2 31 31 31 GLU 75 74 85 BUN 8 10 11 CREA 0.56* 0.60* 0.63* MG 2.2 2.1 2.0  
CA 8.4 8.6 8.6 No results for input(s): PH, PCO2, PO2, HCO3, PHI, PCO2I, PO2I, HCO3I in the last 72 hours. No results for input(s): LCAD, LAC in the last 72 hours. Assessment:  (Medical Decision Making) Hospital Problems  Date Reviewed: 5/20/2020 Codes Class Noted POA Rectal obstruction ICD-10-CM: K62.4 ICD-9-CM: 569.2  6/1/2020 Yes Aspiration pneumonia ([facility] ICD-10-CM: J69.0 ICD-9-CM: 507.0  6/1/2020 Yes Neutropenia ([facility] ICD-10-CM: D70.9 ICD-9-CM: 288.00  5/25/2020 Yes Pleural effusion on right ICD-10-CM: J90 ICD-9-CM: 511.9  5/25/2020 Yes * (Principal) Acute [facility] failure with [facility] ICD-10-CM: J96.01 
ICD-9-CM: 518.81  5/24/2020 Yes Normocytic anemia ICD-10-CM: D64.9 ICD-9-CM: 285.9  5/24/2020 Yes Pleural effusion on left ICD-10-CM: J90 ICD-9-CM: 511.9  5/24/2020 Yes Malignant neoplasm metastatic to bone [facility]) ICD-10-CM: C79.51 
ICD-9-CM: 198.5  6/26/2019 Yes Type 2 diabetes with nephropathy ([facility] ICD-10-CM: E11.21 
ICD-9-CM: 250.40, 583.81  7/3/2018 Yes Diabetes mellitus due to underlying condition with hyperglycemia ([facility] ICD-10-CM: N37.99 ICD-9-CM: 249.80  12/21/2015 Yes COVID-19 ruled out ICD-10-CM: Z03.818 ICD-9-CM: V71.83  12/15/2015 Yes Hyponatremia ICD-10-CM: E87.1 ICD-9-CM: 276.1  11/23/2015 Yes Plan:  (Medical Decision Making)  
 
--wean airvo to NC 
-- PT Mobilize -- pleural fluid is transudative bilaterally- echo from 5/26 id normal with no diastolic dysfunction and normal systolic function- check BNP 
--no malignant cells on cytology 
--No ascites on CT abdomen --Low albumin likely cause. More than 50% of the time documented was spent in face-to-face contact with the patient and in the care of the patient on the floor/unit where the patient is located.  
 
Thelma Pastor MD

## 2020-06-11 NOTE — PROGRESS NOTES
Hospitalist Note Admit Date:  2020 11:00 PM  
Name:  Joyce Álvarez Age:  79 y.o. 
:  1940 MRN:  [id number] PCP:  Prema Rodney MD 
Treatment Team: Attending Provider: Holli Ruggiero MD; Consulting Provider: Angela Velasquez MD; Utilization Review: Maria Luz Camacho RN; Care Manager: Justo Orozco.; Staff Nurse: Rasheed Vizcaino RN; Physical Therapist: Gianni Moy, PT 
 
HPI/Subjective:  
 
Mr. Carla Demarco is a 78 yo male with PMH of oral and anal cancers admitted with acute hypoxic respiratory failure due to bilateral pleural effusions. He has been seen by pulm s/p bilateral thoracentesis , right thoracentesis 6-3, bilateral thoracentesis ,  and diuresis. He has compelted 8 days zosyn. He has been seen by GI s/p 6-2 flex sig with disimpaction. Discharge plans pending 20 ate ok, wants pain meds,  ROS difficult Objective:  
 
Patient Vitals for the past 24 hrs: 
 Temp Pulse Resp BP SpO2  
20 1444     93 % 20 1136 98.2 °F (36.8 °C) 98 18 126/64 96 % 20 0751 98.5 °F (36.9 °C) 95 21 153/79 95 % 20 0423 98.4 °F (36.9 °C) 100 20 160/70 92 % 06/10/20 2333 97.5 °F (36.4 °C) 98 20 158/76 90 % 06/10/20 1954     99 % 06/10/20 1900 98 °F (36.7 °C) 99 20 144/68 95 % 06/10/20 1548     96 % Oxygen Therapy O2 Sat (%): 93 % (20 1444) Pulse via Oximetry: 92 beats per minute (20 1444) O2 Device: Hi flow nasal cannula (20 1444) O2 Flow Rate (L/min): 4 l/min (20 1444) O2 Temperature: 87.8 °F (31 °C) (06/10/20 1514) FIO2 (%): 36 % (20 1444) ETCO2 (mmHg): 93 mmHg (20 1624) Estimated body mass index is 23.46 kg/m² as calculated from the following: 
  Height as of this encounter: 6' (1.829 m). Weight as of this encounter: 78.5 kg (173 lb). Intake/Output Summary (Last 24 hours) at 2020 1526 Last data filed at 2020 1136 Gross per 24 hour Intake  Output 1450 ml Net -1450 ml *Note that automatically entered I/Os may not be accurate; dependent on patient compliance with collection and accurate  by techs. General:    Alert , elderly, no distress. CV:   RRR. No murmur, rub, or gallop. No edema Lungs:   CTAB. No wheezing, rhonchi, or rales. anterior Abdomen:   Soft, nontender, nondistended. Decreased BS Extremities: Warm and dry. Skin:     No rashes or jaundice. Neuro:  No gross focal deficits Data Review: 
I have reviewed all labs, meds, and studies from the last 24 hours: 
Recent Results (from the past 24 hour(s)) GLUCOSE, POC Collection Time: 06/10/20  9:02 PM  
Result Value Ref Range Glucose (POC) 243 (H) 65 - 100 mg/dL GLUCOSE, POC Collection Time: 06/10/20  9:05 PM  
Result Value Ref Range Glucose (POC) 220 (H) 65 - 100 mg/dL METABOLIC PANEL, BASIC Collection Time: 06/11/20  7:42 AM  
Result Value Ref Range Sodium 133 (L) 136 - 145 mmol/L Potassium 4.6 3.5 - 5.1 mmol/L Chloride 96 (L) 98 - 107 mmol/L  
 CO2 31 21 - 32 mmol/L Anion gap 6 (L) 7 - 16 mmol/L Glucose 75 65 - 100 mg/dL BUN 8 8 - 23 MG/DL Creatinine 0.56 (L) 0.8 - 1.5 MG/DL  
 GFR est AA >60 >60 ml/min/1.73m2 GFR est non-AA >60 >60 ml/min/1.73m2 Calcium 8.4 8.3 - 10.4 MG/DL MAGNESIUM Collection Time: 06/11/20  7:42 AM  
Result Value Ref Range Magnesium 2.2 1.8 - 2.4 mg/dL CBC W/O DIFF Collection Time: 06/11/20  7:42 AM  
Result Value Ref Range WBC 7.2 4.3 - 11.1 K/uL  
 RBC 2.77 (L) 4.23 - 5.6 M/uL HGB 8.3 (L) 13.6 - 17.2 g/dL HCT 25.2 (L) 41.1 - 50.3 % MCV 91.0 79.6 - 97.8 FL  
 MCH 30.0 26.1 - 32.9 PG  
 MCHC 32.9 31.4 - 35.0 g/dL  
 RDW 15.5 (H) 11.9 - 14.6 % PLATELET 180 067 - 869 K/uL MPV 9.1 (L) 9.4 - 12.3 FL ABSOLUTE NRBC 0.02 0.0 - 0.2 K/uL GLUCOSE, POC Collection Time: 06/11/20  7:52 AM  
Result Value Ref Range Glucose (POC) 80 65 - 100 mg/dL GLUCOSE, POC Collection Time: 06/11/20 11:21 AM  
Result Value Ref Range Glucose (POC) 161 (H) 65 - 100 mg/dL NT-PRO BNP Collection Time: 06/11/20 12:30 PM  
Result Value Ref Range NT pro- (H) <450 PG/ML Current Meds: 
Current Facility-Administered Medications Medication Dose Route Frequency  potassium chloride (K-DUR, KLOR-CON) SR tablet 20 mEq  20 mEq Oral TID  furosemide (LASIX) injection 40 mg  40 mg IntraVENous DAILY  insulin glargine (LANTUS) injection 5 Units  5 Units SubCUTAneous QHS  albuterol-ipratropium (DUO-NEB) 2.5 MG-0.5 MG/3 ML  3 mL Nebulization Q6HWA RT  
 hydrALAZINE (APRESOLINE) 20 mg/mL injection 10 mg  10 mg IntraVENous Q6H PRN  
 magnesium hydroxide (MILK OF MAGNESIA) 400 mg/5 mL oral suspension 30 mL  30 mL Oral DAILY  senna-docusate (PERICOLACE) 8.6-50 mg per tablet 1 Tab  1 Tab Oral BID  
 bisacodyL (DULCOLAX) suppository 10 mg  10 mg Rectal DAILY  0.9% sodium chloride infusion 250 mL  250 mL IntraVENous PRN  
 amLODIPine (NORVASC) tablet 10 mg  10 mg Oral DAILY  docusate sodium (COLACE) capsule 100 mg  100 mg Oral PRN  
 [Held by provider] glimepiride (AMARYL) tablet 4 mg  4 mg Oral 7am  
 lisinopriL (PRINIVIL, ZESTRIL) tablet 20 mg  20 mg Oral DAILY  meloxicam (MOBIC) tablet 7.5 mg  7.5 mg Oral DAILY  sodium chloride tablet 2 g  2 g Oral BID  traMADoL (ULTRAM) tablet 50 mg  50 mg Oral Q6H PRN  
 sodium chloride (NS) flush 5-40 mL  5-40 mL IntraVENous Q8H  
 sodium chloride (NS) flush 5-40 mL  5-40 mL IntraVENous PRN  
 acetaminophen (TYLENOL) tablet 650 mg  650 mg Oral Q4H PRN  
 ondansetron (ZOFRAN) injection 4 mg  4 mg IntraVENous Q4H PRN  
 insulin regular (NOVOLIN R, HUMULIN R) injection   SubCUTAneous AC&HS  enoxaparin (LOVENOX) injection 40 mg  40 mg SubCUTAneous Q24H Other Studies: 
Results for orders placed or performed during the hospital encounter of 05/24/20 2D ECHO COMPLETE ADULT (TTE) W OR WO CONTR Narrative Chantelle One [address] [place], [address] [facility] 
[phone] Transthoracic Echocardiogram 
2D, M-mode, Doppler, and Color Doppler Patient: Otis García 
MR #: [id number] :  Age: 79 years Gender: Male Study date: 26-May-2020 Account #: [de-identified] Height: 72 in 72 in 
Weight: 164 lb 163.7 lb 
BSA: 1.96 mï¾² 1.96 mï¾² Status:Routine Location: 831 BP: 169/ 88 Allergies: NO KNOWN ALLERGENS Sonographer:  Garrison Paul RDCS Group:  [facility] Referring Physician:  Javy [place] MD 
Reading Physician:  Dorothy Arriola. MD [facility] [place] INDICATIONS: Bilateral pleural effusions *Pt. scanned supine. Unable to turn LLD. PROCEDURE: This was a routine study. A transthoracic echocardiogram was 
performed. The study included complete 2D imaging, M-mode, complete spectral 
Doppler, and color Doppler. Intravenous contrast (Definity, 1 ml) was 
administered to opacify the left ventricle. Image quality was adequate. LEFT VENTRICLE: Size was normal. Systolic function was normal. Ejection 
fraction was estimated in the range of 60 % to 65 %. There were no regional 
wall motion abnormalities. Wall thickness was normal. Left ventricular 
diastolic function parameters were normal. E/e' av.46. RIGHT VENTRICLE: The size was normal. Systolic function was normal. 
 
LEFT ATRIUM: Size was normal. 
 
RIGHT ATRIUM: Size was normal. 
 
SYSTEMIC VEINS: IVC: The inferior vena cava was normal in size and course. AORTIC VALVE: The valve was structurally normal, tri-commissural. There was  
no 
evidence for stenosis. There was no insufficiency. MITRAL VALVE: Valve structure was normal. There was no evidence for stenosis. There was no regurgitation. TRICUSPID VALVE: The valve structure was normal. There was no evidence for 
stenosis. There was trivial regurgitation. PULMONIC VALVE: Not well visualized. There was no evidence for stenosis. There 
was no insufficiency. PERICARDIUM: There was no pericardial effusion. AORTA: The root exhibited normal size. SUMMARY: 
 
-  Left ventricle: Systolic function was normal. Ejection fraction was 
estimated in the range of 60 % to 65 %. There were no regional wall motion 
abnormalities. -  Pericardium: There was no pericardial effusion. SYSTEM MEASUREMENT TABLES 
 
2D Ao Diam: 3.2 cm 
LA Diam: 2.6 cm 
LAEDV Index (A-L): 25.9 ml/m2 %FS: 34.9 % IVSd: 1.1 cm 
LVIDd: 3.7 cm 
LVIDs: 2.4 cm 
LVOT Diam: 2.1 cm 
LVPWd: 1.2 cm Prepared and signed by 
 
Yudith Porras. MD [facility] [place] Signed 26-May-2020 16:55:26 No results found. All Micro Results Procedure Component Value Units Date/Time FUNGUS CULTURE AND SMEAR [[id number]] Collected:  06/09/20 1029 Order Status:  Completed Specimen:  Miscellaneous sample Updated:  06/11/20 1437 Source LEFT Comment: Pleural Fluid Specimen Fungus stain Direct Inoculation Fungus (Mycology) Culture Other source received Comment: (NOTE) Performed At: [address] [id number] Leticia Pruitt MD [id number] CULTURE, BODY FLUID Saadia Kelley [[id number]] Collected:  06/09/20 1029 Order Status:  Completed Specimen:  Left Updated:  06/11/20 5571 Special Requests: NO SPECIAL REQUESTS     
  GRAM STAIN 0 TO 1 WBC'S SEEN PER OIF  
   NO DEFINITE ORGANISM SEEN Culture result: NO GROWTH 2 DAYS     
 AFB CULTURE + SMEAR W/RFLX ID FROM CULTURE [[id number]] Collected:  06/09/20 1029 Order Status:  Completed Specimen:  Miscellaneous sample Updated:  06/10/20 1536 Source LEFT Comment: Pleural Fluid Specimen AFB Specimen processing Concentration Acid Fast Smear Negative Comment: (NOTE) Performed At: [address] [id number] Giselle Gonzalez MD [id number] Acid Fast Culture PENDING  
 EMERGENT DISEASE PANEL [[id number]] Collected:  06/10/20 1203 Order Status:  Completed Updated:  06/10/20 1241 FUNGUS CULTURE AND SMEAR [[id number]] Collected:  05/25/20 1315 Order Status:  Completed Specimen:  Miscellaneous sample Updated:  05/28/20 1536 Source LEFT Comment: Pleural Fluid Specimen Fungus stain Direct Inoculation Fungus (Mycology) Culture Other source received Comment: (NOTE) Performed At: [address] [id number] Giselle Gonzalez MD [id number] AFB CULTURE + SMEAR W/RFLX ID FROM CULTURE [[id number]] Collected:  05/25/20 1315 Order Status:  Completed Specimen:  Miscellaneous sample Updated:  05/27/20 1537 Source LEFT Comment: Pleural Fluid Specimen AFB Specimen processing Concentration Acid Fast Smear Negative Comment: (NOTE) Performed At: [address] [id number] Giselle Gonzalez MD [id number] Acid Fast Culture PENDING  
 CULTURE, BODY FLUID W Donna Orellana [[id number]] Collected:  05/25/20 1315 Order Status:  Completed Specimen:  Left Updated:  05/27/20 1984 Special Requests: NO SPECIAL REQUESTS     
  GRAM STAIN 0 TO 1 WBC'S/OIF  
   NO DEFINITE ORGANISM SEEN Culture result: NO GROWTH 2 DAYS     
  
 
 
SARS-CoV-2 Lab Results \"Novel Coronavirus\" Test:  
Lab Results Component Value Date/Time COV2NT WRONG TEST ORDERED 06/10/2020 12:03 PM  
  
\"Emergent Disease\" Test:  
Lab Results Component Value Date/Time EDPR Not detected 05/24/2020 06:22 PM  
 
\"SARS-COV-2\" Test:  
Lab Results Component Value Date/Time XGCOVT WRONG TEST ORDERED 06/10/2020 12:03 PM  
 
As of: 4:01 PM on 6/11/2020 Assessment and Plan:  
 
Hospital Problems as of 6/11/2020 Date Reviewed: 5/20/2020 Codes Class Noted - Resolved POA Rectal obstruction ICD-10-CM: K62.4 ICD-9-CM: 569.2  6/1/2020 - Present Yes Aspiration pneumonia ([place] [address].) ICD-10-CM: J69.0 ICD-9-CM: 507.0  6/1/2020 - Present Yes Neutropenia ([place] [address].) ICD-10-CM: D70.9 ICD-9-CM: 288.00  5/25/2020 - Present Yes Pleural effusion on right ICD-10-CM: J90 ICD-9-CM: 511.9  5/25/2020 - Present Yes * (Principal) Acute respiratory failure with hypoxia [facility]) ICD-10-CM: J96.01 
ICD-9-CM: 518.81  5/24/2020 - Present Yes Normocytic anemia ICD-10-CM: D64.9 ICD-9-CM: 285.9  5/24/2020 - Present Yes  
   
 CAP (community acquired pneumonia) ICD-10-CM: J18.9 ICD-9-CM: 594  5/24/2020 - Present Pleural effusion on left ICD-10-CM: J90 ICD-9-CM: 511.9  5/24/2020 - Present Yes Malignant neoplasm metastatic to bone [facility]) ICD-10-CM: C79.51 
ICD-9-CM: 198.5  6/26/2019 - Present Yes Type 2 diabetes with nephropathy ([place] [address].) ICD-10-CM: E11.21 
ICD-9-CM: 250.40, 583.81  7/3/2018 - Present Yes Diabetes mellitus due to underlying condition with hyperglycemia ([place] [address] ICD-10-CM: O46.91 ICD-9-CM: 249.80  12/21/2015 - Present Yes COVID-19 ruled out ICD-10-CM: Z03.818 ICD-9-CM: V71.83  12/15/2015 - Present Yes Hyponatremia ICD-10-CM: E87.1 ICD-9-CM: 276.1  11/23/2015 - Present Yes RESOLVED: Acute metabolic encephalopathy NF-88-FL: G93.41 
ICD-9-CM: 348.31  5/24/2020 - 5/24/2020 Plan: · Acute hypoxic respiratory failure, bilateral effusions: 
· Weaning O2 as tolerant, currently on 2 L NC 
· Antibiotics stopped by pulmonary · Continued IV lasix · Anal cancer with stricture and rectal obstruction: · Declines surgery · continued bowel regimen · HTN:  
· continued norvasc, lisinopril · DM2: 
· Holding amaryl · Continued  lantus and SSI · Anemia: 
· followup CBC, transfuse HGB < 7 
 
DC planning/Dispo:  Pending to SNF Diet:  DIET NUTRITIONAL SUPPLEMENTS 
DIET REGULAR 
DVT ppx:  SCD Signed: Angie Head, MD

## 2020-06-11 NOTE — PROGRESS NOTES
PT note: Patient refused PT treatment at this time. Pt complaining of back pain, just had medication per RN. Will attempt treatment at later time and date as schedule allows. Thank you. Sumit Mccartney, PT 
6/11/2020

## 2020-06-12 NOTE — PROGRESS NOTES
Patient placed on [address] per dr's order. Patient is tolerating change well. No distress noted at this time. Will continue to monitor. Wife remains at bedside.

## 2020-06-12 NOTE — PROGRESS NOTES
Problem: Falls - Risk of 
Goal: *Absence of Falls Description: Document Maria Dolores Powell Fall Risk and appropriate interventions in the flowsheet. Outcome: Progressing Towards Goal 
Note: Fall Risk Interventions: 
Mobility Interventions: Bed/chair exit alarm Mentation Interventions: Door open when patient unattended, Bed/chair exit alarm, Eyeglasses and hearing aids, Familiar objects from home, Gait belt with transfers/ambulation, HELP ([address]) if available, More frequent rounding, Reorient patient, Room close to nurse's station, Self-releasing belt, Toileting rounds Medication Interventions: Patient to call before getting OOB, Teach patient to arise slowly Elimination Interventions: Bed/chair exit alarm

## 2020-06-12 NOTE — PROGRESS NOTES
Breanna Dudley Admission Date: 5/24/2020 Daily Progress Note: 6/12/2020 The patient's chart is reviewed and the patient is discussed with the staff. The patient's chart is reviewed and the patient is discussed with the staff. 
  
The patient is a 77 y. o. male seen and evaluated at the request of Dr. Zaina Mckeon for evaluation and management of pleural effusions. 
  
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 He received carbo/etoposide 2 weeks ago and has also had radiation for anal cancer. He has required anal dilation in past.  
  
He presented to [facility] on 5/24 with increasing dyspnea and chest discomfort. A CT of the chest revealed large bilateral effusions and we are now asked to assist with management. He is hyponatremic and getting saline infusions and also getting PRBCs for anemia. Had nausea/vomiting, aspiration event on 5/31 with hypoxemia. CT at that time suggested rectal obstruction. Flex sig on 6/2 with no anal stricture and stool impaction. 
  
S/p tap or Right chest with 800 removed on 6/3 S/p thoracentesis on L( 1100 cc ) and R( 1000 cc) removed   On 6/10 Subjective: Went back on optiflow overnight, no other events. Very debilitated and weak. Current Facility-Administered Medications Medication Dose Route Frequency  potassium chloride (K-DUR, KLOR-CON) SR tablet 20 mEq  20 mEq Oral TID  furosemide (LASIX) injection 40 mg  40 mg IntraVENous DAILY  insulin glargine (LANTUS) injection 5 Units  5 Units SubCUTAneous QHS  albuterol-ipratropium (DUO-NEB) 2.5 MG-0.5 MG/3 ML  3 mL Nebulization Q6HWA RT  
 hydrALAZINE (APRESOLINE) 20 mg/mL injection 10 mg  10 mg IntraVENous Q6H PRN  
  magnesium hydroxide (MILK OF MAGNESIA) 400 mg/5 mL oral suspension 30 mL  30 mL Oral DAILY  senna-docusate (PERICOLACE) 8.6-50 mg per tablet 1 Tab  1 Tab Oral BID  
 bisacodyL (DULCOLAX) suppository 10 mg  10 mg Rectal DAILY  0.9% sodium chloride infusion 250 mL  250 mL IntraVENous PRN  
 amLODIPine (NORVASC) tablet 10 mg  10 mg Oral DAILY  docusate sodium (COLACE) capsule 100 mg  100 mg Oral PRN  
 lisinopriL (PRINIVIL, ZESTRIL) tablet 20 mg  20 mg Oral DAILY  meloxicam (MOBIC) tablet 7.5 mg  7.5 mg Oral DAILY  sodium chloride tablet 2 g  2 g Oral BID  traMADoL (ULTRAM) tablet 50 mg  50 mg Oral Q6H PRN  
 sodium chloride (NS) flush 5-40 mL  5-40 mL IntraVENous Q8H  
 sodium chloride (NS) flush 5-40 mL  5-40 mL IntraVENous PRN  
 acetaminophen (TYLENOL) tablet 650 mg  650 mg Oral Q4H PRN  
 ondansetron (ZOFRAN) injection 4 mg  4 mg IntraVENous Q4H PRN  
 insulin regular (NOVOLIN R, HUMULIN R) injection   SubCUTAneous AC&HS  enoxaparin (LOVENOX) injection 40 mg  40 mg SubCUTAneous Q24H Review of Systems Constitutional: negative for fever, chills, sweats Cardiovascular: negative for chest pain, palpitations, syncope, edema Gastrointestinal:  negative for dysphagia, reflux, vomiting, diarrhea, abdominal pain, or melena Neurologic:  negative for focal weakness, numbness, headache Objective:  
 
Vitals:  
 06/12/20 0000 06/12/20 0420 06/12/20 4688 06/12/20 3402 BP: 145/68 160/76 160/84 Pulse: 95 (!) 101 98 Resp: 18 20 20 Temp: 98.5 °F (36.9 °C) 98.5 °F (36.9 °C) 98.1 °F (36.7 °C) SpO2: 95% 94% 97% 97% Weight:      
Height:      
 
 
 
Intake/Output Summary (Last 24 hours) at 6/12/2020 4833 Last data filed at 6/12/2020 8318 Gross per 24 hour Intake  Output 1350 ml Net -1350 ml Physical Exam:  
Constitution:  the patient is well developed and in no acute distress EENMT:  Sclera clear, pupils equal, oral mucosa moist 
Respiratory: clear Cardiovascular:  RRR without M,G,R 
Gastrointestinal: soft and non-tender; with positive bowel sounds. Musculoskeletal: warm without cyanosis. There is no lower extremity edema. Skin:  no jaundice or rashes, no wounds Neurologic: no gross neuro deficits Psychiatric:  alert and oriented x 3 CXR: none today LAB Recent Labs  
  06/12/20 
0729 06/11/20 
2136 06/11/20 
1651 06/11/20 
1121 06/11/20 
1813 GLUCPOC 126* 237* 294* 161* 80 Recent Labs  
  06/12/20 
0834 06/11/20 
0742 06/10/20 
0636 WBC 8.8 7.2 6.3 HGB 8.3* 8.3* 7.4* HCT 26.3* 25.2* 23.3*  
 168 156 Recent Labs  
  06/12/20 
0834 06/11/20 
0742 06/10/20 
0636 * 133* 135* K 4.8 4.6 4.3 CL 95* 96* 97* CO2 30 31 31 * 75 74 BUN 8 8 10 CREA 0.63* 0.56* 0.60* MG 2.2 2.2 2.1 CA 9.0 8.4 8.6 No results for input(s): PH, PCO2, PO2, HCO3, PHI, PCO2I, PO2I, HCO3I in the last 72 hours. No results for input(s): LCAD, LAC in the last 72 hours. Assessment:  (Medical Decision Making) Hospital Problems  Date Reviewed: 5/20/2020 Codes Class Noted POA Rectal obstruction ICD-10-CM: K62.4 ICD-9-CM: 569.2  6/1/2020 Yes Aspiration pneumonia ([facility] ICD-10-CM: J69.0 ICD-9-CM: 507.0  6/1/2020 Yes Neutropenia ([facility] ICD-10-CM: D70.9 ICD-9-CM: 288.00  5/25/2020 Yes Pleural effusion on right ICD-10-CM: J90 ICD-9-CM: 511.9  5/25/2020 Yes * (Principal) Acute respiratory failure with hypoxia ([facility] ICD-10-CM: J96.01 
ICD-9-CM: 518.81  5/24/2020 Yes Normocytic anemia ICD-10-CM: D64.9 ICD-9-CM: 285.9  5/24/2020 Yes Pleural effusion on left ICD-10-CM: J90 ICD-9-CM: 511.9  5/24/2020 Yes Malignant neoplasm metastatic to bone [facility]) ICD-10-CM: C79.51 
ICD-9-CM: 198.5  6/26/2019 Yes Type 2 diabetes with nephropathy ([facility] ICD-10-CM: E11.21 
ICD-9-CM: 250.40, 583.81  7/3/2018 Yes Diabetes mellitus due to underlying condition with hyperglycemia ([facility] ICD-10-CM: Y68.36 ICD-9-CM: 249.80  12/21/2015 Yes COVID-19 ruled out ICD-10-CM: Z03.818 ICD-9-CM: V71.83  12/15/2015 Yes Hyponatremia ICD-10-CM: E87.1 ICD-9-CM: 276.1  11/23/2015 Yes Plan:  (Medical Decision Making)  
 
--wean O2 to NC 
-- PT Mobilize -- pleural fluid is transudative bilaterally- echo from 5/26 id normal with no diastolic dysfunction and normal systolic function- - continue lasix -1.3 L last 24h 
--no malignant cells on cytology 
--No ascites on CT abdomen --Low albumin likely contributing. Plans for str/NH noted More than 50% of the time documented was spent in face-to-face contact with the patient and in the care of the patient on the floor/unit where the patient is located.  
 
Ganesh Coreas MD

## 2020-06-12 NOTE — PROGRESS NOTES
Received precert for patient to discharge to rehab (auth# [id number]). Patient has auth until 6/15-Dawson will follow up with [facility] (fax number [phone]). GAEL left patient's wife a voicemail requesting she call back.

## 2020-06-12 NOTE — PROGRESS NOTES
Nutrition Assessment for:  
Follow up Initial assessment for: Consult for general nutrition and supplements (DO Lennox) Assessment: Patient presented with SOB and chest pain. PMH: H/N cancer with mets to bone, DM2. Pt had cycle 3 Carbo/Etop may 8th with cycle 4 planned for this week but currently on hold. Patient had thoracentesis on 5/25 and 5/29. Repeat thoracentesis 6/3. Flex sig on 6/2 for fecal impaction. He is still requiring Airov. Rehab pending once stable. Repeat thoracentesis 6/10 with 1100 mL from left and 1000 mL from right. Patient seen today. He was seen today with wife at bedside today. He was sleeping with lunch tray at bedside. Wife states that he has been eating fairly well since last RD assessment. She states that he has been eating most of each meal and drinking Glucerna 2-3 per day. POC: [phone] over last 24 hrs DIET NUTRITIONAL SUPPLEMENTS All Meals; Glucerna Shake ( ) DIET REGULAR There are no recorded meals since 6/8 
  
Anthropometrics: 
Height: 6' (182.9 cm), Weight: 76.2 kg (168 lb), Weight Source: Patient stated, Body mass index is 22.78 kg/m². BMI class of Normal weight. 
  
Macronutrient needs: (using Listed body weight 74.4 kg) (cancer tx) JENNY: 6067-0411 MBCP/WQK (25-30 kcals/kg ) FFH:  K/TQL (1-1.5 g/kg )  
  
Nutrition Intervention: 
Meals and snacks: Continue current diet Medical food supplement therapy: Continue Glucerna TID Discharge Nutrition Plan: Too soon to determine. [address] [place] [address], [address]

## 2020-06-12 NOTE — PROGRESS NOTES
Problem: Mobility Impaired (Adult and Pediatric) Goal: *Acute Goals and Plan of Care (Insert Text) Outcome: Progressing Towards Goal 
Note: LTG: 
(1.)Mr. Amanuel Madrid will move from supine to sit and sit to supine , scoot up and down, and roll side to side in bed with INDEPENDENT within 7 treatment day(s). (2.)Mr. Amanuel Madrid will transfer from bed to chair and chair to bed with MODIFIED INDEPENDENCE using the least restrictive device within 7 treatment day(s). (3.)Mr. Amanuel Madrid will ambulate with MODIFIED INDEPENDENCE for 250 feet with the least restrictive device within 7 treatment day(s). (4.)Mr. Amanuel Madrid will demonstrate good dynamic standing balance within 7 treatment days. ________________________________________________________________________________________________ PHYSICAL THERAPY: Daily Note and PM 6/12/2020 INPATIENT: PT Visit Days : 6 Payor: Micheline Bolden / Plan: [address] [place] [address] HMO / Product Type: Managed Care Medicare /   
  
NAME/AGE/GENDER: Victor M Medina is a 79 y.o. male PRIMARY DIAGNOSIS: Acute metabolic encephalopathy [N47.58] Acute respiratory failure with hypoxia (HCC) Acute respiratory failure with hypoxia (HCC) Procedure(s) (LRB): 
THORACENTESIS (N/A) ULTRASOUND (Bilateral) 3 Days Post-Op ICD-10: Treatment Diagnosis:  
 · Generalized Muscle Weakness (M62.81) · Other lack of cordination (R27.8) · Difficulty in walking, Not elsewhere classified (R26.2) · Other abnormalities of gait and mobility (R26.89) · Low Back Pain (M54.5) Precaution/Allergies: 
Patient has no known allergies. ASSESSMENT:  
 
Mr. Amanuel Madrid  is a 79year old male who has been admitted to hospital on 05/24 with above diagnosis and hx of cancer. Prior to hospital admission pt lives with wife in a 1 story home with 0 step(s) to enter with no railing. Pt endorses 0 falls in past 6 months. Prior to admission No O2 usage at home, no assistance with ADLs and uses no DME for mobility. 6/10-Mr. Singletary Laughter was received in supine. O2 sats 99% on airvo. He was agreeable to participate. Did therex throughout treatment but spaced it out. A few in supine, a few in sitting. He has a flat affect the entire treatment. He doesn't say much and appears down. Extra time for activity and clear instructions. Rolling and supine to sit with min to mod assist.  Good sitting balance. Sit to stand and bed to chair with min assist and RW 5 ft to chair. After rest break sit to stand with RW with min to mod assist.  Marching in standing for maybe 30 sec. Then seated again a few exercises. Stood o 1 more time but did march. Asked him to but he just didn't. [place] he was worn out. O2 sats 98-99 entire treatment. Encouraged to stay up in the chair for a little while. Tried to shift off buttocks because they were sore. Still no progress made but not really going backwards. He lays in that room that has to have the door closed. Wont let me open the shades and not even the TV on. He needs a change of scenery. 6-12-20 Aftab Cedillo presents supine in bed, agreeable to therapy with spouse in room. He performed rolling with supervision/SBA and supine to sit with very minimal assistance. He sat for quite a bit of time working on stability at trunk. Overall sitting balance great with UE support on bed rails. O2 sats dropped to 80% on airvo, but quickly stabilized to 94% with encouragement in breathing. Note: Room re-arranged in order for patient to transfer to chair while on airvo. He attempted 3 sit to stands with 1-person and unable to fully stand during these attempts. ModAx2 with sit to stand---once standing, he was able to maintain balance as PT adjusted airflo over his head and catheter. From previous chart review for PT, it seems as though he was in better spirits this visit.   Shuffled steps to chair (minAx1); once he is standing he is fairly stable with rolling walker. He performed seated exercises as seen below. Extensive time needed in session for all tasks as he does move quite slow. He remained in chair end of session with all needs met. Spouse in room. Progress PT efforts. Responds well with positive encouragement. This section established at most recent assessment PROBLEM LIST (Impairments causing functional limitations): 1. Decreased Strength 2. Decreased ADL/Functional Activities 3. Decreased Transfer Abilities 4. Decreased Ambulation Ability/Technique 5. Decreased Balance 6. Increased Pain 7. Decreased Activity Tolerance 8. Increased Fatigue 9. Decreased Flexibility/Joint Mobility 10. Decreased Aladdin with Home Exercise Program 
 INTERVENTIONS PLANNED: (Benefits and precautions of physical therapy have been discussed with the patient.) 1. Balance Exercise 2. Bed Mobility 3. Family Education 4. Gait Training 5. Heat 6. Home Exercise Program (HEP) 7. Manual Therapy 8. Neuromuscular Re-education/Strengthening 9. Range of Motion (ROM) 10. Therapeutic Activites 11. Therapeutic Exercise/Strengthening 12. Transfer Training TREATMENT PLAN: Frequency/Duration: 3 times a week for duration of hospital stay Rehabilitation Potential For Stated Goals: Good REHAB RECOMMENDATIONS (at time of discharge pending progress):   
Placement: It is my opinion, based on this patient's performance to date, that Mr. Taylor Blount may benefit from intensive therapy at a [address] [place] [address] after discharge due to the functional deficits listed above that are likely to improve with skilled rehabilitation and concerns that he/she may be unsafe to be unsupervised at home due to multiple comorbidities and high level of intense care needed. . 
Equipment:  
? Walkers, Type: Annye Zeina HISTORY:  
History of Present Injury/Illness (Reason for Referral): 
Per Physician Note: Ewa Roberts is a 79 y.o. male with a past medical history of HEENT cancer undergoing chemo/radiation who presents to the [facility] ER with report of SOB and chest discomfort for the past several days. He also admits to mild cough but denies fevers or chills. Admits to feeling a little better and breathing a bit better since arrival. 
  
Past Medical History/Comorbidities:  
Mr. Marlo Lovett  has a past medical history of GERD (gastroesophageal reflux disease), Head and neck cancer ([facility] (9/30/2015), History of squamous cell carcinoma, History of throat cancer (2015), Hypercholesteremia, Hypertension, Hypomagnesemia (5/20/2020), Rectal cancer ([facility] (2018), Type 2 diabetes mellitus ([facility] and Vomiting (2/23/2016). Mr. Marlo Lovett  has a past surgical history that includes hx orthopaedic (Right, 1966); hx other surgical (9/9/15); hx heent; hx tonsillectomy; hx heent (2015); hx colonoscopy (05/2018); hx vascular access; hx lymph node dissection; hx other surgical; and flexible sigmoidoscopy (N/A, 6/2/2020). Social History/Living Environment:  
Home Environment: Private residence # Steps to Enter: 0 One/Two Story Residence: One story Living Alone: No 
Support Systems: Spouse/Significant Other/Partner Patient Expects to be Discharged to[de-identified] Private residence Current DME Used/Available at Home: None Tub or Shower Type: Tub/Shower combination Prior Level of Function/Work/Activity: Mod I mobility and amb Number of Personal Factors/Comorbidities that affect the Plan of Care: 3+: HIGH COMPLEXITY EXAMINATION:  
Most Recent Physical Functioning:  
Gross Assessment: 
AROM: Generally decreased, functional 
         
  
Posture: 
  
Balance: 
  Bed Mobility: 
Rolling: Contact guard assistance;Stand-by assistance Supine to Sit: Contact Guard Assist;Additional time Scooting: Stand-by assistance Wheelchair Mobility: 
  
Transfers: 
Sit to Stand: Moderate assistance Stand to Sit: Minimum assistance Gait: 
  
 Base of Support: Narrowed Speed/Christina: Slow Step Length: Right shortened;Left shortened Distance (ft): 5 Feet (ft) Assistive Device: Walker, rolling Ambulation - Level of Assistance: Minimal assistance Interventions: Verbal cues Body Structures Involved: 1. Lungs 2. Bones 3. Joints Body Functions Affected: 1. Sensory/Pain 2. Movement Related Activities and Participation Affected: 1. Mobility 2. Self Care 3. Interpersonal Interactions and Relationships 4. Community, Social and [place] Number of elements that affect the Plan of Care: 4+: HIGH COMPLEXITY CLINICAL PRESENTATION:  
Presentation: Stable and uncomplicated: LOW COMPLEXITY CLINICAL DECISION MAKIN[address] [place] [address] AM-PAC 6 Clicks Basic Mobility Inpatient Short Form How much difficulty does the patient currently have. .. Unable A Lot A Little None 1. Turning over in bed (including adjusting bedclothes, sheets and blankets)? [] 1   [] 2   [] 3   [x] 4  
2. Sitting down on and standing up from a chair with arms ( e.g., wheelchair, bedside commode, etc.)   [] 1   [] 2   [x] 3   [] 4  
3. Moving from lying on back to sitting on the side of the bed? [] 1   [] 2   [] 3   [x] 4 How much help from another person does the patient currently need. .. Total A Lot A Little None 4. Moving to and from a bed to a chair (including a wheelchair)? [] 1   [] 2   [x] 3   [] 4  
5. Need to walk in hospital room? [] 1   [] 2   [] 3   [x] 4  
6. Climbing 3-5 steps with a railing? [] 1   [x] 2   [] 3   [] 4  
© , Trustees of [address] [place], [place] [id number], under license to [employer]. All rights reserved Score:  Initial: 20 Most Recent: X (Date: -- ) Interpretation of Tool:  Represents activities that are increasingly more difficult (i.e. Bed mobility, Transfers, Gait). Medical Necessity:    
· Patient is expected to demonstrate progress in  
· strength, range of motion, balance, coordination, and functional technique ·  to  
· increase independence with ambulation and mobility · . Reason for Services/Other Comments: 
· Patient continues to require skilled intervention due to · Gross weakness, poor balance, increased risk of falls · . Use of outcome tool(s) and clinical judgement create a POC that gives a: Clear prediction of patient's progress: LOW COMPLEXITY  
  
 
 
 
TREATMENT:  
  
Pre-treatment Symptoms/Complaints: flat Pain: Initial:  
Pain Intensity 1: 0 0/10 Post Session: flat Therapeutic Activity: (    25 minutes ):  Therapeutic activities including Bed transfers, seated exercises and trunk stability (shifts) and Chair transfers to improve mobility. Required minimal Verbal cues to promote motor control of bilateral, upper extremity(s), lower extremity(s). Therapeutic Exercise: (  13 minutes):  Exercises per grid below to improve strength. Required moderate verbal and tactile cues to promote proper body mechanics. Progressed repetitions as indicated. Date: 
6/5/20 Date: 
6/8/20 Date: 
6/10 Date: 
6-12-20 Activity/Exercise Parameters Parameters Parameters Ankle pumps 20 X 20 B X 10 30x Heel slides 2 x 10  X 10 aa 20x Leg slides (hip abd/add) 2 x 10  X 10 aa 10x Seated knee extension 10 B X 15 B Seated hip flexion 10 B X 15 B X 5 5 Standing marching   X 5 HRs    30 Braces/Orthotics/Lines/Etc:  
· IV 
· traore catheter · O2 monitor · O2 Device: Room air Treatment/Session Assessment:   
· Response to Treatment:  tolreated well, smiled. · Interdisciplinary Collaboration:  
o Physical Therapist 
o Registered Nurse 
o Certified Nursing Assistant/Patient Care Technician · After treatment position/precautions:  
o Up in the chair. o Bed/Chair-wheels locked 
o Call light in hand 
o Family at bedside · Compliance with Program/Exercises: Will assess as treatment progresses · Recommendations/Intent for next treatment session:   \"Next visit will focus on advancements to more challenging activities\". Total Treatment Duration: PT Patient Time In/Time Out Time In: 1 Time Out: 2990 Oniel Dupree DPT

## 2020-06-12 NOTE — PROGRESS NOTES
Problem: Falls - Risk of 
Goal: *Absence of Falls Description: Document Alton Munoz Fall Risk and appropriate interventions in the flowsheet. Outcome: Progressing Towards Goal 
Note: Fall Risk Interventions: 
Mobility Interventions: Bed/chair exit alarm Mentation Interventions: Door open when patient unattended, Bed/chair exit alarm, Eyeglasses and hearing aids, Familiar objects from home, Gait belt with transfers/ambulation, HELP ([address]) if available, More frequent rounding, Reorient patient, Room close to nurse's station, Self-releasing belt, Toileting rounds Medication Interventions: Patient to call before getting OOB, Teach patient to arise slowly Elimination Interventions: Bed/chair exit alarm Problem: Patient Education: Go to Patient Education Activity Goal: Patient/Family Education Outcome: Progressing Towards Goal 
  
Problem: Pain Goal: *Control of Pain Outcome: Progressing Towards Goal 
Goal: *PALLIATIVE CARE:  Alleviation of Pain Outcome: Progressing Towards Goal 
  
Problem: Patient Education: Go to Patient Education Activity Goal: Patient/Family Education Outcome: Progressing Towards Goal 
  
Problem: Pneumonia: Day 1 Goal: Off Pathway (Use only if patient is Off Pathway) Outcome: Progressing Towards Goal 
Goal: Activity/Safety Outcome: Progressing Towards Goal 
Goal: Consults, if ordered Outcome: Progressing Towards Goal 
Goal: Diagnostic Test/Procedures Outcome: Progressing Towards Goal 
Goal: Nutrition/Diet Outcome: Progressing Towards Goal 
Goal: Medications Outcome: Progressing Towards Goal 
Goal: Respiratory Outcome: Progressing Towards Goal 
Goal: Treatments/Interventions/Procedures Outcome: Progressing Towards Goal 
Goal: Psychosocial 
Outcome: Progressing Towards Goal 
Goal: *Oxygen saturation within defined limits Outcome: Progressing Towards Goal 
Goal: *Influenza vaccine administered (October-March) Outcome: Progressing Towards Goal 
Goal: *Pneumoccocal vaccine administered Outcome: Progressing Towards Goal 
Goal: *Hemodynamically stable Outcome: Progressing Towards Goal 
Goal: *Demonstrates progressive activity Outcome: Progressing Towards Goal 
Goal: *Tolerating diet Outcome: Progressing Towards Goal 
  
Problem: Pneumonia: Day 2 Goal: Off Pathway (Use only if patient is Off Pathway) Outcome: Progressing Towards Goal 
Goal: Activity/Safety Outcome: Progressing Towards Goal 
Goal: Consults, if ordered Outcome: Progressing Towards Goal 
Goal: Diagnostic Test/Procedures Outcome: Progressing Towards Goal 
Goal: Nutrition/Diet Outcome: Progressing Towards Goal 
Goal: Discharge Planning Outcome: Progressing Towards Goal 
Goal: Medications Outcome: Progressing Towards Goal 
Goal: Respiratory Outcome: Progressing Towards Goal 
Goal: Treatments/Interventions/Procedures Outcome: Progressing Towards Goal 
Goal: Psychosocial 
Outcome: Progressing Towards Goal 
Goal: *Oxygen saturation within defined limits Outcome: Progressing Towards Goal 
Goal: *Hemodynamically stable Outcome: Progressing Towards Goal 
Goal: *Demonstrates progressive activity Outcome: Progressing Towards Goal 
Goal: *Tolerating diet Outcome: Progressing Towards Goal 
Goal: *Optimal pain control at patient's stated goal 
Outcome: Progressing Towards Goal 
  
Problem: Pneumonia: Day 3 Goal: Off Pathway (Use only if patient is Off Pathway) Outcome: Progressing Towards Goal 
Goal: Activity/Safety Outcome: Progressing Towards Goal 
Goal: Consults, if ordered Outcome: Progressing Towards Goal 
Goal: Diagnostic Test/Procedures Outcome: Progressing Towards Goal 
Goal: Nutrition/Diet Outcome: Progressing Towards Goal 
Goal: Discharge Planning Outcome: Progressing Towards Goal 
Goal: Medications Outcome: Progressing Towards Goal 
Goal: Respiratory Outcome: Progressing Towards Goal 
Goal: Treatments/Interventions/Procedures Outcome: Progressing Towards Goal 
Goal: Psychosocial 
Outcome: Progressing Towards Goal 
Goal: *Oxygen saturation within defined limits Outcome: Progressing Towards Goal 
Goal: *Hemodynamically stable Outcome: Progressing Towards Goal 
Goal: *Demonstrates progressive activity Outcome: Progressing Towards Goal 
Goal: *Tolerating diet Outcome: Progressing Towards Goal 
Goal: *Describes available resources and support systems Outcome: Progressing Towards Goal 
Goal: *Optimal pain control at patient's stated goal 
Outcome: Progressing Towards Goal 
  
Problem: Pneumonia: Day 4 Goal: Off Pathway (Use only if patient is Off Pathway) Outcome: Progressing Towards Goal 
Goal: Activity/Safety Outcome: Progressing Towards Goal 
Goal: Nutrition/Diet Outcome: Progressing Towards Goal 
Goal: Discharge Planning Outcome: Progressing Towards Goal 
Goal: Medications Outcome: Progressing Towards Goal 
Goal: Respiratory Outcome: Progressing Towards Goal 
Goal: Treatments/Interventions/Procedures Outcome: Progressing Towards Goal 
Goal: Psychosocial 
Outcome: Progressing Towards Goal 
  
Problem: Pneumonia: Discharge Outcomes Goal: *Demonstrates progressive activity Outcome: Progressing Towards Goal 
Goal: *Describes follow-up/return visits to physicians Outcome: Progressing Towards Goal 
Goal: *Tolerating diet Outcome: Progressing Towards Goal 
Goal: *Verbalizes name, dosage, time, side effects, and number of days to continue medications Outcome: Progressing Towards Goal 
Goal: *Influenza immunization Outcome: Progressing Towards Goal 
Goal: *Pneumococcal immunization Outcome: Progressing Towards Goal 
Goal: *Respiratory status at baseline Outcome: Progressing Towards Goal 
Goal: *Vital signs within defined limits Outcome: Progressing Towards Goal 
Goal: *Describes available resources and support systems Outcome: Progressing Towards Goal 
Goal: *Optimal pain control at patient's stated goal 
 Outcome: Progressing Towards Goal 
  
Problem: Nutrition Deficit Goal: *Optimize nutritional status Outcome: Progressing Towards Goal

## 2020-06-12 NOTE — PROGRESS NOTES
Hospitalist Note Admit Date:  2020 11:00 PM  
Name:  Eben Marte Age:  79 y.o. 
:  1940 MRN:  [id number] PCP:  Karina Concepcion MD 
Treatment Team: Attending Provider: Dorrene Gaucher, MD; Consulting Provider: Prasanth Burt MD; Utilization Review: Rhoda Mckeon RN; Care Manager: Yohannes Moreno.; Staff Nurse: Yan Real RN 
 
HPI/Subjective:  
 
Mr. 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 He has been seen by pulm s/p bilateral thoracentesis -, right thoracentesis 6-3, bilateral thoracentesis -9,  and diuresis. Studies transudate, felt due to hypoalbuminemia. [place] 60-65%. He has required high flow O2. He has compelted 8 days zosyn. In regards to his metastatic rectal cancer, he has known anal stricture and declined prior surgical diverting ostomy. He is s/p anal dilation per [facility] in past. CTAP showed constipation/ colonic distention to rectum suspicious for obstruction. oncology recommends followup at discharge, last chemo completed . He has been seen by GI s/p 6-2 flex sig with disimpaction/ no stricture found. Recommends [facility] colorectal followup. Discharge plans pending 20 wife present, talking with her son about discharge plans, wants to get him better to reconsider chemo, patient weak but verbal , back on optiflow Objective:  
 
Patient Vitals for the past 24 hrs: 
 Temp Pulse Resp BP SpO2  
20 1502     98 % 20 1454     100 % 20 1202     94 % 20 1159 98.4 °F (36.9 °C) 98 20 158/69 100 % 20 0758     97 % 20 0742 98.1 °F (36.7 °C) 98 20 160/84 97 % 20 0420 98.5 °F (36.9 °C) (!) 101 20 160/76 94 % 20 0000 98.5 °F (36.9 °C) 95 18 145/68 95 % 06/11/20 2038     97 % 06/11/20 1955 98.2 °F (36.8 °C) 95 18 151/75 96 % Oxygen Therapy O2 Sat (%): 98 % (06/12/20 1502) Pulse via Oximetry: 96 beats per minute (06/12/20 1502) O2 Device: Hi flow nasal cannula (06/12/20 1502) O2 Flow Rate (L/min): 5 l/min (06/12/20 1502) O2 Temperature: 87.8 °F (31 °C) (06/12/20 1454) FIO2 (%): 55 % (06/12/20 1454) ETCO2 (mmHg): 93 mmHg (06/01/20 1624) Estimated body mass index is 23.46 kg/m² as calculated from the following: 
  Height as of this encounter: 6' (1.829 m). Weight as of this encounter: 78.5 kg (173 lb). Intake/Output Summary (Last 24 hours) at 6/12/2020 1518 Last data filed at 6/12/2020 1225 Gross per 24 hour Intake 120 ml Output 1700 ml Net -1580 ml *Note that automatically entered I/Os may not be accurate; dependent on patient compliance with collection and accurate  by techs. General:    Alert , elderly, no distress. CV:   RRR. No murmur, rub, or gallop. No edema Lungs:   CTAB. No wheezing, rhonchi, or rales. anterior Abdomen:   Soft, nontender, nondistended. Decreased BS Extremities: Warm and dry. Skin:     No rashes or jaundice. Neuro:  No gross focal deficits Data Review: 
I have reviewed all labs, meds, and studies from the last 24 hours: 
Recent Results (from the past 24 hour(s)) GLUCOSE, POC Collection Time: 06/11/20  4:51 PM  
Result Value Ref Range Glucose (POC) 294 (H) 65 - 100 mg/dL GLUCOSE, POC Collection Time: 06/11/20  9:36 PM  
Result Value Ref Range Glucose (POC) 237 (H) 65 - 100 mg/dL GLUCOSE, POC Collection Time: 06/12/20  7:29 AM  
Result Value Ref Range Glucose (POC) 126 (H) 65 - 100 mg/dL METABOLIC PANEL, BASIC Collection Time: 06/12/20  8:34 AM  
Result Value Ref Range Sodium 131 (L) 136 - 145 mmol/L Potassium 4.8 3.5 - 5.1 mmol/L Chloride 95 (L) 98 - 107 mmol/L  
 CO2 30 21 - 32 mmol/L  Anion gap 6 (L) 7 - 16 mmol/L  
 Glucose 124 (H) 65 - 100 mg/dL BUN 8 8 - 23 MG/DL Creatinine 0.63 (L) 0.8 - 1.5 MG/DL  
 GFR est AA >60 >60 ml/min/1.73m2 GFR est non-AA >60 >60 ml/min/1.73m2 Calcium 9.0 8.3 - 10.4 MG/DL MAGNESIUM Collection Time: 06/12/20  8:34 AM  
Result Value Ref Range Magnesium 2.2 1.8 - 2.4 mg/dL CBC W/O DIFF Collection Time: 06/12/20  8:34 AM  
Result Value Ref Range WBC 8.8 4.3 - 11.1 K/uL  
 RBC 2.84 (L) 4.23 - 5.6 M/uL HGB 8.3 (L) 13.6 - 17.2 g/dL HCT 26.3 (L) 41.1 - 50.3 % MCV 92.6 79.6 - 97.8 FL  
 MCH 29.2 26.1 - 32.9 PG  
 MCHC 31.6 31.4 - 35.0 g/dL  
 RDW 15.8 (H) 11.9 - 14.6 % PLATELET 239 466 - 481 K/uL MPV 10.0 9.4 - 12.3 FL ABSOLUTE NRBC 0.00 0.0 - 0.2 K/uL GLUCOSE, POC Collection Time: 06/12/20 11:31 AM  
Result Value Ref Range Glucose (POC) 264 (H) 65 - 100 mg/dL Current Meds: 
Current Facility-Administered Medications Medication Dose Route Frequency  potassium chloride (K-DUR, KLOR-CON) SR tablet 20 mEq  20 mEq Oral TID  furosemide (LASIX) injection 40 mg  40 mg IntraVENous DAILY  insulin glargine (LANTUS) injection 5 Units  5 Units SubCUTAneous QHS  albuterol-ipratropium (DUO-NEB) 2.5 MG-0.5 MG/3 ML  3 mL Nebulization Q6HWA RT  
 hydrALAZINE (APRESOLINE) 20 mg/mL injection 10 mg  10 mg IntraVENous Q6H PRN  
 magnesium hydroxide (MILK OF MAGNESIA) 400 mg/5 mL oral suspension 30 mL  30 mL Oral DAILY  senna-docusate (PERICOLACE) 8.6-50 mg per tablet 1 Tab  1 Tab Oral BID  
 bisacodyL (DULCOLAX) suppository 10 mg  10 mg Rectal DAILY  0.9% sodium chloride infusion 250 mL  250 mL IntraVENous PRN  
 amLODIPine (NORVASC) tablet 10 mg  10 mg Oral DAILY  docusate sodium (COLACE) capsule 100 mg  100 mg Oral PRN  
 lisinopriL (PRINIVIL, ZESTRIL) tablet 20 mg  20 mg Oral DAILY  meloxicam (MOBIC) tablet 7.5 mg  7.5 mg Oral DAILY  sodium chloride tablet 2 g  2 g Oral BID  traMADoL (ULTRAM) tablet 50 mg  50 mg Oral Q6H PRN  
  sodium chloride (NS) flush 5-40 mL  5-40 mL IntraVENous Q8H  
 sodium chloride (NS) flush 5-40 mL  5-40 mL IntraVENous PRN  
 acetaminophen (TYLENOL) tablet 650 mg  650 mg Oral Q4H PRN  
 ondansetron (ZOFRAN) injection 4 mg  4 mg IntraVENous Q4H PRN  
 insulin regular (NOVOLIN R, HUMULIN R) injection   SubCUTAneous AC&HS  enoxaparin (LOVENOX) injection 40 mg  40 mg SubCUTAneous Q24H Other Studies: 
Results for orders placed or performed during the hospital encounter of 20  
2D ECHO COMPLETE ADULT (TTE) W OR WO CONTR Narrative [facility] [address] [place], [address] [facility] 
[phone] Transthoracic Echocardiogram 
2D, M-mode, Doppler, and Color Doppler Patient: Lesley Poon 
MR #: [id number] : [id number] Age: 79 years Gender: Male Study date: 26-May-2020 Account #: [de-identified] Height: 72 in 72 in 
Weight: 164 lb 163.7 lb 
BSA: 1.96 mï¾² 1.96 mï¾² Status:Routine Location: 831 BP: 169/ 88 Allergies: NO KNOWN ALLERGENS Sonographer:  Casey Lobe RD Group:  [address] Cardiology Referring Physician:  April Pisano MD 
Reading Physician:  Doran Najjar. Nessmith, MD [facility] INDICATIONS: Bilateral pleural effusions *Pt. scanned supine. Unable to turn LLD. PROCEDURE: This was a routine study. A transthoracic echocardiogram was 
performed. The study included complete 2D imaging, M-mode, complete spectral 
Doppler, and color Doppler. Intravenous contrast (Definity, 1 ml) was 
administered to opacify the left ventricle. Image quality was adequate. LEFT VENTRICLE: Size was normal. Systolic function was normal. Ejection 
fraction was estimated in the range of 60 % to 65 %. There were no regional 
wall motion abnormalities. Wall thickness was normal. Left ventricular 
diastolic function parameters were normal. E/e' av.46. RIGHT VENTRICLE: The size was normal. Systolic function was normal. 
 
 LEFT ATRIUM: Size was normal. 
 
RIGHT ATRIUM: Size was normal. 
 
SYSTEMIC VEINS: IVC: The inferior vena cava was normal in size and course. AORTIC VALVE: The valve was structurally normal, tri-commissural. There was  
no 
evidence for stenosis. There was no insufficiency. MITRAL VALVE: Valve structure was normal. There was no evidence for stenosis. There was no regurgitation. TRICUSPID VALVE: The valve structure was normal. There was no evidence for 
stenosis. There was trivial regurgitation. PULMONIC VALVE: Not well visualized. There was no evidence for stenosis. There 
was no insufficiency. PERICARDIUM: There was no pericardial effusion. AORTA: The root exhibited normal size. SUMMARY: 
 
-  Left ventricle: Systolic function was normal. Ejection fraction was 
estimated in the range of 60 % to 65 %. There were no regional wall motion 
abnormalities. -  Pericardium: There was no pericardial effusion. SYSTEM MEASUREMENT TABLES 
 
2D Ao Diam: 3.2 cm 
LA Diam: 2.6 cm 
LAEDV Index (A-L): 25.9 ml/m2 %FS: 34.9 % IVSd: 1.1 cm 
LVIDd: 3.7 cm 
LVIDs: 2.4 cm 
LVOT Diam: 2.1 cm 
LVPWd: 1.2 cm Prepared and signed by 
 
Sravanthi Kennedy. MD Nara Bronson [facility] Signed 26-May-2020 16:55:26 No results found. All Micro Results Procedure Component Value Units Date/Time EMERGENT DISEASE PANEL [[id number]] Collected:  06/10/20 1203 Order Status:  Completed Specimen:  Not Specified Updated:  06/12/20 2363 Emergent disease panel Not detected Comment: Performed at [facility] RESULTS SCANNED IN [facility] FUNGUS CULTURE AND SMEAR [[id number]] Collected:  06/09/20 1029 Order Status:  Completed Specimen:  Miscellaneous sample Updated:  06/11/20 1437 Source LEFT Comment: Pleural Fluid Specimen Fungus stain Direct Inoculation Fungus (Mycology) Culture Other source received Comment: (NOTE) Performed At: [facility] [address] [id number] Nilo Rosa MD [id number] CULTURE, BODY FLUID Jose Payer [[id number]] Collected:  06/09/20 1029 Order Status:  Completed Specimen:  Left Updated:  06/11/20 1826 Special Requests: NO SPECIAL REQUESTS     
  GRAM STAIN 0 TO 1 WBC'S SEEN PER OIF  
   NO DEFINITE ORGANISM SEEN Culture result: NO GROWTH 2 DAYS     
 AFB CULTURE + SMEAR W/RFLX ID FROM CULTURE [[id number]] Collected:  06/09/20 1029 Order Status:  Completed Specimen:  Miscellaneous sample Updated:  06/10/20 1536 Source LEFT Comment: Pleural Fluid Specimen AFB Specimen processing Concentration Acid Fast Smear Negative Comment: (NOTE) Performed At: [address] [id number] Nilo Rosa MD [id number] Acid Fast Culture PENDING  
 FUNGUS CULTURE AND SMEAR [[id number]] Collected:  05/25/20 1315 Order Status:  Completed Specimen:  Miscellaneous sample Updated:  05/28/20 1536 Source LEFT Comment: Pleural Fluid Specimen Fungus stain Direct Inoculation Fungus (Mycology) Culture Other source received Comment: (NOTE) Performed At: [address] [id number] Nilo Rosa MD [id number] AFB CULTURE + SMEAR W/RFLX ID FROM CULTURE [[id number]] Collected:  05/25/20 1315 Order Status:  Completed Specimen:  Miscellaneous sample Updated:  05/27/20 1537 Source LEFT Comment: Pleural Fluid Specimen AFB Specimen processing Concentration Acid Fast Smear Negative Comment: (NOTE) Performed At: [address] [id number] Nilo Rosa MD [id number] Acid Fast Culture PENDING  
 CULTURE, BODY FLUID W Marcial Siemens [[id number]] Collected:  05/25/20 1315 Order Status:  Completed Specimen:  Left Updated:  05/27/20 6035 Special Requests: NO SPECIAL REQUESTS GRAM STAIN 0 TO 1 WBC'S/OIF  
   NO DEFINITE ORGANISM SEEN Culture result: NO GROWTH 2 DAYS     
  
 
 
SARS-CoV-2 Lab Results \"Novel Coronavirus\" Test:  
Lab Results Component Value Date/Time COV2NT WRONG TEST ORDERED 06/10/2020 12:03 PM  
  
\"Emergent Disease\" Test:  
Lab Results Component Value Date/Time EDPR Not detected 06/10/2020 12:03 PM  
 
\"SARS-COV-2\" Test:  
Lab Results Component Value Date/Time XGCOVT WRONG TEST ORDERED 06/10/2020 12:03 PM  
 
As of: 4:01 PM on 6/12/2020 Assessment and Plan:  
 
Hospital Problems as of 6/12/2020 Date Reviewed: 5/20/2020 Codes Class Noted - Resolved POA Rectal obstruction ICD-10-CM: K62.4 ICD-9-CM: 569.2  6/1/2020 - Present Yes Aspiration pneumonia ([facility] ICD-10-CM: J69.0 ICD-9-CM: 507.0  6/1/2020 - Present Yes Neutropenia ([facility] ICD-10-CM: D70.9 ICD-9-CM: 288.00  5/25/2020 - Present Yes Pleural effusion on right ICD-10-CM: J90 ICD-9-CM: 511.9  5/25/2020 - Present Yes * (Principal) Acute respiratory failure with hypoxia [facility]) ICD-10-CM: J96.01 
ICD-9-CM: 518.81  5/24/2020 - Present Yes Normocytic anemia ICD-10-CM: D64.9 ICD-9-CM: 285.9  5/24/2020 - Present Yes  
   
 CAP (community acquired pneumonia) ICD-10-CM: J18.9 ICD-9-CM: 367  5/24/2020 - Present Pleural effusion on left ICD-10-CM: J90 ICD-9-CM: 511.9  5/24/2020 - Present Yes Malignant neoplasm metastatic to bone [facility]) ICD-10-CM: C79.51 
ICD-9-CM: 198.5  6/26/2019 - Present Yes Type 2 diabetes with nephropathy ([facility] ICD-10-CM: E11.21 
ICD-9-CM: 250.40, 583.81  7/3/2018 - Present Yes Diabetes mellitus due to underlying condition with hyperglycemia ([facility] ICD-10-CM: O46.98 ICD-9-CM: 249.80  12/21/2015 - Present Yes COVID-19 ruled out ICD-10-CM: Z03.818 ICD-9-CM: V71.83  12/15/2015 - Present Yes Hyponatremia ICD-10-CM: E87.1 ICD-9-CM: 276.1  11/23/2015 - Present Yes RESOLVED: Acute metabolic encephalopathy [id number]-[id number]: G93.41 
ICD-9-CM: 348.31  5/24/2020 - 5/24/2020 Plan: · Acute hypoxic respiratory failure, bilateral effusions: 
· Weaning O2 as tolerant, currently on optiflow · Antibiotics stopped by pulmonary · Continued IV lasix · Anal cancer with stricture and rectal obstruction: · Declines surgery · continued bowel regimen · Outpatient colorectal and oncology followups · HTN:  
· continued norvasc, lisinopril · DM2: 
· Holding amaryl · Continued  lantus and SSI · Anemia: 
· followup CBC, transfuse HGB < 7 
 
DC planning/Dispo:  Pending to SNF Diet:  DIET NUTRITIONAL SUPPLEMENTS 
DIET REGULAR 
DVT ppx:  SCD Signed: Po Vieira MD

## 2020-06-12 NOTE — PROGRESS NOTES
Patient is calm Looks weak Wife is present - had met her on the elevator. Assured them of our prayers Prayed with them Will follow very closely due to illness. Day Carrasco, staff

## 2020-06-13 NOTE — PROGRESS NOTES
Hospitalist Progress Note 2020 Admit Date: 2020 11:00 PM  
NAME: Ashley Houser :  1940 MRN:  [id number] Attending: Lacie Gaucher, DO 
PCP:  Kashif Sanchez MD 
 
SUBJECTIVE:  
 Mr. 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 He has been seen by pulm s/p bilateral thoracentesis , right thoracentesis -3, bilateral thoracentesis ,  and diuresis. Studies transudate, felt due to hypoalbuminemia. ECHO EF 60-65%. He has required high flow O2. He has compelted 8 days zosyn. In regards to his metastatic rectal cancer, he has known anal stricture and declined prior surgical diverting ostomy. He is s/p anal dilation per [facility] surgery in past. CTAP showed constipation/ colonic distention to rectum suspicious for obstruction. oncology recommends followup at discharge, last chemo completed . He has been seen by GI s/p 6-2 flex sig with disimpaction/ no stricture found. Recommends [facility] colorectal followup. Discharge plans pending  
  
 - pt appears weak and fatigued. Wife at bedside - questions answered. Has been weaned from airvo on  to [address] currently on 5lnc Review of Systems negative with exception of pertinent positives noted above PHYSICAL EXAM  
 
Visit Vitals /71 (BP 1 Location: Right arm, BP Patient Position: At rest) Pulse (!) 102 Temp 98 °F (36.7 °C) Resp 17 Ht 6' (1.829 m) Wt 78.5 kg (173 lb) SpO2 93% BMI 23.46 kg/m² Temp (24hrs), Av.3 °F (36.8 °C), Min:98 °F (36.7 °C), Max:98.5 °F (36.9 °C) Oxygen Therapy O2 Sat (%): 93 % (20 1514) Pulse via Oximetry: 101 beats per minute (20 1401) O2 Device: Hi flow nasal cannula (20 1401) O2 Flow Rate (L/min): 5 l/min (20 1409) O2 Temperature: 87.8 °F (31 °C) (20 6973) FIO2 (%): 40 % (06/13/20 1401) ETCO2 (mmHg): 93 mmHg (06/01/20 1624) Intake/Output Summary (Last 24 hours) at 6/13/2020 1756 Last data filed at 6/13/2020 1791 Gross per 24 hour Intake  Output 1600 ml Net -1600 ml General: No acute distress  appears fatigued and chronically ill Lungs:  Diminished bilaterally Heart:  Regular rate and rhythm,  No murmur, rub, or gallop Abdomen: Soft, Non distended, Non tender, Positive bowel sounds Extremities: No cyanosis, clubbing or edema Neurologic:  No focal deficits ASSESSMENT Active Hospital Problems Diagnosis Date Noted  Rectal obstruction 06/01/2020  Aspiration pneumonia ([facility] 06/01/2020  Neutropenia ([address] 05/25/2020  Pleural effusion on right 05/25/2020  Acute respiratory failure with hypoxia ([address] 05/24/2020  Normocytic anemia 05/24/2020  Pleural effusion on left 05/24/2020  Malignant neoplasm metastatic to bone ([address] 06/26/2019  Type 2 diabetes with nephropathy ([address] 07/03/2018  Diabetes mellitus due to underlying condition with hyperglycemia ([address] 12/21/2015  COVID-19 ruled out 12/15/2015  Hyponatremia 11/23/2015 A/p · Acute hypoxic respiratory failure, bilateral effusions: 
· Weaning O2 as tolerant, currently on optiflow · Antibiotics stopped by pulmonary · Continued IV lasix (-1.6L yesterday) 
  
· Anal cancer with stricture and rectal obstruction: · Declines surgery · continued bowel regimen. Has had BM today · Outpatient colorectal and oncology followups 
  
  HTN:  
· continued norvasc, lisinopril 
  
  
· DM2: 
· Holding amaryl · Continued  lantus and SSI 
  
  
· Anemia: 
· followup CBC, transfuse HGB < 7 
 
 
· Hyponatremia · Stable in 130's, follow BMP · Debility · Continue PT/OT 
 
DVT Prophylaxis: lovenox sq Dispo - pending ability to wean oxygen. Has bed approved at [facility] [place] till 6/15 only Signed By: Seble Greer,    
 June 13, 2020

## 2020-06-13 NOTE — PROGRESS NOTES
HOB elevated, no acute distress, productive cough, frothy clear sputum, lungs scattered rhonchi bilateral, A/P, continuous O2, via nasal cannula, pain level 0, wife at bedside.

## 2020-06-14 NOTE — PROGRESS NOTES
Resting quietly, resp even, unlab with O2 intact. Eyes closed with relaxed facial expression during report given to Naman Montes RN. No distress noted. O2 sat mid 90s.

## 2020-06-14 NOTE — PROGRESS NOTES
Continues to rest quietly, resp even, unlab with O2 intact at 5L N/C, O2 sat 96%, at rest.  Assessed as noted. Lungs sounds coarse. No c/o. No distress.

## 2020-06-14 NOTE — PROGRESS NOTES
Hospitalist Progress Note 2020 Admit Date: 2020 11:00 PM  
NAME: Ania Correa :  1940 MRN:  [id number] Attending: Erik Martin DO 
PCP:  Ramon Koo MD 
 
SUBJECTIVE:  
 Mr. 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 He has been seen by pulm s/p bilateral thoracentesis -, right thoracentesis -3, bilateral thoracentesis -9,  and diuresis. Studies transudate, felt due to hypoalbuminemia. ECHO EF 60-65%. He has required high flow O2. He has compelted 8 days zosyn. In regards to his metastatic rectal cancer, he has known anal stricture and declined prior surgical diverting ostomy. He is s/p anal dilation per [facility] surgery in past. CTAP showed constipation/ colonic distention to rectum suspicious for obstruction. oncology recommends followup at discharge, last chemo completed . He has been seen by GI s/p 6-2 flex sig with disimpaction/ no stricture found. Recommends [facility] followup. Discharge plans pending  
  
 - pt appears weak and fatigued. Wife at bedside - questions answered. Has been weaned from airvo on  to [address] currently on 5lnc  - sleeping on arrival. Supplemental o2 still weaning now on 4l nc high flow. Wife at bedside concerned his stomach is distended but it remains soft and he is having soft stools. Will add gas ex. She also has noticed his tongue is red and he admits to soreness. Review of Systems negative with exception of pertinent positives noted above PHYSICAL EXAM  
 
Visit Vitals /70 (BP 1 Location: Left arm, BP Patient Position: At rest) Pulse 98 Temp 97.9 °F (36.6 °C) Resp 18 Ht 6' (1.829 m) Wt 78.5 kg (173 lb) SpO2 95% BMI 23.46 kg/m² Temp (24hrs), Av.5 °F (36.9 °C), Min:97.9 °F (36.6 °C), Max:99.5 °F (37.5 °C) Oxygen Therapy O2 Sat (%): 95 % (06/14/20 1429) Pulse via Oximetry: 104 beats per minute (06/14/20 1429) O2 Device: Hi flow nasal cannula (06/14/20 1429) O2 Flow Rate (L/min): 3 l/min (06/14/20 1429) O2 Temperature: 87.8 °F (31 °C) (06/12/20 1454) FIO2 (%): 32 % (06/14/20 1429) ETCO2 (mmHg): 93 mmHg (06/01/20 1624) Intake/Output Summary (Last 24 hours) at 6/14/2020 1639 Last data filed at 6/14/2020 1030 Gross per 24 hour Intake  Output 4025 ml Net -4025 ml General: No acute distress  appears fatigued and chronically ill. Tongue slightly red/inflamed w/o exudates Lungs:  Diminished bilaterally Heart:  Regular rate and rhythm,  No murmur, rub, or gallop Abdomen: Soft, Non distended, Non tender, Positive bowel sounds Extremities: No cyanosis, clubbing or edema Neurologic:  No focal deficits ASSESSMENT Active Hospital Problems Diagnosis Date Noted  Rectal obstruction 06/01/2020  Aspiration pneumonia ([address] 06/01/2020  Neutropenia ([address] 05/25/2020  Pleural effusion on right 05/25/2020  Acute respiratory failure with hypoxia ([address] 05/24/2020  Normocytic anemia 05/24/2020  Pleural effusion on left 05/24/2020  Malignant neoplasm metastatic to bone ([address] 06/26/2019  Type 2 diabetes with nephropathy ([address] 07/03/2018  Diabetes mellitus due to underlying condition with hyperglycemia ([address] 12/21/2015  COVID-19 ruled out 12/15/2015  Hyponatremia 11/23/2015 A/p · Acute hypoxic respiratory failure, bilateral effusions: 
· Weaning O2 as tolerant, currently on optiflow · Antibiotics stopped by pulmonary · Continued IV lasix (-3.8L yesterday but doubt accurate as no input recorded. Have requested strict I/o's) 
  
· Anal cancer with stricture and rectal obstruction: · Declines surgery · continued bowel regimen. Has had BM today · Add gas-ex · Outpatient colorectal and oncology followups 
  
  HTN:  
· continued norvasc, lisinopril 
  
   
· DM2: 
· Holding amaryl · Continued  lantus and SSI 
  
  
· Anemia: 
· followup CBC, transfuse HGB < 7 
 
 
· Hyponatremia · Stable in 130's, follow BMP · Debility · Continue PT/OT 
 
DVT Prophylaxis: lovenox sq Dispo - pending ability to wean oxygen. Has bed approved at [facility] [place] till 6/15 only Signed By: Jailene Churchill DO   
 June 14, 2020

## 2020-06-14 NOTE — PROGRESS NOTES
Problem: Self Care Deficits Care Plan (Adult) Goal: *Acute Goals and Plan of Care (Insert Text) Description: 1. Patient will complete total body bathing and dressing with modified indpendence and adaptive equipment as needed. 2. Patient will complete toileting with modified independence and adaptive equipment as needed. PROGRESSING 6/10/2020 3. Patient will tolerate 20 minutes of OT treatment with up to 2 rest breaks to increase activity tolerance for ADLs. PROGRESSING 6/10/2020 4. Patient will complete functional transfers with independence and adaptive equipment as needed. PROGRESSING 6/10/2020 5. Patient will complete functional mobility for ADLs with modified independence and adaptive equipment as needed. PROGRESSING 6/4/2020 6. Patient will verbalize 2 energy conservation techniques with no cues from therapist to increase safety and independence with ADLs. Timeframe: 7 visits Outcome: Progressing Towards Goal 
  
OCCUPATIONAL THERAPY: Daily Note and PM 6/14/2020 INPATIENT: OT Visit Days: 4 Payor: Kaden Tate / Plan: [address] [place] [address] HMO / Product Type: [employer] /  
  
NAME/AGE/GENDER: Andrea Ruiz is a 79 y.o. male PRIMARY DIAGNOSIS:  Acute metabolic encephalopathy [J56.49] Acute respiratory failure with hypoxia (HCC) Acute respiratory failure with hypoxia (HCC) Procedure(s) (LRB): 
THORACENTESIS (N/A) ULTRASOUND (Bilateral) 5 Days Post-Op ICD-10: Treatment Diagnosis:  
 · Generalized Muscle Weakness (M62.81) Precautions/Allergies: 
   
 Patient has no known allergies. ASSESSMENT:  
Per Initial Assessment: 
Mr. Kristie Zhou is a 79 y.o. male admitted with SOB, respiratory failure, hypoxia, acute metabolic encephalopathy. S/p thoracentesis. 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 At baseline pt lives with wife and reports independence with ADLs and ambulation. No hx falls.   
 
6/14/2020 Pt was supine in bed upon arrival. Pt required lots of encouragement from therapist and pt's wife to participate. Pt completed bed mobility with min A. Pt sat EOB for 15 minutes with good sitting balance. Pt completed standing with min A while [place] changed brief. Good progress made. Continue POC. This section established at most recent assessment PROBLEM LIST (Impairments causing functional limitations): 1. Decreased Strength 2. Decreased ADL/Functional Activities 3. Decreased Transfer Abilities 4. Decreased Ambulation Ability/Technique 5. Decreased Balance 6. Decreased Activity Tolerance 7. Decreased Pacing Skills 8. Decreased Work Simplification/Energy Conservation Techniques 9. Increased Fatigue INTERVENTIONS PLANNED: (Benefits and precautions of occupational therapy have been discussed with the patient.) 1. Activities of daily living training 2. Adaptive equipment training 3. Balance training 4. Clothing management 5. Donning&doffing training 6. Hygiene training 7. Neuromuscular re-eduation 8. Re-evaluation 9. Therapeutic activity 10. Therapeutic exercise TREATMENT PLAN: Frequency/Duration: Follow patient 3x/week to address above goals. Rehabilitation Potential For Stated Goals: Good REHAB RECOMMENDATIONS (at time of discharge pending progress):   
Placement: It is my opinion, based on this patient's performance to date, that Mr. Zach Mcmahon may benefit from intensive therapy at [address] after discharge due to the functional deficits listed above that are likely to improve with skilled rehabilitation and concerns that he/she may be unsafe to be unsupervised at home due to impaired strength and activity tolerance impacting function. Equipment:  
? RW, potentially BSC as shower chair OCCUPATIONAL PROFILE AND HISTORY:  
History of Present Injury/Illness (Reason for Referral): 
See H&P.   
Past Medical History/Comorbidities:  
Mr. Zach Mcmahon  has a past medical history of GERD (gastroesophageal reflux disease), Head and neck cancer ([facility] (9/30/2015), History of squamous cell carcinoma, History of throat cancer (2015), Hypercholesteremia, Hypertension, Hypomagnesemia (5/20/2020), Rectal cancer ([facility] (2018), Type 2 diabetes mellitus ([facility] and Vomiting (2/23/2016). Mr. Carla Demarco  has a past surgical history that includes hx orthopaedic (Right, 1966); hx other surgical (9/9/15); hx heent; hx tonsillectomy; hx heent (2015); hx colonoscopy (05/2018); hx vascular access; hx lymph node dissection; hx other surgical; and flexible sigmoidoscopy (N/A, 6/2/2020). Social History/Living Environment:  
Home Environment: Private residence # Steps to Enter: 0 One/Two Story Residence: One story Living Alone: No 
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 At baseline pt lives with wife and reports independence with ADLs and ambulation. No hx falls. Personal Factors:   
      Sex:  male Age:  79 y.o. Other factors that influence how disability is experienced by the patient:  Multiple co-morbidities Number of Personal Factors/Comorbidities that affect the Plan of Care: Expanded review of therapy/medical records (1-2):  MODERATE COMPLEXITY ASSESSMENT OF OCCUPATIONAL PERFORMANCE[de-identified]  
Activities of Daily Living:  
Basic ADLs (From Assessment) Complex ADLs (From Assessment) Feeding: Independent Oral Facial Hygiene/Grooming: Stand-by assistance Bathing: Minimum assistance Upper Body Dressing: Stand-by assistance Lower Body Dressing: Stand-by assistance Toileting: Stand by assistance Grooming/Bathing/Dressing Activities of Daily Living Cognitive Retraining Safety/Judgement: Fall prevention Bed/Mat Mobility Supine to Sit: Supervision Sit to Supine: Minimum assistance Stand to Sit: Minimum assistance Most Recent Physical Functioning:  
Gross Assessment: 
  
         
  
Posture: 
Posture (WDL): Exceptions to [facility] Posture Assessment: Forward head, Trunk flexion Balance: 
Sitting: Intact Standing: With support;Pull to stand Bed Mobility: 
Supine to Sit: Supervision Sit to Supine: Minimum assistance Wheelchair Mobility: 
  
Transfers: 
Stand to Sit: Minimum assistance Patient Vitals for the past 6 hrs: 
 SpO2 O2 Flow Rate (L/min) 06/14/20 1429 95 % 3 l/min Mental Status Neurologic State: Alert Orientation Level: Oriented X4 Cognition: Appropriate for age attention/concentration, Appropriate decision making Perception: Appears intact Perseveration: No perseveration noted Safety/Judgement: Fall prevention Physical Skills Involved: 1. Range of Motion 2. Balance 3. Strength 4. Activity Tolerance 5. Gross Motor Control Cognitive Skills Affected (resulting in the inability to perform in a timely and safe manner): 1. None  Psychosocial Skills Affected: 1. Habits/Routines 2. Environmental Adaptation 3. Social Interaction 4. Emotional Regulation 5. Self-Awareness 6. Awareness of Others 7. Social Roles Number of elements that affect the Plan of Care: 5+:  HIGH COMPLEXITY CLINICAL DECISION MAKING:  
[facility] AM-PAC 6 Clicks Daily Activity Inpatient Short Form How much help from another person does the patient currently need. .. Total A Lot A Little None 1. Putting on and taking off regular lower body clothing? [] 1   [] 2   [x] 3   [] 4  
2. Bathing (including washing, rinsing, drying)? [] 1   [] 2   [x] 3   [] 4  
3. Toileting, which includes using toilet, bedpan or urinal?   [] 1   [] 2   [x] 3   [] 4  
4. Putting on and taking off regular upper body clothing?    [] 1   [] 2   [x] 3   [] 4  
 5. Taking care of personal grooming such as brushing teeth? [] 1   [] 2   [x] 3   [] 4  
6. Eating meals? [] 1   [] 2   [] 3   [x] 4  
© 2007, Trustees of [address] [place] [address] [address] [address], under license to [employer]. All rights reserved Score:  Initial: 19 5/29/2020 Most Recent: X (Date: -- ) Interpretation of Tool:  Represents activities that are increasingly more difficult (i.e. Bed mobility, Transfers, Gait). Medical Necessity:    
· Patient demonstrates · good ·  rehab potential due to higher previous functional level. Reason for Services/Other Comments: 
· Patient continues to require skilled intervention due to  
· Inability to complete ADLs at prior level of independence · . Use of outcome tool(s) and clinical judgement create a POC that gives a: MODERATE COMPLEXITY  
 
 
 
TREATMENT:  
(In addition to Assessment/Re-Assessment sessions the following treatments were rendered) Pre-treatment Symptoms/Complaints:   
Pain: Initial:  
Pain Intensity 1: 0  Post Session:  same Therapeutic Activity: (    30): Therapeutic activities including bed mobility to improve mobility and strength. Required min verbal and tactile cues   to promote motor control of bilateral, upper extremity(s), lower extremity(s). Braces/Orthotics/Lines/Etc:  
· O2 Device: HFNC Treatment/Session Assessment:   
· Response to Treatment:  Tolerated well · Interdisciplinary Collaboration:  
o Certified Occupational Therapy Assistant 
o Registered Nurse · After treatment position/precautions:  
o Supine in bed 
o Bed/Chair-wheels locked 
o Bed in low position 
o Call light within reach 
o RN notified 
o Family at bedside 
o Side rails x 2  
· Compliance with Program/Exercises: Compliant all of the time, Will assess as treatment progresses. · Recommendations/Intent for next treatment session: \"Next visit will focus on advancements to more challenging activities and reduction in assistance provided\". Total Treatment Duration: OT Patient Time In/Time Out Time In: 1430 Time Out: 1500 Stacy Daniel [facility]

## 2020-06-15 PROBLEM — R53.81 DEBILITY: Status: ACTIVE | Noted: 2020-01-01

## 2020-06-15 NOTE — PROGRESS NOTES
[facility] Admission Date: 5/24/2020 Daily Progress Note: 6/15/2020 The patient's chart is reviewed and the patient is discussed with the staff. The patient's chart is reviewed and the patient is discussed with the staff. 
  
The patient is a 77 y. o. male seen and evaluated at the request of Dr. Demarcus Claudio for evaluation and management of pleural effusions. 
  
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 He received carbo/etoposide 2 weeks ago and has also had radiation for anal cancer. He has required anal dilation in past.  
  
He presented to [facility] on 5/24 with increasing dyspnea and chest discomfort. A CT of the chest revealed large bilateral effusions and we are now asked to assist with management. He is hyponatremic and getting saline infusions and also getting PRBCs for anemia. Had nausea/vomiting, aspiration event on 5/31 with hypoxemia. CT at that time suggested rectal obstruction. Flex sig on 6/2 with no anal stricture and stool impaction. 
  
S/p tap or Right chest with 800 removed on 6/3 S/p thoracentesis on L( 1100 cc ) and R( 1000 cc) removed   On 6/10 Has been on/off optiflow Subjective:  
 
Patient today is down to 3 LPM. Does not feel congested. Coughing up secretion. No dyspnea. Wife reports oxygen was down in 80's and took awhile to get up. Current Facility-Administered Medications Medication Dose Route Frequency  hydrALAZINE (APRESOLINE) 20 mg/mL injection 10 mg  10 mg IntraVENous Q6H PRN  
 insulin glargine (LANTUS) injection 10 Units  10 Units SubCUTAneous QHS  nystatin (MYCOSTATIN) 100,000 unit/mL oral suspension 500,000 Units  500,000 Units Oral QID  simethicone (MYLICON) tablet 80 mg  80 mg Oral QID PRN  
  potassium chloride (K-DUR, KLOR-CON) SR tablet 20 mEq  20 mEq Oral TID  furosemide (LASIX) injection 40 mg  40 mg IntraVENous DAILY  albuterol-ipratropium (DUO-NEB) 2.5 MG-0.5 MG/3 ML  3 mL Nebulization Q6HWA RT  
 magnesium hydroxide (MILK OF MAGNESIA) 400 mg/5 mL oral suspension 30 mL  30 mL Oral DAILY  senna-docusate (PERICOLACE) 8.6-50 mg per tablet 1 Tab  1 Tab Oral BID  
 bisacodyL (DULCOLAX) suppository 10 mg  10 mg Rectal DAILY  0.9% sodium chloride infusion 250 mL  250 mL IntraVENous PRN  
 amLODIPine (NORVASC) tablet 10 mg  10 mg Oral DAILY  docusate sodium (COLACE) capsule 100 mg  100 mg Oral PRN  
 lisinopriL (PRINIVIL, ZESTRIL) tablet 20 mg  20 mg Oral DAILY  meloxicam (MOBIC) tablet 7.5 mg  7.5 mg Oral DAILY  sodium chloride tablet 2 g  2 g Oral BID  traMADoL (ULTRAM) tablet 50 mg  50 mg Oral Q6H PRN  
 sodium chloride (NS) flush 5-40 mL  5-40 mL IntraVENous Q8H  
 sodium chloride (NS) flush 5-40 mL  5-40 mL IntraVENous PRN  
 acetaminophen (TYLENOL) tablet 650 mg  650 mg Oral Q4H PRN  
 ondansetron (ZOFRAN) injection 4 mg  4 mg IntraVENous Q4H PRN  
 insulin regular (NOVOLIN R, HUMULIN R) injection   SubCUTAneous AC&HS  enoxaparin (LOVENOX) injection 40 mg  40 mg SubCUTAneous Q24H Review of Systems Constitutional: negative for fever, chills, sweats Cardiovascular: negative for chest pain, palpitations, syncope, edema Gastrointestinal:  negative for dysphagia, reflux, vomiting, diarrhea, abdominal pain, or melena Neurologic:  negative for focal weakness, numbness, headache Objective:  
 
Vitals:  
 06/14/20 2002 06/14/20 2015 06/15/20 0112 06/15/20 6820 BP:  160/75 124/60 153/76 Pulse:  (!) 106 98 97 Resp:  26 24 20 Temp:  98.4 °F (36.9 °C) 99.1 °F (37.3 °C) 98.3 °F (36.8 °C) SpO2: 95% 95% 98% 97% Weight:      
Height:      
 
 
 
Intake/Output Summary (Last 24 hours) at 6/15/2020 0815 Last data filed at 6/14/2020 6373 Gross per 24 hour Intake 680 ml Output 1850 ml Net -1170 ml Physical Exam:  
Constitution:  the patient is well developed and in no acute distress EENMT:  Sclera clear, pupils equal, oral mucosa moist on 3 LPM 
Respiratory: coarse in left chest.  
Cardiovascular:  RRR without M,G,R 
Gastrointestinal: soft and non-tender; with positive bowel sounds. Musculoskeletal: warm without cyanosis. There is no lower extremity edema. Skin:  no jaundice or rashes, no wounds Neurologic: no gross neuro deficits Psychiatric:  alert and oriented x 3 CXR: none today LAB Recent Labs  
  06/15/20 
0742 06/14/20 
2059 06/14/20 
1813 06/14/20 
1608 06/14/20 
1318 GLUCPOC 150* 173* 248* 285* 302* Recent Labs  
  06/15/20 
7847 06/14/20 
0813 06/13/20 
0715 06/12/20 
7051 WBC 7.2 7.4 7.4 8.8 HGB 7.0* 7.6* 7.4* 8.3* HCT 21.8* 24.1* 23.6* 26.3*  
* 141* 156 166 Recent Labs  
  06/14/20 
0813 06/13/20 
0715 06/12/20 
9376 * 131* 131* K 4.3 4.2 4.8  
CL 96* 95* 95* CO2 28 30 30 * 149* 124* BUN 7* 6* 8  
CREA 0.55* 0.55* 0.63* MG 2.2 2.0 2.2 CA 8.6 8.5 9.0 No results for input(s): PH, PCO2, PO2, HCO3, PHI, PCO2I, PO2I, HCO3I in the last 72 hours. No results for input(s): LCAD, LAC in the last 72 hours. Assessment:  (Medical Decision Making) Hospital Problems  Date Reviewed: 5/20/2020 Codes Class Noted POA Debility ICD-10-CM: R53.81 ICD-9-CM: 799.3  6/15/2020 Unknown Rectal obstruction ICD-10-CM: K62.4 ICD-9-CM: 569.2  6/1/2020 Yes Aspiration pneumonia ([facility] ICD-10-CM: J69.0 ICD-9-CM: 507.0  6/1/2020 Yes Neutropenia ([facility] ICD-10-CM: D70.9 ICD-9-CM: 288.00  5/25/2020 Yes Pleural effusion on right ICD-10-CM: J90 ICD-9-CM: 511.9  5/25/2020 Yes * (Principal) Acute respiratory failure with hypoxia ([facility] ICD-10-CM: J96.01 
ICD-9-CM: 518.81  5/24/2020 Yes Normocytic anemia ICD-10-CM: D64.9 ICD-9-CM: 285.9  5/24/2020 Yes Pleural effusion on left ICD-10-CM: J90 ICD-9-CM: 511.9  5/24/2020 Yes Malignant neoplasm metastatic to bone [facility]) ICD-10-CM: C79.51 
ICD-9-CM: 198.5  6/26/2019 Yes Type 2 diabetes with nephropathy ([facility] ICD-10-CM: E11.21 
ICD-9-CM: 250.40, 583.81  7/3/2018 Yes Diabetes mellitus due to underlying condition with hyperglycemia ([facility] ICD-10-CM: K92.07 ICD-9-CM: 249.80  12/21/2015 Yes COVID-19 ruled out ICD-10-CM: Z03.818 ICD-9-CM: V71.83  12/15/2015 Yes Hyponatremia ICD-10-CM: E87.1 ICD-9-CM: 276.1  11/23/2015 Yes Patient with metastatic CA with aspiration pna/recurrent pleural effusion s/p taps (neg for CA)/respiratory failure was on on optiflow and now NC. Still very weak. Plan:  (Medical Decision Making) -- down to NC again and 3 LPm. Remove pulse oximeter --cxr today given having dyspnea and check if has recurrent pleural effusion. Thoracentesis recently with pleural fluid noted as transudative and Echo was normal with no DHF or SHF. No malignant cells on cytology -- PT Mobilize -- still very weak. --Plans for str/NH noted. Was not accepted to [facility] and will ask them to re-assess given marked weakness. More than 50% of the time documented was spent in face-to-face contact with the patient and in the care of the patient on the floor/unit where the patient is located.  
 
Javy Rossi MD

## 2020-06-15 NOTE — PROGRESS NOTES
Resting quietly, resp labored on exertion with O2, improved at rest. Skin warm, dry. Lungs sounds coarse. Abdom soft, distended with active bowel sounds. Assessment completed. No c/o. No distress.

## 2020-06-15 NOTE — PROGRESS NOTES
Patient assessment complete. Patient is alert and oriented x 4. Heart rate is with regular rate and rhythm. Lung sounds are coarse throughout but diminished in the bases bilaterally. Patient currently on continuous O2 saturation monitoring but this will be discontinued today. Patient's abdomen is semi-soft, bowel sounds present and he had a bowel movement this am. Some excoriation noted around his rectum. Applied barrier cream. [place]arez care completed. Patient has no needs at this time.

## 2020-06-15 NOTE — PROGRESS NOTES
Problem: Falls - Risk of 
Goal: *Absence of Falls Description: Document Andie Chowdary Fall Risk and appropriate interventions in the flowsheet. 6/15/2020 1021 by Jimy Mayo Outcome: Progressing Towards Goal 
Note: Fall Risk Interventions: 
Mobility Interventions: Bed/chair exit alarm Mentation Interventions: Bed/chair exit alarm Medication Interventions: Bed/chair exit alarm, Patient to call before getting OOB Elimination Interventions: Call light in reach, Bed/chair exit alarm 6/15/2020 1020 by Jimy Mayo Outcome: Progressing Towards Goal 
Note: Fall Risk Interventions: 
Mobility Interventions: Bed/chair exit alarm Mentation Interventions: Bed/chair exit alarm Medication Interventions: Bed/chair exit alarm, Patient to call before getting OOB Elimination Interventions: Call light in reach, Bed/chair exit alarm Problem: Patient Education: Go to Patient Education Activity Goal: Patient/Family Education 6/15/2020 1021 by Jimy Mayo Outcome: Progressing Towards Goal 
6/15/2020 1020 by Jimy Mayo Outcome: Progressing Towards Goal 
  
Problem: Pain Goal: *Control of Pain 6/15/2020 1021 by Jimy Mayo Outcome: Progressing Towards Goal 
6/15/2020 1020 by Jimy Mayo Outcome: Progressing Towards Goal 
Goal: *PALLIATIVE CARE:  Alleviation of Pain 6/15/2020 1021 by Jimy Mayo Outcome: Progressing Towards Goal 
6/15/2020 1020 by Jimy Mayo Outcome: Progressing Towards Goal 
  
Problem: Patient Education: Go to Patient Education Activity Goal: Patient/Family Education 6/15/2020 1021 by Jimy Mayo Outcome: Progressing Towards Goal 
6/15/2020 1020 by Jimy Mayo Outcome: Progressing Towards Goal 
  
Problem: Pneumonia: Day 1 Goal: Off Pathway (Use only if patient is Off Pathway) 6/15/2020 1021 by Jimy Mayo Outcome: Progressing Towards Goal 
 6/15/2020 1020 by Louvella Bataliciat Outcome: Progressing Towards Goal 
Goal: Activity/Safety 6/15/2020 1021 by Louvella Latoshat Outcome: Progressing Towards Goal 
6/15/2020 1020 by Louvella Bataliciat Outcome: Progressing Towards Goal 
Goal: Consults, if ordered 6/15/2020 1021 by Argenisuvella Latoshat Outcome: Progressing Towards Goal 
6/15/2020 1020 by Louvella Latoshat Outcome: Progressing Towards Goal 
Goal: Diagnostic Test/Procedures 6/15/2020 1021 by Louvella Latoshat Outcome: Progressing Towards Goal 
6/15/2020 1020 by Louvella Bataliciat Outcome: Progressing Towards Goal 
Goal: Nutrition/Diet 6/15/2020 1021 by Argenisuvella Latoshat Outcome: Progressing Towards Goal 
6/15/2020 1020 by Louvella Bataliciat Outcome: Progressing Towards Goal 
Goal: Medications 6/15/2020 1021 by Argenisuvella Latoshat Outcome: Progressing Towards Goal 
6/15/2020 1020 by Argenisuvella Latoshat Outcome: Progressing Towards Goal 
Goal: Respiratory 6/15/2020 1021 by Louvella Latoshat Outcome: Progressing Towards Goal 
6/15/2020 1020 by Louvella Bataliciat Outcome: Progressing Towards Goal 
Goal: Treatments/Interventions/Procedures 6/15/2020 1021 by Argenisuvella Latoshat Outcome: Progressing Towards Goal 
6/15/2020 1020 by Louvella Bataliciat Outcome: Progressing Towards Goal 
Goal: Psychosocial 
6/15/2020 1021 by Argenisuvella Latoshat Outcome: Progressing Towards Goal 
6/15/2020 1020 by Argenisuvella Latoshat Outcome: Progressing Towards Goal 
Goal: *Oxygen saturation within defined limits 6/15/2020 1021 by Louvella Bataliciat Outcome: Progressing Towards Goal 
6/15/2020 1020 by Louvella Bataliciat Outcome: Progressing Towards Goal 
Goal: *Influenza vaccine administered (October-March) 6/15/2020 1021 by Bayleeella Latoshat Outcome: Progressing Towards Goal 
6/15/2020 1020 by Louvella Battiest Outcome: Progressing Towards Goal 
Goal: *Pneumoccocal vaccine administered 6/15/2020 1021 by Parish Santiago Outcome: Progressing Towards Goal 
6/15/2020 1020 by Parish Santiago Outcome: Progressing Towards Goal 
Goal: *Hemodynamically stable 6/15/2020 1021 by Parish Santiago Outcome: Progressing Towards Goal 
6/15/2020 1020 by Parish Santiago Outcome: Progressing Towards Goal 
Goal: *Demonstrates progressive activity 6/15/2020 1021 by Parish Santiago Outcome: Progressing Towards Goal 
6/15/2020 1020 by Parish Santiago Outcome: Progressing Towards Goal 
Goal: *Tolerating diet 6/15/2020 1021 by Parish Santiago Outcome: Progressing Towards Goal 
6/15/2020 1020 by Parish Santiago Outcome: Progressing Towards Goal 
  
Problem: Pneumonia: Day 2 Goal: Off Pathway (Use only if patient is Off Pathway) 6/15/2020 1021 by Parish Santiago Outcome: Progressing Towards Goal 
6/15/2020 1020 by Parish Santiago Outcome: Progressing Towards Goal 
Goal: Activity/Safety 6/15/2020 1021 by Parish Santiago Outcome: Progressing Towards Goal 
6/15/2020 1020 by Parish Santiago Outcome: Progressing Towards Goal 
Goal: Consults, if ordered 6/15/2020 1021 by Parish Santiago Outcome: Progressing Towards Goal 
6/15/2020 1020 by Parish Santiago Outcome: Progressing Towards Goal 
Goal: Diagnostic Test/Procedures 6/15/2020 1021 by Parish Santiago Outcome: Progressing Towards Goal 
6/15/2020 1020 by Parish Santiago Outcome: Progressing Towards Goal 
Goal: Nutrition/Diet 6/15/2020 1021 by Parish Santiago Outcome: Progressing Towards Goal 
6/15/2020 1020 by Parish Santiago Outcome: Progressing Towards Goal 
Goal: Discharge Planning 6/15/2020 1021 by Parish Santiago Outcome: Progressing Towards Goal 
6/15/2020 1020 by Parish Santiago Outcome: Progressing Towards Goal 
Goal: Medications 6/15/2020 1021 by Parish Santiago Outcome: Progressing Towards Goal 
6/15/2020 1020 by Parish Santiago 
 Outcome: Progressing Towards Goal 
Goal: Respiratory 6/15/2020 1021 by Amy Dowling Outcome: Progressing Towards Goal 
6/15/2020 1020 by Amy Dowling Outcome: Progressing Towards Goal 
Goal: Treatments/Interventions/Procedures 6/15/2020 1021 by Amy Dowling Outcome: Progressing Towards Goal 
6/15/2020 1020 by Amy Dowling Outcome: Progressing Towards Goal 
Goal: Psychosocial 
6/15/2020 1021 by Amy Dowling Outcome: Progressing Towards Goal 
6/15/2020 1020 by Amy Dowling Outcome: Progressing Towards Goal 
Goal: *Oxygen saturation within defined limits 6/15/2020 1021 by Amy Dowling Outcome: Progressing Towards Goal 
6/15/2020 1020 by Amy Dowling Outcome: Progressing Towards Goal 
Goal: *Hemodynamically stable 6/15/2020 1021 by Amy Dowling Outcome: Progressing Towards Goal 
6/15/2020 1020 by Amy Dowling Outcome: Progressing Towards Goal 
Goal: *Demonstrates progressive activity 6/15/2020 1021 by Amy Dowling Outcome: Progressing Towards Goal 
6/15/2020 1020 by Amy Dowling Outcome: Progressing Towards Goal 
Goal: *Tolerating diet 6/15/2020 1021 by Amy Dowling Outcome: Progressing Towards Goal 
6/15/2020 1020 by Amy Dowling Outcome: Progressing Towards Goal 
Goal: *Optimal pain control at patient's stated goal 
6/15/2020 1021 by Amy Dowling Outcome: Progressing Towards Goal 
6/15/2020 1020 by Amy Dowling Outcome: Progressing Towards Goal 
  
Problem: Pneumonia: Day 3 Goal: Off Pathway (Use only if patient is Off Pathway) 6/15/2020 1021 by Amy Dowling Outcome: Progressing Towards Goal 
6/15/2020 1020 by Amy Dowling Outcome: Progressing Towards Goal 
Goal: Activity/Safety 6/15/2020 1021 by Amy Dowling Outcome: Progressing Towards Goal 
6/15/2020 1020 by Amy Dowling Outcome: Progressing Towards Goal 
 Goal: Consults, if ordered 6/15/2020 1021 by Jordi Pierce Outcome: Progressing Towards Goal 
6/15/2020 1020 by Jordi Pierce Outcome: Progressing Towards Goal 
Goal: Diagnostic Test/Procedures 6/15/2020 1021 by Jordi Pierce Outcome: Progressing Towards Goal 
6/15/2020 1020 by Jordi Pierce Outcome: Progressing Towards Goal 
Goal: Nutrition/Diet 6/15/2020 1021 by Jordi Pierce Outcome: Progressing Towards Goal 
6/15/2020 1020 by Jordi Pierce Outcome: Progressing Towards Goal 
Goal: Discharge Planning 6/15/2020 1021 by Jordi Pierce Outcome: Progressing Towards Goal 
6/15/2020 1020 by Jordi Pierce Outcome: Progressing Towards Goal 
Goal: Medications 6/15/2020 1021 by Jordi Pierce Outcome: Progressing Towards Goal 
6/15/2020 1020 by Jordi Pierce Outcome: Progressing Towards Goal 
Goal: Respiratory 6/15/2020 1021 by Jordi Pierce Outcome: Progressing Towards Goal 
6/15/2020 1020 by Jordi Pierce Outcome: Progressing Towards Goal 
Goal: Treatments/Interventions/Procedures 6/15/2020 1021 by Jordi Pierce Outcome: Progressing Towards Goal 
6/15/2020 1020 by Jordi Pierce Outcome: Progressing Towards Goal 
Goal: Psychosocial 
6/15/2020 1021 by Jordi Pierce Outcome: Progressing Towards Goal 
6/15/2020 1020 by Jordi Pierce Outcome: Progressing Towards Goal 
Goal: *Oxygen saturation within defined limits 6/15/2020 1021 by Jordi Pierce Outcome: Progressing Towards Goal 
6/15/2020 1020 by Jordi Pierce Outcome: Progressing Towards Goal 
Goal: *Hemodynamically stable 6/15/2020 1021 by oJrdi Pierce Outcome: Progressing Towards Goal 
6/15/2020 1020 by Jordi Pierce Outcome: Progressing Towards Goal 
Goal: *Demonstrates progressive activity 6/15/2020 1021 by Jordi Pierce Outcome: Progressing Towards Goal 
6/15/2020 1020 by Jordi Pierce 
 Outcome: Progressing Towards Goal 
Goal: *Tolerating diet 6/15/2020 1021 by Brittaney Cho Outcome: Progressing Towards Goal 
6/15/2020 1020 by Brittaney Cho Outcome: Progressing Towards Goal 
Goal: *Describes available resources and support systems 6/15/2020 1021 by Brittaney Cho Outcome: Progressing Towards Goal 
6/15/2020 1020 by Brittaney Cho Outcome: Progressing Towards Goal 
Goal: *Optimal pain control at patient's stated goal 
6/15/2020 1021 by Brittaney Cho Outcome: Progressing Towards Goal 
6/15/2020 1020 by Brittaney Cho Outcome: Progressing Towards Goal 
  
Problem: Pneumonia: Day 4 Goal: Off Pathway (Use only if patient is Off Pathway) 6/15/2020 1021 by Brittaney Cho Outcome: Progressing Towards Goal 
6/15/2020 1020 by Brittaney Cho Outcome: Progressing Towards Goal 
Goal: Activity/Safety 6/15/2020 1021 by Brittaney Cho Outcome: Progressing Towards Goal 
6/15/2020 1020 by Brittaney Cho Outcome: Progressing Towards Goal 
Goal: Nutrition/Diet 6/15/2020 1021 by Brittaney Cho Outcome: Progressing Towards Goal 
6/15/2020 1020 by Brittaney Cho Outcome: Progressing Towards Goal 
Goal: Discharge Planning 6/15/2020 1021 by Brittaney Cho Outcome: Progressing Towards Goal 
6/15/2020 1020 by Brittaney Cho Outcome: Progressing Towards Goal 
Goal: Medications 6/15/2020 1021 by Brittaney Cho Outcome: Progressing Towards Goal 
6/15/2020 1020 by Brittaney Cho Outcome: Progressing Towards Goal 
Goal: Respiratory 6/15/2020 1021 by Brittaney Cho Outcome: Progressing Towards Goal 
6/15/2020 1020 by Brittaney Cho Outcome: Progressing Towards Goal 
Goal: Treatments/Interventions/Procedures 6/15/2020 1021 by Brittaney Cho Outcome: Progressing Towards Goal 
6/15/2020 1020 by Brittaney Cho Outcome: Progressing Towards Goal 
Goal: Psychosocial 
 6/15/2020 1021 by Jordi Pierce Outcome: Progressing Towards Goal 
6/15/2020 1020 by Jordi Pierce Outcome: Progressing Towards Goal 
  
Problem: Pneumonia: Discharge Outcomes Goal: *Demonstrates progressive activity 6/15/2020 1021 by Jordi Pierce Outcome: Progressing Towards Goal 
6/15/2020 1020 by Jordi Pierce Outcome: Progressing Towards Goal 
Goal: *Describes follow-up/return visits to physicians 6/15/2020 1021 by Jordi Pierce Outcome: Progressing Towards Goal 
6/15/2020 1020 by Jordi Pierce Outcome: Progressing Towards Goal 
Goal: *Tolerating diet 6/15/2020 1021 by Jordi Pierce Outcome: Progressing Towards Goal 
6/15/2020 1020 by Jordi Pierce Outcome: Progressing Towards Goal 
Goal: *Verbalizes name, dosage, time, side effects, and number of days to continue medications 6/15/2020 1021 by Jordi Pierce Outcome: Progressing Towards Goal 
6/15/2020 1020 by Jordi Pierce Outcome: Progressing Towards Goal 
Goal: *Influenza immunization 6/15/2020 1021 by Jordi Pierce Outcome: Progressing Towards Goal 
6/15/2020 1020 by Jordi Pierce Outcome: Progressing Towards Goal 
Goal: *Pneumococcal immunization 6/15/2020 1021 by Jordi Pierce Outcome: Progressing Towards Goal 
6/15/2020 1020 by Jordi Pierce Outcome: Progressing Towards Goal 
Goal: *Respiratory status at baseline 6/15/2020 1021 by Jordi Pierce Outcome: Progressing Towards Goal 
6/15/2020 1020 by Jordi Pierce Outcome: Progressing Towards Goal 
Goal: *Vital signs within defined limits 6/15/2020 1021 by Jordi Pierce Outcome: Progressing Towards Goal 
6/15/2020 1020 by Jordi Pierce Outcome: Progressing Towards Goal 
Goal: *Describes available resources and support systems 6/15/2020 1021 by Jordi Pierce Outcome: Progressing Towards Goal 
6/15/2020 1020 by Jordi Pierce 
 Outcome: Progressing Towards Goal 
Goal: *Optimal pain control at patient's stated goal 
6/15/2020 1021 by Bailey Corcoran Outcome: Progressing Towards Goal 
6/15/2020 1020 by Bailey Corcoran Outcome: Progressing Towards Goal 
  
Problem: Nutrition Deficit Goal: *Optimize nutritional status 6/15/2020 1021 by Bailey Corcoran Outcome: Progressing Towards Goal 
6/15/2020 1020 by Bailey Corcoran Outcome: Progressing Towards Goal

## 2020-06-15 NOTE — PROGRESS NOTES
Hospitalist Progress Note 6/15/2020 Admit Date: 2020 11:00 PM  
NAME: Belinda Monson :  1940 MRN:  [id number] Attending: Soco Goins DO 
PCP:  Jackie Quezada MD 
 
SUBJECTIVE:  
 Mr. 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 He has been seen by pulm s/p bilateral thoracentesis -, right thoracentesis 6-3, bilateral thoracentesis -9,  and diuresis. Studies transudate, felt due to hypoalbuminemia. ECHO EF 60-65%. He has required high flow O2. He has compelted 8 days zosyn. In regards to his metastatic rectal cancer, he has known anal stricture and declined prior surgical diverting ostomy. He is s/p anal dilation per [facility] in past. CTAP showed constipation/ colonic distention to rectum suspicious for obstruction. oncology recommends followup at discharge, last chemo completed . He has been seen by GI s/p 6-2 flex sig with disimpaction/ no stricture found. Recommends [facility] followup. Discharge plans pending  
  
6/15 - sitting up in bedside chair eating lunch. Wants to get back to bed, says he is tired. Review of Systems negative with exception of pertinent positives noted above PHYSICAL EXAM  
 
Visit Vitals /67 (BP 1 Location: Right arm) Pulse (!) 103 Temp 98.2 °F (36.8 °C) Resp 24 Ht 6' (1.829 m) Wt 78.5 kg (173 lb) SpO2 91% BMI 23.46 kg/m² Temp (24hrs), Av.3 °F (36.8 °C), Min:97.7 °F (36.5 °C), Max:99.1 °F (37.3 °C) Oxygen Therapy O2 Sat (%): 91 % (06/15/20 1533) Pulse via Oximetry: 104 beats per minute (06/15/20 1354) O2 Device: Nasal cannula (06/15/20 1354) O2 Flow Rate (L/min): 2 l/min (06/15/20 1354) O2 Temperature: 87.8 °F (31 °C) (20 1454) FIO2 (%): 32 % (20 1429) ETCO2 (mmHg): 93 mmHg (20 1624) Intake/Output Summary (Last 24 hours) at 6/15/2020 1800 Last data filed at 6/15/2020 1257 Gross per 24 hour Intake 200 ml Output 975 ml Net -775 ml General: No acute distress  appears fatigued and chronically ill. Tongue slightly red/inflamed w/o exudates Lungs:  Diminished bilaterally Heart:  Regular rate and rhythm,  No murmur, rub, or gallop Abdomen: Soft, Non distended, Non tender, Positive bowel sounds Extremities: No cyanosis, clubbing or edema Neurologic:  No focal deficits ASSESSMENT Active Hospital Problems Diagnosis Date Noted  Debility 06/15/2020  Rectal obstruction 06/01/2020  Aspiration pneumonia ([facility] [address] 06/01/2020  Neutropenia ([facility] [address] 05/25/2020  Pleural effusion on right 05/25/2020  Acute respiratory failure with hypoxia ([facility] [address] 05/24/2020  Normocytic anemia 05/24/2020  Pleural effusion on left 05/24/2020  Malignant neoplasm metastatic to bone ([facility] [address] 06/26/2019  Type 2 diabetes with nephropathy ([facility] [address] 07/03/2018  Diabetes mellitus due to underlying condition with hyperglycemia ([facility] [address] 12/21/2015  COVID-19 ruled out 12/15/2015  Hyponatremia 11/23/2015 A/p · Acute hypoxic respiratory failure, bilateral effusions: 
· Weaning O2 as tolerant, currently on optiflow · Antibiotics stopped by pulmonary · Continued IV lasix (remains net negative daily) 
  
· Anal cancer with stricture and rectal obstruction: · Declines surgery · continued bowel regimen. Has had BM today · Add gas-ex · Outpatient colorectal and oncology followups 
  
  HTN:  
· continued norvasc, lisinopril 
  
  
· DM2: 
· Holding amaryl · Continued  lantus (increased on 6/14, may need to adjust again) · Cont SSI 
  
  
· Anemia: 
· Anemia of chronic disease · Transfuse 1 unit prbc · Hyponatremia · Stable in 130's, follow BMP · Debility · Continue PT/OT 
 
DVT Prophylaxis: lovenox sq Dispo - pending ability to wean oxygen. ?IRC. CM following Signed By: Vitaliy Shen, DO   
 Mehreen 15, 2020

## 2020-06-15 NOTE — PROGRESS NOTES
Continues to rest quietly, arousing at brief intervals. Resting quietly, resp even, unlab with O2 intact, at rest with no distress noted during report given to Jennifer Teixeira RN. Wife remains at side.

## 2020-06-15 NOTE — PROGRESS NOTES
Problem: Mobility Impaired (Adult and Pediatric) Goal: *Acute Goals and Plan of Care (Insert Text) Outcome: Progressing Towards Goal 
Note: LTG: (Reviewed and updated 6/15/20) (1.)Mr. Hoa Javier will move from supine to sit and sit to supine , scoot up and down and roll side to side with INDEPENDENT within 7 treatment day(s). (2.)Mr. Hoa Javier will transfer from bed to chair and chair to bed with SUPERVISION using the least restrictive device within 7 treatment day(s). (3.)Mr. Hoa Javier will ambulate with STAND BY ASSIST for 100+ feet with the least restrictive device within 7 treatment day(s). (4.)Mr. Hoa Javier will perform exercises per HEP independently to improve strength and mobility within 7 days. (5.)Mr. Hoa Javier will perform standing mobility and balance activities for 10+ minutes to improve strength and mobility within 7 days. ________________________________________________________________________________________________ PHYSICAL THERAPY: Daily Note, Re-evaluation and AM 6/15/2020 INPATIENT: PT Visit Days : 1 Payor: Simon Quintero / Plan: [address] [place] [address] HMO / Product Type: [facility] Care Medicare /   
  
NAME/AGE/GENDER: Tahira Yates is a 79 y.o. male PRIMARY DIAGNOSIS: Acute metabolic encephalopathy [W21.55] Acute respiratory failure with hypoxia (HCC) Acute respiratory failure with hypoxia (HCC) Procedure(s) (LRB): 
THORACENTESIS (N/A) ULTRASOUND (Bilateral) 6 Days Post-Op ICD-10: Treatment Diagnosis:  
 · Generalized Muscle Weakness (M62.81) · Other lack of cordination (R27.8) · Difficulty in walking, Not elsewhere classified (R26.2) · Other abnormalities of gait and mobility (R26.89) · Low Back Pain (M54.5) Precaution/Allergies: 
Patient has no known allergies. ASSESSMENT:  
 
Mr. Hoa Javier  is a 79year old male who has been admitted to hospital on 05/24 with above diagnosis and hx of cancer.  Prior to hospital admission pt lives with wife in a 1 story home with 0 step(s) to enter with no railing. Pt endorses 0 falls in past 6 months. Prior to admission No O2 usage at home, no assistance with ADLs and uses no DME for mobility. 6/15- pt seen for therapy reassessment this morning; he presents in supine without complaints and is agreeable to therapy evaluation, mobility. Transfers to sitting with CGA and additional time, verbal cues. Intact seated balance. Reviewed sit-stand transfer technique with cueing for weight shift, sequencing, and hand/foot placement. Close CGA to very min assist to stand from edge of bed. Ambulates 5 ft from bed to chair with RW/CGA and verbal cues for posture, gait safety, and body mechanics as well as slow descent into chair. Took short rest break, O2 sats 93% on 3L nasal cannula. Min assist to stand from lower chair. Ambulates 5 ft more with RW/CGA and continued cueing. Took another short rest break, then performs below LE exercises with good participation. Lopez Mayfield is making slow steady progress with therapy- goals have been updated. He will need continued acute PT during hospital stay to address deficits/maxmize independence. [facility]. This section established at most recent assessment PROBLEM LIST (Impairments causing functional limitations): 1. Decreased Strength 2. Decreased ADL/Functional Activities 3. Decreased Transfer Abilities 4. Decreased Ambulation Ability/Technique 5. Decreased Balance 6. Increased Pain 7. Decreased Activity Tolerance 8. Increased Fatigue 9. Decreased Flexibility/Joint Mobility 10. Decreased Roberts with Home Exercise Program 
 INTERVENTIONS PLANNED: (Benefits and precautions of physical therapy have been discussed with the patient.) 1. Balance Exercise 2. Bed Mobility 3. Family Education 4. Gait Training 5. Heat 6. Home Exercise Program (HEP) 7. Manual Therapy 8. Neuromuscular Re-education/Strengthening 9. Range of Motion (ROM) 10. Therapeutic Activites 11. Therapeutic Exercise/Strengthening 12. Transfer Training TREATMENT PLAN: Frequency/Duration: 3 times a week for duration of hospital stay Rehabilitation Potential For Stated Goals: Good REHAB RECOMMENDATIONS (at time of discharge pending progress):   
Placement: It is my opinion, based on this patient's performance to date, that Mr. Wayne Dahl may benefit from intensive therapy at a [address] [place] [address] after discharge due to the functional deficits listed above that are likely to improve with skilled rehabilitation and concerns that he/she may be unsafe to be unsupervised at home due to multiple comorbidities and high level of intense care needed. . 
Equipment:  
? Walkers, Type: Lalito Suzanne HISTORY:  
History of Present Injury/Illness (Reason for Referral): 
Per Physician Note: 
 
Ania Correa is a 79 y.o. male with a past medical history of HEENT cancer undergoing chemo/radiation who presents to the [facility] ER with report of SOB and chest discomfort for the past several days. He also admits to mild cough but denies fevers or chills. Admits to feeling a little better and breathing a bit better since arrival. 
  
Past Medical History/Comorbidities:  
Mr. Wayne Dahl  has a past medical history of GERD (gastroesophageal reflux disease), Head and neck cancer ([facility] (9/30/2015), History of squamous cell carcinoma, History of throat cancer (2015), Hypercholesteremia, Hypertension, Hypomagnesemia (5/20/2020), Rectal cancer ([facility] (2018), Type 2 diabetes mellitus ([facility] and Vomiting (2/23/2016). Mr. Wayne Dahl  has a past surgical history that includes hx orthopaedic (Right, 1966); hx other surgical (9/9/15); hx heent; hx tonsillectomy; hx heent (2015); hx colonoscopy (05/2018); hx vascular access; hx lymph node dissection; hx other surgical; and flexible sigmoidoscopy (N/A, 6/2/2020). Social History/Living Environment: Home Environment: Private residence # Steps to Enter: 0 One/Two Story Residence: One story Living Alone: No 
Support Systems: Spouse/Significant Other/Partner Patient Expects to be Discharged to[de-identified] Private residence Current DME Used/Available at Home: None Tub or Shower Type: Tub/Shower combination Prior Level of Function/Work/Activity: Mod I mobility and amb Number of Personal Factors/Comorbidities that affect the Plan of Care: 3+: HIGH COMPLEXITY EXAMINATION:  
Most Recent Physical Functioning:  
Gross Assessment: 
AROM: Generally decreased, functional 
Strength: Generally decreased, functional 
Coordination: Within functional limits Posture: 
Posture (WDL): Exceptions to [facility] Posture Assessment: Forward head, Rounded shoulders, Trunk flexion Balance: 
Sitting: Intact Standing: Impaired Standing - Static: Fair Standing - Dynamic : Fair Bed Mobility: 
Rolling: Contact guard assistance;Stand-by assistance Supine to Sit: Contact Guard Assist;Additional time Scooting: Stand-by assistance Wheelchair Mobility: 
  
Transfers: 
Sit to Stand: Minimum assistance Stand to Sit: Contact guard assistance Bed to Chair: Contact guard assistance;Minimum assistance Gait: 
  
Base of Support: Center of gravity altered Speed/Christina: Pace decreased (<100 feet/min); Slow Step Length: Left shortened;Right shortened Gait Abnormalities: Trunk sway increased;Decreased step clearance; Path deviations Distance (ft): 5 Feet (ft) Assistive Device: Walker, rolling Ambulation - Level of Assistance: Contact guard assistance;Minimal assistance Interventions: Verbal cues; Safety awareness training; Tactile cues Duration: 15 Minutes Body Structures Involved: 1. Lungs 2. Bones 3. Joints Body Functions Affected: 1. Sensory/Pain 2. Movement Related Activities and Participation Affected: 1. Mobility 2. Self Care 3. Interpersonal Interactions and Relationships 4. Community, Social and [place] Number of elements that affect the Plan of Care: 4+: HIGH COMPLEXITY CLINICAL PRESENTATION:  
Presentation: Stable and uncomplicated: LOW COMPLEXITY CLINICAL DECISION MAKING:  
[facility] AM-PAC 6 Clicks Basic Mobility Inpatient Short Form How much difficulty does the patient currently have. .. Unable A Lot A Little None 1. Turning over in bed (including adjusting bedclothes, sheets and blankets)? [] 1   [] 2   [] 3   [x] 4  
2. Sitting down on and standing up from a chair with arms ( e.g., wheelchair, bedside commode, etc.)   [] 1   [] 2   [x] 3   [] 4  
3. Moving from lying on back to sitting on the side of the bed? [] 1   [] 2   [] 3   [x] 4 How much help from another person does the patient currently need. .. Total A Lot A Little None 4. Moving to and from a bed to a chair (including a wheelchair)? [] 1   [] 2   [x] 3   [] 4  
5. Need to walk in hospital room? [] 1   [x] 2   [] 3   [] 4  
6. Climbing 3-5 steps with a railing? [x] 1   [] 2   [] 3   [] 4  
© 2007, Trustees of [facility], under license to [employer]. All rights reserved Score:  Initial: 20 Most Recent: 17 (Date: 6/15/20 ) Interpretation of Tool:  Represents activities that are increasingly more difficult (i.e. Bed mobility, Transfers, Gait). Medical Necessity:    
· Patient is expected to demonstrate progress in  
· strength, range of motion, balance, coordination, and functional technique ·  to  
· increase independence with ambulation and mobility · . Reason for Services/Other Comments: 
· Patient continues to require skilled intervention due to · Gross weakness, poor balance, increased risk of falls · . Use of outcome tool(s) and clinical judgement create a POC that gives a: Clear prediction of patient's progress: LOW COMPLEXITY  
  
 
 
 
TREATMENT:  
  
Pre-treatment Symptoms/Complaints: pt comfortable in chair, wife at bedside. C/o fatigue. Pain: Initial: Pain Intensity 1: 0 0/10 Post Session: 0/10 PT Reassessment Table:  
Initial Physical Functioning: Most Recent Physical Functioning:  
Gross Assessment: 
AROM: Generally decreased, functional 
Strength: Generally decreased, functional 
Coordination: Generally decreased, functional 
Tone: Normal 
Sensation: Intact Gross Assessment:  
AROM: Generally decreased, functional 
Strength: Generally decreased, functional 
Coordination: Within functional limits Posture:  
Posture (WDL): Exceptions to [facility] Posture Assessment: Trunk flexion, Increased, Forward head, Cervical, Kyphosis, Rounded shoulders Posture:  
Posture (WDL): Exceptions to [facility] Posture Assessment: Forward head;Rounded shoulders;Trunk flexion Balance:  
Sitting: Intact Standing: Impaired Standing - Static: Fair, Good Standing - Dynamic : Fair, Occasional Balance:  
Sitting: Intact Standing: Impaired Standing - Static: Fair Standing - Dynamic : Fair Bed Mobility:  
Rolling: Modified independent Supine to Sit: Modified independent Sit to Supine: Stand-by assistance Scooting: Modified independent Bed Mobility:  
Supine to Sit: Supervision Scooting: Stand-by assistance Transfers:  
Sit to Stand: Minimum assistance Stand to Sit: Stand-by assistance Bed to Chair: Stand-by assistance Transfers:  
Sit to Stand: Minimum assistance Stand to Sit: Contact guard assistance Bed to Chair: Contact guard assistance;Minimum assistance Gait:  
Base of Support: Center of gravity altered, Narrowed Speed/Christina: Shuffled, Slow Step Length: Left shortened, Right shortened Gait Abnormalities: Altered arm swing, Decreased step clearance, Shuffling gait, Trunk sway increased Ambulation - Level of Assistance: Stand-by assistance Distance (ft): 30 Feet (ft) Assistive Device: Walker, rolling Interventions: Safety awareness training, Tactile cues, Verbal cues, Visual/Demos Duration: 15 Minutes Gait:  
Base of Support: Center of gravity altered Speed/Christina: Pace decreased (<100 feet/min); Slow Step Length: Left shortened;Right shortened Gait Abnormalities: Trunk sway increased;Decreased step clearance; Path deviations Ambulation - Level of Assistance: Contact guard assistance;Minimal assistance Distance (ft): 5 Feet (ft) Assistive Device: Walker, rolling Interventions: Verbal cues; Safety awareness training; Tactile cues Duration: 15 Minutes Therapeutic Activity: (    15 Minutes): Therapeutic activities including Bed mobility, sit-stand transfer training from bed and chair, standing static/dynamic mobility, Chair transfers, and Ambulation on level ground x 2 trials to improve mobility, strength, balance, coordination and activity tolerance. Required minimal Verbal cues; Safety awareness training; Tactile cues to promote static and dynamic balance in standing and promote motor control of bilateral, lower extremity(s). Therapeutic Exercise: (8 Minutes):  Exercises per grid below to improve mobility, strength and balance. Required minimal visual and verbal cues to promote proper body mechanics and exercise form. Progressed range and repetitions as indicated. Date: 
6/5/20 Date: 
6/8/20 Date: 
6/10 Date: 
6-12-20 Date: 
6/15/20 Activity/Exercise Parameters Parameters Parameters Ankle pumps 20 X 20 B X 10 30x 15x AB Heel slides 2 x 10  X 10 aa 20x Leg slides (hip abd/add) 2 x 10  X 10 aa 10x Seated knee extension 10 B X 15 B   15x AB Seated hip flexion 10 B X 15 B X 5 5 Standing marching   X 5  15x AB  
HRs    30 Seated hip aBd     15x Ab Braces/Orthotics/Lines/Etc:  
· traore catheter · nasal cannula Treatment/Session Assessment:   
· Response to Treatment: good progress · Interdisciplinary Collaboration:  
o Physical Therapist 
o Registered Nurse · After treatment position/precautions:  
o Up in the chair. o Bed/Chair-wheels locked 
o Call light in hand 
o Family at bedside · Compliance with Program/Exercises: Will assess as treatment progresses · Recommendations/Intent for next treatment session: \"Next visit will focus on advancements to more challenging activities\". Total Treatment Duration: PT Patient Time In/Time Out Time In: 1250 Time Out: 1156 Rosendo Thompson DPT

## 2020-06-16 NOTE — PROGRESS NOTES
Jordon Wheeler Admission Date: 5/24/2020 Daily Progress Note: 6/16/2020 The patient's chart is reviewed and the patient is discussed with the staff. The patient's chart is reviewed and the patient is discussed with the staff. 
  
The patient is a 77 y. o. male seen and evaluated at the request of Dr. Ysabel Watsno for evaluation and management of pleural effusions. 
  
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 He received carbo/etoposide 2 weeks ago and has also had radiation for anal cancer. He has required anal dilation in past.  
  
He presented to [facility] on 5/24 with increasing dyspnea and chest discomfort. A CT of the chest revealed large bilateral effusions and we are now asked to assist with management. He is hyponatremic and getting saline infusions and also getting PRBCs for anemia. Had nausea/vomiting, aspiration event on 5/31 with hypoxemia. CT at that time suggested rectal obstruction. Flex sig on 6/2 with no anal stricture and stool impaction. 
  
S/p tap or Right chest with 800 removed on 6/3 S/p thoracentesis on L( 1100 cc ) and R( 1000 cc) removed   On 6/10 Has been on/off optiflow Subjective:  
 
Patient today is awake and alert. On few liter. More alert and did sleep yesterday. Only able to walk about 5 feet per PT notes Current Facility-Administered Medications Medication Dose Route Frequency  0.9% sodium chloride infusion 250 mL  250 mL IntraVENous PRN  
 hydrALAZINE (APRESOLINE) 20 mg/mL injection 10 mg  10 mg IntraVENous Q6H PRN  
 insulin glargine (LANTUS) injection 10 Units  10 Units SubCUTAneous QHS  nystatin (MYCOSTATIN) 100,000 unit/mL oral suspension 500,000 Units  500,000 Units Oral QID  simethicone (MYLICON) tablet 80 mg  80 mg Oral QID PRN  potassium chloride (K-DUR, KLOR-CON) SR tablet 20 mEq  20 mEq Oral TID  furosemide (LASIX) injection 40 mg  40 mg IntraVENous DAILY  albuterol-ipratropium (DUO-NEB) 2.5 MG-0.5 MG/3 ML  3 mL Nebulization Q6HWA RT  
 magnesium hydroxide (MILK OF MAGNESIA) 400 mg/5 mL oral suspension 30 mL  30 mL Oral DAILY  senna-docusate (PERICOLACE) 8.6-50 mg per tablet 1 Tab  1 Tab Oral BID  
 bisacodyL (DULCOLAX) suppository 10 mg  10 mg Rectal DAILY  0.9% sodium chloride infusion 250 mL  250 mL IntraVENous PRN  
 amLODIPine (NORVASC) tablet 10 mg  10 mg Oral DAILY  docusate sodium (COLACE) capsule 100 mg  100 mg Oral PRN  
 lisinopriL (PRINIVIL, ZESTRIL) tablet 20 mg  20 mg Oral DAILY  meloxicam (MOBIC) tablet 7.5 mg  7.5 mg Oral DAILY  sodium chloride tablet 2 g  2 g Oral BID  traMADoL (ULTRAM) tablet 50 mg  50 mg Oral Q6H PRN  
 sodium chloride (NS) flush 5-40 mL  5-40 mL IntraVENous Q8H  
 sodium chloride (NS) flush 5-40 mL  5-40 mL IntraVENous PRN  
 acetaminophen (TYLENOL) tablet 650 mg  650 mg Oral Q4H PRN  
 ondansetron (ZOFRAN) injection 4 mg  4 mg IntraVENous Q4H PRN  
 insulin regular (NOVOLIN R, HUMULIN R) injection   SubCUTAneous AC&HS  enoxaparin (LOVENOX) injection 40 mg  40 mg SubCUTAneous Q24H Review of Systems Constitutional: negative for fever, chills, sweats Cardiovascular: negative for chest pain, palpitations, syncope, edema Gastrointestinal:  negative for dysphagia, reflux, vomiting, diarrhea, abdominal pain, or melena Neurologic:  negative for focal weakness, numbness, headache Objective:  
 
Vitals:  
 06/16/20 9930 06/16/20 8409 06/16/20 3200 06/16/20 6715 BP: 139/69  155/73 Pulse: 92 96 96 91 Resp: 16  18 Temp: 98.7 °F (37.1 °C)  97.7 °F (36.5 °C) SpO2: 97%  95% Weight:      
Height:      
 
 
 
Intake/Output Summary (Last 24 hours) at 6/16/2020 0801 Last data filed at 6/16/2020 3122 Gross per 24 hour Intake 777 ml Output 1700 ml Net -923 ml Physical Exam:  
Constitution:  the patient is well developed and in no acute distress EENMT:  Sclera clear, pupils equal, oral mucosa moist on 3 LPM 
Respiratory: mildly coarse no wheezing. Cardiovascular:  RRR without M,G,R 
Gastrointestinal: soft and non-tender; with positive bowel sounds. Musculoskeletal: warm without cyanosis. There is no lower extremity edema. Skin:  no jaundice or rashes, no wounds Neurologic: no gross neuro deficits Psychiatric:  alert and oriented x 3 CXR: on 6/15 -- rigth sided infiltrates are less today. LAB Recent Labs  
  06/16/20 
0730 06/15/20 
2144 06/15/20 
1630 06/15/20 
1143 06/15/20 
5281 GLUCPOC 88 225* 280* 211* 150* Recent Labs  
  06/15/20 
2929 06/14/20 
0813 WBC 7.2 7.4 HGB 7.0* 7.6* HCT 21.8* 24.1*  
* 141* Recent Labs  
  06/15/20 
6412 06/14/20 
0813 * 131* K 4.4 4.3 CL 97* 96* CO2 29 28 * 142* BUN 10 7* CREA 0.64* 0.55* MG 2.2 2.2 CA 8.6 8.6 No results for input(s): PH, PCO2, PO2, HCO3, PHI, PCO2I, PO2I, HCO3I in the last 72 hours. No results for input(s): LCAD, LAC in the last 72 hours. Assessment:  (Medical Decision Making) Hospital Problems  Date Reviewed: 5/20/2020 Codes Class Noted POA Debility ICD-10-CM: R53.81 ICD-9-CM: 799.3  6/15/2020 Unknown Rectal obstruction ICD-10-CM: K62.4 ICD-9-CM: 569.2  6/1/2020 Yes Aspiration pneumonia ([facility] [address] [address] ICD-10-CM: J69.0 ICD-9-CM: 507.0  6/1/2020 Yes Neutropenia ([facility] [address] [address] ICD-10-CM: D70.9 ICD-9-CM: 288.00  5/25/2020 Yes Pleural effusion on right ICD-10-CM: J90 ICD-9-CM: 511.9  5/25/2020 Yes * (Principal) [facility] ICD-10-CM: J96.01 
ICD-9-CM: 518.81  5/24/2020 Yes Normocytic anemia ICD-10-CM: D64.9 ICD-9-CM: 285.9  5/24/2020 Yes Pleural effusion on left ICD-10-CM: J90 ICD-9-CM: 511.9  5/24/2020 Yes Malignant neoplasm metastatic to bone [facility]) ICD-10-CM: C79.51 
ICD-9-CM: 198.5  6/26/2019 Yes Type 2 [facility] with [facility]ropathy [facility] ICD-10-CM: E11.21 
ICD-9-CM: 250.40, 583.81  7/3/2018 Yes Diabetes mellitus due to underlying condition with hyperglycemia ([facility] ICD-10-CM: P95.77 ICD-9-CM: 249.80  12/21/2015 Yes COVID-19 ruled out ICD-10-CM: Z03.818 ICD-9-CM: V71.83  12/15/2015 Yes Hyponatremia ICD-10-CM: E87.1 ICD-9-CM: 276.1  11/23/2015 Yes Patient with metastatic CA with aspiration pna/recurrent pleural effusion s/p taps (neg for CA)/respiratory failure was on on optiflow and now NC. Still very weak. Looking for placement. Plan:  (Medical Decision Making) -- down to NC again and 3 LPm.   
--cxr yesterday with improving infiltrates on Right > left side. Thoracentesis recently with pleural fluid noted as transudative and Echo was normal with no DHF or SHF. No malignant cells on cytology -- PT Mobilize -- still very weak. --Plans for str/NH noted. Was not accepted to [facility] and will ask them to re-assess given marked weakness. Overall stable, will sign off for now. Please call back if needed. More than 50% of the time documented was spent in face-to-face contact with the patient and in the care of the patient on the floor/unit where the patient is located.  
 
Camille Penaloza MD

## 2020-06-16 NOTE — PROGRESS NOTES
Per blood bank pt has rare antibody that requires additional testing and then blood will need to be ordered from blood bank. Blood bank to call floor and notify RN when blood is ready. Delay in administration explained to pt and wife at bedside, verbalized understanding. Blood consent in chart. 0200: Blood started with 2nd RN, Cory Hill. No transfusion reaction suspected, VSS and pt tolerating well.

## 2020-06-16 NOTE — PROGRESS NOTES
Problem: Falls - Risk of 
Goal: *Absence of Falls Description: Document Liu Ring Fall Risk and appropriate interventions in the flowsheet. Outcome: Progressing Towards Goal 
Note: Fall Risk Interventions: 
Mobility Interventions: Patient to call before getting OOB Mentation Interventions: Bed/chair exit alarm Medication Interventions: Teach patient to arise slowly Elimination Interventions: Patient to call for help with toileting needs Problem: Patient Education: Go to Patient Education Activity Goal: Patient/Family Education Outcome: Progressing Towards Goal 
  
Problem: Pain Goal: *Control of Pain Outcome: Progressing Towards Goal 
Goal: *PALLIATIVE CARE:  Alleviation of Pain Outcome: Progressing Towards Goal 
  
Problem: Patient Education: Go to Patient Education Activity Goal: Patient/Family Education Outcome: Progressing Towards Goal 
  
Problem: Pneumonia: Day 1 Goal: Off Pathway (Use only if patient is Off Pathway) Outcome: Progressing Towards Goal 
Goal: Activity/Safety Outcome: Progressing Towards Goal 
Goal: Consults, if ordered Outcome: Progressing Towards Goal 
Goal: Diagnostic Test/Procedures Outcome: Progressing Towards Goal 
Goal: Nutrition/Diet Outcome: Progressing Towards Goal 
Goal: Medications Outcome: Progressing Towards Goal 
Goal: Respiratory Outcome: Progressing Towards Goal 
Goal: Treatments/Interventions/Procedures Outcome: Progressing Towards Goal 
Goal: Psychosocial 
Outcome: Progressing Towards Goal 
Goal: *Oxygen saturation within defined limits Outcome: Progressing Towards Goal 
Goal: *Influenza vaccine administered (October-March) Outcome: Progressing Towards Goal 
Goal: *Pneumoccocal vaccine administered Outcome: Progressing Towards Goal 
Goal: *Hemodynamically stable Outcome: Progressing Towards Goal 
Goal: *Demonstrates progressive activity Outcome: Progressing Towards Goal 
Goal: *Tolerating diet Outcome: Progressing Towards Goal 
  
 Problem: Pneumonia: Day 2 Goal: Off Pathway (Use only if patient is Off Pathway) Outcome: Progressing Towards Goal 
Goal: Activity/Safety Outcome: Progressing Towards Goal 
Goal: Consults, if ordered Outcome: Progressing Towards Goal 
Goal: Diagnostic Test/Procedures Outcome: Progressing Towards Goal 
Goal: Nutrition/Diet Outcome: Progressing Towards Goal 
Goal: Discharge Planning Outcome: Progressing Towards Goal 
Goal: Medications Outcome: Progressing Towards Goal 
Goal: Respiratory Outcome: Progressing Towards Goal 
Goal: Treatments/Interventions/Procedures Outcome: Progressing Towards Goal 
Goal: Psychosocial 
Outcome: Progressing Towards Goal 
Goal: *Oxygen saturation within defined limits Outcome: Progressing Towards Goal 
Goal: *Hemodynamically stable Outcome: Progressing Towards Goal 
Goal: *Demonstrates progressive activity Outcome: Progressing Towards Goal 
Goal: *Tolerating diet Outcome: Progressing Towards Goal 
Goal: *Optimal pain control at patient's stated goal 
Outcome: Progressing Towards Goal 
  
Problem: Pneumonia: Day 3 Goal: Off Pathway (Use only if patient is Off Pathway) Outcome: Progressing Towards Goal 
Goal: Activity/Safety Outcome: Progressing Towards Goal 
Goal: Consults, if ordered Outcome: Progressing Towards Goal 
Goal: Diagnostic Test/Procedures Outcome: Progressing Towards Goal 
Goal: Nutrition/Diet Outcome: Progressing Towards Goal 
Goal: Discharge Planning Outcome: Progressing Towards Goal 
Goal: Medications Outcome: Progressing Towards Goal 
Goal: Respiratory Outcome: Progressing Towards Goal 
Goal: Treatments/Interventions/Procedures Outcome: Progressing Towards Goal 
Goal: Psychosocial 
Outcome: Progressing Towards Goal 
Goal: *Oxygen saturation within defined limits Outcome: Progressing Towards Goal 
Goal: *Hemodynamically stable Outcome: Progressing Towards Goal 
Goal: *Demonstrates progressive activity Outcome: Progressing Towards Goal 
 Goal: *Tolerating diet Outcome: Progressing Towards Goal 
Goal: *Describes available resources and support systems Outcome: Progressing Towards Goal 
Goal: *Optimal pain control at patient's stated goal 
Outcome: Progressing Towards Goal 
  
Problem: Pneumonia: Day 4 Goal: Off Pathway (Use only if patient is Off Pathway) Outcome: Progressing Towards Goal 
Goal: Activity/Safety Outcome: Progressing Towards Goal 
Goal: Nutrition/Diet Outcome: Progressing Towards Goal 
Goal: Discharge Planning Outcome: Progressing Towards Goal 
Goal: Medications Outcome: Progressing Towards Goal 
Goal: Respiratory Outcome: Progressing Towards Goal 
Goal: Treatments/Interventions/Procedures Outcome: Progressing Towards Goal 
Goal: Psychosocial 
Outcome: Progressing Towards Goal 
  
Problem: Pneumonia: Discharge Outcomes Goal: *Demonstrates progressive activity Outcome: Progressing Towards Goal 
Goal: *Describes follow-up/return visits to physicians Outcome: Progressing Towards Goal 
Goal: *Tolerating diet Outcome: Progressing Towards Goal 
Goal: *Verbalizes name, dosage, time, side effects, and number of days to continue medications Outcome: Progressing Towards Goal 
Goal: *Influenza immunization Outcome: Progressing Towards Goal 
Goal: *Pneumococcal immunization Outcome: Progressing Towards Goal 
Goal: *Respiratory status at baseline Outcome: Progressing Towards Goal 
Goal: *Vital signs within defined limits Outcome: Progressing Towards Goal 
Goal: *Describes available resources and support systems Outcome: Progressing Towards Goal 
Goal: *Optimal pain control at patient's stated goal 
Outcome: Progressing Towards Goal 
  
Problem: Nutrition Deficit Goal: *Optimize nutritional status Outcome: Progressing Towards Goal

## 2020-06-17 PROBLEM — E11.21 TYPE 2 DIABETES WITH NEPHROPATHY (HCC): Chronic | Status: ACTIVE | Noted: 2018-07-03

## 2020-06-17 PROBLEM — C79.51 MALIGNANT NEOPLASM METASTATIC TO BONE (HCC): Chronic | Status: ACTIVE | Noted: 2019-06-26

## 2020-06-17 NOTE — PROGRESS NOTES
Patient alert and oriented x 4. Respirations even and unlabored. Lungs sounds diminished. Dyspnea with exertion. O2 @ 3L on via nasal cannula. Bowel sounds active. Juarez catheter patent and draining. Denies any pain. No distress observed. Call light in reach. Wife at bedside. Side rails up x 2. Bed in low position. Instructed to call with any needs, patient verbalized understanding.

## 2020-06-17 NOTE — PROGRESS NOTES
Hospitalist Progress Note 2020 Admit Date: 2020 11:00 PM  
NAME: Zenon Srivastava :  1940 MRN:  [id number] Attending: Daxa Horton DO 
PCP:  Deb Greco MD 
 
SUBJECTIVE:  
 Mr. 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 He has been seen by pulm s/p bilateral thoracentesis , right thoracentesis -3, bilateral thoracentesis ,  and diuresis. Studies transudate, felt due to hypoalbuminemia. ECHO EF 60-65%. He has required high flow O2. He has compelted 8 days zosyn. In regards to his metastatic rectal cancer, he has known anal stricture and declined prior surgical diverting ostomy. He is s/p anal dilation per [facility] surgery in past. CTAP showed constipation/ colonic distention to rectum suspicious for obstruction. oncology recommends followup at discharge, last chemo completed . He has been seen by GI s/p 6-2 flex sig with disimpaction/ no stricture found. Recommends [facility] colorectal followup. Discharge plans pending  
  
6/15 - sitting up in bedside chair eating lunch. Wants to get back to bed, says he is tired.  - asleep on arrival. No family at bedside. Doing well on 3lnc. Still extremely weak Review of Systems negative with exception of pertinent positives noted above PHYSICAL EXAM  
 
Visit Vitals /79 (BP 1 Location: Right arm, BP Patient Position: At rest;Head of bed elevated (Comment degrees)) Pulse 99 Temp 98.4 °F (36.9 °C) Resp 20 Ht 6' (1.829 m) Wt 78.5 kg (173 lb) SpO2 96% BMI 23.46 kg/m² Temp (24hrs), Av.1 °F (36.7 °C), Min:97.6 °F (36.4 °C), Max:98.7 °F (37.1 °C) Oxygen Therapy O2 Sat (%): 96 % (20) Pulse via Oximetry: 102 beats per minute (20) O2 Device: Nasal cannula (206) O2 Flow Rate (L/min): 3 l/min (06/16/20 2116) O2 Temperature: 87.8 °F (31 °C) (06/12/20 1454) FIO2 (%): 32 % (06/14/20 1429) ETCO2 (mmHg): 93 mmHg (06/01/20 1624) Intake/Output Summary (Last 24 hours) at 6/16/2020 2134 Last data filed at 6/16/2020 1355 Gross per 24 hour Intake 1377 ml Output 1500 ml Net -123 ml General: No acute distress  appears fatigued and chronically ill. Tongue slightly red/inflamed w/o exudates Lungs:  Diminished bilaterally Heart:  Regular rate and rhythm,  No murmur, rub, or gallop Abdomen: Soft, Non distended, Non tender, Positive bowel sounds Extremities: No cyanosis, clubbing or edema Neurologic:  No focal deficits ASSESSMENT Active Hospital Problems Diagnosis Date Noted  Debility 06/15/2020  Rectal obstruction 06/01/2020  Aspiration pneumonia ([facility] 06/01/2020  Neutropenia ([facility] 05/25/2020  Pleural effusion on right 05/25/2020  Acute respiratory failure with hypoxia ([address] 05/24/2020  Normocytic anemia 05/24/2020  Pleural effusion on left 05/24/2020  Malignant neoplasm metastatic to bone ([address] 06/26/2019  Type 2 diabetes with nephropathy ([address] 07/03/2018  Diabetes mellitus due to underlying condition with hyperglycemia ([facility] 12/21/2015  COVID-19 ruled out 12/15/2015  Hyponatremia 11/23/2015 A/p · Acute hypoxic respiratory failure, bilateral effusions: 
· Weaning O2 as tolerant, currently on 3lnc · Antibiotics stopped by pulmonary · Continued IV lasix (remains net negative daily) 
  
· Anal cancer with stricture and rectal obstruction: · Declines surgery · continued bowel regimen. Has had BM today · Add gas-ex · Outpatient colorectal and oncology followups 
  
  HTN:  
· continued norvasc, lisinopril 
  
  
· DM2: 
· Holding amaryl · Continued  lantus (increased on 6/14) · Cont SSI 
  
  
· Anemia: 
· Anemia of chronic disease · Transfuse 1 unit prbc on 6/15 · Hyponatremia · Stable in 130's, follow BMP · Debility · Continue PT/OT 
 
DVT Prophylaxis: lovenox sq Dispo - ? [facility] for continued rehab. Still sick and very weak - will follow with CM in AM. Signed By: Selma Zamora,    
 June 16, 2020

## 2020-06-17 NOTE — PROGRESS NOTES
Problem: Mobility Impaired (Adult and Pediatric) Goal: *Acute Goals and Plan of Care (Insert Text) Outcome: Progressing Towards Goal 
Note: LTG: (Reviewed and updated 6/15/20) (1.)Mr. Nicki Madrid will move from supine to sit and sit to supine , scoot up and down and roll side to side with INDEPENDENT within 7 treatment day(s). (2.)Mr. Nicki Madrid will transfer from bed to chair and chair to bed with SUPERVISION using the least restrictive device within 7 treatment day(s). (3.)Mr. Nicki Madrid will ambulate with STAND BY ASSIST for 100+ feet with the least restrictive device within 7 treatment day(s). (4.)Mr. Nicki Madrid will perform exercises per HEP independently to improve strength and mobility within 7 days. (5.)Mr. Nicki Madrid will perform standing mobility and balance activities for 10+ minutes to improve strength and mobility within 7 days. ________________________________________________________________________________________________ PHYSICAL THERAPY: Daily Note and PM 6/17/2020 INPATIENT: PT Visit Days : 2 Payor: Lousi Werner / Plan: [address] [place] [address] HMO / Product Type: Managed Care Medicare /   
  
NAME/AGE/GENDER: Ashley Houser is a 79 y.o. male PRIMARY DIAGNOSIS: Acute metabolic encephalopathy [G29.21] Acute respiratory failure with hypoxia (HCC) Acute respiratory failure with hypoxia (HCC) Procedure(s) (LRB): 
THORACENTESIS (N/A) ULTRASOUND (Bilateral) 8 Days Post-Op ICD-10: Treatment Diagnosis:  
 · Generalized Muscle Weakness (M62.81) · Other lack of cordination (R27.8) · Difficulty in walking, Not elsewhere classified (R26.2) · Other abnormalities of gait and mobility (R26.89) · Low Back Pain (M54.5) Precaution/Allergies: 
Patient has no known allergies. ASSESSMENT:  
 
Mr. Nicki Madrid  is a 79year old male who has been admitted to hospital on 05/24 with above diagnosis and hx of cancer.  Prior to hospital admission pt lives with wife in a 1 story home with 0 step(s) to enter with no railing. Pt endorses 0 falls in past 6 months. Prior to admission No O2 usage at home, no assistance with ADLs and uses no DME for mobility. 6/17- pt presents in supine without complaints and is agreeable to therapy with some encouragement. Worked on bed mobility (rolling, scooting, and bridging) during functional activities/brief change. Transfers to sitting with SBA and demonstrates intact seated balance. Worked on sit-stand transfer from edge of bed with cueing for body mechanics, technique. CGA-min assist from low surface. Additional time needed for all mobility, ambulation, transfers, and functional activities. Ambulates 15 ft in room with close CGA-min assist using walker and with heavy cues for posture, body mechanics, walker management. Worked on toilet transfers, seated and standing functional activities and static/dynamic tasks, and ambulation 6 ft more from commode to chair. Took rest break then performs below LE exercises with good participation. Pt positioned comfortably in recliner with legs elevated, needs in reach. Making good progress towards therapy goals and able to increase gait distance today with less assist and improved mechanics. Will benefit from continued therapy to maximize safety/independence with mobility. [facility] vs [facility] vs STR at [place]. This section established at most recent assessment PROBLEM LIST (Impairments causing functional limitations): 1. Decreased Strength 2. Decreased ADL/Functional Activities 3. Decreased Transfer Abilities 4. Decreased Ambulation Ability/Technique 5. Decreased Balance 6. Increased Pain 7. Decreased Activity Tolerance 8. Increased Fatigue 9. Decreased Flexibility/Joint Mobility 10. Decreased [place] with Home Exercise Program 
 INTERVENTIONS PLANNED: (Benefits and precautions of physical therapy have been discussed with the patient.) 1. Balance Exercise 2. Bed Mobility 3. Family Education 4. Gait Training 5. Heat 6. Home Exercise Program (HEP) 7. Manual Therapy 8. Neuromuscular Re-education/Strengthening 9. Range of Motion (ROM) 10. Therapeutic Activites 11. Therapeutic Exercise/Strengthening 12. Transfer Training TREATMENT PLAN: Frequency/Duration: 3 times a week for duration of hospital stay Rehabilitation Potential For Stated Goals: Good REHAB RECOMMENDATIONS (at time of discharge pending progress):   
Placement: It is my opinion, based on this patient's performance to date, that Mr. Kim Vallecillo may benefit from intensive therapy at an [address] [place] [address] after discharge due to a probable need for close medical supervision by a rehab physician, a probable need for 24 hour rehab nursing, a probable need for multiple therapy disciplines and potential to make ongoing and sustainable functional improvement that is of practical value. [place] [employer] Equipment:  
? Walkers, Type: Rolling [place] [employer] HISTORY:  
History of Present Injury/Illness (Reason for Referral): 
Per Physician Note: 
 
Ladonna Negro is a 79 y.o. male with a past medical history of HEENT cancer undergoing chemo/radiation who presents to the [facility] ER with report of SOB and chest discomfort for the past several days. He also admits to mild cough but denies fevers or chills. Admits to feeling a little better and breathing a bit better since arrival. 
  
Past Medical History/Comorbidities:  
Mr. Kim Vallecillo  has a past medical history of GERD (gastroesophageal reflux disease), Head and neck cancer ([facility] (9/30/2015), History of squamous cell carcinoma, History of throat cancer (2015), Hypercholesteremia, Hypertension, Hypomagnesemia (5/20/2020), Rectal cancer ([address] (2018), Type 2 diabetes mellitus ([facility] and Vomiting (2/23/2016).   Mr. Kim Vallecillo  has a past surgical history that includes hx orthopaedic (Right, 1966); hx other surgical (9/9/15); hx heent; hx tonsillectomy; hx heent (2015); hx colonoscopy (05/2018); hx vascular access; hx lymph node dissection; hx other surgical; and flexible sigmoidoscopy (N/A, 6/2/2020). Social History/Living Environment:  
Home Environment: Private residence # Steps to Enter: 0 One/Two Story Residence: One story Living Alone: No 
Support Systems: Spouse/Significant Other/Partner Patient Expects to be Discharged to[de-identified] Private residence Current DME Used/Available at Home: None Tub or Shower Type: Tub/Shower combination Prior Level of Function/Work/Activity: Mod I mobility and amb Number of Personal Factors/Comorbidities that affect the Plan of Care: 3+: HIGH COMPLEXITY EXAMINATION:  
Most Recent Physical Functioning:  
Gross Assessment: 
AROM: Generally decreased, functional 
Strength: Generally decreased, functional 
Coordination: Within functional limits Posture: 
Posture (WDL): Exceptions to [facility] Posture Assessment: Forward head, Rounded shoulders, Trunk flexion Balance: 
Sitting: Intact Standing: Impaired Standing - Static: Fair Standing - Dynamic : Fair Bed Mobility: 
Rolling: Contact guard assistance;Stand-by assistance Supine to Sit: Contact Guard Assist;Additional time Scooting: Stand-by assistance Wheelchair Mobility: 
  
Transfers: 
Sit to Stand: Contact guard assistance;Minimum assistance Stand to Sit: Contact guard assistance Bed to Chair: Contact guard assistance;Minimum assistance Interventions: Verbal cues; Visual cues; Safety awareness training; Tactile cues;Manual cues Duration: 31 Minutes Gait: 
  
Base of Support: Center of gravity altered Speed/Christina: Pace decreased (<100 feet/min); Slow Step Length: Left shortened;Right shortened Gait Abnormalities: Trunk sway increased;Decreased step clearance; Path deviations Distance (ft): 15 Feet (ft) Assistive Device: Walker, rolling Ambulation - Level of Assistance: Contact guard assistance;Minimal assistance Interventions: Verbal cues; Safety awareness training; Tactile cues Body Structures Involved: 1. Lungs 2. Bones 3. Joints Body Functions Affected: 1. Sensory/Pain 2. Movement Related Activities and Participation Affected: 1. Mobility 2. Self Care 3. Interpersonal Interactions and Relationships 4. Community, Social and [place] Number of elements that affect the Plan of Care: 4+: HIGH COMPLEXITY CLINICAL PRESENTATION:  
Presentation: Stable and uncomplicated: LOW COMPLEXITY CLINICAL DECISION MAKING:  
[facility] AM-PAC 6 Clicks Basic Mobility Inpatient Short Form How much difficulty does the patient currently have. .. Unable A Lot A Little None 1. Turning over in bed (including adjusting bedclothes, sheets and blankets)? [] 1   [] 2   [] 3   [x] 4  
2. Sitting down on and standing up from a chair with arms ( e.g., wheelchair, bedside commode, etc.)   [] 1   [] 2   [x] 3   [] 4  
3. Moving from lying on back to sitting on the side of the bed? [] 1   [] 2   [] 3   [x] 4 How much help from another person does the patient currently need. .. Total A Lot A Little None 4. Moving to and from a bed to a chair (including a wheelchair)? [] 1   [] 2   [x] 3   [] 4  
5. Need to walk in hospital room? [] 1   [x] 2   [] 3   [] 4  
6. Climbing 3-5 steps with a railing? [x] 1   [] 2   [] 3   [] 4  
© 2007, Trustees of [facility], under license to [employer]. All rights reserved Score:  Initial: 20 Most Recent: 17 (Date: 6/15/20 ) Interpretation of Tool:  Represents activities that are increasingly more difficult (i.e. Bed mobility, Transfers, Gait). Medical Necessity:    
· Patient is expected to demonstrate progress in  
· strength, range of motion, balance, coordination, and functional technique ·  to  
· increase independence with ambulation and mobility · . Reason for Services/Other Comments: · Patient continues to require skilled intervention due to · Gross weakness, poor balance, increased risk of falls · . Use of outcome tool(s) and clinical judgement create a POC that gives a: Clear prediction of patient's progress: LOW COMPLEXITY  
  
 
 
 
TREATMENT:  
  
Pre-treatment Symptoms/Complaints: no complaints, fatigues quickly Pain: Initial:  
Pain Intensity 1: 0 0/10 Post Session: 0/10 Therapeutic Activity: (  31 Minutes ):  Therapeutic activities including Bed mobility, sit-stand transfer training from bed and chair, standing static/dynamic mobility, Chair transfers, and Ambulation on level ground x 2 trials to improve mobility, strength, balance, coordination and activity tolerance. Required minimal Verbal cues; Safety awareness training; Tactile cues to promote static and dynamic balance in standing and promote motor control of bilateral, lower extremity(s). Therapeutic Exercise: (10 Minutes):  Exercises per grid below to improve mobility, strength and balance. Required minimal visual and verbal cues to promote proper body mechanics and exercise form. Progressed range and repetitions as indicated. Date: 
6/5/20 Date: 
6/8/20 Date: 
6/10 Date: 
6-12-20 Date: 
6/15/20 Date 6/17/20 Activity/Exercise Parameters Parameters Parameters Ankle pumps 20 X 20 B X 10 30x 15x AB 15x AB Heel slides 2 x 10  X 10 aa 20x Leg slides (hip abd/add) 2 x 10  X 10 aa 10x Seated knee extension 10 B X 15 B   15x AB 15x AB Seated hip flexion 10 B X 15 B X 5 5 Standing marching   X 5  15x AB 15x AB  
HRs    30 Seated hip aBd     15x Ab 15x AB Braces/Orthotics/Lines/Etc:  
· traore catheter · nasal cannula Treatment/Session Assessment:   
· Response to Treatment: good progress with mobility, activity tolerance · Interdisciplinary Collaboration:  
o Physical Therapist 
o Registered Nurse · After treatment position/precautions:  
o Up in the chair. o Bed/Chair-wheels locked 
o Call light in hand 
o Family at bedside · Compliance with Program/Exercises: Will assess as treatment progresses · Recommendations/Intent for next treatment session: \"Next visit will focus on advancements to more challenging activities\". Total Treatment Duration: PT Patient Time In/Time Out Time In: 2506 Time Out: 1416 Amita Gold DPT

## 2020-06-17 NOTE — ROUTINE PROCESS
Pt in bed,family at bedside,  bed low and locked, call light in reach, hourly rounds completed, report will be given to oncoming RN.

## 2020-06-17 NOTE — DIABETES MGMT
Patient's blood glucose ranged  yesterday with patient receiving Lantus 10 units and regular 12 units. Blood glucose 81 this morning. Spoke with provider and order received to stop Lantus for now and initiate Humalog 4 units with meals to improve control. Will follow along.

## 2020-06-18 NOTE — PROGRESS NOTES
Patient alert and oriented x 4. Respirations even and unlabored. Lung sounds coarse. O2 @ 3L via nasal cannula on. Dyspnea on exertion. Bowel sounds active. Juarez draining clear, yellow urine. Denies any pain. No distress observed. Bed in low position. Call light in reach. Side rails up x 2. Instructed to call with any needs, patient verbalized understanding.

## 2020-06-18 NOTE — CONSULTS
IRC Consulted Reviewed pts chart including medical status and therapy progress. Unfortunate gentleman, HD #24 related to acute hypoxic respiratory failure due to bilateral pleural effusions thought to be secondary to hypoalbuminemia in the setting of oral and rectal cancers. Pleural fluid studies showed a transudate, cx neg. He has been seen by pulm s/p bilateral thoracentesis 5-26, right thoracentesis 6-3, bilateral thoracentesis 6-9,  and diuresis. He follows up at [facility]. From a functional status, pt is debilitated. This is also probably hindered by prolonged hospital stay, lack of motivation due to underlying depression . He is NOT requiring much physical assistance at all. SBA with ADLs. He is not ambulating far but with walker CGA/min assist. Requires CGA/min for STS and transfers . CGA /SBA with Bed mobility. LTACH denied by [facility]. On 3 L high flow O2 Rec; HH PT/OT/RN to monitor bs and pulmonary status. I think he would be better off at home. Does not require SNF placement. He can actually be more active within his home. Needs to have self motivation. Hopefully getting out of the hospital environment will improve this. PS message sent to MAHOGANY MAYO regarding recommendations [address] 30min review of records

## 2020-06-18 NOTE — PROGRESS NOTES
Hospitalist Note Admit Date:  2020 11:00 PM  
Name:  Grace Conn Age:  79 y.o. 
:  1940 MRN:  [id number] PCP:  Clyde Palacio MD 
Treatment Team: Attending Provider: Josy Groves MD; Utilization Review: Roxanne Eubanks RN; Care Manager: Kirti Montes.; Occupational Therapist: Melani Woods, OTR/JOSE; Consulting Provider: Rafy Shaikh RN; Staff Nurse: Ryan Pagan RN 
 
HPI/Subjective:  
 
Mr. 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 He has been seen by pulm s/p bilateral thoracentesis -26, right thoracentesis 6-3, bilateral thoracentesis -9,  and diuresis. Studies transudate, felt due to hypoalbuminemia. [place] 60-65%. He has required high flow O2. He has compelted 8 days zosyn. In regards to his metastatic rectal cancer, he has known anal stricture and declined prior surgical diverting ostomy. He is s/p anal dilation per [facility] surgery in past. CTAP showed constipation/ colonic distention to rectum suspicious for obstruction. oncology recommends followup at discharge, last chemo completed . He has been seen by GI s/p 6-2 flex sig with disimpaction/ no stricture found. Recommends [facility] colorectal followup. He required 1 unit PRBC 6-15. Discharge plans pending 20 wife present, feeling better, less short of breath, eating ok, having BM, no pains, working with PT  
 
 
Objective:  
 
Patient Vitals for the past 24 hrs: 
 Temp Pulse Resp BP SpO2  
20 1549 98.3 °F (36.8 °C) 99 18 130/61 96 % 20 1527     91 % 20 1448     90 % 20 1152 98.5 °F (36.9 °C) 94 18 119/66 95 % 20 0933     94 % 20 0743 98.2 °F (36.8 °C) 96 18 144/76 94 % 20 0327 98 °F (36.7 °C) 95 20 145/70 95 % 06/18/20 0010 97.9 °F (36.6 °C) 96 20 148/79 95 % 06/17/20 1928     92 % 06/17/20 1921 98.4 °F (36.9 °C) 100 20 146/72 94 % Oxygen Therapy O2 Sat (%): 96 % (06/18/20 1549) Pulse via Oximetry: 87 beats per minute (06/18/20 1527) O2 Device: Hi flow nasal cannula (06/18/20 1527) O2 Flow Rate (L/min): 3 l/min (06/18/20 1527) O2 Temperature: 87.8 °F (31 °C) (06/12/20 1454) FIO2 (%): 32 % (06/14/20 1429) ETCO2 (mmHg): 93 mmHg (06/01/20 1624) Estimated body mass index is 23.06 kg/m² as calculated from the following: 
  Height as of this encounter: 6' (1.829 m). Weight as of this encounter: 77.1 kg (170 lb). Intake/Output Summary (Last 24 hours) at 6/18/2020 1615 Last data filed at 6/18/2020 1513 Gross per 24 hour Intake  Output 4100 ml Net -4100 ml *Note that automatically entered I/Os may not be accurate; dependent on patient compliance with collection and accurate  by techs. General:    Alert , elderly, no distress. CV:   RRR. No murmur, rub, or gallop. No edema Lungs:   CTAB. No wheezing, rhonchi, or rales. anterior Abdomen:   Soft, nontender, nondistended. Decreased BS Extremities: Warm and dry. Skin:     No rashes or jaundice. Neuro:  No gross focal deficits Data Review: 
I have reviewed all labs, meds, and studies from the last 24 hours: 
Recent Results (from the past 24 hour(s)) GLUCOSE, POC Collection Time: 06/17/20  4:37 PM  
Result Value Ref Range Glucose (POC) 259 (H) 65 - 100 mg/dL GLUCOSE, POC Collection Time: 06/17/20  9:24 PM  
Result Value Ref Range Glucose (POC) 265 (H) 65 - 100 mg/dL METABOLIC PANEL, BASIC Collection Time: 06/18/20  6:09 AM  
Result Value Ref Range Sodium 133 (L) 136 - 145 mmol/L Potassium 4.4 3.5 - 5.1 mmol/L Chloride 98 98 - 107 mmol/L  
 CO2 29 21 - 32 mmol/L Anion gap 6 (L) 7 - 16 mmol/L Glucose 150 (H) 65 - 100 mg/dL  BUN 10 8 - 23 MG/DL  
 Creatinine 0.59 (L) 0.8 - 1.5 MG/DL  
 GFR est AA >60 >60 ml/min/1.73m2 GFR est non-AA >60 >60 ml/min/1.73m2 Calcium 8.5 8.3 - 10.4 MG/DL MAGNESIUM Collection Time: 06/18/20  6:09 AM  
Result Value Ref Range Magnesium 2.0 1.8 - 2.4 mg/dL CBC W/O DIFF Collection Time: 06/18/20  6:09 AM  
Result Value Ref Range WBC 5.2 4.3 - 11.1 K/uL  
 RBC 2.75 (L) 4.23 - 5.6 M/uL HGB 8.1 (L) 13.6 - 17.2 g/dL HCT 24.8 (L) 41.1 - 50.3 % MCV 90.2 79.6 - 97.8 FL  
 MCH 29.5 26.1 - 32.9 PG  
 MCHC 32.7 31.4 - 35.0 g/dL  
 RDW 16.8 (H) 11.9 - 14.6 % PLATELET 159 (L) 209 - 450 K/uL MPV 10.7 9.4 - 12.3 FL ABSOLUTE NRBC 0.00 0.0 - 0.2 K/uL GLUCOSE, POC Collection Time: 06/18/20  7:23 AM  
Result Value Ref Range Glucose (POC) 153 (H) 65 - 100 mg/dL GLUCOSE, POC Collection Time: 06/18/20 11:41 AM  
Result Value Ref Range Glucose (POC) 178 (H) 65 - 100 mg/dL Current Meds: 
Current Facility-Administered Medications Medication Dose Route Frequency  [START ON 6/19/2020] furosemide (LASIX) tablet 40 mg  40 mg Oral DAILY  insulin lispro (HUMALOG) injection   SubCUTAneous AC&HS  insulin lispro (HUMALOG) injection 4 Units  4 Units SubCUTAneous TIDAC  ferrous sulfate tablet 325 mg  1 Tab Oral DAILY WITH BREAKFAST  0.9% sodium chloride infusion 250 mL  250 mL IntraVENous PRN  
 hydrALAZINE (APRESOLINE) 20 mg/mL injection 10 mg  10 mg IntraVENous Q6H PRN  
 nystatin (MYCOSTATIN) 100,000 unit/mL oral suspension 500,000 Units  500,000 Units Oral QID  simethicone (MYLICON) tablet 80 mg  80 mg Oral QID PRN  potassium chloride (K-DUR, KLOR-CON) SR tablet 20 mEq  20 mEq Oral TID  albuterol-ipratropium (DUO-NEB) 2.5 MG-0.5 MG/3 ML  3 mL Nebulization Q6HWA RT  
 magnesium hydroxide (MILK OF MAGNESIA) 400 mg/5 mL oral suspension 30 mL  30 mL Oral DAILY  senna-docusate (PERICOLACE) 8.6-50 mg per tablet 1 Tab  1 Tab Oral BID  
  bisacodyL (DULCOLAX) suppository 10 mg  10 mg Rectal DAILY  0.9% sodium chloride infusion 250 mL  250 mL IntraVENous PRN  
 amLODIPine (NORVASC) tablet 10 mg  10 mg Oral DAILY  docusate sodium (COLACE) capsule 100 mg  100 mg Oral PRN  
 lisinopriL (PRINIVIL, ZESTRIL) tablet 20 mg  20 mg Oral DAILY  meloxicam (MOBIC) tablet 7.5 mg  7.5 mg Oral DAILY  sodium chloride tablet 2 g  2 g Oral BID  traMADoL (ULTRAM) tablet 50 mg  50 mg Oral Q6H PRN  
 sodium chloride (NS) flush 5-40 mL  5-40 mL IntraVENous Q8H  
 sodium chloride (NS) flush 5-40 mL  5-40 mL IntraVENous PRN  
 acetaminophen (TYLENOL) tablet 650 mg  650 mg Oral Q4H PRN  
 ondansetron (ZOFRAN) injection 4 mg  4 mg IntraVENous Q4H PRN  
 enoxaparin (LOVENOX) injection 40 mg  40 mg SubCUTAneous Q24H Other Studies: 
Results for orders placed or performed during the hospital encounter of 20  
2D ECHO COMPLETE ADULT (TTE) W OR WO CONTR Narrative Jarredkatelyn One [address]  [place], [address] [facility] 
[phone] Transthoracic Echocardiogram 
2D, M-mode, Doppler, and Color Doppler Patient: Ce Grant 
MR #: [id number] : [id number] Age: 79 years Gender: Male Study date: 26-May-2020 Account #: [de-identified] Height: 72 in 72 in 
Weight: 164 lb 163.7 lb 
BSA: 1.96 mï¾² 1.96 mï¾² Status:Routine Location: 83 BP: 169/ 88 Allergies: NO KNOWN ALLERGENS Sonographer:  Annamarie Evans RDCS Group:  [id number] [facility] [facility] [address] [address] Cardiology Referring Physician:  Stevo Reardon MD 
Reading Physician:  Juli Hearn. MD Nara [facility] INDICATIONS: Bilateral pleural effusions *Pt. scanned supine. Unable to turn LLD. PROCEDURE: This was a routine study. A transthoracic echocardiogram was 
performed. The study included complete 2D imaging, M-mode, complete spectral 
Doppler, and color Doppler. Intravenous contrast (Definity, 1 ml) was administered to opacify the left ventricle. Image quality was adequate. LEFT VENTRICLE: Size was normal. Systolic function was normal. Ejection 
fraction was estimated in the range of 60 % to 65 %. There were no regional 
wall motion abnormalities. Wall thickness was normal. Left ventricular 
diastolic function parameters were normal. E/e' av.46. RIGHT VENTRICLE: The size was normal. Systolic function was normal. 
 
LEFT ATRIUM: Size was normal. 
 
RIGHT ATRIUM: Size was normal. 
 
SYSTEMIC VEINS: IVC: The inferior vena cava was normal in size and course. AORTIC VALVE: The valve was structurally normal, tri-commissural. There was  
no 
evidence for stenosis. There was no insufficiency. MITRAL VALVE: Valve structure was normal. There was no evidence for stenosis. There was no regurgitation. TRICUSPID VALVE: The valve structure was normal. There was no evidence for 
stenosis. There was trivial regurgitation. PULMONIC VALVE: Not well visualized. There was no evidence for stenosis. There 
was no insufficiency. PERICARDIUM: There was no pericardial effusion. AORTA: The root exhibited normal size. SUMMARY: 
 
-  Left ventricle: Systolic function was normal. Ejection fraction was 
estimated in the range of 60 % to 65 %. There were no regional wall motion 
abnormalities. -  Pericardium: There was no pericardial effusion. SYSTEM MEASUREMENT TABLES 
 
2D Ao Diam: 3.2 cm 
LA Diam: 2.6 cm 
LAEDV Index (A-L): 25.9 ml/m2 %FS: 34.9 % IVSd: 1.1 cm 
LVIDd: 3.7 cm 
LVIDs: 2.4 cm 
LVOT Diam: 2.1 cm 
LVPWd: 1.2 cm Prepared and signed by 
 
Yudith Porras. MD Nara [facility] Signed 26-May-2020 16:55:26 No results found. All Micro Results Procedure Component Value Units Date/Time FUNGUS CULTURE AND SMEAR [[id number]] Collected:  20 1029 Order Status:  Completed Specimen:  Miscellaneous sample Updated:  20 1532 Source LEFT Comment: Pleural Fluid Specimen Fungus stain Direct Inoculation Fungus (Mycology) Culture Other source received Comment: (NOTE) Performed At: [address] [id number] Joaquin Mujica MD CR:[id number] EMERGENT DISEASE PANEL [[id number]] Collected:  06/10/20 1203 Order Status:  Completed Specimen:  Not Specified Updated:  06/12/20 3760 Emergent disease panel Not detected Comment: Performed at [facility] RESULTS SCANNED IN [facility] 
  
  
 CULTURE, BODY FLUID W Martha Raid [[id number]] Collected:  06/09/20 1029 Order Status:  Completed Specimen:  Left Updated:  06/11/20 6177 Special Requests: NO SPECIAL REQUESTS     
  GRAM STAIN 0 TO 1 WBC'S SEEN PER OIF  
   NO DEFINITE ORGANISM SEEN Culture result: NO GROWTH 2 DAYS     
 AFB CULTURE + SMEAR W/RFLX ID FROM CULTURE [[id number]] Collected:  06/09/20 1029 Order Status:  Completed Specimen:  Miscellaneous sample Updated:  06/10/20 1536 Source LEFT Comment: Pleural Fluid Specimen AFB Specimen processing Concentration Acid Fast Smear Negative Comment: (NOTE) Performed At: [address] [id number] Joaquin Mujica MD GLORIA:[id number] Acid Fast Culture PENDING  
 FUNGUS CULTURE AND SMEAR [[id number]] Collected:  05/25/20 1315 Order Status:  Completed Specimen:  Miscellaneous sample Updated:  05/28/20 1536 Source LEFT Comment: Pleural Fluid Specimen Fungus stain Direct Inoculation Fungus (Mycology) Culture Other source received Comment: (NOTE) Performed At: [address] [id number] Joaquin Mujica MD UP:[id number] AFB CULTURE + SMEAR W/RFLX ID FROM CULTURE [[id number]] Collected:  05/25/20 1315 Order Status:  Completed Specimen:  Miscellaneous sample Updated:  05/27/20 1537 Source LEFT Comment: Pleural Fluid Specimen AFB Specimen processing Concentration Acid Fast Smear Negative Comment: (NOTE) Performed At: [address] [id number] Anastacio Iqbal MD HF:[id number] Acid Fast Culture PENDING  
 CULTURE, BODY FLUID W Melvin Kelley [[id number]] Collected:  05/25/20 1315 Order Status:  Completed Specimen:  Left Updated:  05/27/20 9924 Special Requests: NO SPECIAL REQUESTS     
  GRAM STAIN 0 TO 1 WBC'S/OIF  
   NO DEFINITE ORGANISM SEEN Culture result: NO GROWTH 2 DAYS     
  
 
 
SARS-CoV-2 Lab Results \"Novel Coronavirus\" Test:  
Lab Results Component Value Date/Time COV2NT WRONG TEST ORDERED 06/10/2020 12:03 PM  
  
\"Emergent Disease\" Test:  
Lab Results Component Value Date/Time EDPR Not detected 06/10/2020 12:03 PM  
 
\"SARS-COV-2\" Test:  
Lab Results Component Value Date/Time XGCOVT WRONG TEST ORDERED 06/10/2020 12:03 PM  
 
As of: 4:01 PM on 6/18/2020 Assessment and Plan:  
 
Hospital Problems as of 6/18/2020 Date Reviewed: 6/17/2020 Codes Class Noted - Resolved POA Debility ICD-10-CM: R53.81 ICD-9-CM: 799.3  6/15/2020 - Present Yes Rectal obstruction ICD-10-CM: K62.4 ICD-9-CM: 569.2  6/1/2020 - Present Yes Aspiration pneumonia ([facility] [address] ICD-10-CM: J69.0 ICD-9-CM: 507.0  6/1/2020 - Present Yes Neutropenia ([facility] [address] ICD-10-CM: D70.9 ICD-9-CM: 288.00  5/25/2020 - Present Yes Pleural effusion on right ICD-10-CM: J90 ICD-9-CM: 511.9  5/25/2020 - Present Yes Overview Addendum 6/17/2020  8:25 AM by Kasie Bell NP  
  6/3/20:  Right chest thoracentesis with 800 removed. 6/10/20:  Bilateral thoracentesis on L( 1100 cc ) and R( 1000 cc) removed * (Principal) Acute respiratory failure with hypoxia [facility]) ICD-10-CM: J96.01 
ICD-9-CM: 518.81  5/24/2020 - Present Yes Normocytic anemia ICD-10-CM: D64.9 ICD-9-CM: 285.9  5/24/2020 - Present Yes CAP (community acquired pneumonia) ICD-10-CM: J18.9 ICD-9-CM: 228  5/24/2020 - Present Pleural effusion on left ICD-10-CM: J90 ICD-9-CM: 511.9  5/24/2020 - Present Yes Overview Signed 6/17/2020  8:24 AM by Telly Adams NP  
   6/10/20:  Bilateral thoracentesis on L( 1100 cc ) and R( 1000 cc) removed Malignant neoplasm metastatic to bone [facility]) (Chronic) ICD-10-CM: C79.51 
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 
ICD-9-CM: 250.40, 583.81  7/3/2018 - Present Yes Diabetes mellitus due to underlying condition with hyperglycemia (HCC) (Chronic) ICD-10-CM: V86.23 ICD-9-CM: 249.80  12/21/2015 - Present Yes COVID-19 ruled out ICD-10-CM: Z03.818 ICD-9-CM: V71.83  12/15/2015 - Present Yes Hyponatremia ICD-10-CM: E87.1 ICD-9-CM: 276.1  11/23/2015 - Present Yes RESOLVED: Acute metabolic encephalopathy [facility]46-HS: G93.41 
ICD-9-CM: 348.31  5/24/2020 - 5/24/2020 Plan: · Acute hypoxic respiratory failure, bilateral effusions: 
· Weaning O2 as tolerant, currently on 3L NC 
· Antibiotics stopped by pulmonary · Continued oral lasix · Anal cancer with stricture and rectal obstruction: · Declines surgery · continued bowel regimen · Outpatient colorectal and oncology followups · HTN:  
· continued norvasc, lisinopril · DM2: 
· Holding amaryl · Continued lantus and SSI · Anemia: 
· followup CBC stable ·  transfuse HGB < 7 last on 6-15 DC planning/Dispo:  Pending to SNF Diet:  DIET NUTRITIONAL SUPPLEMENTS 
DIET REGULAR 
DVT ppx:  SCD Signed: Bharat Crow MD

## 2020-06-18 NOTE — PROGRESS NOTES
Problem: Falls - Risk of 
Goal: *Absence of Falls Description: Document Alton Munoz Fall Risk and appropriate interventions in the flowsheet. Outcome: Progressing Towards Goal 
Note: Fall Risk Interventions: 
Mobility Interventions: Patient to call before getting OOB Mentation Interventions: Bed/chair exit alarm Medication Interventions: Evaluate medications/consider consulting pharmacy Elimination Interventions: Patient to call for help with toileting needs Problem: Patient Education: Go to Patient Education Activity Goal: Patient/Family Education Outcome: Progressing Towards Goal 
  
Problem: Nutrition Deficit Goal: *Optimize nutritional status Outcome: Progressing Towards Goal 
  
Problem: Pain Goal: *Control of Pain Outcome: Progressing Towards Goal 
Goal: *PALLIATIVE CARE:  Alleviation of Pain Outcome: Progressing Towards Goal 
  
Problem: Patient Education: Go to Patient Education Activity Goal: Patient/Family Education Outcome: Progressing Towards Goal 
  
Problem: Pneumonia: Day 1 Goal: Off Pathway (Use only if patient is Off Pathway) Outcome: Progressing Towards Goal 
Goal: Activity/Safety Outcome: Progressing Towards Goal 
Goal: Consults, if ordered Outcome: Progressing Towards Goal 
Goal: Diagnostic Test/Procedures Outcome: Progressing Towards Goal 
Goal: Nutrition/Diet Outcome: Progressing Towards Goal 
Goal: Medications Outcome: Progressing Towards Goal 
Goal: Respiratory Outcome: Progressing Towards Goal 
Goal: Treatments/Interventions/Procedures Outcome: Progressing Towards Goal 
Goal: Psychosocial 
Outcome: Progressing Towards Goal 
Goal: *Oxygen saturation within defined limits Outcome: Progressing Towards Goal 
Goal: *Influenza vaccine administered (October-March) Outcome: Progressing Towards Goal 
Goal: *Pneumoccocal vaccine administered Outcome: Progressing Towards Goal 
Goal: *Hemodynamically stable Outcome: Progressing Towards Goal 
 Goal: *Demonstrates progressive activity Outcome: Progressing Towards Goal 
Goal: *Tolerating diet Outcome: Progressing Towards Goal 
  
Problem: Pneumonia: Day 2 Goal: Off Pathway (Use only if patient is Off Pathway) Outcome: Progressing Towards Goal 
Goal: Activity/Safety Outcome: Progressing Towards Goal 
Goal: Consults, if ordered Outcome: Progressing Towards Goal 
Goal: Diagnostic Test/Procedures Outcome: Progressing Towards Goal 
Goal: Nutrition/Diet Outcome: Progressing Towards Goal 
Goal: Discharge Planning Outcome: Progressing Towards Goal 
Goal: Medications Outcome: Progressing Towards Goal 
Goal: Respiratory Outcome: Progressing Towards Goal 
Goal: Treatments/Interventions/Procedures Outcome: Progressing Towards Goal 
Goal: Psychosocial 
Outcome: Progressing Towards Goal 
Goal: *Oxygen saturation within defined limits Outcome: Progressing Towards Goal 
Goal: *Hemodynamically stable Outcome: Progressing Towards Goal 
Goal: *Demonstrates progressive activity Outcome: Progressing Towards Goal 
Goal: *Tolerating diet Outcome: Progressing Towards Goal 
Goal: *Optimal pain control at patient's stated goal 
Outcome: Progressing Towards Goal 
  
Problem: Pneumonia: Day 3 Goal: Off Pathway (Use only if patient is Off Pathway) Outcome: Progressing Towards Goal 
Goal: Activity/Safety Outcome: Progressing Towards Goal 
Goal: Consults, if ordered Outcome: Progressing Towards Goal 
Goal: Diagnostic Test/Procedures Outcome: Progressing Towards Goal 
Goal: Nutrition/Diet Outcome: Progressing Towards Goal 
Goal: Discharge Planning Outcome: Progressing Towards Goal 
Goal: Medications Outcome: Progressing Towards Goal 
Goal: Respiratory Outcome: Progressing Towards Goal 
Goal: Treatments/Interventions/Procedures Outcome: Progressing Towards Goal 
Goal: Psychosocial 
Outcome: Progressing Towards Goal 
Goal: *Oxygen saturation within defined limits Outcome: Progressing Towards Goal 
 Goal: *Hemodynamically stable Outcome: Progressing Towards Goal 
Goal: *Demonstrates progressive activity Outcome: Progressing Towards Goal 
Goal: *Tolerating diet Outcome: Progressing Towards Goal 
Goal: *Describes available resources and support systems Outcome: Progressing Towards Goal 
Goal: *Optimal pain control at patient's stated goal 
Outcome: Progressing Towards Goal 
  
Problem: Pneumonia: Day 4 Goal: Off Pathway (Use only if patient is Off Pathway) Outcome: Progressing Towards Goal 
Goal: Activity/Safety Outcome: Progressing Towards Goal 
Goal: Nutrition/Diet Outcome: Progressing Towards Goal 
Goal: Discharge Planning Outcome: Progressing Towards Goal 
Goal: Medications Outcome: Progressing Towards Goal 
Goal: Respiratory Outcome: Progressing Towards Goal 
Goal: Treatments/Interventions/Procedures Outcome: Progressing Towards Goal 
Goal: Psychosocial 
Outcome: Progressing Towards Goal 
  
Problem: Pneumonia: Discharge Outcomes Goal: *Demonstrates progressive activity Outcome: Progressing Towards Goal 
Goal: *Describes follow-up/return visits to physicians Outcome: Progressing Towards Goal 
Goal: *Tolerating diet Outcome: Progressing Towards Goal 
Goal: *Verbalizes name, dosage, time, side effects, and number of days to continue medications Outcome: Progressing Towards Goal 
Goal: *Influenza immunization Outcome: Progressing Towards Goal 
Goal: *Pneumococcal immunization Outcome: Progressing Towards Goal 
Goal: *Respiratory status at baseline Outcome: Progressing Towards Goal 
Goal: *Vital signs within defined limits Outcome: Progressing Towards Goal 
Goal: *Describes available resources and support systems Outcome: Progressing Towards Goal 
Goal: *Optimal pain control at patient's stated goal 
Outcome: Progressing Towards Goal

## 2020-06-18 NOTE — PROGRESS NOTES
Problem: Self Care Deficits Care Plan (Adult) Goal: *Acute Goals and Plan of Care (Insert Text) Description: 1. Patient will complete total body bathing and dressing with modified indpendence and adaptive equipment as needed. PROGRESSING 6/18/2020 2. Patient will complete toileting with modified independence and adaptive equipment as needed. PROGRESSING 6/18/2020 3. Patient will tolerate 20 minutes of OT treatment with up to 2 rest breaks to increase activity tolerance for ADLs. PROGRESSING 6/10/2020 4. Patient will complete functional transfers with independence and adaptive equipment as needed. PROGRESSING 6/10/2020 5. Patient will complete functional mobility for ADLs with modified independence and adaptive equipment as needed. PROGRESSING 6/4/2020 6. Patient will verbalize 2 energy conservation techniques with no cues from therapist to increase safety and independence with ADLs. Timeframe: 7 visits Outcome: Progressing Towards Goal 
  
OCCUPATIONAL THERAPY: Daily Note and PM 6/18/2020 INPATIENT: OT Visit Days: 5 Payor: Teodoro Bertrand / Plan: [address] [place] [address] HMO / Product Type: [employer] /  
  
NAME/AGE/GENDER: Jeremie More is a 79 y.o. male PRIMARY DIAGNOSIS:  Acute metabolic encephalopathy [Z45.17] Acute respiratory failure with hypoxia (HCC) Acute respiratory failure with hypoxia (HCC) Procedure(s) (LRB): 
THORACENTESIS (N/A) ULTRASOUND (Bilateral) 9 Days Post-Op ICD-10: Treatment Diagnosis:  
 · Generalized Muscle Weakness (M62.81) Precautions/Allergies: 
   
 Patient has no known allergies. ASSESSMENT:  
Per Initial Assessment: 
Mr. Sharad Amador is a 79 y.o. male admitted with SOB, respiratory failure, hypoxia, acute metabolic encephalopathy. S/p thoracentesis. 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 At baseline pt lives with wife and reports independence with ADLs and ambulation. No hx falls. 6/18/2020: Pt found supine in bed upon arrival, alert and agreeable to OT treatment. Pt practiced bed mobility with SBA/additional time. Intact sitting balance. Lower body ADLs with ModA for sock management, SBA-Luigi to apply lotion to lower legs. Sit > stand with CGA initially, Luigi as pt fatigues during session. Toileting in standing in RW with Luigi to doff saturated brief, CGA for bowel/walker hygiene, Total assist to don new breif. Pt required 2 seated rest breaks to change brief. Steps to head of bed with RW/SBA, sit > supine with SBA. Pt left supine in bed with call bell within reach, head of bed raised. Pt is making slow progress towards goals. See above. Continue OT POC. This section established at most recent assessment PROBLEM LIST (Impairments causing functional limitations): 1. Decreased Strength 2. Decreased ADL/Functional Activities 3. Decreased Transfer Abilities 4. Decreased Ambulation Ability/Technique 5. Decreased Balance 6. Decreased Activity Tolerance 7. Decreased Pacing Skills 8. Decreased Work Simplification/Energy Conservation Techniques 9. Increased Fatigue INTERVENTIONS PLANNED: (Benefits and precautions of occupational therapy have been discussed with the patient.) 1. Activities of daily living training 2. Adaptive equipment training 3. Balance training 4. Clothing management 5. Donning&doffing training 6. Hygiene training 7. Neuromuscular re-eduation 8. Re-evaluation 9. Therapeutic activity 10. Therapeutic exercise TREATMENT PLAN: Frequency/Duration: Follow patient 3x/week to address above goals. Rehabilitation Potential For Stated Goals: Good REHAB RECOMMENDATIONS (at time of discharge pending progress):   
Placement: It is my opinion, based on this patient's performance to date, that Mr. Kristie Zhou may benefit from intensive therapy at a [address] [place] [address] after discharge due to the functional deficits listed above that are likely to improve with skilled rehabilitation and concerns that he/she may be unsafe to be unsupervised at home due to impaired strength and activity tolerance impacting function. Equipment:  
? RW, potentially BSC as shower chair OCCUPATIONAL PROFILE AND HISTORY:  
History of Present Injury/Illness (Reason for Referral): 
See H&P. Past Medical History/Comorbidities:  
Mr. Shari Peabody  has a past medical history of GERD (gastroesophageal reflux disease), Head and neck cancer ([facility] [address] (9/30/2015), History of squamous cell carcinoma, History of throat cancer (2015), Hypercholesteremia, Hypertension, Hypomagnesemia (5/20/2020), Rectal cancer ([facility] [address] (2018), Type 2 diabetes mellitus ([facility] [address] and Vomiting (2/23/2016). Mr. Shari Peabody  has a past surgical history that includes hx orthopaedic (Right, 1966); hx other surgical (9/9/15); hx heent; hx tonsillectomy; hx heent (2015); hx colonoscopy (05/2018); hx vascular access; hx lymph node dissection; hx other surgical; and flexible sigmoidoscopy (N/A, 6/2/2020). Social History/Living Environment:  
Home Environment: Private residence # Steps to Enter: 0 One/Two Story Residence: One story Living Alone: No 
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 At baseline pt lives with wife and reports independence with ADLs and ambulation. No hx falls. Personal Factors:   
      Sex:  male Age:  79 y.o. Other factors that influence how disability is experienced by the patient:  Multiple co-morbidities Number of Personal Factors/Comorbidities that affect the Plan of Care: Expanded review of therapy/medical records (1-2):  MODERATE COMPLEXITY ASSESSMENT OF OCCUPATIONAL PERFORMANCE[de-identified]  
Activities of Daily Living: Basic ADLs (From Assessment) Complex ADLs (From Assessment) Feeding: Independent Oral Facial Hygiene/Grooming: Stand-by assistance Bathing: Minimum assistance Upper Body Dressing: Stand-by assistance Lower Body Dressing: Stand-by assistance Toileting: Stand by assistance Grooming/Bathing/Dressing Activities of Daily Living Toileting Bladder Hygiene: Contact guard assistance Bowel Hygiene: Contact guard assistance Clothing Management: Maximum assistance Adaptive Equipment: Cletis Kava Lower Body Dressing Assistance Socks: Moderate assistance Bed/Mat Mobility Rolling: Stand-by assistance; Additional time Supine to Sit: Stand-by assistance; Additional time Sit to Supine: Stand-by assistance; Additional time Sit to Stand: Contact guard assistance;Minimum assistance Stand to Sit: Contact guard assistance Bed to Chair: Contact guard assistance Scooting: Stand-by assistance Most Recent Physical Functioning:  
Gross Assessment: 
AROM: Generally decreased, functional(BUEs) Strength: Generally decreased, functional(BUEs) Coordination: Generally decreased, functional(BUEs) Tone: Normal(BUEs) Sensation: Impaired(Decreased sensation in BUE digits 4 & 5) Posture: 
Posture (WDL): Exceptions to [facility] Posture Assessment: Forward head, Rounded shoulders, Trunk flexion Balance: 
Sitting: Intact Standing: Impaired Standing - Static: Fair;Constant support Standing - Dynamic : Fair;Constant support Bed Mobility: 
Rolling: Stand-by assistance; Additional time Supine to Sit: Stand-by assistance; Additional time Sit to Supine: Stand-by assistance; Additional time Scooting: Stand-by assistance Wheelchair Mobility: 
  
Transfers: 
Sit to Stand: Contact guard assistance;Minimum assistance Stand to Sit: Contact guard assistance Bed to Chair: Contact guard assistance Interventions: Verbal cues; Safety awareness training Duration: 18 Minutes Patient Vitals for the past 6 hrs: 
 BP BP Patient Position SpO2 O2 Flow Rate (L/min) Pulse 20 1152 119/66 At rest 95 %  94  
20 1216    3 l/min   
20 1448   90 % 3 l/min   
20 1527   91 % 3 l/min   
20 1549 130/61 At rest 96 %  99  
20 1551    4 l/min  Mental Status Neurologic State: Alert Orientation Level: Appropriate for age Cognition: Appropriate for age attention/concentration Perception: Appears intact Perseveration: No perseveration noted Safety/Judgement: Fall prevention Physical Skills Involved: 1. Range of Motion 2. Balance 3. Strength 4. Activity Tolerance 5. Gross Motor Control Cognitive Skills Affected (resulting in the inability to perform in a timely and safe manner): 1. None  Psychosocial Skills Affected: 1. Habits/Routines 2. Environmental Adaptation 3. Social Interaction 4. Emotional Regulation 5. Self-Awareness 6. Awareness of Others 7. Social Roles Number of elements that affect the Plan of Care: 5+:  HIGH COMPLEXITY CLINICAL DECISION MAKIN[address] [place] [id number] AM-PAC 6 Clicks Daily Activity Inpatient Short Form How much help from another person does the patient currently need. .. Total A Lot A Little None 1. Putting on and taking off regular lower body clothing? [] 1   [] 2   [x] 3   [] 4  
2. Bathing (including washing, rinsing, drying)? [] 1   [] 2   [x] 3   [] 4  
3. Toileting, which includes using toilet, bedpan or urinal?   [] 1   [] 2   [x] 3   [] 4  
4. Putting on and taking off regular upper body clothing? [] 1   [] 2   [x] 3   [] 4  
5. Taking care of personal grooming such as brushing teeth? [] 1   [] 2   [x] 3   [] 4  
6. Eating meals? [] 1   [] 2   [] 3   [x] 4  
© , Trustees of [address] [place] [id number], under license to [employer]. All rights reserved Score:  Initial: 19 2020 Most Recent: X (Date: -- ) Interpretation of Tool:  Represents activities that are increasingly more difficult (i.e. Bed mobility, Transfers, Gait). Medical Necessity:    
· Patient demonstrates · good ·  rehab potential due to higher previous functional level. Reason for Services/Other Comments: 
· Patient continues to require skilled intervention due to  
· Inability to complete ADLs at prior level of independence · . Use of outcome tool(s) and clinical judgement create a POC that gives a: MODERATE COMPLEXITY  
 
 
 
TREATMENT:  
(In addition to Assessment/Re-Assessment sessions the following treatments were rendered) Pre-treatment Symptoms/Complaints:   
Pain: Initial:  
Pain Intensity 1: 0  Post Session:  same Self Care: (25 minutes): Procedure(s) (per grid) utilized to improve and/or restore self-care/home management as related to dressing, bathing and toileting. Required moderate visual, verbal, manual and tactile cueing to facilitate activities of daily living skills and compensatory activities. Pt practiced bed mobility with SBA/additional time. Intact sitting balance. Lower body ADLs with ModA for sock management, SBA-Luigi to apply lotion to lower legs. Sit > stand with CGA initially, Luigi as pt fatigues during session. Toileting in standing in RW with Luigi to doff saturated brief, CGA for bowel/walker hygiene, Total assist to don new breif. Pt required 2 seated rest breaks to change brief. Steps to head of bed with RW/SBA, sit > supine with SBA. Braces/Orthotics/Lines/Etc:  
· O2 Device: HFNC Treatment/Session Assessment:   
· Response to Treatment:  Decreased activity tolerance · Interdisciplinary Collaboration:  
o Occupational Therapist 
o Certified Nursing Assistant/Patient Care Technician · After treatment position/precautions:  
o Supine in bed 
o Bed/Chair-wheels locked 
o Bed in low position 
o Call light within reach · Compliance with Program/Exercises: Compliant all of the time, Will assess as treatment progresses. · Recommendations/Intent for next treatment session: \"Next visit will focus on advancements to more challenging activities and reduction in assistance provided\". Total Treatment Duration: OT Patient Time In/Time Out Time In: 5436 Time Out: 1616 Bailey Blake, OTR/L

## 2020-06-18 NOTE — DIABETES MGMT
Blood glucose ranges  yesterday with patient receiving Humalog 20 units. Blood glucose 153 this morning. Patient would likely benefit from further titration in prandial Humalog to improve glycemic control.

## 2020-06-18 NOTE — PROGRESS NOTES
SBAR shift report received from Ysabel Jimenez, [facility]. Pt stable. Assessment complete. Pt is lying in bed, resting quietly with wife at bedside. Resp even, unlabored. Pt is alert, orient X 4. Pt appears in no acute distress at this time. Pt is on 3L high flow nasal cannula 02. Pt voices no complaints or concerns at this time. Pt has traore in place. Pt has SCD's in place. Pt encouraged to call for assistance, call light in reach. Safety measures in place. Will continue to monitor

## 2020-06-19 NOTE — PROGRESS NOTES
Patient has not voided since traore removal at [phone]. Bladder scan revealed 670 ml urine. Dr. Daniel Garzon notified via [employer], awaiting orders.

## 2020-06-19 NOTE — PROGRESS NOTES
PM assessment complete. Resting quietly, no distress noted. Abdomen soft, bowel sounds active in all 4 quadrants. Denies needs or pain. Wife at bedside. Aware to call should needs arise. Will continue to monitor through hourly rounding.

## 2020-06-19 NOTE — PROGRESS NOTES
Problem: Falls - Risk of 
Goal: *Absence of Falls Description: Document Kimberly Yadav Fall Risk and appropriate interventions in the flowsheet. Outcome: Progressing Towards Goal 
Note: Fall Risk Interventions: 
Mobility Interventions: PT Consult for mobility concerns Mentation Interventions: Bed/chair exit alarm, Adequate sleep, hydration, pain control, Door open when patient unattended, Evaluate medications/consider consulting pharmacy Medication Interventions: Patient to call before getting OOB Elimination Interventions: Call light in reach Problem: Pain Goal: *Control of Pain Outcome: Progressing Towards Goal

## 2020-06-19 NOTE — PROGRESS NOTES
Hospitalist Note Admit Date:  2020 11:00 PM  
Name:  Kevyn Todd Age:  79 y.o. 
:  1940 MRN:  [id number] PCP:  Sarahi Peters MD 
Treatment Team: Attending Provider: Sonia Villafana MD; Utilization Review: Puja Riley RN; Care Manager: Deborah Guajardo; Consulting Provider: Cesar Coleman RN 
 
HPI/Subjective:  
 
Mr. 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 He has been seen by pulm s/p bilateral thoracentesis 5-26, right thoracentesis 6-3, bilateral thoracentesis 6-9,  and diuresis. Studies transudate, felt due to hypoalbuminemia. [place] 60-65%. He has required high flow O2. He has compelted 8 days zosyn. In regards to his metastatic rectal cancer, he has known anal stricture and declined prior surgical diverting ostomy. He is s/p anal dilation per [facility] surgery in past. CTAP showed constipation/ colonic distention to rectum suspicious for obstruction. oncology recommends followup at discharge, last chemo completed . He has been seen by GI s/p 6-2 flex sig with disimpaction/ no stricture found. Recommends [facility] colorectal followup. He required 1 unit PRBC 6-15. Discharge plans pending to SNF. 20 wife present, wants to go to rehab, less short of breath but up to 9 L NC currently, unable to void after traore removed and was replaced Objective:  
 
Patient Vitals for the past 24 hrs: 
 Temp Pulse Resp BP SpO2  
20 1438 98.4 °F (36.9 °C) 98 22 134/61 95 % 20 1200 98.4 °F (36.9 °C) 96 21 139/67 93 % 20 0833     (!) 87 % 20 0735 98.2 °F (36.8 °C) 95 20 155/77 93 % 20 0329 98.9 °F (37.2 °C) (!) 101 20 152/75 95 % 20 2348 99.5 °F (37.5 °C) (!) 109 18 146/61 91 % 20     92 % 06/18/20 1958 99.4 °F (37.4 °C) (!) 101 20 147/67 92 % 06/18/20 1549 98.3 °F (36.8 °C) 99 18 130/61 96 % Oxygen Therapy O2 Sat (%): 95 % (06/19/20 1438) Pulse via Oximetry: 95 beats per minute (06/19/20 7978) O2 Device: Hi flow nasal cannula (06/19/20 5435) O2 Flow Rate (L/min): 5 l/min(was on 3L, increased to 5L due to low sat) (06/19/20 7044) O2 Temperature: 87.8 °F (31 °C) (06/12/20 1454) FIO2 (%): 32 % (06/14/20 1429) ETCO2 (mmHg): 93 mmHg (06/01/20 1624) Estimated body mass index is 23.06 kg/m² as calculated from the following: 
  Height as of this encounter: 6' (1.829 m). Weight as of this encounter: 77.1 kg (170 lb). Intake/Output Summary (Last 24 hours) at 6/19/2020 1541 Last data filed at 6/19/2020 1438 Gross per 24 hour Intake  Output 1500 ml Net -1500 ml *Note that automatically entered I/Os may not be accurate; dependent on patient compliance with collection and accurate  by techs. General:    Alert , elderly, no distress. CV:   RRR. No murmur, rub, or gallop. No edema Lungs:   CTAB. No wheezing, rhonchi, or rales. anterior Abdomen:   Soft, nontender, nondistended. Decreased BS Extremities: Warm and dry. Skin:     No rashes or jaundice. Neuro:  No gross focal deficits Data Review: 
I have reviewed all labs, meds, and studies from the last 24 hours: 
Recent Results (from the past 24 hour(s)) GLUCOSE, POC Collection Time: 06/18/20  4:47 PM  
Result Value Ref Range Glucose (POC) 227 (H) 65 - 100 mg/dL GLUCOSE, POC Collection Time: 06/18/20  9:26 PM  
Result Value Ref Range Glucose (POC) 197 (H) 65 - 100 mg/dL URINALYSIS W/ RFLX MICROSCOPIC Collection Time: 06/18/20 11:46 PM  
Result Value Ref Range Color YELLOW Appearance CLEAR Specific gravity 1.015 1.001 - 1.023    
 pH (UA) 6.5 5.0 - 9.0 Protein 30 (A) NEG mg/dL Glucose Negative mg/dL Ketone Negative NEG mg/dL  Bilirubin Negative NEG    
 Blood Negative NEG Urobilinogen 0.2 0.2 - 1.0 EU/dL Nitrites Negative NEG Leukocyte Esterase Negative NEG    
 WBC 0-3 0 /hpf  
 RBC 0-3 0 /hpf Epithelial cells 0-3 0 /hpf Bacteria 0 0 /hpf Casts 5-10 0 /lpf METABOLIC PANEL, BASIC Collection Time: 06/19/20  6:51 AM  
Result Value Ref Range Sodium 135 (L) 136 - 145 mmol/L Potassium 4.6 3.5 - 5.1 mmol/L Chloride 100 98 - 107 mmol/L  
 CO2 29 21 - 32 mmol/L Anion gap 6 (L) 7 - 16 mmol/L Glucose 169 (H) 65 - 100 mg/dL BUN 11 8 - 23 MG/DL Creatinine 0.61 (L) 0.8 - 1.5 MG/DL  
 GFR est AA >60 >60 ml/min/1.73m2 GFR est non-AA >60 >60 ml/min/1.73m2 Calcium 8.5 8.3 - 10.4 MG/DL MAGNESIUM Collection Time: 06/19/20  6:51 AM  
Result Value Ref Range Magnesium 2.3 1.8 - 2.4 mg/dL CBC W/O DIFF Collection Time: 06/19/20  6:51 AM  
Result Value Ref Range WBC 5.0 4.3 - 11.1 K/uL  
 RBC 2.95 (L) 4.23 - 5.6 M/uL HGB 8.5 (L) 13.6 - 17.2 g/dL HCT 26.8 (L) 41.1 - 50.3 % MCV 90.8 79.6 - 97.8 FL  
 MCH 28.8 26.1 - 32.9 PG  
 MCHC 31.7 31.4 - 35.0 g/dL  
 RDW 16.6 (H) 11.9 - 14.6 % PLATELET 167 (L) 734 - 450 K/uL MPV 10.5 9.4 - 12.3 FL ABSOLUTE NRBC 0.02 0.0 - 0.2 K/uL GLUCOSE, POC Collection Time: 06/19/20  7:22 AM  
Result Value Ref Range Glucose (POC) 165 (H) 65 - 100 mg/dL GLUCOSE, POC Collection Time: 06/19/20 11:37 AM  
Result Value Ref Range Glucose (POC) 234 (H) 65 - 100 mg/dL Current Meds: 
Current Facility-Administered Medications Medication Dose Route Frequency  tamsulosin (FLOMAX) capsule 0.4 mg  0.4 mg Oral DAILY  furosemide (LASIX) tablet 40 mg  40 mg Oral DAILY  insulin lispro (HUMALOG) injection   SubCUTAneous AC&HS  insulin lispro (HUMALOG) injection 4 Units  4 Units SubCUTAneous TIDAC  ferrous sulfate tablet 325 mg  1 Tab Oral DAILY WITH BREAKFAST  0.9% sodium chloride infusion 250 mL  250 mL IntraVENous PRN  
  hydrALAZINE (APRESOLINE) 20 mg/mL injection 10 mg  10 mg IntraVENous Q6H PRN  
 nystatin (MYCOSTATIN) 100,000 unit/mL oral suspension 500,000 Units  500,000 Units Oral QID  simethicone (MYLICON) tablet 80 mg  80 mg Oral QID PRN  potassium chloride (K-DUR, KLOR-CON) SR tablet 20 mEq  20 mEq Oral TID  albuterol-ipratropium (DUO-NEB) 2.5 MG-0.5 MG/3 ML  3 mL Nebulization Q6HWA RT  
 magnesium hydroxide (MILK OF MAGNESIA) 400 mg/5 mL oral suspension 30 mL  30 mL Oral DAILY  senna-docusate (PERICOLACE) 8.6-50 mg per tablet 1 Tab  1 Tab Oral BID  
 bisacodyL (DULCOLAX) suppository 10 mg  10 mg Rectal DAILY  0.9% sodium chloride infusion 250 mL  250 mL IntraVENous PRN  
 amLODIPine (NORVASC) tablet 10 mg  10 mg Oral DAILY  docusate sodium (COLACE) capsule 100 mg  100 mg Oral PRN  
 lisinopriL (PRINIVIL, ZESTRIL) tablet 20 mg  20 mg Oral DAILY  meloxicam (MOBIC) tablet 7.5 mg  7.5 mg Oral DAILY  sodium chloride tablet 2 g  2 g Oral BID  traMADoL (ULTRAM) tablet 50 mg  50 mg Oral Q6H PRN  
 sodium chloride (NS) flush 5-40 mL  5-40 mL IntraVENous Q8H  
 sodium chloride (NS) flush 5-40 mL  5-40 mL IntraVENous PRN  
 acetaminophen (TYLENOL) tablet 650 mg  650 mg Oral Q4H PRN  
 ondansetron (ZOFRAN) injection 4 mg  4 mg IntraVENous Q4H PRN  
 enoxaparin (LOVENOX) injection 40 mg  40 mg SubCUTAneous Q24H Other Studies: 
Results for orders placed or performed during the hospital encounter of 20  
2D ECHO COMPLETE ADULT (TTE) W OR WO CONTR Narrative Chantelle [address] [place], [facility] 
[phone] Transthoracic Echocardiogram 
2D, M-mode, Doppler, and Color Doppler Patient: Tyrone Stewart 
MR #: [id number] : [id number] Age: 79 years Gender: Male Study date: 26-May-2020 Account #: [de-identified] Height: 72 in 72 in 
Weight: 164 lb 163.7 lb 
BSA: 1.96 mï¾² 1.96 mï¾² Status:Routine Location: 831 BP: 169/ 88 
 
 Allergies: NO KNOWN ALLERGENS Sonographer:  Odell Harris [facility] Group:  [facility] Referring Physician:  Wood Burger MD 
Reading Physician:  Denys Calderón. MD Nara [facility] INDICATIONS: Bilateral pleural effusions *Pt. scanned supine. Unable to turn LLD. PROCEDURE: This was a routine study. A transthoracic echocardiogram was 
performed. The study included complete 2D imaging, M-mode, complete spectral 
Doppler, and color Doppler. Intravenous contrast (Definity, 1 ml) was 
administered to opacify the left ventricle. Image quality was adequate. LEFT VENTRICLE: Size was normal. Systolic function was normal. Ejection 
fraction was estimated in the range of 60 % to 65 %. There were no regional 
wall motion abnormalities. Wall thickness was normal. Left ventricular 
diastolic function parameters were normal. E/e' av.46. RIGHT VENTRICLE: The size was normal. Systolic function was normal. 
 
LEFT ATRIUM: Size was normal. 
 
RIGHT ATRIUM: Size was normal. 
 
SYSTEMIC VEINS: IVC: The inferior vena cava was normal in size and course. AORTIC VALVE: The valve was structurally normal, tri-commissural. There was  
no 
evidence for stenosis. There was no insufficiency. MITRAL VALVE: Valve structure was normal. There was no evidence for stenosis. There was no regurgitation. TRICUSPID VALVE: The valve structure was normal. There was no evidence for 
stenosis. There was trivial regurgitation. PULMONIC VALVE: Not well visualized. There was no evidence for stenosis. There 
was no insufficiency. PERICARDIUM: There was no pericardial effusion. AORTA: The root exhibited normal size. SUMMARY: 
 
-  Left ventricle: Systolic function was normal. Ejection fraction was 
estimated in the range of 60 % to 65 %. There were no regional wall motion 
abnormalities. -  Pericardium: There was no pericardial effusion. SYSTEM MEASUREMENT TABLES 
 
2D Ao Diam: 3.2 cm 
LA Diam: 2.6 cm 
 LAEDV Index (A-L): 25.9 ml/m2 %FS: 34.9 % IVSd: 1.1 cm 
LVIDd: 3.7 cm 
LVIDs: 2.4 cm 
LVOT Diam: 2.1 cm 
LVPWd: 1.2 cm Prepared and signed by 
 
Raul Gomez. MD Nara [facility] Signed 26-May-2020 16:55:26 Xr Chest Sngl V Result Date: 6/19/2020 Portable view of the chest 6/19/2020 Comparison: 6/15/2020 Indication: Hypoxia FINDINGS: Cardiac and pulmonary artery enlargement similar in appearance previous examination. Previously noted diffuse perihilar pulmonary parenchymal infiltrates with large bilateral pleural effusions not significant change when compared to previous examination. No interval infiltrate or pneumothorax. No discrete acute osseous lesion seen. IMPRESSION: No significant interval change. Persistent volume overload. All Micro Results Procedure Component Value Units Date/Time FUNGUS CULTURE AND SMEAR [[id number]] Collected:  06/09/20 1029 Order Status:  Completed Specimen:  Miscellaneous sample Updated:  06/13/20 1535 Source LEFT Comment: Pleural Fluid Specimen Fungus stain Direct Inoculation Fungus (Mycology) Culture Other source received Comment: (NOTE) Performed At: [address] [id number] Nilo Rosa MD [id number] EMERGENT DISEASE PANEL [[id number]] Collected:  06/10/20 1203 Order Status:  Completed Specimen:  Not Specified Updated:  06/12/20 7204 Emergent disease panel Not detected Comment: Performed at [facility] RESULTS SCANNED IN [facility] 
  
  
 CULTURE, BODY FLUID W Marcial Siemens [[id number]] Collected:  06/09/20 1029 Order Status:  Completed Specimen:  Left Updated:  06/11/20 2878 Special Requests: NO SPECIAL REQUESTS     
  GRAM STAIN 0 TO 1 WBC'S SEEN PER OIF  
   NO DEFINITE ORGANISM SEEN Culture result: NO GROWTH 2 DAYS     
 AFB CULTURE + SMEAR W/RFLX ID FROM CULTURE [[id number]] Collected:  06/09/20 1029 Order Status:  Completed Specimen:  Miscellaneous sample Updated:  06/10/20 1536 Source LEFT Comment: Pleural Fluid Specimen AFB Specimen processing Concentration Acid Fast Smear Negative Comment: (NOTE) Performed At: [address] [id number] Leticia Pruitt MD [id number] Acid Fast Culture PENDING  
 FUNGUS CULTURE AND SMEAR [[id number]] Collected:  05/25/20 1315 Order Status:  Completed Specimen:  Miscellaneous sample Updated:  05/28/20 1536 Source LEFT Comment: Pleural Fluid Specimen Fungus stain Direct Inoculation Fungus (Mycology) Culture Other source received Comment: (NOTE) Performed At: [address] [id number] Leticia Pruitt MD [id number] AFB CULTURE + SMEAR W/RFLX ID FROM CULTURE [[id number]] Collected:  05/25/20 1315 Order Status:  Completed Specimen:  Miscellaneous sample Updated:  05/27/20 1537 Source LEFT Comment: Pleural Fluid Specimen AFB Specimen processing Concentration Acid Fast Smear Negative Comment: (NOTE) Performed At: [address] [id number] Leticia Pruitt MD [id number] Acid Fast Culture PENDING  
 CULTURE, BODY FLUID W Luis Diss [[id number]] Collected:  05/25/20 1315 Order Status:  Completed Specimen:  Left Updated:  05/27/20 2621 Special Requests: NO SPECIAL REQUESTS     
  GRAM STAIN 0 TO 1 WBC'S/OIF  
   NO DEFINITE ORGANISM SEEN Culture result: NO GROWTH 2 DAYS     
  
 
 
SARS-CoV-2 Lab Results \"Novel Coronavirus\" Test:  
Lab Results Component Value Date/Time COV2NT WRONG TEST ORDERED 06/10/2020 12:03 PM  
  
\"Emergent Disease\" Test:  
Lab Results Component Value Date/Time EDPR Not detected 06/10/2020 12:03 PM  
 
\"SARS-COV-2\" Test:  
Lab Results Component Value Date/Time XGCOVT WRONG TEST ORDERED 06/10/2020 12:03 PM  
 
As of: 4:01 PM on 6/19/2020 Assessment and Plan:  
 
Hospital Problems as of 6/19/2020 Date Reviewed: 6/17/2020 Codes Class Noted - Resolved POA Debility ICD-10-CM: R53.81 ICD-9-CM: 799.3  6/15/2020 - Present Yes Rectal obstruction ICD-10-CM: K62.4 ICD-9-CM: 569.2  6/1/2020 - Present Yes Aspiration pneumonia ([facility] [address] ICD-10-CM: J69.0 ICD-9-CM: 507.0  6/1/2020 - Present Yes Neutropenia ([facility] [address] ICD-10-CM: D70.9 ICD-9-CM: 288.00  5/25/2020 - Present Yes Pleural effusion on right ICD-10-CM: J90 ICD-9-CM: 511.9  5/25/2020 - Present Yes Overview Addendum 6/17/2020  8:25 AM by Melanie Spencer NP  
  6/3/20:  Right chest thoracentesis with 800 removed. 6/10/20:  Bilateral thoracentesis on L( 1100 cc ) and R( 1000 cc) removed * (Principal) Acute respiratory failure with hypoxia [facility]) ICD-10-CM: J96.01 
ICD-9-CM: 518.81  5/24/2020 - Present Yes Normocytic anemia ICD-10-CM: D64.9 ICD-9-CM: 285.9  5/24/2020 - Present Yes  
   
 CAP (community acquired pneumonia) ICD-10-CM: J18.9 ICD-9-CM: 170  5/24/2020 - Present Pleural effusion on left ICD-10-CM: J90 ICD-9-CM: 511.9  5/24/2020 - Present Yes Overview Signed 6/17/2020  8:24 AM by Melanie Spencer NP  
   6/10/20:  Bilateral thoracentesis on L( 1100 cc ) and R( 1000 cc) removed Malignant neoplasm metastatic to bone [facility]) (Chronic) ICD-10-CM: C79.51 
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 
ICD-9-CM: 250.40, 583.81  7/3/2018 - Present Yes Diabetes mellitus due to underlying condition with hyperglycemia (HCC) (Chronic) ICD-10-CM: E57.33 ICD-9-CM: 249.80  12/21/2015 - Present Yes COVID-19 ruled out ICD-10-CM: Z03.818 ICD-9-CM: V71.83  12/15/2015 - Present Yes Hyponatremia ICD-10-CM: E87.1 ICD-9-CM: 276.1  11/23/2015 - Present Yes RESOLVED: Acute metabolic encephalopathy YCW-94-EZ: G93.41 
ICD-9-CM: 348.31  5/24/2020 - 5/24/2020 Plan: · Acute hypoxic respiratory failure, bilateral effusions: 
· Weaning O2 as tolerant, currently on 9L NC 
· Antibiotics stopped by pulmonary · Resume IV lasix 40 mg every 12 hours · CXR ordered · Discussed with Dr. Raul Huddleston of pulmonary and will try diuretics prior to reconsidering repeat tap · Anal cancer with stricture and rectal obstruction: · Declines surgery · continued bowel regimen · Outpatient colorectal and oncology followups · HTN:  
· continued norvasc, lisinopril · DM2: 
· Holding amaryl · Continued SSI, premeal insulin · Anemia: 
· followup CBC stable ·  transfuse HGB < 7 last on 6-15 · Urine retention: · Replaced traore · Add flomax · Voiding trial possibly prior to discharge DC planning/Dispo:  Pending to SNF Diet:  DIET NUTRITIONAL SUPPLEMENTS 
DIET REGULAR 
DVT ppx:  SCD Signed: Lida Hickman MD

## 2020-06-19 NOTE — PROGRESS NOTES
06/19/20 2206 Oxygen Therapy O2 Sat (%) (!) 87 % Pulse via Oximetry 95 beats per minute O2 Device Hi flow nasal cannula O2 Flow Rate (L/min) 5 l/min 
(was on 3L, increased to 5L due to low sat)

## 2020-06-19 NOTE — PROGRESS NOTES
Nutrition Assessment for:  
Follow up Initial assessment for: Consult for general nutrition and supplements (DO [place]) Assessment: Patient presented with SOB and chest pain. PMH: H/N cancer with mets to bone, DM2. Pt had cycle 3 Carbo/Etop may 8th with cycle 4 planned for this week but currently on hold. Patient had thoracentesis on 5/25 and 5/29. Repeat thoracentesis 6/3. Flex sig on 6/2 for fecal impaction. Repeat thoracentesis 6/10 with 1100 mL from left and 1000 mL from right. Referral to inpt rehab, but felt too well and would benefit from therapies at home. Currently back on 7L per Dr. Mor Allen, so no plans to discharge urgently.  also reports that pulmonology concerned with serum albumin even though patient is eating fairly well. Discussed that serum albumin is not a nutritional indicator, but more a reflect of inflammation and disease process. Patient seen today and reports appetite better and eating better. Wife reports that he eats nearly all of each meal, but typically does not like meat. She states that he has been drinking Glucerna with all meals. POC: 178-234 over last 24 hrs DIET NUTRITIONAL SUPPLEMENTS All Meals; Glucerna Shake ( ) DIET REGULAR   
Per nursing charting, intake % over last 7 days. 
  
Anthropometrics: 
Height: 6' (182.9 cm), Weight: 76.2 kg (168 lb), Weight Source: Patient stated, Body mass index is 22.78 kg/m². BMI class of Normal weight. 
  
Macronutrient needs: (using Listed body weight 74.4 kg) (cancer tx) RGX: 0820-8903 QAOE/JVI (25-30 kcals/kg ) AUDIE:  N/SOL (1-1.5 g/kg )  
  
Nutrition Intervention: 
Meals and snacks: Continue current diet. Explained other sources of protein that are not meat. Patient and wife voiced understanding and asks for milk with all meals. Will add milk to all trays. Medical food supplement therapy: Continue Glucerna TID Coordination of Care: Discussed with Dr. Mor Allen. Discharge Nutrition Plan: Patient would benefit from oral nutrition supplement at discharge. Would recommend Glucerna Shake or comparable 3 times per day or as directed by MD. 
 
[address] [place] [address], [address], [address]

## 2020-06-19 NOTE — PROGRESS NOTES
CM received precert from [facility] [employer] [facility]  [place] #[phone])GJK patient to [facility] to [facility]. Arabella Aj is assigned to patient's for updates ([facility]#[phone]). Patient's oxygen need increased and he's not medically stable. [facility] can accept patient this weekend if stable. CM following.

## 2020-06-19 NOTE — PROGRESS NOTES
Shift Assessment - Patient is alert and oriented x4. No complaint of pain at this time. Respirations even and unlabored. Bowel sounds active x4. Wife at bedside and very helpful. Currently resting in a low, locked bed. Call light in reach.

## 2020-06-19 NOTE — DIABETES MGMT
Wife at bedside. . Wife states that prior to admission medications include Metformin 500 mg bid and Glimepiride daily. Pt and wife have a working blood glucose meter, all necessary supplies and monitor blood glucose daily. Wife states that blood glucose is usually 126 every morning. Recommended monitoring bid, fasting and occasional 2 hour post prandial checks and to record in log book to bring to PCP appointments for medication titration. Discussed signs, symptoms and treatments for hyper/hypoglycemia and to notify primary nurse of any signs/symptioms. Educated patient regarding current insulin regimen of Humalog 4 units 3x/day ac and Humalog sliding scale coverage 4x/day ac and hs, including type of insulin, timing with meals, onset, duration of effect, and peak of insulin dose. Stressed the importance of follow up care for diabetes management with PCP. Pt and wife have no further questions at this time.

## 2020-06-20 NOTE — PROGRESS NOTES
Hospitalist Note Admit Date:  2020 11:00 PM  
Name:  Breanna Dudley Age:  79 y.o. 
:  1940 MRN:  [id number] PCP:  Shanel Avalos MD 
Treatment Team: Attending Provider: Isabell Mariano MD; Utilization Review: Arlyn Evans RN; Care Manager: Roman Lemus.; Consulting Provider: Da Beard RN 
 
HPI/Subjective:  
 
Mr. 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 He has been seen by pulm s/p bilateral thoracentesis 5-26, right thoracentesis 6-3, bilateral thoracentesis 6-9,  and diuresis. Studies transudate, felt due to hypoalbuminemia. [place] 60-65%. He has required high flow O2. He has compelted 8 days zosyn. In regards to his metastatic rectal cancer, he has known anal stricture and declined prior surgical diverting ostomy. He is s/p anal dilation per [facility] surgery in past. CTAP showed constipation/ colonic distention to rectum suspicious for obstruction. oncology recommends followup at discharge, last chemo completed . He has been seen by GI s/p 6-2 flex sig with disimpaction/ no stricture found. Recommends [facility] colorectal followup. He required 1 unit PRBC 6-15. Discharge plans pending to SNF. - family present, feels some better, no BM yet today, some abd pains, less short of breath Objective:  
 
Patient Vitals for the past 24 hrs: 
 Temp Pulse Resp BP SpO2  
20 1137 98.4 °F (36.9 °C) 96 (!) 33 128/65 94 % 20 0858     98 %  
20 0805 98.1 °F (36.7 °C) 99 20 137/74 96 %  
20 0312 98.5 °F (36.9 °C) (!) 102 18 149/79 97 % 20 2339 99.4 °F (37.4 °C) (!) 104 18 147/82 97 % 20 2019     96 % 20 1939 99.4 °F (37.4 °C) (!) 101 18 147/76 95 % 20 1706     96 % 06/19/20 1438 98.4 °F (36.9 °C) 98 22 134/61 95 % Oxygen Therapy O2 Sat (%): 94 % (06/20/20 1137) Pulse via Oximetry: 99 beats per minute (06/20/20 0858) O2 Device: Hi flow nasal cannula (06/20/20 0858) O2 Flow Rate (L/min): 5 l/min(reduced due to SAT of 97% on 7L. ) (06/20/20 2926) O2 Temperature: 87.8 °F (31 °C) (06/12/20 1454) FIO2 (%): 32 % (06/14/20 1429) ETCO2 (mmHg): 93 mmHg (06/01/20 1624) Estimated body mass index is 23.06 kg/m² as calculated from the following: 
  Height as of this encounter: 6' (1.829 m). Weight as of this encounter: 77.1 kg (170 lb). Intake/Output Summary (Last 24 hours) at 6/20/2020 1411 Last data filed at 6/20/2020 3893 Gross per 24 hour Intake  Output 2900 ml Net -2900 ml *Note that automatically entered I/Os may not be accurate; dependent on patient compliance with collection and accurate  by techs. General:    Alert , elderly, no distress. CV:   RRR. No murmur, rub, or gallop. No edema Lungs:   CTAB. No wheezing, rhonchi, or rales. anterior Abdomen:   Soft, tender upper abdomen, slightly distended. Decreased BS Extremities: Warm and dry. Skin:     No rashes or jaundice. Neuro:  No gross focal deficits Data Review: 
I have reviewed all labs, meds, and studies from the last 24 hours: 
Recent Results (from the past 24 hour(s)) GLUCOSE, POC Collection Time: 06/19/20  4:23 PM  
Result Value Ref Range Glucose (POC) 235 (H) 65 - 100 mg/dL GLUCOSE, POC Collection Time: 06/19/20  9:23 PM  
Result Value Ref Range Glucose (POC) 307 (H) 65 - 100 mg/dL METABOLIC PANEL, BASIC Collection Time: 06/20/20  7:59 AM  
Result Value Ref Range Sodium 134 (L) 136 - 145 mmol/L Potassium 4.4 3.5 - 5.1 mmol/L Chloride 97 (L) 98 - 107 mmol/L  
 CO2 28 21 - 32 mmol/L Anion gap 9 7 - 16 mmol/L Glucose 175 (H) 65 - 100 mg/dL BUN 11 8 - 23 MG/DL  Creatinine 0.59 (L) 0.8 - 1.5 MG/DL  
 GFR est AA >60 >60 ml/min/1.73m2 GFR est non-AA >60 >60 ml/min/1.73m2 Calcium 8.6 8.3 - 10.4 MG/DL MAGNESIUM Collection Time: 06/20/20  7:59 AM  
Result Value Ref Range Magnesium 1.9 1.8 - 2.4 mg/dL CBC W/O DIFF Collection Time: 06/20/20  7:59 AM  
Result Value Ref Range WBC 5.4 4.3 - 11.1 K/uL  
 RBC 2.61 (L) 4.23 - 5.6 M/uL HGB 7.6 (L) 13.6 - 17.2 g/dL HCT 23.7 (L) 41.1 - 50.3 % MCV 90.8 79.6 - 97.8 FL  
 MCH 29.1 26.1 - 32.9 PG  
 MCHC 32.1 31.4 - 35.0 g/dL  
 RDW 16.5 (H) 11.9 - 14.6 % PLATELET 437 (L) 787 - 450 K/uL MPV 9.8 9.4 - 12.3 FL ABSOLUTE NRBC 0.00 0.0 - 0.2 K/uL GLUCOSE, POC Collection Time: 06/20/20  9:12 AM  
Result Value Ref Range Glucose (POC) 241 (H) 65 - 100 mg/dL GLUCOSE, POC Collection Time: 06/20/20 11:50 AM  
Result Value Ref Range Glucose (POC) 255 (H) 65 - 100 mg/dL Current Meds: 
Current Facility-Administered Medications Medication Dose Route Frequency  tamsulosin (FLOMAX) capsule 0.4 mg  0.4 mg Oral DAILY  furosemide (LASIX) injection 40 mg  40 mg IntraVENous Q12H  
 insulin lispro (HUMALOG) injection   SubCUTAneous AC&HS  insulin lispro (HUMALOG) injection 4 Units  4 Units SubCUTAneous TIDAC  ferrous sulfate tablet 325 mg  1 Tab Oral DAILY WITH BREAKFAST  0.9% sodium chloride infusion 250 mL  250 mL IntraVENous PRN  
 hydrALAZINE (APRESOLINE) 20 mg/mL injection 10 mg  10 mg IntraVENous Q6H PRN  
 nystatin (MYCOSTATIN) 100,000 unit/mL oral suspension 500,000 Units  500,000 Units Oral QID  simethicone (MYLICON) tablet 80 mg  80 mg Oral QID PRN  potassium chloride (K-DUR, KLOR-CON) SR tablet 20 mEq  20 mEq Oral TID  albuterol-ipratropium (DUO-NEB) 2.5 MG-0.5 MG/3 ML  3 mL Nebulization Q6HWA RT  
 magnesium hydroxide (MILK OF MAGNESIA) 400 mg/5 mL oral suspension 30 mL  30 mL Oral DAILY  senna-docusate (PERICOLACE) 8.6-50 mg per tablet 1 Tab  1 Tab Oral BID  
  bisacodyL (DULCOLAX) suppository 10 mg  10 mg Rectal DAILY  0.9% sodium chloride infusion 250 mL  250 mL IntraVENous PRN  
 amLODIPine (NORVASC) tablet 10 mg  10 mg Oral DAILY  docusate sodium (COLACE) capsule 100 mg  100 mg Oral PRN  
 lisinopriL (PRINIVIL, ZESTRIL) tablet 20 mg  20 mg Oral DAILY  meloxicam (MOBIC) tablet 7.5 mg  7.5 mg Oral DAILY  sodium chloride tablet 2 g  2 g Oral BID  traMADoL (ULTRAM) tablet 50 mg  50 mg Oral Q6H PRN  
 sodium chloride (NS) flush 5-40 mL  5-40 mL IntraVENous Q8H  
 sodium chloride (NS) flush 5-40 mL  5-40 mL IntraVENous PRN  
 acetaminophen (TYLENOL) tablet 650 mg  650 mg Oral Q4H PRN  
 ondansetron (ZOFRAN) injection 4 mg  4 mg IntraVENous Q4H PRN  
 enoxaparin (LOVENOX) injection 40 mg  40 mg SubCUTAneous Q24H Other Studies: 
Results for orders placed or performed during the hospital encounter of 20  
2D ECHO COMPLETE ADULT (TTE) W OR WO CONTR [facility]ative [facility] [address] [place] [address] Dr George, [address] [facility] 
[phone] Transthoracic Echocardiogram 
2D, M-mode, Doppler, and Color Doppler Patient: Dulcie Lesch 
MR #: [id number] : [id number] Age: 79 years Gender: Male Study date: 26-May-2020 Account #: [de-identified] Height: 72 in 72 in 
Weight: 164 lb 163.7 lb 
BSA: 1.96 mï¾² 1.96 mï¾² Status:Routine Location: [facility] BP: 169/ 88 Allergies: NO KNOWN ALLERGENS Sonographer:  Romi Holguin RDCS Group:  [facility] Referring Physician:  Erma Villafuerte MD 
Reading Physician:  Gianfranco Bains MD [facility] INDICATIONS: Bilateral pleural effusions *Pt. scanned supine. Unable to turn LLD. PROCEDURE: This was a routine study. A transthoracic echocardiogram was 
performed. The study included complete 2D imaging, M-mode, complete spectral 
Doppler, and color Doppler. Intravenous contrast (Definity, 1 ml) was administered to opacify the left ventricle. Image quality was adequate. LEFT VENTRICLE: Size was normal. Systolic function was normal. Ejection 
fraction was estimated in the range of 60 % to 65 %. There were no regional 
wall motion abnormalities. Wall thickness was normal. Left ventricular 
diastolic function parameters were normal. E/e' av.46. RIGHT VENTRICLE: The size was normal. Systolic function was normal. 
 
LEFT ATRIUM: Size was normal. 
 
RIGHT ATRIUM: Size was normal. 
 
SYSTEMIC VEINS: IVC: The inferior vena cava was normal in size and course. AORTIC VALVE: The valve was structurally normal, tri-commissural. There was  
no 
evidence for stenosis. There was no insufficiency. MITRAL VALVE: Valve structure was normal. There was no evidence for stenosis. There was no regurgitation. TRICUSPID VALVE: The valve structure was normal. There was no evidence for 
stenosis. There was trivial regurgitation. PULMONIC VALVE: Not well visualized. There was no evidence for stenosis. There 
was no insufficiency. PERICARDIUM: There was no pericardial effusion. AORTA: The root exhibited normal size. SUMMARY: 
 
-  Left ventricle: Systolic function was normal. Ejection fraction was 
estimated in the range of 60 % to 65 %. There were no regional wall motion 
abnormalities. -  Pericardium: There was no pericardial effusion. SYSTEM MEASUREMENT TABLES 
 
2D Ao Diam: 3.2 cm 
LA Diam: 2.6 cm 
LAEDV Index (A-L): 25.9 ml/m2 %FS: 34.9 % IVSd: 1.1 cm 
LVIDd: 3.7 cm 
LVIDs: 2.4 cm 
LVOT Diam: 2.1 cm 
LVPWd: 1.2 cm Prepared and signed by 
 
Neri Loera. MD [facility] [place], [place] Signed 26-May-2020 16:55:26 No results found. All Micro Results Procedure Component Value Units Date/Time FUNGUS CULTURE AND SMEAR [[id number]] Collected:  20 1029 Order Status:  Completed Specimen:  Miscellaneous sample Updated:  20 1537 Source LEFT Comment: Pleural Fluid Specimen Fungus stain Direct Inoculation Fungus (Mycology) Culture Other source received Comment: (NOTE) Performed At: [address] [id number] Helen Mijares MD [id number] EMERGENT DISEASE PANEL [[id number]] Collected:  06/10/20 1203 Order Status:  Completed Specimen:  Not Specified Updated:  06/12/20 3227 Emergent disease panel Not detected Comment: Performed at [facility] RESULTS SCANNED IN [facility] 
  
  
 CULTURE, BODY FLUID W Shakir Copelandacle [[id number]] Collected:  06/09/20 1029 Order Status:  Completed Specimen:  Left Updated:  06/11/20 3086 Special Requests: NO SPECIAL REQUESTS     
  GRAM STAIN 0 TO 1 WBC'S SEEN PER OIF  
   NO DEFINITE ORGANISM SEEN Culture result: NO GROWTH 2 DAYS     
 AFB CULTURE + SMEAR W/RFLX ID FROM CULTURE [[id number]] Collected:  06/09/20 1029 Order Status:  Completed Specimen:  Miscellaneous sample Updated:  06/10/20 1536 Source LEFT Comment: Pleural Fluid Specimen AFB Specimen processing Concentration Acid Fast Smear Negative Comment: (NOTE) Performed At: [address] [id number] Helen Mijares MD [id number] Acid Fast Culture PENDING  
 FUNGUS CULTURE AND SMEAR [[id number]] Collected:  05/25/20 1315 Order Status:  Completed Specimen:  Miscellaneous sample Updated:  05/28/20 1536 Source LEFT Comment: Pleural Fluid Specimen Fungus stain Direct Inoculation Fungus (Mycology) Culture Other source received Comment: (NOTE) Performed At: [address] [id number] Helen Mijares MD [id number] AFB CULTURE + SMEAR W/RFLX ID FROM CULTURE [[id number]] Collected:  05/25/20 1315 Order Status:  Completed Specimen:  Miscellaneous sample Updated:  05/27/20 1537 Source LEFT Comment: Pleural Fluid Specimen AFB Specimen processing Concentration Acid Fast Smear Negative Comment: (NOTE) Performed At: [address] [id number] Nathan Ernandez MD [id number] Acid Fast Culture PENDING  
 CULTURE, BODY FLUID W Elfreda Hodgkin [[id number]] Collected:  05/25/20 1315 Order Status:  Completed Specimen:  Left Updated:  05/27/20 6072 Special Requests: NO SPECIAL REQUESTS     
  GRAM STAIN 0 TO 1 WBC'S/OIF  
   NO DEFINITE ORGANISM SEEN Culture result: NO GROWTH 2 DAYS     
  
 
 
SARS-CoV-2 Lab Results \"Novel Coronavirus\" Test:  
Lab Results Component Value Date/Time COV2NT WRONG TEST ORDERED 06/10/2020 12:03 PM  
  
\"Emergent Disease\" Test:  
Lab Results Component Value Date/Time EDPR Not detected 06/10/2020 12:03 PM  
 
\"SARS-COV-2\" Test:  
Lab Results Component Value Date/Time XGCOVT WRONG TEST ORDERED 06/10/2020 12:03 PM  
 
As of: 4:01 PM on 6/20/2020 Assessment and Plan:  
 
Hospital Problems as of 6/20/2020 Date Reviewed: 6/17/2020 Codes Class Noted - Resolved POA Debility ICD-10-CM: R53.81 ICD-9-CM: 799.3  6/15/2020 - Present Yes Rectal obstruction ICD-10-CM: K62.4 ICD-9-CM: 569.2  6/1/2020 - Present Yes Aspiration pneumonia ([facility] [address] ICD-10-CM: J69.0 ICD-9-CM: 507.0  6/1/2020 - Present Yes Neutropenia ([facility] [address] ICD-10-CM: D70.9 ICD-9-CM: 288.00  5/25/2020 - Present Yes Pleural effusion on right ICD-10-CM: J90 ICD-9-CM: 511.9  5/25/2020 - Present Yes Overview Addendum 6/17/2020  8:25 AM by Ankit Holguin NP  
  6/3/20:  Right chest thoracentesis with 800 removed. 6/10/20:  Bilateral thoracentesis on L( 1100 cc ) and R( 1000 cc) removed * (Principal) Acute respiratory failure with hypoxia [facility]) ICD-10-CM: J96.01 
ICD-9-CM: 518.81  5/24/2020 - Present Yes Normocytic anemia ICD-10-CM: D64.9 ICD-9-CM: 285.9  5/24/2020 - Present Yes CAP (community acquired pneumonia) ICD-10-CM: J18.9 ICD-9-CM: 462  5/24/2020 - Present Pleural effusion on left ICD-10-CM: J90 ICD-9-CM: 511.9  5/24/2020 - Present Yes Overview Signed 6/17/2020  8:24 AM by Lauren Malloy NP  
   6/10/20:  Bilateral thoracentesis on L( 1100 cc ) and R( 1000 cc) removed Malignant neoplasm metastatic to bone [facility]) (Chronic) ICD-10-CM: C79.51 
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 
ICD-9-CM: 250.40, 583.81  7/3/2018 - Present Yes Diabetes mellitus due to underlying condition with hyperglycemia (HCC) (Chronic) ICD-10-CM: D19.47 ICD-9-CM: 249.80  12/21/2015 - Present Yes COVID-19 ruled out ICD-10-CM: Z03.818 ICD-9-CM: V71.83  12/15/2015 - Present Yes Hyponatremia ICD-10-CM: E87.1 ICD-9-CM: 276.1  11/23/2015 - Present Yes RESOLVED: Acute metabolic encephalopathy [id number]: G93.41 
ICD-9-CM: 348.31  5/24/2020 - 5/24/2020 Plan: · Acute hypoxic respiratory failure, bilateral effusions: 
· Weaning O2 as tolerant, currently on 5L NC 
· Antibiotics stopped by pulmonary · Resumed IV lasix 40 mg every 12 hours · Discussed with Dr. Denise Jiménez of pulmonary 6-19 and will try diuretics prior to reconsidering repeat tap · Anal cancer with stricture and rectal obstruction: · Declines surgery · continued bowel regimen · Outpatient colorectal and oncology followups · HTN:  
· continued norvasc, lisinopril · DM2: 
· Holding amaryl · Continued SSI, premeal insulin · Resume lantus · Anemia: 
· followup CBC stable ·  transfuse HGB < 7 last on 6-15 · Urine retention: · Replaced traore · continued flomax · Voiding trial possibly prior to discharge DC planning/Dispo:  Pending to SNF Diet:  DIET NUTRITIONAL SUPPLEMENTS 
DIET REGULAR 
DVT ppx:  SCD Signed: Toya Stein MD

## 2020-06-20 NOTE — PROGRESS NOTES
Shift Assessment - Patient is alert and oriented x4. No complaint of pain at this time. Respirations even and unlabored. Bowel sounds active x4. Wife present and helpful with patient. Currently resting in a low, locked bed. Call light in reach.

## 2020-06-21 NOTE — PROGRESS NOTES
Hospitalist Note Admit Date:  2020 11:00 PM  
Name:  Grace Conn Age:  79 y.o. 
:  1940 MRN:  [id number] PCP:  Clyde Palacio MD 
Treatment Team: Attending Provider: Josy Groves MD; Utilization Review: Roxanne Eubanks RN; Care Manager: Patrizia Mon.; Consulting Provider: Rafy Shaikh RN 
 
HPI/Subjective:  
 
Mr. 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 He has been seen by pulm s/p bilateral thoracentesis 5-26, right thoracentesis 6-3, bilateral thoracentesis 6-9,  and diuresis. Studies transudate, felt due to hypoalbuminemia. [place] 60-65%. He has required high flow O2. He has compelted 8 days zosyn. In regards to his metastatic rectal cancer, he has known anal stricture and declined prior surgical diverting ostomy. He is s/p anal dilation per [facility] in past. CTAP showed constipation/ colonic distention to rectum suspicious for obstruction. oncology recommends followup at discharge, last chemo completed . He has been seen by GI s/p 6-2 flex sig with disimpaction/ no stricture found. Recommends [facility] colorectal followup. He required 1 unit PRBC 6-15. Discharge plans pending to SNF. 20 not frankly short of breath, eats ok, has BM, no longer has abd pains, has fecal/ urine incontinence Objective:  
 
Patient Vitals for the past 24 hrs: 
 Temp Pulse Resp BP SpO2  
20 1454     94 % 20 1104 98.2 °F (36.8 °C) (!) 102 18 118/74 94 % 20 0841     96 %  
20 0746 98 °F (36.7 °C) 98 18 136/80 98 %  
20 0431 98.7 °F (37.1 °C) 100 18 132/85 96 %  
20 2255 98.2 °F (36.8 °C) (!) 110 20 125/72 93 % 20 2228    128/67   
20    129/88   
20     93 % 06/20/20 1929 99.2 °F (37.3 °C) (!) 107 22 137/74 93 % 06/20/20 1652 98.6 °F (37 °C) 100 26 139/75 96 %  
06/20/20 1505     96 % Oxygen Therapy O2 Sat (%): 94 % (06/21/20 1454) Pulse via Oximetry: 97 beats per minute (06/21/20 1454) O2 Device: Hi flow nasal cannula (06/21/20 1454) O2 Flow Rate (L/min): 3 l/min (06/21/20 1454) O2 Temperature: 87.8 °F (31 °C) (06/12/20 1454) FIO2 (%): 32 % (06/14/20 1429) ETCO2 (mmHg): 93 mmHg (06/01/20 1624) Estimated body mass index is 23.06 kg/m² as calculated from the following: 
  Height as of this encounter: 6' (1.829 m). Weight as of this encounter: 77.1 kg (170 lb). Intake/Output Summary (Last 24 hours) at 6/21/2020 1503 Last data filed at 6/21/2020 1331 Gross per 24 hour Intake 50 ml Output 3250 ml Net -3200 ml *Note that automatically entered I/Os may not be accurate; dependent on patient compliance with collection and accurate  by techs. General:    Alert , elderly, no distress. CV:   RRR. No murmur, rub, or gallop. No edema Lungs:   CTAB. No wheezing, rhonchi, or rales. anterior Abdomen:   Soft, nonender, slightly distended. Decreased BS Extremities: Warm and dry. Skin:     No rashes or jaundice. Neuro:  No gross focal deficits Data Review: 
I have reviewed all labs, meds, and studies from the last 24 hours: 
Recent Results (from the past 24 hour(s)) GLUCOSE, POC Collection Time: 06/20/20  4:50 PM  
Result Value Ref Range Glucose (POC) 184 (H) 65 - 100 mg/dL GLUCOSE, POC Collection Time: 06/20/20  8:43 PM  
Result Value Ref Range Glucose (POC) 228 (H) 65 - 100 mg/dL METABOLIC PANEL, BASIC Collection Time: 06/21/20  7:15 AM  
Result Value Ref Range Sodium 133 (L) 136 - 145 mmol/L Potassium 4.7 3.5 - 5.1 mmol/L Chloride 97 (L) 98 - 107 mmol/L  
 CO2 31 21 - 32 mmol/L Anion gap 5 (L) 7 - 16 mmol/L Glucose 202 (H) 65 - 100 mg/dL  BUN 13 8 - 23 MG/DL  
 Creatinine 0.67 (L) 0.8 - 1.5 MG/DL  
 GFR est AA >60 >60 ml/min/1.73m2 GFR est non-AA >60 >60 ml/min/1.73m2 Calcium 8.3 8.3 - 10.4 MG/DL MAGNESIUM Collection Time: 06/21/20  7:15 AM  
Result Value Ref Range Magnesium 1.9 1.8 - 2.4 mg/dL CBC W/O DIFF Collection Time: 06/21/20  7:15 AM  
Result Value Ref Range WBC 6.2 4.3 - 11.1 K/uL  
 RBC 2.59 (L) 4.23 - 5.6 M/uL HGB 7.5 (L) 13.6 - 17.2 g/dL HCT 23.9 (L) 41.1 - 50.3 % MCV 92.3 79.6 - 97.8 FL  
 MCH 29.0 26.1 - 32.9 PG  
 MCHC 31.4 31.4 - 35.0 g/dL  
 RDW 16.7 (H) 11.9 - 14.6 % PLATELET 706 (L) 301 - 450 K/uL MPV 10.3 9.4 - 12.3 FL ABSOLUTE NRBC 0.02 0.0 - 0.2 K/uL GLUCOSE, POC Collection Time: 06/21/20  7:48 AM  
Result Value Ref Range Glucose (POC) 209 (H) 65 - 100 mg/dL GLUCOSE, POC Collection Time: 06/21/20 11:06 AM  
Result Value Ref Range Glucose (POC) 251 (H) 65 - 100 mg/dL Current Meds: 
Current Facility-Administered Medications Medication Dose Route Frequency  insulin glargine (LANTUS) injection 8 Units  8 Units SubCUTAneous QHS  tamsulosin (FLOMAX) capsule 0.4 mg  0.4 mg Oral DAILY  furosemide (LASIX) injection 40 mg  40 mg IntraVENous Q12H  
 insulin lispro (HUMALOG) injection   SubCUTAneous AC&HS  insulin lispro (HUMALOG) injection 4 Units  4 Units SubCUTAneous TIDAC  ferrous sulfate tablet 325 mg  1 Tab Oral DAILY WITH BREAKFAST  0.9% sodium chloride infusion 250 mL  250 mL IntraVENous PRN  
 hydrALAZINE (APRESOLINE) 20 mg/mL injection 10 mg  10 mg IntraVENous Q6H PRN  
 nystatin (MYCOSTATIN) 100,000 unit/mL oral suspension 500,000 Units  500,000 Units Oral QID  simethicone (MYLICON) tablet 80 mg  80 mg Oral QID PRN  potassium chloride (K-DUR, KLOR-CON) SR tablet 20 mEq  20 mEq Oral TID  albuterol-ipratropium (DUO-NEB) 2.5 MG-0.5 MG/3 ML  3 mL Nebulization Q6HWA RT  
 magnesium hydroxide (MILK OF MAGNESIA) 400 mg/5 mL oral suspension 30 mL 30 mL Oral DAILY  senna-docusate (PERICOLACE) 8.6-50 mg per tablet 1 Tab  1 Tab Oral BID  
 bisacodyL (DULCOLAX) suppository 10 mg  10 mg Rectal DAILY  0.9% sodium chloride infusion 250 mL  250 mL IntraVENous PRN  
 amLODIPine (NORVASC) tablet 10 mg  10 mg Oral DAILY  docusate sodium (COLACE) capsule 100 mg  100 mg Oral PRN  
 lisinopriL (PRINIVIL, ZESTRIL) tablet 20 mg  20 mg Oral DAILY  meloxicam (MOBIC) tablet 7.5 mg  7.5 mg Oral DAILY  sodium chloride tablet 2 g  2 g Oral BID  traMADoL (ULTRAM) tablet 50 mg  50 mg Oral Q6H PRN  
 sodium chloride (NS) flush 5-40 mL  5-40 mL IntraVENous Q8H  
 sodium chloride (NS) flush 5-40 mL  5-40 mL IntraVENous PRN  
 acetaminophen (TYLENOL) tablet 650 mg  650 mg Oral Q4H PRN  
 ondansetron (ZOFRAN) injection 4 mg  4 mg IntraVENous Q4H PRN  
 enoxaparin (LOVENOX) injection 40 mg  40 mg SubCUTAneous Q24H Other Studies: 
Results for orders placed or performed during the hospital encounter of 20  
2D ECHO COMPLETE ADULT (TTE) W OR WO CONTR Narrative Chantelle One [address] [place], [address] [facility] 
[phone] Transthoracic Echocardiogram 
2D, M-mode, Doppler, and Color Doppler Patient: Jairo Richardson 
MR #: [id number] : [id number] Age: 79 years Gender: Male Study date: 26-May-2020 Account #: [de-identified] Height: 72 in 72 in 
Weight: 164 lb 163.7 lb 
BSA: 1.96 mï¾² 1.96 mï¾² Status:Routine Location: 831 BP: 169/ 88 Allergies: NO KNOWN ALLERGENS Sonographer:  Mahi Joseph RDCS Group:  [facility] Referring Physician:  Della Cobb MD 
Reading Physician:  Vicky Barrera. MD [facility] INDICATIONS: Bilateral pleural effusions *Pt. scanned supine. Unable to turn LLD. PROCEDURE: This was a routine study. A transthoracic echocardiogram was 
performed.  The study included complete 2D imaging, M-mode, complete spectral 
 Doppler, and color Doppler. Intravenous contrast (Definity, 1 ml) was 
administered to opacify the left ventricle. Image quality was adequate. LEFT VENTRICLE: Size was normal. Systolic function was normal. Ejection 
fraction was estimated in the range of 60 % to 65 %. There were no regional 
wall motion abnormalities. Wall thickness was normal. Left ventricular 
diastolic function parameters were normal. E/e' av.46. RIGHT VENTRICLE: The size was normal. Systolic function was normal. 
 
LEFT ATRIUM: Size was normal. 
 
RIGHT ATRIUM: Size was normal. 
 
SYSTEMIC VEINS: IVC: The inferior vena cava was normal in size and course. AORTIC VALVE: The valve was structurally normal, tri-commissural. There was  
no 
evidence for stenosis. There was no insufficiency. MITRAL VALVE: Valve structure was normal. There was no evidence for stenosis. There was no regurgitation. TRICUSPID VALVE: The valve structure was normal. There was no evidence for 
stenosis. There was trivial regurgitation. PULMONIC VALVE: Not well visualized. There was no evidence for stenosis. There 
was no insufficiency. PERICARDIUM: There was no pericardial effusion. AORTA: The root exhibited normal size. SUMMARY: 
 
-  Left ventricle: Systolic function was normal. Ejection fraction was 
estimated in the range of 60 % to 65 %. There were no regional wall motion 
abnormalities. -  Pericardium: There was no pericardial effusion. SYSTEM MEASUREMENT TABLES 
 
2D Ao Diam: 3.2 cm 
LA Diam: 2.6 cm 
LAEDV Index (A-L): 25.9 ml/m2 %FS: 34.9 % IVSd: 1.1 cm 
LVIDd: 3.7 cm 
LVIDs: 2.4 cm 
LVOT Diam: 2.1 cm 
LVPWd: 1.2 cm Prepared and signed by 
 
Malcolm Perrin. MD [facility] Signed 26-May-2020 16:55:26 No results found. All Micro Results Procedure Component Value Units Date/Time FUNGUS CULTURE AND SMEAR [[id number]] Collected:  20 1029  Order Status:  Completed Specimen:  Miscellaneous sample Updated: 06/13/20 1535 Source LEFT Comment: Pleural Fluid Specimen Fungus stain Direct Inoculation Fungus (Mycology) Culture Other source received Comment: (NOTE) Performed At: [address] [id number] Ro Matta MD [id number] EMERGENT DISEASE PANEL [[id number]] Collected:  06/10/20 1203 Order Status:  Completed Specimen:  Not Specified Updated:  06/12/20 2463 Emergent disease panel Not detected Comment: Performed at [facility] RESULTS SCANNED IN [facility] 
  
  
 CULTURE, BODY FLUID W Prieto Milian [[id number]] Collected:  06/09/20 1029 Order Status:  Completed Specimen:  Left Updated:  06/11/20 1929 Special Requests: NO SPECIAL REQUESTS     
  GRAM STAIN 0 TO 1 WBC'S SEEN PER OIF  
   NO DEFINITE ORGANISM SEEN Culture result: NO GROWTH 2 DAYS     
 AFB CULTURE + SMEAR W/RFLX ID FROM CULTURE [[id number]] Collected:  06/09/20 1029 Order Status:  Completed Specimen:  Miscellaneous sample Updated:  06/10/20 1536 Source LEFT Comment: Pleural Fluid Specimen AFB Specimen processing Concentration Acid Fast Smear Negative Comment: (NOTE) Performed At: [address] [id number] Ro Matta MD [id number] Acid Fast Culture PENDING  
 FUNGUS CULTURE AND SMEAR [[id number]] Collected:  05/25/20 1315 Order Status:  Completed Specimen:  Miscellaneous sample Updated:  05/28/20 1536 Source LEFT Comment: Pleural Fluid Specimen Fungus stain Direct Inoculation Fungus (Mycology) Culture Other source received Comment: (NOTE) Performed At: [address] [id number] Ro Matta MD [id number] AFB CULTURE + SMEAR W/RFLX ID FROM CULTURE [[id number]] Collected:  05/25/20 1315 Order Status:  Completed Specimen:  Miscellaneous sample Updated:  05/27/20 1537   Source LEFT     
 Comment: Pleural Fluid Specimen AFB Specimen processing Concentration Acid Fast Smear Negative Comment: (NOTE) Performed At: [address] [id number] Giselle Gonzalez MD [id number] Acid Fast Culture PENDING  
 CULTURE, BODY FLUID W Donna Orellana [[id number]] Collected:  05/25/20 1315 Order Status:  Completed Specimen:  Left Updated:  05/27/20 4252 Special Requests: NO SPECIAL REQUESTS     
  GRAM STAIN 0 TO 1 WBC'S/OIF  
   NO DEFINITE ORGANISM SEEN Culture result: NO GROWTH 2 DAYS     
  
 
 
SARS-CoV-2 Lab Results \"Novel Coronavirus\" Test:  
Lab Results Component Value Date/Time COV2NT WRONG TEST ORDERED 06/10/2020 12:03 PM  
  
\"Emergent Disease\" Test:  
Lab Results Component Value Date/Time EDPR Not detected 06/10/2020 12:03 PM  
 
\"SARS-COV-2\" Test:  
Lab Results Component Value Date/Time XGCOVT WRONG TEST ORDERED 06/10/2020 12:03 PM  
 
As of: 4:01 PM on 6/21/2020 Assessment and Plan:  
 
Hospital Problems as of 6/21/2020 Date Reviewed: 6/17/2020 Codes Class Noted - Resolved POA Debility ICD-10-CM: R53.81 ICD-9-CM: 799.3  6/15/2020 - Present Yes Rectal obstruction ICD-10-CM: K62.4 ICD-9-CM: 569.2  6/1/2020 - Present Yes Aspiration pneumonia ([facility] [address] ICD-10-CM: J69.0 ICD-9-CM: 507.0  6/1/2020 - Present Yes Neutropenia ([facility] [address] ICD-10-CM: D70.9 ICD-9-CM: 288.00  5/25/2020 - Present Yes Pleural effusion on right ICD-10-CM: J90 ICD-9-CM: 511.9  5/25/2020 - Present Yes Overview Addendum 6/17/2020  8:25 AM by Wendy Escobedo NP  
  6/3/20:  Right chest thoracentesis with 800 removed. 6/10/20:  Bilateral thoracentesis on L( 1100 cc ) and R( 1000 cc) removed * (Principal) Acute respiratory failure with hypoxia [facility]) ICD-10-CM: J96.01 
ICD-9-CM: 518.81  5/24/2020 - Present Yes Normocytic anemia ICD-10-CM: D64.9 ICD-9-CM: 285.9  5/24/2020 - Present Yes  
   
 CAP (community acquired pneumonia) ICD-10-CM: J18.9 ICD-9-CM: 530  5/24/2020 - Present Pleural effusion on left ICD-10-CM: J90 ICD-9-CM: 511.9  5/24/2020 - Present Yes Overview Signed 6/17/2020  8:24 AM by Robyn Bonner NP  
   6/10/20:  Bilateral thoracentesis on L( 1100 cc ) and R( 1000 cc) removed Malignant neoplasm metastatic to bone [facility]) (Chronic) ICD-10-CM: C79.51 
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 
ICD-9-CM: 250.40, 583.81  7/3/2018 - Present Yes Diabetes mellitus due to underlying condition with hyperglycemia (HCC) (Chronic) ICD-10-CM: O23.32 ICD-9-CM: 249.80  12/21/2015 - Present Yes COVID-19 ruled out ICD-10-CM: Z03.818 ICD-9-CM: V71.83  12/15/2015 - Present Yes Hyponatremia ICD-10-CM: E87.1 ICD-9-CM: 276.1  11/23/2015 - Present Yes RESOLVED: Acute metabolic encephalopathy [facility]-83-KI: G93.41 
ICD-9-CM: 348.31  5/24/2020 - 5/24/2020 Plan: · Acute hypoxic respiratory failure, bilateral effusions: 
· Weaning O2 as tolerant, currently on 3 L NC 
· Antibiotics stopped by pulmonary · Resumed IV lasix 40 mg every 12 hours · Discussed with Dr. Johnanna Baumgarten of pulmonary 6-19 and will try diuretics prior to reconsidering repeat tap · Anal cancer with stricture and rectal obstruction: · Declines surgery · continued bowel regimen · Outpatient colorectal and oncology followups · HTN:  
· continued norvasc, lisinopril · DM2: 
· Holding amaryl · Continued SSI, premeal insulin · increase lantus · Anemia: 
· followup CBC ·  transfuse HGB < 7 last on 6-15 · Urine retention: · Replaced traore · continued flomax · Voiding trial possibly prior to discharge DC planning/Dispo:  Pending to SNF Diet:  DIET NUTRITIONAL SUPPLEMENTS 
DIET REGULAR 
DVT ppx:  SCD Signed: Reyes Vazquez MD

## 2020-06-21 NOTE — PROGRESS NOTES
Assessment complete via flow sheet. Pt A&Ox4. Respirations even. S1 S2 auscultated. Bowel sounds active, abdomen soft. Denies other needs. Bed in lowest position, side rails up x3, call bell in reach. Instructed to call for assistance. Pt verbalized understanding. Plan of care reviewed with patient.

## 2020-06-22 NOTE — PROGRESS NOTES
[facility] will need to be updated since approval has lapsed at this time. CM will remain available for dc needs.

## 2020-06-22 NOTE — PROGRESS NOTES
Problem: Mobility Impaired (Adult and Pediatric) Goal: *Acute Goals and Plan of Care (Insert Text) Outcome: Progressing Towards Goal 
Note: LTG: (Reviewed and updated 6/15/20) (1.)Mr. Zach Mcmahon will move from supine to sit and sit to supine , scoot up and down and roll side to side with INDEPENDENT within 7 treatment day(s). (2.)Mr. Zach Mcmahon will transfer from bed to chair and chair to bed with SUPERVISION using the least restrictive device within 7 treatment day(s). (3.)Mr. Zach Mcmahon will ambulate with STAND BY ASSIST for 100+ feet with the least restrictive device within 7 treatment day(s). (4.)Mr. Zach Mcmahon will perform exercises per HEP independently to improve strength and mobility within 7 days. (5.)Mr. Zach Mcmahon will perform standing mobility and balance activities for 10+ minutes to improve strength and mobility within 7 days. ________________________________________________________________________________________________ PHYSICAL THERAPY: Daily Note and AM 6/22/2020 INPATIENT: PT Visit Days : 4 Payor: Nii Kang / Plan: [address] [place] [address] HMO / Product Type: [employer] /   
  
NAME/AGE/GENDER: Madelin Milligan is a 79 y.o. male PRIMARY DIAGNOSIS: Acute metabolic encephalopathy [T18.08] Acute respiratory failure with hypoxia (HCC) Acute respiratory failure with hypoxia (HCC) Procedure(s) (LRB): 
THORACENTESIS (N/A) ULTRASOUND (Bilateral) 13 Days Post-Op ICD-10: Treatment Diagnosis:  
 · Generalized Muscle Weakness (M62.81) · Other lack of cordination (R27.8) · Difficulty in walking, Not elsewhere classified (R26.2) · Other abnormalities of gait and mobility (R26.89) · Low Back Pain (M54.5) Precaution/Allergies: 
Patient has no known allergies. ASSESSMENT:  
 
Mr. Zach Mcmahon  is a 79year old male who has been admitted to hospital on 05/24 with above diagnosis and hx of cancer.  Prior to hospital admission pt lives with wife in a 1 story home with 0 step(s) to enter with no railing. Pt endorses 0 falls in past 6 months. Prior to admission No O2 usage at home, no assistance with ADLs and uses no DME for mobility. Patient is supine with wife present and willing to try. He needed moderate assist to roll and sit up from sidelying. He stood from an elevated bed with minimal assist into the rollator and walked about 60 feet with min A/CGA. He sat on rollator seat and performed seated exercises below. He stood again and used the commode chair with assist then assisted to the chair. He is quite weak and will certainly need a rehab stay. Slow progress with no goals met. This section established at most recent assessment PROBLEM LIST (Impairments causing functional limitations): 1. Decreased Strength 2. Decreased ADL/Functional Activities 3. Decreased Transfer Abilities 4. Decreased Ambulation Ability/Technique 5. Decreased Balance 6. Increased Pain 7. Decreased Activity Tolerance 8. Increased Fatigue 9. Decreased Flexibility/Joint Mobility 10. Decreased [place] with Home Exercise Program 
 INTERVENTIONS PLANNED: (Benefits and precautions of physical therapy have been discussed with the patient.) 1. Balance Exercise 2. Bed Mobility 3. Family Education 4. Gait Training 5. Heat 6. Home Exercise Program (HEP) 7. Manual Therapy 8. Neuromuscular Re-education/Strengthening 9. Range of Motion (ROM) 10. Therapeutic Activites 11. Therapeutic Exercise/Strengthening 12. Transfer Training TREATMENT PLAN: Frequency/Duration: 3 times a week for duration of hospital stay Rehabilitation Potential For Stated Goals: Good REHAB RECOMMENDATIONS (at time of discharge pending progress):   
Placement: It is my opinion, based on this patient's performance to date, that Mr. Hoa Javier may benefit from intensive therapy at an [place] [id number] [facility] after discharge due to a probable need for close medical supervision by a rehab physician, a probable need for 24 hour rehab nursing, a probable need for multiple therapy disciplines and potential to make ongoing and sustainable functional improvement that is of practical value. Sayra Shadow Equipment:  
? Walkers, Type: Rolling Nicholette Peeling HISTORY:  
History of Present Injury/Illness (Reason for Referral): 
Per Physician Note: 
 
Elisa Scheuermann is a 79 y.o. male with a past medical history of HEENT cancer undergoing chemo/radiation who presents to the [facility] ER with report of SOB and chest discomfort for the past several days. He also admits to mild cough but denies fevers or chills. Admits to feeling a little better and breathing a bit better since arrival. 
  
Past Medical History/Comorbidities:  
Mr. Julio Fernandes  has a past medical history of GERD (gastroesophageal reflux disease), Head and neck cancer ([facility] [address] (9/30/2015), History of squamous cell carcinoma, History of throat cancer (2015), Hypercholesteremia, Hypertension, Hypomagnesemia (5/20/2020), Rectal cancer ([facility] [address] (2018), Type 2 diabetes mellitus ([facility] [address] and Vomiting (2/23/2016). Mr. Julio Fernandes  has a past surgical history that includes hx orthopaedic (Right, 1966); hx other surgical (9/9/15); hx heent; hx tonsillectomy; hx heent (2015); hx colonoscopy (05/2018); hx vascular access; hx lymph node dissection; hx other surgical; and flexible sigmoidoscopy (N/A, 6/2/2020). Social History/Living Environment:  
Home Environment: Private residence # Steps to Enter: 0 One/Two Story Residence: One story Living Alone: No 
Support Systems: Spouse/Significant Other/Partner Patient Expects to be Discharged to[de-identified] Private residence Current DME Used/Available at Home: None Tub or Shower Type: Tub/Shower combination Prior Level of Function/Work/Activity: Mod I mobility and amb Number of Personal Factors/Comorbidities that affect the Plan of Care: 3+: HIGH COMPLEXITY EXAMINATION:  
Most Recent Physical Functioning:  
Gross Assessment: 
  
         
  
Posture: 
  
Balance: 
  Bed Mobility: 
Rolling: Contact guard assistance;Stand-by assistance Supine to Sit: Contact Guard Assist;Additional time Scooting: Stand-by assistance Wheelchair Mobility: 
  
Transfers: 
Sit to Stand: Contact guard assistance;Minimum assistance Stand to Sit: Contact guard assistance Gait: 
  
Speed/Christina: Shuffled; Slow Step Length: Left shortened;Right shortened Gait Abnormalities: Decreased step clearance;Shuffling gait; Steppage gait Distance (ft): 70 Feet (ft) Assistive Device: Walker, rollator Ambulation - Level of Assistance: Contact guard assistance;Minimal assistance Body Structures Involved: 1. Lungs 2. Bones 3. Joints Body Functions Affected: 1. Sensory/Pain 2. Movement Related Activities and Participation Affected: 1. Mobility 2. Self Care 3. Interpersonal Interactions and Relationships 4. Community, Social and [place] Number of elements that affect the Plan of Care: 4+: HIGH COMPLEXITY CLINICAL PRESENTATION:  
Presentation: Stable and uncomplicated: LOW COMPLEXITY CLINICAL DECISION MAKIN [facility] [address] [id number] AM-PAC 6 Clicks Basic Mobility Inpatient Short Form How much difficulty does the patient currently have. .. Unable A Lot A Little None 1. Turning over in bed (including adjusting bedclothes, sheets and blankets)? [] 1   [] 2   [] 3   [x] 4  
2. Sitting down on and standing up from a chair with arms ( e.g., wheelchair, bedside commode, etc.)   [] 1   [] 2   [x] 3   [] 4  
3. Moving from lying on back to sitting on the side of the bed? [] 1   [] 2   [] 3   [x] 4 How much help from another person does the patient currently need. .. Total A Lot A Little None 4. Moving to and from a bed to a chair (including a wheelchair)? [] 1   [] 2   [x] 3   [] 4  
5. Need to walk in hospital room?    [] 1   [x] 2   [] 3   [] 4  
 6.  Climbing 3-5 steps with a railing? [x] 1   [] 2   [] 3   [] 4  
© 2007, Trustees of [address] [place] [address] [address] [id number], under license to [employer]. All rights reserved Score:  Initial: 20 Most Recent: 17 (Date: 6/15/20 ) Interpretation of Tool:  Represents activities that are increasingly more difficult (i.e. Bed mobility, Transfers, Gait). Medical Necessity:    
· Patient is expected to demonstrate progress in  
· strength, range of motion, balance, coordination, and functional technique ·  to  
· increase independence with ambulation and mobility · . Reason for Services/Other Comments: 
· Patient continues to require skilled intervention due to · Gross weakness, poor balance, increased risk of falls · . Use of outcome tool(s) and clinical judgement create a POC that gives a: Clear prediction of patient's progress: LOW COMPLEXITY  
  
 
 
 
TREATMENT:  
  
Pre-treatment Symptoms/Complaints: \"If I have to\" Pain: Initial:  
Pain Intensity 1: 0  Post Session: 0/10 Therapeutic Activity: (30 minutes): Therapeutic activities including Bed mobility, sit-stand transfer training from bed and chair, standing static/dynamic mobility, Chair transfers, commode transfers and Ambulation on level ground to improve mobility, strength, balance, coordination and activity tolerance. Required minimal   to promote static and dynamic balance in standing and promote motor control of bilateral, lower extremity(s). Therapeutic Exercise: (10 Minutes):  Exercises per grid below to improve mobility, strength and balance. Required minimal visual and verbal cues to promote proper body mechanics and exercise form. Date: 
6/5/20 Date: 
6/8/20 Date: 
6/10 Date: 
6-12-20 Date: 
6/15/20 Date 6/17/20 Date: 
6/18/20 Date 6/22/20 Activity/Exercise Parameters Parameters Parameters Ankle pumps 20 X 20 B X 10 30x 15x AB 15x AB 15x AB 10x B Heel slides 2 x 10  X 10 aa 20x Leg slides (hip abd/add) 2 x 10  X 10 aa 10x Seated knee extension 10 B X 15 B   15x AB 15x AB 15x AB 10x B Seated hip flexion 10 B X 15 B X 5 5    10x B Standing marching   X 5  15x AB 15x AB 15x AB   
HRs    30 Seated hip aBd     15x Ab 15x AB 15x AB 10x B Braces/Orthotics/Lines/Etc:  
· traore catheter · nasal cannula Treatment/Session Assessment:   
· Response to Treatment: slow progress, weak · Interdisciplinary Collaboration:  
o Physical Therapy Assistant 
o Registered Nurse · After treatment position/precautions:  
o Up in the chair. o Bed/Chair-wheels locked 
o Call light in hand · Compliance with Program/Exercises: Compliant all of the time · Recommendations/Intent for next treatment session: \"Next visit will focus on advancements to more challenging activities\". Total Treatment Duration: PT Patient Time In/Time Out Time In: 0930 Time Out: 1015 Caryle Morel, PTA

## 2020-06-22 NOTE — PROGRESS NOTES
Hospitalist Note Admit Date:  2020 11:00 PM  
Name:  Lopez Mayfield Age:  79 y.o. 
:  1940 MRN:  [id number] PCP:  Fercho Infante MD 
Treatment Team: Attending Provider: Abigail Martines MD; Utilization Review: Brett Chavez RN; Consulting Provider: Meche Mejía RN; Physical Therapy Assistant: Palma Del Angel PTA; Occupational Therapist: Hyacinth Head OTR/JOSE; Care Manager: Shirley Philip RN 
 
HPI/Subjective:  
 
Mr. 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 He has been seen by pulm s/p bilateral thoracentesis -26, right thoracentesis 6-3, bilateral thoracentesis 6-9,  and diuresis. Studies transudate, felt due to hypoalbuminemia. [place] 60-65%. He has required high flow O2. He has compelted 8 days zosyn. In regards to his metastatic rectal cancer, he has known anal stricture and declined prior surgical diverting ostomy. He is s/p anal dilation per [facility] surgery in past. CTAP showed constipation/ colonic distention to rectum suspicious for obstruction. oncology recommends followup at discharge, last chemo completed . He has been seen by GI s/p 6-2 flex sig with disimpaction/ no stricture found. Recommends [facility] colorectal followup. He required 1 unit PRBC 6-15. Discharge plans pending to SNF.  up to chair, wife present, ate ok, had BM, no figueroa shortness of breath Objective:  
 
Patient Vitals for the past 24 hrs: 
 Temp Pulse Resp BP SpO2  
20 0907     98 %  
20 0723 98 °F (36.7 °C) (!) 103 18 132/68 98 %  
20 0420    123/65   
20 0318 98.2 °F (36.8 °C) (!) 104 18 123/47 96 %  
20 0005 98.4 °F (36.9 °C) (!) 107 18 111/61 95 % 20 2233    143/72   
20 98.7 °F (37.1 °C) (!) 106 18 153/70 93 % 06/21/20 2043     91 %  
06/21/20 1735 97.6 °F (36.4 °C) (!) 110 20 146/67 93 % 06/21/20 1454     94 % Oxygen Therapy O2 Sat (%): 98 % (06/22/20 0907) Pulse via Oximetry: 100 beats per minute (06/22/20 0907) O2 Device: Nasal cannula (06/22/20 0907) O2 Flow Rate (L/min): 3 l/min (06/22/20 0907) O2 Temperature: 87.8 °F (31 °C) (06/12/20 1454) FIO2 (%): 32 % (06/14/20 1429) ETCO2 (mmHg): 93 mmHg (06/01/20 1624) Estimated body mass index is 23.06 kg/m² as calculated from the following: 
  Height as of this encounter: 6' (1.829 m). Weight as of this encounter: 77.1 kg (170 lb). Intake/Output Summary (Last 24 hours) at 6/22/2020 1222 Last data filed at 6/22/2020 7183 Gross per 24 hour Intake  Output 2200 ml Net -2200 ml *Note that automatically entered I/Os may not be accurate; dependent on patient compliance with collection and accurate  by techs. General:    Alert , elderly, no distress. CV:   RRR. No murmur, rub, or gallop. No edema Lungs:   Upper airway congestion anterior Abdomen:   Soft, nontender, nondistended. Decreased BS Extremities: Warm and dry. Skin:     No rashes or jaundice. Neuro:  No gross focal deficits Data Review: 
I have reviewed all labs, meds, and studies from the last 24 hours: 
Recent Results (from the past 24 hour(s)) GLUCOSE, POC Collection Time: 06/21/20  4:37 PM  
Result Value Ref Range Glucose (POC) 189 (H) 65 - 100 mg/dL GLUCOSE, POC Collection Time: 06/21/20  9:16 PM  
Result Value Ref Range Glucose (POC) 177 (H) 65 - 100 mg/dL METABOLIC PANEL, BASIC Collection Time: 06/22/20  6:20 AM  
Result Value Ref Range Sodium 135 (L) 136 - 145 mmol/L Potassium 4.5 3.5 - 5.1 mmol/L Chloride 97 (L) 98 - 107 mmol/L  
 CO2 20 (L) 21 - 32 mmol/L Anion gap 18 (H) 7 - 16 mmol/L Glucose 136 (H) 65 - 100 mg/dL BUN 13 8 - 23 MG/DL  Creatinine 0.68 (L) 0.8 - 1.5 MG/DL  
 GFR est AA >60 >60 ml/min/1.73m2 GFR est non-AA >60 >60 ml/min/1.73m2 Calcium 8.5 8.3 - 10.4 MG/DL MAGNESIUM Collection Time: 06/22/20  6:20 AM  
Result Value Ref Range Magnesium 2.3 1.8 - 2.4 mg/dL CBC W/O DIFF Collection Time: 06/22/20  6:20 AM  
Result Value Ref Range WBC 6.1 4.3 - 11.1 K/uL  
 RBC 2.62 (L) 4.23 - 5.6 M/uL HGB 7.5 (L) 13.6 - 17.2 g/dL HCT 24.4 (L) 41.1 - 50.3 % MCV 93.1 79.6 - 97.8 FL  
 MCH 28.6 26.1 - 32.9 PG  
 MCHC 30.7 (L) 31.4 - 35.0 g/dL  
 RDW 16.7 (H) 11.9 - 14.6 % PLATELET 538 (L) 145 - 450 K/uL MPV 10.2 9.4 - 12.3 FL ABSOLUTE NRBC 0.00 0.0 - 0.2 K/uL GLUCOSE, POC Collection Time: 06/22/20  8:03 AM  
Result Value Ref Range Glucose (POC) 140 (H) 65 - 100 mg/dL Current Meds: 
Current Facility-Administered Medications Medication Dose Route Frequency  insulin glargine (LANTUS) injection 8 Units  8 Units SubCUTAneous QHS  tamsulosin (FLOMAX) capsule 0.4 mg  0.4 mg Oral DAILY  furosemide (LASIX) injection 40 mg  40 mg IntraVENous Q12H  
 insulin lispro (HUMALOG) injection   SubCUTAneous AC&HS  insulin lispro (HUMALOG) injection 4 Units  4 Units SubCUTAneous TIDAC  ferrous sulfate tablet 325 mg  1 Tab Oral DAILY WITH BREAKFAST  0.9% sodium chloride infusion 250 mL  250 mL IntraVENous PRN  
 hydrALAZINE (APRESOLINE) 20 mg/mL injection 10 mg  10 mg IntraVENous Q6H PRN  
 nystatin (MYCOSTATIN) 100,000 unit/mL oral suspension 500,000 Units  500,000 Units Oral QID  simethicone (MYLICON) tablet 80 mg  80 mg Oral QID PRN  potassium chloride (K-DUR, KLOR-CON) SR tablet 20 mEq  20 mEq Oral TID  albuterol-ipratropium (DUO-NEB) 2.5 MG-0.5 MG/3 ML  3 mL Nebulization Q6HWA RT  
 magnesium hydroxide (MILK OF MAGNESIA) 400 mg/5 mL oral suspension 30 mL  30 mL Oral DAILY  senna-docusate (PERICOLACE) 8.6-50 mg per tablet 1 Tab  1 Tab Oral BID  
 bisacodyL (DULCOLAX) suppository 10 mg  10 mg Rectal DAILY  0.9% sodium chloride infusion 250 mL  250 mL IntraVENous PRN  
 amLODIPine (NORVASC) tablet 10 mg  10 mg Oral DAILY  docusate sodium (COLACE) capsule 100 mg  100 mg Oral PRN  
 lisinopriL (PRINIVIL, ZESTRIL) tablet 20 mg  20 mg Oral DAILY  meloxicam (MOBIC) tablet 7.5 mg  7.5 mg Oral DAILY  sodium chloride tablet 2 g  2 g Oral BID  traMADoL (ULTRAM) tablet 50 mg  50 mg Oral Q6H PRN  
 sodium chloride (NS) flush 5-40 mL  5-40 mL IntraVENous Q8H  
 sodium chloride (NS) flush 5-40 mL  5-40 mL IntraVENous PRN  
 acetaminophen (TYLENOL) tablet 650 mg  650 mg Oral Q4H PRN  
 ondansetron (ZOFRAN) injection 4 mg  4 mg IntraVENous Q4H PRN  
 enoxaparin (LOVENOX) injection 40 mg  40 mg SubCUTAneous Q24H Other Studies: 
Results for orders placed or performed during the hospital encounter of 20  
2D ECHO COMPLETE ADULT (TTE) W OR WO CONTR Narrative [place] [address] [place], [address] [facility] 
[phone] Transthoracic Echocardiogram 
2D, M-mode, Doppler, and Color Doppler Patient: Khurram Navarro 
MR #: [id number] : [id number] Age: 79 years Gender: Male Study date: 26-May-2020 Account #: [de-identified] Height: 72 in 72 in 
Weight: 164 lb 163.7 lb 
BSA: 1.96 mï¾² 1.96 mï¾² Status:Routine Location: [facility] BP: 169/ 88 Allergies: NO KNOWN ALLERGENS Sonographer:  Erma Jovel [facility] Group:  [facility] Referring Physician:  Lucie Colbert MD 
Reading Physician:  Raul Gomez. MD N[facility] INDICATIONS: Bilateral pleural effusions *Pt. scanned supine. Unable to turn LLD. PROCEDURE: This was a routine study. A transthoracic echocardiogram was 
performed. The study included complete 2D imaging, M-mode, complete spectral 
Doppler, and color Doppler. Intravenous contrast (Definity, 1 ml) was 
administered to opacify the left ventricle. Image quality was adequate. LEFT VENTRICLE: Size was normal. Systolic function was normal. Ejection 
fraction was estimated in the range of 60 % to 65 %. There were no regional 
wall motion abnormalities. Wall thickness was normal. Left ventricular 
diastolic function parameters were normal. E/e' av.46. RIGHT VENTRICLE: The size was normal. Systolic function was normal. 
 
LEFT ATRIUM: Size was normal. 
 
RIGHT ATRIUM: Size was normal. 
 
SYSTEMIC VEINS: IVC: The inferior vena cava was normal in size and course. AORTIC VALVE: The valve was structurally normal, tri-commissural. There was  
no 
evidence for stenosis. There was no insufficiency. MITRAL VALVE: Valve structure was normal. There was no evidence for stenosis. There was no regurgitation. TRICUSPID VALVE: The valve structure was normal. There was no evidence for 
stenosis. There was trivial regurgitation. PULMONIC VALVE: Not well visualized. There was no evidence for stenosis. There 
was no insufficiency. PERICARDIUM: There was no pericardial effusion. AORTA: The root exhibited normal size. SUMMARY: 
 
-  Left ventricle: Systolic function was normal. Ejection fraction was 
estimated in the range of 60 % to 65 %. There were no regional wall motion 
abnormalities. -  Pericardium: There was no pericardial effusion. SYSTEM MEASUREMENT TABLES 
 
2D Ao Diam: 3.2 cm 
LA Diam: 2.6 cm 
LAEDV Index (A-L): 25.9 ml/m2 %FS: 34.9 % IVSd: 1.1 cm 
LVIDd: 3.7 cm 
LVIDs: 2.4 cm 
LVOT Diam: 2.1 cm 
LVPWd: 1.2 cm Prepared and signed by 
 
Ida Justice. MD Nara [facility] Signed 26-May-2020 16:55:26 No results found. All Micro Results Procedure Component Value Units Date/Time FUNGUS CULTURE AND SMEAR [[id number]] Collected:  20 1029 Order Status:  Completed Specimen:  Miscellaneous sample Updated:  20 9465 Source LEFT Comment: Pleural Fluid Specimen Fungus stain Direct Inoculation Fungus (Mycology) Culture Other source received Comment: (NOTE) Performed At: [address] [id number] Allison Martínez MD [id number] EMERGENT DISEASE PANEL [[id number]] Collected:  06/10/20 1203 Order Status:  Completed Specimen:  Not Specified Updated:  06/12/20 2777 Emergent disease panel Not detected Comment: Performed at [facility] RESULTS SCANNED IN [facility] 
  
  
 CULTURE, BODY FLUID W Eagle Mcintosh [[id number]] Collected:  06/09/20 1029 Order Status:  Completed Specimen:  Left Updated:  06/11/20 8041 Special Requests: NO SPECIAL REQUESTS     
  GRAM STAIN 0 TO 1 WBC'S SEEN PER OIF  
   NO DEFINITE ORGANISM SEEN Culture result: NO GROWTH 2 DAYS     
 AFB CULTURE + SMEAR W/RFLX ID FROM CULTURE [[id number]] Collected:  06/09/20 1029 Order Status:  Completed Specimen:  Miscellaneous sample Updated:  06/10/20 1536 Source LEFT Comment: Pleural Fluid Specimen AFB Specimen processing Concentration Acid Fast Smear Negative Comment: (NOTE) Performed At: [address] [id number] Allison Martínez MD [id number] Acid Fast Culture PENDING  
 FUNGUS CULTURE AND SMEAR [[id number]] Collected:  05/25/20 1315 Order Status:  Completed Specimen:  Miscellaneous sample Updated:  05/28/20 1536 Source LEFT Comment: Pleural Fluid Specimen Fungus stain Direct Inoculation Fungus (Mycology) Culture Other source received Comment: (NOTE) Performed At: [address] [id number] Allison Martínez MD :[id number] AFB CULTURE + SMEAR W/RFLX ID FROM CULTURE [[id number]] Collected:  05/25/20 1315 Order Status:  Completed Specimen:  Miscellaneous sample Updated:  05/27/20 1537 Source LEFT Comment: Pleural Fluid Specimen AFB Specimen processing Concentration Acid Fast Smear Negative Comment: (NOTE) Performed At: [facility] [address], [place] [id number] Modesto Wolfe MD YF:[id number] Acid Fast Culture PENDING  
 CULTURE, BODY FLUID W [address] [[id number]] Collected:  05/25/20 1315 Order Status:  Completed Specimen:  Left Updated:  05/27/20 0803 Special Requests: NO SPECIAL REQUESTS     
  GRAM STAIN 0 TO 1 WBC'S/OIF  
   NO DEFINITE ORGANISM SEEN Culture result: NO GROWTH 2 DAYS     
  
 
 
SARS-CoV-2 Lab Results \"Novel Coronavirus\" Test:  
Lab Results Component Value Date/Time COV2NT WRONG TEST ORDERED 06/10/2020 12:03 PM  
  
\"Emergent Disease\" Test:  
Lab Results Component Value Date/Time EDPR Not detected 06/10/2020 12:03 PM  
 
\"SARS-COV-2\" Test:  
Lab Results Component Value Date/Time XGCOVT WRONG TEST ORDERED 06/10/2020 12:03 PM  
 
As of: 4:01 PM on 6/22/2020 Assessment and Plan:  
 
Hospital Problems as of 6/22/2020 Date Reviewed: 6/17/2020 Codes Class Noted - Resolved POA Debility ICD-10-CM: R53.81 ICD-9-CM: 799.3  6/15/2020 - Present Yes Rectal obstruction ICD-10-CM: K62.4 ICD-9-CM: 569.2  6/1/2020 - Present Yes Aspiration pneumonia ([facility] [address] ICD-10-CM: J69.0 ICD-9-CM: 507.0  6/1/2020 - Present Yes Neutropenia ([facility] [address] ICD-10-CM: D70.9 ICD-9-CM: 288.00  5/25/2020 - Present Yes Pleural effusion on right ICD-10-CM: J90 ICD-9-CM: 511.9  5/25/2020 - Present Yes Overview Addendum 6/17/2020  8:25 AM by Kerri Gregg NP  
  6/3/20:  Right chest thoracentesis with 800 removed. 6/10/20:  Bilateral thoracentesis on L( 1100 cc ) and R( 1000 cc) removed * (Principal) Acute respiratory failure with hypoxia [facility]) ICD-10-CM: J96.01 
ICD-9-CM: 518.81  5/24/2020 - Present Yes Normocytic anemia ICD-10-CM: D64.9 ICD-9-CM: 285.9  5/24/2020 - Present Yes  
   
 CAP (community acquired pneumonia) ICD-10-CM: J18.9 ICD-9-CM: 055  5/24/2020 - Present Pleural effusion on left ICD-10-CM: J90 ICD-9-CM: 511.9  5/24/2020 - Present Yes Overview Signed 6/17/2020  8:24 AM by Nilay Walker NP  
   6/10/20:  Bilateral thoracentesis on L( 1100 cc ) and R( 1000 cc) removed Malignant neoplasm metastatic to bone [facility]) (Chronic) ICD-10-CM: C79.51 
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 
ICD-9-CM: 250.40, 583.81  7/3/2018 - Present Yes Diabetes mellitus due to underlying condition with hyperglycemia (HCC) (Chronic) ICD-10-CM: K39.55 ICD-9-CM: 249.80  12/21/2015 - Present Yes COVID-19 ruled out ICD-10-CM: Z03.818 ICD-9-CM: V71.83  12/15/2015 - Present Yes Hyponatremia ICD-10-CM: E87.1 ICD-9-CM: 276.1  11/23/2015 - Present Yes RESOLVED: Acute metabolic encephalopathy DMJ-35-FS: G93.41 
ICD-9-CM: 348.31  5/24/2020 - 5/24/2020 Plan: · Acute hypoxic respiratory failure, bilateral effusions: 
· Weaning O2 as tolerant, currently on 3 L NC for prior few days · Antibiotics stopped by pulmonary · Resumed IV lasix 40 mg every 12 hours, change to oral lasix tomorrow and see if can hold oxygenation · Discussed with Dr. Anya Yadav of pulmonary 6-19 and will try diuretics prior to reconsidering repeat tap · Anal cancer with stricture and rectal obstruction: · Declines surgery · continued bowel regimen · Outpatient colorectal and oncology followups · HTN:  
· continued norvasc, lisinopril · DM2: 
· Holding amaryl · Continued SSI, premeal insulin · Continued  lantus · Anemia: 
· followup CBC ·  transfuse HGB < 7 last on 6-15 · Urine retention: · Replaced traore · continued flomax · Voiding trial possibly prior to discharge DC planning/Dispo:  Pending to SNF Diet:  DIET NUTRITIONAL SUPPLEMENTS 
DIET REGULAR 
DVT ppx:  SCD Signed: Sammy Simons MD

## 2020-06-22 NOTE — PROGRESS NOTES
Assessment complete via flow sheet. Pt A&Ox4. Respirations even and unlabored. S1 S2 auscultated. Pt on 3L. Bowel sounds active, abdomen soft. Denies other needs. Bed in lowest position, side rails up x3, call bell in reach. Instructed to call for assistance. Pt verbalized understanding. Plan of care reviewed with patient.

## 2020-06-22 NOTE — DIABETES MGMT
Wife at bedside. Pt states that he is exhausted from physical therapy. Sitting in recliner chair with eyes closed. . Blood glucose range yesterday 177-251 with pt receiving a total 38 units of insulin (Lantus 5 and Humalog 30 units). Lantus dose has been increased to Lantus 8 units hs. Educated patient and spouse regarding current basal/bolus regimen of Lantus 8 units hs and Humalog sliding scale coverage 4x/day ac and hs, including type of insulin, timing with meals, onset, duration of effect, and peak of insulin dose. Patient and spouse verbalize basic understanding. Offered to provide insulin instruction to pt and spouse. They politely declined at this time. Per spouse plan is for pt to discharge to [facility]. Pt and spouse have no further questions regarding diabetes management at this time.

## 2020-06-23 NOTE — INTERVAL H&P NOTE
Update History & Physical 
 
The Patient's progress note from 6/23/20 was reviewed with the patient and I examined the patient. There was no change. The surgical site was confirmed by me based on US images. Plan:  The risk, benefits, expected outcome, and alternative to the recommended procedure have been discussed with the patient. Patient understands and wants to proceed with the procedure.  
 
Electronically signed by Kamryn Meyers MD on 6/23/2020 at 13:56 PM

## 2020-06-23 NOTE — PROGRESS NOTES
Interdisciplinary team rounds were held 6/23/2020 with the following team members:Care Management, Nursing, Physical Therapy and Physician . Plan of care discussed. See clinical pathway and/or care plan for interventions and desired outcomes.

## 2020-06-23 NOTE — PROCEDURES
Thoracentesis Procedure Note Procedure:  R Thoracentesis with ultrasound guidance Indication:  R Pleural effusion Summary: After informed consent was obtained, the patient was positioned upright. Ultrasound was used to identify the optimal spot for pleural drainage. The lower xxx intercostal space was anesthetized with 1% Lidocaine to achieve adequate anesthesia. The pleural space was entered with serosanguinous fluid identified. Lidocaine was instilled within the pleural space as well. A small stab incision was made at the site of local anesthesia and the thoracentesis catheter was advanced into the pleural space. Approximately 1100 ml of fluid was obtained with ease. The procedure was terminated after pleural drainage stopped. Air was not aspirated during the procedure. A pressure dressing was placed over the incision after adequate hemostasis was achieved. A CXR is not pending as a follow up US revealed a + lung slide c/w no PTX. The pleural fluid will be sent for protein, LDH, glucose, cell count with differential, Gram stain, culture and sensitivity, AFB smear and culture, fungal smear and culture, cytology and cell block. EBL: negligible Post Procedure Dx: Pleural effusion on R The patient's BP was very marginal during the procedure. Will hold on L thoracentesis at present. Will check CRP since effusion is now bloody.   
 
 
 
 Signed By: Cira Mittal MD

## 2020-06-23 NOTE — PROGRESS NOTES
RT told this nurse patient's O2 sat dropped to 63-67, pt placed on non-rebreather mask for recovery, O2 sat now 95% on 12 L. Dr. Sumit Goddard, hospitalist, notified via perfect serve. Radiology called and informed stat portable chest xray ordered for this patient.

## 2020-06-23 NOTE — PROGRESS NOTES
Physical Therapy Note: Attempted to see patient this AM for physical therapy treatment session. Patient currently getting a bath, requesting PT come back later. Pt was seen yesterday by PT. Will follow and re-attempt as schedule permits/patient available. Thank you, Lauren Ambrose, PT, DPT

## 2020-06-23 NOTE — DIABETES MGMT
. Blood glucose range yesterday 136-278 with pt receiving Lantus 8 units and Humalog 22 units sliding scale coverage. Wife at bedside. Pt sleeping. Reviewed current insulin regimen: Lantus 8 units hs, Humalog 4 units 3x/day ac and Humalog sliding scale coverage 4x/day ac and hs. Pt scheduled for a thoracentesis. Wife has no further questions regarding diabetes management at this time. Pt waiting on a STR authorization.

## 2020-06-23 NOTE — PROGRESS NOTES
Pt sat up on side of bed for thoracentesis. Consent obtained. Time out performed. Pts vitals monitored throughout procedure. Right ultrasound done and pic taken of pleural fluid. ~1100 ml serosanguinous pleural fluid from R.  Pt tolerated procedure well with no adverse rxn. Specimens sent to the lab x 3 and labeled appropriately. Site dressed appropriately and report given to pts RN.    Lung sliding done and ultrasound findings reviewed by MD.

## 2020-06-23 NOTE — PROGRESS NOTES
Problem: Falls - Risk of 
Goal: *Absence of Falls Description: Document Ronniisabella Hooper Fall Risk and appropriate interventions in the flowsheet. Outcome: Progressing Towards Goal 
Note: Fall Risk Interventions: 
Mobility Interventions: PT Consult for mobility concerns Mentation Interventions: Adequate sleep, hydration, pain control, Door open when patient unattended, Family/sitter at bedside Medication Interventions: Evaluate medications/consider consulting pharmacy Elimination Interventions: Call light in reach, Toileting schedule/hourly rounds Problem: Pain Goal: *PALLIATIVE CARE:  Alleviation of Pain Outcome: Progressing Towards Goal 
  
Problem: Pneumonia: Discharge Outcomes Goal: *Tolerating diet Outcome: Progressing Towards Goal 
Goal: *Describes available resources and support systems Outcome: Progressing Towards Goal 
Goal: *Optimal pain control at patient's stated goal 
Outcome: Progressing Towards Goal 
  
Problem: Nutrition Deficit Goal: *Optimize nutritional status Outcome: Progressing Towards Goal

## 2020-06-23 NOTE — PROGRESS NOTES
Update clinicals fax to [facility] today, pending response Per Constance La with [facility] they can take pt with Pleurex drain but will need to get carve out with insurance to pay for supplys,  Referral made to [facility], chart being reviewed by admissions staff and will get back with CM about accepting or denial.

## 2020-06-23 NOTE — PROGRESS NOTES
Ewa Roberts Admission Date: 5/24/2020 Daily Progress Note: 6/23/2020 The patient's chart is reviewed and the patient is discussed with the staff. Mr. 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 He is followed by . He has undergone multiple thoracenteses - 5/25 - right, 1000 mls, 5/29 - right, 1300mls/left 800 mls, 6/3 - right, 800 mls, 6/9 - right, 1000 mls, left 1100 mls. The fluid has been transudative with rare atypical cells. ECHO with EF 60-65%. He is on lasix with a negative balance. He is currently on 12 liters oxygen. In regards to his metastatic rectal cancer, he has known anal stricture and declined prior surgical diverting ostomy. He is s/p anal dilation per [facility] surgery in past.  Oncology recommends followup at discharge, last chemo completed 5,2020. He has been seen by GI s/p 6-2 flex sig with disimpaction/ no stricture found. Recommends [facility] followup. He required 1 unit PRBC 6-15. Subjective:  
  Got more short of breath this AM. Now on 12 liters oxygen after saturation dropped to 64%. He is very weak. Wife at bedside. Current Facility-Administered Medications Medication Dose Route Frequency  furosemide (LASIX) injection 40 mg  40 mg IntraVENous BID  insulin glargine (LANTUS) injection 8 Units  8 Units SubCUTAneous QHS  tamsulosin (FLOMAX) capsule 0.4 mg  0.4 mg Oral DAILY  insulin lispro (HUMALOG) injection   SubCUTAneous AC&HS  insulin lispro (HUMALOG) injection 4 Units  4 Units SubCUTAneous TIDAC  ferrous sulfate tablet 325 mg  1 Tab Oral DAILY WITH BREAKFAST  0.9% sodium chloride infusion 250 mL  250 mL IntraVENous PRN  
 hydrALAZINE (APRESOLINE) 20 mg/mL injection 10 mg  10 mg IntraVENous Q6H PRN  
  nystatin (MYCOSTATIN) 100,000 unit/mL oral suspension 500,000 Units  500,000 Units Oral QID  simethicone (MYLICON) tablet 80 mg  80 mg Oral QID PRN  potassium chloride (K-DUR, KLOR-CON) SR tablet 20 mEq  20 mEq Oral TID  albuterol-ipratropium (DUO-NEB) 2.5 MG-0.5 MG/3 ML  3 mL Nebulization Q6HWA RT  
 magnesium hydroxide (MILK OF MAGNESIA) 400 mg/5 mL oral suspension 30 mL  30 mL Oral DAILY  senna-docusate (PERICOLACE) 8.6-50 mg per tablet 1 Tab  1 Tab Oral BID  
 bisacodyL (DULCOLAX) suppository 10 mg  10 mg Rectal DAILY  0.9% sodium chloride infusion 250 mL  250 mL IntraVENous PRN  
 amLODIPine (NORVASC) tablet 10 mg  10 mg Oral DAILY  docusate sodium (COLACE) capsule 100 mg  100 mg Oral PRN  
 lisinopriL (PRINIVIL, ZESTRIL) tablet 20 mg  20 mg Oral DAILY  meloxicam (MOBIC) tablet 7.5 mg  7.5 mg Oral DAILY  sodium chloride tablet 2 g  2 g Oral BID  traMADoL (ULTRAM) tablet 50 mg  50 mg Oral Q6H PRN  
 sodium chloride (NS) flush 5-40 mL  5-40 mL IntraVENous Q8H  
 sodium chloride (NS) flush 5-40 mL  5-40 mL IntraVENous PRN  
 acetaminophen (TYLENOL) tablet 650 mg  650 mg Oral Q4H PRN  
 ondansetron (ZOFRAN) injection 4 mg  4 mg IntraVENous Q4H PRN  
 enoxaparin (LOVENOX) injection 40 mg  40 mg SubCUTAneous Q24H Objective:  
 
Vitals:  
 06/23/20 0758 06/23/20 0800 06/23/20 0807 06/23/20 1110 BP: 118/59   120/62 Pulse: (!) 107   (!) 102 Resp: 22   18 Temp: 97.9 °F (36.6 °C)   98.6 °F (37 °C) SpO2: (!) 63% 95% 91% 90% Weight:      
Height:      
 
Intake and Output:  
06/21 1901 - 06/23 0700 In: -  
Out: [address] No intake/output data recorded. Physical Exam:  
Constitutional:  the patient is well developed and in no acute distress. He is very weak and appears chronically ill HEENT:  Sclera clear, pupils equal, oral mucosa moist 
Lungs: clear bilaterally but decreased from the posterior - especially on the right. Wearing high flow cannula Cardiovascular:  RRR without M,G,R 
Abd/GI: soft and non-tender; with positive bowel sounds. Ext: warm without cyanosis. There is no lower leg edema. Musculoskeletal: moves all four extremities with equal strength Skin:  no jaundice or rashes, no wounds Neuro: no gross neuro deficits Musculoskeletal: can ambulate. No deformity Psychiatric: Calm. Review of Systems - Review of Systems Constitutional: Positive for malaise/fatigue. Respiratory: Positive for shortness of breath. Cardiovascular: Negative. Gastrointestinal:  
     Poor appetite Lines: peripheral IV, traore CHEST XRAY:   
 
 
LAB Recent Labs  
  06/23/20 
0912 06/22/20 
9272 06/21/20 
0715 WBC 5.5 6.1 6.2 HGB 7.6* 7.5* 7.5* HCT 24.1* 24.4* 23.9*  
* 124* 122* Recent Labs  
  06/23/20 
0912 06/22/20 
8760 06/21/20 
0715 * 135* 133* K 4.8 4.5 4.7 CL 97* 97* 97* CO2 32 20* 31 * 136* 202* BUN 14 13 13 CREA 0.72* 0.68* 0.67* MG 2.4 2.3 1.9 Lab Results Component Value Date/Time Protein, total 6.6 05/24/2020 05:10 PM  
 Albumin 2.5 (L) 05/24/2020 05:10 PM  
 
 
 
Assessment:  (Medical Decision Making) Hospital Problems  Date Reviewed: 6/17/2020 Codes Class Noted POA Debility ICD-10-CM: R53.81 ICD-9-CM: 799.3  6/15/2020 Yes Severe Rectal obstruction ICD-10-CM: K62.4 ICD-9-CM: 569.2  6/1/2020 Yes Aspiration pneumonia ([facility] [address] ICD-10-CM: J69.0 ICD-9-CM: 507.0  6/1/2020 Yes Neutropenia ([facility] [address] ICD-10-CM: D70.9 ICD-9-CM: 288.00  5/25/2020 Yes Pleural effusion on right ICD-10-CM: J90 ICD-9-CM: 511.9  5/25/2020 Yes Overview Addendum 6/17/2020  8:25 AM by Hannah Mcmahon NP  
  6/3/20:  Right chest thoracentesis with 800 removed. 6/10/20:  Bilateral thoracentesis on L( 1100 cc ) and R( 1000 cc) removed   
  
  
 :arge; will drain  * (Principal) Acute respiratory failure with hypoxia (HCC) ICD-10-CM: J96.01 
 ICD-9-CM: 518.81  5/24/2020 Yes Worse Normocytic anemia ICD-10-CM: D64.9 ICD-9-CM: 285.9  5/24/2020 Yes Pleural effusion on left ICD-10-CM: J90 ICD-9-CM: 511.9  5/24/2020 Yes Overview Signed 6/17/2020  8:24 AM by Chet Harada, NP  
   6/10/20:  Bilateral thoracentesis on L( 1100 cc ) and R( 1000 cc) removed May also need tap. Malignant neoplasm metastatic to bone [facility]) (Chronic) ICD-10-CM: C79.51 
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 
ICD-9-CM: 250.40, 583.81  7/3/2018 Yes Diabetes mellitus due to underlying condition with hyperglycemia (HCC) (Chronic) ICD-10-CM: N79.57 ICD-9-CM: 249.80  12/21/2015 Yes COVID-19 ruled out ICD-10-CM: Z03.818 ICD-9-CM: V71.83  12/15/2015 Yes Hyponatremia ICD-10-CM: E87.1 ICD-9-CM: 276.1  11/23/2015 Yes Plan:  (Medical Decision Making) 1. Will check albumin - last one in May was 2.5. Supplement if < 2.0.  
2. IV lasix for now - has been on variable amounts of lasix during hospitalization. Has normal EF per echo. Pleural fluid with rare atypical cells - ? Secondary to malignancy versus other. Will check CRP in case inflammation playing a part. Monitor fluid balance, labs, blood pressure 3. Will plan to retap again today. He may need to have pleurex catheters placed -  to check to see which rehab facilities will accept if these are placed (she will check with [facility] as well). 4. Monitor oxygen needs Elvi Ortiz NP More than 50% of time documented was spent face-to-face contact with the patient and in the care of the patient on the floor/unit where the patient is located. Lungs: decreased BS at bases R > L Heart:  RRR with no Murmur/Rubs/Gallops Additional Comments: Worsening oxygenation related to expanding effusions. Will proceed with thoracentesis. BP borderline so will only do large side today. I have spoken with and examined the patient. I agree with the above assessment and plan as documented.  
 
Ady Barcenas MD

## 2020-06-23 NOTE — PROGRESS NOTES
Shift assessment completed. A/O x3 with periodic confusion. No acute distress or c/o pain at this time. Call light within reach.

## 2020-06-23 NOTE — PROGRESS NOTES
Spoke with Marlene Shi with [facility] about pts auth, will need to to send updates to get approval for admission to [facility] today by 4pm or [facility] [place] will need to be restarted, per Johana OT, pt will be seen by PT today but not on the schedule for OT today, made aware pt has not been seen by OT since 6/18 and he needs to be seen so we can get auth for admission to [facility] today if possible, states she will check with OT on unit to see if pt can be treated today or not. CM will remain available.

## 2020-06-23 NOTE — PROGRESS NOTES
Referral made to [facility] for possible admission also checking with [facility] to see if they will be able to care pt with karen Pleurex drains.  Message sent to Mallory Vargas states he will check and get back with CM

## 2020-06-23 NOTE — PROGRESS NOTES
Problem: Self Care Deficits Care Plan (Adult) Goal: *Acute Goals and Plan of Care (Insert Text) Description: 1. Patient will complete total body bathing and dressing with modified indpendence and adaptive equipment as needed. PROGRESSING 6/18/2020 2. Patient will complete toileting with modified independence and adaptive equipment as needed. PROGRESSING 6/18/2020 3. Patient will tolerate 20 minutes of OT treatment with up to 2 rest breaks to increase activity tolerance for ADLs. PROGRESSING 6/10/2020 4. Patient will complete functional transfers with independence and adaptive equipment as needed. PROGRESSING 6/10/2020 5. Patient will complete functional mobility for ADLs with modified independence and adaptive equipment as needed. PROGRESSING 6/4/2020 6. Patient will verbalize 2 energy conservation techniques with no cues from therapist to increase safety and independence with ADLs. Timeframe: 7 visits Outcome: Progressing Towards Goal 
  
OCCUPATIONAL THERAPY: Daily Note and AM 6/23/2020 INPATIENT: OT Visit Days: 6 Payor: Seferino Lynch / Plan: [address] [place] [address] HMO / Product Type: [employer] /  
  
NAME/AGE/GENDER: Wenona Collet is a 79 y.o. male PRIMARY DIAGNOSIS:  Acute metabolic encephalopathy [Y22.94] Acute respiratory failure with hypoxia (HCC) Acute respiratory failure with hypoxia (HCC) Procedure(s) (LRB): 
THORACENTESIS (N/A) ULTRASOUND (Bilateral) Day of Surgery ICD-10: Treatment Diagnosis:  
 · Generalized Muscle Weakness (M62.81) Precautions/Allergies: 
   
 Patient has no known allergies. ASSESSMENT:  
Per Initial Assessment: 
Mr. Jackelin Carrasquillo is a 79 y.o. male admitted with SOB, respiratory failure, hypoxia, acute metabolic encephalopathy. S/p thoracentesis. 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 At baseline pt lives with wife and reports independence with ADLs and ambulation. No hx falls. 6/23/2020: Pt found supine in bed upon arrival, alert and agreeable to OT treatment with encouragement. O2 sats 88-90% on 13L O2 HFNC, increased to 15L for activity. Pt practiced bed mobility with CGA/additional time. Intact sitting balance. Pt desatted to 79% on 15L in unsupported sitting despite cues for rest break and breathing technique. Sit > sidelying with CGA/additional time, sats still in mid 80s. Returned to supine with SBA, practiced breathing technique until sats improved. Rolling L <> R in supine with CGA-SBA to adjust linens. Pt with productive cough, able to clear secretions and reports less difficulty breathing. Pt left sidelying L with bony prominences offloaded. Weaned back down to 13L O2 HFNC with O2 sats 96% at end of session. RN alerted. Pt left with call bell within reach, head of bed raised. No progress towards goals this session, pt limited by SOB/desatting. Continue OT POC. This section established at most recent assessment PROBLEM LIST (Impairments causing functional limitations): 1. Decreased Strength 2. Decreased ADL/Functional Activities 3. Decreased Transfer Abilities 4. Decreased Ambulation Ability/Technique 5. Decreased Balance 6. Decreased Activity Tolerance 7. Decreased Pacing Skills 8. Decreased Work Simplification/Energy Conservation Techniques 9. Increased Fatigue INTERVENTIONS PLANNED: (Benefits and precautions of occupational therapy have been discussed with the patient.) 1. Activities of daily living training 2. Adaptive equipment training 3. Balance training 4. Clothing management 5. Donning&doffing training 6. Hygiene training 7. Neuromuscular re-eduation 8. Re-evaluation 9. Therapeutic activity 10. Therapeutic exercise TREATMENT PLAN: Frequency/Duration: Follow patient 3x/week to address above goals. Rehabilitation Potential For Stated Goals: Good REHAB RECOMMENDATIONS (at time of discharge pending progress):   
Placement: It is my opinion, based on this patient's performance to date, that Mr. Adrianne Portillo may benefit from intensive therapy at a [address] [place] [address] after discharge due to the functional deficits listed above that are likely to improve with skilled rehabilitation and concerns that he/she may be unsafe to be unsupervised at home due to impaired strength and activity tolerance impacting function. [address] Equipment:  
? RW, potentially BSC as shower chair OCCUPATIONAL PROFILE AND HISTORY:  
History of Present Injury/Illness (Reason for Referral): 
See H&P. Past Medical History/Comorbidities:  
Mr. Adrianne Portillo  has a past medical history of GERD (gastroesophageal reflux disease), Head and neck cancer ([facility] [address] (9/30/2015), History of squamous cell carcinoma, History of throat cancer (2015), Hypercholesteremia, Hypertension, Hypomagnesemia (5/20/2020), Rectal cancer ([facility] [address] (2018), Type 2 diabetes mellitus ([facility] [address] and Vomiting (2/23/2016). Mr. Adrianne Portillo  has a past surgical history that includes hx orthopaedic (Right, 1966); hx other surgical (9/9/15); hx heent; hx tonsillectomy; hx heent (2015); hx colonoscopy (05/2018); hx vascular access; hx lymph node dissection; hx other surgical; and flexible sigmoidoscopy (N/A, 6/2/2020). Social History/Living Environment:  
Home Environment: Private residence # Steps to Enter: 0 One/Two Story Residence: One story Living Alone: No 
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 At baseline pt lives with wife and reports independence with ADLs and ambulation. No hx falls. Personal Factors:   
      Sex:  male Age:  79 y.o.  
      Other factors that influence how disability is experienced by the patient:  Multiple co-morbidities Number of Personal Factors/Comorbidities that affect the Plan of Care: Expanded review of therapy/medical records (1-2):  MODERATE COMPLEXITY ASSESSMENT OF OCCUPATIONAL PERFORMANCE[de-identified]  
Activities of Daily Living:  
Basic ADLs (From Assessment) Complex ADLs (From Assessment) Feeding: Independent Oral Facial Hygiene/Grooming: Stand-by assistance Bathing: Minimum assistance Upper Body Dressing: Stand-by assistance Lower Body Dressing: Stand-by assistance Toileting: Stand by assistance Grooming/Bathing/Dressing Activities of Daily Living Bed/Mat Mobility Rolling: Contact guard assistance Supine to Sit: Contact guard assistance; Additional time Sit to Supine: Contact guard assistance Most Recent Physical Functioning:  
Gross Assessment: 
AROM: Generally decreased, functional(BUEs) Strength: Generally decreased, functional(BUEs) Coordination: Generally decreased, functional(BUEs) Tone: Normal(BUEs) Sensation: Impaired(Decreased sensation in BUE digits 4 & 5) Posture: 
Posture (WDL): Exceptions to [facility] Posture Assessment: Forward head, Rounded shoulders, Trunk flexion Balance: 
  Bed Mobility: 
Rolling: Contact guard assistance Supine to Sit: Contact guard assistance; Additional time Sit to Supine: Contact guard assistance Wheelchair Mobility: 
  
Transfers: 
   
 
    
 
Patient Vitals for the past 6 hrs: 
 BP BP Patient Position SpO2 O2 Flow Rate (L/min) Pulse  
06/23/20 0758 118/59 At rest (!) 63 %  (!) 107  
06/23/20 0800  At rest;Head of bed elevated (Comment degrees) 95 %    
06/23/20 0807   91 % 12 l/min   
06/23/20 1110 120/62 Head of bed elevated (Comment degrees) 90 %  (!) 102  
06/23/20 1137   (!) 79 % 15 l/min   
06/23/20 1156   96 % 13 l/min   
06/23/20 1336   90 % 12 l/min  Mental Status Neurologic State: Alert Orientation Level: Appropriate for age Cognition: Follows commands Perception: Appears intact Perseveration: No perseveration noted Safety/Judgement: Fall prevention Physical Skills Involved: 1. Range of Motion 2. Balance 3. Strength 4. Activity Tolerance 5. Gross Motor Control Cognitive Skills Affected (resulting in the inability to perform in a timely and safe manner): 1. None  Psychosocial Skills Affected: 1. Habits/Routines 2. Environmental Adaptation 3. Social Interaction 4. Emotional Regulation 5. Self-Awareness 6. Awareness of Others 7. Social Roles Number of elements that affect the Plan of Care: 5+:  HIGH COMPLEXITY CLINICAL DECISION MAKING:  
[facility] AM-PAC 6 Clicks Daily Activity Inpatient Short Form How much help from another person does the patient currently need. .. Total A Lot A Little None 1. Putting on and taking off regular lower body clothing? [] 1   [] 2   [x] 3   [] 4  
2. Bathing (including washing, rinsing, drying)? [] 1   [] 2   [x] 3   [] 4  
3. Toileting, which includes using toilet, bedpan or urinal?   [] 1   [] 2   [x] 3   [] 4  
4. Putting on and taking off regular upper body clothing? [] 1   [] 2   [x] 3   [] 4  
5. Taking care of personal grooming such as brushing teeth? [] 1   [] 2   [x] 3   [] 4  
6. Eating meals? [] 1   [] 2   [] 3   [x] 4  
© 2007, Trustees of [facility], under license to [employer]. All rights reserved Score:  Initial: 19 5/29/2020 Most Recent: X (Date: -- ) Interpretation of Tool:  Represents activities that are increasingly more difficult (i.e. Bed mobility, Transfers, Gait). Medical Necessity:    
· Patient demonstrates · good ·  rehab potential due to higher previous functional level. Reason for Services/Other Comments: 
· Patient continues to require skilled intervention due to  
· Inability to complete ADLs at prior level of independence · . Use of outcome tool(s) and clinical judgement create a POC that gives a: MODERATE COMPLEXITY  
 
 
 
TREATMENT:  
(In addition to Assessment/Re-Assessment sessions the following treatments were rendered) Pre-treatment Symptoms/Complaints:   
Pain: Initial:  
Pain Intensity 1: 0  Post Session:  same Therapeutic Activity: (    19 minutes): Therapeutic activities including Bed transfers and rolling in supine to improve mobility, strength and activity tolerance. Required minimal verbal cueing   to promote coordination of bilateral, upper extremity(s), lower extremity(s). Pt practiced bed mobility with CGA/additional time. Intact sitting balance. Pt desatted to 79% on 15L in unsupported sitting despite cues for rest break and breathing technique. Sit > sidelying with CGA/additional time, sats still in mid 80s. Returned to supine with SBA, practiced breathing technique until sats improved. Rolling L <> R in supine with CGA-SBA to adjust linens. Pt with productive cough, able to clear secretions and reports less difficulty breathing. Pt left sidelying L with bony prominences offloaded. Weaned back down to 13L O2 HFNC with O2 sats 96% at end of session. Braces/Orthotics/Lines/Etc:  
· traore catheter · O2 Device: HFNC Treatment/Session Assessment:   
· Response to Treatment:  Decreased activity tolerance, limited by SOB · Interdisciplinary Collaboration:  
o Occupational Therapist 
o Registered Nurse · After treatment position/precautions:  
o Bed/Chair-wheels locked 
o Bed in low position 
o Call light within reach 
o RN notified 
o Family at bedside 
o Sidelying L  
· Compliance with Program/Exercises: Compliant all of the time, Will assess as treatment progresses. · Recommendations/Intent for next treatment session: \"Next visit will focus on advancements to more challenging activities and reduction in assistance provided\". Total Treatment Duration: OT Patient Time In/Time Out Time In: 1137 Time Out: 1156 Bailey Blake, OTR/L

## 2020-06-24 NOTE — PROGRESS NOTES
The patient was placed on Opti-flow at this time. The settings are 80% FIO2 and  50 L/M. The patient's SAT is 96%.

## 2020-06-24 NOTE — INTERVAL H&P NOTE
Update History & Physical 
 
The Patient's progress note from 6/24/20 was reviewed. There was no change. The thoracentesis site was confirmed by US imaging. Plan:  The risk, benefits, expected outcome, and alternative to the recommended procedure have been discussed with the patient. Patient understands and wants to proceed with the procedure.  
 
Electronically signed by Henny Forbes MD on 6/24/2020 at 12:01 PM

## 2020-06-24 NOTE — CONSULTS
Palliative Care Patient: Ruiz Molina MRN: [id number]  SSN: [id number] YOB: 1940  Age: 79 y.o. Sex: male Date of Request: 6/24/2020 Date of Consult:  6/24/2020 Reason for Consult:  family support and education and goals of care Requesting Physician: Dr. Carmen Cunningham Assessment/Plan:  
 
Principal Diagnosis: Dyspnea  R06.00 Additional Diagnoses: · Debility, Unspecified  R53.81 
· Frailty  R54 · Respiratory Failure, Acute on Chronic  J96.20 · Counseling, Encounter for Medical Advice  Z71.9 
· Encounter for Palliative Care  Z51.5 Palliative Performance Scale (PPS): PPS: 50% Medical Decision Making:  
Reviewed and summarized labs and imaging. Met with pt and wife at bedside. Dr. Sanchez Doctor was present as well. Discussed severity of illness and pt continued functional decline. Reviewed potential for pt to end up on life support and likely becoming vent dependent. Pt states he would wish to remain comfortable however pt wife wishes for pt to be aggressive with treatment then make decisions regarding stopping treatment once pt is on ventilator. I discussed the possibility of pt requiring a PEG and Trach for her to consider. Wife states she will make that decision with her son when the time comes. Will continue to follow and assist with further goals discussion. Will discuss findings with members of the interdisciplinary team.   
 
Thank you for this referral.    
 
 
Subjective:  
 
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  He has been seen by pulm s/p bilateral thoracentesis 5-26, right thoracentesis 6-3, bilateral thoracentesis 6-9,  and diuresis.  Studies transudate, felt due to hypoalbuminemia. ECHOEF 60-65%.  He has required high flow O2.  He has compelted 8 days zosyn. In regards to his metastatic rectal cancer, he has known anal stricture and declined prior surgical diverting ostomy. He is s/p anal dilation per [facility] surgery in past. CTAP showed constipation/ colonic distention to rectum suspicious for obstruction. oncology recommends followup at discharge, last chemo completed [place] has been seen by GI s/p 6-2 flex sig with disimpaction/ no stricture found. Recommends [facility] colorectal followup. He required 1 unit PRBC 6-15. Pt with continued functional decline and need for continued thoracentesis. Advance Directive: No      
Code Status:  Full Code Health Care Power of : pt spouse is legal decision maker Past Medical History:  
Diagnosis Date  GERD (gastroesophageal reflux disease)   
 pt wife denies  Head and neck cancer ([address] 9/30/2015  
 radiation and chemo and and surgery  History of squamous cell carcinoma   
 to neck area- 38 radiation treatement and 8 chemo treatment  History of throat cancer 2015  
 peg tube for about 4 months  Hypercholesteremia   
 managed well with meds  Hypertension   
 managed well with meds  Hypomagnesemia 5/20/2020  Rectal cancer ([address] 2018  
 28 radiation treatment and chemo pump  Type 2 diabetes mellitus ([address] oral agent only/AVG BS: /no s.s of hypoglycemia  Vomiting 2/23/2016  
 pt wife denies Past Surgical History:  
Procedure Laterality Date 2021 Keiry Crawford Alanna N/A 6/2/2020 SIGMOIDOSCOPY FLEXIBLE performed by Cristo Sanchez MD at [facility] ENDOSCOPY  
 HX COLONOSCOPY  05/2018  HX HEENT    
 sinus  HX HEENT  2015  
 surgery on right throat for squamous cell ca right ear wedge  HX LYMPH NODE DISSECTION    
 HX ORTHOPAEDIC Right 1966  
 right leg  HX OTHER SURGICAL  9/9/15 EXCISION OF RIGHT Neck and PARAPHARYNGEAL MASS/RIGHT EAR WEDGE RESECTION  
 HX OTHER SURGICAL    
 peg placed and removed  HX TONSILLECTOMY  HX VASCULAR ACCESS    
 placed and removed Family History Problem Relation Age of Onset  Emphysema Father  Lung Disease Father  Cancer Brother Colon  Heart Disease Sister  Hypertension Sister  Heart Disease Sister  Hypertension Sister  Heart Disease Brother  Hypertension Brother  Heart Disease Brother  Hypertension Brother  Heart Disease Brother  Hypertension Brother  Heart Disease Brother  Hypertension Brother  Heart Disease Brother  Hypertension Brother Social History Tobacco Use  Smoking status: Never Smoker  Smokeless tobacco: Never Used Substance Use Topics  Alcohol use: No  
 
Prior to Admission medications Medication Sig Start Date End Date Taking? Authorizing Provider  
potassium chloride (K-DUR, KLOR-CON) 20 mEq tablet Take 2 Tabs by mouth two (2) times a day. 5/30/20  Yes Nydia High, DO  
magnesium oxide (MAG-OX) 400 mg tablet Take 1 Tab by mouth daily. 5/30/20  Yes Nydia High, DO  
furosemide (LASIX) 20 mg tablet Take 1 Tab by mouth two (2) times a day. 5/30/20  Yes Nydia High,   
lisinopriL (PRINIVIL, ZESTRIL) 20 mg tablet Take 20 mg by mouth daily. Other, MD [place]  
glimepiride (AMARYL) 4 mg tablet Take 4 mg by mouth every morning. [place] Hobbs MD  
tolvaptan ([place]) tab tablet Take 15 mg by mouth daily. [place] Hobbs MD  
traMADoL (ULTRAM) 50 mg tablet Take 1 Tab by mouth every six (6) hours as needed for Pain for up to 30 days. Max Daily Amount: 200 mg. Indications: pain 5/1/20 5/31/20  Rosalba Ackerman MD  
meloxicam (MOBIC) 7.5 mg tablet Take 1 Tab by mouth daily. Indications: arthralgias 4/17/20   Jay Parker MD  
sodium chloride 1 gram tablet TAKE 2 TABS BY MOUTH TWO (2) TIMES A DAY. 4/17/20   Luis Moraes MD  
TRUEPLUS LANCETS 28 gauge misc  11/6/19   Provider, Historical  
ferrous sulfate 324 mg (65 mg iron) tablet Take  by mouth Daily (before breakfast). Provider, Historical  
docusate sodium (COLACE) 100 mg capsule Take 100 mg by mouth as needed for Constipation. Provider, Historical  
amLODIPine (NORVASC) 10 mg tablet Take 10 mg by mouth daily. In am    Provider, Historical  
metFORMIN (GLUCOPHAGE) 1,000 mg tablet Take 1,000 mg by mouth daily. In am  Indications: type 2 diabetes mellitus    Provider, Historical  
 
 
No Known Allergies Review of systems negative with exception of noted above Objective:  
 
Visit Vitals /69 Pulse (!) 106 Temp 97.8 °F (36.6 °C) Resp 19 Ht 6' (1.829 m) Wt 170 lb (77.1 kg) SpO2 96% BMI 23.06 kg/m² Physical Exam: 
 
General:  Cooperative. No acute distress. frail Eyes:  Conjunctivae/corneas clear Nose: Nares normal. Septum midline. Neck: Supple, symmetrical, trachea midline, no JVD Lungs:   Coarse bilateral bs Heart:  Regular rate and rhythm, no murmur Abdomen:   Soft, non-tender, non-distended. Positive bowel sounds Extremities: Normal, atraumatic, no cyanosis or edema Skin: Skin color, texture, turgor normal. No rash or lesions. Neurologic: Nonfocal  
Psych: Alert and oriented Assessment:  
 
Hospital Problems  Date Reviewed: 6/24/2020 Codes Class Noted POA Debility ICD-10-CM: R53.81 ICD-9-CM: 799.3  6/15/2020 Yes Rectal obstruction ICD-10-CM: K62.4 ICD-9-CM: 569.2  6/1/2020 Yes Aspiration pneumonia ([facility] ICD-10-CM: J69.0 ICD-9-CM: 507.0  6/1/2020 Yes Neutropenia ([facility] ICD-10-CM: D70.9 ICD-9-CM: 288.00  5/25/2020 Yes Pleural effusion on right ICD-10-CM: J90 ICD-9-CM: 511.9  5/25/2020 Yes Overview Addendum 6/24/2020  8:13 AM by Marybeth Mora NP  
  6/23/20L  Right thoracentesis:  1100 ml bloody effusion removed 6/10/20:  Bilateral thoracentesis on L( 1100 cc ) and R( 1000 cc) removed 6/3/20:  Right chest thoracentesis with 800 removed. * (Principal) Acute respiratory failure with hypoxia ([address] ICD-10-CM: J96.01 
ICD-9-CM: 518.81  5/24/2020 Yes Normocytic anemia ICD-10-CM: D64.9 ICD-9-CM: 285.9  5/24/2020 Yes Pleural effusion on left ICD-10-CM: J90 ICD-9-CM: 511.9  5/24/2020 Yes Overview Signed 6/17/2020  8:24 AM by Ankit Holguin NP  
   6/10/20:  Bilateral thoracentesis on L( 1100 cc ) and R( 1000 cc) removed Malignant neoplasm metastatic to bone [facility]) (Chronic) ICD-10-CM: C79.51 
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 
ICD-9-CM: 250.40, 583.81  7/3/2018 Yes Diabetes mellitus due to underlying condition with hyperglycemia (HCC) (Chronic) ICD-10-CM: W20.72 ICD-9-CM: 249.80  12/21/2015 Yes COVID-19 ruled out ICD-10-CM: Z03.818 ICD-9-CM: V71.83  12/15/2015 Yes Hyponatremia ICD-10-CM: E87.1 ICD-9-CM: 276.1  11/23/2015 Yes Signed By: James Fairbanks MD   
 June 24, 2020

## 2020-06-24 NOTE — PROGRESS NOTES
Andrea Ruiz Admission Date: 5/24/2020 Daily Progress Note: 6/24/2020 The patient's chart is reviewed and the patient is discussed with the staff. 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 He has undergone multiple thoracenteses - 5/25 - right, 1000 ml, 5/29 - right, 1300ml/left 800 ml, 6/3 - right, 800 ml, 6/9 - right, 1000 ml, left 1100 ml. The fluid has been transudative with rare atypical cells. ECHO with EF 60-65%. Is on Lasix with a negative balance and currently on 12 L NC O2. In regards to his metastatic rectal cancer, he has known anal stricture and declined prior surgical diverting ostomy. He is s/p anal dilation per [facility] surgery in past.  Oncology recommends followup at discharge, last chemo completed 5/2020. He has been seen by GI s/p 6-2 flex sig with disimpaction/ no stricture found. Recommends [facility] colorectal followup. He required 1 unit PRBC 6-15. Subjective:  
   
Rapid response called due to increased hypoxia on 15L NRB. Wife at bedside. Had repeat right thoracentesis 6/23 with 1100 ml bloody effusion removed. CRP elevated. Sat had dropped to 70 with HR up to 220. Hgb down to 6.4 and 2U PRBC planned. Review of Systems Constitutional: negative for fever, chills, sweats Cardiovascular: negative for chest pain, palpitations, syncope, edema Gastrointestinal: negative for dysphagia, reflux, vomiting, diarrhea, abdominal pain, or melena Neurologic: negative for focal weakness, numbness, headache Current Facility-Administered Medications Medication Dose Route Frequency  methylPREDNISolone (PF) (Solu-MEDROL) injection 60 mg  60 mg IntraVENous Q12H  furosemide (LASIX) injection 40 mg  40 mg IntraVENous BID  
  insulin glargine (LANTUS) injection 8 Units  8 Units SubCUTAneous QHS  tamsulosin (FLOMAX) capsule 0.4 mg  0.4 mg Oral DAILY  insulin lispro (HUMALOG) injection   SubCUTAneous AC&HS  insulin lispro (HUMALOG) injection 4 Units  4 Units SubCUTAneous TIDAC  ferrous sulfate tablet 325 mg  1 Tab Oral DAILY WITH BREAKFAST  0.9% sodium chloride infusion 250 mL  250 mL IntraVENous PRN  
 hydrALAZINE (APRESOLINE) 20 mg/mL injection 10 mg  10 mg IntraVENous Q6H PRN  
 nystatin (MYCOSTATIN) 100,000 unit/mL oral suspension 500,000 Units  500,000 Units Oral QID  simethicone (MYLICON) tablet 80 mg  80 mg Oral QID PRN  potassium chloride (K-DUR, KLOR-CON) SR tablet 20 mEq  20 mEq Oral TID  albuterol-ipratropium (DUO-NEB) 2.5 MG-0.5 MG/3 ML  3 mL Nebulization Q6HWA RT  
 magnesium hydroxide (MILK OF MAGNESIA) 400 mg/5 mL oral suspension 30 mL  30 mL Oral DAILY  senna-docusate (PERICOLACE) 8.6-50 mg per tablet 1 Tab  1 Tab Oral BID  
 bisacodyL (DULCOLAX) suppository 10 mg  10 mg Rectal DAILY  0.9% sodium chloride infusion 250 mL  250 mL IntraVENous PRN  
 amLODIPine (NORVASC) tablet 10 mg  10 mg Oral DAILY  docusate sodium (COLACE) capsule 100 mg  100 mg Oral PRN  
 lisinopriL (PRINIVIL, ZESTRIL) tablet 20 mg  20 mg Oral DAILY  meloxicam (MOBIC) tablet 7.5 mg  7.5 mg Oral DAILY  sodium chloride tablet 2 g  2 g Oral BID  traMADoL (ULTRAM) tablet 50 mg  50 mg Oral Q6H PRN  
 sodium chloride (NS) flush 5-40 mL  5-40 mL IntraVENous Q8H  
 sodium chloride (NS) flush 5-40 mL  5-40 mL IntraVENous PRN  
 acetaminophen (TYLENOL) tablet 650 mg  650 mg Oral Q4H PRN  
 ondansetron (ZOFRAN) injection 4 mg  4 mg IntraVENous Q4H PRN  
 enoxaparin (LOVENOX) injection 40 mg  40 mg SubCUTAneous Q24H Objective:  
 
Vitals:  
 06/24/20 0115 06/24/20 2269 06/24/20 9495 06/24/20 9472 BP:  132/65 108/57 Pulse:  (!) 106 (!) 106 Resp:  19 19   
 Temp:  97.8 °F (36.6 °C) 97.8 °F (36.6 °C) SpO2: 99% 97% 90% (!) 82% Weight:      
Height:      
 
Intake and Output:  
06/22 1901 - 06/24 0700 In: 0 Out: 8094 [Urine:2725] No intake/output data recorded. Physical Exam:  
Constitutional:  the patient is well developed and in mild acute distress on Optiflow 80%. He is very weak and appears chronically ill. HEENT:  Sclera clear, pupils equal, oral mucosa moist 
Lungs: coarse rhonchi 
Cardiovascular:  Tachy RRR without M,G,R 
Abd/GI: soft and non-tender; with positive bowel sounds. Ext: warm without cyanosis. There is no lower leg edema. Musculoskeletal: moves all four extremities with equal strength Skin:  no jaundice or rashes, no wounds Neuro: no gross neuro deficits Musculoskeletal: can ambulate. No deformity Psychiatric: Calm. CXR: 
 
6/24: 
 
pending 6/23/20 LAB 
 6/23/2020 14:02 BODY FLUID TYPE PLEURAL FLUID FLUID APPEARANCE TURBID  
FLUID COLOR RED  
FLUID RBC CT. 63,000 FLUID WBC COUNT 171 [facility] [place] 19 [facility] [place]HS 79 [address] 2 Protein total, body fld. 3.1 Glucose, body fld. 82 Talya Mcmillan LD, body fld. 9000 [place]  Recent Labs  
  06/24/20 
0716 06/23/20 
0912 06/22/20 
7521 WBC 4.8 5.5 6.1 HGB 6.4* 7.6* 7.5* HCT 20.2* 24.1* 24.4*  
* 129* 124* Recent Labs  
  06/24/20 
0716 06/23/20 
0912 06/22/20 
5014 * 135* 135* K 4.8 4.8 4.5  
CL 98 97* 97* CO2 22 32 20* * 106* 136* BUN 17 14 13 CREA 0.70* 0.72* 0.68* MG 2.0 2.4 2.3 Lab Results Component Value Date/Time Protein, total 6.6 05/24/2020 05:10 PM  
 Albumin 2.5 (L) 05/24/2020 05:10 PM  
 
 
 
Assessment:  (Medical Decision Making) Hospital Problems  Date Reviewed: 6/24/2020 Codes Class Noted POA Debility ICD-10-CM: R53.81 ICD-9-CM: 799.3  6/15/2020 Yes Very severe. Rectal obstruction ICD-10-CM: K62.4 ICD-9-CM: 569.2  6/1/2020 Yes Aspiration pneumonia ([facility] ICD-10-CM: J69.0 ICD-9-CM: 507.0  6/1/2020 Yes Neutropenia ([facility] ICD-10-CM: D70.9 ICD-9-CM: 288.00  5/25/2020 Yes Ongoing Pleural effusion on right ICD-10-CM: J90 ICD-9-CM: 511.9  5/25/2020 Yes Overview Addendum 6/24/2020  8:13 AM by Sean Chua NP  
  6/23/20L  Right thoracentesis:  1100 ml bloody effusion removed 6/10/20:  Bilateral thoracentesis on L( 1100 cc ) and R( 1000 cc) removed 6/3/20:  Right chest thoracentesis with 800 removed. Now exudative and bloody. Had atypical cells in past. ? Malignant. * (Principal) Acute respiratory failure with hypoxia ([facility] ICD-10-CM: J96.01 
ICD-9-CM: 518.81  5/24/2020 Yes Worse Normocytic anemia ICD-10-CM: D64.9 ICD-9-CM: 285.9  5/24/2020 Yes Worse and needing transfusion. Pleural effusion on left ICD-10-CM: J90 ICD-9-CM: 511.9  5/24/2020 Yes Overview Signed 6/17/2020  8:24 AM by Sean Chua NP  
   6/10/20:  Bilateral thoracentesis on L( 1100 cc ) and R( 1000 cc) removed Thoracentesis planned today Malignant neoplasm metastatic to bone [facility]) (Chronic) ICD-10-CM: C79.51 
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 
ICD-9-CM: 250.40, 583.81  7/3/2018 Yes Diabetes mellitus due to underlying condition with hyperglycemia (HCC) (Chronic) ICD-10-CM: H60.08 ICD-9-CM: 249.80  12/21/2015 Yes COVID-19 ruled out ICD-10-CM: Z03.818 ICD-9-CM: V71.83  12/15/2015 Yes Hyponatremia ICD-10-CM: E87.1 ICD-9-CM: 276.1  11/23/2015 Yes Plan:  (Medical Decision Making) Continue Optiflow. Lengthy discussion with pt and wife. Lack of clinical improvement reviewed with very poor prognosis apparent. Discussed category status and wife indicates she will make decision if/when intubation needed. Tap L lung today if able. Lasix. Steroids started for high CRP and possible inflammatory effusion. Albumin IV. PRBC x 2. Palliative care consulted. Critically ill: E/M 32 min. --Referral made to [facility] per case management but transfer not appropriate now. More than 50% of time documented was spent face-to-face contact with the patient and in the care of the patient on the floor/unit where the patient is located.   
 
 
Davina Avalos MD

## 2020-06-24 NOTE — DIABETES MGMT
. Blood glucose range yesterday 106-251 with pt receiving a total 26 units of insulin (Lantus 8 units and Humalog 18 units). Per chart review pt and spouse declined hospice at this time. Pending plan is for discharge to SNF.

## 2020-06-24 NOTE — PROCEDURES
Thoracentesis Procedure Note Procedure:  L Thoracentesis with ultrasound guidance Indication:   L Pleural effusion Summary: After informed consent was obtained, the patient was positioned upright. Ultrasound was used to identify the optimal spot for pleural drainage. There was minimal fluid on the R. The lower L intercostal space was anesthetized with 1% Lidocaine to achieve adequate anesthesia. The pleural space was entered with serosanguinous fluid identified. Lidocaine was instilled within the pleural space as well. A small stab incision was made at the site of local anesthesia and the thoracentesis catheter was advanced into the pleural space. Approximately 900 ml of fluid was obtained with ease. The procedure was terminated after pleural drainage stopped. Air was not aspirated during the procedure. A pressure dressing was placed over the incision after adequate hemostasis was achieved. A CXR is not pending as a follow up US revealed a + lung slide c/w no PTX. The pleural fluid will be sent for protein, LDH, glucose, cytology and cell block. EBL: negligible Post Procedure Dx: Pleural effusion on the L.  
 
 
 
 Signed By: Miguel Burgos MD

## 2020-06-24 NOTE — PROGRESS NOTES
06/24/20 0114 Oxygen Therapy O2 Sat (%) (!) 84 % Pulse via Oximetry 111 beats per minute O2 Device Hi flow nasal cannula;Humidifier O2 Flow Rate (L/min) 9 l/min 
(adjusted up to 12L and then back down due to coughing spell) Called to patient's room by nurse due to Oxygen desaturation event. Patient breath sounds remain coarse bilaterally with Left Lower lobe crackles. Patient is on HFNC 9L. SAT is fluctuating between low 80's  to high 90's without movement or change in patient condition. Patient dropped to 84%, I increased O2 to 12L until SAT was 100% then turned back down to 9L HFNC. Remained in patient's room for to confirm SAT was stabilized at 99%.

## 2020-06-24 NOTE — PROGRESS NOTES
HOB elevated drop in O2 sat 87, repositioned tubing to nares, increased sat 99%, continues on blood transfusion no reaction.

## 2020-06-24 NOTE — PROGRESS NOTES
Hospitalist Progress Note 2020 Admit Date: 2020 11:00 PM  
NAME: Elisa Scheuermann :  1940 MRN:  [id number] Attending: Rafia Hobbs MD 
PCP:  Phillip Larson MD 
 
SUBJECTIVE:  
 Mr. 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 He has been seen by pulm s/p bilateral thoracentesis -, right thoracentesis 6-3, bilateral thoracentesis -9,  and diuresis. Studies transudate, felt due to hypoalbuminemia. [place] 60-65%. He has required high flow O2. He has compelted 8 days zosyn. In regards to his metastatic rectal cancer, he has known anal stricture and declined prior surgical diverting ostomy. He is s/p anal dilation per [facility] surgery in past. CTAP showed constipation/ colonic distention to rectum suspicious for obstruction. oncology recommends followup at discharge, last chemo completed . He has been seen by GI s/p 6-2 flex sig with disimpaction/ no stricture found. Recommends [facility] colorectal followup. He required 1 unit PRBC 6-15. Discharge plans pending to SNF. 
 
 - acute increase in oxygen requirement overnight. Pt appears weak and worn out today. Discussed concern of continued decline during lengthy hospital stay and that hospice would be appropriate. Wife at bedside. Neither patient nor wife want to \"give up\" at this time. Explained that his medical issues are only being temporarily improved (his recurrent lung effusions and debility mainly) and uncertain that a long term fix is in the near future.  
  
 
 
Review of Systems negative with exception of pertinent positives noted above PHYSICAL EXAM  
 
Visit Vitals /60 (BP 1 Location: Right arm, BP Patient Position: At rest;Head of bed elevated (Comment degrees)) Pulse (!) 105 Temp 97.5 °F (36.4 °C) Resp 20 Ht 6' (1.829 m) Wt 77.1 kg (170 lb) SpO2 99% BMI 23.06 kg/m² Temp (24hrs), Av °F (36.7 °C), Min:97.5 °F (36.4 °C), Max:98.6 °F (37 °C) Oxygen Therapy O2 Sat (%): 99 % (20) Pulse via Oximetry: 107 beats per minute (20) O2 Device: Hi flow nasal cannula;Humidifier (20) O2 Flow Rate (L/min): 9 l/min(weaned from 13L to 9L) (20) O2 Temperature: 87.8 °F (31 °C) (20 1454) FIO2 (%): 32 % (20 1429) ETCO2 (mmHg): 93 mmHg (20 1624) Intake/Output Summary (Last 24 hours) at 2020 0729 Last data filed at 2020 7147 Gross per 24 hour Intake 0 ml Output 550 ml Net -550 ml General: No acute distress   
Lungs:  CTA Bilaterally. Heart:  Regular rate and rhythm,  No murmur, rub, or gallop Abdomen: Soft, Non distended, Non tender, Positive bowel sounds Extremities: No cyanosis, clubbing or edema Neurologic:  No focal deficits ASSESSMENT Active Hospital Problems Diagnosis Date Noted  Debility 06/15/2020  Rectal obstruction 2020  Aspiration pneumonia ([address] 2020  Neutropenia ([address] 2020  Pleural effusion on right 2020  
  6/3/20:  Right chest thoracentesis with 800 removed. 6/10/20:  Bilateral thoracentesis on L( 1100 cc ) and R( 1000 cc) removed  Acute respiratory failure with hypoxia ([address] 2020  Normocytic anemia 2020  Pleural effusion on left 2020  
   6/10/20:  Bilateral thoracentesis on L( 1100 cc ) and R( 1000 cc) removed  Malignant neoplasm metastatic to bone ([address] 2019  Type 2 diabetes with nephropathy ([address] 2018  Diabetes mellitus due to underlying condition with hyperglycemia ([address] 2015  COVID-19 ruled out 12/15/2015  Hyponatremia 2015 A/p 
 
  
Plan: 
  
· Acute hypoxic respiratory failure, bilateral effusions: · Worsening effusions · reconsulted pulm for thoracentesis 1100 cc taken from right side studies pending · Resumed IV lasix  
  
· Anal cancer with stricture and rectal obstruction: · Declines surgery · continued bowel regimen · Outpatient colorectal and oncology followups 
  
  
· HTN:  
· continued norvasc, lisinopril 
  
  
· DM2: 
· Holding amaryl · Continued SSI, premeal insulin · Continued  lantus   
  
· Anemia: 
· followup CBC ·  transfuse HGB < 7 last on 6-15 
  
  
· Urine retention: · Replaced traore · continued flomax · Voiding trial possibly prior to discharge 
  
DC planning/Dispo:  Pending to SNF 
DVT Prophylaxis: lovenox 
 
dispo - needs hospice but spoke with family and are not ready to consider at this time. Will request PC again for AM 
 
Signed By: Jailene Churchill DO   
 June 23, 2020

## 2020-06-24 NOTE — PROGRESS NOTES
Stabilized decreased placed on Opti flow 80% FIO2, 50 liter/ minute, sat 96 as reported by respiratory therapist.

## 2020-06-24 NOTE — PROGRESS NOTES
Received call from [facility] with approval for admission to [facility], updated her on pts Rapid Response this am and not sure when/where pt will dc to. Auth#  [id number] Updates due 6/26/20  Fax [phone] GAEL Gould [phone] if questions and to update on dc plans.

## 2020-06-24 NOTE — PROGRESS NOTES
Pt sat up on side of bed for thoracentesis. Consent obtained. Time out performed. Pts vitals monitored throughout procedure. Left ultrasound done and pic taken of pleural fluid.  ~900 ml serosanginous pleural fluid from L. Pt tolerated procedure well with no adverse rxn. Specimens sent to the lab x 2 (cytology and Chemistries only) and labeled appropriately. Site dressed appropriately and report given to pts RN. Lung sliding done and ultrasound findings reviewed by MD. CXR ordered post procedure.

## 2020-06-24 NOTE — PROGRESS NOTES
Summoned to room patient with an elevated pulse rate of 220, Sat 71, rapid response called, Lungs rhonchi scattered throughout field, SOB on exertion and at rest, respirations 28 , labored.

## 2020-06-24 NOTE — PROGRESS NOTES
Problem: Falls - Risk of 
Goal: *Absence of Falls Description: Document Geno Ramirez Fall Risk and appropriate interventions in the flowsheet. Outcome: Progressing Towards Goal 
Note: Fall Risk Interventions: 
Mobility Interventions: PT Consult for mobility concerns Mentation Interventions: Adequate sleep, hydration, pain control Medication Interventions: Evaluate medications/consider consulting pharmacy Elimination Interventions: Call light in reach, Patient to call for help with toileting needs Problem: Patient Education: Go to Patient Education Activity Goal: Patient/Family Education Outcome: Progressing Towards Goal 
  
Problem: Pain Goal: *Control of Pain Outcome: Progressing Towards Goal 
Goal: *PALLIATIVE CARE:  Alleviation of Pain Outcome: Progressing Towards Goal 
  
Problem: Patient Education: Go to Patient Education Activity Goal: Patient/Family Education Outcome: Progressing Towards Goal 
  
Problem: Pneumonia: Day 1 Goal: Off Pathway (Use only if patient is Off Pathway) Outcome: Progressing Towards Goal 
Goal: Activity/Safety Outcome: Progressing Towards Goal 
Goal: Consults, if ordered Outcome: Progressing Towards Goal 
Goal: Diagnostic Test/Procedures Outcome: Progressing Towards Goal 
Goal: Nutrition/Diet Outcome: Progressing Towards Goal 
Goal: Medications Outcome: Progressing Towards Goal 
Goal: Respiratory Outcome: Progressing Towards Goal 
Goal: Treatments/Interventions/Procedures Outcome: Progressing Towards Goal 
Goal: Psychosocial 
Outcome: Progressing Towards Goal 
Goal: *Oxygen saturation within defined limits Outcome: Progressing Towards Goal 
Goal: *Influenza vaccine administered (October-March) Outcome: Progressing Towards Goal 
Goal: *Pneumoccocal vaccine administered Outcome: Progressing Towards Goal 
Goal: *Hemodynamically stable Outcome: Progressing Towards Goal 
Goal: *Demonstrates progressive activity Outcome: Progressing Towards Goal 
 Goal: *Tolerating diet Outcome: Progressing Towards Goal 
  
Problem: Pneumonia: Day 2 Goal: Off Pathway (Use only if patient is Off Pathway) Outcome: Progressing Towards Goal 
Goal: Activity/Safety Outcome: Progressing Towards Goal 
Goal: Consults, if ordered Outcome: Progressing Towards Goal 
Goal: Diagnostic Test/Procedures Outcome: Progressing Towards Goal 
Goal: Nutrition/Diet Outcome: Progressing Towards Goal 
Goal: Discharge Planning Outcome: Progressing Towards Goal 
Goal: Medications Outcome: Progressing Towards Goal 
Goal: Respiratory Outcome: Progressing Towards Goal 
Goal: Treatments/Interventions/Procedures Outcome: Progressing Towards Goal 
Goal: Psychosocial 
Outcome: Progressing Towards Goal 
Goal: *Oxygen saturation within defined limits Outcome: Progressing Towards Goal 
Goal: *Hemodynamically stable Outcome: Progressing Towards Goal 
Goal: *Demonstrates progressive activity Outcome: Progressing Towards Goal 
Goal: *Tolerating diet Outcome: Progressing Towards Goal 
Goal: *Optimal pain control at patient's stated goal 
Outcome: Progressing Towards Goal 
  
Problem: Pneumonia: Day 3 Goal: Off Pathway (Use only if patient is Off Pathway) Outcome: Progressing Towards Goal 
Goal: Activity/Safety Outcome: Progressing Towards Goal 
Goal: Consults, if ordered Outcome: Progressing Towards Goal 
Goal: Diagnostic Test/Procedures Outcome: Progressing Towards Goal 
Goal: Nutrition/Diet Outcome: Progressing Towards Goal 
Goal: Discharge Planning Outcome: Progressing Towards Goal 
Goal: Medications Outcome: Progressing Towards Goal 
Goal: Respiratory Outcome: Progressing Towards Goal 
Goal: Treatments/Interventions/Procedures Outcome: Progressing Towards Goal 
Goal: Psychosocial 
Outcome: Progressing Towards Goal 
Goal: *Oxygen saturation within defined limits Outcome: Progressing Towards Goal 
Goal: *Hemodynamically stable Outcome: Progressing Towards Goal 
 Goal: *Demonstrates progressive activity Outcome: Progressing Towards Goal 
Goal: *Tolerating diet Outcome: Progressing Towards Goal 
Goal: *Describes available resources and support systems Outcome: Progressing Towards Goal 
Goal: *Optimal pain control at patient's stated goal 
Outcome: Progressing Towards Goal 
  
Problem: Pneumonia: Day 4 Goal: Off Pathway (Use only if patient is Off Pathway) Outcome: Progressing Towards Goal 
Goal: Activity/Safety Outcome: Progressing Towards Goal 
Goal: Nutrition/Diet Outcome: Progressing Towards Goal 
Goal: Discharge Planning Outcome: Progressing Towards Goal 
Goal: Medications Outcome: Progressing Towards Goal 
Goal: Respiratory Outcome: Progressing Towards Goal 
Goal: Treatments/Interventions/Procedures Outcome: Progressing Towards Goal 
Goal: Psychosocial 
Outcome: Progressing Towards Goal 
  
Problem: Pneumonia: Discharge Outcomes Goal: *Demonstrates progressive activity Outcome: Progressing Towards Goal 
Goal: *Describes follow-up/return visits to physicians Outcome: Progressing Towards Goal 
Goal: *Tolerating diet Outcome: Progressing Towards Goal 
Goal: *Verbalizes name, dosage, time, side effects, and number of days to continue medications Outcome: Progressing Towards Goal 
Goal: *Influenza immunization Outcome: Progressing Towards Goal 
Goal: *Pneumococcal immunization Outcome: Progressing Towards Goal 
Goal: *Respiratory status at baseline Outcome: Progressing Towards Goal 
Goal: *Vital signs within defined limits Outcome: Progressing Towards Goal 
Goal: *Describes available resources and support systems Outcome: Progressing Towards Goal 
Goal: *Optimal pain control at patient's stated goal 
Outcome: Progressing Towards Goal 
  
Problem: Nutrition Deficit Goal: *Optimize nutritional status Outcome: Progressing Towards Goal

## 2020-06-24 NOTE — PROGRESS NOTES
Hospitalist Progress Note 2020 Admit Date: 2020 11:00 PM  
NAME: Madelin Milligan :  1940 MRN:  [id number] Attending: Brittaney Linares DO 
PCP:  Blanquita Rosas MD 
 
SUBJECTIVE:  
 Mr. 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 He has been seen by pulm s/p bilateral thoracentesis -, right thoracentesis 6-3, bilateral thoracentesis -9,  and diuresis. Studies transudate, felt due to hypoalbuminemia. [place] 60-65%. He has required high flow O2. He has compelted 8 days zosyn. In regards to his metastatic rectal cancer, he has known anal stricture and declined prior surgical diverting ostomy. He is s/p anal dilation per [facility] surgery in past. CTAP showed constipation/ colonic distention to rectum suspicious for obstruction. oncology recommends followup at discharge, last chemo completed . He has been seen by GI s/p 6-2 flex sig with disimpaction/ no stricture found. Recommends [facility] colorectal followup. He required 1 unit PRBC 6-15. Discharge plans pending to SNF. 
 
 - acute increase in oxygen requirement overnight. Pt appears weak and worn out today. Discussed concern of continued decline during lengthy hospital stay and that hospice would be appropriate. Wife at bedside. Neither patient nor wife want to \"give up\" at this time. Explained that his medical issues are only being temporarily improved (his recurrent lung effusions and debility mainly) and uncertain that a long term fix is in the near future.  - rapid called for increased o2 demand. Pulm at bedside. Now s/p left side thora 900 cc cloudy removed. Spoke at length regarding goals of care as did pulm and PC.  Hospice deemed appropriate given poor progrnosis and lack of improvement in 4 weeks hospital stay however wife (and therefore ) would like to pursue all treatment options to include ventilation if required.  
  
 
 
Review of Systems negative with exception of pertinent positives noted above PHYSICAL EXAM  
 
Visit Vitals /65 (BP 1 Location: Right arm, BP Patient Position: At rest) Pulse (!) 101 Temp 97.7 °F (36.5 °C) Resp 20 Ht 6' (1.829 m) Wt 77.1 kg (170 lb) SpO2 95% BMI 23.06 kg/m² Temp (24hrs), Av.8 °F (36.6 °C), Min:97.2 °F (36.2 °C), Max:98.2 °F (36.8 °C) Oxygen Therapy O2 Sat (%): 95 % (20 1718) Pulse via Oximetry: 102 beats per minute (20 1546) O2 Device: Heated; Hi flow nasal cannula (20 1546) O2 Flow Rate (L/min): 20 l/min (20 1546) O2 Temperature: 87.6 °F (30.9 °C) (20 1546) FIO2 (%): 55 % (20 1546) ETCO2 (mmHg): 93 mmHg (20 1624) Intake/Output Summary (Last 24 hours) at 2020 194 Last data filed at 2020 1718 Gross per 24 hour Intake 120 ml Output 2910 ml Net -2790 ml General: No acute distress  pale and weak appearing Lungs:  Diminished bilaterally Heart:  Regular rate and rhythm,  No murmur, rub, or gallop Abdomen: Soft, Non distended, Non tender, Positive bowel sounds Extremities: No cyanosis, clubbing or edema Neurologic:  No focal deficits ASSESSMENT Active Hospital Problems Diagnosis Date Noted  Debility 06/15/2020  Rectal obstruction 2020  Aspiration pneumonia ([facility] 2020  Neutropenia ([facility] [address] 2020  Pleural effusion on right 2020L  Right thoracentesis:  1100 ml bloody effusion removed 6/10/20:  Bilateral thoracentesis on L( 1100 cc ) and R( 1000 cc) removed 6/3/20:  Right chest thoracentesis with 800 removed.  Acute respiratory failure with hypoxia ([address] 2020  Normocytic anemia 2020  Pleural effusion on left 2020 6/10/20:  Bilateral thoracentesis on L( 1100 cc ) and R( 1000 cc) removed  Malignant neoplasm metastatic to bone ([facility] [address] 06/26/2019  Type 2 diabetes with nephropathy ([facility] [address] 07/03/2018  Diabetes mellitus due to underlying condition with hyperglycemia ([facility] 12/21/2015  COVID-19 ruled out 12/15/2015  Hyponatremia 11/23/2015 A/p 
 
  
Plan: 
  
· Acute hypoxic respiratory failure, bilateral effusions: · Worsening effusions · Right side thora 6/23 with 1100 cc bloody effusion, left side thora 6/24 900 cc yellow effusion · IV lasix, IV albumin, IV steroids per pulm 
  
· Anal cancer with stricture and rectal obstruction: · Declines surgery · continued bowel regimen · Outpatient colorectal and oncology followups 
  
  
· HTN:  
· continued norvasc, lisinopril 
  
  
· DM2: 
· Holding amaryl · Continued SSI, premeal insulin · Continued  lantus   
  
· Anemia: 
· followup CBC ·  transfuse HGB < 7 last on 6-24 
  
  
· Urine retention: · Replaced traore · continued flomax · Voiding trial possibly prior to discharge 
  
DC planning/Dispo:  Pending to SNF 
DVT Prophylaxis: lovenox on hold secondary to bloody effusion, SCDs 
 
dispo - needs hospice but spoke with family and are not ready to consider at this time. [facility] would be appropriate - CM following Signed By: Grace Joseph DO   
 June 24, 2020

## 2020-06-25 NOTE — PROGRESS NOTES
Problem: Falls - Risk of 
Goal: *Absence of Falls Description: Document Michelle Arevalo Fall Risk and appropriate interventions in the flowsheet. Outcome: Progressing Towards Goal 
Note: Fall Risk Interventions: 
Mobility Interventions: Bed/chair exit alarm, Patient to call before getting OOB, PT Consult for mobility concerns, Utilize walker, cane, or other assistive device Mentation Interventions: Adequate sleep, hydration, pain control, Bed/chair exit alarm, Door open when patient unattended, Family/sitter at bedside, More frequent rounding, Toileting rounds, Update white board Medication Interventions: Bed/chair exit alarm, Patient to call before getting OOB, Teach patient to arise slowly Elimination Interventions: Bed/chair exit alarm, Call light in reach, Patient to call for help with toileting needs, Toileting schedule/hourly rounds Problem: Patient Education: Go to Patient Education Activity Goal: Patient/Family Education Outcome: Progressing Towards Goal 
  
Problem: Nutrition Deficit Goal: *Optimize nutritional status Outcome: Progressing Towards Goal 
  
Problem: Pain Goal: *Control of Pain Outcome: Progressing Towards Goal 
Goal: *PALLIATIVE CARE:  Alleviation of Pain Outcome: Progressing Towards Goal 
  
Problem: Constipation - Risk of 
Goal: *Prevention of constipation Outcome: Progressing Towards Goal 
  
Problem: Pressure Injury - Risk of 
Goal: *Prevention of pressure injury Description: Document Jude Scale and appropriate interventions in the flowsheet. Outcome: Progressing Towards Goal 
Note: Pressure Injury Interventions: 
Sensory Interventions: Assess changes in LOC, Check visual cues for pain, Keep linens dry and wrinkle-free, Minimize linen layers Moisture Interventions: Absorbent underpads, Check for incontinence Q2 hours and as needed, Maintain skin hydration (lotion/cream), Minimize layers, Moisture barrier Activity Interventions: Increase time out of bed, Pressure redistribution bed/mattress(bed type), PT/OT evaluation Mobility Interventions: HOB 30 degrees or less, Pressure redistribution bed/mattress (bed type), PT/OT evaluation Nutrition Interventions: Document food/fluid/supplement intake, Offer support with meals,snacks and hydration Friction and Shear Interventions: HOB 30 degrees or less, Lift sheet

## 2020-06-25 NOTE — PROGRESS NOTES
Spoke with Dr Wanda Magaña this am she requested CM staff to proceed with [facility] referral. Susan Neumann with [facility] updated.

## 2020-06-25 NOTE — PROGRESS NOTES
Patient alert and oriented x 4. Respirations even and unlabored. Dyspnea upon exertion. Coarse lung sounds. Productive cough present. High-flow heat oxygen on and functioning properly. Juarez draining yellow urine. Bowel sounds active. Denies any pain. No distress observed. Call light in reach. Bed in low position. Side rails up x 2. Family at bedside. Repositioned, encouraged patient to change position frequently to given sacrum/coccxy some relief from pressure, patient verbalized understanding.

## 2020-06-25 NOTE — PROGRESS NOTES
Palliative Care Progress Note Patient: Amelia Diaz MRN: [id number]  SSN: [id number] YOB: 1940  Age: 79 y.o. Sex: male Assessment/Plan: Chief Complaint/Interval History: pt feels breathing improved today s/p thoracentesis 6/24. Principal Diagnosis: · Dyspnea  R06.00 Additional Diagnoses: · Acute Respiratory Failure, Unspecified  J96.00 · Debility, Unspecified  R53.81 
· Frailty  R54 · Counseling, Encounter for Medical Advice  Z71.9 
· Encounter for Palliative Care  Z51.5 Palliative Performance Scale (PPS) PPS 50% Medical Decision Making:  
Reviewed and summarized labs and imaging. Met with pt and wife at bedside. Pt states he feels breathing has improved since yesterday. He ate some breakfast this am and is planning on working with PT. Wife still wishing to pursue aggressive care and pt nods head agreement. Will follow loosely. Family will need to make decisions for pt if/when he requires intubation. Will discuss findings with members of the interdisciplinary team.   
 
More than 50% of this 25 minute visit was spent counseling and coordination of care as outlined above. Subjective:  
 
Review of Systems negative with the exception of as noted above Objective:  
 
Visit Vitals /75 Pulse (!) 106 Temp 97.8 °F (36.6 °C) Resp 24 Ht 6' (1.829 m) Wt 170 lb (77.1 kg) SpO2 93% BMI 23.06 kg/m² Physical Exam: 
 
General:  Cooperative. No acute distress. frail Eyes:  Conjunctivae/corneas clear Nose: Nares normal. Septum midline. Neck: Supple, symmetrical, trachea midline, no JVD Lungs:   Coarse bilateral bs Heart:  Regular rate and rhythm, no murmur Abdomen:   Soft, non-tender, non-distended Extremities: Normal, atraumatic, no cyanosis or edema Skin: Skin color, texture, turgor normal. No rash or lesions. Neurologic: Nonfocal  
Psych: Alert and oriented Signed By: Da Ponce MD   
 June 25, 2020

## 2020-06-25 NOTE — PROGRESS NOTES
Received pt from [facility] Alvarado in stable condition. Pt in bed resting quietly. Resp even & unlabored at rest; no acute signs of distress noted. Bed low & locked; call light in reach; no needs voiced. Wife at bedside.

## 2020-06-25 NOTE — PROGRESS NOTES
Problem: Falls - Risk of 
Goal: *Absence of Falls Description: Document Estela Dear Fall Risk and appropriate interventions in the flowsheet. Outcome: Progressing Towards Goal 
Note: Fall Risk Interventions: 
Mobility Interventions: Bed/chair exit alarm, Communicate number of staff needed for ambulation/transfer, Patient to call before getting OOB, PT Consult for mobility concerns, OT consult for ADLs Mentation Interventions: Adequate sleep, hydration, pain control, Door open when patient unattended, Evaluate medications/consider consulting pharmacy, Family/sitter at bedside Medication Interventions: Bed/chair exit alarm, Evaluate medications/consider consulting pharmacy, Patient to call before getting OOB, Teach patient to arise slowly Elimination Interventions: Bed/chair exit alarm, Call light in reach, Patient to call for help with toileting needs Problem: Patient Education: Go to Patient Education Activity Goal: Patient/Family Education Outcome: Progressing Towards Goal 
  
Problem: Nutrition Deficit Goal: *Optimize nutritional status Outcome: Progressing Towards Goal 
  
Problem: Pain Goal: *Control of Pain Outcome: Progressing Towards Goal 
Goal: *PALLIATIVE CARE:  Alleviation of Pain Outcome: Progressing Towards Goal 
  
Problem: Pain Goal: *Control of Pain Outcome: Progressing Towards Goal 
Goal: *PALLIATIVE CARE:  Alleviation of Pain Outcome: Progressing Towards Goal 
  
Problem: Patient Education: Go to Patient Education Activity Goal: Patient/Family Education Outcome: Progressing Towards Goal 
  
Problem: Pneumonia: Day 1 Goal: Off Pathway (Use only if patient is Off Pathway) Outcome: Resolved/Met Goal: Activity/Safety Outcome: Resolved/Met Goal: Consults, if ordered Outcome: Resolved/Met Goal: Diagnostic Test/Procedures Outcome: Resolved/Met Goal: Nutrition/Diet Outcome: Resolved/Met Goal: Medications Outcome: Resolved/Met Goal: Respiratory Outcome: Resolved/Met Goal: Treatments/Interventions/Procedures Outcome: Resolved/Met Goal: Psychosocial 
Outcome: Resolved/Met Goal: *Oxygen saturation within defined limits Outcome: Resolved/Met Goal: *Influenza vaccine administered (October-March) Outcome: Resolved/Met Goal: *Pneumoccocal vaccine administered Outcome: Resolved/Met Goal: *Hemodynamically stable Outcome: Resolved/Met Goal: *Demonstrates progressive activity Outcome: Resolved/Met Goal: *Tolerating diet Outcome: Resolved/Met Problem: Pneumonia: Day 2 Goal: Off Pathway (Use only if patient is Off Pathway) Outcome: Resolved/Met Goal: Activity/Safety Outcome: Resolved/Met Goal: Consults, if ordered Outcome: Resolved/Met Goal: Diagnostic Test/Procedures Outcome: Resolved/Met Goal: Nutrition/Diet Outcome: Resolved/Met Goal: Discharge Planning Outcome: Resolved/Met Goal: Medications Outcome: Resolved/Met Goal: Respiratory Outcome: Resolved/Met Goal: Treatments/Interventions/Procedures Outcome: Resolved/Met Goal: Psychosocial 
Outcome: Resolved/Met Goal: *Oxygen saturation within defined limits Outcome: Resolved/Met Goal: *Hemodynamically stable Outcome: Resolved/Met Goal: *Demonstrates progressive activity Outcome: Resolved/Met Goal: *Tolerating diet Outcome: Resolved/Met Goal: *Optimal pain control at patient's stated goal 
Outcome: Resolved/Met Problem: Pneumonia: Day 3 Goal: Off Pathway (Use only if patient is Off Pathway) Outcome: Resolved/Met Goal: Activity/Safety Outcome: Resolved/Met Goal: Consults, if ordered Outcome: Resolved/Met Goal: Diagnostic Test/Procedures Outcome: Resolved/Met Goal: Nutrition/Diet Outcome: Resolved/Met Goal: Discharge Planning Outcome: Resolved/Met Goal: Medications Outcome: Resolved/Met Goal: Respiratory Outcome: Resolved/Met Goal: Treatments/Interventions/Procedures Outcome: Resolved/Met Goal: Psychosocial 
Outcome: Resolved/Met Goal: *Oxygen saturation within defined limits Outcome: Resolved/Met Goal: *Hemodynamically stable Outcome: Resolved/Met Goal: *Demonstrates progressive activity Outcome: Resolved/Met Goal: *Tolerating diet Outcome: Resolved/Met Goal: *Describes available resources and support systems Outcome: Resolved/Met Goal: *Optimal pain control at patient's stated goal 
Outcome: Resolved/Met Problem: Pneumonia: Day 4 Goal: Off Pathway (Use only if patient is Off Pathway) Outcome: Progressing Towards Goal 
Goal: Activity/Safety Outcome: Progressing Towards Goal 
Goal: Nutrition/Diet Outcome: Progressing Towards Goal 
Goal: Discharge Planning Outcome: Progressing Towards Goal 
Goal: Medications Outcome: Progressing Towards Goal 
Goal: Respiratory Outcome: Progressing Towards Goal 
Goal: Treatments/Interventions/Procedures Outcome: Progressing Towards Goal 
Goal: Psychosocial 
Outcome: Progressing Towards Goal 
  
Problem: Pneumonia: Discharge Outcomes Goal: *Demonstrates progressive activity Outcome: Progressing Towards Goal 
Goal: *Describes follow-up/return visits to physicians Outcome: Progressing Towards Goal 
Goal: *Tolerating diet Outcome: Progressing Towards Goal 
Goal: *Verbalizes name, dosage, time, side effects, and number of days to continue medications Outcome: Progressing Towards Goal 
Goal: *Influenza immunization Outcome: Progressing Towards Goal 
Goal: *Pneumococcal immunization Outcome: Progressing Towards Goal 
Goal: *Respiratory status at baseline Outcome: Progressing Towards Goal 
Goal: *Vital signs within defined limits Outcome: Progressing Towards Goal 
Goal: *Describes available resources and support systems Outcome: Progressing Towards Goal 
Goal: *Optimal pain control at patient's stated goal 
Outcome: Progressing Towards Goal 
  
Problem: Constipation - Risk of 
Goal: *Prevention of constipation Outcome: Progressing Towards Goal 
  
Problem: Pressure Injury - Risk of 
 Goal: *Prevention of pressure injury Description: Document Jude Scale and appropriate interventions in the flowsheet. Outcome: Progressing Towards Goal 
Note: Pressure Injury Interventions: 
Sensory Interventions: Assess changes in LOC Moisture Interventions: Absorbent underpads, Apply protective barrier, creams and emollients Activity Interventions: Increase time out of bed, Pressure redistribution bed/mattress(bed type), PT/OT evaluation Mobility Interventions: HOB 30 degrees or less, Pressure redistribution bed/mattress (bed type), PT/OT evaluation Nutrition Interventions: Document food/fluid/supplement intake Friction and Shear Interventions: Apply protective barrier, creams and emollients, Foam dressings/transparent film/skin sealants, HOB 30 degrees or less, Lift sheet Problem: Patient Education: Go to Patient Education Activity Goal: Patient/Family Education Outcome: Progressing Towards Goal

## 2020-06-25 NOTE — PROGRESS NOTES
Patient alert and oriented x 4. Dyspnea with exertion. Lungs sounds coarse. Productive cough present. Patient is on heated high flow oxygen. Bowel sounds active. Juarez draining and patent. Denies any pain. Family at beside. No distress observed. Call light in reach. Bed in low position. Side rails up x 2.

## 2020-06-25 NOTE — PROGRESS NOTES
Problem: Falls - Risk of 
Goal: *Absence of Falls Description: Document Maria Dolores Powell Fall Risk and appropriate interventions in the flowsheet. Outcome: Progressing Towards Goal 
Note: Fall Risk Interventions: 
Mobility Interventions: Bed/chair exit alarm, Communicate number of staff needed for ambulation/transfer, OT consult for ADLs, Patient to call before getting OOB, PT Consult for mobility concerns Mentation Interventions: Adequate sleep, hydration, pain control, Bed/chair exit alarm, Door open when patient unattended, Evaluate medications/consider consulting pharmacy Medication Interventions: Bed/chair exit alarm, Evaluate medications/consider consulting pharmacy, Patient to call before getting OOB Elimination Interventions: Call light in reach Problem: Patient Education: Go to Patient Education Activity Goal: Patient/Family Education Outcome: Progressing Towards Goal 
  
Problem: Nutrition Deficit Goal: *Optimize nutritional status Outcome: Progressing Towards Goal 
  
Problem: Nutrition Deficit Goal: *Optimize nutritional status Outcome: Progressing Towards Goal 
  
Problem: Pain Goal: *Control of Pain Outcome: Progressing Towards Goal 
Goal: *PALLIATIVE CARE:  Alleviation of Pain Outcome: Progressing Towards Goal 
  
Problem: Pain Goal: *Control of Pain Outcome: Progressing Towards Goal 
Goal: *PALLIATIVE CARE:  Alleviation of Pain Outcome: Progressing Towards Goal 
  
Problem: Patient Education: Go to Patient Education Activity Goal: Patient/Family Education Outcome: Progressing Towards Goal 
  
Problem: Patient Education: Go to Patient Education Activity Goal: Patient/Family Education Outcome: Progressing Towards Goal 
  
Problem: Pneumonia: Day 1 Goal: Off Pathway (Use only if patient is Off Pathway) Outcome: Progressing Towards Goal 
Goal: Activity/Safety Outcome: Progressing Towards Goal 
Goal: Consults, if ordered Outcome: Progressing Towards Goal 
 Goal: Diagnostic Test/Procedures Outcome: Progressing Towards Goal 
Goal: Nutrition/Diet Outcome: Progressing Towards Goal 
Goal: Medications Outcome: Progressing Towards Goal 
Goal: Respiratory Outcome: Progressing Towards Goal 
Goal: Treatments/Interventions/Procedures Outcome: Progressing Towards Goal 
Goal: Psychosocial 
Outcome: Progressing Towards Goal 
Goal: *Oxygen saturation within defined limits Outcome: Progressing Towards Goal 
Goal: *Influenza vaccine administered (October-March) Outcome: Progressing Towards Goal 
Goal: *Pneumoccocal vaccine administered Outcome: Progressing Towards Goal 
Goal: *Hemodynamically stable Outcome: Progressing Towards Goal 
Goal: *Demonstrates progressive activity Outcome: Progressing Towards Goal 
Goal: *Tolerating diet Outcome: Progressing Towards Goal 
  
Problem: Pneumonia: Day 2 Goal: Off Pathway (Use only if patient is Off Pathway) Outcome: Progressing Towards Goal 
Goal: Activity/Safety Outcome: Progressing Towards Goal 
Goal: Consults, if ordered Outcome: Progressing Towards Goal 
Goal: Diagnostic Test/Procedures Outcome: Progressing Towards Goal 
Goal: Nutrition/Diet Outcome: Progressing Towards Goal 
Goal: Discharge Planning Outcome: Progressing Towards Goal 
Goal: Medications Outcome: Progressing Towards Goal 
Goal: Respiratory Outcome: Progressing Towards Goal 
Goal: Treatments/Interventions/Procedures Outcome: Progressing Towards Goal 
Goal: Psychosocial 
Outcome: Progressing Towards Goal 
Goal: *Oxygen saturation within defined limits Outcome: Progressing Towards Goal 
Goal: *Hemodynamically stable Outcome: Progressing Towards Goal 
Goal: *Demonstrates progressive activity Outcome: Progressing Towards Goal 
Goal: *Tolerating diet Outcome: Progressing Towards Goal 
Goal: *Optimal pain control at patient's stated goal 
Outcome: Progressing Towards Goal 
  
Problem: Pneumonia: Day 3 Goal: Off Pathway (Use only if patient is Off Pathway) Outcome: Progressing Towards Goal 
Goal: Activity/Safety Outcome: Progressing Towards Goal 
Goal: Consults, if ordered Outcome: Progressing Towards Goal 
Goal: Diagnostic Test/Procedures Outcome: Progressing Towards Goal 
Goal: Nutrition/Diet Outcome: Progressing Towards Goal 
Goal: Discharge Planning Outcome: Progressing Towards Goal 
Goal: Medications Outcome: Progressing Towards Goal 
Goal: Respiratory Outcome: Progressing Towards Goal 
Goal: Treatments/Interventions/Procedures Outcome: Progressing Towards Goal 
Goal: Psychosocial 
Outcome: Progressing Towards Goal 
Goal: *Oxygen saturation within defined limits Outcome: Progressing Towards Goal 
Goal: *Hemodynamically stable Outcome: Progressing Towards Goal 
Goal: *Demonstrates progressive activity Outcome: Progressing Towards Goal 
Goal: *Tolerating diet Outcome: Progressing Towards Goal 
Goal: *Describes available resources and support systems Outcome: Progressing Towards Goal 
Goal: *Optimal pain control at patient's stated goal 
Outcome: Progressing Towards Goal 
  
Problem: Pneumonia: Day 4 Goal: Off Pathway (Use only if patient is Off Pathway) Outcome: Progressing Towards Goal 
Goal: Activity/Safety Outcome: Progressing Towards Goal 
Goal: Nutrition/Diet Outcome: Progressing Towards Goal 
Goal: Discharge Planning Outcome: Progressing Towards Goal 
Goal: Medications Outcome: Progressing Towards Goal 
Goal: Respiratory Outcome: Progressing Towards Goal 
Goal: Treatments/Interventions/Procedures Outcome: Progressing Towards Goal 
Goal: Psychosocial 
Outcome: Progressing Towards Goal 
  
Problem: Pneumonia: Discharge Outcomes Goal: *Demonstrates progressive activity Outcome: Progressing Towards Goal 
Goal: *Describes follow-up/return visits to physicians Outcome: Progressing Towards Goal 
Goal: *Tolerating diet Outcome: Progressing Towards Goal 
Goal: *Verbalizes name, dosage, time, side effects, and number of days to continue medications Outcome: Progressing Towards Goal 
Goal: *Influenza immunization Outcome: Progressing Towards Goal 
Goal: *Pneumococcal immunization Outcome: Progressing Towards Goal 
Goal: *Respiratory status at baseline Outcome: Progressing Towards Goal 
Goal: *Vital signs within defined limits Outcome: Progressing Towards Goal 
Goal: *Describes available resources and support systems Outcome: Progressing Towards Goal 
Goal: *Optimal pain control at patient's stated goal 
Outcome: Progressing Towards Goal

## 2020-06-25 NOTE — PROGRESS NOTES
Problem: Mobility Impaired (Adult and Pediatric) Goal: *Acute Goals and Plan of Care (Insert Text) Outcome: Progressing Towards Goal 
Note: LTG: (Reviewed and updated 6/15/20) (1.)Mr. Marlo Lovett will move from supine to sit and sit to supine , scoot up and down and roll side to side with INDEPENDENT within 7 treatment day(s). (2.)Mr. Marlo Lovett will transfer from bed to chair and chair to bed with SUPERVISION using the least restrictive device within 7 treatment day(s). (3.)Mr. Marlo Lovett will ambulate with STAND BY ASSIST for 100+ feet with the least restrictive device within 7 treatment day(s). (4.)Mr. Marlo Lovett will perform exercises per HEP independently to improve strength and mobility within 7 days. (5.)Mr. Marlo Lovett will perform standing mobility and balance activities for 10+ minutes to improve strength and mobility within 7 days. ________________________________________________________________________________________________ PHYSICAL THERAPY: Daily Note and AM 6/25/2020 INPATIENT: PT Visit Days : 5 Payor: Roge Erazo / Plan: [address] [place] [address] HMO / Product Type: [employer] Care Medicare /   
  
NAME/AGE/GENDER: Ewa Roberts is a 79 y.o. male PRIMARY DIAGNOSIS: Acute metabolic encephalopathy [Q84.64] Acute respiratory failure with hypoxia (HCC) Acute respiratory failure with hypoxia (HCC) Procedure(s) (LRB): ULTRASOUND (N/A) THORACENTESIS (N/A) 1 Day Post-Op ICD-10: Treatment Diagnosis:  
 · Generalized Muscle Weakness (M62.81) · Other lack of cordination (R27.8) · Difficulty in walking, Not elsewhere classified (R26.2) · Other abnormalities of gait and mobility (R26.89) · Low Back Pain (M54.5) Precaution/Allergies: 
Patient has no known allergies. ASSESSMENT:  
 
Mr. Marlo Lovett  is a 79year old male who has been admitted to hospital on 05/24 with above diagnosis and hx of cancer.  Prior to hospital admission pt lives with wife in a 1 story home with 0 step(s) to enter with no railing. Pt endorses 0 falls in past 6 months. Prior to admission No O2 usage at home, no assistance with ADLs and uses no DME for mobility. Patient is supine on optiflow today with wife present and willing to try. He needed minimal assist to sit up from supine. He stood from an elevated bed with minimal assist into thewalker and walked about 10 feet with min A/CGA into the bathroom. He sat on the toilet and dependent for clean up. He stood again and walked to the chair with assist then assisted to the chair. He performed seated exercises below. He is quite weak and will certainly need a rehab stay. Slow progress with no goals met. This section established at most recent assessment PROBLEM LIST (Impairments causing functional limitations): 1. Decreased Strength 2. Decreased ADL/Functional Activities 3. Decreased Transfer Abilities 4. Decreased Ambulation Ability/Technique 5. Decreased Balance 6. Increased Pain 7. Decreased Activity Tolerance 8. Increased Fatigue 9. Decreased Flexibility/Joint Mobility 10. Decreased [place] with Home Exercise Program 
 INTERVENTIONS PLANNED: (Benefits and precautions of physical therapy have been discussed with the patient.) 1. Balance Exercise 2. Bed Mobility 3. Family Education 4. Gait Training 5. Heat 6. Home Exercise Program (HEP) 7. Manual Therapy 8. Neuromuscular Re-education/Strengthening 9. Range of Motion (ROM) 10. Therapeutic Activites 11. Therapeutic Exercise/Strengthening 12. Transfer Training TREATMENT PLAN: Frequency/Duration: 3 times a week for duration of hospital stay Rehabilitation Potential For Stated Goals: Good REHAB RECOMMENDATIONS (at time of discharge pending progress):   
Placement: It is my opinion, based on this patient's performance to date, that Mr. Janette Quinones may benefit from intensive therapy at an [facility] [address] [facility] after discharge due to a probable need for close medical supervision by a rehab physician, a probable need for 24 hour rehab nursing, a probable need for multiple therapy disciplines and potential to make ongoing and sustainable functional improvement that is of practical value. Nilda Griffith Equipment:  
? Walkers, Type: Rolling Sonam Cardoza HISTORY:  
History of Present Injury/Illness (Reason for Referral): 
Per Physician Note: 
 
Aftab Cedillo is a 79 y.o. male with a past medical history of HEENT cancer undergoing chemo/radiation who presents to the [facility] ER with report of SOB and chest discomfort for the past several days. He also admits to mild cough but denies fevers or chills. Admits to feeling a little better and breathing a bit better since arrival. 
  
Past Medical History/Comorbidities:  
Mr. Hayder Love  has a past medical history of GERD (gastroesophageal reflux disease), Head and neck cancer ([facility] [address] (9/30/2015), History of squamous cell carcinoma, History of throat cancer (2015), Hypercholesteremia, Hypertension, Hypomagnesemia (5/20/2020), Rectal cancer ([facility] [address] (2018), Type 2 diabetes mellitus ([facility] [address] and Vomiting (2/23/2016). Mr. Hayder Love  has a past surgical history that includes hx orthopaedic (Right, 1966); hx other surgical (9/9/15); hx heent; hx tonsillectomy; hx heent (2015); hx colonoscopy (05/2018); hx vascular access; hx lymph node dissection; hx other surgical; and flexible sigmoidoscopy (N/A, 6/2/2020). Social History/Living Environment:  
Home Environment: Private residence # Steps to Enter: 0 One/Two Story Residence: One story Living Alone: No 
Support Systems: Spouse/Significant Other/Partner Patient Expects to be Discharged to[de-identified] Private residence Current DME Used/Available at Home: None Tub or Shower Type: Tub/Shower combination Prior Level of Function/Work/Activity: Mod I mobility and amb Number of Personal Factors/Comorbidities that affect the Plan of Care: 3+: HIGH COMPLEXITY EXAMINATION:  
Most Recent Physical Functioning:  
Gross Assessment: 
  
         
  
Posture: 
  
Balance: 
  Bed Mobility: 
Rolling: Contact guard assistance;Stand-by assistance Supine to Sit: Contact Guard Assist;Additional time Scooting: Stand-by assistance Wheelchair Mobility: 
  
Transfers: 
Sit to Stand: Minimum assistance Stand to Sit: Minimum assistance Gait: 
  
Speed/Christina: Shuffled; Slow Step Length: Left shortened;Right shortened Gait Abnormalities: Decreased step clearance;Shuffling gait; Steppage gait;Trunk sway increased Distance (ft): 10 Feet (ft)(x2) 
Assistive Device: Walker, rolling Ambulation - Level of Assistance: Contact guard assistance;Minimal assistance Body Structures Involved: 1. Lungs 2. Bones 3. Joints Body Functions Affected: 1. Sensory/Pain 2. Movement Related Activities and Participation Affected: 1. Mobility 2. Self Care 3. Interpersonal Interactions and Relationships 4. Community, Social and [place] [employer] Number of elements that affect the Plan of Care: 4+: HIGH COMPLEXITY CLINICAL PRESENTATION:  
Presentation: Stable and uncomplicated: LOW COMPLEXITY CLINICAL DECISION MAKIN [facility] [address] [id number] AM-PAC 6 Clicks Basic Mobility Inpatient Short Form How much difficulty does the patient currently have. .. Unable A Lot A Little None 1. Turning over in bed (including adjusting bedclothes, sheets and blankets)? [] 1   [] 2   [] 3   [x] 4  
2. Sitting down on and standing up from a chair with arms ( e.g., wheelchair, bedside commode, etc.)   [] 1   [] 2   [x] 3   [] 4  
3. Moving from lying on back to sitting on the side of the bed? [] 1   [] 2   [] 3   [x] 4 How much help from another person does the patient currently need. .. Total A Lot A Little None 4. Moving to and from a bed to a chair (including a wheelchair)? [] 1   [] 2   [x] 3   [] 4  
5. Need to walk in hospital room?    [] 1   [x] 2   [] 3   [] 4  
 6.  Climbing 3-5 steps with a railing? [x] 1   [] 2   [] 3   [] 4  
© 2007, Trustees of [address] [place] [address] [address] [id number], under license to [employer]. All rights reserved Score:  Initial: 20 Most Recent: 17 (Date: 6/15/20 ) Interpretation of Tool:  Represents activities that are increasingly more difficult (i.e. Bed mobility, Transfers, Gait). Medical Necessity:    
· Patient is expected to demonstrate progress in  
· strength, range of motion, balance, coordination, and functional technique ·  to  
· increase independence with ambulation and mobility · . Reason for Services/Other Comments: 
· Patient continues to require skilled intervention due to · Gross weakness, poor balance, increased risk of falls · . Use of outcome tool(s) and clinical judgement create a POC that gives a: Clear prediction of patient's progress: LOW COMPLEXITY  
  
 
 
 
TREATMENT:  
  
Pre-treatment Symptoms/Complaints: \"yes maam\" Pain: Initial:  
Pain Intensity 1: 0  Post Session: 0/10 Therapeutic Activity: (30 minutes): Therapeutic activities including Bed mobility, sit-stand transfer training from bed and chair, standing static/dynamic mobility, Chair transfers, commode transfers and Ambulation on level ground to improve mobility, strength, balance, coordination and activity tolerance. Required minimal   to promote static and dynamic balance in standing and promote motor control of bilateral, lower extremity(s). Therapeutic Exercise: (10 Minutes):  Exercises per grid below to improve mobility, strength and balance. Required minimal visual and verbal cues to promote proper body mechanics and exercise form. Date: 
6/24/20 Date: 
6/8/20 Date: 
6/10 Date: 
6-12-20 Date: 
6/15/20 Date 6/17/20 Date: 
6/18/20 Date 6/22/20 Activity/Exercise Parameters Parameters Parameters Ankle pumps 10 X 20 B X 10 30x 15x AB 15x AB 15x AB 10x B Heel slides   X 10 aa 20x Leg slides (hip abd/add)   X 10 aa 10x Seated knee extension 15 B X 15 B   15x AB 15x AB 15x AB 10x B Seated hip flexion 10 B X 15 B X 5 5    10x B Standing marching   X 5  15x AB 15x AB 15x AB   
HRs    30 Seated hip aBd 15x B    15x Ab 15x AB 15x AB 10x B Braces/Orthotics/Lines/Etc:  
· traore catheter · nasal cannula Treatment/Session Assessment:   
· Response to Treatment: slow progress, weak · Interdisciplinary Collaboration:  
o Physical Therapy Assistant 
o Registered Nurse · After treatment position/precautions:  
o Up in the chair. o Bed/Chair-wheels locked 
o Call light in hand · Compliance with Program/Exercises: Compliant all of the time · Recommendations/Intent for next treatment session: \"Next visit will focus on advancements to more challenging activities\". Total Treatment Duration: PT Patient Time In/Time Out Time In: 1000 Time Out: 1045 Charlie Shay, PTA

## 2020-06-25 NOTE — PROGRESS NOTES
Eben Pu Admission Date: 5/24/2020 Daily Progress Note: 6/25/2020 The patient's chart is reviewed and the patient is discussed with the staff. 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 He has undergone multiple thoracenteses - 5/25 - right, 1000 ml, 5/29 - right, 1300ml/left 800 ml, 6/3 - right, 800 ml, 6/9 - right, 1000 ml, left 1100 ml. The fluid has been transudative with rare atypical cells. ECHO with EF 60-65%. Is on Lasix with a negative balance and currently on 12 L NC O2. In regards to his metastatic rectal cancer, he has known anal stricture and declined prior surgical diverting ostomy. He is s/p anal dilation per [facility] surgery in past.  Oncology recommends followup at discharge, last chemo completed 5/2020. He has been seen by GI s/p 6-2 flex sig with disimpaction/ no stricture found. Recommends [facility] colorectal followup. He required 1 unit PRBC 6-15. Had repeat right thoracentesis 6/23 with 1100 ml bloody effusion removed. Rapid response called 6/24 for hypoxia, was placed on Opti-flow. Repeat left thoracentesis 6/24 with 900 ml serosanguinous fluid removed. Subjective:  
   
Remains on Opti-flow and just sat us in the chair--back in bed and \"exhausted\". Wife at bedside. Rapid response yesterday, placed on Opti-flow, given albumin, Lasix and PRBCs x2. Appetite improved. Review of Systems Constitutional: negative for fever, chills, sweats Cardiovascular: negative for chest pain, palpitations, syncope, edema Gastrointestinal: negative for dysphagia, reflux, vomiting, diarrhea, abdominal pain, or melena Neurologic: negative for focal weakness, numbness, headache Current Facility-Administered Medications Medication Dose Route Frequency  methylPREDNISolone (PF) (Solu-MEDROL) injection 60 mg  60 mg IntraVENous Q12H  
 0.9% sodium chloride infusion 250 mL  250 mL IntraVENous PRN  
 furosemide (LASIX) injection 40 mg  40 mg IntraVENous BID  insulin glargine (LANTUS) injection 8 Units  8 Units SubCUTAneous QHS  tamsulosin (FLOMAX) capsule 0.4 mg  0.4 mg Oral DAILY  insulin lispro (HUMALOG) injection   SubCUTAneous AC&HS  insulin lispro (HUMALOG) injection 4 Units  4 Units SubCUTAneous TIDAC  ferrous sulfate tablet 325 mg  1 Tab Oral DAILY WITH BREAKFAST  0.9% sodium chloride infusion 250 mL  250 mL IntraVENous PRN  
 hydrALAZINE (APRESOLINE) 20 mg/mL injection 10 mg  10 mg IntraVENous Q6H PRN  
 nystatin (MYCOSTATIN) 100,000 unit/mL oral suspension 500,000 Units  500,000 Units Oral QID  simethicone (MYLICON) tablet 80 mg  80 mg Oral QID PRN  potassium chloride (K-DUR, KLOR-CON) SR tablet 20 mEq  20 mEq Oral TID  albuterol-ipratropium (DUO-NEB) 2.5 MG-0.5 MG/3 ML  3 mL Nebulization Q6HWA RT  
 magnesium hydroxide (MILK OF MAGNESIA) 400 mg/5 mL oral suspension 30 mL  30 mL Oral DAILY  senna-docusate (PERICOLACE) 8.6-50 mg per tablet 1 Tab  1 Tab Oral BID  
 bisacodyL (DULCOLAX) suppository 10 mg  10 mg Rectal DAILY  0.9% sodium chloride infusion 250 mL  250 mL IntraVENous PRN  
 amLODIPine (NORVASC) tablet 10 mg  10 mg Oral DAILY  docusate sodium (COLACE) capsule 100 mg  100 mg Oral PRN  
 lisinopriL (PRINIVIL, ZESTRIL) tablet 20 mg  20 mg Oral DAILY  meloxicam (MOBIC) tablet 7.5 mg  7.5 mg Oral DAILY  sodium chloride tablet 2 g  2 g Oral BID  traMADoL (ULTRAM) tablet 50 mg  50 mg Oral Q6H PRN  
 sodium chloride (NS) flush 5-40 mL  5-40 mL IntraVENous Q8H  
 sodium chloride (NS) flush 5-40 mL  5-40 mL IntraVENous PRN  
 acetaminophen (TYLENOL) tablet 650 mg  650 mg Oral Q4H PRN  
 ondansetron (ZOFRAN) injection 4 mg  4 mg IntraVENous Q4H PRN  
  [Held by provider] enoxaparin (LOVENOX) injection 40 mg  40 mg SubCUTAneous Q24H Objective:  
 
Vitals:  
 06/25/20 0507 06/25/20 0745 06/25/20 5184 06/25/20 0033 BP: 137/75 136/75  136/75 Pulse: (!) 113 (!) 106  (!) 106 Resp: 20 24 Temp: 98.9 °F (37.2 °C) 97.8 °F (36.6 °C) SpO2: 94% 98% 93% Weight:      
Height:      
 
Intake and Output:  
06/23 1901 - 06/25 0700 In: 120 [P.O.:120] Out: 4410 [[id number]] No intake/output data recorded. Physical Exam:  
Constitutional:  the patient is well developed and in mild acute distress on Optiflow 60%, 40L. He is very weak and appears chronically ill. HEENT:  Sclera clear, pupils equal, oral mucosa moist 
Lungs: few anterior crackles, productive cough at times Cardiovascular:  Tachy RRR without M,G,R 
Abd/GI: soft and non-tender; with positive bowel sounds. Ext: warm without cyanosis. There is no lower leg edema. Musculoskeletal: moves all four extremities with equal strength Skin:  no jaundice or rashes, no wounds Neuro: no gross neuro deficits Musculoskeletal: can ambulate. No deformity Psychiatric: Alert, oriented, calm, very weak voice. CXR: 
6/24:  Persistent right-sided pulmonary infiltrate, decreased effusion 6/23/20 LAB 
 6/23/2020 14:02 BODY FLUID TYPE PLEURAL FLUID FLUID APPEARANCE TURBID  
FLUID COLOR RED  
FLUID RBC CT. 63,000 FLUID WBC COUNT 171 [facility] NEUTROPHIL 19 [facility] LYMPHS 79 [address] 2 Protein total, body fld. 3.1 Glucose, body fld. [address] LD, body fld. [address]  Recent Labs  
  06/25/20 
0706 06/24/20 
0716 06/23/20 
0912 WBC 5.6 4.8 5.5 HGB 8.1* 6.4* 7.6* HCT 26.0* 20.2* 24.1*  
* 121* 129* Recent Labs  
  06/25/20 
0706 06/24/20 
0716 06/23/20 
0912  135* 135* K 4.4 4.8 4.8  
CL 99 98 97* CO2 28 22 32 * 175* 106* BUN 22 17 14 CREA 0.74* 0.70* 0.72* MG 2.0 2.0 2.4 Lab Results Component Value Date/Time Protein, total 6.6 05/24/2020 05:10 PM  
 Albumin 2.5 (L) 05/24/2020 05:10 PM  
 
Assessment:  (Medical Decision Making) Hospital Problems  Date Reviewed: 6/25/2020 Codes Class Noted POA Debility ICD-10-CM: R53.81 ICD-9-CM: 799.3  6/15/2020 Yes Very weak Rectal obstruction ICD-10-CM: K62.4 ICD-9-CM: 569.2  6/1/2020 Yes Aspiration pneumonia ([facility] [address] ICD-10-CM: J69.0 ICD-9-CM: 507.0  6/1/2020 Yes Neutropenia ([address] ICD-10-CM: D70.9 ICD-9-CM: 288.00  5/25/2020 Yes WBC 5.9 Pleural effusion on right ICD-10-CM: J90 ICD-9-CM: 511.9  5/25/2020 Yes Overview Addendum 6/24/2020  8:13 AM by Telly Adams NP  
  6/23/20L  Right thoracentesis:  1100 ml bloody effusion removed 6/10/20:  Bilateral thoracentesis on L( 1100 cc ) and R( 1000 cc) removed 6/3/20:  Right chest thoracentesis with 800 removed. * (Principal) Acute respiratory failure with hypoxia ([facility] [address] ICD-10-CM: J96.01 
ICD-9-CM: 518.81  5/24/2020 Yes On Opti-flow--wean as tolerated Normocytic anemia ICD-10-CM: D64.9 ICD-9-CM: 285.9  5/24/2020 Yes  
 hgb 8.1--transfused x2 yesterday Pleural effusion on left ICD-10-CM: J90 ICD-9-CM: 511.9  5/24/2020 Yes Overview Addendum 6/25/2020 11:40 AM by Telly Adams NP  
   6/10/20:  Bilateral thoracentesis on L( 1100 cc ) and R( 1000 cc) removed 6/25/20:  L thoracentesis:  900 ml serosanguinous fluid removed--negative cytology Negative cytology 6/25/20 Malignant neoplasm metastatic to bone [facility]) (Chronic) ICD-10-CM: C79.51 
ICD-9-CM: 198.5  6/26/2019 Yes  
 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 
ICD-9-CM: 250.40, 583.81  7/3/2018 Yes Diabetes mellitus due to underlying condition with hyperglycemia (HCC) (Chronic) ICD-10-CM: X35.17 ICD-9-CM: 249.80  12/21/2015 Yes Per primary--ranges 200-266 COVID-19 ruled out ICD-10-CM: Z03.818 ICD-9-CM: V71.83  12/15/2015 Yes  
 negative Hyponatremia ICD-10-CM: E87.1 ICD-9-CM: 276.1  11/23/2015 Yes Na up to 136 Plan:  (Medical Decision Making) --Continue Optiflow--wean as tolerated. --Lasix 40 mg IV BID, urine output 2.2 L, creat 0.74 
--Solu Medrol 60mg q12h, recent CRP 23.2 possible inflammatory effusion. --Received Albumin x3 doses--completed this morning 
--Hgb up to 8.1--received PRBC x 2 yesterday for hgb 6.4. 
--Cytology pleural fluid 6/25:  No malignant cells 
--AFB and fungal culture:  pending  
--Pleural fluid culture:  pending 
--Palliative care consulted--patient and wife wish for a full code to be continued. --Referral made to [facility] AT [facility] per case management but transfer not appropriate now. More than 50% of time documented was spent face-to-face contact with the patient and in the care of the patient on the floor/unit where the patient is located. Veronica Johnson NP Lungs:  clearer Heart:  RRR with no Murmur/Rubs/Gallops Additional Comments:  Color looks better. Sat now 100 on 55%. Will decrease FIO2. Continue steroids today. Cytology on R tap negative. CXR tomorrow. I have spoken with and examined the patient. I agree with the above assessment and plan as documented.  
 
Nuzhat Eugene MD

## 2020-06-26 NOTE — PROGRESS NOTES
Ewa Roberts Admission Date: 5/24/2020 Daily Progress Note: 6/26/2020 The patient's chart is reviewed and the patient is discussed with the staff. 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 He has undergone multiple thoracenteses - 5/25 - right, 1000 ml, 5/29 - right, 1300ml/left 800 ml, 6/3 - right, 800 ml, 6/9 - right, 1000 ml, left 1100 ml. The fluid has been transudative with rare atypical cells. ECHO with EF 60-65%. Is on Lasix with a negative balance and currently on 12 L NC O2. In regards to his metastatic rectal cancer, he has known anal stricture and declined prior surgical diverting ostomy. He is s/p anal dilation per [facility] surgery in past.  Oncology recommends followup at discharge, last chemo completed 5/2020. He has been seen by GI s/p 6-2 flex sig with disimpaction/ no stricture found. Recommends [facility] colorectal followup. He required 1 unit PRBC 6-15. Had repeat right thoracentesis 6/23 with 1100 ml bloody effusion removed. Rapid response called 6/24 for hypoxia, was placed on Opti-flow. Repeat left thoracentesis 6/24 with 900 ml serosanguinous fluid removed. Treated with burst dose Albumin and Lasix with good diuresis. Subjective:  
   
Lying in bed, states he is feeling better today. Has productive cough at times. Remains on Opti-flow. Wife at bedside sleeping in the chair. Review of Systems Constitutional: negative for fever, chills, sweats Cardiovascular: negative for chest pain, palpitations, syncope, edema Gastrointestinal: negative for dysphagia, reflux, vomiting, diarrhea, abdominal pain, or melena Neurologic: negative for focal weakness, numbness, headache Current Facility-Administered Medications Medication Dose Route Frequency  insulin glargine (LANTUS) injection 12 Units  12 Units SubCUTAneous QHS  insulin lispro (HUMALOG) injection 6 Units  6 Units SubCUTAneous TIDAC  
 0.9% sodium chloride infusion 250 mL  250 mL IntraVENous PRN  
 furosemide (LASIX) injection 40 mg  40 mg IntraVENous BID  tamsulosin (FLOMAX) capsule 0.4 mg  0.4 mg Oral DAILY  insulin lispro (HUMALOG) injection   SubCUTAneous AC&HS  ferrous sulfate tablet 325 mg  1 Tab Oral DAILY WITH BREAKFAST  0.9% sodium chloride infusion 250 mL  250 mL IntraVENous PRN  
 hydrALAZINE (APRESOLINE) 20 mg/mL injection 10 mg  10 mg IntraVENous Q6H PRN  
 nystatin (MYCOSTATIN) 100,000 unit/mL oral suspension 500,000 Units  500,000 Units Oral QID  simethicone (MYLICON) tablet 80 mg  80 mg Oral QID PRN  potassium chloride (K-DUR, KLOR-CON) SR tablet 20 mEq  20 mEq Oral TID  albuterol-ipratropium (DUO-NEB) 2.5 MG-0.5 MG/3 ML  3 mL Nebulization Q6HWA RT  
 magnesium hydroxide (MILK OF MAGNESIA) 400 mg/5 mL oral suspension 30 mL  30 mL Oral DAILY  senna-docusate (PERICOLACE) 8.6-50 mg per tablet 1 Tab  1 Tab Oral BID  
 bisacodyL (DULCOLAX) suppository 10 mg  10 mg Rectal DAILY  0.9% sodium chloride infusion 250 mL  250 mL IntraVENous PRN  
 amLODIPine (NORVASC) tablet 10 mg  10 mg Oral DAILY  docusate sodium (COLACE) capsule 100 mg  100 mg Oral PRN  
 lisinopriL (PRINIVIL, ZESTRIL) tablet 20 mg  20 mg Oral DAILY  meloxicam (MOBIC) tablet 7.5 mg  7.5 mg Oral DAILY  sodium chloride tablet 2 g  2 g Oral BID  traMADoL (ULTRAM) tablet 50 mg  50 mg Oral Q6H PRN  
 sodium chloride (NS) flush 5-40 mL  5-40 mL IntraVENous Q8H  
 sodium chloride (NS) flush 5-40 mL  5-40 mL IntraVENous PRN  
 acetaminophen (TYLENOL) tablet 650 mg  650 mg Oral Q4H PRN  
 ondansetron (ZOFRAN) injection 4 mg  4 mg IntraVENous Q4H PRN  
 [Held by provider] enoxaparin (LOVENOX) injection 40 mg  40 mg SubCUTAneous Q24H Objective:  
 
Vitals: 06/25/20 1920 06/25/20 2119 06/25/20 2300 06/26/20 0346 BP: 132/73  155/84 124/60 Pulse: (!) 102  (!) 101 98 Resp: 22 22 22 Temp: 98 °F (36.7 °C)  97.9 °F (36.6 °C) 98.8 °F (37.1 °C) SpO2: 98% 96% 97% 98% Weight:      
Height:      
 
Intake and Output:  
06/24 1901 - 06/26 0700 In: 135 [P.O.:125; I.V.:10] Out: 1013 [address] [[id number]] No intake/output data recorded. Physical Exam:  
Constitutional:  the patient is well developed, weak on Optiflow 55%, 40L O2 sat 98%. He is chronically ill. HEENT:  Sclera clear, pupils equal, oral mucosa moist 
Lungs: few anterior crackles, productive cough at times--sound clearer Cardiovascular:  Tachy RRR, HR 90-100s without M,G,R 
Abd/GI: soft and non-tender; with positive bowel sounds. Ext: warm without cyanosis. There is no lower leg edema. Musculoskeletal: moves all four extremities with equal strength Skin:  no jaundice or rashes, no wounds Neuro: no gross neuro deficits Musculoskeletal: can ambulate. No deformity Psychiatric: Alert, oriented, calm, very weak voice. CXR: 
6/26/20:   
1. Improving bilateral lung edema or infiltrates. 2. Resolved left pleural effusion. 3. Progressed loculated right pleural effusion. 6/24:  Persistent right-sided pulmonary infiltrate, decreased effusion 6/23/20 LAB 
 6/24/2020 12:30  
BODY FLUID TYPE PLEURAL FLUID FLUID APPEARANCE CLOUDY FLUID COLOR CALI  
FLUID RBC CT. 12,000 FLUID WBC COUNT <100 Protein total, body fld. 3.0 Glucose, body fld. [address] LD, body fld. 157  
 
 
 6/23/2020 14:02 BODY FLUID TYPE PLEURAL FLUID FLUID APPEARANCE TURBID  
FLUID COLOR RED  
FLUID RBC CT. 63,000 FLUID WBC COUNT 171 [facility] NEUTROPHIL 19 [facility] LYMPHS 79 [address] 2 Protein total, body fld. 3.1 Glucose, body fld. [address] LD, body fld. [address] Recent Labs  
  06/25/20 
0706 06/24/20 
0716 06/23/20 
0912 WBC 5.6 4.8 5.5 HGB 8.1* 6.4* 7.6* HCT 26.0* 20.2* 24.1*  
 * 121* 129* Recent Labs  
  06/25/20 
0706 06/24/20 
0716 06/23/20 
0912  135* 135* K 4.4 4.8 4.8  
CL 99 98 97* CO2 28 22 32 * 175* 106* BUN 22 17 14 CREA 0.74* 0.70* 0.72* MG 2.0 2.0 2.4 Lab Results Component Value Date/Time Protein, total 6.6 05/24/2020 05:10 PM  
 Albumin 2.5 (L) 05/24/2020 05:10 PM  
 
Assessment:  (Medical Decision Making) Hospital Problems  Date Reviewed: 6/26/2020 Codes Class Noted POA Debility ICD-10-CM: R53.81 ICD-9-CM: 799.3  6/15/2020 Yes Very weak--works with PT Rectal obstruction ICD-10-CM: K62.4 ICD-9-CM: 569.2  6/1/2020 Yes Aspiration pneumonia ([facility] [address] ICD-10-CM: J69.0 ICD-9-CM: 507.0  6/1/2020 Yes Neutropenia ([facility] [address] ICD-10-CM: D70.9 ICD-9-CM: 288.00  5/25/2020 Yes Recent WBC 5.9 Pleural effusion on right ICD-10-CM: J90 ICD-9-CM: 511.9  5/25/2020 Yes Overview Addendum 6/24/2020  8:13 AM by Radha Cartagena NP  
  6/23/20L  Right thoracentesis:  1100 ml bloody effusion removed 6/10/20:  Bilateral thoracentesis on L( 1100 cc ) and R( 1000 cc) removed 6/3/20:  Right chest thoracentesis with 800 removed. * (Principal) Acute respiratory failure with hypoxia ([facility] [address] ICD-10-CM: J96.01 
ICD-9-CM: 518.81  5/24/2020 Yes On Opti-flow--wean as tolerated Normocytic anemia ICD-10-CM: D64.9 ICD-9-CM: 285.9  5/24/2020 Yes Hgb 8.1 yesterday--recent transfused PRBCs x2 Pleural effusion on left ICD-10-CM: J90 ICD-9-CM: 511.9  5/24/2020 Yes Overview Addendum 6/25/2020 11:40 AM by Radha Cartagena NP  
   6/10/20:  Bilateral thoracentesis on L( 1100 cc ) and R( 1000 cc) removed 6/25/20:  L thoracentesis:  900 ml serosanguinous fluid removed--negative cytology Negative cytology 6/25/20 Malignant neoplasm metastatic to bone [facility]) (Chronic) ICD-10-CM: C79.51 
ICD-9-CM: 198.5  6/26/2019 Yes  
 chronic  Type 2 diabetes with nephropathy (HCC) (Chronic) ICD-10-CM: E11.21 
 ICD-9-CM: 250.40, 583.81  7/3/2018 Yes Diabetes mellitus due to underlying condition with hyperglycemia (HCC) (Chronic) ICD-10-CM: C62.89 ICD-9-CM: 249.80  12/21/2015 Yes Per primary--ranges 256-417--secondary to steroids COVID-19 ruled out ICD-10-CM: Z03.818 ICD-9-CM: V71.83  12/15/2015 Yes  
 negative Hyponatremia ICD-10-CM: E87.1 ICD-9-CM: 276.1  11/23/2015 Yes Na yesterday up to 136 Plan:  (Medical Decision Making) --CXR today-improved 
--Duoneb 
--Continue Optiflow--wean as tolerated. --Lasix 40 mg IV BID, urine output 1.5 L, creat 0.74 yesterday --Burst dose Solu Medrol completed, recent CRP 23.2 possible inflammatory effusion. --Recent Hgb up to 8.1--received PRBC x 2 for hgb 6.4. 
--Cytology pleural fluid 6/25:  No malignant cells 
--AFB and fungal culture:  pending  
--Pleural fluid culture:  No growth 2 days 
--PT following--sat in chair yesterday 
--Palliative care consulted--patient and wife wish for a full code to be continued. --Referral made to [facility] per case management but transfer not appropriate now. More than 50% of time documented was spent face-to-face contact with the patient and in the care of the patient on the floor/unit where the patient is located. Tab [place], [facility] Lungs:  clearer Heart:  RRR with no Murmur/Rubs/Gallops Additional Comments:  Has loculated fluid collection on R. US and try to drain. Doing better on steroids. Can wean oxygen. Cytology from both chests negative. I have spoken with and examined the patient. I agree with the above assessment and plan as documented.  
 
Yodit Mccain MD

## 2020-06-26 NOTE — PROGRESS NOTES
Ultra Sound done of the Bilateral chest, pictures taken. Not enough fluid noted to warrant thoracentesis per Dr Alek Goel.

## 2020-06-26 NOTE — PROGRESS NOTES
Problem: Falls - Risk of 
Goal: *Absence of Falls Description: Document [facility] Fall Risk and appropriate interventions in the flowsheet. Outcome: Progressing Towards Goal 
Note: Fall Risk Interventions: 
Mobility Interventions: Bed/chair exit alarm, Patient to call before getting OOB, PT Consult for mobility concerns Mentation Interventions: Adequate sleep, hydration, pain control, Bed/chair exit alarm, Door open when patient unattended, Family/sitter at bedside, More frequent rounding, Reorient patient, Toileting rounds, Update white board Medication Interventions: Bed/chair exit alarm, Patient to call before getting OOB, Teach patient to arise slowly Elimination Interventions: Bed/chair exit alarm, Call light in reach, Patient to call for help with toileting needs, Toileting schedule/hourly rounds Problem: Patient Education: Go to Patient Education Activity Goal: Patient/Family Education Outcome: Progressing Towards Goal 
  
Problem: Nutrition Deficit Goal: *Optimize nutritional status Outcome: Progressing Towards Goal 
  
Problem: Pain Goal: *Control of Pain Outcome: Progressing Towards Goal 
  
Problem: Constipation - Risk of 
Goal: *Prevention of constipation Outcome: Progressing Towards Goal 
  
Problem: Pressure Injury - Risk of 
Goal: *Prevention of pressure injury Description: Document Jude Scale and appropriate interventions in the flowsheet. Outcome: Progressing Towards Goal 
Note: Pressure Injury Interventions: 
Sensory Interventions: Assess changes in LOC, Check visual cues for pain, Minimize linen layers, Keep linens dry and wrinkle-free Moisture Interventions: Absorbent underpads, Check for incontinence Q2 hours and as needed, Maintain skin hydration (lotion/cream) Activity Interventions: Increase time out of bed, Pressure redistribution bed/mattress(bed type), PT/OT evaluation Mobility Interventions: Pressure redistribution bed/mattress (bed type), PT/OT evaluation Nutrition Interventions: Document food/fluid/supplement intake, Offer support with meals,snacks and hydration Friction and Shear Interventions: Lift sheet, HOB 30 degrees or less

## 2020-06-26 NOTE — DIABETES MGMT
. Blood glucose range yesterday 252-430 with pt receiving Solu-medrol 120 mg, Lantus 8 units and Humalog 53 units. Lantus dose was increased to Lantus 12 units hs and Prandial dose increased to Humalog 6 units 3x/day ac. Nursing will continue to monitor blood glucose per MD orders.

## 2020-06-26 NOTE — PROGRESS NOTES
Problem: Mobility Impaired (Adult and Pediatric) Goal: *Acute Goals and Plan of Care (Insert Text) Outcome: Progressing Towards Goal 
Note: LTG: (Reviewed and updated 6/15/20) (1.)Mr. Anil Workman will move from supine to sit and sit to supine , scoot up and down and roll side to side with INDEPENDENT within 7 treatment day(s). (2.)Mr. Anil Workman will transfer from bed to chair and chair to bed with SUPERVISION using the least restrictive device within 7 treatment day(s). (3.)Mr. Anil Workman will ambulate with STAND BY ASSIST for 100+ feet with the least restrictive device within 7 treatment day(s). (4.)Mr. Anil Workman will perform exercises per HEP independently to improve strength and mobility within 7 days. (5.)Mr. Anil Workman will perform standing mobility and balance activities for 10+ minutes to improve strength and mobility within 7 days. ________________________________________________________________________________________________ PHYSICAL THERAPY: Daily Note and AM 6/26/2020 INPATIENT: PT Visit Days : 6 Payor: Heath Ahr / Plan: [address] [place] [address] HMO / Product Type: [facility] Care Medicare /   
  
NAME/AGE/GENDER: Madeleine Burk is a 79 y.o. male PRIMARY DIAGNOSIS: Acute metabolic encephalopathy [G35.41] Acute respiratory failure with hypoxia (HCC) Acute respiratory failure with hypoxia (HCC) Procedure(s) (LRB): ULTRASOUND (N/A) Day of Surgery ICD-10: Treatment Diagnosis:  
 · Generalized Muscle Weakness (M62.81) · Other lack of cordination (R27.8) · Difficulty in walking, Not elsewhere classified (R26.2) · Other abnormalities of gait and mobility (R26.89) · Low Back Pain (M54.5) Precaution/Allergies: 
Patient has no known allergies. ASSESSMENT:  
 
Mr. Anil Workman  is a 79year old male who has been admitted to hospital on 05/24 with above diagnosis and hx of cancer.  Prior to hospital admission pt lives with wife in a 1 story home with 0 step(s) to enter with no jessica. Pt endorses 0 falls in past 6 months. Prior to admission No O2 usage at home, no assistance with ADLs and uses no DME for mobility. Patient is supine on optiflow today with wife present. He is motivated and willing to try. Respiratory present to assist with Optiflow and maintaining sats. He needed minimal assist to sit up from supine. He stood from an elevated bed with minimal assist into the walker and walked about 20 feet with min A/CGA around the bed to the door and back to EOB. He performed seated exercises below. He is quite weak and will certainly need a rehab stay. Respiratory present to perform thoracentesis, so pt remained on EOB for that procedure. Slow progress with no goals met. Very motivated and will to try. Wife encourages him. This section established at most recent assessment PROBLEM LIST (Impairments causing functional limitations): 1. Decreased Strength 2. Decreased ADL/Functional Activities 3. Decreased Transfer Abilities 4. Decreased Ambulation Ability/Technique 5. Decreased Balance 6. Increased Pain 7. Decreased Activity Tolerance 8. Increased Fatigue 9. Decreased Flexibility/Joint Mobility 10. Decreased [place] with Home Exercise Program 
 INTERVENTIONS PLANNED: (Benefits and precautions of physical therapy have been discussed with the patient.) 1. Balance Exercise 2. Bed Mobility 3. Family Education 4. Gait Training 5. Heat 6. Home Exercise Program (HEP) 7. Manual Therapy 8. Neuromuscular Re-education/Strengthening 9. Range of Motion (ROM) 10. Therapeutic Activites 11. Therapeutic Exercise/Strengthening 12. Transfer Training TREATMENT PLAN: Frequency/Duration: 3 times a week for duration of hospital stay Rehabilitation Potential For Stated Goals: Good REHAB RECOMMENDATIONS (at time of discharge pending progress):   
Placement:  
It is my opinion, based on this patient's performance to date, that  Haim Huddleston may benefit from intensive therapy at an [address] [place] [address] after discharge due to a probable need for close medical supervision by a rehab physician, a probable need for 24 hour rehab nursing, a probable need for multiple therapy disciplines and potential to make ongoing and sustainable functional improvement that is of practical value. Yelitza Kay Equipment:  
? Walkers, Type: Rolling CleRoyal Madina Kava HISTORY:  
History of Present Injury/Illness (Reason for Referral): 
Per Physician Note: 
 
Shavonne García is a 79 y.o. male with a past medical history of HEENT cancer undergoing chemo/radiation who presents to the [facility] ER with report of SOB and chest discomfort for the past several days. He also admits to mild cough but denies fevers or chills. Admits to feeling a little better and breathing a bit better since arrival. 
  
Past Medical History/Comorbidities:  
Mr. Haim Huddleston  has a past medical history of GERD (gastroesophageal reflux disease), Head and neck cancer ([facility] (9/30/2015), History of squamous cell carcinoma, History of throat cancer (2015), Hypercholesteremia, Hypertension, Hypomagnesemia (5/20/2020), Rectal cancer ([address] (2018), Type 2 diabetes mellitus ([facility] and Vomiting (2/23/2016). Mr. Haim Huddleston  has a past surgical history that includes hx orthopaedic (Right, 1966); hx other surgical (9/9/15); hx heent; hx tonsillectomy; hx heent (2015); hx colonoscopy (05/2018); hx vascular access; hx lymph node dissection; hx other surgical; and flexible sigmoidoscopy (N/A, 6/2/2020). Social History/Living Environment:  
Home Environment: Private residence # Steps to Enter: 0 One/Two Story Residence: One story Living Alone: No 
Support Systems: Spouse/Significant Other/Partner Patient Expects to be Discharged to[de-identified] Private residence Current DME Used/Available at Home: None Tub or Shower Type: Tub/Shower combination Prior Level of Function/Work/Activity: Mod I mobility and amb Number of Personal Factors/Comorbidities that affect the Plan of Care: 3+: HIGH COMPLEXITY EXAMINATION:  
Most Recent Physical Functioning:  
Gross Assessment: 
  
         
  
Posture: 
Posture Assessment: Forward head Balance: 
Sitting: Intact Standing: Impaired Standing - Static: Fair Standing - Dynamic : Constant support; Fair Bed Mobility: 
Rolling: Contact guard assistance;Stand-by assistance Supine to Sit: Contact Guard Assist;Additional time Scooting: Stand-by assistance Wheelchair Mobility: 
  
Transfers: 
Sit to Stand: Minimum assistance Stand to Sit: Minimum assistance Gait: 
  
Speed/Christina: Shuffled; Slow Step Length: Left shortened;Right shortened Gait Abnormalities: Decreased step clearance Distance (ft): 20 Feet (ft) Assistive Device: Walker, rolling Ambulation - Level of Assistance: Contact guard assistance Interventions: Safety awareness training;Verbal cues Body Structures Involved: 1. Lungs 2. Bones 3. Joints Body Functions Affected: 1. Sensory/Pain 2. Movement Related Activities and Participation Affected: 1. Mobility 2. Self Care 3. Interpersonal Interactions and Relationships 4. Community, Social and [place] Number of elements that affect the Plan of Care: 4+: HIGH COMPLEXITY CLINICAL PRESENTATION:  
Presentation: Stable and uncomplicated: LOW COMPLEXITY CLINICAL DECISION MAKIN[address] [place] [address] AM-PAC 6 Clicks Basic Mobility Inpatient Short Form How much difficulty does the patient currently have. .. Unable A Lot A Little None 1. Turning over in bed (including adjusting bedclothes, sheets and blankets)? [] 1   [] 2   [] 3   [x] 4  
2. Sitting down on and standing up from a chair with arms ( e.g., wheelchair, bedside commode, etc.)   [] 1   [] 2   [x] 3   [] 4  
3. Moving from lying on back to sitting on the side of the bed? [] 1   [] 2   [] 3   [x] 4 How much help from another person does the patient currently need. ..  Total A Lot A Little None 4. Moving to and from a bed to a chair (including a wheelchair)? [] 1   [] 2   [x] 3   [] 4  
5. Need to walk in hospital room? [] 1   [x] 2   [] 3   [] 4  
6. Climbing 3-5 steps with a railing? [x] 1   [] 2   [] 3   [] 4  
© 2007, Trustees of [address] [place] [address] [address] [address], under license to [employer]. All rights reserved Score:  Initial: 20 Most Recent: 17 (Date: 6/15/20 ) Interpretation of Tool:  Represents activities that are increasingly more difficult (i.e. Bed mobility, Transfers, Gait). Medical Necessity:    
· Patient is expected to demonstrate progress in  
· strength, range of motion, balance, coordination, and functional technique ·  to  
· increase independence with ambulation and mobility · . Reason for Services/Other Comments: 
· Patient continues to require skilled intervention due to · Gross weakness, poor balance, increased risk of falls · . Use of outcome tool(s) and clinical judgement create a POC that gives a: Clear prediction of patient's progress: LOW COMPLEXITY  
  
 
 
 
TREATMENT:  
  
Pre-treatment Symptoms/Complaints: I'll try. Pain: Initial:  
Pain Intensity 1: 0  Post Session: 0/10 Therapeutic Activity: (25  minutes): Therapeutic activities including Bed mobility, sit-stand transfer training from bed and chair, standing static/dynamic mobility, Chair transfers, commode transfers and Ambulation on level ground to improve mobility, strength, balance, coordination and activity tolerance. Required minimal Safety awareness training;Verbal cues to promote static and dynamic balance in standing and promote motor control of bilateral, lower extremity(s). Date: 
6/24/20 Date: 
6/8/20 Date: 
6/10 Date: 
6-12-20 Date: 
6/15/20 Date 6/17/20 Date: 
6/18/20 Date 6/22/20 Activity/Exercise Parameters Parameters Parameters Ankle pumps 10 X 20 B X 10 30x 15x AB 15x AB 15x AB 10x B Heel slides   X 10 aa 20x Leg slides (hip abd/add)   X 10 aa 10x Seated knee extension 15 B X 15 B   15x AB 15x AB 15x AB 10x B Seated hip flexion 10 B X 15 B X 5 5    10x B Standing marching   X 5  15x AB 15x AB 15x AB   
HRs    30 Seated hip aBd 15x B    15x Ab 15x AB 15x AB 10x B Braces/Orthotics/Lines/Etc:  
· traore catheter · nasal cannula Treatment/Session Assessment:   
· Response to Treatment: slow progress, weak · Interdisciplinary Collaboration:  
o Physical Therapy Assistant 
o Registered Nurse · After treatment position/precautions:  
o Up in the chair. o Bed/Chair-wheels locked 
o Call light in hand · Compliance with Program/Exercises: Compliant all of the time · Recommendations/Intent for next treatment session: \"Next visit will focus on advancements to more challenging activities\". Total Treatment Duration: PT Patient Time In/Time Out Time In: 4918 Time Out: 1003 Maya Velez, PTA

## 2020-06-26 NOTE — PROGRESS NOTES
Nutrition Assessment for:  
Follow up Initial assessment for: Consult for general nutrition and supplements (DO [place]) Assessment: Patient presented with SOB and chest pain. PMH: H/N cancer with mets to bone, DM2. Pt had cycle 3 Carbo/Etop may 8th with cycle 4 planned for this week but currently on hold. Patient had thoracentesis on 5/25 and 5/29. Repeat thoracentesis 6/3. Flex sig on 6/2 for fecal impaction. Repeat thoracentesis 6/10 with 1100 mL from left and 1000 mL from right. Rapid response called 6/24 for increased O2 demands. Repeat thoracentesis right on 6/23 and left on 6/24. Palliative care following and recommending hospice, however wife refusing. US for possible thoracentesis today, but not enough fluid. Patient seen sleeping with wife at bedside. She reports that he had a \"bad day\" yesterday or the day before and did not eat well that day. Other than that she states that he continues to eat well with meals. She reports that he continues Glucerna with all meals. She cannot recall if he had one this am. 
POC: 178-234 over last 24 hrs DIET NUTRITIONAL SUPPLEMENTS All Meals; Glucerna Shake ( ) DIET REGULAR   
Very limited intake data since last assessment. . 
  
Anthropometrics: 
Height: 6' (182.9 cm), Weight: 76.2 kg (168 lb), Weight Source: Patient stated, Body mass index is 22.78 kg/m². BMI class of Normal weight. 
  
Macronutrient needs: (using Listed body weight 74.4 kg) (cancer tx) XCZ: [id number] DJCU/TKT (25-30 kcals/kg ) DEE:  Z/BOH (1-1.5 g/kg )  
  
Nutrition Intervention: 
Meals and snacks: Continue current diet. Medical food supplement therapy: Continue Glucerna TID. Explained that Glucerna also available in floor stock if missing on the tray or if patient would like an additional one. Discharge Nutrition Plan: Patient would benefit from oral nutrition supplement at discharge.  Would recommend Glucerna Shake or comparable 3 times per day or as directed by MD. 
 
 [address] [place] [address], [address], [address] [facility]

## 2020-06-26 NOTE — PROGRESS NOTES
Hospitalist Progress Note 2020 Admit Date: 2020 11:00 PM  
NAME: Schuyler Peters :  1940 MRN:  [id number] Attending: Adore Piedra DO 
PCP:  Анна Lim MD 
 
SUBJECTIVE:  
 Mr. 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 He has been seen by pulm s/p multiple thoracentesis and diuresis. Studies transudate, felt due to hypoalbuminemia. Cyto neg from both sides [place] 60-65%. He has required high flow O2. He has compelted 8 days zosyn. In regards to his metastatic rectal cancer, he has known anal stricture and declined prior surgical diverting ostomy. He is s/p anal dilation per [facility] surgery in past. CTAP showed constipation/ colonic distention to rectum suspicious for obstruction. oncology recommends followup at discharge, last chemo completed . He has been seen by GI s/p 6-2 flex sig with disimpaction/ no stricture found. Recommends [facility] colorectal followup. He required 1 unit PRBC -15,  Rapid repsonse on  for increased o2 demand. Thoracentesis right side  and left . Right effusion bloody and steroids added per pulm. PC following for goals of care and wife/pt still want full code 
  
 - still appears extremely weak but less pale. Wife and granddaughter at bedside (?who authorized multiple visitors) Review of Systems negative with exception of pertinent positives noted above PHYSICAL EXAM  
 
Visit Vitals /73 (BP 1 Location: Left arm, BP Patient Position: At rest) Pulse (!) 102 Temp 98 °F (36.7 °C) Resp 22 Ht 6' (1.829 m) Wt 77.1 kg (170 lb) SpO2 98% BMI 23.06 kg/m² Temp (24hrs), Av.1 °F (36.7 °C), Min:97.7 °F (36.5 °C), Max:98.9 °F (37.2 °C) Oxygen Therapy O2 Sat (%): 98 % (20) Pulse via Oximetry: 104 beats per minute (06/25/20 1436) O2 Device: Heated; Hi flow nasal cannula (06/25/20 1436) O2 Flow Rate (L/min): 40 l/min (06/25/20 1436) O2 Temperature: 87.8 °F (31 °C) (06/25/20 0751) FIO2 (%): 55 % (06/25/20 1436) ETCO2 (mmHg): 93 mmHg (06/01/20 1624) Intake/Output Summary (Last 24 hours) at 6/25/2020 2017 Last data filed at 6/25/2020 1230 Gross per 24 hour Intake 135 ml Output 1500 ml Net -1365 ml General: No acute distress  pale and weak appearing Lungs:  Diminished bilaterally Heart:  Regular rate and rhythm,  No murmur, rub, or gallop Abdomen: Soft, Non distended, Non tender, Positive bowel sounds Extremities: No cyanosis, clubbing or edema Neurologic:  No focal deficits ASSESSMENT Active Hospital Problems Diagnosis Date Noted  Debility 06/15/2020  Rectal obstruction 06/01/2020  Aspiration pneumonia ([address] 06/01/2020  Neutropenia ([address] 05/25/2020  Pleural effusion on right 05/25/2020 6/23/20L  Right thoracentesis:  1100 ml bloody effusion removed 6/10/20:  Bilateral thoracentesis on L( 1100 cc ) and R( 1000 cc) removed 6/3/20:  Right chest thoracentesis with 800 removed.  Acute respiratory failure with hypoxia ([address] 05/24/2020  Normocytic anemia 05/24/2020  Pleural effusion on left 05/24/2020  
   6/10/20:  Bilateral thoracentesis on L( 1100 cc ) and R( 1000 cc) removed 6/25/20:  L thoracentesis:  900 ml serosanguinous fluid removed--negative cytology  Malignant neoplasm metastatic to bone ([address] 06/26/2019  Type 2 diabetes with nephropathy ([address] 07/03/2018  Diabetes mellitus due to underlying condition with hyperglycemia ([address] 12/21/2015  COVID-19 ruled out 12/15/2015  Hyponatremia 11/23/2015 A/p 
 
  
Plan: 
  
· Acute hypoxic respiratory failure, bilateral effusions: · Worsening effusions · Right side thora 6/23 with 1100 cc bloody effusion, left side thora 6/24 900 cc yellow effusion Cytology neg for both L&R 
· IV albumin x 3 doses complete · IV steroids per pulm · IV lasix 
  
· Anal cancer with stricture and rectal obstruction: · Declines surgery · continued bowel regimen · Outpatient colorectal and oncology followups 
  
  
· HTN:  
· continued norvasc, lisinopril 
  
  
· DM2: 
· Holding amaryl · Continued SSI, premeal insulin · Increased Lantus for suboptimal control (6/25)   
  
· Anemia: 
· followup CBC ·  transfuse HGB < 7 last on 6-24 
  
  
· Urine retention: · Replaced traore · continued flomax · Voiding trial possibly prior to discharge 
  
DC planning/Dispo:  Pending to SNF 
DVT Prophylaxis: lovenox on hold secondary to bloody effusion, SCDs 
 
dispo - needs hospice but spoke with family and are not ready to consider at this time. [facility] would be appropriate - CM following Signed By: Sergey Fitzpatrick DO   
 June 25, 2020

## 2020-06-26 NOTE — PROCEDURES
US CHEST Bilateral US performed. The is only a small amount of R pleural fluid with some striations. Not enough to drain. L effusion is very small and not enough for safe drainage.  
 
Tricia Flores MD

## 2020-06-27 NOTE — PROGRESS NOTES
Hospitalist Note Admit Date:  2020 11:00 PM  
Name:  Eben Marte Age:  79 y.o. 
:  1940 MRN:  [id number] PCP:  Karina Concepcion MD 
Treatment Team: Attending Provider: Dorrene Gaucher, MD; Utilization Review: Rhoda Mckeon, RN; Consulting Provider: Fidel Blum, RN; Care Manager: Justen Sutton RN; Consulting Provider: Lamont Moore MD; Consulting Provider: Malick Lion MD; Care Manager: Tyrone Moreno LMSW HPI/Subjective:  
 
Mr. 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 He has been seen by pulm s/p bilateral thoracentesis -26, right thoracentesis 6-3, bilateral thoracentesis 6-9, right thoracentesis 6-23, left thoracentesis -24 and diuresis. Studies transudate, felt due to hypoalbuminemia. [place] 60-65%. He has required high flow O2. He has compelted 8 days zosyn. Course of steroids added. In regards to his metastatic rectal cancer, he has known anal stricture and declined prior surgical diverting ostomy. He is s/p anal dilation per [facility] surgery in past. CTAP showed constipation/ colonic distention to rectum suspicious for obstruction. oncology recommends followup at discharge, last chemo completed . He has been seen by GI s/p 6-2 flex sig with disimpaction/ no stricture found. Recommends [facility] colorectal followup. He required 1 unit PRBC 6-15// 6-24. Palliative care following. Discharge plans pending to SNF. 20 wife present, breathing better, had BM, diet tolerant, sleeping well, still has traore Objective:  
 
Patient Vitals for the past 24 hrs: 
 Temp Pulse Resp BP SpO2  
20 1505     96 %  
20 1205 97.7 °F (36.5 °C) 97 18 102/61 94 % 20 0836     92 %  
20 0749 97.7 °F (36.5 °C) 96 20 130/80 96 % 06/27/20 0330 97.7 °F (36.5 °C) 97 16 151/85 97 % 06/27/20 0021 97.5 °F (36.4 °C) 93 16 139/81 97 % 06/26/20 2017     96 %  
06/26/20 1927 97.4 °F (36.3 °C) 98 18 130/70 96 % Oxygen Therapy O2 Sat (%): 96 % (06/27/20 1505) Pulse via Oximetry: 95 beats per minute (06/26/20 2017) O2 Device: Nasal cannula (06/27/20 1505) O2 Flow Rate (L/min): 2 l/min (06/27/20 1505) O2 Temperature: 87.8 °F (31 °C) (06/25/20 2119) FIO2 (%): 50 % (06/26/20 0836) ETCO2 (mmHg): 93 mmHg (06/01/20 1624) Estimated body mass index is 23.06 kg/m² as calculated from the following: 
  Height as of this encounter: 6' (1.829 m). Weight as of this encounter: 77.1 kg (170 lb). Intake/Output Summary (Last 24 hours) at 6/27/2020 1756 Last data filed at 6/27/2020 1112 Gross per 24 hour Intake 118 ml Output 3200 ml Net -3082 ml *Note that automatically entered I/Os may not be accurate; dependent on patient compliance with collection and accurate  by techs. General:    Alert , elderly, no distress. CV:   RRR. No murmur, rub, or gallop. No edema Lungs:   Clear anterior Abdomen:   Soft, nontender, nondistended. Decreased BS Extremities: Warm and dry. Skin:     No rashes or jaundice. Neuro:  No gross focal deficits Data Review: 
I have reviewed all labs, meds, and studies from the last 24 hours: 
Recent Results (from the past 24 hour(s)) GLUCOSE, POC Collection Time: 06/26/20  9:17 PM  
Result Value Ref Range Glucose (POC) 270 (H) 65 - 100 mg/dL METABOLIC PANEL, BASIC Collection Time: 06/27/20  7:06 AM  
Result Value Ref Range Sodium 137 136 - 145 mmol/L Potassium 3.9 3.5 - 5.1 mmol/L Chloride 98 98 - 107 mmol/L  
 CO2 32 21 - 32 mmol/L Anion gap 7 7 - 16 mmol/L Glucose 114 (H) 65 - 100 mg/dL BUN 22 8 - 23 MG/DL Creatinine 0.71 (L) 0.8 - 1.5 MG/DL  
 GFR est AA >60 >60 ml/min/1.73m2 GFR est non-AA >60 >60 ml/min/1.73m2 Calcium 8.3 8.3 - 10.4 MG/DL MAGNESIUM Collection Time: 06/27/20  7:06 AM  
Result Value Ref Range Magnesium 2.0 1.8 - 2.4 mg/dL CBC W/O DIFF Collection Time: 06/27/20  7:06 AM  
Result Value Ref Range WBC 6.9 4.3 - 11.1 K/uL  
 RBC 2.90 (L) 4.23 - 5.6 M/uL HGB 8.4 (L) 13.6 - 17.2 g/dL HCT 27.0 (L) 41.1 - 50.3 % MCV 93.1 79.6 - 97.8 FL  
 MCH 29.0 26.1 - 32.9 PG  
 MCHC 31.1 (L) 31.4 - 35.0 g/dL  
 RDW 16.4 (H) 11.9 - 14.6 % PLATELET 910 (L) 115 - 450 K/uL MPV 10.5 9.4 - 12.3 FL ABSOLUTE NRBC 0.04 0.0 - 0.2 K/uL GLUCOSE, POC Collection Time: 06/27/20  7:52 AM  
Result Value Ref Range Glucose (POC) 141 (H) 65 - 100 mg/dL GLUCOSE, POC Collection Time: 06/27/20 11:33 AM  
Result Value Ref Range Glucose (POC) 220 (H) 65 - 100 mg/dL GLUCOSE, POC Collection Time: 06/27/20  4:33 PM  
Result Value Ref Range Glucose (POC) 184 (H) 65 - 100 mg/dL Current Meds: 
Current Facility-Administered Medications Medication Dose Route Frequency  predniSONE (DELTASONE) tablet 40 mg  40 mg Oral DAILY WITH BREAKFAST  insulin glargine (LANTUS) injection 20 Units  20 Units SubCUTAneous QHS  insulin lispro (HUMALOG) injection 6 Units  6 Units SubCUTAneous TIDAC  
 0.9% sodium chloride infusion 250 mL  250 mL IntraVENous PRN  
 furosemide (LASIX) injection 40 mg  40 mg IntraVENous BID  tamsulosin (FLOMAX) capsule 0.4 mg  0.4 mg Oral DAILY  insulin lispro (HUMALOG) injection   SubCUTAneous AC&HS  ferrous sulfate tablet 325 mg  1 Tab Oral DAILY WITH BREAKFAST  0.9% sodium chloride infusion 250 mL  250 mL IntraVENous PRN  
 hydrALAZINE (APRESOLINE) 20 mg/mL injection 10 mg  10 mg IntraVENous Q6H PRN  
 nystatin (MYCOSTATIN) 100,000 unit/mL oral suspension 500,000 Units  500,000 Units Oral QID  simethicone (MYLICON) tablet 80 mg  80 mg Oral QID PRN  potassium chloride (K-DUR, KLOR-CON) SR tablet 20 mEq  20 mEq Oral TID  albuterol-ipratropium (DUO-NEB) 2.5 MG-0.5 MG/3 ML  3 mL Nebulization Q6HWA RT  
 magnesium hydroxide (MILK OF MAGNESIA) 400 mg/5 mL oral suspension 30 mL  30 mL Oral DAILY  senna-docusate (PERICOLACE) 8.6-50 mg per tablet 1 Tab  1 Tab Oral BID  
 bisacodyL (DULCOLAX) suppository 10 mg  10 mg Rectal DAILY  0.9% sodium chloride infusion 250 mL  250 mL IntraVENous PRN  
 amLODIPine (NORVASC) tablet 10 mg  10 mg Oral DAILY  docusate sodium (COLACE) capsule 100 mg  100 mg Oral PRN  
 lisinopriL (PRINIVIL, ZESTRIL) tablet 20 mg  20 mg Oral DAILY  meloxicam (MOBIC) tablet 7.5 mg  7.5 mg Oral DAILY  sodium chloride tablet 2 g  2 g Oral BID  traMADoL (ULTRAM) tablet 50 mg  50 mg Oral Q6H PRN  
 sodium chloride (NS) flush 5-40 mL  5-40 mL IntraVENous Q8H  
 sodium chloride (NS) flush 5-40 mL  5-40 mL IntraVENous PRN  
 acetaminophen (TYLENOL) tablet 650 mg  650 mg Oral Q4H PRN  
 ondansetron (ZOFRAN) injection 4 mg  4 mg IntraVENous Q4H PRN  
 [Held by provider] enoxaparin (LOVENOX) injection 40 mg  40 mg SubCUTAneous Q24H Other Studies: 
Results for orders placed or performed during the hospital encounter of 20  
2D ECHO COMPLETE ADULT (TTE) W OR WO CONTR Narrative [address] [place], [address] [facility] 
[phone] Transthoracic Echocardiogram 
2D, M-mode, Doppler, and Color Doppler Patient: Rafi Zamora 
MR #: [id number] : [id number] Age: 79 years Gender: Male Study date: 26-May-2020 Account #: [de-identified] Height: 72 in 72 in 
Weight: 164 lb 163.7 lb 
BSA: 1.96 mï¾² 1.96 mï¾² Status:Routine Location: 831 BP: 169/ 88 Allergies: NO KNOWN ALLERGENS Sonographer:  Bran Orozco [facility] Group:  [place] Cardiology Referring Physician:  Fely James MD 
Reading Physician:  Sarah Smith. MD [facility] INDICATIONS: Bilateral pleural effusions *Pt. scanned supine. Unable to turn LLD. PROCEDURE: This was a routine study. A transthoracic echocardiogram was 
performed. The study included complete 2D imaging, M-mode, complete spectral 
Doppler, and color Doppler. Intravenous contrast (Definity, 1 ml) was 
administered to opacify the left ventricle. Image quality was adequate. LEFT VENTRICLE: Size was normal. Systolic function was normal. Ejection 
fraction was estimated in the range of 60 % to 65 %. There were no regional 
wall motion abnormalities. Wall thickness was normal. Left ventricular 
diastolic function parameters were normal. E/e' av.46. RIGHT VENTRICLE: The size was normal. Systolic function was normal. 
 
LEFT ATRIUM: Size was normal. 
 
RIGHT ATRIUM: Size was normal. 
 
SYSTEMIC VEINS: IVC: The inferior vena cava was normal in size and course. AORTIC VALVE: The valve was structurally normal, tri-commissural. There was  
no 
evidence for stenosis. There was no insufficiency. MITRAL VALVE: Valve structure was normal. There was no evidence for stenosis. There was no regurgitation. TRICUSPID VALVE: The valve structure was normal. There was no evidence for 
stenosis. There was trivial regurgitation. PULMONIC VALVE: Not well visualized. There was no evidence for stenosis. There 
was no insufficiency. PERICARDIUM: There was no pericardial effusion. AORTA: The root exhibited normal size. SUMMARY: 
 
-  Left ventricle: Systolic function was normal. Ejection fraction was 
estimated in the range of 60 % to 65 %. There were no regional wall motion 
abnormalities. -  Pericardium: There was no pericardial effusion. SYSTEM MEASUREMENT TABLES 
 
2D Ao Diam: 3.2 cm 
LA Diam: 2.6 cm 
LAEDV Index (A-L): 25.9 ml/m2 %FS: 34.9 % IVSd: 1.1 cm 
LVIDd: 3.7 cm 
LVIDs: 2.4 cm 
LVOT Diam: 2.1 cm 
LVPWd: 1.2 cm Prepared and signed by 
 
Vicky Barrera. MD [facility] Signed 26-May-2020 16:55:26 No results found. All Micro Results Procedure Component Value Units Date/Time FUNGUS CULTURE AND SMEAR [[id number]] Collected:  06/23/20 1402 Order Status:  Completed Specimen:  Miscellaneous sample Updated:  06/25/20 1345 Source RIGHT Comment: Pleural Fluid Specimen Fungus stain Direct Inoculation Fungus (Mycology) Culture Other source received Comment: (NOTE) Performed At: [address] [id number] Helen Mijares MD GY:[id number] CULTURE, BODY FLUID Arturo Gastelum [[id number]] Collected:  06/23/20 1402 Order Status:  Completed Specimen:  Right Updated:  06/25/20 9480 Special Requests: NO SPECIAL REQUESTS     
  GRAM STAIN 2 TO 10 WBC'S/OIF  
   NO DEFINITE ORGANISM SEEN Culture result: NO GROWTH 2 DAYS FUNGUS CULTURE AND SMEAR [[id number]] Collected:  05/25/20 1315 Order Status:  Completed Specimen:  Miscellaneous sample Updated:  06/24/20 1638 Source LEFT Comment: Pleural Fluid Specimen Fungus stain Direct Inoculation Fungus (Mycology) Culture Other source received Comment: (NOTE) Performed At: [address] [id number] Helen Mijares MD CX:[id number] AFB CULTURE + SMEAR W/RFLX ID FROM CULTURE [[id number]] Collected:  06/23/20 1402 Order Status:  Completed Specimen:  Miscellaneous sample Updated:  06/24/20 1537 Source RIGHT Comment: Pleural Fluid Specimen AFB Specimen processing Concentration Acid Fast Smear Negative Comment: (NOTE) Performed At: [address] [id number] Helen Mijares MD PT:[id number] Acid Fast Culture PENDING  
 FUNGUS CULTURE AND SMEAR [[id number]] Collected:  06/09/20 1029 Order Status:  Completed Specimen:  Miscellaneous sample Updated:  06/13/20 1535 Source LEFT Comment: Pleural Fluid Specimen Fungus stain Direct Inoculation Fungus (Mycology) Culture Other source received Comment: (NOTE) Performed At: [address] [id number] Donald Watts MD LV:[id number] EMERGENT DISEASE PANEL [[id number]] Collected:  06/10/20 1203 Order Status:  Completed Specimen:  Not Specified Updated:  20 4548 Emergent disease panel Not detected Comment: Performed at [facility] RESULTS SCANNED IN [facility] CARE 
  
  
 CULTURE, BODY FLUID W Guy Robbinstang [[id number]] Collected:  20 1029 Order Status:  Completed Specimen:  Left Updated:  20 7649 Special Requests: NO SPECIAL REQUESTS     
  GRAM STAIN 0 TO 1 WBC'S SEEN PER OIF  
   NO DEFINITE ORGANISM SEEN Culture result: NO GROWTH 2 DAYS     
 AFB CULTURE + SMEAR W/RFLX ID FROM CULTURE [[id number]] Collected:  20 1029 Order Status:  Completed Specimen:  Miscellaneous sample Updated:  06/10/20 1536 Source LEFT Comment: Pleural Fluid Specimen AFB Specimen processing Concentration Acid Fast Smear Negative Comment: (NOTE) Performed At: [address] [id number] Donald Watts MD RN:[id number] Acid Fast Culture PENDING  
 AFB CULTURE + SMEAR W/RFLX ID FROM CULTURE [[id number]] Collected:  20 1315 Order Status:  Completed Specimen:  Miscellaneous sample Updated:  20 1537 Source LEFT Comment: Pleural Fluid Specimen AFB Specimen processing Concentration Acid Fast Smear Negative Comment: (NOTE) Performed At: [address] [id number] Donald Watts MD I Acid Fast Culture PENDING  
 CULTURE, BODY FLUID W Guy Robbinstang [[id number]] Collected:  20 1315 Order Status:  Completed Specimen:  Left Updated:  20 5668   Special Requests: NO SPECIAL REQUESTS     
  GRAM STAIN 0 TO 1 WBC'S/OIF  
   NO DEFINITE ORGANISM SEEN     
 Culture result: NO GROWTH 2 DAYS     
  
 
 
SARS-CoV-2 Lab Results \"Novel Coronavirus\" Test:  
Lab Results Component Value Date/Time COV2NT WRONG TEST ORDERED 06/10/2020 12:03 PM  
  
\"Emergent Disease\" Test:  
Lab Results Component Value Date/Time EDPR Not detected 06/10/2020 12:03 PM  
 
\"SARS-COV-2\" Test:  
Lab Results Component Value Date/Time XGCOVT WRONG TEST ORDERED 06/10/2020 12:03 PM  
 
As of: 4:01 PM on 6/27/2020 Assessment and Plan:  
 
Hospital Problems as of 6/27/2020 Date Reviewed: 6/25/2020 Codes Class Noted - Resolved POA Debility ICD-10-CM: R53.81 ICD-9-CM: 799.3  6/15/2020 - Present Yes Rectal obstruction ICD-10-CM: K62.4 ICD-9-CM: 569.2  6/1/2020 - Present Yes Aspiration pneumonia ([facility] [address] [address] ICD-10-CM: J69.0 ICD-9-CM: 507.0  6/1/2020 - Present Yes Neutropenia ([facility] [address] [address] ICD-10-CM: D70.9 ICD-9-CM: 288.00  5/25/2020 - Present Yes Pleural effusion on right ICD-10-CM: J90 ICD-9-CM: 511.9  5/25/2020 - Present Yes Overview Addendum 6/24/2020  8:13 AM by Kasie Bell NP  
  6/23/20L  Right thoracentesis:  1100 ml bloody effusion removed 6/10/20:  Bilateral thoracentesis on L( 1100 cc ) and R( 1000 cc) removed 6/3/20:  Right chest thoracentesis with 800 removed. * (Principal) Acute respiratory failure with hypoxia [facility]) ICD-10-CM: J96.01 
ICD-9-CM: 518.81  5/24/2020 - Present Yes Normocytic anemia ICD-10-CM: D64.9 ICD-9-CM: 285.9  5/24/2020 - Present Yes  
   
 CAP (community acquired pneumonia) ICD-10-CM: J18.9 ICD-9-CM: 804  5/24/2020 - Present Pleural effusion on left ICD-10-CM: J90 ICD-9-CM: 511.9  5/24/2020 - Present Yes Overview Addendum 6/25/2020 11:40 AM by Kasie Bell NP  
   6/10/20:  Bilateral thoracentesis on L( 1100 cc ) and R( 1000 cc) removed 6/25/20:  L thoracentesis:  900 ml serosanguinous fluid removed--negative cytology Malignant neoplasm metastatic to bone [facility]) (Chronic) ICD-10-CM: C79.51 
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 
ICD-9-CM: 250.40, 583.81  7/3/2018 - Present Yes Diabetes mellitus due to underlying condition with hyperglycemia (HCC) (Chronic) ICD-10-CM: V43.01 ICD-9-CM: 249.80  12/21/2015 - Present Yes COVID-19 ruled out ICD-10-CM: Z03.818 ICD-9-CM: V71.83  12/15/2015 - Present Yes Hyponatremia ICD-10-CM: E87.1 ICD-9-CM: 276.1  11/23/2015 - Present Yes RESOLVED: Acute metabolic encephalopathy PDS-47-ST: G93.41 
ICD-9-CM: 348.31  5/24/2020 - 5/24/2020 Plan: · Acute hypoxic respiratory failure, bilateral effusions: 
· Weaning O2 as tolerant, currently on 2 L NC for prior few days · Antibiotics stopped by pulmonary · Resumed IV lasix 40 mg every 12 hours · Steroids per pulmonary · Anal cancer with stricture and rectal obstruction: · Declines surgery · continued bowel regimen · Outpatient colorectal and oncology followups · HTN:  
· continued norvasc, lisinopril · DM2: 
· Holding amaryl · Continued SSI, premeal insulin · Continued  lantus · Anemia: 
· followup CBC ·  transfuse HGB < 7 last on 6-24 · Urine retention: · Replaced traore · continued flomax · Voiding trial possibly prior to discharge DC planning/Dispo:  Pending to SNF Diet:  DIET NUTRITIONAL SUPPLEMENTS 
DIET REGULAR 
DVT ppx:  SCD Signed: Rodrigo Amezquita MD

## 2020-06-27 NOTE — PROGRESS NOTES
Problem: Falls - Risk of 
Goal: *Absence of Falls Description: Document Maria Dolores Powell Fall Risk and appropriate interventions in the flowsheet. Outcome: Progressing Towards Goal 
Note: Fall Risk Interventions: 
Mobility Interventions: Bed/chair exit alarm, Patient to call before getting OOB, PT Consult for mobility concerns, Utilize walker, cane, or other assistive device Mentation Interventions: Adequate sleep, hydration, pain control, Bed/chair exit alarm, Door open when patient unattended, Family/sitter at bedside, More frequent rounding, Toileting rounds, Update white board Medication Interventions: Bed/chair exit alarm, Patient to call before getting OOB, Teach patient to arise slowly Elimination Interventions: Bed/chair exit alarm, Call light in reach, Patient to call for help with toileting needs, Toileting schedule/hourly rounds Problem: Patient Education: Go to Patient Education Activity Goal: Patient/Family Education Outcome: Progressing Towards Goal 
  
Problem: Nutrition Deficit Goal: *Optimize nutritional status Outcome: Progressing Towards Goal 
  
Problem: Pain Goal: *Control of Pain Outcome: Progressing Towards Goal 
Goal: *PALLIATIVE CARE:  Alleviation of Pain Outcome: Progressing Towards Goal 
  
Problem: Patient Education: Go to Patient Education Activity Goal: Patient/Family Education Outcome: Progressing Towards Goal 
  
Problem: Constipation - Risk of 
Goal: *Prevention of constipation Outcome: Progressing Towards Goal 
  
Problem: Pressure Injury - Risk of 
Goal: *Prevention of pressure injury Description: Document Jude Scale and appropriate interventions in the flowsheet. Outcome: Progressing Towards Goal 
Note: Pressure Injury Interventions: 
Sensory Interventions: Assess changes in LOC, Check visual cues for pain, Float heels, Keep linens dry and wrinkle-free, Minimize linen layers Moisture Interventions: Absorbent underpads, Check for incontinence Q2 hours and as needed, Maintain skin hydration (lotion/cream), Minimize layers, Moisture barrier Activity Interventions: Increase time out of bed, Pressure redistribution bed/mattress(bed type), PT/OT evaluation Mobility Interventions: Float heels, HOB 30 degrees or less, Pressure redistribution bed/mattress (bed type), PT/OT evaluation Nutrition Interventions: Document food/fluid/supplement intake, Offer support with meals,snacks and hydration Friction and Shear Interventions: Apply protective barrier, creams and emollients, HOB 30 degrees or less, Foam dressings/transparent film/skin sealants, Lift sheet

## 2020-06-27 NOTE — PROGRESS NOTES
Hospitalist Progress Note 2020 Admit Date: 2020 11:00 PM  
NAME: Milagros Phalen :  1940 MRN:  [id number] Attending: Kartik Guerra DO 
PCP:  Moe Gongora MD 
 
SUBJECTIVE:  
 Mr. 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 He has been seen by pulm s/p multiple thoracentesis and diuresis. Studies transudate, felt due to hypoalbuminemia. Cyto neg from both sides [place] 60-65%. He has required high flow O2. He has compelted 8 days zosyn. In regards to his metastatic rectal cancer, he has known anal stricture and declined prior surgical diverting ostomy. He is s/p anal dilation per [facility] surgery in past. CTAP showed constipation/ colonic distention to rectum suspicious for obstruction. oncology recommends followup at discharge, last chemo completed . He has been seen by GI s/p 6-2 flex sig with disimpaction/ no stricture found. Recommends [facility] colorectal followup. He required 1 unit PRBC -15,  Rapid repsonse on  for increased o2 demand. Thoracentesis right side  and left . Right effusion bloody and steroids added per pulm. PC following for goals of care and wife/pt still want full code 
  
 - looks a little better than last few days. Weaning oxygen to NC. Review of Systems negative with exception of pertinent positives noted above PHYSICAL EXAM  
 
Visit Vitals /70 (BP 1 Location: Left arm, BP Patient Position: At rest) Pulse 98 Temp 97.4 °F (36.3 °C) Resp 18 Ht 6' (1.829 m) Wt 77.1 kg (170 lb) SpO2 96% BMI 23.06 kg/m² Temp (24hrs), Av.9 °F (36.6 °C), Min:97.4 °F (36.3 °C), Max:98.8 °F (37.1 °C) Oxygen Therapy O2 Sat (%): 96 % (20) Pulse via Oximetry: 95 beats per minute (20) O2 Device: Hi flow nasal cannula;Humidifier (06/26/20 2017) O2 Flow Rate (L/min): 2 l/min (06/26/20 2017) O2 Temperature: 87.8 °F (31 °C) (06/25/20 2119) FIO2 (%): 50 % (06/26/20 0836) ETCO2 (mmHg): 93 mmHg (06/01/20 1624) Intake/Output Summary (Last 24 hours) at 6/26/2020 2112 Last data filed at 6/26/2020 1944 Gross per 24 hour Intake 598 ml Output 3775 ml Net -3177 ml General: No acute distress  appears weak Lungs:  Diminished bilaterally Heart:  Regular rate and rhythm,  No murmur, rub, or gallop Abdomen: Soft, Non distended, Non tender, Positive bowel sounds Extremities: No cyanosis, clubbing or edema Neurologic:  No focal deficits ASSESSMENT Active Hospital Problems Diagnosis Date Noted  Debility 06/15/2020  Rectal obstruction 06/01/2020  Aspiration pneumonia ([address] 06/01/2020  Neutropenia ([address] 05/25/2020  Pleural effusion on right 05/25/2020 6/23/20L  Right thoracentesis:  1100 ml bloody effusion removed 6/10/20:  Bilateral thoracentesis on L( 1100 cc ) and R( 1000 cc) removed 6/3/20:  Right chest thoracentesis with 800 removed.  Acute respiratory failure with hypoxia ([address] 05/24/2020  Normocytic anemia 05/24/2020  Pleural effusion on left 05/24/2020  
   6/10/20:  Bilateral thoracentesis on L( 1100 cc ) and R( 1000 cc) removed 6/25/20:  L thoracentesis:  900 ml serosanguinous fluid removed--negative cytology  Malignant neoplasm metastatic to bone ([address] 06/26/2019  Type 2 diabetes with nephropathy ([address] 07/03/2018  Diabetes mellitus due to underlying condition with hyperglycemia ([address] 12/21/2015  COVID-19 ruled out 12/15/2015  Hyponatremia 11/23/2015 A/p · Acute hypoxic respiratory failure, bilateral effusions: · Right side thora 6/23 with 1100 cc bloody effusion, left side thora 6/24 900 cc yellow effusion Cytology neg for both L&R 
· IV albumin x 3 doses complete · IV steroids per pulm · IV lasix 
  
 · Anal cancer with stricture and rectal obstruction: · Declines surgery · continued bowel regimen · Outpatient colorectal and oncology followups 
  
  
· HTN:  
· continued norvasc, lisinopril 
  
  
· DM2: 
· Holding amaryl · Continued SSI, premeal insulin · Increased Lantus for suboptimal control (6/26)    
  
· Anemia: 
· followup CBC ·  transfuse HGB < 7 last on 6-24 
  
  
· Urine retention: · Replaced traore · continued flomax · Voiding trial possibly prior to discharge 
  
DC planning/Dispo:  Pending to SNF vs [facility] (appeal signed) DVT Prophylaxis: lovenox on hold secondary to bloody effusion, SCDs Signed By: Donna Gaming DO   
 June 26, 2020

## 2020-06-27 NOTE — PROGRESS NOTES
Jeremie More Admission Date: 5/24/2020 Daily Progress Note: 6/27/2020 The patient's chart is reviewed and the patient is discussed with the staff.   
  
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 He has undergone multiple thoracenteses - 5/25 - right, 1000 ml, 5/29 - right, 1300ml/left 800 ml, 6/3 - right, 800 ml, 6/9 - right, 1000 ml, left 1100 ml. The fluid has been transudative with rare atypical cells. ECHO with EF 60-65%.  Is on Lasix with a negative balance and currently on 12 L NC O2. In regards to his metastatic rectal cancer, he has known anal stricture and declined prior surgical diverting ostomy. He is s/p anal dilation per [facility] surgery in past.  Oncology recommends followup at discharge, last chemo completed 5/2020. Alessio Jaramillo has been seen by GI s/p 6-2 flex sig with disimpaction/ no stricture found. Recommends [facility] colorectal followup. He required 1 unit PRBC 6-15. Had repeat right thoracentesis 6/23 with 1100 ml bloody effusion removed. Rapid response called 6/24 for hypoxia, was placed on Opti-flow. Repeat left thoracentesis 6/24 with 900 ml serosanguinous fluid removed. Treated with burst dose Albumin and Lasix with good diuresis. Subjective:  
 
Lying in bed, states he is feeling better today. Has productive cough at times. HFNC 2 LPM with O2 sat 92%. Review of Systems Constitutional: negative for fever, chills, sweats Cardiovascular: negative for chest pain, palpitations, syncope, edema Gastrointestinal: negative for dysphagia, reflux, vomiting, diarrhea, abdominal pain, or melena Neurologic: negative for focal weakness, numbness, headache Current Facility-Administered Medications Medication Dose Route Frequency  insulin glargine (LANTUS) injection 20 Units  20 Units SubCUTAneous QHS  insulin lispro (HUMALOG) injection 6 Units  6 Units SubCUTAneous TIDAC  
 0.9% sodium chloride infusion 250 mL  250 mL IntraVENous PRN  
 furosemide (LASIX) injection 40 mg  40 mg IntraVENous BID  tamsulosin (FLOMAX) capsule 0.4 mg  0.4 mg Oral DAILY  insulin lispro (HUMALOG) injection   SubCUTAneous AC&HS  ferrous sulfate tablet 325 mg  1 Tab Oral DAILY WITH BREAKFAST  0.9% sodium chloride infusion 250 mL  250 mL IntraVENous PRN  
 hydrALAZINE (APRESOLINE) 20 mg/mL injection 10 mg  10 mg IntraVENous Q6H PRN  
 nystatin (MYCOSTATIN) 100,000 unit/mL oral suspension 500,000 Units  500,000 Units Oral QID  simethicone (MYLICON) tablet 80 mg  80 mg Oral QID PRN  potassium chloride (K-DUR, KLOR-CON) SR tablet 20 mEq  20 mEq Oral TID  albuterol-ipratropium (DUO-NEB) 2.5 MG-0.5 MG/3 ML  3 mL Nebulization Q6HWA RT  
 magnesium hydroxide (MILK OF MAGNESIA) 400 mg/5 mL oral suspension 30 mL  30 mL Oral DAILY  senna-docusate (PERICOLACE) 8.6-50 mg per tablet 1 Tab  1 Tab Oral BID  
 bisacodyL (DULCOLAX) suppository 10 mg  10 mg Rectal DAILY  0.9% sodium chloride infusion 250 mL  250 mL IntraVENous PRN  
 amLODIPine (NORVASC) tablet 10 mg  10 mg Oral DAILY  docusate sodium (COLACE) capsule 100 mg  100 mg Oral PRN  
 lisinopriL (PRINIVIL, ZESTRIL) tablet 20 mg  20 mg Oral DAILY  meloxicam (MOBIC) tablet 7.5 mg  7.5 mg Oral DAILY  sodium chloride tablet 2 g  2 g Oral BID  traMADoL (ULTRAM) tablet 50 mg  50 mg Oral Q6H PRN  
 sodium chloride (NS) flush 5-40 mL  5-40 mL IntraVENous Q8H  
 sodium chloride (NS) flush 5-40 mL  5-40 mL IntraVENous PRN  
 acetaminophen (TYLENOL) tablet 650 mg  650 mg Oral Q4H PRN  
 ondansetron (ZOFRAN) injection 4 mg  4 mg IntraVENous Q4H PRN  
 [Held by provider] enoxaparin (LOVENOX) injection 40 mg  40 mg SubCUTAneous Q24H Objective:  
 
Vitals:  
 06/27/20 0021 06/27/20 0330 06/27/20 2775 06/27/20 9690 BP: 139/81 151/85 130/80 Pulse: 93 97 96 Resp: 16 16 20 Temp: 97.5 °F (36.4 °C) 97.7 °F (36.5 °C) 97.7 °F (36.5 °C) SpO2: 97% 97% 96% 92% Weight:      
Height:      
 
Intake and Output:  
06/25 1901 - 06/27 0700 In: 598 [P.O.:598] Out: 5275 [SCMUL:7856] No intake/output data recorded. Intake/Output Summary (Last 24 hours) at 6/27/2020 1006 Last data filed at 6/27/2020 0330 Gross per 24 hour Intake 238 ml Output 3725 ml Net -3487 ml Physical Exam:         
 
Constitutional:  the patient is well developed, weak, O2 sat 94% on 2 LPM via HFNC. He is chronically ill. HEENT:  Sclera clear, pupils equal, oral mucosa moist 
Lungs: few anterior crackles, productive cough at times--sound clearer Cardiovascular:  Tachy RRR, HR 90-100s without M,G,R 
Abd/GI: soft and non-tender; with positive bowel sounds. Ext: warm without cyanosis. There is no lower leg edema. Musculoskeletal: moves all four extremities with equal strength Skin:  no jaundice or rashes, no wounds Neuro: no gross neuro deficits Musculoskeletal: can ambulate. No deformity Psychiatric: Alert, oriented, calm, very weak voice. CHEST XRAY: 6/26/20:   
1. Improving bilateral lung edema or infiltrates. 2. Resolved left pleural effusion. 3. Progressed loculated right pleural effusion. 
 
  
6/24:  Persistent right-sided pulmonary infiltrate, decreased effusion LAB Recent Labs  
  06/27/20 
0706 06/26/20 
0735 06/25/20 
4999 WBC 6.9 6.8 5.6 HGB 8.4* 8.6* 8.1* HCT 27.0* 25.8* 26.0*  
* 121* 123* Recent Labs  
  06/27/20 
0706 06/26/20 
0735 06/25/20 
4974  135* 136  
K 3.9 4.0 4.4 CL 98 98 99 CO2 32 30 28 * 303* 252* BUN 22 24* 22  
CREA 0.71* 0.71* 0.74* MG 2.0 2.1 2.0 ABG:  No results found for: PH, PHI, PCO2, PCO2I, PO2, PO2I, HCO3, HCO3I, FIO2, FIO2I 
 
LAB 
  6/24/2020 12:30  
BODY FLUID TYPE PLEURAL FLUID FLUID APPEARANCE CLOUDY FLUID COLOR CALI  
FLUID RBC CT. 12,000 FLUID WBC COUNT <100 Protein total, body fld. 3.0 Glucose, body fld. [address] LD, body fld. 157  
  
  
  6/23/2020 14:02 BODY FLUID TYPE PLEURAL FLUID FLUID APPEARANCE TURBID  
FLUID COLOR RED  
FLUID RBC CT. 63,000 FLUID WBC COUNT 171 [facility] [place] 19 [facility] [place] 79 [address] 2 Protein total, body fld. 3.1 Glucose, body fld. [address] LD, body fld. [address] Cultures:  
Results Procedure Component Value Units Date/Time FUNGUS CULTURE AND SMEAR [[id number]] Collected:  06/23/20 1402 Order Status:  Completed Specimen:  Miscellaneous sample Updated:  06/25/20 1345 Source RIGHT Comment: Pleural Fluid Specimen Fungus stain Direct Inoculation Fungus (Mycology) Culture Other source received Comment: (NOTE) Performed At: [address], [address] [place] [address] [id number] Elba Rodríguez MD [id number] CULTURE, BODY FLUID Emiliano Paula [[id number]] Collected:  06/23/20 1402 Order Status:  Completed Specimen:  Right Updated:  06/25/20 6152 Special Requests: NO SPECIAL REQUESTS     
  GRAM STAIN 2 TO 10 WBC'S/OIF  
   NO DEFINITE ORGANISM SEEN Culture result: NO GROWTH 2 DAYS     
 AFB CULTURE + SMEAR W/RFLX ID FROM CULTURE [[id number]] Collected:  06/23/20 1402 Order Status:  Completed Specimen:  Miscellaneous sample Updated:  06/24/20 1537 Source RIGHT Comment: Pleural Fluid Specimen AFB Specimen processing Concentration Acid Fast Smear Negative Comment: (NOTE) Performed At: [address], [address] [place] [address] [id number] Elba Rodríguez MD [id number] Acid Fast Culture PENDING Assessment:  
 
Hospital Problems  Date Reviewed: 6/25/2020 Codes Class Noted POA Debility ICD-10-CM: R53.81 ICD-9-CM: 799.3  6/15/2020 Yes Ongoing Rectal obstruction ICD-10-CM: K62.4 ICD-9-CM: 569.2  6/1/2020 Yes Aspiration pneumonia ([address] ICD-10-CM: J69.0 ICD-9-CM: 507.0  6/1/2020 Yes Neutropenia ([facility] ICD-10-CM: D70.9 ICD-9-CM: 288.00  5/25/2020 Yes Pleural effusion on right ICD-10-CM: J90 ICD-9-CM: 511.9  5/25/2020 Yes Overview Addendum 6/24/2020  8:13 AM by Lucille Chang NP  
  6/23/20L  Right thoracentesis:  1100 ml bloody effusion removed 6/10/20:  Bilateral thoracentesis on L( 1100 cc ) and R( 1000 cc) removed 6/3/20:  Right chest thoracentesis with 800 removed. Better after tap. Loculated component. * (Principal) Acute respiratory failure with hypoxia ([facility] ICD-10-CM: J96.01 
ICD-9-CM: 518.81  5/24/2020 Yes Weaned to 2L Normocytic anemia ICD-10-CM: D64.9 ICD-9-CM: 285.9  5/24/2020 Yes Pleural effusion on left ICD-10-CM: J90 ICD-9-CM: 511.9  5/24/2020 Yes Overview Addendum 6/25/2020 11:40 AM by Lucille Chang NP  
   6/10/20:  Bilateral thoracentesis on L( 1100 cc ) and R( 1000 cc) removed 6/25/20:  L thoracentesis:  900 ml serosanguinous fluid removed--negative cytology Better. Malignant neoplasm metastatic to bone [facility]) (Chronic) ICD-10-CM: C79.51 
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 
ICD-9-CM: 250.40, 583.81  7/3/2018 Yes Diabetes mellitus due to underlying condition with hyperglycemia (HCC) (Chronic) ICD-10-CM: G11.55 ICD-9-CM: 249.80  12/21/2015 Yes COVID-19 ruled out ICD-10-CM: Z03.818 ICD-9-CM: V71.83  12/15/2015 Yes Hyponatremia ICD-10-CM: E87.1 ICD-9-CM: 276.1  11/23/2015 Yes PLAN:  
--duoneb 
--HFNC on 2 LPM, tolerating weaning well 
--continue lasix 40 mg IV BID, urine output -3725 over past 24 hours--crit 0.71 
--pleural fluid--no growth x 3 days 
--AFB and fungal culture--pending, priliminary negative 
--PT following 
--palliative care consulted and following--remains full code at this time 
--[facility] referral was made, but per case management transfer not appropriate now. Mamadou Ulrich NP More than 50% of time documented was spent in face-to-face contact with the patient and in the care of the patient on the floor/unit where the patient is located. Lungs:  clear Heart:  RRR with no Murmur/Rubs/Gallops Additional Comments:  Much improved. Now on 2L. Get OOB. Prednisone daily. Check CRP in AM and wean steroids if lower. I have spoken with and examined the patient. I agree with the above assessment and plan as documented.  
 
Rick Soto MD

## 2020-06-27 NOTE — PROGRESS NOTES
Pt in bed resting quietly. Alert and oriented x4. Denies pain. RR is even and unlabored. Pt on 2L NC. Bed in low and locked position with call light within reach. Pt denies any other needs at this time.

## 2020-06-27 NOTE — PROGRESS NOTES
Received pt from DAVID Lopez) in stable condition. Pt in bed resting quietly. Resp even & unlabored; no acute signs of distress noted. Bed low & locked; call light in reach; no needs voiced. Wife at bedside.

## 2020-06-28 NOTE — PROGRESS NOTES
Joyce Álvraez Admission Date: 5/24/2020 Daily Progress Note: 6/28/2020 The patient's chart is reviewed and the patient is discussed with the staff.   
  
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 He has undergone multiple thoracenteses - 5/25 - right, 1000 ml, 5/29 - right, 1300ml/left 800 ml, 6/3 - right, 800 ml, 6/9 - right, 1000 ml, left 1100 ml. The fluid has been transudative with rare atypical cells. ECHO with EF 60-65%.  Is on Lasix with a negative balance and currently on 12 L NC O2. In regards to his metastatic rectal cancer, he has known anal stricture and declined prior surgical diverting ostomy. He is s/p anal dilation per [facility] surgery in past.  Oncology recommends followup at discharge, last chemo completed 5/2020. Denys Elliott has been seen by GI s/p 6-2 flex sig with disimpaction/ no stricture found. Recommends [facility] colorectal followup. He required 1 unit PRBC 6-15. Had repeat right thoracentesis 6/23 with 1100 ml bloody effusion removed. Rapid response called 6/24 for hypoxia, was placed on Opti-flow. Repeat left thoracentesis 6/24 with 900 ml serosanguinous fluid removed. Treated with burst dose Albumin and Lasix with good diuresis. Subjective:  
 
 Remains on HFNC 2 LPM with O2 sat 95%. CRP down to 8.6. Review of Systems Constitutional: negative for fever, chills, sweats Cardiovascular: negative for chest pain, palpitations, syncope, edema Gastrointestinal: negative for dysphagia, reflux, vomiting, diarrhea, abdominal pain, or melena Neurologic: negative for focal weakness, numbness, headache Current Facility-Administered Medications Medication Dose Route Frequency  predniSONE (DELTASONE) tablet 40 mg  40 mg Oral DAILY WITH BREAKFAST  insulin glargine (LANTUS) injection 20 Units  20 Units SubCUTAneous QHS  insulin lispro (HUMALOG) injection 6 Units  6 Units SubCUTAneous TIDAC  
 0.9% sodium chloride infusion 250 mL  250 mL IntraVENous PRN  
 furosemide (LASIX) injection 40 mg  40 mg IntraVENous BID  tamsulosin (FLOMAX) capsule 0.4 mg  0.4 mg Oral DAILY  insulin lispro (HUMALOG) injection   SubCUTAneous AC&HS  ferrous sulfate tablet 325 mg  1 Tab Oral DAILY WITH BREAKFAST  0.9% sodium chloride infusion 250 mL  250 mL IntraVENous PRN  
 hydrALAZINE (APRESOLINE) 20 mg/mL injection 10 mg  10 mg IntraVENous Q6H PRN  
 nystatin (MYCOSTATIN) 100,000 unit/mL oral suspension 500,000 Units  500,000 Units Oral QID  simethicone (MYLICON) tablet 80 mg  80 mg Oral QID PRN  potassium chloride (K-DUR, KLOR-CON) SR tablet 20 mEq  20 mEq Oral TID  albuterol-ipratropium (DUO-NEB) 2.5 MG-0.5 MG/3 ML  3 mL Nebulization Q6HWA RT  
 magnesium hydroxide (MILK OF MAGNESIA) 400 mg/5 mL oral suspension 30 mL  30 mL Oral DAILY  senna-docusate (PERICOLACE) 8.6-50 mg per tablet 1 Tab  1 Tab Oral BID  
 bisacodyL (DULCOLAX) suppository 10 mg  10 mg Rectal DAILY  0.9% sodium chloride infusion 250 mL  250 mL IntraVENous PRN  
 amLODIPine (NORVASC) tablet 10 mg  10 mg Oral DAILY  docusate sodium (COLACE) capsule 100 mg  100 mg Oral PRN  
 lisinopriL (PRINIVIL, ZESTRIL) tablet 20 mg  20 mg Oral DAILY  meloxicam (MOBIC) tablet 7.5 mg  7.5 mg Oral DAILY  sodium chloride tablet 2 g  2 g Oral BID  traMADoL (ULTRAM) tablet 50 mg  50 mg Oral Q6H PRN  
 sodium chloride (NS) flush 5-40 mL  5-40 mL IntraVENous Q8H  
 sodium chloride (NS) flush 5-40 mL  5-40 mL IntraVENous PRN  
 acetaminophen (TYLENOL) tablet 650 mg  650 mg Oral Q4H PRN  
 ondansetron (ZOFRAN) injection 4 mg  4 mg IntraVENous Q4H PRN  
 [Held by provider] enoxaparin (LOVENOX) injection 40 mg  40 mg SubCUTAneous Q24H Objective:  
 
Vitals: 06/28/20 0716 06/28/20 0806 06/28/20 1205 06/28/20 1537 BP: 150/78  125/69 Pulse: 97  98 Resp: 19  20 Temp: 97.6 °F (36.4 °C)  97.4 °F (36.3 °C) SpO2: 95% 98% 95% 95% Weight:      
Height:      
 
Intake and Output:  
06/26 1901 - 06/28 0700 In: 800 [P.O.:800] Out: 3781 [[facility]:9083] No intake/output data recorded. Intake/Output Summary (Last 24 hours) at 6/28/2020 1545 Last data filed at 6/28/2020 0401 Gross per 24 hour Intake 250 ml Output 1181 ml Net -931 ml Physical Exam:         
 
Constitutional:  the patient is well developed, weak, O2 sat 95% on 2 LPM via HFNC. He is chronically ill. HEENT:  Sclera clear, pupils equal, oral mucosa moist 
Lungs: few anterior crackles, productive cough at times--sound clearer Cardiovascular:  RRR, HR 90-100s without M,G,R 
Abd/GI: soft and non-tender; with positive bowel sounds. Ext: warm without cyanosis. There is no lower leg edema. Musculoskeletal: moves all four extremities with equal strength Skin:  no jaundice or rashes, no wounds Neuro: no gross neuro deficits Musculoskeletal: can ambulate. No deformity Psychiatric: Alert, oriented, calm, very weak voice. CHEST XRAY: 6/26/20:   
1. Improving bilateral lung edema or infiltrates. 2. Resolved left pleural effusion. 3. Progressed loculated right pleural effusion. 
 
  
6/24:  Persistent right-sided pulmonary infiltrate, decreased effusion LAB Recent Labs  
  06/28/20 
2320 06/27/20 
0706 06/26/20 
8305 WBC 7.9 6.9 6.8 HGB 8.6* 8.4* 8.6* HCT 27.6* 27.0* 25.8* PLT 96* 112* 121* Recent Labs  
  06/28/20 
7254 06/27/20 
0706 06/26/20 
2483  137 135* K 4.0 3.9 4.0  
CL 99 98 98 CO2 33* 32 30 * 114* 303* BUN 19 22 24* CREA 0.63* 0.71* 0.71* MG 2.3 2.0 2.1 ABG:  No results found for: PH, PHI, PCO2, PCO2I, PO2, PO2I, HCO3, HCO3I, FIO2, FIO2I 
 
LAB 
  6/24/2020 12:30  
BODY FLUID TYPE PLEURAL FLUID  
 FLUID APPEARANCE CLOUDY FLUID COLOR CALI  
FLUID RBC CT. 12,000 FLUID WBC COUNT <100 Protein total, body fld. 3.0 Glucose, body fld. [address] LD, body fld. 157  
  
  
  6/23/2020 14:02 BODY FLUID TYPE PLEURAL FLUID FLUID APPEARANCE TURBID  
FLUID COLOR RED  
FLUID RBC CT. 63,000 FLUID WBC COUNT 171 [facility] [place]IL 19 [facility] LYMPHS 79 [address] 2 Protein total, body fld. 3.1 Glucose, body fld. [address] LD, body fld. [address]  Cultures:  
Results Procedure Component Value Units Date/Time FUNGUS CULTURE AND SMEAR [[id number]] Collected:  06/23/20 1402 Order Status:  Completed Specimen:  Miscellaneous sample Updated:  06/25/20 1345 Source RIGHT Comment: Pleural Fluid Specimen Fungus stain Direct Inoculation Fungus (Mycology) Culture Other source received Comment: (NOTE) Performed At: [address] [id number] Giselle Gonzalez MD [id number] CULTURE, BODY FLUID [place] [facility] [[id number]] Collected:  06/23/20 1402 Order Status:  Completed Specimen:  Right Updated:  06/25/20 3979 Special Requests: NO SPECIAL REQUESTS     
  GRAM STAIN 2 TO 10 WBC'S/OIF  
   NO DEFINITE ORGANISM SEEN Culture result: NO GROWTH 2 DAYS     
 AFB CULTURE + SMEAR W/RFLX ID FROM CULTURE [[id number]] Collected:  06/23/20 1402 Order Status:  Completed Specimen:  Miscellaneous sample Updated:  06/24/20 1537 Source RIGHT Comment: Pleural Fluid Specimen AFB Specimen processing Concentration Acid Fast Smear Negative Comment: (NOTE) Performed At: [address] [id number] Giselle Gonzalez MD [id number] Acid Fast Culture PENDING Assessment:  
 
Hospital Problems  Date Reviewed: 6/25/2020 Codes Class Noted POA Debility ICD-10-CM: R53.81 ICD-9-CM: 799.3  6/15/2020 Yes Ongoing Rectal obstruction ICD-10-CM: K62.4 ICD-9-CM: 569.2  6/1/2020 Yes Aspiration pneumonia ([facility] ICD-10-CM: J69.0 ICD-9-CM: 507.0  6/1/2020 Yes Complete Abx Neutropenia ([facility] ICD-10-CM: D70.9 ICD-9-CM: 288.00  5/25/2020 Yes Pleural effusion on right ICD-10-CM: J90 ICD-9-CM: 511.9  5/25/2020 Yes Overview Addendum 6/24/2020  8:13 AM by Florentin Chatman NP  
  6/23/20L  Right thoracentesis:  1100 ml bloody effusion removed 6/10/20:  Bilateral thoracentesis on L( 1100 cc ) and R( 1000 cc) removed 6/3/20:  Right chest thoracentesis with 800 removed. Better after tap. Loculated component evident on last CXR. * (Principal) Acute respiratory failure with hypoxia ([facility] ICD-10-CM: J96.01 
ICD-9-CM: 518.81  5/24/2020 Yes Weaned to 2L Normocytic anemia ICD-10-CM: D64.9 ICD-9-CM: 285.9  5/24/2020 Yes Pleural effusion on left ICD-10-CM: J90 ICD-9-CM: 511.9  5/24/2020 Yes Overview Addendum 6/25/2020 11:40 AM by Florentin Chatman NP  
   6/10/20:  Bilateral thoracentesis on L( 1100 cc ) and R( 1000 cc) removed 6/25/20:  L thoracentesis:  900 ml serosanguinous fluid removed--negative cytology Better. Malignant neoplasm metastatic to bone [facility]) (Chronic) ICD-10-CM: C79.51 
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 
ICD-9-CM: 250.40, 583.81  7/3/2018 Yes Diabetes mellitus due to underlying condition with hyperglycemia (HCC) (Chronic) ICD-10-CM: M52.91 ICD-9-CM: 249.80  12/21/2015 Yes COVID-19 ruled out ICD-10-CM: Z03.818 ICD-9-CM: V71.83  12/15/2015 Yes Hyponatremia ICD-10-CM: E87.1 ICD-9-CM: 276.1  11/23/2015 Yes PLAN:  
 
--Decrease steroids 
--CXR tomorrow 
--HFNC on 2 LPM, tolerating weaning well 
--continue lasix 40 mg IV BID 
--pleural fluid--no growth x 3 days 
--AFB and fungal stains negative 
--PT following 
--palliative care consulted and following--remains full code at this time --[facility] referral was made, but per case management transfer not appropriate now. Austin Parker MD 
 
More than 50% of time documented was spent in face-to-face contact with the patient and in the care of the patient on the floor/unit where the patient is located. Lungs:  clear Heart:  RRR with no Murmur/Rubs/Gallops Additional Comments:  Much improved. Now on 2L. Get OOB. Prednisone daily. Check CRP in AM and wean steroids if lower. I have spoken with and examined the patient. I agree with the above assessment and plan as documented.  
 
Austin Parker MD

## 2020-06-28 NOTE — PROGRESS NOTES
END OF SHIFT NOTE: 
 
Intake/Output No intake/output data recorded. Voiding: YES Catheter: YES Drain:   
 
 
 
 
 
Stool:  1 occurrences. Stool Assessment Stool Color: Brown;Green (06/28/20 1225) Stool Appearance: Formed; Soft (06/28/20 1225) Stool Amount: Small (06/28/20 1225) Stool Source/Status: Rectum (06/28/20 1225) Emesis:  0 occurrences. VITAL SIGNS Patient Vitals for the past 12 hrs: 
 Temp Pulse Resp BP SpO2  
06/28/20 1640 97.9 °F (36.6 °C) (!) 102 20 133/67 93 % 06/28/20 1537     95 % 06/28/20 1205 97.4 °F (36.3 °C) 98 20 125/69 95 % 06/28/20 0806     98 %  
06/28/20 0716 97.6 °F (36.4 °C) 97 19 150/78 95 % Pain Assessment Pain 1 Pain Scale 1: Numeric (0 - 10) (06/28/20 1507) Pain Intensity 1: 0 (06/28/20 1507) Patient Stated Pain Goal: 0 (06/28/20 0130) Pain Reassessment 1: Yes (06/09/20 0345) Pain Location 1: Hip (06/11/20 0541) Pain Orientation 1: Anterior (06/08/20 0407) Pain Description 1: Sore (06/10/20 1502) Pain Intervention(s) 1: Medication (see MAR) (06/11/20 0541) Ambulating No 
 
Additional Information: Patient resting in bed with wife at bedside. CXR scheduled for AM. Shift report given to oncoming nurse at the bedside.  
 
Ana Luisa Castillo RN

## 2020-06-28 NOTE — PROGRESS NOTES
Hospitalist Note Admit Date:  2020 11:00 PM  
Name:  Belinda Dose Age:  79 y.o. 
:  1940 MRN:  [id number] PCP:  Jackie Quezada MD 
Treatment Team: Attending Provider: Tiago Ambrocio MD; Utilization Review: Marylin Arana, RN; Consulting Provider: Court Rodriguez, RN; Care Manager: Ayush Henry RN; Consulting Provider: Yuni Zamora MD; Consulting Provider: Victor M Rendon MD; Care Manager: Chandan Owens LM HPI/Subjective:  
 
Mr. 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 He has been seen by pulm s/p bilateral thoracentesis 5-26, right thoracentesis 6-3, bilateral thoracentesis 6-9, right thoracentesis 6-23, left thoracentesis 6-24 and diuresis. Studies transudate, felt due to hypoalbuminemia. [place] 60-65%. He has required high flow O2. He has compelted 8 days zosyn. Course of steroids added. In regards to his metastatic rectal cancer, he has known anal stricture and declined prior surgical diverting ostomy. He is s/p anal dilation per [facility] surgery in past. CTAP showed constipation/ colonic distention to rectum suspicious for obstruction. oncology recommends followup at discharge, last chemo completed . He has been seen by GI s/p 6-2 flex sig with disimpaction/ no stricture found. Recommends [facility] colorectal followup. He required 1 unit PRBC 6-15// 6-24. Palliative care following. Discharge plans pending to SNF. 20 wife present, eating ok with good BM, not short of breath, some buttock pains Objective:  
 
Patient Vitals for the past 24 hrs: 
 Temp Pulse Resp BP SpO2  
20 0716 97.6 °F (36.4 °C) 97 19 150/78 95 % 20 0400 97.8 °F (36.6 °C) (!) 101 18 146/78 95 % 20 2226 97.9 °F (36.6 °C) (!) 104 18 156/78 93 % 06/27/20 2130     94 % 06/27/20 1954 98.1 °F (36.7 °C) (!) 102 18 115/61 98 %  
06/27/20 1505 98.7 °F (37.1 °C) (!) 101 20 136/63 96 %  
06/27/20 1205 97.7 °F (36.5 °C) 97 18 102/61 94 % Oxygen Therapy O2 Sat (%): 95 % (06/28/20 0716) Pulse via Oximetry: 102 beats per minute (06/27/20 2130) O2 Device: Nasal cannula (06/27/20 2130) O2 Flow Rate (L/min): 2 l/min (06/27/20 2130) O2 Temperature: 87.8 °F (31 °C) (06/25/20 2119) FIO2 (%): 50 % (06/26/20 0836) ETCO2 (mmHg): 93 mmHg (06/01/20 1624) Estimated body mass index is 23.06 kg/m² as calculated from the following: 
  Height as of this encounter: 6' (1.829 m). Weight as of this encounter: 77.1 kg (170 lb). Intake/Output Summary (Last 24 hours) at 6/28/2020 3654 Last data filed at 6/28/2020 0401 Gross per 24 hour Intake 550 ml Output 1881 ml Net -1331 ml  
   
*Note that automatically entered I/Os may not be accurate; dependent on patient compliance with collection and accurate  by techs. General:    Alert , elderly, no distress. CV:   RRR. No murmur, rub, or gallop. No edema Lungs:   Clear Abdomen:   Soft, nontender, nondistended. Decreased BS Extremities: Warm and dry. Skin:     No rashes or jaundice. Neuro:  No gross focal deficits Data Review: 
I have reviewed all labs, meds, and studies from the last 24 hours: 
Recent Results (from the past 24 hour(s)) GLUCOSE, POC Collection Time: 06/27/20 11:33 AM  
Result Value Ref Range Glucose (POC) 220 (H) 65 - 100 mg/dL GLUCOSE, POC Collection Time: 06/27/20  4:33 PM  
Result Value Ref Range Glucose (POC) 184 (H) 65 - 100 mg/dL GLUCOSE, POC Collection Time: 06/27/20  9:47 PM  
Result Value Ref Range Glucose (POC) 252 (H) 65 - 100 mg/dL METABOLIC PANEL, BASIC Collection Time: 06/28/20  6:48 AM  
Result Value Ref Range Sodium 137 136 - 145 mmol/L Potassium 4.0 3.5 - 5.1 mmol/L  Chloride 99 98 - 107 mmol/L  
 CO2 33 (H) 21 - 32 mmol/L Anion gap 5 (L) 7 - 16 mmol/L Glucose 172 (H) 65 - 100 mg/dL BUN 19 8 - 23 MG/DL Creatinine 0.63 (L) 0.8 - 1.5 MG/DL  
 GFR est AA >60 >60 ml/min/1.73m2 GFR est non-AA >60 >60 ml/min/1.73m2 Calcium 8.4 8.3 - 10.4 MG/DL MAGNESIUM Collection Time: 06/28/20  6:48 AM  
Result Value Ref Range Magnesium 2.3 1.8 - 2.4 mg/dL CBC W/O DIFF Collection Time: 06/28/20  6:48 AM  
Result Value Ref Range WBC 7.9 4.3 - 11.1 K/uL  
 RBC 2.95 (L) 4.23 - 5.6 M/uL HGB 8.6 (L) 13.6 - 17.2 g/dL HCT 27.6 (L) 41.1 - 50.3 % MCV 93.6 79.6 - 97.8 FL  
 MCH 29.2 26.1 - 32.9 PG  
 MCHC 31.2 (L) 31.4 - 35.0 g/dL  
 RDW 16.6 (H) 11.9 - 14.6 % PLATELET 96 (L) 900 - 450 K/uL MPV 10.3 9.4 - 12.3 FL ABSOLUTE NRBC 0.04 0.0 - 0.2 K/uL  
C REACTIVE PROTEIN, QT Collection Time: 06/28/20  6:48 AM  
Result Value Ref Range C-Reactive protein 8.6 (H) 0.0 - 0.9 mg/dL GLUCOSE, POC Collection Time: 06/28/20  7:17 AM  
Result Value Ref Range Glucose (POC) 187 (H) 65 - 100 mg/dL Current Meds: 
Current Facility-Administered Medications Medication Dose Route Frequency  predniSONE (DELTASONE) tablet 40 mg  40 mg Oral DAILY WITH BREAKFAST  insulin glargine (LANTUS) injection 20 Units  20 Units SubCUTAneous QHS  insulin lispro (HUMALOG) injection 6 Units  6 Units SubCUTAneous TIDAC  
 0.9% sodium chloride infusion 250 mL  250 mL IntraVENous PRN  
 furosemide (LASIX) injection 40 mg  40 mg IntraVENous BID  tamsulosin (FLOMAX) capsule 0.4 mg  0.4 mg Oral DAILY  insulin lispro (HUMALOG) injection   SubCUTAneous AC&HS  ferrous sulfate tablet 325 mg  1 Tab Oral DAILY WITH BREAKFAST  0.9% sodium chloride infusion 250 mL  250 mL IntraVENous PRN  
 hydrALAZINE (APRESOLINE) 20 mg/mL injection 10 mg  10 mg IntraVENous Q6H PRN  
 nystatin (MYCOSTATIN) 100,000 unit/mL oral suspension 500,000 Units  500,000 Units Oral QID  simethicone (MYLICON) tablet 80 mg  80 mg Oral QID PRN  potassium chloride (K-DUR, KLOR-CON) SR tablet 20 mEq  20 mEq Oral TID  albuterol-ipratropium (DUO-NEB) 2.5 MG-0.5 MG/3 ML  3 mL Nebulization Q6HWA RT  
 magnesium hydroxide (MILK OF MAGNESIA) 400 mg/5 mL oral suspension 30 mL  30 mL Oral DAILY  senna-docusate (PERICOLACE) 8.6-50 mg per tablet 1 Tab  1 Tab Oral BID  
 bisacodyL (DULCOLAX) suppository 10 mg  10 mg Rectal DAILY  0.9% sodium chloride infusion 250 mL  250 mL IntraVENous PRN  
 amLODIPine (NORVASC) tablet 10 mg  10 mg Oral DAILY  docusate sodium (COLACE) capsule 100 mg  100 mg Oral PRN  
 lisinopriL (PRINIVIL, ZESTRIL) tablet 20 mg  20 mg Oral DAILY  meloxicam (MOBIC) tablet 7.5 mg  7.5 mg Oral DAILY  sodium chloride tablet 2 g  2 g Oral BID  traMADoL (ULTRAM) tablet 50 mg  50 mg Oral Q6H PRN  
 sodium chloride (NS) flush 5-40 mL  5-40 mL IntraVENous Q8H  
 sodium chloride (NS) flush 5-40 mL  5-40 mL IntraVENous PRN  
 acetaminophen (TYLENOL) tablet 650 mg  650 mg Oral Q4H PRN  
 ondansetron (ZOFRAN) injection 4 mg  4 mg IntraVENous Q4H PRN  
 [Held by provider] enoxaparin (LOVENOX) injection 40 mg  40 mg SubCUTAneous Q24H Other Studies: 
Results for orders placed or performed during the hospital encounter of 20  
2D ECHO COMPLETE ADULT (TTE) W OR WO CONTR Narrative [address] [place], [address] [facility] 
[phone] Transthoracic Echocardiogram 
2D, M-mode, Doppler, and Color Doppler Patient: Suzan Cohn 
MR #: [id number] : [id number] Age: 79 years Gender: Male Study date: 26-May-2020 Account #: [de-identified] Height: 72 in 72 in 
Weight: 164 lb 163.7 lb 
BSA: 1.96 mï¾² 1.96 mï¾² Status:Routine Location: [facility] BP: 169/ 88 Allergies: NO KNOWN ALLERGENS Sonographer:  Lenard [facility] Group:  [address] Cardiology Referring Physician:  Luis Noonan MD 
 Reading Physician:  Denys Calderón. MD Nara [facility] INDICATIONS: Bilateral pleural effusions *Pt. scanned supine. Unable to turn LLD. PROCEDURE: This was a routine study. A transthoracic echocardiogram was 
performed. The study included complete 2D imaging, M-mode, complete spectral 
Doppler, and color Doppler. Intravenous contrast (Definity, 1 ml) was 
administered to opacify the left ventricle. Image quality was adequate. LEFT VENTRICLE: Size was normal. Systolic function was normal. Ejection 
fraction was estimated in the range of 60 % to 65 %. There were no regional 
wall motion abnormalities. Wall thickness was normal. Left ventricular 
diastolic function parameters were normal. E/e' av.46. RIGHT VENTRICLE: The size was normal. Systolic function was normal. 
 
LEFT ATRIUM: Size was normal. 
 
RIGHT ATRIUM: Size was normal. 
 
SYSTEMIC VEINS: IVC: The inferior vena cava was normal in size and course. AORTIC VALVE: The valve was structurally normal, tri-commissural. There was  
no 
evidence for stenosis. There was no insufficiency. MITRAL VALVE: Valve structure was normal. There was no evidence for stenosis. There was no regurgitation. TRICUSPID VALVE: The valve structure was normal. There was no evidence for 
stenosis. There was trivial regurgitation. PULMONIC VALVE: Not well visualized. There was no evidence for stenosis. There 
was no insufficiency. PERICARDIUM: There was no pericardial effusion. AORTA: The root exhibited normal size. SUMMARY: 
 
-  Left ventricle: Systolic function was normal. Ejection fraction was 
estimated in the range of 60 % to 65 %. There were no regional wall motion 
abnormalities. -  Pericardium: There was no pericardial effusion. SYSTEM MEASUREMENT TABLES 
 
2D Ao Diam: 3.2 cm 
LA Diam: 2.6 cm 
LAEDV Index (A-L): 25.9 ml/m2 %FS: 34.9 % IVSd: 1.1 cm 
LVIDd: 3.7 cm 
LVIDs: 2.4 cm 
LVOT Diam: 2.1 cm 
LVPWd: 1.2 cm Prepared and signed by 
 
Sridhar Bains MD [facility] Signed 26-May-2020 16:55:26 No results found. All Micro Results Procedure Component Value Units Date/Time FUNGUS CULTURE AND SMEAR [[id number]] Collected:  06/23/20 1402 Order Status:  Completed Specimen:  Miscellaneous sample Updated:  06/25/20 1345 Source RIGHT Comment: Pleural Fluid Specimen Fungus stain Direct Inoculation Fungus (Mycology) Culture Other source received Comment: (NOTE) Performed At: [address] [id number] Petty Madrid MD [id number] CULTURE, BODY FLUID Aissatou Singh [[id number]] Collected:  06/23/20 1402 Order Status:  Completed Specimen:  Right Updated:  06/25/20 2086 Special Requests: NO SPECIAL REQUESTS     
  GRAM STAIN 2 TO 10 WBC'S/OIF  
   NO DEFINITE ORGANISM SEEN Culture result: NO GROWTH 2 DAYS FUNGUS CULTURE AND SMEAR [[id number]] Collected:  05/25/20 1315 Order Status:  Completed Specimen:  Miscellaneous sample Updated:  06/24/20 1638 Source LEFT Comment: Pleural Fluid Specimen Fungus stain Direct Inoculation Fungus (Mycology) Culture Other source received Comment: (NOTE) Performed At: [address] [id number] Petty Madrid MD [id number] AFB CULTURE + SMEAR W/RFLX ID FROM CULTURE [[id number]] Collected:  06/23/20 1402 Order Status:  Completed Specimen:  Miscellaneous sample Updated:  06/24/20 1537 Source RIGHT Comment: Pleural Fluid Specimen AFB Specimen processing Concentration Acid Fast Smear Negative Comment: (NOTE) Performed At: [address] [id number] Petty Madrid MD [id number] Acid Fast Culture PENDING  
 FUNGUS CULTURE AND SMEAR [[id number]] Collected:  06/09/20 1029  Order Status:  Completed Specimen:  Miscellaneous sample Updated: 06/13/20 1535 Source LEFT Comment: Pleural Fluid Specimen Fungus stain Direct Inoculation Fungus (Mycology) Culture Other source received Comment: (NOTE) Performed At: [address] [id number] Tito Reynoso MD SENIA:[id number] EMERGENT DISEASE PANEL [[id number]] Collected:  06/10/20 1203 Order Status:  Completed Specimen:  Not Specified Updated:  06/12/20 6549 Emergent disease panel Not detected Comment: Performed at [facility] RESULTS SCANNED IN [facility] 
  
  
 CULTURE, BODY FLUID W Elyce Luisa [[id number]] Collected:  06/09/20 1029 Order Status:  Completed Specimen:  Left Updated:  06/11/20 5985 Special Requests: NO SPECIAL REQUESTS     
  GRAM STAIN 0 TO 1 WBC'S SEEN PER OIF  
   NO DEFINITE ORGANISM SEEN Culture result: NO GROWTH 2 DAYS     
 AFB CULTURE + SMEAR W/RFLX ID FROM CULTURE [[id number]] Collected:  06/09/20 1029 Order Status:  Completed Specimen:  Miscellaneous sample Updated:  06/10/20 1536 Source LEFT Comment: Pleural Fluid Specimen AFB Specimen processing Concentration Acid Fast Smear Negative Comment: (NOTE) Performed At: [address] [id number] Tito Reynoso MD [id number] Acid Fast Culture PENDING  
 AFB CULTURE + SMEAR W/RFLX ID FROM CULTURE [[id number]] Collected:  05/25/20 1315 Order Status:  Completed Specimen:  Miscellaneous sample Updated:  05/27/20 1537 Source LEFT Comment: Pleural Fluid Specimen AFB Specimen processing Concentration Acid Fast Smear Negative Comment: (NOTE) Performed At: [address] [id number] Tito Reynoso MD PS:[id number] Acid Fast Culture PENDING  
 CULTURE, BODY FLUID W Elyce Luisa [[id number]] Collected:  05/25/20 1315 Order Status:  Completed Specimen:  Left Updated:  05/27/20 7138 Special Requests: NO SPECIAL REQUESTS     
  GRAM STAIN 0 TO 1 WBC'S/OIF  
   NO DEFINITE ORGANISM SEEN Culture result: NO GROWTH 2 DAYS     
  
 
 
SARS-CoV-2 Lab Results \"Novel Coronavirus\" Test:  
Lab Results Component Value Date/Time COV2NT WRONG TEST ORDERED 06/10/2020 12:03 PM  
  
\"Emergent Disease\" Test:  
Lab Results Component Value Date/Time EDPR Not detected 06/10/2020 12:03 PM  
 
\"SARS-COV-2\" Test:  
Lab Results Component Value Date/Time XGCOVT WRONG TEST ORDERED 06/10/2020 12:03 PM  
 
As of: 4:01 PM on 6/28/2020 Assessment and Plan:  
 
Hospital Problems as of 6/28/2020 Date Reviewed: 6/25/2020 Codes Class Noted - Resolved POA Debility ICD-10-CM: R53.81 ICD-9-CM: 799.3  6/15/2020 - Present Yes Rectal obstruction ICD-10-CM: K62.4 ICD-9-CM: 569.2  6/1/2020 - Present Yes Aspiration pneumonia ([facility] [address] ICD-10-CM: J69.0 ICD-9-CM: 507.0  6/1/2020 - Present Yes Neutropenia ([facility] [address] ICD-10-CM: D70.9 ICD-9-CM: 288.00  5/25/2020 - Present Yes Pleural effusion on right ICD-10-CM: J90 ICD-9-CM: 511.9  5/25/2020 - Present Yes Overview Addendum 6/24/2020  8:13 AM by Elsa Leslie, NP  
  6/23/20L  Right thoracentesis:  1100 ml bloody effusion removed 6/10/20:  Bilateral thoracentesis on L( 1100 cc ) and R( 1000 cc) removed 6/3/20:  Right chest thoracentesis with 800 removed. * (Principal) Acute respiratory failure with hypoxia [facility]) ICD-10-CM: J96.01 
ICD-9-CM: 518.81  5/24/2020 - Present Yes Normocytic anemia ICD-10-CM: D64.9 ICD-9-CM: 285.9  5/24/2020 - Present Yes  
   
 CAP (community acquired pneumonia) ICD-10-CM: J18.9 ICD-9-CM: 833  5/24/2020 - Present Pleural effusion on left ICD-10-CM: J90 ICD-9-CM: 511.9  5/24/2020 - Present Yes Overview Addendum 6/25/2020 11:40 AM by Elsa Leslie, NP  
   6/10/20:  Bilateral thoracentesis on L( 1100 cc ) and R( 1000 cc) removed 6/25/20:  L thoracentesis:  900 ml serosanguinous fluid removed--negative cytology Malignant neoplasm metastatic to bone [facility]) (Chronic) ICD-10-CM: C79.51 
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 
ICD-9-CM: 250.40, 583.81  7/3/2018 - Present Yes Diabetes mellitus due to underlying condition with hyperglycemia (HCC) (Chronic) ICD-10-CM: D07.01 ICD-9-CM: 249.80  12/21/2015 - Present Yes COVID-19 ruled out ICD-10-CM: Z03.818 ICD-9-CM: V71.83  12/15/2015 - Present Yes Hyponatremia ICD-10-CM: E87.1 ICD-9-CM: 276.1  11/23/2015 - Present Yes RESOLVED: Acute metabolic encephalopathy RXW-24-OV: G93.41 
ICD-9-CM: 348.31  5/24/2020 - 5/24/2020 Plan: · Acute hypoxic respiratory failure, bilateral effusions: 
· Weaning O2 as tolerant, currently on 1 L NC 
· Antibiotics stopped by pulmonary · Resumed IV lasix 40 mg every 12 hours · Steroids per pulmonary · Anal cancer with stricture and rectal obstruction: · Declines surgery · continued bowel regimen · Outpatient colorectal and oncology followups · HTN:  
· continued norvasc, lisinopril · DM2: 
· Holding amaryl · Continued SSI, premeal insulin · Continued  lantus · Anemia: 
· followup CBC ·  transfuse HGB < 7 last on 6-24 · Urine retention: · Replaced traore · continued flomax · Voiding trial possibly prior to discharge DC planning/Dispo:  Pending to SNF Diet:  DIET NUTRITIONAL SUPPLEMENTS 
DIET REGULAR 
DVT ppx:  SCD Signed: Regina Pena MD

## 2020-06-28 NOTE — PROGRESS NOTES
Pt assessment completed. Pt in bed with wife at bedside. NAD noted. Call light and essentials within reach. Will monitor.

## 2020-06-28 NOTE — PROGRESS NOTES
Problem: Falls - Risk of 
Goal: *Absence of Falls Outcome: Progressing Towards Goal 
Note: Fall Risk Interventions: 
Mobility Interventions: Bed/chair exit alarm, Patient to call before getting OOB Mentation Interventions: Bed/chair exit alarm, Door open when patient unattended Medication Interventions: Patient to call before getting OOB Elimination Interventions: Call light in reach, Toileting schedule/hourly rounds Problem: Pain Goal: *Control of Pain Outcome: Progressing Towards Goal

## 2020-06-29 NOTE — PROGRESS NOTES
Shift assessment complete. A&OX4. Lungs coarse on auscultation. Respirations present. Even and unlabored. No s/s of distress. Zero c/o pain at this time. Call light within reach. Encouraged patient to call for assistance. Patient verbalizes understanding. See Doc Flowsheet for assessment details. Patient resting in bed. Wife at the bedside. Will continue to monitor.

## 2020-06-29 NOTE — PROGRESS NOTES
Hospitalist Note Admit Date:  2020 11:00 PM  
Name:  Grace Conn Age:  79 y.o. 
:  1940 MRN:  [id number] PCP:  Clyde Palacio MD 
Treatment Team: Attending Provider: Josy Groves MD; Utilization Review: Roxanne Eubanks RN; Consulting Provider: Rafy Shaikh RN; Consulting Provider: Ara Dandy, MD; Consulting Provider: Sally Farmer MD; Care Manager: Kirti Montes 
 
HPI/Subjective:  
 
Mr. 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 He has been seen by pulm s/p bilateral thoracentesis -26, right thoracentesis 6-3, bilateral thoracentesis 6-9, right thoracentesis -23, left thoracentesis -24 and diuresis. Studies transudate, felt due to hypoalbuminemia. [place] 60-65%. He has required high flow O2. He has compelted 8 days zosyn. Course of steroids added. In regards to his metastatic rectal cancer, he has known anal stricture and declined prior surgical diverting ostomy. He is s/p anal dilation per [facility] surgery in past. CTAP showed constipation/ colonic distention to rectum suspicious for obstruction. oncology recommends followup at discharge, last chemo completed . He has been seen by GI s/p 6-2 flex sig with disimpaction/ no stricture found. Recommends [facility] colorectal followup. He required 1 unit PRBC 6-15// . Palliative care following. Discharge plans pending to SNF. 20 less short of breath, ate ok, had BM Objective:  
 
Patient Vitals for the past 24 hrs: 
 Temp Pulse Resp BP SpO2  
20 1450 98.4 °F (36.9 °C) (!) 104 18 123/65 96 %  
20 1337     95 % 20 1100 97.5 °F (36.4 °C) 93 18 132/63 93 % 20 0731 97.5 °F (36.4 °C)  18 122/63 93 % 20 0709     94 % 06/29/20 0449 97.4 °F (36.3 °C) 95 18 154/82 97 % 06/28/20 2354 97.3 °F (36.3 °C) 97 18 144/81 99 % 06/28/20 2032 97.7 °F (36.5 °C) 100 18 137/72 97 % 06/28/20 1948     95 % Oxygen Therapy O2 Sat (%): 96 % (06/29/20 1450) Pulse via Oximetry: 100 beats per minute (06/29/20 1337) O2 Device: Nasal cannula (06/29/20 1337) O2 Flow Rate (L/min): 2 l/min (06/29/20 1337) O2 Temperature: 87.8 °F (31 °C) (06/25/20 2119) FIO2 (%): 28 % (06/28/20 1948) ETCO2 (mmHg): 93 mmHg (06/01/20 1624) Estimated body mass index is 23.06 kg/m² as calculated from the following: 
  Height as of this encounter: 6' (1.829 m). Weight as of this encounter: 77.1 kg (170 lb). Intake/Output Summary (Last 24 hours) at 6/29/2020 1641 Last data filed at 6/29/2020 0900 Gross per 24 hour Intake  Output 5300 ml Net -5300 ml *Note that automatically entered I/Os may not be accurate; dependent on patient compliance with collection and accurate  by techs. General:    Alert , elderly, no distress. CV:   RRR. No murmur, rub, or gallop. No edema Lungs:   Clear anterior Abdomen:   Soft, nontender, nondistended. Decreased BS Extremities: Warm and dry. Skin:     No rashes or jaundice. Neuro:  No gross focal deficits Data Review: 
I have reviewed all labs, meds, and studies from the last 24 hours: 
Recent Results (from the past 24 hour(s)) GLUCOSE, POC Collection Time: 06/28/20  4:57 PM  
Result Value Ref Range Glucose (POC) 229 (H) 65 - 100 mg/dL GLUCOSE, POC Collection Time: 06/28/20  9:13 PM  
Result Value Ref Range Glucose (POC) 298 (H) 65 - 100 mg/dL METABOLIC PANEL, BASIC Collection Time: 06/29/20  6:38 AM  
Result Value Ref Range Sodium 139 136 - 145 mmol/L Potassium 3.9 3.5 - 5.1 mmol/L Chloride 101 98 - 107 mmol/L  
 CO2 32 21 - 32 mmol/L Anion gap 6 (L) 7 - 16 mmol/L Glucose 141 (H) 65 - 100 mg/dL  BUN 16 8 - 23 MG/DL  
 Creatinine 0.62 (L) 0.8 - 1.5 MG/DL  
 GFR est AA >60 >60 ml/min/1.73m2 GFR est non-AA >60 >60 ml/min/1.73m2 Calcium 8.5 8.3 - 10.4 MG/DL MAGNESIUM Collection Time: 06/29/20  6:38 AM  
Result Value Ref Range Magnesium 2.2 1.8 - 2.4 mg/dL CBC W/O DIFF Collection Time: 06/29/20  6:38 AM  
Result Value Ref Range WBC 7.2 4.3 - 11.1 K/uL  
 RBC 2.98 (L) 4.23 - 5.6 M/uL HGB 8.9 (L) 13.6 - 17.2 g/dL HCT 27.6 (L) 41.1 - 50.3 % MCV 92.6 79.6 - 97.8 FL  
 MCH 29.9 26.1 - 32.9 PG  
 MCHC 32.2 31.4 - 35.0 g/dL  
 RDW 16.7 (H) 11.9 - 14.6 % PLATELET 95 (L) 178 - 450 K/uL MPV 10.7 9.4 - 12.3 FL ABSOLUTE NRBC 0.03 0.0 - 0.2 K/uL GLUCOSE, POC Collection Time: 06/29/20  7:57 AM  
Result Value Ref Range Glucose (POC) 133 (H) 65 - 100 mg/dL GLUCOSE, POC Collection Time: 06/29/20 10:37 AM  
Result Value Ref Range Glucose (POC) 222 (H) 65 - 100 mg/dL Current Meds: 
Current Facility-Administered Medications Medication Dose Route Frequency  [START ON 6/30/2020] predniSONE (DELTASONE) tablet 30 mg  30 mg Oral DAILY WITH BREAKFAST  insulin glargine (LANTUS) injection 20 Units  20 Units SubCUTAneous QHS  insulin lispro (HUMALOG) injection 6 Units  6 Units SubCUTAneous TIDAC  
 0.9% sodium chloride infusion 250 mL  250 mL IntraVENous PRN  
 furosemide (LASIX) injection 40 mg  40 mg IntraVENous BID  tamsulosin (FLOMAX) capsule 0.4 mg  0.4 mg Oral DAILY  insulin lispro (HUMALOG) injection   SubCUTAneous AC&HS  ferrous sulfate tablet 325 mg  1 Tab Oral DAILY WITH BREAKFAST  0.9% sodium chloride infusion 250 mL  250 mL IntraVENous PRN  
 hydrALAZINE (APRESOLINE) 20 mg/mL injection 10 mg  10 mg IntraVENous Q6H PRN  
 nystatin (MYCOSTATIN) 100,000 unit/mL oral suspension 500,000 Units  500,000 Units Oral QID  simethicone (MYLICON) tablet 80 mg  80 mg Oral QID PRN  potassium chloride (K-DUR, KLOR-CON) SR tablet 20 mEq  20 mEq Oral TID  albuterol-ipratropium (DUO-NEB) 2.5 MG-0.5 MG/3 ML  3 mL Nebulization Q6HWA RT  
 magnesium hydroxide (MILK OF MAGNESIA) 400 mg/5 mL oral suspension 30 mL  30 mL Oral DAILY  senna-docusate (PERICOLACE) 8.6-50 mg per tablet 1 Tab  1 Tab Oral BID  
 bisacodyL (DULCOLAX) suppository 10 mg  10 mg Rectal DAILY  0.9% sodium chloride infusion 250 mL  250 mL IntraVENous PRN  
 amLODIPine (NORVASC) tablet 10 mg  10 mg Oral DAILY  docusate sodium (COLACE) capsule 100 mg  100 mg Oral PRN  
 lisinopriL (PRINIVIL, ZESTRIL) tablet 20 mg  20 mg Oral DAILY  meloxicam (MOBIC) tablet 7.5 mg  7.5 mg Oral DAILY  sodium chloride tablet 2 g  2 g Oral BID  traMADoL (ULTRAM) tablet 50 mg  50 mg Oral Q6H PRN  
 sodium chloride (NS) flush 5-40 mL  5-40 mL IntraVENous Q8H  
 sodium chloride (NS) flush 5-40 mL  5-40 mL IntraVENous PRN  
 acetaminophen (TYLENOL) tablet 650 mg  650 mg Oral Q4H PRN  
 ondansetron (ZOFRAN) injection 4 mg  4 mg IntraVENous Q4H PRN  
 [Held by provider] enoxaparin (LOVENOX) injection 40 mg  40 mg SubCUTAneous Q24H Other Studies: 
Results for orders placed or performed during the hospital encounter of 20  
2D ECHO COMPLETE ADULT (TTE) W OR WO CONTR Narrative [facility] [address] [place] [address] Dr George, [address] [facility] 
[phone] Transthoracic Echocardiogram 
2D, M-mode, Doppler, and Color Doppler Patient: Sara Young 
MR #: [id number] : [id number] Age: 79 years Gender: Male Study date: 26-May-2020 Account #: [de-identified] Height: 72 in 72 in 
Weight: 164 lb 163.7 lb 
BSA: 1.96 mï¾² 1.96 mï¾² Status:Routine Location: [facility] BP: 169/ 88 Allergies: NO KNOWN ALLERGENS Sonographer:  Priscilla Braxton RDCS Group:  [facility] Referring Physician:  Destiney Rossi MD 
Reading Physician:  Alis Bains MD [facility] INDICATIONS: Bilateral pleural effusions *Pt. scanned supine. Unable to turn LLD. PROCEDURE: This was a routine study. A transthoracic echocardiogram was 
performed. The study included complete 2D imaging, M-mode, complete spectral 
Doppler, and color Doppler. Intravenous contrast (Definity, 1 ml) was 
administered to opacify the left ventricle. Image quality was adequate. LEFT VENTRICLE: Size was normal. Systolic function was normal. Ejection 
fraction was estimated in the range of 60 % to 65 %. There were no regional 
wall motion abnormalities. Wall thickness was normal. Left ventricular 
diastolic function parameters were normal. E/e' av.46. RIGHT VENTRICLE: The size was normal. Systolic function was normal. 
 
LEFT ATRIUM: Size was normal. 
 
RIGHT ATRIUM: Size was normal. 
 
SYSTEMIC VEINS: IVC: The inferior vena cava was normal in size and course. AORTIC VALVE: The valve was structurally normal, tri-commissural. There was  
no 
evidence for stenosis. There was no insufficiency. MITRAL VALVE: Valve structure was normal. There was no evidence for stenosis. There was no regurgitation. TRICUSPID VALVE: The valve structure was normal. There was no evidence for 
stenosis. There was trivial regurgitation. PULMONIC VALVE: Not well visualized. There was no evidence for stenosis. There 
was no insufficiency. PERICARDIUM: There was no pericardial effusion. AORTA: The root exhibited normal size. SUMMARY: 
 
-  Left ventricle: Systolic function was normal. Ejection fraction was 
estimated in the range of 60 % to 65 %. There were no regional wall motion 
abnormalities. -  Pericardium: There was no pericardial effusion. SYSTEM MEASUREMENT TABLES 
 
2D Ao Diam: 3.2 cm 
LA Diam: 2.6 cm 
LAEDV Index (A-L): 25.9 ml/m2 %FS: 34.9 % IVSd: 1.1 cm 
LVIDd: 3.7 cm 
LVIDs: 2.4 cm 
LVOT Diam: 2.1 cm 
LVPWd: 1.2 cm Prepared and signed by 
 
Vicky Barrera. MD [facility] Signed 26-May-2020 16:55:26 [facility] Result Date: 2020 Chest portable CLINICAL INDICATION: Follow-up of pleural effusions, acute respiratory failure with hypoxia, dyspnea COMPARISON: June 26, 2020 TECHNIQUE: single AP portable view chest at 4:10 AM semiupright FINDINGS: Stable mediastinal and hilar contours. Patient is rotated. There is persistence of bilateral pulmonary vascular congestion, right greater than left pleural effusions, and right mid to lower lung infiltrate. Overall allowing for technique and positioning the appearance is very similar to prior. IMPRESSION: Pulmonary vascular congestion, infiltrates and small pleural effusions are similar to prior. All Micro Results Procedure Component Value Units Date/Time FUNGUS CULTURE AND SMEAR [[id number]] Collected:  06/23/20 1402 Order Status:  Completed Specimen:  Miscellaneous sample Updated:  06/25/20 1345 Source RIGHT Comment: Pleural Fluid Specimen Fungus stain Direct Inoculation Fungus (Mycology) Culture Other source received Comment: (NOTE) Performed At: [address] [id number] Ro Matta MD [id number] CULTURE, BODY FLUID Lourdes Robbins [[id number]] Collected:  06/23/20 1402 Order Status:  Completed Specimen:  Right Updated:  06/25/20 1492 Special Requests: NO SPECIAL REQUESTS     
  GRAM STAIN 2 TO 10 WBC'S/OIF  
   NO DEFINITE ORGANISM SEEN Culture result: NO GROWTH 2 DAYS FUNGUS CULTURE AND SMEAR [[id number]] Collected:  05/25/20 1315 Order Status:  Completed Specimen:  Miscellaneous sample Updated:  06/24/20 1638 Source LEFT Comment: Pleural Fluid Specimen Fungus stain Direct Inoculation Fungus (Mycology) Culture Other source received Comment: (NOTE) Performed At: [address] [id number] Ro Matta MD [id number] AFB CULTURE + SMEAR W/RFLX ID FROM CULTURE [[id number]] Collected:  06/23/20 1402 Order Status:  Completed Specimen:  Miscellaneous sample Updated:  06/24/20 1537 Source RIGHT Comment: Pleural Fluid Specimen AFB Specimen processing Concentration Acid Fast Smear Negative Comment: (NOTE) Performed At: [address] [id number] Valentino Saris MD [id number] Acid Fast Culture PENDING  
 FUNGUS CULTURE AND SMEAR [[id number]] Collected:  06/09/20 1029 Order Status:  Completed Specimen:  Miscellaneous sample Updated:  06/13/20 1535 Source LEFT Comment: Pleural Fluid Specimen Fungus stain Direct Inoculation Fungus (Mycology) Culture Other source received Comment: (NOTE) Performed At: [address] [id number] Valentino Saris MD [id number] EMERGENT DISEASE PANEL [[id number]] Collected:  06/10/20 1203 Order Status:  Completed Specimen:  Not Specified Updated:  06/12/20 4645 Emergent disease panel Not detected Comment: Performed at [facility] RESULTS SCANNED IN [facility] CARE 
  
  
 CULTURE, BODY FLUID [address] [[id number]] Collected:  06/09/20 1029 Order Status:  Completed Specimen:  Left Updated:  06/11/20 0537 Special Requests: NO SPECIAL REQUESTS     
  GRAM STAIN 0 TO 1 WBC'S SEEN PER OIF  
   NO DEFINITE ORGANISM SEEN Culture result: NO GROWTH 2 DAYS     
 AFB CULTURE + SMEAR W/RFLX ID FROM CULTURE [[id number]] Collected:  06/09/20 1029 Order Status:  Completed Specimen:  Miscellaneous sample Updated:  06/10/20 1536 Source LEFT Comment: Pleural Fluid Specimen AFB Specimen processing Concentration Acid Fast Smear Negative Comment: (NOTE) Performed At: [address] [id number] Valentino Saris MD [id number] Acid Fast Culture PENDING  
 AFB CULTURE + SMEAR W/RFLX ID FROM CULTURE [[id number]] Collected:  05/25/20 1315 Order Status:  Completed Specimen:  Miscellaneous sample Updated:  05/27/20 1537 Source LEFT Comment: Pleural Fluid Specimen AFB Specimen processing Concentration Acid Fast Smear Negative Comment: (NOTE) Performed At: [address] [id number] Angel Luis Mujica MD [id number] Acid Fast Culture PENDING  
 CULTURE, BODY FLUID W Carla Elizondo [[id number]] Collected:  05/25/20 1315 Order Status:  Completed Specimen:  Left Updated:  05/27/20 7799 Special Requests: NO SPECIAL REQUESTS     
  GRAM STAIN 0 TO 1 WBC'S/OIF  
   NO DEFINITE ORGANISM SEEN Culture result: NO GROWTH 2 DAYS     
  
 
 
SARS-CoV-2 Lab Results \"Novel Coronavirus\" Test:  
Lab Results Component Value Date/Time COV2NT WRONG TEST ORDERED 06/10/2020 12:03 PM  
  
\"Emergent Disease\" Test:  
Lab Results Component Value Date/Time EDPR Not detected 06/10/2020 12:03 PM  
 
\"SARS-COV-2\" Test:  
Lab Results Component Value Date/Time XGCOVT WRONG TEST ORDERED 06/10/2020 12:03 PM  
 
As of: 4:01 PM on 6/29/2020 Assessment and Plan:  
 
Hospital Problems as of 6/29/2020 Date Reviewed: 6/25/2020 Codes Class Noted - Resolved POA Debility ICD-10-CM: R53.81 ICD-9-CM: 799.3  6/15/2020 - Present Yes Rectal obstruction ICD-10-CM: K62.4 ICD-9-CM: 569.2  6/1/2020 - Present Yes Aspiration pneumonia ([facility] [address] ICD-10-CM: J69.0 ICD-9-CM: 507.0  6/1/2020 - Present Yes Neutropenia ([facility] [address] ICD-10-CM: D70.9 ICD-9-CM: 288.00  5/25/2020 - Present Yes Pleural effusion on right ICD-10-CM: J90 ICD-9-CM: 511.9  5/25/2020 - Present Yes Overview Addendum 6/24/2020  8:13 AM by Clementina Dahiana, NP  
  6/23/20L  Right thoracentesis:  1100 ml bloody effusion removed 6/10/20:  Bilateral thoracentesis on L( 1100 cc ) and R( 1000 cc) removed 6/3/20:  Right chest thoracentesis with 800 removed. * (Principal) Acute respiratory failure with hypoxia [facility]) ICD-10-CM: J96.01 
ICD-9-CM: 518.81  5/24/2020 - Present Yes Normocytic anemia ICD-10-CM: D64.9 ICD-9-CM: 285.9  5/24/2020 - Present Yes  
   
 CAP (community acquired pneumonia) ICD-10-CM: J18.9 ICD-9-CM: 874  5/24/2020 - Present Pleural effusion on left ICD-10-CM: J90 ICD-9-CM: 511.9  5/24/2020 - Present Yes Overview Addendum 6/25/2020 11:40 AM by Elsa Leslie, NP  
   6/10/20:  Bilateral thoracentesis on L( 1100 cc ) and R( 1000 cc) removed 6/25/20:  L thoracentesis:  900 ml serosanguinous fluid removed--negative cytology Malignant neoplasm metastatic to bone [facility]) (Chronic) ICD-10-CM: C79.51 
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 
ICD-9-CM: 250.40, 583.81  7/3/2018 - Present Yes Diabetes mellitus due to underlying condition with hyperglycemia (HCC) (Chronic) ICD-10-CM: N95.44 ICD-9-CM: 249.80  12/21/2015 - Present Yes COVID-19 ruled out ICD-10-CM: Z03.818 ICD-9-CM: V71.83  12/15/2015 - Present Yes Hyponatremia ICD-10-CM: E87.1 ICD-9-CM: 276.1  11/23/2015 - Present Yes RESOLVED: Acute metabolic encephalopathy HNU-14-YF: G93.41 
ICD-9-CM: 348.31  5/24/2020 - 5/24/2020 Plan: · Acute hypoxic respiratory failure, bilateral effusions: 
· Weaning O2 as tolerant, currently on 2 L NC 
· Antibiotics stopped by pulmonary · Resumed IV lasix 40 mg every 12 hours · Steroid taper per pulmonary · Anal cancer with stricture and rectal obstruction: · Declines surgery · continued bowel regimen · Outpatient colorectal and oncology followups · HTN:  
· continued norvasc, lisinopril · DM2: 
· Holding amaryl · Continued SSI, premeal insulin · Continued  lantus · Anemia/thrombocytopenia: 
· followup CBC ·  transfuse HGB < 7 last on 6-24 · Urine retention: · Replaced traore · continued flomax · Voiding trial possibly prior to discharge DC planning/Dispo:  Pending to SNF Diet:  DIET NUTRITIONAL SUPPLEMENTS 
DIET REGULAR 
DVT ppx:  SCD Signed: Clair Dillard MD

## 2020-06-29 NOTE — PROGRESS NOTES
Elisa Scheuermann Admission Date: 5/24/2020 Daily Progress Note: 6/29/2020 The patient's chart is reviewed and the patient is discussed with the staff. 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  He has undergone multiple thoracenteses - 5/25 - right, 1000 ml, 5/29 - right, 1300ml/left 800 ml, 6/3 - right, 800 ml, 6/9 - right, 1000 ml, left 1100 ml. The fluid has been transudative with rare atypical cells.  ECHO with EF 60-65%.  Is on Lasix with a negative balance and currently on 12 L NC O2.  
    In regards to his metastatic rectal cancer, he has known anal stricture and declined prior surgical diverting ostomy. He is s/p anal dilation per [facility] surgery in past.  Oncology recommends followup at discharge, last chemo completed 5/2020. Mirna Peralta has been seen by GI s/p 6-2 flex sig with disimpaction/ no stricture found. Recommends [facility] colorectal followup. He required 1 unit PRBC 6-15. Had repeat right thoracentesis 6/23 with 1100 ml bloody effusion removed. Rapid response called 6/24 for hypoxia, was placed on Opti-flow.  Repeat left thoracentesis 6/24 with 900 ml serosanguinous fluid removed. Treated with burst dose Albumin and Lasix with good diuresis Subjective:  
Currently doing ok on 2 L O2,  
3300 ml out yesterday- thinks steroids have helped Current Facility-Administered Medications Medication Dose Route Frequency  predniSONE (DELTASONE) tablet 40 mg  40 mg Oral DAILY WITH BREAKFAST  insulin glargine (LANTUS) injection 20 Units  20 Units SubCUTAneous QHS  insulin lispro (HUMALOG) injection 6 Units  6 Units SubCUTAneous TIDAC  
 0.9% sodium chloride infusion 250 mL  250 mL IntraVENous PRN  
 furosemide (LASIX) injection 40 mg  40 mg IntraVENous BID  
  tamsulosin (FLOMAX) capsule 0.4 mg  0.4 mg Oral DAILY  insulin lispro (HUMALOG) injection   SubCUTAneous AC&HS  ferrous sulfate tablet 325 mg  1 Tab Oral DAILY WITH BREAKFAST  0.9% sodium chloride infusion 250 mL  250 mL IntraVENous PRN  
 hydrALAZINE (APRESOLINE) 20 mg/mL injection 10 mg  10 mg IntraVENous Q6H PRN  
 nystatin (MYCOSTATIN) 100,000 unit/mL oral suspension 500,000 Units  500,000 Units Oral QID  simethicone (MYLICON) tablet 80 mg  80 mg Oral QID PRN  potassium chloride (K-DUR, KLOR-CON) SR tablet 20 mEq  20 mEq Oral TID  albuterol-ipratropium (DUO-NEB) 2.5 MG-0.5 MG/3 ML  3 mL Nebulization Q6HWA RT  
 magnesium hydroxide (MILK OF MAGNESIA) 400 mg/5 mL oral suspension 30 mL  30 mL Oral DAILY  senna-docusate (PERICOLACE) 8.6-50 mg per tablet 1 Tab  1 Tab Oral BID  
 bisacodyL (DULCOLAX) suppository 10 mg  10 mg Rectal DAILY  0.9% sodium chloride infusion 250 mL  250 mL IntraVENous PRN  
 amLODIPine (NORVASC) tablet 10 mg  10 mg Oral DAILY  docusate sodium (COLACE) capsule 100 mg  100 mg Oral PRN  
 lisinopriL (PRINIVIL, ZESTRIL) tablet 20 mg  20 mg Oral DAILY  meloxicam (MOBIC) tablet 7.5 mg  7.5 mg Oral DAILY  sodium chloride tablet 2 g  2 g Oral BID  traMADoL (ULTRAM) tablet 50 mg  50 mg Oral Q6H PRN  
 sodium chloride (NS) flush 5-40 mL  5-40 mL IntraVENous Q8H  
 sodium chloride (NS) flush 5-40 mL  5-40 mL IntraVENous PRN  
 acetaminophen (TYLENOL) tablet 650 mg  650 mg Oral Q4H PRN  
 ondansetron (ZOFRAN) injection 4 mg  4 mg IntraVENous Q4H PRN  
 [Held by provider] enoxaparin (LOVENOX) injection 40 mg  40 mg SubCUTAneous Q24H Review of Systems Constitutional: negative for fever, chills, sweats Cardiovascular: negative for chest pain, palpitations, syncope, edema Gastrointestinal:  negative for dysphagia, reflux, vomiting, diarrhea, abdominal pain, or melena Neurologic:  negative for focal weakness, numbness, headache Objective: Vitals:  
 06/28/20 2032 06/28/20 2354 06/29/20 0449 06/29/20 8286 BP: 137/72 144/81 154/82 Pulse: 100 97 95 Resp: 18 18 18 Temp: 97.7 °F (36.5 °C) 97.3 °F (36.3 °C) 97.4 °F (36.3 °C) SpO2: 97% 99% 97% 94% Weight:      
Height:      
 
 
 
Intake/Output Summary (Last 24 hours) at 6/29/2020 7084 Last data filed at 6/29/2020 6341 Gross per 24 hour Intake  Output 3300 ml Net -3300 ml Physical Exam:  
Constitution:  the patient is well developed and in no acute distress EENMT:  Sclera clear, pupils equal, oral mucosa moist 
Respiratory: basilar crackles Cardiovascular:  RRR without M,G,R 
Gastrointestinal: soft and non-tender; with positive bowel sounds. Musculoskeletal: warm without cyanosis. There is no lower extremity edema. Skin:  no jaundice or rashes, no wounds Neurologic: no gross neuro deficits Psychiatric:  alert and oriented x 3 CXR:  
 
 
LAB Recent Labs  
  06/29/20 
0757 06/28/20 
2113 06/28/20 
1657 06/28/20 
1141 06/28/20 
1695 GLUCPOC 133* 298* 229* 295* 187* Recent Labs  
  06/29/20 
8074 06/28/20 
9123 06/27/20 
2846 WBC 7.2 7.9 6.9 HGB 8.9* 8.6* 8.4* HCT 27.6* 27.6* 27.0*  
PLT 95* 96* 112* Recent Labs  
  06/29/20 
2483 06/28/20 
6355 06/27/20 
9476  137 137  
K 3.9 4.0 3.9  99 98  
CO2 32 33* 32 * 172* 114* BUN 16 19 22 CREA 0.62* 0.63* 0.71* MG 2.2 2.3 2.0  
CA 8.5 8.4 8.3 No results for input(s): PH, PCO2, PO2, HCO3, PHI, PCO2I, PO2I, HCO3I in the last 72 hours. No results for input(s): LCAD, LAC in the last 72 hours. Assessment:  (Medical Decision Making) Hospital Problems  Date Reviewed: 6/25/2020 Codes Class Noted POA Debility ICD-10-CM: R53.81 ICD-9-CM: 799.3  6/15/2020 Yes Rectal obstruction ICD-10-CM: K62.4 ICD-9-CM: 569.2  6/1/2020 Yes Aspiration pneumonia ([address] ICD-10-CM: J69.0 ICD-9-CM: 507.0  6/1/2020 Yes Neutropenia ([address] ICD-10-CM: D70.9 ICD-9-CM: 288.00  5/25/2020 Yes Pleural effusion on right ICD-10-CM: J90 ICD-9-CM: 511.9  5/25/2020 Yes Overview Addendum 6/24/2020  8:13 AM by Clementina Talbot, NP  
  6/23/20L  Right thoracentesis:  1100 ml bloody effusion removed 6/10/20:  Bilateral thoracentesis on L( 1100 cc ) and R( 1000 cc) removed 6/3/20:  Right chest thoracentesis with 800 removed. * (Principal) Acute respiratory failure with hypoxia ([address] ICD-10-CM: J96.01 
ICD-9-CM: 518.81  5/24/2020 Yes Normocytic anemia ICD-10-CM: D64.9 ICD-9-CM: 285.9  5/24/2020 Yes Pleural effusion on left ICD-10-CM: J90 ICD-9-CM: 511.9  5/24/2020 Yes Overview Addendum 6/25/2020 11:40 AM by Clementina Talbot NP  
   6/10/20:  Bilateral thoracentesis on L( 1100 cc ) and R( 1000 cc) removed 6/25/20:  L thoracentesis:  900 ml serosanguinous fluid removed--negative cytology No change on cxr Malignant neoplasm metastatic to bone [facility]) (Chronic) ICD-10-CM: C79.51 
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 
ICD-9-CM: 250.40, 583.81  7/3/2018 Yes Diabetes mellitus due to underlying condition with hyperglycemia (HCC) (Chronic) ICD-10-CM: N09.29 ICD-9-CM: 249.80  12/21/2015 Yes COVID-19 ruled out ICD-10-CM: Z03.818 ICD-9-CM: V71.83  12/15/2015 Yes Hyponatremia ICD-10-CM: E87.1 ICD-9-CM: 276.1  11/23/2015 Yes Plan:  (Medical Decision Making) 1    Pt/ot 2    Wean O2- may need home O2 
3    prednisone 40 mg -will decrease dose-crp better 
--4    Lasix 40 mg bid-renal function ok More than 50% of the time documented was spent in face-to-face contact with the patient and in the care of the patient on the floor/unit where the patient is located.  
 
Sherren Shone, MD

## 2020-06-29 NOTE — PROGRESS NOTES
Problem: Falls - Risk of 
Goal: *Absence of Falls Description: Document Rebbecca Persons Fall Risk and appropriate interventions in the flowsheet. Outcome: Progressing Towards Goal 
Note: Fall Risk Interventions: 
Mobility Interventions: Bed/chair exit alarm Mentation Interventions: Bed/chair exit alarm Medication Interventions: Patient to call before getting OOB Elimination Interventions: Call light in reach

## 2020-06-29 NOTE — DIABETES MGMT
Blood glucose ranged 172-298 yesterday with patient receiving Lantus 20 units, Humalog 36 units, and prednisone 40 mg. Blood glucose 133 this morning. Hgb 8.9. Note prednisone is to decrease to 30 mg daily. Current regimen: Lantus 20 units QHS, Humlaog 6 units with meals, and Humalog SSI. As steroids are titrated patient's insulin needs should decrease and improve control.

## 2020-06-29 NOTE — PROGRESS NOTES
Palliative Care Progress Note Patient: Chano Sanchez MRN: [id number]  SSN: [id number] YOB: 1940  Age: 79 y.o. Sex: male Assessment/Plan: Chief Complaint/Interval History: Pt says he is breathing better and has increased appetite. Principal Diagnosis: · Dyspnea  R06.00 Additional Diagnoses: · Acute Respiratory Failure, Unspecified  J96.00 · Debility, Unspecified  R53.81 
· Frailty  R54 · Counseling, Encounter for Medical Advice  Z71.9 
· Encounter for Palliative Care  Z51.5 Palliative Performance Scale (PPS) PPS 50% Medical Decision Making:  
Pt resting in bed, wife at bedside. The pt speaks softly and slowly, but is coherent. He denies pain, N/V, and SOB (on O2 via NC). He says he is not having any difficulty with urination or BMs. His appetite has improved, he says. Per Ms Jackie Smith, a location for discharge has not yet been determined. She remains optimistic about her 's prospects for recovery. The patient and wife wish for him to remain full code at this time, and for continued aggressive care. Will discuss findings with members of the interdisciplinary team.   
 
More than 50% of this 15 minute visit was spent counseling and coordination of care as outlined above. Subjective:  
 
Review of Systems negative with the exception of as noted above Objective:  
 
Visit Vitals /63 Pulse 93 Temp 97.5 °F (36.4 °C) Resp 18 Ht 6' (1.829 m) Wt 170 lb (77.1 kg) SpO2 93% BMI 23.06 kg/m² Physical Exam: 
 
General:  Cooperative. No acute distress. frail Eyes:  Conjunctivae/corneas clear Nose: Nares normal. Septum midline. Neck: Supple, symmetrical, trachea midline, no JVD Lungs:   Clear to auscultation bilaterally Heart:  Regular rate and rhythm, no murmur Abdomen:   Soft, non-tender, non-distended Extremities: Normal, atraumatic, no cyanosis or edema Skin: Skin color, texture, turgor normal. No rash or lesions. Neurologic: Nonfocal  
Psych: Alert and oriented Signed By: Jessica Salgado NP   
 June 29, 2020

## 2020-06-30 NOTE — PROGRESS NOTES
Patient alert and oriented x 4. Respirations even and unlabored. Lung sounds coarse. O2 @ 2L via nasal cannula on. Denies pain. Juarez draining clear yellow urine. Bowel sounds active. Bowel movement reported today. In bed watching tv. Encouraged to turn frequently to relive pressure from sacrum/coccxy, patient agreed. No distress observed. Call light in reach. Bed in low position. Side rails up x 2. Bed alarm on. Instructed to call with any needs, patient verbalized understanding.

## 2020-06-30 NOTE — PROGRESS NOTES
Blood sugar 389. Dr. Stinson Mention notified per sliding scale order. Dr. Stinson Mention wants to only give the 10 units of Humalog at this time.

## 2020-06-30 NOTE — PROGRESS NOTES
Problem: Mobility Impaired (Adult and Pediatric) Goal: *Acute Goals and Plan of Care (Insert Text) Outcome: Progressing Towards Goal 
Note: LTG: (Reviewed and updated 6/30/20) (1.)Mr. Genoveva Zuniga will move from supine to sit and sit to supine , scoot up and down and roll side to side with INDEPENDENTLY with bed flat within 7 treatment day(s). (2.)Mr. Genoveva Zuniga will transfer from bed to chair and chair to bed with SUPERVISION using the least restrictive device within 7 treatment day(s). (3.)Mr. Genoveva Zuniga will ambulate with STAND BY ASSIST for 100+ feet with the least restrictive device within 7 treatment day(s). (4.)Mr. Genoveva Zuniga will perform exercises per HEP independently to improve strength and mobility within 7 days. (5.)Mr. Genoveva Zuniga will perform standing mobility and balance activities for 10+ minutes to improve strength and mobility within 7 days. (6.)Mr. Genoveva Zuniga will increase out of bed activity to two hours daily to improve activity tolerance and strength within 7 days. ________________________________________________________________________________________________ PHYSICAL THERAPY: Daily Note, Re-evaluation and AM 6/30/2020 INPATIENT: PT Visit Days : 1 Payor: Estela Mora / Plan: [address] [place] [address] HMO / Product Type: [employer] /   
  
NAME/AGE/GENDER: Breanna Dudley is a 79 y.o. male PRIMARY DIAGNOSIS: Acute metabolic encephalopathy [M10.13] Acute respiratory failure with hypoxia (HCC) Acute respiratory failure with hypoxia (HCC) Procedure(s) (LRB): ULTRASOUND (N/A) 4 Days Post-Op ICD-10: Treatment Diagnosis:  
 · Generalized Muscle Weakness (M62.81) · Other lack of cordination (R27.8) · Difficulty in walking, Not elsewhere classified (R26.2) · Other abnormalities of gait and mobility (R26.89) · Low Back Pain (M54.5) Precaution/Allergies: 
Patient has no known allergies.   
  
ASSESSMENT:  
 
 Mr. Wayne Dahl  is a 79year old male who has been admitted to hospital on 05/24 with above diagnosis and hx of cancer. Prior to hospital admission pt lives with wife in a 1 story home with 0 step(s) to enter with no railing. Pt endorses 0 falls in past 6 months. Prior to admission No O2 usage at home, no assistance with ADLs and uses no DME for mobility. 6/30- pt seen for therapy reassessment today; he presents in supine and is agreeable to therapy, mobility with some encouragement. Back on nasal cannula at 2L. Wife at bedside. Pt needs min assist to transfer to sitting at edge of bed and demonstrates intact seated balance statically. Min assist to stand with bed height slightly elevated. Ambulates slowly in room for 25 ft with CGA/min A, RW, and verbal cues for posture, walker management, and gait safety. Up to recliner for rest break, then ambulates for an additional 35 ft with same assist level and slow pace. Took another rest break, then performs below LE exercises with good participation. Continues to be quite weak and will need rehab at [place]- [place] per chart review. Will continue with therapy efforts. Goals updated. This section established at most recent assessment PROBLEM LIST (Impairments causing functional limitations): 1. Decreased Strength 2. Decreased ADL/Functional Activities 3. Decreased Transfer Abilities 4. Decreased Ambulation Ability/Technique 5. Decreased Balance 6. Increased Pain 7. Decreased Activity Tolerance 8. Increased Fatigue 9. Decreased Flexibility/Joint Mobility 10. Decreased [place] with Home Exercise Program 
 INTERVENTIONS PLANNED: (Benefits and precautions of physical therapy have been discussed with the patient.) 1. Balance Exercise 2. Bed Mobility 3. Family Education 4. Gait Training 5. Heat 6. Home Exercise Program (HEP) 7. Manual Therapy 8. Neuromuscular Re-education/Strengthening 9. Range of Motion (ROM) 10. Therapeutic Activites 11. Therapeutic Exercise/Strengthening 12. Transfer Training TREATMENT PLAN: Frequency/Duration: 3 times a week for duration of hospital stay Rehabilitation Potential For Stated Goals: Good REHAB RECOMMENDATIONS (at time of discharge pending progress):   
Placement: It is my opinion, based on this patient's performance to date, that Mr. Dejah Ribera may benefit from intensive therapy at an [address] [place] [address] after discharge due to a probable need for close medical supervision by a rehab physician, a probable need for 24 hour rehab nursing, a probable need for multiple therapy disciplines and potential to make ongoing and sustainable functional improvement that is of practical value. [employer] Equipment:  
? Walkers, Type: Rolling [address] HISTORY:  
History of Present Injury/Illness (Reason for Referral): 
Per Physician Note: 
 
Schuyler Peters is a 79 y.o. male with a past medical history of HEENT cancer undergoing chemo/radiation who presents to the [facility] ER with report of SOB and chest discomfort for the past several days. He also admits to mild cough but denies fevers or chills. Admits to feeling a little better and breathing a bit better since arrival. 
  
Past Medical History/Comorbidities:  
Mr. Dejah Ribera  has a past medical history of GERD (gastroesophageal reflux disease), Head and neck cancer ([facility] (9/30/2015), History of squamous cell carcinoma, History of throat cancer (2015), Hypercholesteremia, Hypertension, Hypomagnesemia (5/20/2020), Rectal cancer ([facility] (2018), Type 2 diabetes mellitus ([facility] and Vomiting (2/23/2016). Mr. Dejah Ribera  has a past surgical history that includes hx orthopaedic (Right, 1966); hx other surgical (9/9/15); hx heent; hx tonsillectomy; hx heent (2015); hx colonoscopy (05/2018); hx vascular access; hx lymph node dissection; hx other surgical; and flexible sigmoidoscopy (N/A, 6/2/2020). Social History/Living Environment: Home Environment: Private residence # Steps to Enter: 0 One/Two Story Residence: One story Living Alone: No 
Support Systems: Spouse/Significant Other/Partner Patient Expects to be Discharged to[de-identified] Private residence Current DME Used/Available at Home: None Tub or Shower Type: Tub/Shower combination Prior Level of Function/Work/Activity: Mod I mobility and amb Number of Personal Factors/Comorbidities that affect the Plan of Care: 3+: HIGH COMPLEXITY EXAMINATION:  
Most Recent Physical Functioning:  
Gross Assessment: 
AROM: Generally decreased, functional 
Strength: Generally decreased, functional 
Coordination: Generally decreased, functional 
         
  
Posture: 
Posture (WDL): Exceptions to [facility] Posture Assessment: Forward head, Rounded shoulders, Trunk flexion Balance: 
Sitting: Intact Standing: Impaired Standing - Static: Fair Standing - Dynamic : Fair Bed Mobility: 
Rolling: Contact guard assistance;Stand-by assistance Supine to Sit: Contact Guard Assist;Additional time Scooting: Stand-by assistance Wheelchair Mobility: 
  
Transfers: 
Sit to Stand: Minimum assistance Stand to Sit: Contact guard assistance Bed to Chair: Contact guard assistance;Minimum assistance Interventions: Verbal cues; Safety awareness training; Tactile cues Duration: 23 Minutes Gait: 
  
Base of Support: Center of gravity altered Speed/Christina: Pace decreased (<100 feet/min); Slow Step Length: Left shortened;Right shortened Gait Abnormalities: Decreased step clearance;Trunk sway increased; Path deviations Distance (ft): 25 Feet (ft)(then 35 ft) Assistive Device: Walker, rolling Ambulation - Level of Assistance: Contact guard assistance;Minimal assistance Interventions: Verbal cues; Safety awareness training; Tactile cues Body Structures Involved: 1. Lungs 2. Bones 3. Joints Body Functions Affected: 1. Sensory/Pain 2. Movement Related Activities and Participation Affected: 1. Mobility 2. Self Care 3. Interpersonal Interactions and Relationships 4. Community, Social and [place] Number of elements that affect the Plan of Care: 4+: HIGH COMPLEXITY CLINICAL PRESENTATION:  
Presentation: Stable and uncomplicated: LOW COMPLEXITY CLINICAL DECISION MAKIN [facility] [address] [id number] AM-PAC 6 Clicks Basic Mobility Inpatient Short Form How much difficulty does the patient currently have. .. Unable A Lot A Little None 1. Turning over in bed (including adjusting bedclothes, sheets and blankets)? [] 1   [] 2   [] 3   [x] 4  
2. Sitting down on and standing up from a chair with arms ( e.g., wheelchair, bedside commode, etc.)   [] 1   [] 2   [x] 3   [] 4  
3. Moving from lying on back to sitting on the side of the bed? [] 1   [] 2   [x] 3   [] 4 How much help from another person does the patient currently need. .. Total A Lot A Little None 4. Moving to and from a bed to a chair (including a wheelchair)? [] 1   [] 2   [x] 3   [] 4  
5. Need to walk in hospital room? [] 1   [x] 2   [] 3   [] 4  
6. Climbing 3-5 steps with a railing? [x] 1   [] 2   [] 3   [] 4  
© , Trustees of [address] [place] [address] [address] [id number], under license to [employer]. All rights reserved Score:  Initial: 20 Most Recent: 16 (Date: 20 ) Interpretation of Tool:  Represents activities that are increasingly more difficult (i.e. Bed mobility, Transfers, Gait). Medical Necessity:    
· Patient is expected to demonstrate progress in  
· strength, range of motion, balance, coordination, and functional technique ·  to  
· increase independence with ambulation and mobility · . Reason for Services/Other Comments: 
· Patient continues to require skilled intervention due to · Gross weakness, poor balance, increased risk of falls · . Use of outcome tool(s) and clinical judgement create a POC that gives a: Clear prediction of patient's progress: LOW COMPLEXITY  
  
 
 
 
TREATMENT:  
  
 Pre-treatment Symptoms/Complaints: \"ok\" Pain: Initial:  
Pain Intensity 1: 0  Post Session: 0/10 PT Reassessment Table:  
Initial Physical Functioning: Most Recent Physical Functioning:  
Gross Assessment: 
AROM: Generally decreased, functional 
Strength: Generally decreased, functional 
Coordination: Generally decreased, functional 
Tone: Normal 
Sensation: Intact Gross Assessment:  
AROM: Generally decreased, functional 
Strength: Generally decreased, functional 
Coordination: Generally decreased, functional  
Posture:  
Posture (WDL): Exceptions to [facility] Posture Assessment: Trunk flexion, Increased, Forward head, Cervical, Kyphosis, Rounded shoulders Posture:  
Posture (WDL): Exceptions to [facility] Posture Assessment: Forward head;Rounded shoulders;Trunk flexion Balance:  
Sitting: Intact Standing: Impaired Standing - Static: Fair, Good Standing - Dynamic : Fair, Occasional Balance:  
Sitting: Intact Standing: Impaired Standing - Static: Fair Standing - Dynamic : Fair Bed Mobility:  
Rolling: Modified independent Supine to Sit: Modified independent Sit to Supine: Stand-by assistance Scooting: Modified independent Bed Mobility:  
Rolling: Minimum assistance Supine to Sit: Minimum assistance Scooting: Minimum assistance Transfers:  
Sit to Stand: Minimum assistance Stand to Sit: Stand-by assistance Bed to Chair: Stand-by assistance Interventions: Verbal cues, Visual cues, Safety awareness training, Tactile cues, Manual cues Duration: 31 Minutes Transfers:  
Sit to Stand: Minimum assistance Stand to Sit: Contact guard assistance Bed to Chair: Contact guard assistance;Minimum assistance Interventions: Verbal cues; Safety awareness training; Tactile cues Duration: 23 Minutes Gait:  
Base of Support: Center of gravity altered, Narrowed Speed/Christina: Shuffled, Slow Step Length: Left shortened, Right shortened Gait Abnormalities: Altered arm swing, Decreased step clearance, Shuffling gait, Trunk sway increased Ambulation - Level of Assistance: Stand-by assistance Distance (ft): 30 Feet (ft) Assistive Device: Walker, rolling Interventions: Safety awareness training, Tactile cues, Verbal cues, Visual/Demos Duration: 15 Minutes Gait:  
Base of Support: Center of gravity altered Speed/Christina: Pace decreased (<100 feet/min); Slow Step Length: Left shortened;Right shortened Gait Abnormalities: Decreased step clearance;Trunk sway increased; Path deviations Ambulation - Level of Assistance: Contact guard assistance;Minimal assistance Distance (ft): 25 Feet (ft)(then 35 ft) Assistive Device: Walker, rolling Interventions: Verbal cues; Safety awareness training; Tactile cues In addition to reassessment, the following treatment was provided: 
 
Therapeutic Activity: (23 minutes): Therapeutic activities including Bed mobility, sit-stand transfer training from bed and chair, standing static/dynamic mobility, Chair transfers, Ambulation on level ground x 2 trials, seated LE exercises to improve mobility, strength, balance, coordination and activity tolerance. Required minimal Verbal cues; Safety awareness training; Tactile cues to promote static and dynamic balance in standing and promote motor control of bilateral, lower extremity(s). Date: 
6/24/20 Date: 
6/8/20 Date: 
6/10 Date: 
6-12-20 Date: 
6/15/20 Date 6/17/20 Date: 
6/18/20 Date 6/22/20 Date: 
6/30/20 Activity/Exercise Parameters Parameters Parameters Ankle pumps 10 X 20 B X 10 30x 15x AB 15x AB 15x AB 10x B 10x AB Heel slides   X 10 aa 20x Leg slides (hip abd/add)   X 10 aa 10x Seated knee extension 15 B X 15 B   15x AB 15x AB 15x AB 10x B 10x AB Seated hip flexion 10 B X 15 B X 5 5    10x B 10x AB Standing marching   X 5  15x AB 15x AB 15x AB    
HRs    30 Seated hip aBd 15x B    15x Ab 15x AB 15x AB 10x B 10x AB Braces/Orthotics/Lines/Etc:  
· traore catheter · nasal cannula Treatment/Session Assessment:   
· Response to Treatment: steady progress · Interdisciplinary Collaboration:  
o Physical Therapist 
o Registered Nurse 
o  · After treatment position/precautions:  
o Up in the chair. o Bed/Chair-wheels locked 
o Call light in reach 
o Wife present · Compliance with Program/Exercises: Compliant all of the time · Recommendations/Intent for next treatment session: \"Next visit will focus on advancements to more challenging activities\". Total Treatment Duration: PT Patient Time In/Time Out Time In: 6052 Time Out: 9698 Colette Joseph DPT

## 2020-06-30 NOTE — PROGRESS NOTES
Problem: Falls - Risk of 
Goal: *Absence of Falls Description: Document Santi Mcdowell Fall Risk and appropriate interventions in the flowsheet. Outcome: Progressing Towards Goal 
Note: Fall Risk Interventions: 
Mobility Interventions: Bed/chair exit alarm Mentation Interventions: Bed/chair exit alarm Medication Interventions: Bed/chair exit alarm Elimination Interventions: Call light in reach Problem: Pain Goal: *Control of Pain Outcome: Progressing Towards Goal 
  
Problem: Constipation - Risk of 
Goal: *Prevention of constipation Outcome: Progressing Towards Goal 
  
Problem: Pressure Injury - Risk of 
Goal: *Prevention of pressure injury Description: Document Jude Scale and appropriate interventions in the flowsheet. Outcome: Progressing Towards Goal 
Note: Pressure Injury Interventions: 
Sensory Interventions: Assess changes in LOC Moisture Interventions: Absorbent underpads, Apply protective barrier, creams and emollients Activity Interventions: Increase time out of bed Mobility Interventions: Pressure redistribution bed/mattress (bed type) Nutrition Interventions: Offer support with meals,snacks and hydration Friction and Shear Interventions: Apply protective barrier, creams and emollients Problem: Nutrition Deficit Goal: *Optimize nutritional status Outcome: Progressing Towards Goal

## 2020-06-30 NOTE — PROGRESS NOTES
Interdisciplinary team rounds were held 6/30/2020 with the following team members:Care Management, Nursing, Physical Therapy and Physician . Plan of care discussed. See clinical pathway and/or care plan for interventions and desired outcomes.

## 2020-06-30 NOTE — PROGRESS NOTES
Patient's denial for LTAC was upheld-it now goes to a third party [facility]. CM faxed referral to [facility] for snf in the event that LTAC is denied again by insurance. Awaiting response.

## 2020-06-30 NOTE — PROGRESS NOTES
Patient alert and oriented x 4. Respirations even and unlabored. Lung sounds coarse. O2 @ 2L via nasal cannula on. Denies pain. [place] draining clear yellow urine. Bowel sounds active. No distress observed. Call light in reach. Bed in low position. Side rails up x 2. Bed alarm on. Instructed to call with any needs, patient verbalized understanding.

## 2020-06-30 NOTE — PROGRESS NOTES
Eben Pu Admission Date: 5/24/2020 Daily Progress Note: 6/30/2020 The patient's chart is reviewed and the patient is discussed with the staff. 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  He has undergone multiple thoracenteses - 5/25 - right, 1000 ml, 5/29 - right, 1300ml/left 800 ml, 6/3 - right, 800 ml, 6/9 - right, 1000 ml, left 1100 ml. The fluid has been transudative with rare atypical cells.  ECHO with EF 60-65%.  Is on Lasix with a negative balance and currently on 12 L NC O2.  
    In regards to his metastatic rectal cancer, he has known anal stricture and declined prior surgical diverting ostomy. He is s/p anal dilation per [facility] surgery in past.  Oncology recommends followup at discharge, last chemo completed 5/2020. José Luis Gunderson has been seen by GI s/p 6-2 flex sig with disimpaction/ no stricture found. Recommends formerly [facility] colorectal followup. He required 1 unit PRBC 6-15. Had repeat right thoracentesis 6/23 with 1100 ml bloody effusion removed. Rapid response called 6/24 for hypoxia, was placed on Opti-flow.  Repeat left thoracentesis 6/24 with 900 ml serosanguinous fluid removed. Treated with burst dose Albumin and Lasix with good diuresis. Subjective:  
 
Remains on NC 2L and denies shortness of breath. States he did not sleep well but has no complaints. Has productive cough at times with yellow sputum. Appetite good and having BMs. Urine output 5.6 L with IV Lasix BID. Current Facility-Administered Medications Medication Dose Route Frequency  predniSONE (DELTASONE) tablet 30 mg  30 mg Oral DAILY WITH BREAKFAST  insulin glargine (LANTUS) injection 20 Units  20 Units SubCUTAneous QHS  insulin lispro (HUMALOG) injection 6 Units  6 Units SubCUTAneous TIDAC  
  0.9% sodium chloride infusion 250 mL  250 mL IntraVENous PRN  
 furosemide (LASIX) injection 40 mg  40 mg IntraVENous BID  tamsulosin (FLOMAX) capsule 0.4 mg  0.4 mg Oral DAILY  insulin lispro (HUMALOG) injection   SubCUTAneous AC&HS  ferrous sulfate tablet 325 mg  1 Tab Oral DAILY WITH BREAKFAST  0.9% sodium chloride infusion 250 mL  250 mL IntraVENous PRN  
 hydrALAZINE (APRESOLINE) 20 mg/mL injection 10 mg  10 mg IntraVENous Q6H PRN  
 nystatin (MYCOSTATIN) 100,000 unit/mL oral suspension 500,000 Units  500,000 Units Oral QID  simethicone (MYLICON) tablet 80 mg  80 mg Oral QID PRN  potassium chloride (K-DUR, KLOR-CON) SR tablet 20 mEq  20 mEq Oral TID  albuterol-ipratropium (DUO-NEB) 2.5 MG-0.5 MG/3 ML  3 mL Nebulization Q6HWA RT  
 magnesium hydroxide (MILK OF MAGNESIA) 400 mg/5 mL oral suspension 30 mL  30 mL Oral DAILY  senna-docusate (PERICOLACE) 8.6-50 mg per tablet 1 Tab  1 Tab Oral BID  
 bisacodyL (DULCOLAX) suppository 10 mg  10 mg Rectal DAILY  0.9% sodium chloride infusion 250 mL  250 mL IntraVENous PRN  
 amLODIPine (NORVASC) tablet 10 mg  10 mg Oral DAILY  docusate sodium (COLACE) capsule 100 mg  100 mg Oral PRN  
 lisinopriL (PRINIVIL, ZESTRIL) tablet 20 mg  20 mg Oral DAILY  meloxicam (MOBIC) tablet 7.5 mg  7.5 mg Oral DAILY  sodium chloride tablet 2 g  2 g Oral BID  traMADoL (ULTRAM) tablet 50 mg  50 mg Oral Q6H PRN  
 sodium chloride (NS) flush 5-40 mL  5-40 mL IntraVENous Q8H  
 sodium chloride (NS) flush 5-40 mL  5-40 mL IntraVENous PRN  
 acetaminophen (TYLENOL) tablet 650 mg  650 mg Oral Q4H PRN  
 ondansetron (ZOFRAN) injection 4 mg  4 mg IntraVENous Q4H PRN  
 [Held by provider] enoxaparin (LOVENOX) injection 40 mg  40 mg SubCUTAneous Q24H Review of Systems Constitutional: negative for fever, chills, sweats Cardiovascular: negative for chest pain, palpitations, syncope, edema Gastrointestinal:  negative for dysphagia, reflux, vomiting, diarrhea, abdominal pain, or melena Neurologic:  negative for focal weakness, numbness, headache Objective:  
 
Vitals:  
 06/29/20 1933 06/29/20 2043 06/29/20 2310 06/30/20 5040 BP:  143/73 149/79 136/73 Pulse:  (!) 107 98 95 Resp:  18 18 18 Temp:  97.8 °F (36.6 °C) 98.5 °F (36.9 °C) 98.1 °F (36.7 °C) SpO2: 96% 97% 98% 96% Weight:      
Height:      
 
 
 
Intake/Output Summary (Last 24 hours) at 6/30/2020 9580 Last data filed at 6/30/2020 0056 Gross per 24 hour Intake  Output 5600 ml Net -5600 ml Physical Exam:  
Constitution:  the patient is well developed and in no acute distress, NC 2L sat 96% EENMT:  Sclera clear, pupils equal, oral mucosa moist 
Respiratory: basilar crackles, R>L, no wheezing, productive cough with yellow sputum Cardiovascular:  RRR without M,G,R 
Gastrointestinal: soft and non-tender; with positive bowel sounds. Musculoskeletal: warm without cyanosis. There is no lower extremity edema. Skin:  no jaundice or rashes, no wounds Neurologic: no gross neuro deficits Psychiatric:  alert and oriented x 3 CXR:  
6/29/20:  Pulmonary vascular congestion, infiltrates and small pleural 
effusions are similar to prior. LAB Recent Labs  
  06/29/20 
2117 06/29/20 
1701 06/29/20 
1037 06/29/20 
0757 06/28/20 
2113 [place] 13 Recent Labs  
  06/29/20 
2278 06/28/20 
6288 06/27/20 
3602 WBC 7.2 7.9 6.9 HGB 8.9* 8.6* 8.4* HCT 27.6* 27.6* 27.0*  
PLT 95* 96* 112* Recent Labs  
  06/29/20 
4871 06/28/20 
5029 06/27/20 
8682  137 137  
K 3.9 4.0 3.9  99 98  
CO2 32 33* 32 * 172* 114* BUN 16 19 22 CREA 0.62* 0.63* 0.71* MG 2.2 2.3 2.0  
CA 8.5 8.4 8.3 No results for input(s): PH, PCO2, PO2, HCO3, PHI, PCO2I, PO2I, HCO3I in the last 72 hours. No results for input(s): LCAD, LAC in the last 72 hours. Assessment:  (Medical Decision Making) Hospital Problems  Date Reviewed: 6/30/2020 Codes Class Noted POA Debility ICD-10-CM: R53.81 ICD-9-CM: 799.3  6/15/2020 Yes Chronic--PT/OT following Rectal obstruction ICD-10-CM: K62.4 ICD-9-CM: 569.2  6/1/2020 Yes Asp[facility] ([facility] ICD-10-CM: J69.0 ICD-9-CM: 507.0  6/1/2020 Yes Antibiotics completed Neutropenia ([facility] ICD-10-CM: D70.9 ICD-9-CM: 288.00  5/25/2020 Yes WBC up to 7.2 Pleural effusion on right ICD-10-CM: J90 ICD-9-CM: 511.9  5/25/2020 Yes Overview Addendum 6/24/2020  8:13 AM by Priti Mason, NP  
  6/23/20L  Right thoracentesis:  1100 ml bloody effusion removed 6/10/20:  Bilateral thoracentesis on L( 1100 cc ) and R( 1000 cc) removed 6/3/20:  Right chest thoracentesis with 800 removed. Small effusions per CXR  
 * (Principal) Acute respiratory failure with hypoxia ([facility] ICD-10-CM: J96.01 
ICD-9-CM: 518.81  5/24/2020 Yes Volume overload Normocytic anemia ICD-10-CM: D64.9 ICD-9-CM: 285.9  5/24/2020 Yes HGB stable 8.9 Pleural effusion on left ICD-10-CM: J90 ICD-9-CM: 511.9  5/24/2020 Yes Overview Addendum 6/25/2020 11:40 AM by Priti Mason, NP  
   6/10/20:  Bilateral thoracentesis on L( 1100 cc ) and R( 1000 cc) removed 6/25/20:  L thoracentesis:  900 ml serosanguinous fluid removed--negative cytology No change on cxr Malignant neoplasm metastatic to bone [facility]) (Chronic) ICD-10-CM: C79.51 
ICD-9-CM: 198.5  6/26/2019 Yes  
 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 
ICD-9-CM: 250.40, 583.81  7/3/2018 Yes  
 chronic Diabetes mellitus due to underlying condition with hyperglycemia (HCC) (Chronic) ICD-10-CM: G41.71 ICD-9-CM: 249.80  12/21/2015 Yes Chronic--ranges 133-389--per primary COVID-19 ruled out ICD-10-CM: Z03.818 ICD-9-CM: V71.83  12/15/2015 Yes  
 unchanged Hyponatremia ICD-10-CM: E87.1 ICD-9-CM: 276.1  11/23/2015 Yes Resolved--Na 139 Plan:  (Medical Decision Making) --Duoneb 
--PT/OT 
--AFB culture:  pending 
--O2 weaned to NC 2L O2- may need home O2 
--Prednisone 30 mg daily -will decrease dose-crp better --Lasix 40 mg bid IV, urine out 5.6 L, I&Os in accurate, no intake recorded-creat 0.62 
--Palliative Care following and remains full code. --Case management assisting with discharge plan. Awaiting insurance approval for LTAC. More than 50% of the time documented was spent in face-to-face contact with the patient and in the care of the patient on the floor/unit where the patient is located. Grace Gan NP Lungs:  [place] Heart:  RRR with no Murmur/Rubs/Gallops Additional Comments:  Wean O2 I have spoken with and examined the patient. I agree with the above assessment and plan as documented.  
 
Michelle Manuel MD

## 2020-06-30 NOTE — PROGRESS NOTES
Shift assessment complete. Pt resting in bed. A&Ox4. Respirations present, even, unlabored. Lung sounds clear to auscultation. 2 L O2 NC in place. HR regular, S1&S2 auscultated. Abdomen soft with active bowel sounds in all 4 quadrants. [place] in place draining yellow clear urine. Pt denies pain at this time. IV capped and patent. Bed in lowest position, call light within reach, side rails x3. Encouraged to call for help when needed.

## 2020-06-30 NOTE — PROGRESS NOTES
Hospitalist Note Admit Date:  2020 11:00 PM  
Name:  Joyce Álvarez Age:  79 y.o. 
:  1940 MRN:  [id number] PCP:  Prema Rodney MD 
Treatment Team: Attending Provider: Holli Ruggiero MD; Utilization Review: Maria Luz Camacho, RN; Consulting Provider: Karma Lau RN; Consulting Provider: Zoila Malagon MD; Consulting Provider: Phillip Doss MD; Care Manager: Dany Bustos 
 
HPI/Subjective:  
 
Mr. 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 He has been seen by pulm s/p bilateral thoracentesis -26, right thoracentesis 6-3, bilateral thoracentesis 6-9, right thoracentesis -23, left thoracentesis 24 and diuresis. Studies transudate, felt due to hypoalbuminemia. [place] 60-65%. He has required high flow O2. He has compelted 8 days zosyn. Course of steroids added. In regards to his metastatic rectal cancer, he has known anal stricture and declined prior surgical diverting ostomy. He is s/p anal dilation per [facility] surgery in past. CTAP showed constipation/ colonic distention to rectum suspicious for obstruction. oncology recommends followup at discharge, last chemo completed . He has been seen by GI s/p 6-2 flex sig with disimpaction/ no stricture found. Recommends [facility] colorectal followup. He required 1 unit PRBC 6-15/. Palliative care following. Discharge plans pending to SNF. 20 both granddaughters present, ate ok, rectal pain, not short of breath Objective:  
 
Patient Vitals for the past 24 hrs: 
 Temp Pulse Resp BP SpO2  
20 1517 98.3 °F (36.8 °C) 97 24 126/69 95 % 20 1425     94 % 20 1130 97.6 °F (36.4 °C) 94 20 125/64 95 % 20 0947     94 % 20 0735 97.6 °F (36.4 °C) 90 22 136/77 95 % 06/30/20 0307 98.1 °F (36.7 °C) 95 18 136/73 96 %  
06/29/20 2310 98.5 °F (36.9 °C) 98 18 149/79 98 %  
06/29/20 2043 97.8 °F (36.6 °C) (!) 107 18 143/73 97 % 06/29/20 1933     96 % Oxygen Therapy O2 Sat (%): 95 % (06/30/20 1517) Pulse via Oximetry: 85 beats per minute (06/30/20 1425) O2 Device: Nasal cannula (06/30/20 1425) O2 Flow Rate (L/min): 2 l/min (06/30/20 1425) O2 Temperature: 87.8 °F (31 °C) (06/25/20 2119) FIO2 (%): 28 % (06/28/20 1948) ETCO2 (mmHg): 93 mmHg (06/01/20 1624) Estimated body mass index is 23.06 kg/m² as calculated from the following: 
  Height as of this encounter: 6' (1.829 m). Weight as of this encounter: 77.1 kg (170 lb). Intake/Output Summary (Last 24 hours) at 6/30/2020 1659 Last data filed at 6/30/2020 1831 Gross per 24 hour Intake  Output 3600 ml Net -3600 ml *Note that automatically entered I/Os may not be accurate; dependent on patient compliance with collection and accurate  by techs. General:    Alert , elderly, no distress. CV:   RRR. No murmur, rub, or gallop. No edema Lungs:   Clear anterior Abdomen:   Soft, nontender, nondistended. Decreased BS Extremities: Warm and dry. Skin:     No rashes or jaundice. Neuro:  No gross focal deficits Data Review: 
I have reviewed all labs, meds, and studies from the last 24 hours: 
Recent Results (from the past 24 hour(s)) GLUCOSE, POC Collection Time: 06/29/20  5:01 PM  
Result Value Ref Range Glucose (POC) 320 (H) 65 - 100 mg/dL GLUCOSE, POC Collection Time: 06/29/20  9:17 PM  
Result Value Ref Range Glucose (POC) 389 (H) 65 - 100 mg/dL METABOLIC PANEL, BASIC Collection Time: 06/30/20  6:45 AM  
Result Value Ref Range Sodium 139 136 - 145 mmol/L Potassium 3.9 3.5 - 5.1 mmol/L Chloride 99 98 - 107 mmol/L  
 CO2 34 (H) 21 - 32 mmol/L Anion gap 6 (L) 7 - 16 mmol/L Glucose 73 65 - 100 mg/dL  BUN 17 8 - 23 MG/DL  
 Creatinine 0.60 (L) 0.8 - 1.5 MG/DL  
 GFR est AA >60 >60 ml/min/1.73m2 GFR est non-AA >60 >60 ml/min/1.73m2 Calcium 8.5 8.3 - 10.4 MG/DL MAGNESIUM Collection Time: 06/30/20  6:45 AM  
Result Value Ref Range Magnesium 2.5 (H) 1.8 - 2.4 mg/dL CBC W/O DIFF Collection Time: 06/30/20  6:45 AM  
Result Value Ref Range WBC 6.6 4.3 - 11.1 K/uL  
 RBC 2.87 (L) 4.23 - 5.6 M/uL HGB 8.2 (L) 13.6 - 17.2 g/dL HCT 27.1 (L) 41.1 - 50.3 % MCV 94.4 79.6 - 97.8 FL  
 MCH 28.6 26.1 - 32.9 PG  
 MCHC 30.3 (L) 31.4 - 35.0 g/dL  
 RDW 16.8 (H) 11.9 - 14.6 % PLATELET 94 (L) 155 - 450 K/uL MPV 9.6 9.4 - 12.3 FL ABSOLUTE NRBC 0.02 0.0 - 0.2 K/uL GLUCOSE, POC Collection Time: 06/30/20  7:35 AM  
Result Value Ref Range Glucose (POC) 82 65 - 100 mg/dL GLUCOSE, POC Collection Time: 06/30/20 11:29 AM  
Result Value Ref Range Glucose (POC) 237 (H) 65 - 100 mg/dL GLUCOSE, POC Collection Time: 06/30/20  4:18 PM  
Result Value Ref Range Glucose (POC) 332 (H) 65 - 100 mg/dL Current Meds: 
Current Facility-Administered Medications Medication Dose Route Frequency  predniSONE (DELTASONE) tablet 30 mg  30 mg Oral DAILY WITH BREAKFAST  insulin glargine (LANTUS) injection 20 Units  20 Units SubCUTAneous QHS  insulin lispro (HUMALOG) injection 6 Units  6 Units SubCUTAneous TIDAC  
 0.9% sodium chloride infusion 250 mL  250 mL IntraVENous PRN  
 furosemide (LASIX) injection 40 mg  40 mg IntraVENous BID  tamsulosin (FLOMAX) capsule 0.4 mg  0.4 mg Oral DAILY  insulin lispro (HUMALOG) injection   SubCUTAneous AC&HS  ferrous sulfate tablet 325 mg  1 Tab Oral DAILY WITH BREAKFAST  0.9% sodium chloride infusion 250 mL  250 mL IntraVENous PRN  
 hydrALAZINE (APRESOLINE) 20 mg/mL injection 10 mg  10 mg IntraVENous Q6H PRN  
 nystatin (MYCOSTATIN) 100,000 unit/mL oral suspension 500,000 Units  500,000 Units Oral QID  simethicone (MYLICON) tablet 80 mg  80 mg Oral QID PRN  potassium chloride (K-DUR, KLOR-CON) SR tablet 20 mEq  20 mEq Oral TID  albuterol-ipratropium (DUO-NEB) 2.5 MG-0.5 MG/3 ML  3 mL Nebulization Q6HWA RT  
 magnesium hydroxide (MILK OF MAGNESIA) 400 mg/5 mL oral suspension 30 mL  30 mL Oral DAILY  senna-docusate (PERICOLACE) 8.6-50 mg per tablet 1 Tab  1 Tab Oral BID  
 bisacodyL (DULCOLAX) suppository 10 mg  10 mg Rectal DAILY  0.9% sodium chloride infusion 250 mL  250 mL IntraVENous PRN  
 amLODIPine (NORVASC) tablet 10 mg  10 mg Oral DAILY  docusate sodium (COLACE) capsule 100 mg  100 mg Oral PRN  
 lisinopriL (PRINIVIL, ZESTRIL) tablet 20 mg  20 mg Oral DAILY  meloxicam (MOBIC) tablet 7.5 mg  7.5 mg Oral DAILY  sodium chloride tablet 2 g  2 g Oral BID  traMADoL (ULTRAM) tablet 50 mg  50 mg Oral Q6H PRN  
 sodium chloride (NS) flush 5-40 mL  5-40 mL IntraVENous Q8H  
 sodium chloride (NS) flush 5-40 mL  5-40 mL IntraVENous PRN  
 acetaminophen (TYLENOL) tablet 650 mg  650 mg Oral Q4H PRN  
 ondansetron (ZOFRAN) injection 4 mg  4 mg IntraVENous Q4H PRN  
 [Held by provider] enoxaparin (LOVENOX) injection 40 mg  40 mg SubCUTAneous Q24H Other Studies: 
Results for orders placed or performed during the hospital encounter of 20  
2D ECHO COMPLETE ADULT (TTE) W OR WO CONTR Narrative [address] [place], [address] [facility] 
[phone] Transthoracic Echocardiogram 
2D, M-mode, Doppler, and Color Doppler Patient: Danielle Romero 
MR #: [id number] : [id number] Age: 79 years Gender: Male Study date: 26-May-2020 Account #: [de-identified] Height: 72 in 72 in 
Weight: 164 lb 163.7 lb 
BSA: 1.96 mï¾² 1.96 mï¾² Status:Routine Location: [facility] BP: 169/ 88 Allergies: NO KNOWN ALLERGENS Sonographer:  Leighton Haddad [facility] Group:  [facility] Referring Physician:  Guy Yadav MD 
 Reading Physician:  Bernice Martinez. MD Nara [facility] INDICATIONS: Bilateral pleural effusions *Pt. scanned supine. Unable to turn LLD. PROCEDURE: This was a routine study. A transthoracic echocardiogram was 
performed. The study included complete 2D imaging, M-mode, complete spectral 
Doppler, and color Doppler. Intravenous contrast (Definity, 1 ml) was 
administered to opacify the left ventricle. Image quality was adequate. LEFT VENTRICLE: Size was normal. Systolic function was normal. Ejection 
fraction was estimated in the range of 60 % to 65 %. There were no regional 
wall motion abnormalities. Wall thickness was normal. Left ventricular 
diastolic function parameters were normal. E/e' av.46. RIGHT VENTRICLE: The size was normal. Systolic function was normal. 
 
LEFT ATRIUM: Size was normal. 
 
RIGHT ATRIUM: Size was normal. 
 
SYSTEMIC VEINS: IVC: The inferior vena cava was normal in size and course. AORTIC VALVE: The valve was structurally normal, tri-commissural. There was  
no 
evidence for stenosis. There was no insufficiency. MITRAL VALVE: Valve structure was normal. There was no evidence for stenosis. There was no regurgitation. TRICUSPID VALVE: The valve structure was normal. There was no evidence for 
stenosis. There was trivial regurgitation. PULMONIC VALVE: Not well visualized. There was no evidence for stenosis. There 
was no insufficiency. PERICARDIUM: There was no pericardial effusion. AORTA: The root exhibited normal size. SUMMARY: 
 
-  Left ventricle: Systolic function was normal. Ejection fraction was 
estimated in the range of 60 % to 65 %. There were no regional wall motion 
abnormalities. -  Pericardium: There was no pericardial effusion. SYSTEM MEASUREMENT TABLES 
 
2D Ao Diam: 3.2 cm 
LA Diam: 2.6 cm 
LAEDV Index (A-L): 25.9 ml/m2 %FS: 34.9 % IVSd: 1.1 cm 
LVIDd: 3.7 cm 
LVIDs: 2.4 cm 
LVOT Diam: 2.1 cm 
LVPWd: 1.2 cm Prepared and signed by 
 
Kvng Bains MD [facility] Signed 26-May-2020 16:55:26 No results found. All Micro Results Procedure Component Value Units Date/Time FUNGUS CULTURE AND SMEAR [[id number]] Collected:  06/23/20 1402 Order Status:  Completed Specimen:  Miscellaneous sample Updated:  06/25/20 1345 Source RIGHT Comment: Pleural Fluid Specimen Fungus stain Direct Inoculation Fungus (Mycology) Culture Other source received Comment: (NOTE) Performed At: [address] [id number] Allison Martínez MD QE:[id number] CULTURE, BODY FLUID Ayaan Woody [[id number]] Collected:  06/23/20 1402 Order Status:  Completed Specimen:  Right Updated:  06/25/20 2237 Special Requests: NO SPECIAL REQUESTS     
  GRAM STAIN 2 TO 10 WBC'S/OIF  
   NO DEFINITE ORGANISM SEEN Culture result: NO GROWTH 2 DAYS FUNGUS CULTURE AND SMEAR [[id number]] Collected:  05/25/20 1315 Order Status:  Completed Specimen:  Miscellaneous sample Updated:  06/24/20 1638 Source LEFT Comment: Pleural Fluid Specimen Fungus stain Direct Inoculation Fungus (Mycology) Culture Other source received Comment: (NOTE) Performed At: [address] [id number] Allison Martínez MD [id number]:[id number] AFB CULTURE + SMEAR W/RFLX ID FROM CULTURE [[id number]] Collected:  06/23/20 1402 Order Status:  Completed Specimen:  Miscellaneous sample Updated:  06/24/20 1537 Source RIGHT Comment: Pleural Fluid Specimen AFB Specimen processing Concentration Acid Fast Smear Negative Comment: (NOTE) Performed At: [address] [id number] Allison Martínez MD QD:[id number] Acid Fast Culture PENDING  
 FUNGUS CULTURE AND SMEAR [[id number]] Collected:  06/09/20 1029  Order Status:  Completed Specimen:  Miscellaneous sample Updated: 06/13/20 1535 Source LEFT Comment: Pleural Fluid Specimen Fungus stain Direct Inoculation Fungus (Mycology) Culture Other source received Comment: (NOTE) Performed At: [address] [id number] Ro Matta MD [id number] EMERGENT DISEASE PANEL [[id number]] Collected:  06/10/20 1203 Order Status:  Completed Specimen:  Not Specified Updated:  06/12/20 4900 Emergent disease panel Not detected Comment: Performed at [facility] RESULTS SCANNED IN [facility] 
  
  
 CULTURE, BODY FLUID W Prieto Milian [[id number]] Collected:  06/09/20 1029 Order Status:  Completed Specimen:  Left Updated:  06/11/20 6805 Special Requests: NO SPECIAL REQUESTS     
  GRAM STAIN 0 TO 1 WBC'S SEEN PER OIF  
   NO DEFINITE ORGANISM SEEN Culture result: NO GROWTH 2 DAYS     
 AFB CULTURE + SMEAR W/RFLX ID FROM CULTURE [[id number]] Collected:  06/09/20 1029 Order Status:  Completed Specimen:  Miscellaneous sample Updated:  06/10/20 1536 Source LEFT Comment: Pleural Fluid Specimen AFB Specimen processing Concentration Acid Fast Smear Negative Comment: (NOTE) Performed At: [address] [id number] Ro Matta MD [id number] Acid Fast Culture PENDING  
 AFB CULTURE + SMEAR W/RFLX ID FROM CULTURE [[id number]] Collected:  05/25/20 1315 Order Status:  Completed Specimen:  Miscellaneous sample Updated:  05/27/20 1537 Source LEFT Comment: Pleural Fluid Specimen AFB Specimen processing Concentration Acid Fast Smear Negative Comment: (NOTE) Performed At: [address] [id number] Ro Matta MD [id number] Acid Fast Culture PENDING  
 CULTURE, BODY FLUID W Prieto Milian [[id number]] Collected:  05/25/20 1315 Order Status:  Completed Specimen:  Left Updated:  05/27/20 7850 Special Requests: NO SPECIAL REQUESTS     
  GRAM STAIN 0 TO 1 WBC'S/OIF  
   NO DEFINITE ORGANISM SEEN Culture result: NO GROWTH 2 DAYS     
  
 
 
SARS-CoV-2 Lab Results \"Novel Coronavirus\" Test:  
Lab Results Component Value Date/Time COV2NT WRONG TEST ORDERED 06/10/2020 12:03 PM  
  
\"Emergent Disease\" Test:  
Lab Results Component Value Date/Time EDPR Not detected 06/10/2020 12:03 PM  
 
\"SARS-COV-2\" Test:  
Lab Results Component Value Date/Time XGCOVT WRONG TEST ORDERED 06/10/2020 12:03 PM  
 
As of: 4:01 PM on 6/30/2020 Assessment and Plan:  
 
Hospital Problems as of 6/30/2020 Date Reviewed: 6/30/2020 Codes Class Noted - Resolved POA Debility ICD-10-CM: R53.81 ICD-9-CM: 799.3  6/15/2020 - Present Yes Rectal obstruction ICD-10-CM: K62.4 ICD-9-CM: 569.2  6/1/2020 - Present Yes Aspiration pneumonia ([facility] [address] ICD-10-CM: J69.0 ICD-9-CM: 507.0  6/1/2020 - Present Yes Neutropenia ([facility] [address] ICD-10-CM: D70.9 ICD-9-CM: 288.00  5/25/2020 - Present Yes Pleural effusion on right ICD-10-CM: J90 ICD-9-CM: 511.9  5/25/2020 - Present Yes Overview Addendum 6/24/2020  8:13 AM by Radha Cartagena NP  
  6/23/20L  Right thoracentesis:  1100 ml bloody effusion removed 6/10/20:  Bilateral thoracentesis on L( 1100 cc ) and R( 1000 cc) removed 6/3/20:  Right chest thoracentesis with 800 removed. * (Principal) Acute respiratory failure with hypoxia [facility]) ICD-10-CM: J96.01 
ICD-9-CM: 518.81  5/24/2020 - Present Yes Normocytic anemia ICD-10-CM: D64.9 ICD-9-CM: 285.9  5/24/2020 - Present Yes  
   
 CAP (community acquired pneumonia) ICD-10-CM: J18.9 ICD-9-CM: 278  5/24/2020 - Present Pleural effusion on left ICD-10-CM: J90 ICD-9-CM: 511.9  5/24/2020 - Present Yes Overview Addendum 6/25/2020 11:40 AM by Radha Cartagena NP  
   6/10/20:  Bilateral thoracentesis on L( 1100 cc ) and R( 1000 cc) removed 6/25/20:  L thoracentesis:  900 ml serosanguinous fluid removed--negative cytology Malignant neoplasm metastatic to bone [facility]) (Chronic) ICD-10-CM: C79.51 
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 
ICD-9-CM: 250.40, 583.81  7/3/2018 - Present Yes Diabetes mellitus due to underlying condition with hyperglycemia (HCC) (Chronic) ICD-10-CM: D25.07 ICD-9-CM: 249.80  12/21/2015 - Present Yes COVID-19 ruled out ICD-10-CM: Z03.818 ICD-9-CM: V71.83  12/15/2015 - Present Yes Hyponatremia ICD-10-CM: E87.1 ICD-9-CM: 276.1  11/23/2015 - Present Yes RESOLVED: Acute metabolic encephalopathy PNZ-97-GB: G93.41 
ICD-9-CM: 348.31  5/24/2020 - 5/24/2020 Plan: · Acute hypoxic respiratory failure, bilateral effusions: 
· Weaning O2 as tolerant, currently on 2 L NC 
· Antibiotics stopped by pulmonary · Resumed IV lasix 40 mg every 12 hours · Steroid taper per pulmonary · Anal cancer with stricture and rectal obstruction: · Declines surgery · continued bowel regimen · Outpatient colorectal and oncology followups · HTN:  
· continued norvasc, lisinopril · DM2: 
· Holding amaryl · Continued SSI · Increase premeal insulin · reduce  lantus · Anemia/thrombocytopenia: 
· followup CBC ·  transfuse HGB < 7 last on 6-24 · Urine retention: · Replaced traore · continued flomax · Voiding trial possibly prior to discharge DC planning/Dispo:  Pending to SNF vs [facility] Diet:  DIET NUTRITIONAL SUPPLEMENTS 
DIET REGULAR 
DVT ppx:  SCD Signed: Sammy Simons MD

## 2020-06-30 NOTE — DIABETES MGMT
Patient's blood glucose 133-389 yesterday with patient receiving Lantus 20 units, Humalog 40 units, and prednisone 40 mg. Blood glucose 82 this morning. Given prednisone is decreasing to 30 mg today and patient's fasting glucose, patient would likely benefit from decrease in Lantus to reduce risk for morning hypoglycemia. Provider could also consider slight increase in prandial insulin to offset steroid impact if tighter control is desired. Notified provider of recommendations via [employer].

## 2020-07-01 NOTE — PROGRESS NOTES
Alisson Britton Admission Date: 5/24/2020 Daily Progress Note: 7/1/2020 The patient's chart is reviewed and the patient is discussed with the staff. 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  He has undergone multiple thoracenteses - 5/25 - right, 1000 ml, 5/29 - right, 1300ml/left 800 ml, 6/3 - right, 800 ml, 6/9 - right, 1000 ml, left 1100 ml. The fluid has been transudative with rare atypical cells.  ECHO with EF 60-65%.  Is on Lasix with a negative balance and currently on 12 L NC O2.  
    In regards to his metastatic rectal cancer, he has known anal stricture and declined prior surgical diverting ostomy. He is s/p anal dilation per [facility] in past.  Oncology recommends followup at discharge, last chemo completed 5/2020. [facility] has been seen by GI s/p 6-2 flex sig with disimpaction/ no stricture found. Recommends [facility] colorectal followup. He required 1 unit PRBC 6-15. Had repeat right thoracentesis 6/23 with 1100 ml bloody effusion removed. Rapid response called 6/24 for hypoxia, was placed on Opti-flow.  Repeat left thoracentesis 6/24 with 900 ml serosanguinous fluid removed. Treated with burst dose Albumin and Lasix with good diuresis. Subjective:  
 
Resting in bed, remains on NC 2L and denies shortness of breath. States is not sleeping well. Has productive cough at times with yellow sputum. Appetite good and having BMs. Urine output 1.7 L with IV Lasix BID. Current Facility-Administered Medications Medication Dose Route Frequency  predniSONE (DELTASONE) tablet 30 mg  30 mg Oral DAILY WITH BREAKFAST  insulin glargine (LANTUS) injection 20 Units  20 Units SubCUTAneous QHS  insulin lispro (HUMALOG) injection 6 Units  6 Units SubCUTAneous TIDAC  
  0.9% sodium chloride infusion 250 mL  250 mL IntraVENous PRN  
 furosemide (LASIX) injection 40 mg  40 mg IntraVENous BID  tamsulosin (FLOMAX) capsule 0.4 mg  0.4 mg Oral DAILY  insulin lispro (HUMALOG) injection   SubCUTAneous AC&HS  ferrous sulfate tablet 325 mg  1 Tab Oral DAILY WITH BREAKFAST  0.9% sodium chloride infusion 250 mL  250 mL IntraVENous PRN  
 hydrALAZINE (APRESOLINE) 20 mg/mL injection 10 mg  10 mg IntraVENous Q6H PRN  
 nystatin (MYCOSTATIN) 100,000 unit/mL oral suspension 500,000 Units  500,000 Units Oral QID  simethicone (MYLICON) tablet 80 mg  80 mg Oral QID PRN  potassium chloride (K-DUR, KLOR-CON) SR tablet 20 mEq  20 mEq Oral TID  albuterol-ipratropium (DUO-NEB) 2.5 MG-0.5 MG/3 ML  3 mL Nebulization Q6HWA RT  
 magnesium hydroxide (MILK OF MAGNESIA) 400 mg/5 mL oral suspension 30 mL  30 mL Oral DAILY  senna-docusate (PERICOLACE) 8.6-50 mg per tablet 1 Tab  1 Tab Oral BID  
 bisacodyL (DULCOLAX) suppository 10 mg  10 mg Rectal DAILY  0.9% sodium chloride infusion 250 mL  250 mL IntraVENous PRN  
 amLODIPine (NORVASC) tablet 10 mg  10 mg Oral DAILY  docusate sodium (COLACE) capsule 100 mg  100 mg Oral PRN  
 lisinopriL (PRINIVIL, ZESTRIL) tablet 20 mg  20 mg Oral DAILY  meloxicam (MOBIC) tablet 7.5 mg  7.5 mg Oral DAILY  sodium chloride tablet 2 g  2 g Oral BID  traMADoL (ULTRAM) tablet 50 mg  50 mg Oral Q6H PRN  
 sodium chloride (NS) flush 5-40 mL  5-40 mL IntraVENous Q8H  
 sodium chloride (NS) flush 5-40 mL  5-40 mL IntraVENous PRN  
 acetaminophen (TYLENOL) tablet 650 mg  650 mg Oral Q4H PRN  
 ondansetron (ZOFRAN) injection 4 mg  4 mg IntraVENous Q4H PRN  
 [Held by provider] enoxaparin (LOVENOX) injection 40 mg  40 mg SubCUTAneous Q24H Review of Systems Constitutional: negative for fever, chills, sweats Cardiovascular: negative for chest pain, palpitations, syncope, edema Gastrointestinal:  negative for dysphagia, reflux, vomiting, diarrhea, abdominal pain, or melena Neurologic:  negative for focal weakness, numbness, headache Objective:  
 
Vitals:  
 06/30/20 1517 06/30/20 2010 06/30/20 2018 06/30/20 2333 BP: 126/69  121/68 141/81 Pulse: 97  100 96 Resp: 24  19 18 Temp: 98.3 °F (36.8 °C)  98 °F (36.7 °C) 98.1 °F (36.7 °C) SpO2: 95% 96% 96% 99% Weight:      
Height:      
 
 
 
Intake/Output Summary (Last 24 hours) at 7/1/2020 1119 Last data filed at 7/1/2020 2481 Gross per 24 hour Intake  Output 1750 ml Net -1750 ml Physical Exam:  
Constitution:  the patient is well developed and in no acute distress, NC 2L sat 99% EENMT:  Sclera clear, pupils equal, oral mucosa moist 
Respiratory: few basilar crackles, R>L, no wheezing, productive cough at times with yellow sputum Cardiovascular:  RRR without M,G,R 
Gastrointestinal: soft and non-tender; with positive bowel sounds. Musculoskeletal: warm without cyanosis. There is no lower extremity edema. Skin:  no jaundice or rashes, no wounds Neurologic: no gross neuro deficits Psychiatric:  alert and oriented x 3 CXR:  
6/29/20:  Pulmonary vascular congestion, infiltrates and small pleural 
effusions are similar to prior. LAB Recent Labs  
  06/30/20 
2117 06/30/20 
1618 06/30/20 
1129 06/30/20 
0735 06/29/20 
2117 GLUCPOC 265* 332* 237* 82 389* Recent Labs  
  07/01/20 
0544 06/30/20 
0645 06/29/20 
9446 WBC 6.8 6.6 7.2 HGB 8.2* 8.2* 8.9* HCT 25.6* 27.1* 27.6* PLT 88* 94* 95* Recent Labs  
  07/01/20 
0544 06/30/20 
0645 06/29/20 
0351  139 139  
K 4.1 3.9 3.9 CL 98 99 101 CO2 35* 34* 32 * 73 141* BUN 21 17 16 CREA 0.61* 0.60* 0.62* MG 2.4 2.5* 2.2 CA 8.3 8.5 8.5 No results for input(s): PH, PCO2, PO2, HCO3, PHI, PCO2I, PO2I, HCO3I in the last 72 hours. No results for input(s): LCAD, LAC in the last 72 hours. Assessment:  (Medical Decision Making) Hospital Problems  Date Reviewed: 7/1/2020 Codes Class Noted POA Debility ICD-10-CM: R53.81 ICD-9-CM: 799.3  6/15/2020 Yes Chronic--PT/OT following Rectal obstruction ICD-10-CM: K62.4 ICD-9-CM: 569.2  6/1/2020 Yes Aspiration pneumonia ([facility] ICD-10-CM: J69.0 ICD-9-CM: 507.0  6/1/2020 Yes Antibiotics completed Neutropenia ([facility] ICD-10-CM: D70.9 ICD-9-CM: 288.00  5/25/2020 Yes WBC down to 6.8 Pleural effusion on right ICD-10-CM: J90 ICD-9-CM: 511.9  5/25/2020 Yes Overview Addendum 6/24/2020  8:13 AM by Rodriguez Hardy NP  
  6/23/20L  Right thoracentesis:  1100 ml bloody effusion removed 6/10/20:  Bilateral thoracentesis on L( 1100 cc ) and R( 1000 cc) removed 6/3/20:  Right chest thoracentesis with 800 removed. Small effusions per CXR  
 * (Principal) Acute respiratory failure with hypoxia ([facility] ICD-10-CM: J96.01 
ICD-9-CM: 518.81  5/24/2020 Yes Volume overload Normocytic anemia ICD-10-CM: D64.9 ICD-9-CM: 285.9  5/24/2020 Yes HGB down to 8.2 Pleural effusion on left ICD-10-CM: J90 ICD-9-CM: 511.9  5/24/2020 Yes Overview Addendum 6/25/2020 11:40 AM by Rodriguez Hardy NP  
   6/10/20:  Bilateral thoracentesis on L( 1100 cc ) and R( 1000 cc) removed 6/25/20:  L thoracentesis:  900 ml serosanguinous fluid removed--negative cytology No change on recent CXR Malignant neoplasm metastatic to bone [facility]) (Chronic) ICD-10-CM: C79.51 
ICD-9-CM: 198.5  6/26/2019 Yes  
 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 
ICD-9-CM: 250.40, 583.81  7/3/2018 Yes Chronic--per primary Diabetes mellitus due to underlying condition with hyperglycemia (HCC) (Chronic) ICD-10-CM: W86.16 ICD-9-CM: 249.80  12/21/2015 Yes Chronic--ranges --per primary COVID-19 ruled out ICD-10-CM: Z03.818 ICD-9-CM: V71.83  12/15/2015 Yes  
 unchanged Hyponatremia ICD-10-CM: E87.1 ICD-9-CM: 276.1  11/23/2015 Yes Resolved--Na 137 Plan:  (Medical Decision Making) --Duoneb 
--PT/OT 
--AFB, fungal culture:  pending 
--O2 weaned to NC 2L O2- may need home O2 
--Prednisone 30 mg daily-consider wean today? ? Recent CRP down to 8.6 from 23.2 
--Lasix 40 mg bid IV, urine out 1.7 L, I&Os in accurate, no intake recorded-creat 0.61 
--Platelets down to 88 
--Palliative Care following and remains full code. --Case management assisting with discharge plan. LTAC denied-third party and SNF referrals. More than 50% of the time documented was spent in face-to-face contact with the patient and in the care of the patient on the floor/unit where the patient is located. Canda Eisenmenger, NP Lungs:  Basilar crackles Heart:  RRR with no Murmur/Rubs/Gallops Additional Comments:  ? Transfer to rehab soon I have spoken with and examined the patient. I agree with the above assessment and plan as documented.  
 
Sherren Shone, MD

## 2020-07-01 NOTE — DIABETES MGMT
Blood glucose ranged  yesterday with patient receiving Lantus 20 units, Humalog 30 units, and prednisone 30 mg. Blood glucose 139 this morning. As prednisone is decreased insulin needs will decrease as well and insulin should be reduced accordingly to reduce risk for hypoglycemia. If patient remains on Prednisone provider could consider reducing Lantus dose and increasing prandial insulin to offset steroid's impact and improve glycemic control.

## 2020-07-02 NOTE — DIABETES MGMT
Patient to discharge to rehab today on basal/bolus regimen: Lantus 10 units nightly, Humalog 8 units with meals, and Prednisone 30mg daily. As patient steroid therapy is weaned patient insulin needs will likely decrease. Creatinine 0.58. GFR >60.

## 2020-07-02 NOTE — DISCHARGE SUMMARY
Hospitalist Discharge Summary Patient ID: 
Belinda Monson [id number] 
42 y.o. 
1940 Admit date: 5/24/2020 11:00 PM 
Discharge date and time: 7/2/2020 Attending: Tiago Ambrocio MD 
PCP:  Jackie Quezada MD 
Treatment Team: Attending Provider: Tiago Ambrocio MD; Utilization Review: Marylin Arana, RN; Consulting Provider: Court Rodriguez RN; Consulting Provider: Yuni Zamora MD; Consulting Provider: Victor M Rendon MD; Care Manager: Edilma Mckeon; Occupational Therapist: Shay Duarte OTR/L; Physical Therapist: Ellen Mcgee PT 
 
Principal Diagnosis Acute respiratory failure with hypoxia ([address] Principal Problem: 
  Acute respiratory failure with hypoxia ([address] (5/24/2020) Active Problems: Hyponatremia (11/23/2015) COVID-19 ruled out (12/15/2015) Diabetes mellitus due to underlying condition with hyperglycemia ([address] (12/21/2015) Type 2 diabetes with nephropathy ([address] (7/3/2018) Malignant neoplasm metastatic to bone ([address] (6/26/2019) Normocytic anemia (5/24/2020) Pleural effusion on left (5/24/2020) Overview:  6/10/20:  Bilateral thoracentesis on L( 1100 cc ) and R( 1000 cc) removed 6/25/20:  L thoracentesis:  900 ml serosanguinous fluid removed--negative  
    cytology Neutropenia ([address] (5/25/2020) Pleural effusion on right (5/25/2020) Overview: 6/23/20L  Right thoracentesis:  1100 ml bloody effusion removed 6/10/20:  Bilateral thoracentesis on L( 1100 cc ) and R( 1000 cc) removed 6/3/20:  Right chest thoracentesis with 800 removed. Rectal obstruction (6/1/2020) Aspiration pneumonia ([address] (6/1/2020) Debility (6/15/2020) Hospital Course: 
Please refer to the admission H&P for details of presentation.  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 He has been seen by pulm s/p bilateral thoracentesis 5-26, right thoracentesis 6-3, bilateral thoracentesis 6-9, right thoracentesis 6-23, left thoracentesis 6-24 and diuresis.  Studies transudate, felt due to hypoalbuminemia. Cytology negative from both sides. AFB and fungal sudies are pending. ECHOEF 60-65%.  He has required high flow O2 but weaned down. He has been on IV lasix transitioned to oral which may need to be titrated accordingly.  
Heather Chin has compelted 8 days zosyn. Course of steroids added and he will need prolonged taper. His insulin requirements were increased during steroid course so recommend close monitoring of blood sugars and adjustment of his insulin as needed. 
 In regards to his metastatic rectal cancer, he has known anal stricture and declined prior surgical diverting ostomy. He is s/p anal dilation per [facility] in past. CTAP showed constipation/ colonic distention to rectum suspicious for obstruction. oncology recommends followup at discharge, last chemo completed [facility] has been seen by GI s/p 6-2 flex sig with disimpaction/ no stricture found. Recommends [facility] followup. He required 1 unit PRBC 6-15// 6-24. He was also found to have urinary retention and traore catheter placed. He will need to do bladder training at the facility.  
  
 
Significant Diagnostic Studies:  
Final [99] 6/29/2020 04:18 6/29/2020 06:30 Study Result Chest portable 
  
CLINICAL INDICATION: Follow-up of pleural effusions, acute respiratory failure 
with hypoxia, dyspnea 
  
COMPARISON: June 26, 2020 
  
TECHNIQUE: single AP portable view chest at 4:10 AM semiupright  
  
FINDINGS: Stable mediastinal and hilar contours. Patient is rotated. There is 
persistence of bilateral pulmonary vascular congestion, right greater than left pleural effusions, and right mid to lower lung infiltrate. Overall allowing for 
technique and positioning the appearance is very similar to prior.  
  
  
IMPRESSION IMPRESSION: Pulmonary vascular congestion, infiltrates and small pleural 
effusions are similar to prior. Final [99] 6/01/2020 05:03 6/01/2020 05:06 Study Result EXAM: CT Chest with IV contrast - PE protocol. 
  
INDICATION: Dyspnea. 
  
COMPARISON: Prior CT chest on May 24, 2020. 
  
TECHNIQUE: Axial CT images of the chest were obtained after the intravenous 
injection of 100 mL Isovue 370 CT contrast. Radiation dose reduction techniques 
were used for this study. Our CT scanners use one or all of the following: Automated exposure control, adjustment of the mA and/or kV according to patient 
size, iterative reconstruction. 
  
FINDINGS: 
- Pleura/pericardium: There are decreased small to moderate-sized bilateral 
pleural effusions. No pneumothorax or pericardial effusion is seen. - Lungs: There is progressed patchy edema or infiltrates in both lungs, worse in 
the right lower lobe. - Malina/Mediastinum: Within normal limits. - Tracheobronchial tree: Within normal limits. - Aorta/pulmonary arteries: Within normal limits. - Heart: Within normal limits. - Coronary arteries: There are coronary artery calcifications. - Chest wall: Within normal limits. - Spine/bones: Again noted are multiple sclerotic foci in the ribs and spine, 
consistent with osseous metastatic disease. 
- Additional comments: None. 
  
IMPRESSION IMPRESSION:  
1. No evidence of pulmonary embolism. 2. Decreased small to moderate bilateral pleural effusions. 3. Progressed patchy pulmonary edema or pneumonia in both lungs, worse in the 
right lower lobe. 4. Coronary artery disease. 5. Osseous metastatic disease. 
  
EXAM: CT abdomen and pelvis with IV contrast.  
 
 
 
 
Labs: Results:  
   
Chemistry Recent Labs  
  07/02/20 
7522 07/01/20 
0544 06/30/20 
7185 GLU 85 133* 73  137 139  
K 3.9 4.1 3.9  98 99 CO2 35* 35* 34* BUN 17 21 17 CREA 0.58* 0.61* 0.60* CA 8.5 8.3 8.5 AGAP 4* 4* 6* CBC w/Diff Recent Labs  
  07/02/20 
0752 07/01/20 
0544 06/30/20 
0645 WBC 7.2 6.8 6.6  
RBC 2.74* 2.77* 2.87* HGB 8.2* 8.2* 8.2* HCT 25.2* 25.6* 27.1*  
PLT 86* 88* 94* Cardiac Enzymes No results for input(s): CPK, CKND1, ELSY in the last 72 hours. No lab exists for component: Dank Rakers Coagulation No results for input(s): PTP, INR, APTT, INREXT in the last 72 hours. Lipid Panel No results found for: CHOL, CHOLPOCT, CHOLX, CHLST, CHOLV, 999210, HDL, HDLP, LDL, LDLC, DLDLP, 544644, VLDLC, VLDL, TGLX, TRIGL, TRIGP, TGLPOCT, CHHD, CHHDX  
BNP No results for input(s): BNPP in the last 72 hours. Liver Enzymes No results for input(s): TP, ALB, TBIL, AP in the last 72 hours. No lab exists for component: SGOT, GPT, DBIL Thyroid Studies Lab Results Component Value Date/Time TSH 4.460 (H) 10/26/2017 04:14 PM  
    
 
 
Discharge Exam: 
Visit Rich Guess /80 (BP 1 Location: Left arm, BP Patient Position: At rest) Pulse 96 Temp 97.2 °F (36.2 °C) Resp 16 Ht 6' (1.829 m) Wt 77.1 kg (170 lb) SpO2 93% BMI 23.06 kg/m² General appearance: alert, cooperative, no distress, appears stated age Lungs: slightly diminished at bases Heart: regular rate and rhythm, S1, S2 normal, no murmur, click, rub or gallop Abdomen: soft, non-tender. Bowel sounds normal. No masses,  no organomegaly Extremities: no cyanosis or edema Neurologic: Grossly normal 
 
Disposition: STR Discharge Condition: stable Patient Instructions:  
Current Discharge Medication List  
  
START taking these medications Details  
albuterol-ipratropium (DUO-NEB) 2.5 mg-0.5 mg/3 ml nebu 3 mL by Nebulization route every six (6) hours as needed for Wheezing. Qty: 30 Nebule, Refills: 0  
  
bisacodyL (DULCOLAX) 10 mg supp Insert 10 mg into rectum daily. Qty: 1 Each, Refills: 0  
  
insulin glargine (LANTUS) 100 unit/mL injection 10 Units by SubCUTAneous route nightly. Qty: 1 Vial, Refills: 0  
  
insulin lispro (HUMALOG) 100 unit/mL injection 8 Units by SubCUTAneous route Before breakfast, lunch, and dinner. Qty: 1 Vial, Refills: 0  
  
magnesium hydroxide (MILK OF MAGNESIA) 400 mg/5 mL suspension Take 30 mL by mouth daily. Qty: 1 Bottle, Refills: 0  
  
potassium chloride (K-DUR, KLOR-CON) 20 mEq tablet Take 1 Tab by mouth three (3) times daily. Qty: 1 Tab, Refills: 0  
  
predniSONE (DELTASONE) 10 mg tablet Take 30 mg by mouth daily (with breakfast). Qty: 1 Tab, Refills: 0  
  
senna-docusate (PERICOLACE) 8.6-50 mg per tablet Take 1 Tab by mouth two (2) times a day. Qty: 1 Tab, Refills: 0  
  
simethicone (MYLICON) 80 mg chewable tablet Take 1 Tab by mouth four (4) times daily as needed for GI Pain. Qty: 1 Tab, Refills: 0  
  
tamsulosin (FLOMAX) 0.4 mg capsule Take 1 Cap by mouth daily. Qty: 1 Cap, Refills: 0  
  
furosemide (LASIX) 40 mg tablet Take 1 Tab by mouth two (2) times a day. Qty: 1 Tab, Refills: 0  
  
magnesium oxide (MAG-OX) 400 mg tablet Take 1 Tab by mouth daily. Qty: 30 Tab, Refills: 0 CONTINUE these medications which have NOT CHANGED Details  
lisinopriL (PRINIVIL, ZESTRIL) 20 mg tablet Take 20 mg by mouth daily. glimepiride (AMARYL) 4 mg tablet Take 4 mg by mouth every morning. traMADoL (ULTRAM) 50 mg tablet Take 1 Tab by mouth every six (6) hours as needed for Pain for up to 30 days. Max Daily Amount: 200 mg. Indications: pain 
Qty: 90 Tab, Refills: 0 Associated Diagnoses: Anal carcinoma ([address] meloxicam (MOBIC) 7.5 mg tablet Take 1 Tab by mouth daily. Indications: arthralgias 
Qty: 30 Tab, Refills: 1  
  
sodium chloride 1 gram tablet TAKE 2 TABS BY MOUTH TWO (2) TIMES A DAY. Qty: 180 Tab, Refills: 3 Associated Diagnoses: Hyponatremia TRUEPLUS LANCETS 28 gauge misc ferrous sulfate 324 mg (65 mg iron) tablet Take  by mouth Daily (before breakfast). docusate sodium (COLACE) 100 mg capsule Take 100 mg by mouth as needed for Constipation. amLODIPine (NORVASC) 10 mg tablet Take 10 mg by mouth daily. In am  
  
metFORMIN (GLUCOPHAGE) 1,000 mg tablet Take 1,000 mg by mouth daily. In am  Indications: type 2 diabetes mellitus STOP taking these medications  
  
 tolvaptan ([place]) tab tablet Comments:  
Reason for Stopping:   
   
  
 
 
Activity: as tolerated Diet: regular Follow-up ·   PCp in 1 week, Pulm in 1-2 weeks, Oncology in 1-2 weeks, [facility] in 3-4 weeks Time spent to discharge patient 35 minutes Signed: 
Barbra Lima DO 
7/2/2020 
8:57 AM

## 2020-07-02 NOTE — PROGRESS NOTES
TRANSFER - OUT REPORT: 
 
Verbal report given to nurse Gail(name) on [employer]  being transferred to [facility](unit) for routine progression of care Report consisted of patients Situation, Background, Assessment and  
Recommendations(SBAR). Information from the following report(s) SBAR, Kardex and MAR was reviewed with the receiving nurse. Opportunity for questions and clarification was provided. Patient transported with: 
 O2 @ 2 liters

## 2020-07-02 NOTE — PROGRESS NOTES
Lazarus Grimes Admission Date: 5/24/2020 Daily Progress Note: 7/2/2020 The patient's chart is reviewed and the patient is discussed with the staff. 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  He has undergone multiple thoracenteses - 5/25 - right, 1000 ml, 5/29 - right, 1300ml/left 800 ml, 6/3 - right, 800 ml, 6/9 - right, 1000 ml, left 1100 ml. The fluid has been transudative with rare atypical cells.  ECHO with EF 60-65%.  Is on Lasix with a negative balance and currently on 12 L NC O2.  
    In regards to his metastatic rectal cancer, he has known anal stricture and declined prior surgical diverting ostomy. He is s/p anal dilation per [facility] surgery in past.  Oncology recommends followup at discharge, last chemo completed 5/2020. [facility] has [facility] by GI s/p 6-2 flex sig with disimpaction/ no stricture found. Recommends [facility] colorectal followup. He required 1 unit PRBC 6-15. Had repeat right thoracentesis 6/23 with 1100 ml bloody effusion removed. Rapid response called 6/24 for hypoxia, was placed on Opti-flow.  Repeat left thoracentesis 6/24 with 900 ml serosanguinous fluid removed. Treated with burst dose Albumin and Lasix with good diuresis. Subjective: On 2 L O2, no increase in sob Current Facility-Administered Medications Medication Dose Route Frequency  insulin glargine (LANTUS) injection 10 Units  10 Units SubCUTAneous QHS  insulin lispro (HUMALOG) injection 8 Units  8 Units SubCUTAneous TIDAC  hydrocortisone (ANUSOL-HC) 2.5 % rectal cream   PeriANAL QID PRN  predniSONE (DELTASONE) tablet 30 mg  30 mg Oral DAILY WITH BREAKFAST  0.9% sodium chloride infusion 250 mL  250 mL IntraVENous PRN  
 furosemide (LASIX) injection 40 mg  40 mg IntraVENous BID  
  tamsulosin (FLOMAX) capsule 0.4 mg  0.4 mg Oral DAILY  insulin lispro (HUMALOG) injection   SubCUTAneous AC&HS  ferrous sulfate tablet 325 mg  1 Tab Oral DAILY WITH BREAKFAST  0.9% sodium chloride infusion 250 mL  250 mL IntraVENous PRN  
 hydrALAZINE (APRESOLINE) 20 mg/mL injection 10 mg  10 mg IntraVENous Q6H PRN  
 nystatin (MYCOSTATIN) 100,000 unit/mL oral suspension 500,000 Units  500,000 Units Oral QID  simethicone (MYLICON) tablet 80 mg  80 mg Oral QID PRN  potassium chloride (K-DUR, KLOR-CON) SR tablet 20 mEq  20 mEq Oral TID  albuterol-ipratropium (DUO-NEB) 2.5 MG-0.5 MG/3 ML  3 mL Nebulization Q6HWA RT  
 magnesium hydroxide (MILK OF MAGNESIA) 400 mg/5 mL oral suspension 30 mL  30 mL Oral DAILY  senna-docusate (PERICOLACE) 8.6-50 mg per tablet 1 Tab  1 Tab Oral BID  
 bisacodyL (DULCOLAX) suppository 10 mg  10 mg Rectal DAILY  0.9% sodium chloride infusion 250 mL  250 mL IntraVENous PRN  
 amLODIPine (NORVASC) tablet 10 mg  10 mg Oral DAILY  docusate sodium (COLACE) capsule 100 mg  100 mg Oral PRN  
 lisinopriL (PRINIVIL, ZESTRIL) tablet 20 mg  20 mg Oral DAILY  meloxicam (MOBIC) tablet 7.5 mg  7.5 mg Oral DAILY  sodium chloride tablet 2 g  2 g Oral BID  traMADoL (ULTRAM) tablet 50 mg  50 mg Oral Q6H PRN  
 sodium chloride (NS) flush 5-40 mL  5-40 mL IntraVENous Q8H  
 sodium chloride (NS) flush 5-40 mL  5-40 mL IntraVENous PRN  
 acetaminophen (TYLENOL) tablet 650 mg  650 mg Oral Q4H PRN  
 ondansetron (ZOFRAN) injection 4 mg  4 mg IntraVENous Q4H PRN  
 [Held by provider] enoxaparin (LOVENOX) injection 40 mg  40 mg SubCUTAneous Q24H Review of Systems Constitutional: negative for fever, chills, sweats Cardiovascular: negative for chest pain, palpitations, syncope, edema Gastrointestinal:  negative for dysphagia, reflux, vomiting, diarrhea, abdominal pain, or melena Neurologic:  negative for focal weakness, numbness, headache Objective: Vitals:  
 07/01/20 6602 07/02/20 2997 07/02/20 6011 07/02/20 0740 BP: 157/79 152/80  128/66 Pulse: (!) 103 96  91 Resp: 16 16  18 Temp: 97.9 °F (36.6 °C) 97.2 °F (36.2 °C)  97.7 °F (36.5 °C) SpO2: 92% 94% 93% 91% Weight:      
Height:      
 
 
 
Intake/Output Summary (Last 24 hours) at 7/2/2020 1241 Last data filed at 7/2/2020 3567 Gross per 24 hour Intake  Output 2500 ml Net -2500 ml Physical Exam:  
Constitution:  the patient is well developed and in no acute distress EENMT:  Sclera clear, pupils equal, oral mucosa moist 
Respiratory: basilar crackles Cardiovascular:  RRR without M,G,R 
Gastrointestinal: soft and non-tender; with positive bowel sounds. Musculoskeletal: warm without cyanosis. There is no lower extremity edema. Skin:  no jaundice or rashes, no wounds Neurologic: no gross neuro deficits Psychiatric:  alert and oriented x 3 CXR:  
 
 
LAB Recent Labs  
  07/02/20 
1106 07/02/20 
0735 07/01/20 
2144 07/01/20 
1640 07/01/20 
1629 GLUCPOC 141* 90 349* 417* 491* Recent Labs  
  07/02/20 
5697 07/01/20 
0544 06/30/20 
0645 WBC 7.2 6.8 6.6 HGB 8.2* 8.2* 8.2* HCT 25.2* 25.6* 27.1*  
PLT 86* 88* 94* Recent Labs  
  07/02/20 
2323 07/01/20 
0544 06/30/20 
0645  137 139  
K 3.9 4.1 3.9  98 99 CO2 35* 35* 34* GLU 85 133* 73 BUN 17 21 17 CREA 0.58* 0.61* 0.60* MG 2.0 2.4 2.5*  
CA 8.5 8.3 8.5 No results for input(s): PH, PCO2, PO2, HCO3, PHI, PCO2I, PO2I, HCO3I in the last 72 hours. No results for input(s): LCAD, LAC in the last 72 hours. Assessment:  (Medical Decision Making) Hospital Problems  Date Reviewed: 7/1/2020 Codes Class Noted POA Debility ICD-10-CM: R53.81 ICD-9-CM: 799.3  6/15/2020 Yes Rectal obstruction ICD-10-CM: K62.4 ICD-9-CM: 569.2  6/1/2020 Yes Aspiration pneumonia ([facility] [address] ICD-10-CM: J69.0 ICD-9-CM: 507.0  6/1/2020 Yes Neutropenia ([facility] [address] ICD-10-CM: D70.9 ICD-9-CM: 288.00  5/25/2020 Yes Pleural effusion on right ICD-10-CM: J90 ICD-9-CM: 511.9  5/25/2020 Yes Overview Addendum 6/24/2020  8:13 AM by Lillian Lincoln NP  
  6/23/20L  Right thoracentesis:  1100 ml bloody effusion removed 6/10/20:  Bilateral thoracentesis on L( 1100 cc ) and R( 1000 cc) removed 6/3/20:  Right chest thoracentesis with 800 removed. * (Principal) Acute respiratory failure with hypoxia ([address] ICD-10-CM: J96.01 
ICD-9-CM: 518.81  5/24/2020 Yes Normocytic anemia ICD-10-CM: D64.9 ICD-9-CM: 285.9  5/24/2020 Yes Pleural effusion on left ICD-10-CM: J90 ICD-9-CM: 511.9  5/24/2020 Yes Overview Addendum 6/25/2020 11:40 AM by Lillian Lincoln NP  
   6/10/20:  Bilateral thoracentesis on L( 1100 cc ) and R( 1000 cc) removed 6/25/20:  L thoracentesis:  900 ml serosanguinous fluid removed--negative cytology Malignant neoplasm metastatic to bone [facility]) (Chronic) ICD-10-CM: C79.51 
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 
ICD-9-CM: 250.40, 583.81  7/3/2018 Yes Diabetes mellitus due to underlying condition with hyperglycemia (HCC) (Chronic) ICD-10-CM: K31.82 ICD-9-CM: 249.80  12/21/2015 Yes COVID-19 ruled out ICD-10-CM: Z03.818 ICD-9-CM: V71.83  12/15/2015 Yes Hyponatremia ICD-10-CM: E87.1 ICD-9-CM: 276.1  11/23/2015 Yes Plan:  (Medical Decision Making) 1    To rehab 
2    follow up with cxr in 1 month 
3    He may have recurrance of effusion before then- if so we can do thora as outpt 
-- 
 
More than 50% of the time documented was spent in face-to-face contact with the patient and in the care of the patient on the floor/unit where the patient is located.  
 
Angela Severin, MD

## 2020-07-02 NOTE — PROGRESS NOTES
Care Management Interventions PCP Verified by CM: Yes Transition of Care Consult (CM Consult):  [address]: Yes Physical Therapy Consult: Yes Occupational Therapy Consult: No 
Speech Therapy Consult: No 
Current Support Network: Lives with Spouse Confirm Follow Up Transport: Family Freedom of Choice List was Provided with Basic Dialogue that Supports the Patient's Individualized Plan of Care/Goals, Treatment Preferences and Shares the Quality Data Associated with the Providers?: Yes [employer] Information Provided?: No 
Discharge Location Discharge Placement: Skilled nursing facility Patient will discharge via [facility]  to [facility] after his rapid covid is complete. CM will contact patient's spouse with transport time.

## 2020-07-02 NOTE — PROGRESS NOTES
Hospitalist Progress Note 2020 Admit Date: 2020 11:00 PM  
NAME: Belinda Dose :  1940 MRN:  [id number] Attending: Tiago Ambrocio MD 
PCP:  Jackie Quezada MD 
 
SUBJECTIVE:  
 Mr. 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 He has been seen by pulm s/p bilateral thoracentesis 5-26, right thoracentesis 6-3, bilateral thoracentesis 6-9, right thoracentesis 6-23, left thoracentesis 6-24 and diuresis. Studies transudate, felt due to hypoalbuminemia. [place] 60-65%. He has required high flow O2. He has compelted 8 days zosyn. Course of steroids added. In regards to his metastatic rectal cancer, he has known anal stricture and declined prior surgical diverting ostomy. He is s/p anal dilation per [facility] surgery in past. CTAP showed constipation/ colonic distention to rectum suspicious for obstruction. oncology recommends followup at discharge, last chemo completed . He has been seen by GI s/p 6-2 flex sig with disimpaction/ no stricture found. Recommends [facility] colorectal followup. He required 1 unit PRBC 6-15// -. Palliative care following.  - reports rectal pain no other acute concerns. Wife at bedside questions answered. Informed her of accpetance to STR Review of Systems negative with exception of pertinent positives noted above PHYSICAL EXAM  
 
Visit Vitals /78 (BP 1 Location: Left arm, BP Patient Position: At rest) Pulse (!) 101 Temp 98.2 °F (36.8 °C) Resp 16 Ht 6' (1.829 m) Wt 77.1 kg (170 lb) SpO2 97% BMI 23.06 kg/m² Temp (24hrs), Av.6 °F (36.4 °C), Min:97 °F (36.1 °C), Max:98.2 °F (36.8 °C) Oxygen Therapy O2 Sat (%): 97 % (20) Pulse via Oximetry: 103 beats per minute (20 1519) O2 Device: Nasal cannula (20 0241) O2 Flow Rate (L/min): 2 l/min (07/01/20 1519) O2 Temperature: 87.8 °F (31 °C) (06/25/20 2119) FIO2 (%): 28 % (07/01/20 0818) ETCO2 (mmHg): 93 mmHg (06/01/20 1624) Intake/Output Summary (Last 24 hours) at 7/1/2020 2035 Last data filed at 7/1/2020 2013 Gross per 24 hour Intake  Output 2650 ml Net -2650 ml General: No acute distress   
Lungs:  Slightly diminished at bases no wheezing or rhonchi Heart:  Regular rate and rhythm,  No murmur, rub, or gallop Abdomen: Soft, Non distended, Non tender, Positive bowel sounds Extremities: No cyanosis, clubbing or edema Neurologic:  No focal deficits ASSESSMENT Active Hospital Problems Diagnosis Date Noted  Debility 06/15/2020  Rectal obstruction 06/01/2020  Aspiration pneumonia ([address] 06/01/2020  Neutropenia ([address] 05/25/2020  Pleural effusion on right 05/25/2020 6/23/20L  Right thoracentesis:  1100 ml bloody effusion removed 6/10/20:  Bilateral thoracentesis on L( 1100 cc ) and R( 1000 cc) removed 6/3/20:  Right chest thoracentesis with 800 removed.  Acute respiratory failure with hypoxia ([address] 05/24/2020  Normocytic anemia 05/24/2020  Pleural effusion on left 05/24/2020  
   6/10/20:  Bilateral thoracentesis on L( 1100 cc ) and R( 1000 cc) removed 6/25/20:  L thoracentesis:  900 ml serosanguinous fluid removed--negative cytology  Malignant neoplasm metastatic to bone ([address] 06/26/2019  Type 2 diabetes with nephropathy ([address] 07/03/2018  Diabetes mellitus due to underlying condition with hyperglycemia ([address] 12/21/2015  COVID-19 ruled out 12/15/2015  Hyponatremia 11/23/2015 A/P 
· Acute hypoxic respiratory failure, bilateral effusions: 
· Weaning O2 as tolerant, currently on 2 L NC 
· Antibiotics stopped by pulmonary · Cont  IV lasix 40 mg every 12 hours · Steroid taper per pulmonary 
  
  
· Anal cancer with stricture and rectal obstruction: · Declines surgery · continued bowel regimen · Outpatient colorectal and oncology followups 
  
  
· HTN:  
· continued norvasc, lisinopril 
  
  
· DM2: 
· Holding amaryl · Continued SSI · Increase premeal insulin · labile · reduce  lantus   
  
· Anemia/thrombocytopenia: 
· followup CBC ·  transfuse HGB < 7 last on 6-24 
  
  
· Urine retention: · Replaced traore · continued flomax · Voiding trial possibly prior to discharge DVT Prophylaxis: scds Dispo - likely to STR in AM 
 
Signed By: Aniya Herr DO   
 July 1, 2020

## 2020-07-06 NOTE — PROGRESS NOTES
Patient discharged to [facility] on 7/2/20. Patient discharged to a  Preferred Provider Network facility. Patient will be included in weekly care coordination calls. Information forwarded to Alexandro Culp RN,  Preferred Provider [facility] RN Care Manager.

## 2020-07-08 PROBLEM — D64.9 ANEMIA: Chronic | Status: ACTIVE | Noted: 2020-01-01

## 2020-07-08 PROBLEM — J96.21 ACUTE ON CHRONIC RESPIRATORY FAILURE WITH HYPOXIA (HCC): Status: ACTIVE | Noted: 2020-01-01

## 2020-07-08 PROBLEM — R53.81 DEBILITY: Chronic | Status: ACTIVE | Noted: 2020-01-01

## 2020-07-08 PROBLEM — N17.9 AKI (ACUTE KIDNEY INJURY) (HCC): Status: ACTIVE | Noted: 2020-01-01

## 2020-07-08 PROBLEM — D69.6 THROMBOCYTOPENIA (HCC): Chronic | Status: ACTIVE | Noted: 2020-01-01

## 2020-07-08 PROBLEM — R33.9 URINE RETENTION: Chronic | Status: ACTIVE | Noted: 2020-01-01

## 2020-07-08 PROBLEM — J18.9 PNEUMONIA: Status: ACTIVE | Noted: 2020-01-01

## 2020-07-08 PROBLEM — C21.0 ANAL CARCINOMA (HCC): Chronic | Status: ACTIVE | Noted: 2018-08-09

## 2020-07-08 PROBLEM — J90 CHRONIC BILATERAL PLEURAL EFFUSIONS: Chronic | Status: ACTIVE | Noted: 2020-01-01

## 2020-07-08 PROBLEM — I95.9 HYPOTENSION: Status: ACTIVE | Noted: 2020-01-01

## 2020-07-08 PROBLEM — E87.5 HYPERKALEMIA: Status: ACTIVE | Noted: 2020-01-01

## 2020-07-08 NOTE — H&P (VIEW-ONLY)
PULMONARY/CCM CONSULT :  7/8/2020 Date of Admission:  7/8/2020 The patient's chart has been reviewed and the chart has been discussed with nursing staff. Subjective: This patient has been seen and evaluated at the request of Dr. Uma Jimenez. Patient is a 79 y.o.  male presents with SOB. He has a long complicated history and was just seen by us on 7/2 before discharge from here to SNF. He was readmitted today for SOB. CXR showed pleural effusions with higher oxygen requirements, 6 lpm. He has a temperature of 100.5 and abx initiated. This is his 4th admission since March. He is on prednisone 30 mg daily and plan was to decrease as CRP decreased. 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 He has undergone multiple thoracenteses - 5/25 - right, 1000 ml, 5/29 - right, 1300ml/left 800 ml, 6/3 - right, 800 ml, 6/9 - right, 1000 ml, left 1100 ml, right thoracentesis 6/23 with 1100 ml bloody effusion and Left- 6/24 with 900 ml serosanguinous fluid removed. Treated with burst dose Albumin and Lasix with good diuresis. The fluid with rare atypical cells.  ECHO with EF 60-65%.     In regards to his metastatic rectal cancer, he has known anal stricture and declined prior surgical diverting ostomy. He is s/p anal dilation per [facility] surgery in past.  Oncology recommended followup at discharge, last chemo completed 5/2020. Sumanth Varghese has been seen by GI s/p 6-2 flex sig with disimpaction/ no stricture found. Recommends [facility] colorectal followup. He was admitted to the floor; antibiotics were initiated and Covid R/O. We were asked to see for recurrent pleural effusions. Past Surgical History:  
Procedure Laterality Date 2021 Keiry Mondragon N/A 6/2/2020 SIGMOIDOSCOPY FLEXIBLE performed by Candi Renteria MD at [facility] ENDOSCOPY  HX COLONOSCOPY  05/2018  HX HEENT    
 sinus  HX HEENT  2015  
 surgery on right throat for squamous cell ca right ear wedge  HX LYMPH NODE DISSECTION    
 HX ORTHOPAEDIC Right 1966  
 right leg  HX OTHER SURGICAL  9/9/15 EXCISION OF RIGHT Neck and PARAPHARYNGEAL MASS/RIGHT EAR WEDGE RESECTION  
 HX OTHER SURGICAL    
 peg placed and removed  HX TONSILLECTOMY  HX VASCULAR ACCESS    
 placed and removed Social History Tobacco Use  Smoking status: Never Smoker  Smokeless tobacco: Never Used Substance Use Topics  Alcohol use: No  
  
Family History Problem Relation Age of Onset  Emphysema Father  Lung Disease Father  Cancer Brother Colon  Heart Disease Sister  Hypertension Sister  Heart Disease Sister  Hypertension Sister  Heart Disease Brother  Hypertension Brother  Heart Disease Brother  Hypertension Brother  Heart Disease Brother  Hypertension Brother  Heart Disease Brother  Hypertension Brother  Heart Disease Brother  Hypertension Brother No Known Allergies Prior to Admission Medications Prescriptions Last Dose Informant Patient Reported? Taking? TRUEPLUS LANCETS 28 gauge misc   Yes No  
albuterol-ipratropium (DUO-NEB) 2.5 mg-0.5 mg/3 ml nebu   No No  
Sig: 3 mL by Nebulization route every six (6) hours as needed for Wheezing. amLODIPine (NORVASC) 10 mg tablet   Yes No  
Sig: Take 10 mg by mouth daily. In am  
bisacodyL (DULCOLAX) 10 mg supp   No No  
Sig: Insert 10 mg into rectum daily. docusate sodium (COLACE) 100 mg capsule   Yes No  
Sig: Take 100 mg by mouth as needed for Constipation. ferrous sulfate 324 mg (65 mg iron) tablet   Yes No  
Sig: Take  by mouth Daily (before breakfast). furosemide (LASIX) 40 mg tablet   No No  
Sig: Take 1 Tab by mouth two (2) times a day. glimepiride (AMARYL) 4 mg tablet   Yes No  
Sig: Take 4 mg by mouth every morning. insulin glargine (LANTUS) 100 unit/mL injection   No No  
Sig: 10 Units by SubCUTAneous route nightly. insulin lispro (HUMALOG) 100 unit/mL injection   No No  
Si Units by SubCUTAneous route Before breakfast, lunch, and dinner. lisinopriL (PRINIVIL, ZESTRIL) 20 mg tablet   Yes No  
Sig: Take 20 mg by mouth daily. magnesium hydroxide (MILK OF MAGNESIA) 400 mg/5 mL suspension   No No  
Sig: Take 30 mL by mouth daily. magnesium oxide (MAG-OX) 400 mg tablet   No No  
Sig: Take 1 Tab by mouth daily. meloxicam (MOBIC) 7.5 mg tablet   No No  
Sig: Take 1 Tab by mouth daily. Indications: arthralgias  
metFORMIN (GLUCOPHAGE) 1,000 mg tablet   Yes No  
Sig: Take 1,000 mg by mouth daily. In am  Indications: type 2 diabetes mellitus  
potassium chloride (K-DUR, KLOR-CON) 20 mEq tablet   No No  
Sig: Take 1 Tab by mouth three (3) times daily. predniSONE (DELTASONE) 10 mg tablet   No No  
Sig: Take 30 mg by mouth daily (with breakfast). senna-docusate (PERICOLACE) 8.6-50 mg per tablet   No No  
Sig: Take 1 Tab by mouth two (2) times a day. simethicone (MYLICON) 80 mg chewable tablet   No No  
Sig: Take 1 Tab by mouth four (4) times daily as needed for GI Pain.  
sodium chloride 1 gram tablet   No No  
Sig: TAKE 2 TABS BY MOUTH TWO (2) TIMES A DAY. tamsulosin (FLOMAX) 0.4 mg capsule   No No  
Sig: Take 1 Cap by mouth daily. traMADoL (ULTRAM) 50 mg tablet   No No  
Sig: Take 1 Tab by mouth every six (6) hours as needed for Pain for up to 30 days. Max Daily Amount: 200 mg. Indications: pain  
traMADoL (ULTRAM) 50 mg tablet   No No  
Sig: Take 1 Tab by mouth every six (6) hours as needed for Pain for up to 3 days. Max Daily Amount: 200 mg. Indications: pain Facility-Administered Medications: None MEDS SCHEDULED:   
Current Facility-Administered Medications Medication Dose Route Frequency  albuterol-ipratropium (DUO-NEB) 2.5 MG-0.5 MG/3 ML  3 mL Nebulization Q6H PRN  
  bisacodyL (DULCOLAX) suppository 10 mg  10 mg Rectal DAILY  magnesium oxide (MAG-OX) tablet 400 mg  400 mg Oral DAILY  predniSONE (DELTASONE) tablet 30 mg  30 mg Oral DAILY WITH BREAKFAST  tamsulosin (FLOMAX) capsule 0.4 mg  0.4 mg Oral DAILY  sodium chloride (NS) flush 5-40 mL  5-40 mL IntraVENous Q8H  
 sodium chloride (NS) flush 5-40 mL  5-40 mL IntraVENous PRN  
 acetaminophen (TYLENOL) tablet 650 mg  650 mg Oral Q6H PRN  
 HYDROcodone-acetaminophen (NORCO) 5-325 mg per tablet 1 Tab  1 Tab Oral Q4H PRN  
 morphine injection 2 mg  2 mg IntraVENous Q4H PRN  
 naloxone (NARCAN) injection 0.4 mg  0.4 mg IntraVENous PRN  
 ondansetron (ZOFRAN) injection 4 mg  4 mg IntraVENous Q4H PRN  
 cefepime (MAXIPIME) 2 g in 0.9% sodium chloride (MBP/ADV) 100 mL  2 g IntraVENous Q8H  
 insulin lispro (HUMALOG) injection   SubCUTAneous AC&HS  furosemide (LASIX) injection 40 mg  40 mg IntraVENous Q12H  
 [START ON 7/9/2020] ferrous sulfate tablet 325 mg  1 Tab Oral DAILY WITH BREAKFAST  VANCOMYCIN INTERMITTENT DOSING PER PHARMACY   Other Rx Dosing/Monitoring Facility-Administered Medications Ordered in Other Encounters Medication Dose Route Frequency  0.9% sodium chloride infusion 250 mL  250 mL IntraVENous PRN Review of Systems A comprehensive review of systems was negative except for that written in the HPI. Objective:  
 
Vitals:  
 07/08/20 0810 07/08/20 0901 07/08/20 1001 07/08/20 1156 BP:  133/57 105/52 142/61 Pulse:  (!) 101 94 (!) 101 Resp:  23  15 Temp:    98.1 °F (36.7 °C) SpO2: 91% 93% 98% 100% Weight:      
Height:      
 
07/08 0701 - 07/08 1900 In: 1500 [I.V.:1500] Out: - No intake/output data recorded. PHYSICAL EXAM  
 
Physical Exam: Deferred due Covid 19 precautions- Exam finding per consulting physician addendum. Activity: as able Nutrition: cardiac CHEST X-RAYS: 
 
 
CULTURES: blood culture ordered LABS Recent Labs  
  07/08/20 0601  
WBC 5.9 HGB 7.2* HCT 24.3*  
* Recent Labs  
  07/08/20 
0601  K 6.0*  
 * CO2 27 BUN 36* CREA 1.51* MG 3.3* Echo SUMMARY: 
  
-  Left ventricle: Systolic function was normal. Ejection fraction was 
estimated in the range of 60 % to 65 %. There were no regional wall motion 
abnormalities. 
  
-  Pericardium: There was no pericardial effusion. CT chest/abd/pelvis IMPRESSION:  
1. No evidence of pulmonary embolism. 2. Decreased small to moderate bilateral pleural effusions. 3. Progressed patchy pulmonary edema or pneumonia in both lungs, worse in the 
right lower lobe. 4. Coronary artery disease. 5. Osseous metastatic disease. Assessment:  
 
Hospital Problems  Date Reviewed: 7/1/2020 Codes Class Noted POA * (Principal) Acute on chronic respiratory failure with hypoxia (HCC) ICD-10-CM: J96.21 
ICD-9-CM: 518.84, 799.02  7/8/2020 Yes Pneumonia ICD-10-CM: J18.9 ICD-9-CM: 179  7/8/2020 Yes Anemia (Chronic) ICD-10-CM: D64.9 ICD-9-CM: 285.9  7/8/2020 Yes Hyperkalemia ICD-10-CM: E87.5 ICD-9-CM: 276.7  7/8/2020 Yes KIRK (acute kidney injury) ([facility] [address] ICD-10-CM: N17.9 ICD-9-CM: 584.9  7/8/2020 Yes Thrombocytopenia (HCC) (Chronic) ICD-10-CM: D69.6 ICD-9-CM: 287.5  7/8/2020 Yes Hypotension ICD-10-CM: I95.9 ICD-9-CM: 458.9  7/8/2020 Yes Chronic bilateral pleural effusions (Chronic) ICD-10-CM: J90 ICD-9-CM: 511.9  7/8/2020 Yes Urine retention (Chronic) ICD-10-CM: R33.9 ICD-9-CM: 788.20  7/8/2020 Yes Diabetes mellitus due to underlying condition with hyperglycemia (HCC) (Chronic) ICD-10-CM: U17.64 ICD-9-CM: 249.80  12/21/2015 Yes Plan:  
 
-? Need for 30 mg of prednisone- taper 
-Evaluate for thoracentesis - bilaterally with multiple T/C, pleural fluid analysis above, albumin 1.8, cytology with rare atypical cells. - not on Anticoagulation, plt 108,000 - may need palliative care to see again  
- he has not been on lasix O/P. IV lasix ordered here and then held. - on cefepime and vancomycin, R/O covid pending [facility] [place] More than 50% of time documented was spent in face-to-face contact with the patient and in the care of the patient on the floor/unit where the patient is located. Additional Comments:   
 
GENERAL APPEARANCE: 
The patient is thin and frail appearing in no respiratory distress. HEENT: 
PERRL. Conjunctivae unremarkable. Nasal mucosa is without epistaxis, exudate, or polyps. Gums and dentition are unremarkable. There is no oropharyngeal narrowing. NECK/LYMPHATIC: 
Symmetrical with no elevation of jugular venous pulsation. Trachea midline. No thyroid enlargement. No cervical adenopathy. LUNGS: 
Normal respiratory effort with symmetrical lung expansion. Breath sounds reveal mild basilar crackles. HEART: 
There is a regular rate and rhythm. No murmur, rub, or gallop. ABDOMEN: 
Soft and non-tender. No hepatosplenomegaly. Bowel sounds are normal.   
NEURO/PSYCH: 
The patient is alert and oriented to person, place, and time. Memory appears intact and mood is normal.  No gross sensorimotor deficits are present. MS/SKIN: 
There is no edema in the lower extremities. No rashes, bruises, lesions, ulcers visible. Pt tells me he aches all over but denies most other complaints and answers most questions with \"I don't know\". Hard to determine his presenting symptoms just from talking to him. Evaluated both side of his chest with US with minimal pleural effusion with some loculations present. No need for thoracentesis at present. F/u COVID test. On vanc and cefepime, continue for now. F/u blood cultures when available. I have spoken with and examined the patient. I agree with the above assessment and plan as documented.  
 
Macey Torres MD

## 2020-07-08 NOTE — H&P
Hospitalist H&P Note Admit Date:  2020  5:42 AM  
Name:  Jojo Solis Age:  79 y.o. 
:  1940 MRN:  [id number] PCP:  Radha Dewitt MD 
Treatment Team: Attending Provider: Lainey Nieves MD; Consulting Provider: Nuvia Alvarez MD; Speech Language Pathologist: Bhumi Spence, ROBERT; Primary Nurse: Niesha Albarran HPI:  
 
CC:  Shortness of breath Mr. 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 During prior admit, he had numerous thoracentesis with recollection of fluids and need for IV diuretics. He required high flow O2. Pulmonary followed. It was felt that his effusions were due to hypoalbuminemia as they are transudative. Cytology negative. ECHO preserved EF. He did complete 8 days of zosyn and steroid taper. He was seen by surgery in regards to his rectal cancer with known stricture and declined prior diverting ostomy. He is s/p anal dilation per Meagan. Last chemo . He was seen by GI s/p  flex sig with disimpaction and no stricture noted. He did require PRBC x 2. He has required traore due to urine retention. COVID negative 20. Upon discharge, he went to SNF. He was noted to have increased respiratory distress and added levaquin for possible pneumonia. His wife Richa Pabon feels his oral intake is poor, though she has not seen him since his discharge. I did speak with her via phone on admit. In the ED, he is .5 . He received vancomycin and 2 L NS bolus. CXR shows increased effusions and he was hypoxic and needed 6 L NC. 
 
 
10 systems limited due to fatigue and mentation Past Medical History:  
Diagnosis Date  GERD (gastroesophageal reflux disease)   
 pt wife denies  Head and neck cancer ([facility] [address] 2015 radiation and chemo and and surgery  History of squamous cell carcinoma   
 to neck area- 38 radiation treatement and 8 chemo treatment  History of throat cancer 2015  
 peg tube for about 4 months  Hypercholesteremia   
 managed well with meds  Hypertension   
 managed well with meds  Hypomagnesemia 5/20/2020  Rectal cancer ([facility] [address] 2018  
 28 radiation treatment and chemo pump  Type 2 diabetes mellitus ([facility] [address] oral agent only/AVG BS: /no s.s of hypoglycemia  Vomiting 2/23/2016  
 pt wife denies Past Surgical History:  
Procedure Laterality Date 2021 Keiry Crawford Hwbernardo N/A 6/2/2020 SIGMOIDOSCOPY FLEXIBLE performed by Lizzeth Rendon MD at [facility] ENDOSCOPY  
 HX COLONOSCOPY  05/2018  HX HEENT    
 sinus  HX HEENT  2015  
 surgery on right throat for squamous cell ca right ear wedge  HX LYMPH NODE DISSECTION    
 HX ORTHOPAEDIC Right 1966  
 right leg  HX OTHER SURGICAL  9/9/15 EXCISION OF RIGHT Neck and PARAPHARYNGEAL MASS/RIGHT EAR WEDGE RESECTION  
 HX OTHER SURGICAL    
 peg placed and removed  HX TONSILLECTOMY  HX VASCULAR ACCESS    
 placed and removed No Known Allergies Social History Tobacco Use  Smoking status: Never Smoker  Smokeless tobacco: Never Used Substance Use Topics  Alcohol use: No  
  
Family History Problem Relation Age of Onset  Emphysema Father  Lung Disease Father  Cancer Brother Colon  Heart Disease Sister  Hypertension Sister  Heart Disease Sister  Hypertension Sister  Heart Disease Brother  Hypertension Brother  Heart Disease Brother  Hypertension Brother  Heart Disease Brother  Hypertension Brother  Heart Disease Brother  Hypertension Brother  Heart Disease Brother  Hypertension Brother Immunization History Administered Date(s) Administered  Influenza Vaccine (Quad) PF 09/11/2015  TB Skin Test (PPD) Intradermal 2020, 2020 PTA Medications: 
Prior to Admission Medications Prescriptions Last Dose Informant Patient Reported? Taking? TRUEPLUS LANCETS 28 gauge misc   Yes No  
albuterol-ipratropium (DUO-NEB) 2.5 mg-0.5 mg/3 ml nebu   No No  
Sig: 3 mL by Nebulization route every six (6) hours as needed for Wheezing. amLODIPine (NORVASC) 10 mg tablet   Yes No  
Sig: Take 10 mg by mouth daily. In am  
bisacodyL (DULCOLAX) 10 mg supp   No No  
Sig: Insert 10 mg into rectum daily. docusate sodium (COLACE) 100 mg capsule   Yes No  
Sig: Take 100 mg by mouth as needed for Constipation. ferrous sulfate 324 mg (65 mg iron) tablet   Yes No  
Sig: Take  by mouth Daily (before breakfast). furosemide (LASIX) 40 mg tablet   No No  
Sig: Take 1 Tab by mouth two (2) times a day. glimepiride (AMARYL) 4 mg tablet   Yes No  
Sig: Take 4 mg by mouth every morning. insulin glargine (LANTUS) 100 unit/mL injection   No No  
Sig: 10 Units by SubCUTAneous route nightly. insulin lispro (HUMALOG) 100 unit/mL injection   No No  
Si Units by SubCUTAneous route Before breakfast, lunch, and dinner. lisinopriL (PRINIVIL, ZESTRIL) 20 mg tablet   Yes No  
Sig: Take 20 mg by mouth daily. magnesium hydroxide (MILK OF MAGNESIA) 400 mg/5 mL suspension   No No  
Sig: Take 30 mL by mouth daily. magnesium oxide (MAG-OX) 400 mg tablet   No No  
Sig: Take 1 Tab by mouth daily. meloxicam (MOBIC) 7.5 mg tablet   No No  
Sig: Take 1 Tab by mouth daily. Indications: arthralgias  
metFORMIN (GLUCOPHAGE) 1,000 mg tablet   Yes No  
Sig: Take 1,000 mg by mouth daily. In am  Indications: type 2 diabetes mellitus  
potassium chloride (K-DUR, KLOR-CON) 20 mEq tablet   No No  
Sig: Take 1 Tab by mouth three (3) times daily. predniSONE (DELTASONE) 10 mg tablet   No No  
Sig: Take 30 mg by mouth daily (with breakfast).   
senna-docusate (PERICOLACE) 8.6-50 mg per tablet   No No  
 Sig: Take 1 Tab by mouth two (2) times a day. simethicone (MYLICON) 80 mg chewable tablet   No No  
Sig: Take 1 Tab by mouth four (4) times daily as needed for GI Pain.  
sodium chloride 1 gram tablet   No No  
Sig: TAKE 2 TABS BY MOUTH TWO (2) TIMES A DAY. tamsulosin (FLOMAX) 0.4 mg capsule   No No  
Sig: Take 1 Cap by mouth daily. traMADoL (ULTRAM) 50 mg tablet   No No  
Sig: Take 1 Tab by mouth every six (6) hours as needed for Pain for up to 30 days. Max Daily Amount: 200 mg. Indications: pain  
traMADoL (ULTRAM) 50 mg tablet   No No  
Sig: Take 1 Tab by mouth every six (6) hours as needed for Pain for up to 3 days. Max Daily Amount: 200 mg. Indications: pain Facility-Administered Medications: None Objective:  
 
Patient Vitals for the past 24 hrs: 
 Temp Pulse Resp BP SpO2  
07/08/20 1156 98.1 °F (36.7 °C) (!) 101 15 142/61 100 % 07/08/20 1001  94  105/52 98 % 07/08/20 0901  (!) 101 23 133/57 93 % 07/08/20 0810     91 % 07/08/20 0709  (!) 103 16 110/58 94 % 07/08/20 0706 (!) 100.5 °F (38.1 °C)      
07/08/20 0701  (!) 103 (!) 84 111/55   
07/08/20 0630  (!) 103 16 96/55   
07/08/20 0617     95 % 07/08/20 0556     95 % 07/08/20 0548     (!) 76 % 07/08/20 0548  (!) 108  102/51 91 % 07/08/20 0545 98.9 °F (37.2 °C) (!) 108 10 102/51 (!) 76 % Oxygen Therapy O2 Sat (%): 100 % (07/08/20 1156) Pulse via Oximetry: 96 beats per minute (07/08/20 1001) O2 Device: (P) Nasal cannula (07/08/20 1156) O2 Flow Rate (L/min): (P) 6 l/min (07/08/20 1156) FIO2 (%): 50 % (07/08/20 0556) Intake/Output Summary (Last 24 hours) at 7/8/2020 1252 Last data filed at 7/8/2020 8160 Gross per 24 hour Intake 1500 ml Output  Net 1500 ml Physical Exam: 
General:    Alert. No distress, elderly Eyes:   Normal sclera. Extraocular movements intact. PERRLA 
ENT:  Normocephalic, atraumatic.  dry mucous membranes CV:   RRR. No m/r/g. No edema Lungs:  CTAB. No wheezing, rhonchi, or rales. Abdomen: Soft, nontender, nondistended. Present BS Extremities: Warm and dry. . 
Neurologic:  grossly intact. Skin:     No rashes or jaundice. Normal coloration Psych:  Normal mood and affect. fatigued I reviewed the labs, imaging, EKGs, telemetry, and other studies done this admission. EKG tracing personally reviewed as sinus tachycardia Data Review:  
Recent Results (from the past 24 hour(s)) EKG, 12 LEAD, INITIAL Collection Time: 07/08/20  5:55 AM  
Result Value Ref Range Ventricular Rate 104 BPM  
 Atrial Rate 104 BPM  
 P-R Interval 118 ms QRS Duration 72 ms Q-T Interval 316 ms  
 QTC Calculation (Bezet) 415 ms Calculated P Axis 65 degrees Calculated R Axis 55 degrees Calculated T Axis 71 degrees Diagnosis    
  !! AGE AND GENDER SPECIFIC ECG ANALYSIS !! Sinus tachycardia Otherwise normal ECG When compared with ECG of 01-JUN-2020 13:26, 
QT has shortened Confirmed by [facility]  MD ()YVON ([id number]) on 7/8/2020 7:42:24 AM 
  
CBC WITH AUTOMATED DIFF Collection Time: 07/08/20  6:01 AM  
Result Value Ref Range WBC 5.9 4.3 - 11.1 K/uL  
 RBC 2.42 (L) 4.23 - 5.6 M/uL HGB 7.2 (L) 13.6 - 17.2 g/dL HCT 24.3 (L) 41.1 - 50.3 % .4 (H) 79.6 - 97.8 FL  
 MCH 29.8 26.1 - 32.9 PG  
 MCHC 29.6 (L) 31.4 - 35.0 g/dL  
 RDW 18.7 (H) 11.9 - 14.6 % PLATELET 061 (L) 501 - 450 K/uL MPV 10.0 9.4 - 12.3 FL ABSOLUTE NRBC 0.04 0.0 - 0.2 K/uL  
 DF AUTOMATED NEUTROPHILS 73 43 - 78 % LYMPHOCYTES 18 13 - 44 % MONOCYTES 8 4.0 - 12.0 % EOSINOPHILS 0 (L) 0.5 - 7.8 % BASOPHILS 0 0.0 - 2.0 % IMMATURE GRANULOCYTES 1 0.0 - 5.0 %  
 ABS. NEUTROPHILS 4.3 1.7 - 8.2 K/UL  
 ABS. LYMPHOCYTES 1.1 0.5 - 4.6 K/UL  
 ABS. MONOCYTES 0.5 0.1 - 1.3 K/UL  
 ABS. EOSINOPHILS 0.0 0.0 - 0.8 K/UL  
 ABS. BASOPHILS 0.0 0.0 - 0.2 K/UL  
 ABS. IMM. GRANS. 0.1 0.0 - 0.5 K/UL METABOLIC PANEL, COMPREHENSIVE  
 Collection Time: 07/08/20  6:01 AM  
Result Value Ref Range Sodium 143 136 - 145 mmol/L Potassium 6.0 (H) 3.5 - 5.1 mmol/L Chloride 107 98 - 107 mmol/L  
 CO2 27 21 - 32 mmol/L Anion gap 9 7 - 16 mmol/L Glucose 185 (H) 65 - 100 mg/dL BUN 36 (H) 8 - 23 MG/DL Creatinine 1.51 (H) 0.8 - 1.5 MG/DL  
 GFR est AA 58 (L) >60 ml/min/1.73m2 GFR est non-AA 48 (L) >60 ml/min/1.73m2 Calcium 8.0 (L) 8.3 - 10.4 MG/DL Bilirubin, total 0.5 0.2 - 1.1 MG/DL  
 ALT (SGPT) 23 12 - 65 U/L  
 AST (SGOT) 76 (H) 15 - 37 U/L Alk. phosphatase 472 (H) 50 - 136 U/L Protein, total 5.8 (L) 6.3 - 8.2 g/dL Albumin 1.8 (L) 3.2 - 4.6 g/dL Globulin 4.0 (H) 2.3 - 3.5 g/dL A-G Ratio 0.5 (L) 1.2 - 3.5 LACTIC ACID Collection Time: 07/08/20  6:01 AM  
Result Value Ref Range Lactic acid 1.6 0.4 - 2.0 MMOL/L  
PROCALCITONIN Collection Time: 07/08/20  6:01 AM  
Result Value Ref Range Procalcitonin 0.60 ng/mL MAGNESIUM Collection Time: 07/08/20  6:01 AM  
Result Value Ref Range Magnesium 3.3 (H) 1.8 - 2.4 mg/dL SARS-COV-2 Collection Time: 07/08/20  6:22 AM  
Result Value Ref Range Specimen source Nasopharyngeal    
 COVID-19 PENDING   
URINALYSIS W/ RFLX MICROSCOPIC Collection Time: 07/08/20  6:46 AM  
Result Value Ref Range Color YELLOW Appearance CLEAR Specific gravity 1.010 1.001 - 1.023    
 pH (UA) 7.0 5.0 - 9.0 Protein Negative NEG mg/dL Glucose Negative NEG mg/dL Ketone TRACE (A) NEG mg/dL Bilirubin SMALL (A) NEG Blood TRACE (A) NEG Urobilinogen 0.2 0.2 - 1.0 EU/dL Nitrites Negative NEG Leukocyte Esterase Negative NEG    
 WBC 0-3 0 /hpf  
 RBC 0-3 0 /hpf Bacteria 0 0 /hpf Casts HYALINE 0 /lpf POC G3 Collection Time: 07/08/20  8:07 AM  
Result Value Ref Range Device: NASAL CANNULA  pH (POC) 7.40 7.35 - 7.45    
 pCO2 (POC) 41.7 35 - 45 MMHG  
 pO2 (POC) 65 (L) 75 - 100 MMHG  
 HCO3 (POC) 25.9 22 - 26 MMOL/L  
 sO2 (POC) 92 (L) 95 - 98 % Base excess (POC) 1 mmol/L Allens test (POC) YES Site RIGHT RADIAL Specimen type (POC) ARTERIAL Performed by Hailey   
 CO2, POC 27 MMOL/L Flow rate (POC) 3.000 L/min Critical value read back 00:01 Respiratory comment: PhysicianNotified [id number] GLUCOSE, POC Collection Time: 07/08/20 11:51 AM  
Result Value Ref Range Glucose (POC) 225 (H) 65 - 100 mg/dL All Micro Results Procedure Component Value Units Date/Time CULTURE, BLOOD [[id number]] Collected:  07/08/20 3945 Order Status:  Completed Specimen:  Blood Updated:  07/08/20 0720 CULTURE, BLOOD [[id number]] Collected:  07/08/20 9081 Order Status:  Completed Specimen:  Blood Updated:  07/08/20 0720 Other Studies: Xr Chest [facility] Result Date: 7/8/2020 Single View portable upright chest x-ray dated   July 8, 2020 Reference Exam: June 29, 2020 Indication: Sepsis, some shortness of breath Findings: The cardiac silhouette is normal in size and contour. Hazy opacities are again seen in the lungs but improved from the reference study with bilateral effusions again noted. Patient is rotated some to the right, pulmonary vascularity appears normal.  
 
IMPRESSION: Some improvement in the opacities in the lungs but effusions appear to have enlarged. Assessment and Plan:  
 
Hospital Problems as of 7/8/2020 Date Reviewed: 7/1/2020 Codes Class Noted - Resolved POA * (Principal) Acute on chronic respiratory failure with hypoxia [facility]) ICD-10-CM: J96.21 
ICD-9-CM: 518.84, 799.02  7/8/2020 - Present Yes Pneumonia ICD-10-CM: J18.9 ICD-9-CM: 889  7/8/2020 - Present Yes Anemia (Chronic) ICD-10-CM: D64.9 ICD-9-CM: 285.9  7/8/2020 - Present Yes Hyperkalemia ICD-10-CM: E87.5 ICD-9-CM: 276.7  7/8/2020 - Present Yes [facility] ICD-10-CM: N17.9 ICD-9-CM: 584.9  7/8/2020 - Present Yes Thrombocytopenia (HCC) (Chronic) ICD-10-CM: D69.6 ICD-9-CM: 287.5  7/8/2020 - Present Yes Hypotension ICD-10-CM: I95.9 ICD-9-CM: 458.9  7/8/2020 - Present Yes Chronic bilateral pleural effusions (Chronic) ICD-10-CM: J90 ICD-9-CM: 511.9  7/8/2020 - Present Yes Urine retention (Chronic) ICD-10-CM: R33.9 ICD-9-CM: 788.20  7/8/2020 - Present Yes Diabetes mellitus due to underlying condition with hyperglycemia (HCC) (Chronic) ICD-10-CM: W89.25 ICD-9-CM: 249.80  12/21/2015 - Present Yes · Acute on chronic hypoxic respiratory failure, pneumonia, possible COVID: 
· Admit to contact and droplet precautions · Remote tele · Continued vancomycin and add maxipime · followup BC x 2  
· Continued oral steroids · Pulmonary consult · COVID testing pending · He has received both IVF bolus, held lasix as looks dry, will look for pulmonary insight - discussed with Dr. Saul Berry · Wean O2 as tolerant · DM2: 
· SSI · Holding oral meds · Rectal cancer: · Chemo on hold · Needs bowel regimen · Hyperkalemia: 
· Pending STAT repeat lab · Following on tele · Holding lisinopril · HTN: 
· Hold antihypertensives as lower range BP 
 
 
· KIRK: 
· S/p 2 L NS in the ED, looks intravascularly dry · followup BMP · Anemia: 
· STAT HGB repeat · Urine retention: · [place] · flomax Discharge planning:  Pending DVT ppx: SCD Code status:  Full Estimated LOS:  Greater than 2 midnights Risk:  high Care plan: Enrico Barrow, cell [phone] Signed: Moses Radford MD

## 2020-07-08 NOTE — CONSULTS
PULMONARY/CCM CONSULT :  7/8/2020 Date of Admission:  7/8/2020 The patient's chart has been reviewed and the chart has been discussed with nursing staff. Subjective: This patient has been seen and evaluated at the request of Dr. Annamarie Colbert. Patient is a 79 y.o.  male presents with SOB. He has a long complicated history and was just seen by us on 7/2 before discharge from here to SNF. He was readmitted today for SOB. CXR showed pleural effusions with higher oxygen requirements, 6 lpm. He has a temperature of 100.5 and abx initiated. This is his 4th admission since March. He is on prednisone 30 mg daily and plan was to decrease as CRP decreased. 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 He has undergone multiple thoracenteses - 5/25 - right, 1000 ml, 5/29 - right, 1300ml/left 800 ml, 6/3 - right, 800 ml, 6/9 - right, 1000 ml, left 1100 ml, right thoracentesis 6/23 with 1100 ml bloody effusion and Left- 6/24 with 900 ml serosanguinous fluid removed. Treated with burst dose Albumin and Lasix with good diuresis. The fluid with rare atypical cells.  ECHO with EF 60-65%.     In regards to his metastatic rectal cancer, he has known anal stricture and declined prior surgical diverting ostomy. He is s/p anal dilation per [facility] surgery in past.  Oncology recommended followup at discharge, last chemo completed 5/2020. [facility] has been seen by GI s/p 6-2 flex sig with disimpaction/ no stricture found. Recommends [facility] colorectal followup. He was admitted to the floor; antibiotics were initiated and Covid R/O. We were asked to see for recurrent pleural effusions. Past Surgical History:  
Procedure Laterality Date 2021 Keiry Mondragon N/A 6/2/2020 SIGMOIDOSCOPY FLEXIBLE performed by Brandon Isabel MD at [facility] ENDOSCOPY  HX COLONOSCOPY  05/2018  HX HEENT    
 sinus  HX HEENT  2015  
 surgery on right throat for squamous cell ca right ear wedge  HX LYMPH NODE DISSECTION    
 HX ORTHOPAEDIC Right 1966  
 right leg  HX OTHER SURGICAL  9/9/15 EXCISION OF RIGHT Neck and PARAPHARYNGEAL MASS/RIGHT EAR WEDGE RESECTION  
 HX OTHER SURGICAL    
 peg placed and removed  HX TONSILLECTOMY  HX VASCULAR ACCESS    
 placed and removed Social History Tobacco Use  Smoking status: Never Smoker  Smokeless tobacco: Never Used Substance Use Topics  Alcohol use: No  
  
Family History Problem Relation Age of Onset  Emphysema Father  Lung Disease Father  Cancer Brother Colon  Heart Disease Sister  Hypertension Sister  Heart Disease Sister  Hypertension Sister  Heart Disease Brother  Hypertension Brother  Heart Disease Brother  Hypertension Brother  Heart Disease Brother  Hypertension Brother  Heart Disease Brother  Hypertension Brother  Heart Disease Brother  Hypertension Brother No Known Allergies Prior to Admission Medications Prescriptions Last Dose Informant Patient Reported? Taking? TRUEPLUS LANCETS 28 gauge misc   Yes No  
albuterol-ipratropium (DUO-NEB) 2.5 mg-0.5 mg/3 ml nebu   No No  
Sig: 3 mL by Nebulization route every six (6) hours as needed for Wheezing. amLODIPine (NORVASC) 10 mg tablet   Yes No  
Sig: Take 10 mg by mouth daily. In am  
bisacodyL (DULCOLAX) 10 mg supp   No No  
Sig: Insert 10 mg into rectum daily. docusate sodium (COLACE) 100 mg capsule   Yes No  
Sig: Take 100 mg by mouth as needed for Constipation. ferrous sulfate 324 mg (65 mg iron) tablet   Yes No  
Sig: Take  by mouth Daily (before breakfast). furosemide (LASIX) 40 mg tablet   No No  
Sig: Take 1 Tab by mouth two (2) times a day. glimepiride (AMARYL) 4 mg tablet   Yes No  
Sig: Take 4 mg by mouth every morning. insulin glargine (LANTUS) 100 unit/mL injection   No No  
Sig: 10 Units by SubCUTAneous route nightly. insulin lispro (HUMALOG) 100 unit/mL injection   No No  
Si Units by SubCUTAneous route Before breakfast, lunch, and dinner. lisinopriL (PRINIVIL, ZESTRIL) 20 mg tablet   Yes No  
Sig: Take 20 mg by mouth daily. magnesium hydroxide (MILK OF MAGNESIA) 400 mg/5 mL suspension   No No  
Sig: Take 30 mL by mouth daily. magnesium oxide (MAG-OX) 400 mg tablet   No No  
Sig: Take 1 Tab by mouth daily. meloxicam (MOBIC) 7.5 mg tablet   No No  
Sig: Take 1 Tab by mouth daily. Indications: arthralgias  
metFORMIN (GLUCOPHAGE) 1,000 mg tablet   Yes No  
Sig: Take 1,000 mg by mouth daily. In am  Indications: type 2 diabetes mellitus  
potassium chloride (K-DUR, KLOR-CON) 20 mEq tablet   No No  
Sig: Take 1 Tab by mouth three (3) times daily. predniSONE (DELTASONE) 10 mg tablet   No No  
Sig: Take 30 mg by mouth daily (with breakfast). senna-docusate (PERICOLACE) 8.6-50 mg per tablet   No No  
Sig: Take 1 Tab by mouth two (2) times a day. simethicone (MYLICON) 80 mg chewable tablet   No No  
Sig: Take 1 Tab by mouth four (4) times daily as needed for GI Pain.  
sodium chloride 1 gram tablet   No No  
Sig: TAKE 2 TABS BY MOUTH TWO (2) TIMES A DAY. tamsulosin (FLOMAX) 0.4 mg capsule   No No  
Sig: Take 1 Cap by mouth daily. traMADoL (ULTRAM) 50 mg tablet   No No  
Sig: Take 1 Tab by mouth every six (6) hours as needed for Pain for up to 30 days. Max Daily Amount: 200 mg. Indications: pain  
traMADoL (ULTRAM) 50 mg tablet   No No  
Sig: Take 1 Tab by mouth every six (6) hours as needed for Pain for up to 3 days. Max Daily Amount: 200 mg. Indications: pain Facility-Administered Medications: None MEDS SCHEDULED:   
Current Facility-Administered Medications Medication Dose Route Frequency  albuterol-ipratropium (DUO-NEB) 2.5 MG-0.5 MG/3 ML  3 mL Nebulization Q6H PRN  
  bisacodyL (DULCOLAX) suppository 10 mg  10 mg Rectal DAILY  magnesium oxide (MAG-OX) tablet 400 mg  400 mg Oral DAILY  predniSONE (DELTASONE) tablet 30 mg  30 mg Oral DAILY WITH BREAKFAST  tamsulosin (FLOMAX) capsule 0.4 mg  0.4 mg Oral DAILY  sodium chloride (NS) flush 5-40 mL  5-40 mL IntraVENous Q8H  
 sodium chloride (NS) flush 5-40 mL  5-40 mL IntraVENous PRN  
 acetaminophen (TYLENOL) tablet 650 mg  650 mg Oral Q6H PRN  
 HYDROcodone-acetaminophen (NORCO) 5-325 mg per tablet 1 Tab  1 Tab Oral Q4H PRN  
 morphine injection 2 mg  2 mg IntraVENous Q4H PRN  
 naloxone (NARCAN) injection 0.4 mg  0.4 mg IntraVENous PRN  
 ondansetron (ZOFRAN) injection 4 mg  4 mg IntraVENous Q4H PRN  
 cefepime (MAXIPIME) 2 g in 0.9% sodium chloride (MBP/ADV) 100 mL  2 g IntraVENous Q8H  
 insulin lispro (HUMALOG) injection   SubCUTAneous AC&HS  furosemide (LASIX) injection 40 mg  40 mg IntraVENous Q12H  
 [START ON 7/9/2020] ferrous sulfate tablet 325 mg  1 Tab Oral DAILY WITH BREAKFAST  VANCOMYCIN INTERMITTENT DOSING PER PHARMACY   Other Rx Dosing/Monitoring Facility-Administered Medications Ordered in Other Encounters Medication Dose Route Frequency  0.9% sodium chloride infusion 250 mL  250 mL IntraVENous PRN Review of Systems A comprehensive review of systems was negative except for that written in the HPI. Objective:  
 
Vitals:  
 07/08/20 0810 07/08/20 0901 07/08/20 1001 07/08/20 1156 BP:  133/57 105/52 142/61 Pulse:  (!) 101 94 (!) 101 Resp:  23  15 Temp:    98.1 °F (36.7 °C) SpO2: 91% 93% 98% 100% Weight:      
Height:      
 
07/08 0701 - 07/08 1900 In: 1500 [I.V.:1500] Out: - No intake/output data recorded. PHYSICAL EXAM  
 
Physical Exam: Deferred due Covid 19 precautions- Exam finding per consulting physician addendum. Activity: as able Nutrition: cardiac CHEST X-RAYS: 
 
 
CULTURES: blood culture ordered LABS Recent Labs  
  07/08/20 0601  
WBC 5.9 HGB 7.2* HCT 24.3*  
* Recent Labs  
  07/08/20 
0601  K 6.0*  
 * CO2 27 BUN 36* CREA 1.51* MG 3.3* Echo SUMMARY: 
  
-  Left ventricle: Systolic function was normal. Ejection fraction was 
estimated in the range of 60 % to 65 %. There were no regional wall motion 
abnormalities. 
  
-  Pericardium: There was no pericardial effusion. CT chest/abd/pelvis IMPRESSION:  
1. No evidence of pulmonary embolism. 2. Decreased small to moderate bilateral pleural effusions. 3. Progressed patchy pulmonary edema or pneumonia in both lungs, worse in the 
right lower lobe. 4. Coronary artery disease. 5. Osseous metastatic disease. Assessment:  
 
Hospital Problems  Date Reviewed: 7/1/2020 Codes Class Noted POA * (Principal) Acute on chronic respiratory failure with hypoxia (HCC) ICD-10-CM: J96.21 
ICD-9-CM: 518.84, 799.02  7/8/2020 Yes Pneumonia ICD-10-CM: J18.9 ICD-9-CM: 765  7/8/2020 Yes Anemia (Chronic) ICD-10-CM: D64.9 ICD-9-CM: 285.9  7/8/2020 Yes Hyperkalemia ICD-10-CM: E87.5 ICD-9-CM: 276.7  7/8/2020 Yes KIRK (acute kidney injury) ([facility] [address] ICD-10-CM: N17.9 ICD-9-CM: 584.9  7/8/2020 Yes Thrombocytopenia (HCC) (Chronic) ICD-10-CM: D69.6 ICD-9-CM: 287.5  7/8/2020 Yes Hypotension ICD-10-CM: I95.9 ICD-9-CM: 458.9  7/8/2020 Yes Chronic bilateral pleural effusions (Chronic) ICD-10-CM: J90 ICD-9-CM: 511.9  7/8/2020 Yes Urine retention (Chronic) ICD-10-CM: R33.9 ICD-9-CM: 788.20  7/8/2020 Yes Diabetes mellitus due to underlying condition with hyperglycemia (HCC) (Chronic) ICD-10-CM: G92.45 ICD-9-CM: 249.80  12/21/2015 Yes Plan:  
 
-? Need for 30 mg of prednisone- taper 
-Evaluate for thoracentesis - bilaterally with multiple T/C, pleural fluid analysis above, albumin 1.8, cytology with rare atypical cells. - not on Anticoagulation, plt 108,000 - may need palliative care to see again  
- he has not been on lasix O/P. IV lasix ordered here and then held. - on cefepime and vancomycin, R/O covid pending L[facility] More than 50% of time documented was spent in face-to-face contact with the patient and in the care of the patient on the floor/unit where the patient is located. Additional Comments:   
 
GENERAL APPEARANCE: 
The patient is thin and frail appearing in no respiratory distress. HEENT: 
PERRL. Conjunctivae unremarkable. Nasal mucosa is without epistaxis, exudate, or polyps. Gums and dentition are unremarkable. There is no oropharyngeal narrowing. NECK/LYMPHATIC: 
Symmetrical with no elevation of jugular venous pulsation. Trachea midline. No thyroid enlargement. No cervical adenopathy. LUNGS: 
Normal respiratory effort with symmetrical lung expansion. Breath sounds reveal mild basilar crackles. HEART: 
There is a regular rate and rhythm. No murmur, rub, or gallop. ABDOMEN: 
Soft and non-tender. No hepatosplenomegaly. Bowel sounds are normal.   
NEURO/PSYCH: 
The patient is alert and oriented to person, place, and time. Memory appears intact and mood is normal.  No gross sensorimotor deficits are present. MS/SKIN: 
There is no edema in the lower extremities. No rashes, bruises, lesions, ulcers visible. Pt tells me he aches all over but denies most other complaints and answers most questions with \"I don't know\". Hard to determine his presenting symptoms just from talking to him. Evaluated both side of his chest with US with minimal pleural effusion with some loculations present. No need for thoracentesis at present. F/u COVID test. On vanc and cefepime, continue for now. F/u blood cultures when available. I have spoken with and examined the patient. I agree with the above assessment and plan as documented.  
 
Mya Glasgow MD

## 2020-07-08 NOTE — PROGRESS NOTES
Care Management Interventions PCP Verified by CM: Yes Transition of Care Consult (CM Consult): SNF Partner SNF: Yes Current Support Network: Lives with Spouse Confirm Follow Up Transport: Other (see comment) Freedom of Choice List was Provided with Basic Dialogue that Supports the Patient's Individualized Plan of Care/Goals, Treatment Preferences and Shares the Quality Data Associated with the Providers?: Yes [employer] Information Provided?: No 
Discharge Location Discharge Placement: Skilled nursing facility This patient is known to this CM from his previous stay. Patient was discharged from [facility] to [facility] on 7/2. Patient lives with his wife. He was using walker to ambulate. Wife assists with ADLs. Patient will likely return to [facility] at discharge to complete rehab.

## 2020-07-08 NOTE — PROGRESS NOTES
Pharmacokinetic Consult to Pharmacist 
 
Chavo Akanksha is a 79 y.o. male being treated for sepsis with vancomycin. Height: 6' (182.9 cm)  Weight: 77.1 kg (170 lb) Lab Results Component Value Date/Time BUN 36 (H) 07/08/2020 06:01 AM  
 Creatinine 1.51 (H) 07/08/2020 06:01 AM  
 WBC 5.9 07/08/2020 06:01 AM  
 Procalcitonin 0.60 07/08/2020 06:01 AM  
 Lactic acid 1.6 07/08/2020 06:01 AM  
 Lactic Acid (POC) 2.9 (H) 09/02/2018 07:59 PM  
  
Estimated Creatinine Clearance: 43.3 mL/min (A) (based on SCr of 1.51 mg/dL (H)). CULTURES: 
pending Lab Results Component Value Date/Time Vancomycin,trough 15.2 06/04/2020 09:00 PM  
 
 
Day 1 of vancomycin. Goal trough is 15-20. Patient loaded with 2000 mg in the ED 7/8 am, will dose intermittently for now due to unstable renal function. Will continue to follow patient. Thank you, Amie Venegas, PharmD, [facility] Clinical [facility]acy Specialist 
[phone]

## 2020-07-08 NOTE — PROGRESS NOTES
07/08/20 1156 Dual Skin Pressure Injury Assessment Dual Skin Pressure Injury Assessment X Second Care Provider (Based on [address] [place] [address]) Aysha Mckeon RN Sacrum  Mid  
Skin Integumentary Skin Integumentary (WDL) X Pressure  Injury Documentation Pressure Injury Noted-See Wound LDA to Document 
(Old healing stage I) Skin Color Pale Skin Condition/Temp Warm;Dry Skin Integrity Wound (add Wound LDA) (Old healing sacral wound) Turgor Epidermis thin w/ loss of subcut tissue Hair Growth Sparce Nails X Varicosities Absent Exceptions to WDL Thick Wound Prevention and Protection Methods Orientation of Wound Prevention Posterior Location of Wound Prevention Sacrum/Coccyx Dressing Present  Yes Dressing Status Intact Wound Offloading (Prevention Methods) Bed, pressure reduction mattress; Foam silicone

## 2020-07-08 NOTE — ED PROVIDER NOTES
81-year-old male brought from nursing home with shortness of breath. Patient was diagnosed with pneumonia yesterday started on Levaquin. Through the night he was moaning and groaning having some respiratory distress. Patient also has a bedsore that bothers him as well. Respiratory Distress This is a recurrent problem. The problem occurs continuously. Associated symptoms include a fever, cough and sputum production. He has tried nothing for the symptoms. Past Medical History:  
Diagnosis Date  GERD (gastroesophageal reflux disease)   
 pt wife denies  Head and neck cancer ([facility] [address] 9/30/2015  
 radiation and chemo and and surgery  History of squamous cell carcinoma   
 to neck area- 38 radiation treatement and 8 chemo treatment  History of throat cancer 2015  
 peg tube for about 4 months  Hypercholesteremia   
 managed well with meds  Hypertension   
 managed well with meds  Hypomagnesemia 5/20/2020  Rectal cancer ([facility] [address] 2018 28 radiation treatment and chemo pump  Type 2 diabetes mellitus ([facility] [address] oral agent only/AVG BS: /no s.s of hypoglycemia  Vomiting 2/23/2016  
 pt wife denies Past Surgical History:  
Procedure Laterality Date 2021 Keiry Crawford Alanna N/A 6/2/2020 SIGMOIDOSCOPY FLEXIBLE performed by Eliane Ballesteros MD at [facility] ENDOSCOPY  
 HX COLONOSCOPY  05/2018  HX HEENT    
 sinus  HX HEENT  2015  
 surgery on right throat for squamous cell ca right ear wedge  HX LYMPH NODE DISSECTION    
 HX ORTHOPAEDIC Right 1966  
 right leg  HX OTHER SURGICAL  9/9/15 EXCISION OF RIGHT Neck and PARAPHARYNGEAL MASS/RIGHT EAR WEDGE RESECTION  
 HX OTHER SURGICAL    
 peg placed and removed  HX TONSILLECTOMY  HX VASCULAR ACCESS    
 placed and removed Family History:  
Problem Relation Age of Onset  Emphysema Father  Lung Disease Father  Cancer Brother Colon  Heart Disease Sister  Hypertension Sister  Heart Disease Sister  Hypertension Sister  Heart Disease Brother  Hypertension Brother  Heart Disease Brother  Hypertension Brother  Heart Disease Brother  Hypertension Brother  Heart Disease Brother  Hypertension Brother  Heart Disease Brother  Hypertension Brother Social History Socioeconomic History  Marital status:  Spouse name: Not on file  Number of children: Not on file  Years of education: Not on file  Highest education level: Not on file Occupational History  Not on file Social Needs  Financial resource strain: Not on file  Food insecurity Worry: Not on file Inability: Not on file  Transportation needs Medical: Not on file Non-medical: Not on file Tobacco Use  Smoking status: Never Smoker  Smokeless tobacco: Never Used Substance and Sexual Activity  Alcohol use: No  
 Drug use: No  
 Sexual activity: Not on file Lifestyle  Physical activity Days per week: Not on file Minutes per session: Not on file  Stress: Not on file Relationships  Social connections Talks on phone: Not on file Gets together: Not on file Attends Christianity service: Not on file Active member of club or organization: Not on file Attends meetings of clubs or organizations: Not on file Relationship status: Not on file  Intimate partner violence Fear of current or ex partner: Not on file Emotionally abused: Not on file Physically abused: Not on file Forced sexual activity: Not on file Other Topics Concern  Not on file Social History Narrative  Not on file ALLERGIES: Patient has no known allergies. Review of Systems Constitutional: Positive for fever. Negative for activity change. HENT: Negative. Eyes: Negative. Respiratory: Positive for cough and sputum production. Cardiovascular: Negative. Gastrointestinal: Negative. Genitourinary: Negative. Musculoskeletal: Negative. Skin: Negative. Neurological: Negative. Psychiatric/Behavioral: Negative. All other systems reviewed and are negative. Vitals:  
 07/08/20 0545 Pulse: (!) 108 Resp: 10 Temp: 98.9 °F (37.2 °C) SpO2: (!) 76% Weight: 77.1 kg (170 lb) Height: 6' (1.829 m) Physical Exam 
Vitals signs and nursing note reviewed. Constitutional:   
   General: He is not in acute distress. Appearance: He is well-developed. He is cachectic. He is ill-appearing and toxic-appearing. He is not diaphoretic. HENT:  
   Head: Normocephalic and atraumatic. Right Ear: External ear normal.  
   Left Ear: External ear normal.  
   Nose: Nose normal.  
   Mouth/Throat:  
   Pharynx: No oropharyngeal exudate. Eyes:  
   General: No scleral icterus. Right eye: No discharge. Left eye: No discharge. Conjunctiva/sclera: Conjunctivae normal.  
   Pupils: Pupils are equal, round, and reactive to light. Neck: Musculoskeletal: Normal range of motion and neck supple. Vascular: No JVD. Trachea: No tracheal deviation. Cardiovascular:  
   Rate and Rhythm: Normal rate and regular rhythm. Pulmonary:  
   Effort: Pulmonary effort is normal. No respiratory distress. Breath sounds: Normal breath sounds. No stridor. No wheezing. Chest:  
   Chest wall: No tenderness. Abdominal:  
   General: Bowel sounds are normal. There is no distension. Palpations: Abdomen is soft. There is no mass. Tenderness: There is no abdominal tenderness. Musculoskeletal: Normal range of motion. General: No tenderness. Skin: 
   General: Skin is warm and dry. Coloration: Skin is not pale. Findings: No erythema or rash. Neurological:  
   Mental Status: He is alert and oriented to person, place, and time. Cranial Nerves: No cranial nerve deficit.   
Psychiatric:     
 Behavior: Behavior normal.     
   Thought Content: Thought content normal.  
 
  
 
MDM Number of Diagnoses or Management Options Fever, unspecified fever cause:  
Respiratory distress:  
Diagnosis management comments: Patient requiring increased supplemental oxygen to keep his SaO2 above 90%. Patient respiratory drive improving patient febrile at 100.5 with pneumonia. Amount and/or Complexity of Data Reviewed Clinical lab tests: ordered and reviewed Tests in the radiology section of CPT®: ordered and reviewed Tests in the medicine section of CPT®: ordered and reviewed Procedures

## 2020-07-08 NOTE — PROCEDURES
PROCEDURE: 
DIAGNOSTIC ULTRASOUND OF CHEST 
 
DIAGNOSIS: 
BILATERALPLEURAL EFFUSION 
 
CHEST ULTRASOUND FINDINGS: 
 
A Turbo-M, Sonosite ultrasound with a 5-16 mHz probe was used to image the chest and localize the pleural effusion on the bilateral  chest. 
 
A small, complex pleural space was identified on ultrasound on the bilateral .  There were multiple loculations and septations present, with the pleural fluid having a mixed echogenic character. The fluid was too small to feel that thoracentesis was warranted at this time.   
 
 
 
Melinda Patino MD

## 2020-07-08 NOTE — PROGRESS NOTES
Bilateral Ultra Sound done of the chest, pictures taken and placed into chart. Not enough fluid to perform Thoracentesis.

## 2020-07-08 NOTE — PROGRESS NOTES
Problem: Patient Education: Go to Patient Education Activity Goal: Patient/Family Education Description: LTG: Patient will tolerate least restrictive diet without overt signs or symptoms of airway compromise. STG: Patient will tolerate clear liquid diet- nectar thick liquids without overt signs or symptoms of airway compromise. STG: Patient will participate in modified barium swallow study as clinically indicated. 7/8/2020 1457 by ROBERT Alvarado Outcome: Progressing Towards Goal 
 
SPEECH LANGUAGE PATHOLOGY: DYSPHAGIA- Initial Assessment NAME/AGE/GENDER: Pedro Saucedo is a 79 y.o. male DATE: 7/8/2020 PRIMARY DIAGNOSIS: Acute on chronic respiratory failure with hypoxia ([facility] [address] [J96.21] Procedure(s) (LRB): ULTRASOUND (Bilateral) Day of Surgery ICD-10: Treatment Diagnosis: R13.12 Dysphagia, Oropharyngeal Phase RECOMMENDATIONS  
DIET:  
Clear liquid diet- Nectar thick liquids NO STRAWS Liquids do not have to be clear, but provide patient with liquids only MEDICATIONS: As tolerated ASPIRATION PRECAUTIONS Slow rate of intake Small bites/sips Upright at 90 degrees during meal 
  
COMPENSATORY STRATEGIES/MODIFICATIONS None EDUCATION: 
Recommendations discussed with Hospitalist 
Patient CONTINUATION OF SKILLED SERVICES/MEDICAL NECESSITY: 
Patient is expected to demonstrate progress in  swallow function, diet tolerance, and swallow safety in order to  improve swallow safety, work toward diet advancement, and decrease aspiration risk. Patient continues to require skilled intervention due to dysphagia. RECOMMENDATIONS for CONTINUED SPEECH THERAPY:  
YES: Anticipate need for ongoing speech therapy during this hospitalization and at next level of care. ASSESSMENT Patient presents with moderate oropharyngeal dysphagia symptoms. Immediate coughing and wet girgly vocal quality with thin liquids.  Patient expelled puree and mixed consistency trials -suspect due to oral discomfort from thrush. No overt s/sx airway compromise with 8 oz nectar thick by cup. Recommend clear liquid diet- NECTAR THICK LIQUIDS. NO straws. Medications as tolerated. Will continue to follow for diet tolerance and PO trials for potential diet advancement. REHABILITATION POTENTIAL FOR STATED GOALS: Good PLAN   
FREQUENCY/DURATION: Continue to follow patient 3 times a week for duration of hospital stay to address above goals. - Recommendations for next treatment session: Next treatment will address diet tolerance and PO trials for potential diet advancement. SUBJECTIVE Patient alert upright in bed for assessment. Coughing upon arrival and expelling mucous. Patient confused and has difficulty answering open ended questions at times. Follows 1 step commands. History of Present Injury/Illness: Mr. Michelle Ascencio  has a past medical history of GERD (gastroesophageal reflux disease), Head and neck cancer ([facility] [address] (9/30/2015), History of squamous cell carcinoma, History of throat cancer (2015), Hypercholesteremia, Hypertension, Hypomagnesemia (5/20/2020), Rectal cancer ([facility] [address] (2018), Type 2 diabetes mellitus ([facility] [address] and Vomiting (2/23/2016). Domingo Epps He also  has a past surgical history that includes hx orthopaedic (Right, 1966); hx other surgical (9/9/15); hx heent; hx tonsillectomy; hx heent (2015); hx colonoscopy (05/2018); hx vascular access; hx lymph node dissection; hx other surgical; and flexible sigmoidoscopy (N/A, 6/2/2020). Problem List:  (Impairments causing functional limitations): Oropharyngeal dysphagia Previous Dysphagia: YES Patient with history of oral cancer with chemo/radiation tx in 2015. Modified barium swallow study was completed 11/2015 with results as follows: \"No penetration or aspiration with trials of thin liquids, pudding, mixed, or regular textures. Increased mastication required with chewable textures. Recommend mechanical soft/thin liquids for pleasure with PEG to support and maintain nutritional needs. \" Diet Prior to Evaluation: Regular/thin Orientation:  
Person Place Pain: Pain Scale 1: Numeric (0 - 10) Pain Intensity 1: 0 OBJECTIVE Oral Motor:  
Labial: No impairment Dentition: Natural and Limited Oral Hygiene: Adequate Lingual: No impairment Swallow evaluation: 
 Productive cough upon arrival with patient expelling thick yellow secretions. Patient consumed trials ice chip, thin by teaspoon/cup, nectar by teaspoon/cup, puree, and mixed consistency. Immediate cough post swallow with thin by cup on 2 out of 5 trials. Wet vocal quality that eventually resolved after continuous coughing. No overt s/sx airway compromise with 8 oz nectar by cup. Patient expelled puree consistency and mixed consistency trials and refused solid consistency trials. INTERDISCIPLINARY COLLABORATION: Registered Nurse PRECAUTIONS/ALLERGIES: Patient has no known allergies. Tool Used: Dysphagia Outcome and Severity Scale (ZOYA) Score Comments Normal Diet  [] 7 With no strategies or extra time needed Functional Swallow  [] 6 May have mild oral or pharyngeal delay Mild Dysphagia  [] 5 Which may require one diet consistency restricted Mild-Moderate Dysphagia  [] 4 With 1-2 diet consistencies restricted Moderate Dysphagia  [x] 3 With 2 or more diet consistencies restricted Moderate-Severe Dysphagia  [] 2 With partial PO strategies (trials with ST only) Severe Dysphagia  [] 1 With inability to tolerate any PO safely Score:  Initial: 3 Most Recent: x (Date 07/08/20 ) Interpretation of Tool: The Dysphagia Outcome and Severity Scale (ZOYA) is a simple, easy-to-use, 7-point scale developed to systematically rate the functional severity of dysphagia based on objective assessment and make recommendations for diet level, independence level, and type of nutrition. Current Medications: Current Facility-Administered Medications on File Prior to Encounter Medication Dose Route Frequency Provider Last Rate Last Dose  
 0.9% sodium chloride infusion 250 mL  250 mL IntraVENous PRN Herson Fine MD      
 
Current Outpatient Medications on File Prior to Encounter Medication Sig Dispense Refill  
 albuterol-ipratropium (DUO-NEB) 2.5 mg-0.5 mg/3 ml nebu 3 mL by Nebulization route every six (6) hours as needed for Wheezing. 30 Nebule 0  
 bisacodyL (DULCOLAX) 10 mg supp Insert 10 mg into rectum daily. 1 Each 0  
 insulin glargine (LANTUS) 100 unit/mL injection 10 Units by SubCUTAneous route nightly. 1 Vial 0  
 insulin lispro (HUMALOG) 100 unit/mL injection 8 Units by SubCUTAneous route Before breakfast, lunch, and dinner. 1 Vial 0  
 magnesium hydroxide (MILK OF MAGNESIA) 400 mg/5 mL suspension Take 30 mL by mouth daily. 1 Bottle 0  
 potassium chloride (K-DUR, KLOR-CON) 20 mEq tablet Take 1 Tab by mouth three (3) times daily. 1 Tab 0  
 predniSONE (DELTASONE) 10 mg tablet Take 30 mg by mouth daily (with breakfast). 1 Tab 0  
 senna-docusate (PERICOLACE) 8.6-50 mg per tablet Take 1 Tab by mouth two (2) times a day. 1 Tab 0  
 simethicone (MYLICON) 80 mg chewable tablet Take 1 Tab by mouth four (4) times daily as needed for GI Pain. 1 Tab 0  
 tamsulosin (FLOMAX) 0.4 mg capsule Take 1 Cap by mouth daily. 1 Cap 0  
 furosemide (LASIX) 40 mg tablet Take 1 Tab by mouth two (2) times a day. 1 Tab 0  
 traMADoL (ULTRAM) 50 mg tablet Take 1 Tab by mouth every six (6) hours as needed for Pain for up to 3 days. Max Daily Amount: 200 mg. Indications: pain 12 Tab 0  
 magnesium oxide (MAG-OX) 400 mg tablet Take 1 Tab by mouth daily. 30 Tab 0  
 lisinopriL (PRINIVIL, ZESTRIL) 20 mg tablet Take 20 mg by mouth daily. glimepiride (AMARYL) 4 mg tablet Take 4 mg by mouth every morning.     
 traMADoL (ULTRAM) 50 mg tablet Take 1 Tab by mouth every six (6) hours as needed for Pain for up to 30 days. Max Daily Amount: 200 mg. Indications: pain 90 Tab 0  
 meloxicam (MOBIC) 7.5 mg tablet Take 1 Tab by mouth daily. Indications: arthralgias 30 Tab 1  
 sodium chloride 1 gram tablet TAKE 2 TABS BY MOUTH TWO (2) TIMES A DAY. 180 Tab 3 TRUEPLUS LANCETS 28 gauge misc     
 ferrous sulfate 324 mg (65 mg iron) tablet Take  by mouth Daily (before breakfast). docusate sodium (COLACE) 100 mg capsule Take 100 mg by mouth as needed for Constipation. amLODIPine (NORVASC) 10 mg tablet Take 10 mg by mouth daily. In am    
 metFORMIN (GLUCOPHAGE) 1,000 mg tablet Take 1,000 mg by mouth daily. In am  Indications: type 2 diabetes mellitus After treatment position/precautions: 
Upright in bed RN notified Call light within reach Total Treatment Duration:  
Time In: 1401 Time Out: 1422 Aracely Mcleod MS, CCC-SLP

## 2020-07-08 NOTE — INTERVAL H&P NOTE
Update History & Physical 
 
The Patient's History and Physical of July 8,  
 was reviewed with the patient and I examined the patient. There was no change. The surgical site was confirmed by the patient and me. Plan:  The risk, benefits, expected outcome, and alternative to the recommended procedure have been discussed with the patient. Patient understands and wants to proceed with the procedure.  
 
Electronically signed by Izabella Sanchez MD on 7/8/2020 at 2:40 PM

## 2020-07-08 NOTE — PROGRESS NOTES
Patient arrived to room 820 as COVID Rule out. He is currently afebrile. His mouth is dry and is covered in thrush. Heart is irregular in rate and rhythm. Lung sounds are coarse throughout. Diminished at the bases. Wound to sacrum is healing nicely. Covered in barrier cream.  
Patient with traore catheter draining dark yellow to gatito urine. He is producing adequate amounts of urine. Patient is alert and oriented to person. He is unsure of the time or location.

## 2020-07-08 NOTE — ED TRIAGE NOTES
Pt arrives via EMS from SNF called out for respiratory distress. 2L in low 80s at facility. Pt placed on NRB in route and came up to high 90s%. Pt is mask for triage. Pt green phlem.

## 2020-07-08 NOTE — ED NOTES
TRANSFER - OUT REPORT: 
 
Verbal report given to Brendan(name) on [employer]  being transferred to 820(unit) for routine progression of care Report consisted of patients Situation, Background, Assessment and  
Recommendations(SBAR). Information from the following report(s) ED Summary was reviewed with the receiving nurse. Lines:  
Peripheral IV 07/08/20 Left Forearm (Active) Peripheral IV 07/08/20 Right Hand (Active) Peripheral IV 07/08/20 Left Antecubital (Active) Opportunity for questions and clarification was provided. Patient transported with: 
 [employer]

## 2020-07-09 NOTE — PROGRESS NOTES
Pharmacokinetic Consult to Pharmacist 
 
Vonda Kelsey is a 79 y.o. male being treated for sepsis with vancomycin. Height: 6' (182.9 cm)  Weight: 77.1 kg (170 lb) Lab Results Component Value Date/Time BUN 25 (H) 07/09/2020 05:21 AM  
 Creatinine 1.00 07/09/2020 05:21 AM  
 WBC 5.4 07/09/2020 05:21 AM  
 Procalcitonin 0.60 07/08/2020 06:01 AM  
 Lactic acid 1.6 07/08/2020 06:01 AM  
 Lactic Acid (POC) 2.9 (H) 09/02/2018 07:59 PM  
  
Estimated Creatinine Clearance: 65.3 mL/min (based on SCr of 1 mg/dL). CULTURES: 
pending Lab Results Component Value Date/Time Vancomycin,trough 15.2 06/04/2020 09:00 PM  
 Vancomycin, random 13.3 07/09/2020 05:21 AM  
 
 
Day 2 of vancomycin. Goal trough is 15-20. Random level this AM was 13.3, redosed with 1250 mg. Will initiate 1250 mg every 18 hours for now. Will continue to follow patient. Thank you, Fermin Mejía, PharmD, [facility] Clinical Pharmacy Specialist 
[phone]

## 2020-07-09 NOTE — PROGRESS NOTES
Vonda Kelsey Admission Date: 7/8/2020 Daily Progress Note: 7/9/2020 The patient's chart is reviewed and the patient is discussed with the staff. 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 He was last seen by our practice on 7/2/2020 prior to discharge to SNF. He presented back to the ER on 7/8 (4th admission since 3/2020) with complaints of shortness of breath with bilateral pleural effusion with acute on chronic respiratory failure and temp 100.5*. Pt was admitted by the hospitalist service and we were consulted. He has undergone multiple thoracenteses:  
5/25: R 1000mL removed 5/29: R: 1300mL removed L: 800 mL removed 6/3: R: 800mL removed 6/9: R:1000mL L: 1100mL removed 6/23: R: 1100 mL bloody effusion removed 6/24: L: 900mL serosanguinous fluid removed (rare atypical cells) Pt was evaluated with ultrasound but effusions were too small for thoracentesis on 7/8. Blood cx: + staph, speciation pending Subjective:  
 
Pt lying in bed on 5L HF. Pt asleep but awakes easily. He admits to dry cough. He complains of shortness of breath. He also reports that he feels \"terrible\". He was informed that his hgb is 6.7 and that I will order 1 unit PRBC. Current Facility-Administered Medications Medication Dose Route Frequency  vancomycin (VANCOCIN) 1250 mg in  ml infusion  1,250 mg IntraVENous ONCE  
 albuterol-ipratropium (DUO-NEB) 2.5 MG-0.5 MG/3 ML  3 mL Nebulization Q6H PRN  
 bisacodyL (DULCOLAX) suppository 10 mg  10 mg Rectal DAILY  predniSONE (DELTASONE) tablet 30 mg  30 mg Oral DAILY WITH BREAKFAST  tamsulosin (FLOMAX) capsule 0.4 mg  0.4 mg Oral DAILY  sodium chloride (NS) flush 5-40 mL  5-40 mL IntraVENous Q8H  
  sodium chloride (NS) flush 5-40 mL  5-40 mL IntraVENous PRN  
 acetaminophen (TYLENOL) tablet 650 mg  650 mg Oral Q6H PRN  
 HYDROcodone-acetaminophen (NORCO) 5-325 mg per tablet 1 Tab  1 Tab Oral Q4H PRN  
 morphine injection 2 mg  2 mg IntraVENous Q4H PRN  
 naloxone (NARCAN) injection 0.4 mg  0.4 mg IntraVENous PRN  
 ondansetron (ZOFRAN) injection 4 mg  4 mg IntraVENous Q4H PRN  
 cefepime (MAXIPIME) 2 g in 0.9% sodium chloride (MBP/ADV) 100 mL  2 g IntraVENous Q8H  
 insulin lispro (HUMALOG) injection   SubCUTAneous AC&HS  [Held by provider] furosemide (LASIX) injection 40 mg  40 mg IntraVENous Q12H  ferrous sulfate tablet 325 mg  1 Tab Oral DAILY WITH BREAKFAST  VANCOMYCIN INTERMITTENT DOSING PER PHARMACY   Other Rx Dosing/Monitoring Facility-Administered Medications Ordered in Other Encounters Medication Dose Route Frequency  0.9% sodium chloride infusion 250 mL  250 mL IntraVENous PRN Review of Systems 
+cough 
+dyspnea 
+fatigue Constitutional: negative for fever, chills, sweats Cardiovascular: negative for chest pain, palpitations, syncope, edema Gastrointestinal:  negative for dysphagia, reflux, vomiting, diarrhea, abdominal pain, or melena Neurologic:  negative for focal weakness, numbness, headache Objective:  
 
Vitals:  
 07/08/20 1512 07/08/20 2045 07/09/20 0043 07/09/20 0450 BP: 101/57 113/61 124/59 118/60 Pulse: 89 93 95 97 Resp: 18 18 16 16 Temp: 97.5 °F (36.4 °C) 97.9 °F (36.6 °C) 98 °F (36.7 °C) 98.2 °F (36.8 °C) SpO2: 100% 97% 94% 94% Weight:      
Height:      
 
 
 
Intake/Output Summary (Last 24 hours) at 7/9/2020 4061 Last data filed at 7/8/2020 1956 Gross per 24 hour Intake 1500 ml Output 1100 ml Net 400 ml Physical Exam:  
Constitution:  the patient is thin and in no acute distress, on 5L HF 
EENMT:  Sclera clear, pupils equal, oral mucosa moist 
Respiratory: few crackles/rhonchi 
Cardiovascular:  RRR without M,G,R 
 Gastrointestinal: soft and non-tender; with positive bowel sounds. Musculoskeletal: warm without cyanosis. There is no lower extremity edema. Skin:  no jaundice or rashes Neurologic: no gross neuro deficits Psychiatric:  alert and oriented x 3 CXR:  
 
 
LAB Recent Labs  
  07/08/20 
2130 07/08/20 
1632 07/08/20 
1151 GLUCPOC 103* 157* 225* Recent Labs  
  07/09/20 
0521 07/08/20 
0601 WBC 5.4 5.9 HGB 6.7* 7.2* HCT 21.7* 24.3*  
PLT 83* 108* Recent Labs  
  07/09/20 
0521 07/08/20 
0601  143  
K 4.0 6.0*  
 107 CO2 28 27 * 185* BUN 25* 36* CREA 1.00 1.51* MG  --  3.3*  
CA 8.2* 8.0* ALB  --  1.8* TBILI  --  0.5 ALT  --  23 Recent Labs  
  07/08/20 
1048 PHI 7.40 PCO2I 41.7 PO2I 65* HCO3I 25.9 Recent Labs  
  07/08/20 
0601 LAC 1.6 Assessment:  (Medical Decision Making) Hospital Problems  Date Reviewed: 7/9/2020 Codes Class Noted POA * (Principal) Acute on chronic respiratory failure with hypoxia (HCC) ICD-10-CM: J96.21 
ICD-9-CM: 518.84, 799.02  7/8/2020 Yes On 5L O2, wean as tolerated Pneumonia ICD-10-CM: J18.9 ICD-9-CM: 722  7/8/2020 Yes Continue maxipime/vanc Anemia (Chronic) ICD-10-CM: D64.9 ICD-9-CM: 285.9  7/8/2020 Yes Plans to transfuse 1 unit PRBC today and lasix right after Hyperkalemia ICD-10-CM: E87.5 ICD-9-CM: 276.7  7/8/2020 Yes Back to normal  
 KIRK (acute kidney injury) ([facility] [address] ICD-10-CM: N17.9 ICD-9-CM: 584.9  7/8/2020 Yes Resolved, lasix on hold Thrombocytopenia (HCC) (Chronic) ICD-10-CM: D69.6 ICD-9-CM: 287.5  7/8/2020 Yes Monitor Hypotension ICD-10-CM: I95.9 ICD-9-CM: 458.9  7/8/2020 Yes Persistent Chronic bilateral pleural effusions (Chronic) ICD-10-CM: J90 ICD-9-CM: 511.9  7/8/2020 Yes See above, not enough fluid to tap now Urine retention (Chronic) ICD-10-CM: R33.9 ICD-9-CM: 788.20  7/8/2020 Yes Juarez in place Debility (Chronic) ICD-10-CM: R53.81 ICD-9-CM: 799.3  6/15/2020 Yes Pleural effusion on right ICD-10-CM: J90 ICD-9-CM: 511.9  5/25/2020 Yes Overview Addendum 6/24/2020  8:13 AM by Nette Oropeza NP  
  6/23/20L  Right thoracentesis:  1100 ml bloody effusion removed 6/10/20:  Bilateral thoracentesis on L( 1100 cc ) and R( 1000 cc) removed 6/3/20:  Right chest thoracentesis with 800 removed. Pleural effusion on left ICD-10-CM: J90 ICD-9-CM: 511.9  5/24/2020 Yes Overview Addendum 6/25/2020 11:40 AM by Nette Oropeza NP  
   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  
 
--wean O2 as tolerated 
--Continue maxipime/vanc 
--+[id number] pending 
--transfuse 1 unit PRBC, then 1 dose lasix after blood transfused to avoid worsening pulmonary edema 
--recommend consulting palliative care and discussing code status as pt remains a full code More than 50% of the time documented was spent in face-to-face contact with the patient and in the care of the patient on the floor/unit where the patient is located. OTNY Ramsay Lungs:  B crackles Heart:  RRR with no Murmur/Rubs/Gallops Additional Comments:   
Pt with ongoing hypoxia, currently on 5L. Infiltrates on CXR. Covid test still pending. Cont current abx and f/u covid result.  Sputum sample for bacterial culture if able. I have spoken with and examined the patient. I agree with the above assessment and plan as documented.  
 
Dino Castro MD

## 2020-07-09 NOTE — CONSULTS
Palliative Care Patient: Chelsea Roberts MRN: [id number]  SSN: [id number] YOB: 1940  Age: 79 y.o. Sex: male Date of Request: 7/9/2020 Date of Consult:  7/9/2020 Reason for Consult:  family support and education, goals of care and medical decision making Requesting Physician: Dr. Soco Joy Assessment/Plan:  
 
Principal Diagnosis: Dyspnea  R06.00 Additional Diagnoses: · Debility, Unspecified  R53.81 
· Frailty  R54 · Respiratory Failure, Acute on Chronic  J96.20 · Counseling, Encounter for Medical Advice  Z71.9 
· Encounter for Palliative Care  Z51.5 Palliative Performance Scale (PPS): 
 30% Medical Decision Making:  
Reviewed and summarized labs and imaging. Pt is very weak and lethargic. States he is tired all the time. Repeated extensive hospitalizations. I asked about wishes and code status and pt becomes quiet then defers to his wife. Prior conversations with wife have resulted in continued aggressive care, full code. Will continue to reach out and discuss further with wife given recurrent admission. Will discuss findings with members of the interdisciplinary team.   
 
Thank you for this referral.    
 
 
Subjective:  
 
History obtained from:  Patient and Chart Chief Complaint: dyspnea History of Present Illness:  Mr. 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 During prior admit, he had numerous thoracentesis with recollection of fluids and need for IV diuretics. He required high flow O2. Pulmonary followed. It was felt that his effusions were due to hypoalbuminemia as they are transudative. Cytology negative. ECHO preserved EF. He did complete 8 days of zosyn and steroid taper.  
He was seen by surgery in regards to his rectal cancer with known stricture and declined prior diverting ostomy. He is s/p anal dilation per Meagan. Last chemo 5,202. He was seen by GI s/p 6-2 -20 flex sig with disimpaction and no stricture noted. He did require PRBC x 2. He has required traore due to urine retention. COVID negative 7-2-20. Upon discharge, he went to SNF. He was noted to have increased respiratory distress and added levaquin for possible pneumonia. His wife Concha Cortez feels his oral intake is poor, though she has not seen him since his discharge. I did speak with her via phone on admit. In the ED, he is .5 . He received vancomycin and 2 L NS bolus. CXR shows increased effusions and he was hypoxic and needed 6 L NC. Advance Directive: No      
Code Status:  Full Code Health Care Power of : pt spouse is decision maker Past Medical History:  
Diagnosis Date  GERD (gastroesophageal reflux disease)   
 pt wife denies  Head and neck cancer ([facility] [address] 9/30/2015  
 radiation and chemo and and surgery  History of squamous cell carcinoma   
 to neck area- 38 radiation treatement and 8 chemo treatment  History of throat cancer 2015  
 peg tube for about 4 months  Hypercholesteremia   
 managed well with meds  Hypertension   
 managed well with meds  Hypomagnesemia 5/20/2020  Rectal cancer ([address] 2018 28 radiation treatment and chemo pump  Type 2 diabetes mellitus ([address] oral agent only/AVG BS: /no s.s of hypoglycemia  Vomiting 2/23/2016  
 pt wife denies Past Surgical History:  
Procedure Laterality Date 2021 Keiry Mondragon N/A 6/2/2020 SIGMOIDOSCOPY FLEXIBLE performed by Eliane Ballesteros MD at [facility] ENDOSCOPY  
 HX COLONOSCOPY  05/2018  HX HEENT    
 sinus  HX HEENT  2015  
 surgery on right throat for squamous cell ca right ear wedge  HX LYMPH NODE DISSECTION    
 HX ORTHOPAEDIC Right 1966  
 right leg  HX OTHER SURGICAL  9/9/15 EXCISION OF RIGHT Neck and PARAPHARYNGEAL MASS/RIGHT EAR WEDGE RESECTION  
 HX OTHER SURGICAL    
 peg placed and removed  HX TONSILLECTOMY  HX VASCULAR ACCESS    
 placed and removed Family History Problem Relation Age of Onset  Emphysema Father  Lung Disease Father  Cancer Brother Colon  Heart Disease Sister  Hypertension Sister  Heart Disease Sister  Hypertension Sister  Heart Disease Brother  Hypertension Brother  Heart Disease Brother  Hypertension Brother  Heart Disease Brother  Hypertension Brother  Heart Disease Brother  Hypertension Brother  Heart Disease Brother  Hypertension Brother Social History Tobacco Use  Smoking status: Never Smoker  Smokeless tobacco: Never Used Substance Use Topics  Alcohol use: No  
 
Prior to Admission medications Medication Sig Start Date End Date Taking? Authorizing Provider  
albuterol-ipratropium (DUO-NEB) 2.5 mg-0.5 mg/3 ml nebu 3 mL by Nebulization route every six (6) hours as needed for Wheezing. 7/2/20   Ron Cloud, DO  
bisacodyL (DULCOLAX) 10 mg supp Insert 10 mg into rectum daily. 7/2/20   Nydia High, DO  
insulin glargine (LANTUS) 100 unit/mL injection 10 Units by SubCUTAneous route nightly. 7/2/20   Nydia High, DO  
insulin lispro (HUMALOG) 100 unit/mL injection 8 Units by SubCUTAneous route Before breakfast, lunch, and dinner. 7/2/20   Raegan AWAN, DO  
magnesium hydroxide (MILK OF MAGNESIA) 400 mg/5 mL suspension Take 30 mL by mouth daily. 7/2/20   Raegan Delgado C, DO  
potassium chloride (K-DUR, KLOR-CON) 20 mEq tablet Take 1 Tab by mouth three (3) times daily. 7/2/20   Ron Cloud, DO  
predniSONE (DELTASONE) 10 mg tablet Take 30 mg by mouth daily (with breakfast).  7/2/20   Nydia High, DO  
senna-docusate (PERICOLACE) 8.6-50 mg per tablet Take 1 Tab by mouth two (2) times a day. 7/2/20   Rno Fus, DO  
simethicone (MYLICON) 80 mg chewable tablet Take 1 Tab by mouth four (4) times daily as needed for GI Pain. 7/2/20   Raegan Delgado C, DO  
tamsulosin (FLOMAX) 0.4 mg capsule Take 1 Cap by mouth daily. 7/2/20   Raegan Marking C, DO  
furosemide (LASIX) 40 mg tablet Take 1 Tab by mouth two (2) times a day. 7/2/20   Litolean Fus, DO  
traMADoL (ULTRAM) 50 mg tablet Take 1 Tab by mouth every six (6) hours as needed for Pain for up to 3 days. Max Daily Amount: 200 mg. Indications: pain 7/2/20 7/5/20  Raegan Delgado C, DO  
magnesium oxide (MAG-OX) 400 mg tablet Take 1 Tab by mouth daily. 5/30/20   Fabi High, DO  
lisinopriL (PRINIVIL, ZESTRIL) 20 mg tablet Take 20 mg by mouth daily. Other, MD [place]  
glimepiride (AMARYL) 4 mg tablet Take 4 mg by mouth every morning. [place]a Hobbs MD  
traMADoL (ULTRAM) 50 mg tablet Take 1 Tab by mouth every six (6) hours as needed for Pain for up to 30 days. Max Daily Amount: 200 mg. Indications: pain 5/1/20 5/31/20  Na Horton MD  
meloxicam (MOBIC) 7.5 mg tablet Take 1 Tab by mouth daily. Indications: arthralgias 4/17/20   Darylene Lather, MD  
sodium chloride 1 gram tablet TAKE 2 TABS BY MOUTH TWO (2) TIMES A DAY. 4/17/20   Na Horton MD  
TRUEPLUS LANCETS 28 gauge misc  11/6/19   Provider, Historical  
ferrous sulfate 324 mg (65 mg iron) tablet Take  by mouth Daily (before breakfast). Provider, Historical  
docusate sodium (COLACE) 100 mg capsule Take 100 mg by mouth as needed for Constipation. Provider, Historical  
amLODIPine (NORVASC) 10 mg tablet Take 10 mg by mouth daily. In am    Provider, Historical  
metFORMIN (GLUCOPHAGE) 1,000 mg tablet Take 1,000 mg by mouth daily. In am  Indications: type 2 diabetes mellitus    Provider, Historical  
 
 
No Known Allergies Review of systems negative with exception of noted above Objective:  
 
Visit Vitals /60 Pulse 94 Temp 98.4 °F (36.9 °C) Resp 16 Ht 6' (1.829 m) Wt 170 lb (77.1 kg) SpO2 96% BMI 23.06 kg/m² Physical Exam: 
 
General:  Cooperative. No acute distress. frail Eyes:  Conjunctivae/corneas clear Nose: Nares normal. Septum midline. Neck: Supple, symmetrical, trachea midline, no JVD Lungs:   Coarse bilateral bs Heart:  Regular rate and rhythm, no murmur Abdomen:   Soft, non-tender, non-distended. Positive bowel sounds Extremities: Normal, atraumatic, no cyanosis or edema Skin: Skin color, texture, turgor normal. No rash or lesions. Neurologic: Nonfocal  
Psych: Alert and oriented Assessment:  
 
Hospital Problems  Date Reviewed: 7/9/2020 Codes Class Noted POA * (Principal) Acute on chronic respiratory failure with hypoxia (HCC) ICD-10-CM: J96.21 
ICD-9-CM: 518.84, 799.02  7/8/2020 Yes Pneumonia ICD-10-CM: J18.9 ICD-9-CM: 345  7/8/2020 Yes Anemia (Chronic) ICD-10-CM: D64.9 ICD-9-CM: 285.9  7/8/2020 Yes Hyperkalemia ICD-10-CM: E87.5 ICD-9-CM: 276.7  7/8/2020 Yes KIRK (acute kidney injury) ([facility] [address] ICD-10-CM: N17.9 ICD-9-CM: 584.9  7/8/2020 Yes Thrombocytopenia (HCC) (Chronic) ICD-10-CM: D69.6 ICD-9-CM: 287.5  7/8/2020 Yes Hypotension ICD-10-CM: I95.9 ICD-9-CM: 458.9  7/8/2020 Yes Chronic bilateral pleural effusions (Chronic) ICD-10-CM: J90 ICD-9-CM: 511.9  7/8/2020 Yes Urine retention (Chronic) ICD-10-CM: R33.9 ICD-9-CM: 788.20  7/8/2020 Yes Debility (Chronic) ICD-10-CM: R53.81 ICD-9-CM: 799.3  6/15/2020 Yes Pleural effusion on right ICD-10-CM: J90 ICD-9-CM: 511.9  5/25/2020 Yes Overview Addendum 6/24/2020  8:13 AM by Salazar Reddy NP  
  6/23/20L  Right thoracentesis:  1100 ml bloody effusion removed 6/10/20:  Bilateral thoracentesis on L( 1100 cc ) and R( 1000 cc) removed 6/3/20:  Right chest thoracentesis with 800 removed.   
  
  
   
 Pleural effusion on left ICD-10-CM: J90 
 ICD-9-CM: 511.9  5/24/2020 Yes Overview Addendum 6/25/2020 11:40 AM by Benita Ennis NP  
   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   
 July 9, 2020

## 2020-07-09 NOTE — PROGRESS NOTES
Interdisciplinary team rounds were held 7/9/2020 with the following team members:Care Management, Nursing and Physician. Plan of care discussed. See clinical pathway and/or care plan for interventions and desired outcomes.

## 2020-07-09 NOTE — PROGRESS NOTES
Hospitalist Progress Note Admit Date:  2020  5:42 AM  
Name:  Archana Navas Age:  79 y.o. 
:  1940 MRN:  [id number] PCP:  Jolie Car MD 
Treatment Team: Attending Provider: Livan Melchor MD; Consulting Provider: Daphne Lim MD; Primary Nurse: Tariq Mcmahon; Care Manager: Donell Ortiz.; Consulting Provider: Livan Melchor MD; Utilization Review: Marcela Almazan RN; Speech Language Pathologist: Jorge Alberto Menjivar, ROBERT; Consulting Provider: Rubina Chavez MD 
 
Subjective:  
CC:  Shortness of breath  
  
Mr. 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 During prior admit, he had numerous thoracentesis with recollection of fluids and need for IV diuretics. He required high flow O2. Pulmonary followed. It was felt that his effusions were due to hypoalbuminemia as they are transudative. Cytology negative. ECHO preserved EF. He did complete 8 days of zosyn and steroid taper. He was seen by surgery in regards to his rectal cancer with known stricture and declined prior diverting ostomy. He is s/p anal dilation per Meagan. Last chemo . He was seen by GI s/p  flex sig with disimpaction and no stricture noted. He did require PRBC x 2. He has required traore due to urine retention. COVID negative 20. Upon discharge, he went to SNF. He was noted to have increased respiratory distress and added levaquin for possible pneumonia. His wife Mathew Conde feels his oral intake is poor, though she has not seen him since his discharge. I did speak with her via phone on admit. In the ED, he is .5 . He received vancomycin and 2 L NS bolus. CXR shows increased effusions and he was hypoxic and needed 6 L NC 
 
 patient seen and examined at bedside Hgb 6.7 noted this AM, 1U PRBC ordered with lasix to be given after Pulm following - recommend palliative care eval. Follow up cultures. No drainable effusions at this time Palliative care consulted - still full code Continue antbiotics Covid precautions, follow up test 
He refused to discuss goals of care with me stating 'I will think about it' or 'ask my wife' Biofire showing possible coag neg staph. Repeat blood cultures Nursing notes and chart reviewed. Review of Systems negative with exception of pertinent positives noted above. Objective:  
 
Patient Vitals for the past 24 hrs: 
 Temp Pulse Resp BP SpO2  
07/09/20 1245 98.4 °F (36.9 °C) 94 16 118/60 96 % 07/09/20 1221 97.9 °F (36.6 °C) (!) 102 18 120/65 95 % 07/09/20 0835     96 % 07/09/20 0808 98.2 °F (36.8 °C) (!) 101 18 133/64 94 % 07/09/20 0450 98.2 °F (36.8 °C) 97 16 118/60 94 % 07/09/20 0043 98 °F (36.7 °C) 95 16 124/59 94 % 07/08/20 2045 97.9 °F (36.6 °C) 93 18 113/61 97 % Oxygen Therapy O2 Sat (%): 96 % (07/09/20 1245) Pulse via Oximetry: 96 beats per minute (07/08/20 1001) O2 Device: Nasal cannula (07/09/20 0835) O2 Flow Rate (L/min): 2 l/min (07/09/20 0835) FIO2 (%): 50 % (07/08/20 0556) Intake/Output Summary (Last 24 hours) at 7/9/2020 1516 Last data filed at 7/9/2020 1230 Gross per 24 hour Intake  Output 2325 ml Net -2325 ml General:    cachetic CV:   RRR. No murmur, rub, or gallop. Lungs:   Crackles/junky bilaterally appreciated from anterior chest wall. Abdomen:   Soft, nontender, nondistended. Extremities: Warm and dry. No cyanosis or edema. Skin:     No rashes or jaundice. Data Review: 
I have reviewed all labs, meds, telemetry events, and studies from the last 24 hours. Recent Results (from the past 24 hour(s)) GLUCOSE, POC Collection Time: 07/08/20  4:32 PM  
Result Value Ref Range Glucose (POC) 157 (H) 65 - 100 mg/dL GLUCOSE, POC  
 Collection Time: 07/08/20  9:30 PM  
Result Value Ref Range Glucose (POC) 103 (H) 65 - 100 mg/dL Greg Correa Collection Time: 07/09/20  5:21 AM  
Result Value Ref Range Vancomycin, random 13.3 UG/ML  
CBC WITH AUTOMATED DIFF Collection Time: 07/09/20  5:21 AM  
Result Value Ref Range WBC 5.4 4.3 - 11.1 K/uL  
 RBC 2.17 (L) 4.23 - 5.6 M/uL HGB 6.7 (LL) 13.6 - 17.2 g/dL HCT 21.7 (L) 41.1 - 50.3 % .0 (H) 79.6 - 97.8 FL  
 MCH 30.9 26.1 - 32.9 PG  
 MCHC 30.9 (L) 31.4 - 35.0 g/dL  
 RDW 18.8 (H) 11.9 - 14.6 % PLATELET 83 (L) 455 - 450 K/uL MPV 9.1 (L) 9.4 - 12.3 FL ABSOLUTE NRBC 0.04 0.0 - 0.2 K/uL  
 DF AUTOMATED NEUTROPHILS 74 43 - 78 % LYMPHOCYTES 16 13 - 44 % MONOCYTES 7 4.0 - 12.0 % EOSINOPHILS 1 0.5 - 7.8 % BASOPHILS 0 0.0 - 2.0 % IMMATURE GRANULOCYTES 2 0.0 - 5.0 %  
 ABS. NEUTROPHILS 4.0 1.7 - 8.2 K/UL  
 ABS. LYMPHOCYTES 0.9 0.5 - 4.6 K/UL  
 ABS. MONOCYTES 0.4 0.1 - 1.3 K/UL  
 ABS. EOSINOPHILS 0.0 0.0 - 0.8 K/UL  
 ABS. BASOPHILS 0.0 0.0 - 0.2 K/UL  
 ABS. IMM. GRANS. 0.1 0.0 - 0.5 K/UL METABOLIC PANEL, BASIC Collection Time: 07/09/20  5:21 AM  
Result Value Ref Range Sodium 139 136 - 145 mmol/L Potassium 4.0 3.5 - 5.1 mmol/L Chloride 103 98 - 107 mmol/L  
 CO2 28 21 - 32 mmol/L Anion gap 8 7 - 16 mmol/L Glucose 105 (H) 65 - 100 mg/dL BUN 25 (H) 8 - 23 MG/DL Creatinine 1.00 0.8 - 1.5 MG/DL  
 GFR est AA >60 >60 ml/min/1.73m2 GFR est non-AA >60 >60 ml/min/1.73m2 Calcium 8.2 (L) 8.3 - 10.4 MG/DL  
TYPE + CROSSMATCH Collection Time: 07/09/20  8:04 AM  
Result Value Ref Range Crossmatch Expiration 07/12/2020 ABO/Rh(D) A POSITIVE Antibody screen POS Antibody ID ANTI-FALLON Unit number [id number] Blood component type RC LR Unit division 00 Status of unit ISSUED ANTIGEN/ANTIBODY INFO [place] NEGATIVE, Crossmatch result Compatible Unit number [id number] Blood component type  LR Unit division 00 Status of unit ALLOCATED ANTIGEN/ANTIBODY INFO [place] NEGATIVE, Crossmatch result Compatible GLUCOSE, POC Collection Time: 07/09/20  8:09 AM  
Result Value Ref Range Glucose (POC) 119 (H) 65 - 100 mg/dL GLUCOSE, POC Collection Time: 07/09/20 11:50 AM  
Result Value Ref Range Glucose (POC) 178 (H) 65 - 100 mg/dL All Micro Results Procedure Component Value Units Date/Time CULTURE, RESPIRATORY/SPUTUM/BRONCH Sary Kvng STAIN [[id number]] Order Status:  Sent Specimen:  Sputum CULTURE, BLOOD [[id number]] Collected:  07/08/20 0979 Order Status:  Completed Specimen:  Blood Updated:  07/09/20 5448 Special Requests: LEFT HAND Culture result: NO GROWTH 1 DAY     
 CULTURE, BLOOD [[id number]] Collected:  07/08/20 7342 Order Status:  Completed Specimen:  Blood Updated:  07/09/20 0746 Special Requests: RIGHT HAND     
  GRAM STAIN GRAM POSITIVE COCCI AEROBIC BOTTLE POSITIVE RESULTS VERIFIED, PHONED TO AND READ BACK BY DAVID SANTAMARIA @ 9258 7/9/20 MM Culture result:    
  CULTURE IN PROGRESS,FURTHER UPDATES TO FOLLOW REFER TO BIOFIRE PANEL ACCESSION [id number] BLOOD CULTURE ID PANEL [[id number]]  (Abnormal) Collected:  07/08/20 4073 Order Status:  Completed Specimen:  Blood Updated:  07/09/20 0745 Acc. no. from [facility] [employer] [id number] Staphylococcus Detected Comment: RESULTS VERIFIED, PHONED TO AND READ BACK BY 
Addison Crawley RN @ 3245 ON 7/9/20 BY AMM 
  
  
  mecA (Methicillin-Resistance Genes) Detected Comment: Presence of mecA is highly indicative of methicillin resistance. The test does not replace traditional culture and susceptibilities INTERPRETATION Gram positive cocci in clusters. Identified by realtime PCR as  Coagulase negative Staphylococci   Comment: A single positive culture of coagulase negative Staph is likely to be a contaminant in adult patients. Consider discontinuation of antibiotics for gram positive bloodstream infections if patient asymptomatic. THIS TEST DOES NOT REPLACE SENSITIVITY TESTING. Current Meds: 
Current Facility-Administered Medications Medication Dose Route Frequency  0.9% sodium chloride infusion 250 mL  250 mL IntraVENous PRN  
 0.9% sodium chloride infusion 250 mL  250 mL IntraVENous PRN  
 vancomycin (VANCOCIN) 1250 mg in  ml infusion  1,250 mg IntraVENous Q18H  
 albuterol-ipratropium (DUO-NEB) 2.5 MG-0.5 MG/3 ML  3 mL Nebulization Q6H PRN  
 bisacodyL (DULCOLAX) suppository 10 mg  10 mg Rectal DAILY  predniSONE (DELTASONE) tablet 30 mg  30 mg Oral DAILY WITH BREAKFAST  tamsulosin (FLOMAX) capsule 0.4 mg  0.4 mg Oral DAILY  sodium chloride (NS) flush 5-40 mL  5-40 mL IntraVENous Q8H  
 sodium chloride (NS) flush 5-40 mL  5-40 mL IntraVENous PRN  
 acetaminophen (TYLENOL) tablet 650 mg  650 mg Oral Q6H PRN  
 HYDROcodone-acetaminophen (NORCO) 5-325 mg per tablet 1 Tab  1 Tab Oral Q4H PRN  
 morphine injection 2 mg  2 mg IntraVENous Q4H PRN  
 naloxone (NARCAN) injection 0.4 mg  0.4 mg IntraVENous PRN  
 ondansetron (ZOFRAN) injection 4 mg  4 mg IntraVENous Q4H PRN  
 cefepime (MAXIPIME) 2 g in 0.9% sodium chloride (MBP/ADV) 100 mL  2 g IntraVENous Q8H  
 insulin lispro (HUMALOG) injection   SubCUTAneous AC&HS  furosemide (LASIX) injection 40 mg  40 mg IntraVENous Q12H  ferrous sulfate tablet 325 mg  1 Tab Oral DAILY WITH BREAKFAST Facility-Administered Medications Ordered in Other Encounters Medication Dose Route Frequency  0.9% sodium chloride infusion 250 mL  250 mL IntraVENous PRN Other Studies (last 24 hours): Xr Chest Sngl V Result Date: 7/9/2020 EXAM: TEMPORARY INDICATION: Respiratory failure COMPARISON: 7/8/2020 FINDINGS: A portable AP radiograph of the chest was obtained at [place] [address] hours. The patient is on a cardiac monitor. Left pleural effusion left lower lobe airspace disease worse in the interval. Right pleural effusion and right lower lobe airspace disease worse in the interval. The cardiac and mediastinal contours and pulmonary vascularity are normal.  The bones and soft tissues are grossly within normal limits. IMPRESSION: Worsening of bibasilar atelectasis or pneumonia and bilateral pleural effusions. Given the symmetric worsening also consider pulmonary edema. Assessment and Plan:  
 
Hospital Problems as of 7/9/2020 Date Reviewed: 7/9/2020 Codes Class Noted - Resolved POA * (Principal) Acute on chronic respiratory failure with hypoxia [facility]) ICD-10-CM: J96.21 
ICD-9-CM: 518.84, 799.02  7/8/2020 - Present Yes Pneumonia ICD-10-CM: J18.9 ICD-9-CM: 841  7/8/2020 - Present Yes Anemia (Chronic) ICD-10-CM: D64.9 ICD-9-CM: 285.9  7/8/2020 - Present Yes Hyperkalemia ICD-10-CM: E87.5 ICD-9-CM: 276.7  7/8/2020 - Present Yes KIRK (acute kidney injury) ([facility] [address] ICD-10-CM: N17.9 ICD-9-CM: 584.9  7/8/2020 - Present Yes Thrombocytopenia (HCC) (Chronic) ICD-10-CM: D69.6 ICD-9-CM: 287.5  7/8/2020 - Present Yes Hypotension ICD-10-CM: I95.9 ICD-9-CM: 458.9  7/8/2020 - Present Yes Chronic bilateral pleural effusions (Chronic) ICD-10-CM: J90 ICD-9-CM: 511.9  7/8/2020 - Present Yes Urine retention (Chronic) ICD-10-CM: R33.9 ICD-9-CM: 788.20  7/8/2020 - Present Yes Debility (Chronic) ICD-10-CM: R53.81 ICD-9-CM: 799.3  6/15/2020 - Present Yes Pleural effusion on right ICD-10-CM: J90 ICD-9-CM: 511.9  5/25/2020 - Present Yes Overview Addendum 6/24/2020  8:13 AM by Sandro Almeida NP  
  6/23/20L  Right thoracentesis:  1100 ml bloody effusion removed 6/10/20:  Bilateral thoracentesis on L( 1100 cc ) and R( 1000 cc) removed 6/3/20:  Right chest thoracentesis with 800 removed. Pleural effusion on left ICD-10-CM: J90 ICD-9-CM: 511.9  5/24/2020 - Present Yes Overview Addendum 6/25/2020 11:40 AM by Cheryle Burnett NP  
   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   
#Acute on chronic hypoxic respiratory failure, pneumonia, possible COVID: 
contact and droplet precautions Remote tele Continued vancomycin and maxipime (7/8 - ) 
followup BCx 2 (biofire showing possible MRSA), sputum Continued oral steroids ? ??  
Pulmonary consult - appreciated. No tap at this time COVID testing pending Wean O2 as tolerated 
   
#Anemia: 
7/9 hgb 6.7, 1U PRBC, lasix after #KIRK Baseline 0.6, admission 1.51 
IVF as needed Monitor BUN:Cr daily #Neck and Rectal cancer: 
Chemo on hold Needs bowel regimen Needs realistic goals of care discussion. He refuses to discussion intubation/CPR - defers to his wife who wants him full code Palliative care consulted for goals of care - still full code 
  
#DM2: SSI, Holding oral meds #Hyperkalemia, resolved, Following on tele , Holding lisinopril #HTN:, Hold antihypertensives as lower range BP #Urine retention: Juarez, flomax DC planning/Dispo:  Pending. Needs PT eval when able DVT ppx:  SCD for now Signed: 
Emma Heredia MD

## 2020-07-09 NOTE — PROGRESS NOTES
Problem: Patient Education: Go to Patient Education Activity Goal: Patient/Family Education Description: LTG: Patient will tolerate least restrictive diet without overt signs or symptoms of airway compromise. STG: Patient will tolerate pureed/nectar thick liquids without overt signs or symptoms of airway compromise. Goal revised. STG: Patient will participate in modified barium swallow study as clinically indicated. 7/8/2020 1457 by ROBERT Arias Outcome: Progressing Towards Goal 
 
SPEECH LANGUAGE PATHOLOGY: DYSPHAGIA- Daily Note: 1 NAME/AGE/GENDER: Brody Del Toro is a 79 y.o. male DATE: 7/9/2020 PRIMARY DIAGNOSIS: Acute on chronic respiratory failure with hypoxia ([facility] [place] [address] [J96.21] Procedure(s) (LRB): ULTRASOUND (Bilateral) 1 Day Post-Op ICD-10: Treatment Diagnosis: R13.12 Dysphagia, Oropharyngeal Phase RECOMMENDATIONS  
DIET:  
? Nectar thick liquids ? NO STRAWS 
? Pureed MEDICATIONS: As tolerated ASPIRATION PRECAUTIONS · Slow rate of intake · Small bites/sips · Upright at 90 degrees during meal 
  
COMPENSATORY STRATEGIES/MODIFICATIONS · None EDUCATION: 
· Recommendations discussed with Hospitalist 
· Patient CONTINUATION OF SKILLED SERVICES/MEDICAL NECESSITY: 
? Patient is expected to demonstrate progress in  swallow function, diet tolerance, and swallow safety in order to  improve swallow safety, work toward diet advancement, and decrease aspiration risk. ? Patient continues to require skilled intervention due to dysphagia. RECOMMENDATIONS for CONTINUED SPEECH THERAPY:  
YES: Anticipate need for ongoing speech therapy during this hospitalization and at next level of care. ASSESSMENT Patient seen for po trials. Noted pt with two cups of regular water at bedside, one with straw. Pt given one trial thin liquids via cup. Weak, delayed coughing observed. SLP removed the thin liquids from pt's room. Pt refused nectar thick liquids. He consumed 1 trial pureed and then refused other trials. Adequate consumption of the 1 pureed trial he did consume. Recommend continuing with nectar thick liquids via straw. Advance to pureed. Continue with ST. Discussed with RN that pt had thin liquids at bedside. Also discussed diet upgrade to pureed. RN reported pt with poor po intake. May benefit from dietary consult if poor po intake persists. REHABILITATION POTENTIAL FOR STATED GOALS: Good PLAN   
FREQUENCY/DURATION: Continue to follow patient 3 times a week for duration of hospital stay to address above goals. - Recommendations for next treatment session: Next treatment will address diet tolerance and PO trials for potential diet advancement. SUBJECTIVE Patient with limited participation. History of Present Injury/Illness: Mr. Jayant Nguyen  has a past medical history of GERD (gastroesophageal reflux disease), Head and neck cancer ([facility] [address] (9/30/2015), History of squamous cell carcinoma, History of throat cancer (2015), Hypercholesteremia, Hypertension, Hypomagnesemia (5/20/2020), Rectal cancer ([facility] [address] (2018), Type 2 diabetes mellitus ([facility] [address] and Vomiting (2/23/2016). Michael Carrerapeg He also  has a past surgical history that includes hx orthopaedic (Right, 1966); hx other surgical (9/9/15); hx heent; hx tonsillectomy; hx heent (2015); hx colonoscopy (05/2018); hx vascular access; hx lymph node dissection; hx other surgical; and flexible sigmoidoscopy (N/A, 6/2/2020). Problem List:  (Impairments causing functional limitations): 1. Oropharyngeal dysphagia Previous Dysphagia: YES Patient with history of oral cancer with chemo/radiation tx in 2015. Modified barium swallow study was completed 11/2015 with results as follows: \"No penetration or aspiration with trials of thin liquids, pudding, mixed, or regular textures. Increased mastication required with chewable textures. Recommend mechanical soft/thin liquids for pleasure with PEG to support and maintain nutritional needs. \" Diet Prior to Evaluation: Regular/thin Orientation:  
Person Place Pain: Pain Scale 1: Numeric (0 - 10) Pain Intensity 1: 0 OBJECTIVE  
 
 07/09/20 1555 Mental Status Neurologic State Alert PO Trials Assessment Method(s) Observation Patient Position upright in chair Vocal Quality No impairment Consistency Presented Puree; Thin liquid How Presented Self-fed/presented;Cup/sip;Spoon Bolus Acceptance No impairment Bolus Formation/Control No impairment Propulsion No impairment Oral Residue None Aspiration Signs/Symptoms Weak cough INTERDISCIPLINARY COLLABORATION: Registered Nurse PRECAUTIONS/ALLERGIES: Patient has no known allergies. Tool Used: Dysphagia Outcome and Severity Scale (ZOYA) Score Comments Normal Diet  [] 7 With no strategies or extra time needed Functional Swallow  [] 6 May have mild oral or pharyngeal delay Mild Dysphagia  [] 5 Which may require one diet consistency restricted Mild-Moderate Dysphagia  [] 4 With 1-2 diet consistencies restricted Moderate Dysphagia  [x] 3 With 2 or more diet consistencies restricted Moderate-Severe Dysphagia  [] 2 With partial PO strategies (trials with ST only) Severe Dysphagia  [] 1 With inability to tolerate any PO safely Score:  Initial: 3 Most Recent: x (Date 07/08/20 ) Interpretation of Tool: The Dysphagia Outcome and Severity Scale (ZOYA) is a simple, easy-to-use, 7-point scale developed to systematically rate the functional severity of dysphagia based on objective assessment and make recommendations for diet level, independence level, and type of nutrition. Current Medications:  
Current Facility-Administered Medications on File Prior to Encounter Medication Dose Route Frequency Provider Last Rate Last Dose  
 0.9% sodium chloride infusion 250 mL  250 mL IntraVENous PRN Lynn Samuel Maggie Mckeon MD      
 
Current Outpatient Medications on File Prior to Encounter Medication Sig Dispense Refill  albuterol-ipratropium (DUO-NEB) 2.5 mg-0.5 mg/3 ml nebu 3 mL by Nebulization route every six (6) hours as needed for Wheezing. 30 Nebule 0  
 bisacodyL (DULCOLAX) 10 mg supp Insert 10 mg into rectum daily. 1 Each 0  
 insulin glargine (LANTUS) 100 unit/mL injection 10 Units by SubCUTAneous route nightly. 1 Vial 0  
 insulin lispro (HUMALOG) 100 unit/mL injection 8 Units by SubCUTAneous route Before breakfast, lunch, and dinner. 1 Vial 0  
 magnesium hydroxide (MILK OF MAGNESIA) 400 mg/5 mL suspension Take 30 mL by mouth daily. 1 Bottle 0  
 potassium chloride (K-DUR, KLOR-CON) 20 mEq tablet Take 1 Tab by mouth three (3) times daily. 1 Tab 0  predniSONE (DELTASONE) 10 mg tablet Take 30 mg by mouth daily (with breakfast). 1 Tab 0  
 senna-docusate (PERICOLACE) 8.6-50 mg per tablet Take 1 Tab by mouth two (2) times a day. 1 Tab 0  
 simethicone (MYLICON) 80 mg chewable tablet Take 1 Tab by mouth four (4) times daily as needed for GI Pain. 1 Tab 0  
 tamsulosin (FLOMAX) 0.4 mg capsule Take 1 Cap by mouth daily. 1 Cap 0  
 furosemide (LASIX) 40 mg tablet Take 1 Tab by mouth two (2) times a day. 1 Tab 0  
 traMADoL (ULTRAM) 50 mg tablet Take 1 Tab by mouth every six (6) hours as needed for Pain for up to 3 days. Max Daily Amount: 200 mg. Indications: pain 12 Tab 0  
 magnesium oxide (MAG-OX) 400 mg tablet Take 1 Tab by mouth daily. 30 Tab 0  
 lisinopriL (PRINIVIL, ZESTRIL) 20 mg tablet Take 20 mg by mouth daily.  glimepiride (AMARYL) 4 mg tablet Take 4 mg by mouth every morning.  traMADoL (ULTRAM) 50 mg tablet Take 1 Tab by mouth every six (6) hours as needed for Pain for up to 30 days. Max Daily Amount: 200 mg. Indications: pain 90 Tab 0  
 meloxicam (MOBIC) 7.5 mg tablet Take 1 Tab by mouth daily.  Indications: arthralgias 30 Tab 1  
  sodium chloride 1 gram tablet TAKE 2 TABS BY MOUTH TWO (2) TIMES A DAY. 180 Tab 3  
 TRUEPLUS LANCETS 28 gauge misc  ferrous sulfate 324 mg (65 mg iron) tablet Take  by mouth Daily (before breakfast).  docusate sodium (COLACE) 100 mg capsule Take 100 mg by mouth as needed for Constipation.  amLODIPine (NORVASC) 10 mg tablet Take 10 mg by mouth daily. In am    
 metFORMIN (GLUCOPHAGE) 1,000 mg tablet Take 1,000 mg by mouth daily. In am  Indications: type 2 diabetes mellitus After treatment position/precautions: · Upright in bed · RN notified · Call light within reach Total Treatment Duration:  
Time In: 7909 Time Out: 4718 [id number] [facility] [address] CCC-SLP

## 2020-07-10 NOTE — PROGRESS NOTES
Problem: Patient Education: Go to Patient Education Activity Goal: Patient/Family Education Description: LTG: Patient will tolerate least restrictive diet without overt signs or symptoms of airway compromise. STG: Patient will tolerate pureed/nectar thick liquids without overt signs or symptoms of airway compromise. Goal revised. STG: Patient will participate in modified barium swallow study as clinically indicated. 7/8/2020 1457 by Jayna Zepeda SLP Outcome: Progressing Towards Goal 
 
SPEECH LANGUAGE PATHOLOGY: DYSPHAGIA- Daily Note:2 
 
NAME/AGE/GENDER: Krista Current is a 79 y.o. male DATE: 7/10/2020 PRIMARY DIAGNOSIS: Acute on chronic respiratory failure with hypoxia ([facility] [address] [J96.21] Procedure(s) (LRB): ULTRASOUND (Bilateral) 2 Days Post-Op ICD-10: Treatment Diagnosis: R13.12 Dysphagia, Oropharyngeal Phase RECOMMENDATIONS  
DIET:  
? Nectar thick liquids ? NO STRAWS 
? Pureed Water for pleasure between meals MEDICATIONS: As tolerated ASPIRATION PRECAUTIONS · Slow rate of intake · Small bites/sips · Upright at 90 degrees during meal 
  
COMPENSATORY STRATEGIES/MODIFICATIONS · None EDUCATION: 
· Recommendations discussed with Hospitalist 
· Patient CONTINUATION OF SKILLED SERVICES/MEDICAL NECESSITY: 
? Patient is expected to demonstrate progress in  swallow function, diet tolerance, and swallow safety in order to  improve swallow safety, work toward diet advancement, and decrease aspiration risk. ? Patient continues to require skilled intervention due to dysphagia. RECOMMENDATIONS for CONTINUED SPEECH THERAPY:  
YES: Anticipate need for ongoing speech therapy during this hospitalization and at next level of care. ASSESSMENT Patient continues to accept only minimal amounts of po and states \"I only want water\". Inconsistent throat clearing in response to thin liquids trials, but only occurred on 1/3 sips.  Throat clear also occurred on 1/2 sips of nectar thick liquids. Suspect patient's complaints of discomfort with swallow contributing to throat clearing. He refused all puree trials. Recommend continue with puree diet/nectar thick liquids. OK for sips of water between meals for pleasure. Given minimal trials accepted during speech therapy sessions, it is unlikely that he will meet nutritional needs orally. Attempted to discuss goals and possible alternative nutrition sources, but he stated \"I don't understand\" and declined to continue conversation as he would rather rest. Appreciate Palliative care's assistance with determining patient's goals. REHABILITATION POTENTIAL FOR STATED GOALS: Poor PLAN   
FREQUENCY/DURATION: Continue to follow patient 3 times a week for duration of hospital stay to address above goals. - Recommendations for next treatment session: Next treatment will address diet tolerance and PO trials for potential diet advancement. SUBJECTIVE Patient with limited participation. History of Present Injury/Illness: Mr. Huseyin Jimenez  has a past medical history of GERD (gastroesophageal reflux disease), Head and neck cancer ([facility] [address] (9/30/2015), History of squamous cell carcinoma, History of throat cancer (2015), Hypercholesteremia, Hypertension, Hypomagnesemia (5/20/2020), Rectal cancer ([address] (2018), Type 2 diabetes mellitus ([facility] [address] and Vomiting (2/23/2016). Eli Richter He also  has a past surgical history that includes hx orthopaedic (Right, 1966); hx other surgical (9/9/15); hx heent; hx tonsillectomy; hx heent (2015); hx colonoscopy (05/2018); hx vascular access; hx lymph node dissection; hx other surgical; and flexible sigmoidoscopy (N/A, 6/2/2020). Problem List:  (Impairments causing functional limitations): 1. Oropharyngeal dysphagia Orientation:  
Person Place Pain: Pain Scale 1: Numeric (0 - 10) Pain Intensity 1: 0 OBJECTIVE Patient required encouragement to participate in po trials this AM. He declined to sit upright in bed and remained in a reclined fetal positioning. He was only agreeable to sips of water by straw sips. Timely swallow upon palpation, but would require instrumental swallow assessment to objectively assess. Throat clear and belching occurred on 1/3 straw sips of water. Eventually agreeable to sips of nectar by straw after much encouragement, but only consumed 2 straw sips. Throat clearing occurred on 1/2 sips. He declined any additional trials which limited clinician's ability to fully assess swallow function. Concerns regarding swallow function and nutritional status due to limited trials. He is unable to participate in modified barium swallow study at this time due to isolation precautions. INTERDISCIPLINARY COLLABORATION: Registered Nurse PRECAUTIONS/ALLERGIES: Patient has no known allergies. Tool Used: Dysphagia Outcome and Severity Scale (ZOYA) Score Comments Normal Diet  [] 7 With no strategies or extra time needed Functional Swallow  [] 6 May have mild oral or pharyngeal delay Mild Dysphagia  [] 5 Which may require one diet consistency restricted Mild-Moderate Dysphagia  [] 4 With 1-2 diet consistencies restricted Moderate Dysphagia  [x] 3 With 2 or more diet consistencies restricted Moderate-Severe Dysphagia  [] 2 With partial PO strategies (trials with ST only) Severe Dysphagia  [] 1 With inability to tolerate any PO safely Score:  Initial: 3 Most Recent: x (Date 07/08/20 ) Interpretation of Tool: The Dysphagia Outcome and Severity Scale (ZOYA) is a simple, easy-to-use, 7-point scale developed to systematically rate the functional severity of dysphagia based on objective assessment and make recommendations for diet level, independence level, and type of nutrition. Current Medications:  
Current Facility-Administered Medications on File Prior to Encounter Medication Dose Route Frequency Provider Last Rate Last Dose  0.9% sodium chloride infusion 250 mL  250 mL IntraVENous PRN Jean Mckeon MD      
 
Current Outpatient Medications on File Prior to Encounter Medication Sig Dispense Refill  albuterol-ipratropium (DUO-NEB) 2.5 mg-0.5 mg/3 ml nebu 3 mL by Nebulization route every six (6) hours as needed for Wheezing. 30 Nebule 0  
 bisacodyL (DULCOLAX) 10 mg supp Insert 10 mg into rectum daily. 1 Each 0  
 insulin glargine (LANTUS) 100 unit/mL injection 10 Units by SubCUTAneous route nightly. 1 Vial 0  
 insulin lispro (HUMALOG) 100 unit/mL injection 8 Units by SubCUTAneous route Before breakfast, lunch, and dinner. 1 Vial 0  
 magnesium hydroxide (MILK OF MAGNESIA) 400 mg/5 mL suspension Take 30 mL by mouth daily. 1 Bottle 0  
 potassium chloride (K-DUR, KLOR-CON) 20 mEq tablet Take 1 Tab by mouth three (3) times daily. 1 Tab 0  predniSONE (DELTASONE) 10 mg tablet Take 30 mg by mouth daily (with breakfast). 1 Tab 0  
 senna-docusate (PERICOLACE) 8.6-50 mg per tablet Take 1 Tab by mouth two (2) times a day. 1 Tab 0  
 simethicone (MYLICON) 80 mg chewable tablet Take 1 Tab by mouth four (4) times daily as needed for GI Pain. 1 Tab 0  
 tamsulosin (FLOMAX) 0.4 mg capsule Take 1 Cap by mouth daily. 1 Cap 0  
 furosemide (LASIX) 40 mg tablet Take 1 Tab by mouth two (2) times a day. 1 Tab 0  
 traMADoL (ULTRAM) 50 mg tablet Take 1 Tab by mouth every six (6) hours as needed for Pain for up to 3 days. Max Daily Amount: 200 mg. Indications: pain 12 Tab 0  
 magnesium oxide (MAG-OX) 400 mg tablet Take 1 Tab by mouth daily. 30 Tab 0  
 lisinopriL (PRINIVIL, ZESTRIL) 20 mg tablet Take 20 mg by mouth daily.  glimepiride (AMARYL) 4 mg tablet Take 4 mg by mouth every morning.  traMADoL (ULTRAM) 50 mg tablet Take 1 Tab by mouth every six (6) hours as needed for Pain for up to 30 days. Max Daily Amount: 200 mg. Indications: pain 90 Tab 0  
 meloxicam (MOBIC) 7.5 mg tablet Take 1 Tab by mouth daily.  Indications: arthralgias 30 Tab 1  
 sodium chloride 1 gram tablet TAKE 2 TABS BY MOUTH TWO (2) TIMES A DAY. 180 Tab 3  
 TRUEPLUS LANCETS 28 gauge misc  ferrous sulfate 324 mg (65 mg iron) tablet Take  by mouth Daily (before breakfast).  docusate sodium (COLACE) 100 mg capsule Take 100 mg by mouth as needed for Constipation.  amLODIPine (NORVASC) 10 mg tablet Take 10 mg by mouth daily. In am    
 metFORMIN (GLUCOPHAGE) 1,000 mg tablet Take 1,000 mg by mouth daily. In am  Indications: type 2 diabetes mellitus After treatment position/precautions: · Upright in bed · RN notified · Call light within reach Total Treatment Duration:  
Time In: 4540 Time Out: 1745 Claudene Masson, [address] CCC-SLP

## 2020-07-10 NOTE — PROGRESS NOTES
Pharmacokinetic Consult to Pharmacist 
 
Cehlsea Alejandra is a 79 y.o. male being treated for sepsis with vancomycin and cefepime. Height: 6' (182.9 cm)  Weight: 77.1 kg (170 lb) Lab Results Component Value Date/Time BUN 21 07/10/2020 04:15 AM  
 Creatinine 0.78 (L) 07/10/2020 04:15 AM  
 WBC 5.3 07/10/2020 04:15 AM  
 Procalcitonin 0.60 07/08/2020 06:01 AM  
 Lactic acid 1.6 07/08/2020 06:01 AM  
 Lactic Acid (POC) 2.9 (H) 09/02/2018 07:59 PM  
  
Estimated Creatinine Clearance: 83.7 mL/min (A) (based on SCr of 0.78 mg/dL (L)). Lab Results Component Value Date/Time Vancomycin,trough 15.2 06/04/2020 09:00 PM  
 Vancomycin, random 13.3 07/09/2020 05:21 AM  
 
 
Day 3 of vancomycin. Goal trough is 15-20. Scr improved close to baseline. Vancomycin changed to 1g q12h with next dose at 1200. Will re-check a level before tonight's dose. Will continue to follow patient. Thank you, Alondra Porras, Pharm. D. Clinical Pharmacist 
[phone]

## 2020-07-10 NOTE — PROGRESS NOTES
Late entry Patient's wife returned CM's call. Wife wants patient to return to [facility] when medically stable.

## 2020-07-10 NOTE — PROGRESS NOTES
Neena Malone Admission Date: 7/8/2020 Daily Progress Note: 7/10/2020 The patient's chart is reviewed and the patient is discussed with the staff. 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 He was last seen by our practice on 7/2/2020 prior to discharge to SNF. He presented back to the ER on 7/8 (4th admission since 3/2020) with complaints of shortness of breath with bilateral pleural effusion with acute on chronic respiratory failure and temp 100.5*. Pt was admitted by the hospitalist service and we were consulted. He has undergone multiple thoracenteses:  
5/25: R 1000mL removed 5/29: R: 1300mL removed L: 800 mL removed 6/3: R: 800mL removed 6/9: R:1000mL L: 1100mL removed 6/23: R: 1100 mL bloody effusion removed 6/24: L: 900mL serosanguinous fluid removed (rare atypical cells) 
  
Pt was evaluated with ultrasound but effusions were too small for thoracentesis on 7/8. Blood cx: + staph, speciation pending. Subjective:  
 
Patient seen resting quietly in bed, no family at bedside. Remains on 2L O2 NC. Hgb 9.0 today. Current Facility-Administered Medications Medication Dose Route Frequency  potassium bicarb-citric acid (EFFER-K) tablet 40 mEq  40 mEq Oral BID  
 0.9% sodium chloride infusion 250 mL  250 mL IntraVENous PRN  
 0.9% sodium chloride infusion 250 mL  250 mL IntraVENous PRN  
 vancomycin (VANCOCIN) 1250 mg in  ml infusion  1,250 mg IntraVENous Q18H  
 albuterol-ipratropium (DUO-NEB) 2.5 MG-0.5 MG/3 ML  3 mL Nebulization Q6H PRN  
 bisacodyL (DULCOLAX) suppository 10 mg  10 mg Rectal DAILY  predniSONE (DELTASONE) tablet 30 mg  30 mg Oral DAILY WITH BREAKFAST  tamsulosin (FLOMAX) capsule 0.4 mg  0.4 mg Oral DAILY  sodium chloride (NS) flush 5-40 mL  5-40 mL IntraVENous Q8H  
 sodium chloride (NS) flush 5-40 mL  5-40 mL IntraVENous PRN  
 acetaminophen (TYLENOL) tablet 650 mg  650 mg Oral Q6H PRN  
 HYDROcodone-acetaminophen (NORCO) 5-325 mg per tablet 1 Tab  1 Tab Oral Q4H PRN  
 morphine injection 2 mg  2 mg IntraVENous Q4H PRN  
 naloxone (NARCAN) injection 0.4 mg  0.4 mg IntraVENous PRN  
 ondansetron (ZOFRAN) injection 4 mg  4 mg IntraVENous Q4H PRN  
 cefepime (MAXIPIME) 2 g in 0.9% sodium chloride (MBP/ADV) 100 mL  2 g IntraVENous Q8H  
 insulin lispro (HUMALOG) injection   SubCUTAneous AC&HS  furosemide (LASIX) injection 40 mg  40 mg IntraVENous Q12H  ferrous sulfate tablet 325 mg  1 Tab Oral DAILY WITH BREAKFAST Facility-Administered Medications Ordered in Other Encounters Medication Dose Route Frequency  0.9% sodium chloride infusion 250 mL  250 mL IntraVENous PRN Review of Systems Constitutional: negative for fever, chills, sweats Cardiovascular: negative for chest pain, palpitations, syncope, edema Gastrointestinal:  negative for dysphagia, reflux, vomiting, diarrhea, abdominal pain, or melena Neurologic:  negative for focal weakness, numbness, headache Objective:  
 
Vitals:  
 07/09/20 1940 07/09/20 2337 07/10/20 8462 07/10/20 0720 BP: 131/66 122/71 129/63 114/57 Pulse: 98 98 98 94 Resp: 18 18 18 18 Temp: 97.8 °F (36.6 °C) 98 °F (36.7 °C) 97.6 °F (36.4 °C) 97.7 °F (36.5 °C) SpO2: 97% 94% 97% 94% Weight:      
Height:      
 
 
 
Intake/Output Summary (Last 24 hours) at 7/10/2020 5732 Last data filed at 7/10/2020 9528 Gross per 24 hour Intake 464.6 ml Output 3650 ml Net -3185.4 ml Physical Exam:  
Constitution:  the patient is thin and in no acute distress EENMT:  Sclera clear, pupils equal, oral mucosa moist 
Respiratory:  Crackles, on 2L NC Cardiovascular:  RRR without M,G,R 
Gastrointestinal: soft and non-tender; with positive bowel sounds. Musculoskeletal: warm without cyanosis. There is no lower extremity edema. Skin:  no jaundice or rashes, no wounds Neurologic: no gross neuro deficits Psychiatric:  alert and oriented, resting quietly CXR: 7/10/2020 IMPRESSION: Basilar atelectasis or pneumonia and bilateral pleural effusions unchanged. LAB Recent Labs  
  07/10/20 
0716 07/09/20 
2040 07/09/20 
1648 07/09/20 
1150 07/09/20 
0809 GLUCPOC 144* 158* 198* 178* 119* Recent Labs  
  07/10/20 
0415 07/09/20 
0521 07/08/20 
0601 WBC 5.3 5.4 5.9 HGB 9.0* 6.7* 7.2* HCT 30.4* 21.7* 24.3*  
PLT 81* 83* 108* Recent Labs  
  07/10/20 
0415 07/09/20 
0521 07/08/20 
0601  139 143  
K 3.2* 4.0 6.0*  
 103 107 CO2 28 28 27 * 105* 185* BUN 21 25* 36* CREA 0.78* 1.00 1.51* MG  --   --  3.3*  
CA 8.4 8.2* 8.0* ALB  --   --  1.8* TBILI  --   --  0.5 ALT  --   --  23 Recent Labs  
  07/08/20 
4155 PHI 7.40 PCO2I 41.7 PO2I 65* HCO3I 25.9 Recent Labs  
  07/08/20 
0601 LAC 1.6 Assessment:  (Medical Decision Making) Hospital Problems  Date Reviewed: 7/10/2020 Codes Class Noted POA * (Principal) Acute on chronic respiratory failure with hypoxia (HCC) ICD-10-CM: J96.21 
ICD-9-CM: 518.84, 799.02  7/8/2020 Yes Pneumonia ICD-10-CM: J18.9 ICD-9-CM: 380  7/8/2020 Yes Anemia (Chronic) ICD-10-CM: D64.9 ICD-9-CM: 285.9  7/8/2020 Yes Hyperkalemia ICD-10-CM: E87.5 ICD-9-CM: 276.7  7/8/2020 Yes KIRK (acute kidney injury) ([facility] [address] ICD-10-CM: N17.9 ICD-9-CM: 584.9  7/8/2020 Yes Thrombocytopenia (HCC) (Chronic) ICD-10-CM: D69.6 ICD-9-CM: 287.5  7/8/2020 Yes Hypotension ICD-10-CM: I95.9 ICD-9-CM: 458.9  7/8/2020 Yes Chronic bilateral pleural effusions (Chronic) ICD-10-CM: J90 ICD-9-CM: 511.9  7/8/2020 Yes Urine retention (Chronic) ICD-10-CM: R33.9 ICD-9-CM: 788.20  7/8/2020 Yes Debility (Chronic) ICD-10-CM: R53.81 ICD-9-CM: 799.3  6/15/2020 Yes Pleural effusion on right ICD-10-CM: J90 ICD-9-CM: 511.9  5/25/2020 Yes Overview Addendum 6/24/2020  8:13 AM by Gwen Rodriguez NP  
  6/23/20L  Right thoracentesis:  1100 ml bloody effusion removed 6/10/20:  Bilateral thoracentesis on L( 1100 cc ) and R( 1000 cc) removed 6/3/20:  Right chest thoracentesis with 800 removed. Pleural effusion on left ICD-10-CM: J90 ICD-9-CM: 511.9  5/24/2020 Yes Overview Addendum 6/25/2020 11:40 AM by Gwen Rodriguez NP  
   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 
--Received 1 unit PRBCs yesterday --Appreciate Palliative Care --Cefepime day 3, Vancomycin day 2, continue More than 50% of the time documented was spent in face-to-face contact with the patient and in the care of the patient on the floor/unit where the patient is located. Merlinda Floro, NP Lungs:  Mild B rhonchi Heart:  RRR with no Murmur/Rubs/Gallops Additional Comments:   
Still waiting for covid test. Sputum culture ordered but not yet collected. Pt still on 5L O2 and c/o SOB. Cont broad abx, f/u covid test when available. Blood with 1/2 coag neg staph, likely contaminate. Seen by speech to evaluate for possible aspiration already. I have spoken with and examined the patient. I agree with the above assessment and plan as documented.  
 
Rodriguez Arriola MD

## 2020-07-11 NOTE — PROGRESS NOTES
Pharmacokinetic Consult to Pharmacist 
 
Alia Rajendra is a 79 y.o. male being treated for sepsis with vancomycin and cefepime. Height: 6' (182.9 cm)  Weight: 77.1 kg (170 lb) Lab Results Component Value Date/Time BUN 21 07/10/2020 04:15 AM  
 Creatinine 0.78 (L) 07/10/2020 04:15 AM  
 WBC 5.3 07/10/2020 04:15 AM  
 Procalcitonin 0.60 07/08/2020 06:01 AM  
 Lactic acid 1.6 07/08/2020 06:01 AM  
 Lactic Acid (POC) 2.9 (H) 09/02/2018 07:59 PM  
  
Estimated Creatinine Clearance: 83.7 mL/min (A) (based on SCr of 0.78 mg/dL (L)). Lab Results Component Value Date/Time Vancomycin,trough 26.8 (HH) 07/10/2020 11:40 PM  
 Vancomycin, random 13.3 07/09/2020 05:21 AM  
 
 
Day 4 of vancomycin. Goal trough is 15-20. Scr yesterday improved close to baseline. No Scr today to assess renal function. Trough elevated 11.7 hours post dose = 26.8. Dose given after Trough drawn. Based on this level, seems that patient require Q 18 if not Q 24 hour dosing. Will place patient back on intermittent dosing for now and draw a vancomycin level 24 hrs post dose. Pharmacy will continue to follow patient. Thank you, Ibis Ayala Clinical Pharmacist 
[phone]

## 2020-07-11 NOTE — PROGRESS NOTES
Convalescent plasma consent obtained over phone by Dr. Oswaldo Rogel and Rick Plata RN, from patient's wife. Consent form sent to [facility] via fax.

## 2020-07-11 NOTE — PROGRESS NOTES
Hospitalist Progress Note Admit Date:  2020  5:42 AM  
Name:  Shady Pereira Age:  79 y.o. 
:  1940 MRN:  [id number] PCP:  Sruthi Lopez MD 
Treatment Team: Attending Provider: Ada Hernandez MD; Consulting Provider: Padilla Miller MD; Primary Nurse: Carlos Toledo; Care Manager: Navarro Tay.; Consulting Provider: Ada Hernandez MD; Utilization Review: Gilles Kruger RN; Consulting Provider: Julio Vasques MD 
 
Subjective:  
CC:  Shortness of breath  
  
Mr. 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 During prior admit, he had numerous thoracentesis with recollection of fluids and need for IV diuretics. He required high flow O2. Pulmonary followed. It was felt that his effusions were due to hypoalbuminemia as they are transudative. Cytology negative. ECHO preserved EF. He did complete 8 days of zosyn and steroid taper. He was seen by surgery in regards to his rectal cancer with known stricture and declined prior diverting ostomy. He is s/p anal dilation per Meagan. Last chemo . He was seen by GI s/p  flex sig with disimpaction and no stricture noted. He did require PRBC x 2. He has required traore due to urine retention. COVID negative 20. Upon discharge, he went to SNF. He was noted to have increased respiratory distress and added levaquin for possible pneumonia. His wife Suellen Queen feels his oral intake is poor, though she has not seen him since his discharge. I did speak with her via phone on admit. In the ED, he is .5 . He received vancomycin and 2 L NS bolus. CXR shows increased effusions and he was hypoxic and needed 6 L NC 
  
7/9 1U PRBC. Speech eval - pureed and nectar thick liquids via straw 7/10 On 5L NC oxygen. COVID POSITIVE 
7/11 on 5L NC oxygen. Seen and examined at bedside COVID positive Wife approved convalescent plasma treatment Discussed code status with wife Valerie Cap [phone]). Informed her that he is suffering, is acutely and chronically ill. The patient defers to the wife for end of life decisions and she said he defers all end of life decisions to her. Explained CPR in detail including broken ribs and the likelihood he would not come off the ventilator. She said she would make ventilator removal decisions when the time comes but she wants him to get better. Nursing notes and chart reviewed. Review of Systems negative with exception of pertinent positives noted above. Objective:  
 
Patient Vitals for the past 24 hrs: 
 Temp Pulse Resp BP SpO2  
07/11/20 0730 97.6 °F (36.4 °C) 96 18 133/65 97 % 07/11/20 0408 97.7 °F (36.5 °C) 96 18 118/62 94 % 07/11/20 0011 98.6 °F (37 °C) 86 18 105/70 100 % 07/10/20 2047 98 °F (36.7 °C) 79 20 122/64 97 % 07/10/20 1624 97.6 °F (36.4 °C) 92 16 116/51 100 % 07/10/20 1144 98.2 °F (36.8 °C) 92 17 132/66 97 % Oxygen Therapy O2 Sat (%): 97 % (07/11/20 0730) Pulse via Oximetry: 96 beats per minute (07/08/20 1001) O2 Device: Nasal cannula (07/11/20 0915) O2 Flow Rate (L/min): 2 l/min (07/11/20 0915) FIO2 (%): 50 % (07/08/20 0556) Intake/Output Summary (Last 24 hours) at 7/11/2020 1118 Last data filed at 7/11/2020 2212 Gross per 24 hour Intake 120 ml Output 1300 ml Net -1180 ml General:    Cachetic, confused, on 5L NC oxygen CV:   RRR. No murmur, rub, or gallop. Lungs:   Bilateral crackles Abdomen:   Soft, nontender, nondistended. Extremities: Warm and dry. No cyanosis or edema. Skin:     No rashes or jaundice. Data Review: 
I have reviewed all labs, meds, telemetry events, and studies from the last 24 hours. Recent Results (from the past 24 hour(s)) GLUCOSE, POC  
 Collection Time: 07/10/20 11:43 AM  
Result Value Ref Range Glucose (POC) 194 (H) 65 - 100 mg/dL GLUCOSE, POC Collection Time: 07/10/20  4:22 PM  
Result Value Ref Range Glucose (POC) 219 (H) 65 - 100 mg/dL CULTURE, BLOOD Collection Time: 07/10/20  5:10 PM  
 Specimen: Blood Result Value Ref Range Special Requests: RIGHT 
FOREARM Culture result: NO GROWTH AFTER 12 HOURS    
CULTURE, BLOOD Collection Time: 07/10/20  5:13 PM  
 Specimen: Blood Result Value Ref Range Special Requests: RIGHT 
HAND Culture result: NO GROWTH AFTER 12 HOURS    
GLUCOSE, POC Collection Time: 07/10/20  9:54 PM  
Result Value Ref Range Glucose (POC) 149 (H) 65 - 100 mg/dL Shakir Harper Collection Time: 07/10/20 11:40 PM  
Result Value Ref Range Vancomycin,trough 26.8 (HH) 5 - 20 ug/mL GLUCOSE, POC Collection Time: 07/11/20  7:44 AM  
Result Value Ref Range Glucose (POC) 189 (H) 65 - 100 mg/dL All Micro Results Procedure Component Value Units Date/Time CULTURE, BLOOD [[id number]]  (Abnormal) Collected:  07/08/20 2174 Order Status:  Completed Specimen:  Blood Updated:  07/11/20 7889 Special Requests: RIGHT HAND     
  GRAM STAIN GRAM POSITIVE COCCI AEROBIC AND ANAEROBIC BOTTLES RESULTS VERIFIED, PHONED TO AND READ BACK BY DAVID SANTAMARIA @ 1651 7/9/20 MM Culture result: STAPHYLOCOCCUS AUREUS ANAEROBIC BOTTLE POSITIVE SENSITIVITY TO FOLLOW  
     
      
  STAPHYLOCOCCUS SPECIES, COAGULASE NEGATIVE AEROBIC BOTTLE POSITIVE THIS ORGANISM MAY BE INDICATIVE OF CULTURE CONTAMINATION, HOWEVER, CLINICAL CORRELATION NEEDS TO BE EVALUATED, AS EACH CASE IS UNIQUE. REFER TO Adal Paulino Dr PANEL ACCESSION [id number] (FROM AEROBIC BOTTLE) CULTURE, BLOOD [[id number]] Collected:  07/10/20 1710 Order Status:  Completed Specimen:  Blood Updated:  07/11/20 0631   Special Requests: --     
  RIGHT 
FOREARM 
  
 Culture result: NO GROWTH AFTER 12 HOURS     
 CULTURE, BLOOD [[id number]] Collected:  07/10/20 1713 Order Status:  Completed Specimen:  Blood Updated:  07/11/20 0631 Special Requests: --     
  RIGHT 
HAND Culture result: NO GROWTH AFTER 12 HOURS     
 CULTURE, BLOOD [[id number]] Collected:  07/08/20 7650 Order Status:  Completed Specimen:  Blood Updated:  07/11/20 0630 Special Requests: LEFT HAND Culture result: NO GROWTH 3 DAYS     
 CULTURE, BLOOD [[id number]] Order Status:  Sent Specimen:  Blood CULTURE, BLOOD [[id number]] Order Status:  Sent Specimen:  Blood CULTURE, RESPIRATORY/SPUTUM/BRONCH Rickey Flash STAIN [[id number]] Order Status:  Sent Specimen:  Sputum BLOOD CULTURE ID PANEL [[id number]]  (Abnormal) Collected:  07/08/20 3161 Order Status:  Completed Specimen:  Blood Updated:  07/09/20 0745 Acc. no. from [employer] [id number] Staphylococcus Detected Comment: RESULTS VERIFIED, PHONED TO AND READ BACK BY 
Holger Gaston RN @ 5085 ON 7/9/20 BY AMM 
  
  
  mecA (Methicillin-Resistance Genes) Detected Comment: Presence of mecA is highly indicative of methicillin resistance. The test does not replace traditional culture and susceptibilities INTERPRETATION Gram positive cocci in clusters. Identified by realtime PCR as  Coagulase negative Staphylococci Comment: A single positive culture of coagulase negative Staph is likely to be a contaminant in adult patients. Consider discontinuation of antibiotics for gram positive bloodstream infections if patient asymptomatic. THIS TEST DOES NOT REPLACE SENSITIVITY TESTING. Current Meds: 
Current Facility-Administered Medications Medication Dose Route Frequency  Vancomycin Intermitten Dosing   Other Rx Dosing/Monitoring  enoxaparin (LOVENOX) injection 30 mg  30 mg SubCUTAneous Q12H  
 0.9% sodium chloride infusion 250 mL  250 mL IntraVENous PRN  
  0.9% sodium chloride infusion 250 mL  250 mL IntraVENous PRN  
 albuterol-ipratropium (DUO-NEB) 2.5 MG-0.5 MG/3 ML  3 mL Nebulization Q6H PRN  
 bisacodyL (DULCOLAX) suppository 10 mg  10 mg Rectal DAILY  predniSONE (DELTASONE) tablet 30 mg  30 mg Oral DAILY WITH BREAKFAST  tamsulosin (FLOMAX) capsule 0.4 mg  0.4 mg Oral DAILY  sodium chloride (NS) flush 5-40 mL  5-40 mL IntraVENous Q8H  
 sodium chloride (NS) flush 5-40 mL  5-40 mL IntraVENous PRN  
 acetaminophen (TYLENOL) tablet 650 mg  650 mg Oral Q6H PRN  
 HYDROcodone-acetaminophen (NORCO) 5-325 mg per tablet 1 Tab  1 Tab Oral Q4H PRN  
 morphine injection 2 mg  2 mg IntraVENous Q4H PRN  
 naloxone (NARCAN) injection 0.4 mg  0.4 mg IntraVENous PRN  
 ondansetron (ZOFRAN) injection 4 mg  4 mg IntraVENous Q4H PRN  
 cefepime (MAXIPIME) 2 g in 0.9% sodium chloride (MBP/ADV) 100 mL  2 g IntraVENous Q8H  
 insulin lispro (HUMALOG) injection   SubCUTAneous AC&HS  furosemide (LASIX) injection 40 mg  40 mg IntraVENous Q12H  ferrous sulfate tablet 325 mg  1 Tab Oral DAILY WITH BREAKFAST Facility-Administered Medications Ordered in Other Encounters Medication Dose Route Frequency  0.9% sodium chloride infusion 250 mL  250 mL IntraVENous PRN Other Studies (last 24 hours): Xr Chest Sngl V Result Date: 7/11/2020 EXAM: Chest x-ray. INDICATION: Dyspnea. Rule out Covid 19. COMPARISON: Yesterday's chest x-ray. TECHNIQUE: Frontal view chest x-ray. FINDINGS: Bibasilar lung atelectasis or infiltrates are unchanged. There are small bilateral pleural effusions, progressed on the left and improved on the right. The cardiac size is within normal limits. No pneumothorax is identified. IMPRESSION: Unchanged bibasilar lung atelectasis or infiltrates. Assessment and Plan:  
 
Hospital Problems as of 7/11/2020 Date Reviewed: 7/10/2020 Codes Class Noted - Resolved POA * (Principal) Acute on chronic respiratory failure with hypoxia [facility]) ICD-10-CM: J96.21 
ICD-9-CM: 518.84, 799.02  7/8/2020 - Present Yes Pneumonia ICD-10-CM: J18.9 ICD-9-CM: 031  7/8/2020 - Present Yes Anemia (Chronic) ICD-10-CM: D64.9 ICD-9-CM: 285.9  7/8/2020 - Present Yes Hyperkalemia ICD-10-CM: E87.5 ICD-9-CM: 276.7  7/8/2020 - Present Yes KIRK (acute kidney injury) ([facility] [address] ICD-10-CM: N17.9 ICD-9-CM: 584.9  7/8/2020 - Present Yes Thrombocytopenia (HCC) (Chronic) ICD-10-CM: D69.6 ICD-9-CM: 287.5  7/8/2020 - Present Yes Hypotension ICD-10-CM: I95.9 ICD-9-CM: 458.9  7/8/2020 - Present Yes Chronic bilateral pleural effusions (Chronic) ICD-10-CM: J90 ICD-9-CM: 511.9  7/8/2020 - Present Yes Urine retention (Chronic) ICD-10-CM: R33.9 ICD-9-CM: 788.20  7/8/2020 - Present Yes Debility (Chronic) ICD-10-CM: R53.81 ICD-9-CM: 799.3  6/15/2020 - Present Yes Pleural effusion on right ICD-10-CM: J90 ICD-9-CM: 511.9  5/25/2020 - Present Yes Overview Addendum 6/24/2020  8:13 AM by Dannielle García NP  
  6/23/20L  Right thoracentesis:  1100 ml bloody effusion removed 6/10/20:  Bilateral thoracentesis on L( 1100 cc ) and R( 1000 cc) removed 6/3/20:  Right chest thoracentesis with 800 removed. Pleural effusion on left ICD-10-CM: J90 ICD-9-CM: 511.9  5/24/2020 - Present Yes Overview Addendum 6/25/2020 11:40 AM by Dannielle García NP  
   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   
#Acute on chronic hypoxic respiratory failure, pneumonia, COVID positive: 
contact and droplet precautions Remote tele Continued vancomycin and maxipime (7/8 - ) 
followup BCx 2 (biofire showing possible MRSA), sputum Continued oral steroids ???  
Pulmonary consult - appreciated. No tap at this time COVID positive Wife approved convalescent plasma (7/11) ID consulted for remdesivir however doubt he is a candidate due to life expectancy due to cancer Wean O2 as tolerated Continue diuresis   
  
#Anemia: 
7/9 hgb 6.7, 1U PRBC, lasix after 7/10 9 
  
#KIRK Baseline 0.6, admission 1.51 
IVF as needed Monitor BUN:Cr daily 
  #Neck and Rectal cancer: 
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  He received carbo/etoposide 2 weeks ago and has also had radiation for anal cancer. Roberth Alas has required anal dilation in past. \" 
Chemo on hold Needs bowel regimen  
Needs realistic goals of care discussion. He refuses to discussion intubation/CPR - defers to his wife who wants him full code Palliative care consulted for goals of care - still full code 
  
#DM2: SSI, Holding oral meds #Hyperkalemia, resolved, Following on tele , Holding lisinopril #HTN:, Hold antihypertensives as lower range BP #Urine retention: [place], flomax 
  
DC planning/Dispo: back to facility once medically clear DVT ppx:  SCD for now Signed: 
Ada Vega MD

## 2020-07-11 NOTE — PROGRESS NOTES
Request by treating physician/team  for COVID-19 convalescent plasma based on patient meeting one of the following criteria, which I verified by record review: 1. Age at least 25 years 2. Laboratory confirmed diagnosis of infection with SARS-CoV-2 3. Admitted to an acute care facility for the treatment of [id number] complications 4. Severe or life threatening COVID-19, or judged by the treating provider to be at high risk of progression to severe or life-threatening disease 5. Informed consent provided by the patient or healthcare proxy Severe COVID-19 is defined by one or more of the following:  dyspnea  respiratory frequency ? 30/min  blood oxygen saturation ? 93%  partial pressure of arterial oxygen to fraction of inspired oxygen ratio < 300  lung infiltrates > 50% within 24 to 48 hours Life-threatening COVID-19 is defined as one or more of the following:  
 respiratory failure  septic shock  multiple organ dysfunction or failure Consent was obtained by treating physician/team and patient was registered and assigned patient code. Communicated with blood bank and 1 unit of blood type specific COVID convalescent plasma was ordered for the patient. As per study protocol, adverse events will be monitored and reported and all regulatory procedures will be followed. Arely Arndt MD 
Director, [facility] Hematology/Oncology [address] Phone  Patient Code: [id number] Patient's Last Name: Malika Ma Patient's First Name: Kenzie Hernandez Patient MRN: [id number] Patient's Date of Birth: [birth year]

## 2020-07-11 NOTE — PROGRESS NOTES
Assessment complete via flow sheet. Pt A&Ox3. Respirations even and unlabored. S1 S2 auscultated. Bowel sounds active, abdomen soft. Denies other needs. Bed in lowest position, side rails up x3, call bell in reach. Instructed to call for assistance. Pt verbalized understanding. Plan of care reviewed with patient.

## 2020-07-11 NOTE — PROGRESS NOTES
Received report from [facility]. Patient awake resting in bed. Respirations present. Patient transported to room 816. Covid results noted. Appropriated PPE used. Patient is resting in bed in room 816. On 5 L NC. No signs of distress. No needs expressed. Bed low and locked. Call light within reach. Will continue to monitor. Dr. Bret Drake notified patient's wife of patient's covid status and patient updates.

## 2020-07-11 NOTE — PROGRESS NOTES
Covid + result noted. Seems reasonable to expect this to account for the patient's symptoms. Agree with convalescent plasm in process. Will change PO prednisone 30mg to dexamethasone 6mg daily which is a slightly higher dose. ID consulted for remdesivir considerations. Will sign off at present and allow primary team continue treating covid. Please let us know if new issues arise.   
 
Mya Glasgow MD

## 2020-07-11 NOTE — PROGRESS NOTES
Patient asleep in bed. Respirations present. No signs of distress. Bed low and locked. Call light within reach. Bedside report given to oncoming RNJose Alfredo.

## 2020-07-12 PROBLEM — B95.62 MRSA BACTEREMIA: Status: ACTIVE | Noted: 2020-01-01

## 2020-07-12 PROBLEM — R78.81 MRSA BACTEREMIA: Status: ACTIVE | Noted: 2020-01-01

## 2020-07-12 NOTE — CONSULTS
Infectious Disease Consult Today's Date: 7/12/2020 Admit Date: 7/8/2020 Impression: · MRSA bacteremia (1/2) 7/8, blood cx 7/10 NGTD; TTE pending; source unclear · COVID 7/8; Dr. Praful Dunlap applied for remdesivir 7/11; Plasma completed 7/11 
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  
· Continue IV Vancomycin for MRSA bacteremia · Await TTE · Stop Cefepime · OK with Dexamethasone · ID will continue to follow Anti-infectives: · IV Vancomycin (7/8- 
· IV Cefepime (7/8- 
· Plasma (7/11) · remdesivir application submitted (9/45) Subjective:  
Mr. 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 During prior admit, he had numerous thoracentesis with recollection of fluids and need for IV diuretics. He required high flow O2. Pulmonary followed. It was felt that his effusions were due to hypoalbuminemia as they are transudative. Cytology negative. ECHO preserved EF. He did complete 8 days of zosyn and steroid taper. He was seen by surgery in regards to his rectal cancer with known stricture and declined prior diverting ostomy. He is s/p anal dilation per Meagan. He was seen by GI s/p 6-2 -20 flex sig with disimpaction and no stricture noted. He had required traore due to urine retention. COVID negative 7-2-20. Upon discharge, he went to SNF. He was noted to have increased respiratory distress and added levaquin for possible pneumonia. In the ED, he is .5 . He received vancomycin and 2 L NS bolus. CXR shows increased effusions and he was hypoxic and needed 6 L NC.   7/10 On 5L NC oxygen. COVID POSITIVE. Received Plasma and Remdesivir applied for. MRSA bacteremia noted 7/8, blood cx 7/10 NTD; TTE pending. Source unclear at this time. Started on IV Vancomycin and Cefepime.   ID is asked to evaluate for treatment MRSA bacteremia. Patient Active Problem List  
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  
 Diabetes mellitus due to underlying condition with hyperglycemia ([address] E08.65  Type 2 diabetes with nephropathy ([facility]) E11.21  
 Primary malignant neoplasm of perianal skin C44.500  Anal carcinoma ([address] C21.0  Postoperative seroma of subcutaneous tissue after non-dermatologic procedure L76.34  
 Malignant neoplasm metastatic to bone ([facility]) C79.51  
 SIADH (syndrome of inappropriate ADH production) ([facility]) E22.2  Hypomagnesemia E83.42  
 Acute respiratory failure with hypoxia ([facility]) J96.01  
 Normocytic anemia D64.9  
 CAP (community acquired pneumonia) J18.9  Pleural effusion on left J90  
 Neutropenia ([facility]) D70.9  Pleural effusion on right J90  
 Rectal obstruction K62.4  Aspiration pneumonia ([address] J69.0  Debility R53.81  
 Acute on chronic respiratory failure with hypoxia ([facility]) J96.21  
 Pneumonia J18.9  Anemia D64.9  Hyperkalemia E87.5  KIRK (acute kidney injury) ([address] N17.9  Thrombocytopenia ([address] D69.6  Hypotension I95.9  Chronic bilateral pleural effusions J90  
 Urine retention R33.9  MRSA bacteremia R78.81, B95.62 Past Medical History:  
Diagnosis Date  GERD (gastroesophageal reflux disease)   
 pt wife denies  Head and neck cancer ([address] 9/30/2015  
 radiation and chemo and and surgery  History of squamous cell carcinoma   
 to neck area- 38 radiation treatement and 8 chemo treatment  History of throat cancer 2015  
 peg tube for about 4 months  Hypercholesteremia   
 managed well with meds  Hypertension   
 managed well with meds  Hypomagnesemia 5/20/2020  Rectal cancer ([address] 2018 28 radiation treatment and chemo pump  Type 2 diabetes mellitus ([facility] oral agent only/AVG BS: /no s.s of hypoglycemia  Vomiting 2/23/2016  
 pt wife denies Family History Problem Relation Age of Onset  Emphysema Father  Lung Disease Father  Cancer Brother Colon  Heart Disease Sister  Hypertension Sister  Heart Disease Sister  Hypertension Sister  Heart Disease Brother  Hypertension Brother  Heart Disease Brother  Hypertension Brother  Heart Disease Brother  Hypertension Brother  Heart Disease Brother  Hypertension Brother  Heart Disease Brother  Hypertension Brother Social History Tobacco Use  Smoking status: Never Smoker  Smokeless tobacco: Never Used Substance Use Topics  Alcohol use: No  
 
Past Surgical History:  
Procedure Laterality Date 2021 Keiry Mondragon N/A 6/2/2020 SIGMOIDOSCOPY FLEXIBLE performed by Candi Renteria MD at [facility] ENDOSCOPY  
 HX COLONOSCOPY  05/2018  HX HEENT    
 sinus  HX HEENT  2015  
 surgery on right throat for squamous cell ca right ear wedge  HX LYMPH NODE DISSECTION    
 HX ORTHOPAEDIC Right 1966  
 right leg  HX OTHER SURGICAL  9/9/15 EXCISION OF RIGHT Neck and PARAPHARYNGEAL MASS/RIGHT EAR WEDGE RESECTION  
 HX OTHER SURGICAL    
 peg placed and removed  HX TONSILLECTOMY  HX VASCULAR ACCESS    
 placed and removed Prior to Admission medications Medication Sig Start Date End Date Taking? Authorizing Provider  
albuterol-ipratropium (DUO-NEB) 2.5 mg-0.5 mg/3 ml nebu 3 mL by Nebulization route every six (6) hours as needed for Wheezing. 7/2/20   Abad Gomez DO  
bisacodyL (DULCOLAX) 10 mg supp Insert 10 mg into rectum daily. 7/2/20   Nydia High, DO  
insulin glargine (LANTUS) 100 unit/mL injection 10 Units by SubCUTAneous route nightly.  7/2/20   Abad Gomez,   
 insulin lispro (HUMALOG) 100 unit/mL injection 8 Units by SubCUTAneous route Before breakfast, lunch, and dinner. 7/2/20   Maggie AWAN, DO  
magnesium hydroxide (MILK OF MAGNESIA) 400 mg/5 mL suspension Take 30 mL by mouth daily. 7/2/20   Maggie AWAN, DO  
potassium chloride (K-DUR, KLOR-CON) 20 mEq tablet Take 1 Tab by mouth three (3) times daily. 7/2/20   Tommie Umana DO  
predniSONE (DELTASONE) 10 mg tablet Take 30 mg by mouth daily (with breakfast). 7/2/20   Nydia High, DO  
senna-docusate (PERICOLACE) 8.6-50 mg per tablet Take 1 Tab by mouth two (2) times a day. 7/2/20   Tommie Umana DO  
simethicone (MYLICON) 80 mg chewable tablet Take 1 Tab by mouth four (4) times daily as needed for GI Pain. 7/2/20   Maggie AWAN, DO  
tamsulosin (FLOMAX) 0.4 mg capsule Take 1 Cap by mouth daily. 7/2/20   Maggie AWAN, DO  
furosemide (LASIX) 40 mg tablet Take 1 Tab by mouth two (2) times a day. 7/2/20   Tommie Umana DO  
traMADoL (ULTRAM) 50 mg tablet Take 1 Tab by mouth every six (6) hours as needed for Pain for up to 3 days. Max Daily Amount: 200 mg. Indications: pain 7/2/20 7/5/20  Maggie AWAN DO  
magnesium oxide (MAG-OX) 400 mg tablet Take 1 Tab by mouth daily. 5/30/20   Seble High DO  
lisinopriL (PRINIVIL, ZESTRIL) 20 mg tablet Take 20 mg by mouth daily. Anjel, MD [place]  
glimepiride (AMARYL) 4 mg tablet Take 4 mg by mouth every morning. [place] Hobbs MD  
traMADoL (ULTRAM) 50 mg tablet Take 1 Tab by mouth every six (6) hours as needed for Pain for up to 30 days. Max Daily Amount: 200 mg. Indications: pain 5/1/20 5/31/20  Speedy Salcido MD  
meloxicam (MOBIC) 7.5 mg tablet Take 1 Tab by mouth daily.  Indications: arthralgias 4/17/20   Frances Rutledge MD  
sodium chloride 1 gram tablet TAKE 2 TABS BY MOUTH TWO (2) TIMES A DAY. 4/17/20   Speedy Salcido MD  
TRUEPLUS LANCETS 28 gauge misc  11/6/19   Provider, Historical  
 ferrous sulfate 324 mg (65 mg iron) tablet Take  by mouth Daily (before breakfast). Provider, Historical  
docusate sodium (COLACE) 100 mg capsule Take 100 mg by mouth as needed for Constipation. Provider, Historical  
amLODIPine (NORVASC) 10 mg tablet Take 10 mg by mouth daily. In am    Provider, Historical  
metFORMIN (GLUCOPHAGE) 1,000 mg tablet Take 1,000 mg by mouth daily. In am  Indications: type 2 diabetes mellitus    Provider, Historical  
 
 
No Known Allergies Objective:  
 
Visit Vitals /83 (BP 1 Location: Right arm, BP Patient Position: At rest) Pulse 97 Temp 97.6 °F (36.4 °C) Resp 20 Ht 6' (1.829 m) Wt 77.1 kg (170 lb) SpO2 100% BMI 23.06 kg/m² Temp (24hrs), Av.5 °F (36.4 °C), Min:97.3 °F (36.3 °C), Max:97.7 °F (36.5 °C) Physical Exam:   
General:  Alert, cooperative, well noursished, well developed, appears stated age Eyes:  Sclera anicteric. Pupils equally round and reactive to light. Mouth/Throat: Mucous membranes normal, oral pharynx clear Neck: Supple Lungs:   Course to auscultation bilaterally, good effort, O2 via NC 5 lpm  
CV:  Regular rate and rhythm,no murmur, click, rub or gallop Abdomen:   Soft, non-tender. bowel sounds normal. non-distended Extremities: No cyanosis or edema Skin: Skin color, texture, turgor normal. no acute rash or lesions Lymph nodes: Cervical and supraclavicular normal  
Musculoskeletal: No swelling or deformity Lines/Devices:  Intact, no erythema, drainage or tenderness Psych: Alert and oriented, normal mood affect given the setting Data Review: CBC: 
Recent Labs  
  20 
1025 20 
0451 20 
1115 WBC 4.9 4.8 4.1* GRANS 72 73 70 MONOS 8 5 9 EOS 0* 0* 0* ANEU 3.6 3.5 2.9 ABL 0.8 0.8 0.8 HGB 8.7* 10.4* 8.7* HCT 26.6* 32.3* 28.0*  
PLT 65* 81* 64* BMP: 
Recent Labs  
  20 
1025 20 
0451 20 
1115 CREA 0.94 0.91 1.07  
BUN 28* 26* 26*  136 140  
K 5.1 4.4 3.0*  
 98 101 CO2 27 28 31 AGAP 10 10 8 * 181* 129* LFTS: 
Recent Labs  
  07/12/20 
1025 07/12/20 
0451 TBILI 0.6 0.7 ALT 19 25 * 473* TP 6.0* 7.4 ALB 1.9* 2.4* Microbiology:  
 
All Micro Results Procedure Component Value Units Date/Time CULTURE, BLOOD [[id number]]  (Abnormal)  (Susceptibility) Collected:  07/08/20 0825 Order Status:  Completed Specimen:  Blood Updated:  07/12/20 0840 Special Requests: RIGHT HAND     
  GRAM STAIN GRAM POSITIVE COCCI AEROBIC AND ANAEROBIC BOTTLES RESULTS VERIFIED, PHONED TO AND READ BACK BY DAVID SANTAMARIA @ 7306 7/9/20 MM Culture result:    
  * METHICILLIN RESISTANT STAPHYLOCOCCUS AUREUS * ANAEROBIC BOTTLE POSITIVE  
     
      
  STAPHYLOCOCCUS SPECIES, COAGULASE NEGATIVE AEROBIC BOTTLE POSITIVE THIS ORGANISM MAY BE INDICATIVE OF CULTURE CONTAMINATION, HOWEVER, CLINICAL CORRELATION NEEDS TO BE EVALUATED, AS EACH CASE IS UNIQUE. REFER TO Adal Paulino Dr PANEL ACCESSION [id number] (FROM AEROBIC BOTTLE) RESULTS VERIFIED, PHONED TO AND READ BACK BY 
Argenis Millard RN ON 7/12/20 @0840, TA 
  
 CULTURE, BLOOD [[id number]] Collected:  07/10/20 1710 Order Status:  Completed Specimen:  Blood Updated:  07/12/20 1421 Special Requests: --     
  RIGHT 
FOREARM Culture result: NO GROWTH 2 DAYS     
 CULTURE, BLOOD [[id number]] Collected:  07/10/20 1713 Order Status:  Completed Specimen:  Blood Updated:  07/12/20 0040 Special Requests: --     
  RIGHT 
HAND Culture result: NO GROWTH 2 DAYS     
 CULTURE, BLOOD [[id number]] Collected:  07/08/20 6675 Order Status:  Completed Specimen:  Blood Updated:  07/12/20 9489 Special Requests: LEFT HAND Culture result: NO GROWTH 4 DAYS     
 CULTURE, BLOOD [[id number]] Order Status:  Sent Specimen:  Blood CULTURE, BLOOD [[id number]] Order Status:  Sent Specimen:  Blood CULTURE, RESPIRATORY/SPUTUM/BRONCH Андрей Fairchild [[id number]] Collected:  07/09/20 1345 Order Status:  Canceled Specimen:  Sputum BLOOD CULTURE ID PANEL [[id number]]  (Abnormal) Collected:  07/08/20 8264 Order Status:  Completed Specimen:  Blood Updated:  07/09/20 0745 Acc. no. from [facility] [id number] Staphylococcus Detected Comment: RESULTS VERIFIED, PHONED TO AND READ BACK BY 
Parisa Clark RN @ 7320 ON 7/9/20 BY AMM 
  
  
  mecA (Methicillin-Resistance Genes) Detected Comment: Presence of mecA is highly indicative of methicillin resistance. The test does not replace traditional culture and susceptibilities INTERPRETATION Gram positive cocci in clusters. Identified by realtime PCR as  Coagulase negative Staphylococci Comment: A single positive culture of coagulase negative Staph is likely to be a contaminant in adult patients. Consider discontinuation of antibiotics for gram positive bloodstream infections if patient asymptomatic. THIS TEST DOES NOT REPLACE SENSITIVITY TESTING. Imaging:  
IMPRESSION: Unchanged bibasilar lung atelectasis or infiltrates and pleural 
effusions. Signed By: Winter Caban NP   
 July 12, 2020

## 2020-07-12 NOTE — PROGRESS NOTES
Hospitalist Progress Note Admit Date:  2020  5:42 AM  
Name:  Sally Hernandez Age:  79 y.o. 
:  1940 MRN:  [id number] PCP:  Lior Jaffe MD 
Treatment Team: Attending Provider: Adarsh Tobin MD; Primary Nurse: Greer Motley; Care Manager: Ajay Mitchell; Consulting Provider: Adarsh Tobin MD; Utilization Review: Lashaun Gann RN; Consulting Provider: Rex Boykin MD; Consulting Provider: Delia Zhong MD; Primary Nurse: Gates Lanes; Speech Language Pathologist: ROBERT Braga Subjective:  
CC:  Shortness of breath  
  
Mr. 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 During prior admit, he had numerous thoracentesis with recollection of fluids and need for IV diuretics. He required high flow O2. Pulmonary followed. It was felt that his effusions were due to hypoalbuminemia as they are transudative. Cytology negative. ECHO preserved EF. He did complete 8 days of zosyn and steroid taper. He was seen by surgery in regards to his rectal cancer with known stricture and declined prior diverting ostomy. He is s/p anal dilation per [facility]. Last chemo . He was seen by GI s/p  flex sig with disimpaction and no stricture noted. He did require PRBC x 2. He has required traore due to urine retention. COVID negative 20. Upon discharge, he went to SNF. He was noted to have increased respiratory distress and added levaquin for possible pneumonia. His wife Merheen Chavez feels his oral intake is poor, though she has not seen him since his discharge. I did speak with her via phone on admit. In the ED, he is .5 . He received vancomycin and 2 L NS bolus.  CXR shows increased effusions and he was hypoxic and needed 6 L NC 
  
 7/9 1U PRBC. Speech eval - pureed and nectar thick liquids via straw 7/10 On 5L NC oxygen. COVID POSITIVE 
7/11 on 5L NC oxygen. 7/12 Seen and examined at bedside 
  
Today he refused meds/finger sticks. Complaining of pain all over. Wife convinced him to take his medications via his cell phone. COVID positive Convalescent plasma (7/12) MRSA bacteremia without a source Approved for remdesivir Stopped dexamethasone due to positive blood cultures TTE ordered however unclear if this will get done since he is on the COVID unit Nursing notes and chart reviewed. Review of Systems negative with exception of pertinent positives noted above. Objective:  
 
Patient Vitals for the past 24 hrs: 
 Temp Pulse Resp BP SpO2  
07/12/20 1220 97.6 °F (36.4 °C) 97 20 147/83 100 % 07/12/20 0436 97.5 °F (36.4 °C) 99 20 142/85 96 % 07/12/20 0115 97.5 °F (36.4 °C) 94 20 133/62 98 % 07/12/20 0040 97.5 °F (36.4 °C) 99 22 126/79 100 % 07/11/20 2355 97.5 °F (36.4 °C) 99 24 153/83 99 % 07/11/20 2326 97.3 °F (36.3 °C) 99 26 138/70 97 % 07/11/20 2021 97.7 °F (36.5 °C) 100 18 163/84 99 % 07/11/20 1847  98     
07/11/20 1515 98 °F (36.7 °C) 94 18 160/72 98 % Oxygen Therapy O2 Sat (%): 100 % (07/12/20 1220) Pulse via Oximetry: 96 beats per minute (07/08/20 1001) O2 Device: Nasal cannula (07/11/20 0915) O2 Flow Rate (L/min): 2 l/min (07/11/20 0915) FIO2 (%): 50 % (07/08/20 0556) Intake/Output Summary (Last 24 hours) at 7/12/2020 1349 Last data filed at 7/11/2020 2355 Gross per 24 hour Intake 218 ml Output  Net 218 ml General:    Ill appearing. Cachetic. Oxygen is on the bed and not in his nose - he is refusing. CV:   RRR. No murmur, rub, or gallop. Lungs:   Relatively clear? Abdomen:   Soft, nontender, nondistended. Extremities: Warm and dry. No cyanosis or edema. Skin:     No rashes or jaundice. Data Review: I have reviewed all labs, meds, telemetry events, and studies from the last 24 hours. Recent Results (from the past 24 hour(s)) GLUCOSE, POC Collection Time: 07/11/20  3:25 PM  
Result Value Ref Range Glucose (POC) 189 (H) 65 - 100 mg/dL GLUCOSE, POC Collection Time: 07/11/20  9:37 PM  
Result Value Ref Range Glucose (POC) 222 (H) 65 - 100 mg/dL PROTHROMBIN TIME + INR Collection Time: 07/12/20  4:51 AM  
Result Value Ref Range Prothrombin time 16.0 (H) 12.0 - 14.7 sec INR 1.3 PTT Collection Time: 07/12/20  4:51 AM  
Result Value Ref Range aPTT 33.0 24.3 - 35.4 SEC FIBRINOGEN Collection Time: 07/12/20  4:51 AM  
Result Value Ref Range Fibrinogen 354 190 - 501 mg/dL D DIMER Collection Time: 07/12/20  4:51 AM  
Result Value Ref Range D DIMER 11.61 (HH) <0.56 ug/ml(FEU) CBC WITH AUTOMATED DIFF Collection Time: 07/12/20  4:51 AM  
Result Value Ref Range WBC 4.8 4.3 - 11.1 K/uL  
 RBC 3.35 (L) 4.23 - 5.6 M/uL  
 HGB 10.4 (L) 13.6 - 17.2 g/dL HCT 32.3 (L) 41.1 - 50.3 % MCV 96.4 79.6 - 97.8 FL  
 MCH 31.0 26.1 - 32.9 PG  
 MCHC 32.2 31.4 - 35.0 g/dL  
 RDW 18.7 (H) 11.9 - 14.6 % PLATELET 81 (L) 347 - 450 K/uL MPV 11.3 9.4 - 12.3 FL ABSOLUTE NRBC 0.02 0.0 - 0.2 K/uL  
 DF AUTOMATED NEUTROPHILS 73 43 - 78 % LYMPHOCYTES 18 13 - 44 % MONOCYTES 5 4.0 - 12.0 % EOSINOPHILS 0 (L) 0.5 - 7.8 % BASOPHILS 0 0.0 - 2.0 % IMMATURE GRANULOCYTES 5 0.0 - 5.0 %  
 ABS. NEUTROPHILS 3.5 1.7 - 8.2 K/UL  
 ABS. LYMPHOCYTES 0.8 0.5 - 4.6 K/UL  
 ABS. MONOCYTES 0.2 0.1 - 1.3 K/UL  
 ABS. EOSINOPHILS 0.0 0.0 - 0.8 K/UL  
 ABS. BASOPHILS 0.0 0.0 - 0.2 K/UL  
 ABS. IMM. GRANS. 0.2 0.0 - 0.5 K/UL METABOLIC PANEL, COMPREHENSIVE Collection Time: 07/12/20  4:51 AM  
Result Value Ref Range Sodium 136 136 - 145 mmol/L Potassium 4.4 3.5 - 5.1 mmol/L Chloride 98 98 - 107 mmol/L  
 CO2 28 21 - 32 mmol/L  Anion gap 10 7 - 16 mmol/L  
 Glucose 181 (H) 65 - 100 mg/dL BUN 26 (H) 8 - 23 MG/DL Creatinine 0.91 0.8 - 1.5 MG/DL  
 GFR est AA >60 >60 ml/min/1.73m2 GFR est non-AA >60 >60 ml/min/1.73m2 Calcium 9.7 8.3 - 10.4 MG/DL Bilirubin, total 0.7 0.2 - 1.1 MG/DL  
 ALT (SGPT) 25 12 - 65 U/L  
 AST (SGOT) 53 (H) 15 - 37 U/L Alk. phosphatase 473 (H) 50 - 136 U/L Protein, total 7.4 6.3 - 8.2 g/dL Albumin 2.4 (L) 3.2 - 4.6 g/dL Globulin 5.0 (H) 2.3 - 3.5 g/dL A-G Ratio 0.5 (L) 1.2 - 3.5 LD Collection Time: 07/12/20  4:51 AM  
Result Value Ref Range  (H) 110 - 210 U/L FERRITIN Collection Time: 07/12/20  5:59 AM  
Result Value Ref Range Ferritin 1,500 (H) 8 - 388 NG/ML  
GLUCOSE, POC Collection Time: 07/12/20  8:21 AM  
Result Value Ref Range Glucose (POC) 235 (H) 65 - 100 mg/dL CBC WITH AUTOMATED DIFF Collection Time: 07/12/20 10:25 AM  
Result Value Ref Range WBC 4.9 4.3 - 11.1 K/uL  
 RBC 2.80 (L) 4.23 - 5.6 M/uL HGB 8.7 (L) 13.6 - 17.2 g/dL HCT 26.6 (L) 41.1 - 50.3 % MCV 95.0 79.6 - 97.8 FL  
 MCH 31.1 26.1 - 32.9 PG  
 MCHC 32.7 31.4 - 35.0 g/dL  
 RDW 18.4 (H) 11.9 - 14.6 % PLATELET 65 (L) 047 - 450 K/uL MPV 11.4 9.4 - 12.3 FL ABSOLUTE NRBC 0.02 0.0 - 0.2 K/uL NEUTROPHILS 72 43 - 78 % LYMPHOCYTES 17 13 - 44 % MONOCYTES 8 4.0 - 12.0 % EOSINOPHILS 0 (L) 0.5 - 7.8 % BASOPHILS 0 0.0 - 2.0 % IMMATURE GRANULOCYTES 3 0.0 - 5.0 %  
 ABS. NEUTROPHILS 3.6 1.7 - 8.2 K/UL  
 ABS. LYMPHOCYTES 0.8 0.5 - 4.6 K/UL  
 ABS. MONOCYTES 0.4 0.1 - 1.3 K/UL  
 ABS. EOSINOPHILS 0.0 0.0 - 0.8 K/UL  
 ABS. BASOPHILS 0.0 0.0 - 0.2 K/UL  
 ABS. IMM. GRANS. 0.1 0.0 - 0.5 K/UL  
 DF AUTOMATED METABOLIC PANEL, COMPREHENSIVE Collection Time: 07/12/20 10:25 AM  
Result Value Ref Range Sodium 138 136 - 145 mmol/L Potassium 5.1 3.5 - 5.1 mmol/L Chloride 101 98 - 107 mmol/L  
 CO2 27 21 - 32 mmol/L Anion gap 10 7 - 16 mmol/L Glucose 217 (H) 65 - 100 mg/dL BUN 28 (H) 8 - 23 MG/DL Creatinine 0.94 0.8 - 1.5 MG/DL  
 GFR est AA >60 >60 ml/min/1.73m2 GFR est non-AA >60 >60 ml/min/1.73m2 Calcium 8.6 8.3 - 10.4 MG/DL Bilirubin, total 0.6 0.2 - 1.1 MG/DL  
 ALT (SGPT) 19 12 - 65 U/L  
 AST (SGOT) 70 (H) 15 - 37 U/L Alk. phosphatase 356 (H) 50 - 136 U/L Protein, total 6.0 (L) 6.3 - 8.2 g/dL Albumin 1.9 (L) 3.2 - 4.6 g/dL Globulin 4.1 (H) 2.3 - 3.5 g/dL A-G Ratio 0.5 (L) 1.2 - 3.5 PTT Collection Time: 07/12/20 10:25 AM  
Result Value Ref Range aPTT 29.3 24.3 - 35.4 SEC FIBRINOGEN Collection Time: 07/12/20 10:25 AM  
Result Value Ref Range Fibrinogen 252 190 - 501 mg/dL D DIMER Collection Time: 07/12/20 10:25 AM  
Result Value Ref Range D DIMER 9.64 (HH) <0.56 ug/ml(FEU) [place] Hint Collection Time: 07/12/20 10:25 AM  
Result Value Ref Range Vancomycin, random 16.2 UG/ML  
GLUCOSE, POC Collection Time: 07/12/20 12:03 PM  
Result Value Ref Range Glucose (POC) 223 (H) 65 - 100 mg/dL All Micro Results Procedure Component Value Units Date/Time CULTURE, BLOOD [[id number]]  (Abnormal)  (Susceptibility) Collected:  07/08/20 9941 Order Status:  Completed Specimen:  Blood Updated:  07/12/20 0832 Special Requests: RIGHT HAND     
  GRAM STAIN GRAM POSITIVE COCCI AEROBIC AND ANAEROBIC BOTTLES RESULTS VERIFIED, PHONED TO AND READ BACK BY DAVID SANTAMARIA @ 3538 7/9/20 MM Culture result:    
  * METHICILLIN RESISTANT STAPHYLOCOCCUS AUREUS * ANAEROBIC BOTTLE POSITIVE  
     
      
  STAPHYLOCOCCUS SPECIES, COAGULASE NEGATIVE AEROBIC BOTTLE POSITIVE THIS ORGANISM MAY BE INDICATIVE OF CULTURE CONTAMINATION, HOWEVER, CLINICAL CORRELATION NEEDS TO BE EVALUATED, AS EACH CASE IS UNIQUE. REFER TO [facility] PANEL ACCESSION [id number] (FROM AEROBIC BOTTLE)   RESULTS VERIFIED, PHONED TO AND READ BACK BY 
Marianne Curran RN ON 7/12/20 @7609, TA 
  
 CULTURE, BLOOD [[id number]] Collected:  07/10/20 1710 Order Status:  Completed Specimen:  Blood Updated:  07/12/20 2963 Special Requests: --     
  RIGHT 
FOREARM Culture result: NO GROWTH 2 DAYS     
 CULTURE, BLOOD [[id number]] Collected:  07/10/20 1713 Order Status:  Completed Specimen:  Blood Updated:  07/12/20 0470 Special Requests: --     
  RIGHT 
HAND Culture result: NO GROWTH 2 DAYS     
 CULTURE, BLOOD [[id number]] Collected:  07/08/20 6969 Order Status:  Completed Specimen:  Blood Updated:  07/12/20 3108 Special Requests: LEFT HAND Culture result: NO GROWTH 4 DAYS     
 CULTURE, BLOOD [[id number]] Order Status:  Sent Specimen:  Blood CULTURE, BLOOD [[id number]] Order Status:  Sent Specimen:  Blood CULTURE, RESPIRATORY/SPUTUM/BRONCH Gayla Sutton [[id number]] Collected:  07/09/20 1345 Order Status:  Canceled Specimen:  Sputum BLOOD CULTURE ID PANEL [[id number]]  (Abnormal) Collected:  07/08/20 8868 Order Status:  Completed Specimen:  Blood Updated:  07/09/20 0745 Acc. no. from [employer] [id number] Staphylococcus Detected Comment: RESULTS VERIFIED, PHONED TO AND READ BACK BY 
Angel Candelario, DAVID @ 9204 ON 7/9/20 BY AMM 
  
  
  mecA (Methicillin-Resistance Genes) Detected Comment: Presence of mecA is highly indicative of methicillin resistance. The test does not replace traditional culture and susceptibilities INTERPRETATION Gram positive cocci in clusters. Identified by realtime PCR as  Coagulase negative Staphylococci Comment: A single positive culture of coagulase negative Staph is likely to be a contaminant in adult patients. Consider discontinuation of antibiotics for gram positive bloodstream infections if patient asymptomatic. THIS TEST DOES NOT REPLACE SENSITIVITY TESTING. Current Meds: 
Current Facility-Administered Medications Medication Dose Route Frequency  vancomycin (VANCOCIN) 1,000 mg in 0.9% sodium chloride (MBP/ADV) 250 mL  1,000 mg IntraVENous Q24H  
 remdesivir 200 mg in 0.9% sodium chloride 250 mL IVPB  200 mg IntraVENous ONCE Followed by  
Kj Rhodes ON 7/13/2020] remdesivir 100 mg in 0.9% sodium chloride 250 mL IVPB  100 mg IntraVENous Q24H  
 0.9% sodium chloride infusion 250 mL  250 mL IntraVENous PRN  
 enoxaparin (LOVENOX) injection 30 mg  30 mg SubCUTAneous Q12H  
 [Held by provider] dexamethasone (DECADRON) 4 mg/mL injection 6 mg  6 mg IntraVENous DAILY  0.9% sodium chloride infusion 250 mL  250 mL IntraVENous PRN  
 0.9% sodium chloride infusion 250 mL  250 mL IntraVENous PRN  
 albuterol-ipratropium (DUO-NEB) 2.5 MG-0.5 MG/3 ML  3 mL Nebulization Q6H PRN  
 bisacodyL (DULCOLAX) suppository 10 mg  10 mg Rectal DAILY  tamsulosin (FLOMAX) capsule 0.4 mg  0.4 mg Oral DAILY  sodium chloride (NS) flush 5-40 mL  5-40 mL IntraVENous Q8H  
 sodium chloride (NS) flush 5-40 mL  5-40 mL IntraVENous PRN  
 acetaminophen (TYLENOL) tablet 650 mg  650 mg Oral Q6H PRN  
 HYDROcodone-acetaminophen (NORCO) 5-325 mg per tablet 1 Tab  1 Tab Oral Q4H PRN  
 morphine injection 2 mg  2 mg IntraVENous Q4H PRN  
 naloxone (NARCAN) injection 0.4 mg  0.4 mg IntraVENous PRN  
 ondansetron (ZOFRAN) injection 4 mg  4 mg IntraVENous Q4H PRN  
 cefepime (MAXIPIME) 2 g in 0.9% sodium chloride (MBP/ADV) 100 mL  2 g IntraVENous Q8H  
 insulin lispro (HUMALOG) injection   SubCUTAneous AC&HS  furosemide (LASIX) injection 40 mg  40 mg IntraVENous Q12H  ferrous sulfate tablet 325 mg  1 Tab Oral DAILY WITH BREAKFAST Facility-Administered Medications Ordered in Other Encounters Medication Dose Route Frequency  0.9% sodium chloride infusion 250 mL  250 mL IntraVENous PRN Other Studies (last 24 hours): Xr Chest Sngl V Result Date: 7/12/2020 EXAM: Chest x-ray. INDICATION: Dyspnea. Covid 19.  COMPARISON: Yesterday's chest x-ray. TECHNIQUE: Frontal view chest x-ray. FINDINGS: Bibasilar lung atelectasis or infiltrates and small pleural effusions are unchanged. The cardiac size is stable. No pneumothorax is identified. IMPRESSION: Unchanged bibasilar lung atelectasis or infiltrates and pleural effusions. Assessment and Plan:  
 
Hospital Problems as of 7/12/2020 Date Reviewed: 7/10/2020 Codes Class Noted - Resolved POA MRSA bacteremia ICD-10-CM: R78.81, B95.62 
ICD-9-CM: 790.7, 041.12  7/12/2020 - Present Yes * (Principal) Acute on chronic respiratory failure with hypoxia [facility]) ICD-10-CM: J96.21 
ICD-9-CM: 518.84, 799.02  7/8/2020 - Present Yes Pneumonia ICD-10-CM: J18.9 ICD-9-CM: 851  7/8/2020 - Present Yes Anemia (Chronic) ICD-10-CM: D64.9 ICD-9-CM: 285.9  7/8/2020 - Present Yes Hyperkalemia ICD-10-CM: E87.5 ICD-9-CM: 276.7  7/8/2020 - Present Yes KIRK (acute kidney injury) ([facility] [address] ICD-10-CM: N17.9 ICD-9-CM: 584.9  7/8/2020 - Present Yes Thrombocytopenia (HCC) (Chronic) ICD-10-CM: D69.6 ICD-9-CM: 287.5  7/8/2020 - Present Yes Hypotension ICD-10-CM: I95.9 ICD-9-CM: 458.9  7/8/2020 - Present Yes Chronic bilateral pleural effusions (Chronic) ICD-10-CM: J90 ICD-9-CM: 511.9  7/8/2020 - Present Yes Urine retention (Chronic) ICD-10-CM: R33.9 ICD-9-CM: 788.20  7/8/2020 - Present Yes Debility (Chronic) ICD-10-CM: R53.81 ICD-9-CM: 799.3  6/15/2020 - Present Yes Pleural effusion on right ICD-10-CM: J90 ICD-9-CM: 511.9  5/25/2020 - Present Yes Overview Addendum 6/24/2020  8:13 AM by Bobby Ortiz NP  
  6/23/20L  Right thoracentesis:  1100 ml bloody effusion removed 6/10/20:  Bilateral thoracentesis on L( 1100 cc ) and R( 1000 cc) removed 6/3/20:  Right chest thoracentesis with 800 removed. Pleural effusion on left ICD-10-CM: J90 ICD-9-CM: 511.9  5/24/2020 - Present Yes Overview Addendum 6/25/2020 11:40 AM by Marta Villarreal NP  
   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   
#Acute on chronic hypoxic respiratory failure, pneumonia, COVID positive 
contact and droplet precautions Remote tele Continued vancomycin and maxipime (7/8 - ) 
followup BCx 2 (biofire showing possible MRSA), sputum Stop steroids due to MRSA bacteremia (7/12) Pulmonary consult - appreciated. No tap at this time COVID positive (7/8 - resulted 7/11) convalescent plasma (7/12) ID consulted for remdesivir however doubt he is a candidate due to life expectancy due to cancer Wean O2 as tolerated Continue diuresis  for another day. Probably go back to home med dosing on 7/13 
  
#MRSA bacteremia 
-unknown source -ECHO ordered but he is COVID positive and they may not do it 
-ID following #Anemia, stable 7/9 hgb 6.7, 1U PRBC 
  
#KIRK, improving Baseline 0.6, admission 1.51 
IVF as needed Monitor BUN:Cr daily 
  #Neck and Rectal cancer: 
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  He received carbo/etoposide 2 weeks ago and has also had radiation for anal cancer. Arnaldo Harry has required anal dilation in past. \" Chemo on hold Needs bowel regimen  
 Needs realistic goals of care discussion. He refuses to discussion intubation/CPR - defers to his wife who wants him full code Palliative care consulted for goals of care - still full code 
  
#DM2: SSI, Holding oral meds #Hyperkalemia, resolved, Following on tele , Holding lisinopril #HTN:, Hold antihypertensives as lower range BP #Urine retention: [place], flomax 
  
DC planning/Dispo: back to facility once medically clear DVT ppx:  lovenox 
  
7/11 Discussed code status with wife Delia Mercer [phone]). Informed her that he is suffering, is acutely and chronically ill. The patient defers to the wife for end of life decisions and she said he defers all end of life decisions to her. Explained CPR in detail including broken ribs and the likelihood he would not come off the ventilator. She said she would make ventilator removal decisions when the time comes but she wants him to get better.  
 
Signed: 
Funmilayo Boo MD

## 2020-07-12 NOTE — PROGRESS NOTES
Patient refused all morning medications even after several attempts. No signs or symptoms of diabetic distress. Skin pale but warm and dry to touch. Respirations even and unlabored on 2 L/NC. Lung sounds diminished. . Bed low and locked. Bed alarm and will continue to monitor.

## 2020-07-12 NOTE — PROGRESS NOTES
Problem: Risk for Spread of Infection Goal: Prevent transmission of infectious organism to others Description: Prevent the transmission of infectious organisms to other patients, staff members, and visitors. Outcome: Progressing Towards Goal 
  
Problem: Patient Education:  Go to Education Activity Goal: Patient/Family Education Outcome: Progressing Towards Goal 
  
Problem: Falls - Risk of 
Goal: *Absence of Falls Description: Document Richard Merlin Fall Risk and appropriate interventions in the flowsheet. Outcome: Progressing Towards Goal 
Note: Fall Risk Interventions: 
  
 
Mentation Interventions: Adequate sleep, hydration, pain control, Bed/chair exit alarm, Reorient patient Medication Interventions: Evaluate medications/consider consulting pharmacy Elimination Interventions: Bed/chair exit alarm, Call light in reach, Toileting schedule/hourly rounds Problem: Patient Education: Go to Patient Education Activity Goal: Patient/Family Education Outcome: Progressing Towards Goal 
  
Problem: Pressure Injury - Risk of 
Goal: *Prevention of pressure injury Description: Document Jude Scale and appropriate interventions in the flowsheet. Outcome: Progressing Towards Goal 
Note: Pressure Injury Interventions: 
Sensory Interventions: Assess changes in LOC, Avoid rigorous massage over bony prominences, Check visual cues for pain, Keep linens dry and wrinkle-free, Minimize linen layers, Pressure redistribution bed/mattress (bed type), Turn and reposition approx. every two hours (pillows and wedges if needed) Moisture Interventions: Absorbent underpads Activity Interventions: Pressure redistribution bed/mattress(bed type) Mobility Interventions: Pressure redistribution bed/mattress (bed type) Nutrition Interventions: Document food/fluid/supplement intake Friction and Shear Interventions: Apply protective barrier, creams and emollients, Foam dressings/transparent film/skin sealants Problem: Patient Education: Go to Patient Education Activity Goal: Patient/Family Education Outcome: Progressing Towards Goal 
  
Problem: Patient Education: Go to Patient Education Activity Goal: Patient/Family Education Description: LTG: Patient will tolerate least restrictive diet without overt signs or symptoms of airway compromise. STG: Patient will tolerate clear liquid diet- nectar thick liquids without overt signs or symptoms of airway compromise. STG: Patient will participate in modified barium swallow study as clinically indicated. Outcome: Progressing Towards Goal 
  
Problem: Patient Education: Go to Patient Education Activity Goal: Patient/Family Education Outcome: Progressing Towards Goal

## 2020-07-12 NOTE — PROGRESS NOTES
Shift assessment completed via doc flow sheet. Refer for details. Pt is alert with a flat affect today. No acute distress noted. Pt barely answered to questions. Repositioned in bed. F/c patent, draining yellow urine. All safety measures in place. Bed in low and locked position. Call light within reach and pt is instructed to call for assistance.

## 2020-07-12 NOTE — PROGRESS NOTES
Patient asking repeatedly \" Why wont you just let me die? \" Reminded patient that he has pain medicine available. Patient did complain of constipation and took the rectal dulcolax that he refused earlier today. Changed linens on bed. Patient sat up in bed at 60 degrees and dinner tray set up for patient. Patient also requests ice water instead of tea. Ice water given. Bed low and locked. Call light in reach.

## 2020-07-12 NOTE — PROGRESS NOTES
Convalescent plasma transfusion started. VS checked as per protocol and are wnl. No adverse reaction noted. Will continue with the transfusion and monitoring.

## 2020-07-12 NOTE — PROGRESS NOTES
Pharmacokinetic Consult to Pharmacist 
 
Anastacia Leodan is a 79 y.o. male being treated for sepsis with vancomycin and cefepime. Height: 6' (182.9 cm)  Weight: 77.1 kg (170 lb) Lab Results Component Value Date/Time BUN 28 (H) 07/12/2020 10:25 AM  
 Creatinine 0.94 07/12/2020 10:25 AM  
 WBC 4.9 07/12/2020 10:25 AM  
 Procalcitonin 0.60 07/08/2020 06:01 AM  
 Lactic acid 1.6 07/08/2020 06:01 AM  
 Lactic Acid (POC) 2.9 (H) 09/02/2018 07:59 PM  
  
Estimated Creatinine Clearance: 69.5 mL/min (based on SCr of 0.94 mg/dL). Lab Results Component Value Date/Time Vancomycin,trough 26.8 (HH) 07/10/2020 11:40 PM  
 Vancomycin, random 16.2 07/12/2020 10:25 AM  
 
 
Day 5 of vancomycin. Goal trough is 15-20. Trough elevated yesterday 11.7 hours post dose = 26.8 mcg/ml. Dose was given after Trough drawn. Random Level drawn today 34 hours post dose = 16.2 mcg/ml. Will place patient on Vancomycin 1000 mg Q 24 hours. Plan to check early trough before 3rd dose to ensure that patient is not supra therapeutic again. Pharmacy will continue to follow patient and order levels when clinically indicated Thank you, Sean Sanchez Clinical Pharmacist 
[phone]

## 2020-07-12 NOTE — PROGRESS NOTES
Problem: Risk for Spread of Infection Goal: Prevent transmission of infectious organism to others Description: Prevent the transmission of infectious organisms to other patients, staff members, and visitors. 7/12/2020 1517 by Jose Angel Rizvi Outcome: Progressing Towards Goal 
7/12/2020 1517 by Jose Angel Rizvi Outcome: Progressing Towards Goal 
  
Problem: Patient Education:  Go to Education Activity Goal: Patient/Family Education 7/12/2020 1517 by Jose Angel Rizvi Outcome: Progressing Towards Goal 
7/12/2020 1517 by Jose Angel Rizvi Outcome: Progressing Towards Goal 
  
Problem: Falls - Risk of 
Goal: *Absence of Falls Description: Document Yohannes Shine Fall Risk and appropriate interventions in the flowsheet. 7/12/2020 1517 by Jose Angel Rizvi Outcome: Progressing Towards Goal 
Note: Fall Risk Interventions: 
  
 
Mentation Interventions: Adequate sleep, hydration, pain control, Bed/chair exit alarm, Reorient patient Medication Interventions: Evaluate medications/consider consulting pharmacy Elimination Interventions: Bed/chair exit alarm, Call light in reach, Toileting schedule/hourly rounds 7/12/2020 1517 by Jose Angel Rizvi Outcome: Progressing Towards Goal 
Note: Fall Risk Interventions: 
  
 
Mentation Interventions: Adequate sleep, hydration, pain control, Bed/chair exit alarm, Reorient patient Medication Interventions: Evaluate medications/consider consulting pharmacy Elimination Interventions: Bed/chair exit alarm, Call light in reach, Toileting schedule/hourly rounds Problem: Patient Education: Go to Patient Education Activity Goal: Patient/Family Education 7/12/2020 1517 by Jose Angel Rizvi Outcome: Progressing Towards Goal 
7/12/2020 1517 by Jose Angel Rizvi Outcome: Progressing Towards Goal 
  
Problem: Patient Education: Go to Patient Education Activity Goal: Patient/Family Education 7/12/2020 1517 by Jose Angel Rizvi Outcome: Progressing Towards Goal 
 7/12/2020 1517 by Sharene Score Outcome: Progressing Towards Goal 
  
Problem: Patient Education: Go to Patient Education Activity Goal: Patient/Family Education Description: LTG: Patient will tolerate least restrictive diet without overt signs or symptoms of airway compromise. STG: Patient will tolerate clear liquid diet- nectar thick liquids without overt signs or symptoms of airway compromise. STG: Patient will participate in modified barium swallow study as clinically indicated. 7/12/2020 1517 by Sharene Score Outcome: Progressing Towards Goal 
7/12/2020 1517 by Sharene Score Outcome: Progressing Towards Goal 
  
Problem: Patient Education: Go to Patient Education Activity Goal: Patient/Family Education 7/12/2020 1517 by Sharene Score Outcome: Progressing Towards Goal 
7/12/2020 1517 by Sharene Score Outcome: Progressing Towards Goal

## 2020-07-12 NOTE — PROGRESS NOTES
Pt is resting well in bed stated he is feeling better post convalescent plasma transfusion 4 hours ago. No acute distress noted. Will continue to monitor.

## 2020-07-12 NOTE — PROGRESS NOTES
Convalescent plasma transfusion completed at this time. No adverse reaction noted. Pt tolerated transfusion well. Will continue to monitor patient status.

## 2020-07-12 NOTE — PROGRESS NOTES
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  
·  applied for remdesivir 7/11 · Cont vanc · On cefepime as well empirically - ok to continue · Will need to assess fully in person for source of MRSA bacteremia · Agree with plasma · Currently on dexamethasone 6mg - consider holding given only on 2L nc, active infection and had neg test 7/2 · Ordered TTE though had one last month Anti-infectives:  
· vanc cefpeime · Plasma in process 7.11 
· Dex 7/11 - current · remdesivir application submitted 3/45 Subjective: Will do formal consult instead of chart check given MRSA bacteremia Patient Active Problem List  
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  
 Diabetes mellitus due to underlying condition with hyperglycemia ([address] E08.65  Type 2 diabetes with nephropathy ([facility]) E11.21  
 Primary malignant neoplasm of perianal skin C44.500  Anal carcinoma ([address] C21.0  Postoperative seroma of subcutaneous tissue after non-dermatologic procedure L76.34  
 Malignant neoplasm metastatic to bone ([facility]) C79.51  
 SIADH (syndrome of inappropriate ADH production) ([facility]) E22.2  Hypomagnesemia E83.42  
 Acute respiratory failure with hypoxia ([facility]) J96.01  
 Normocytic anemia D64.9  
 CAP (community acquired pneumonia) J18.9  Pleural effusion on left J90  
 Neutropenia ([facility]) D70.9  Pleural effusion on right J90  
 Rectal obstruction K62.4  Aspiration pneumonia ([address] J69.0  Debility R53.81  
 Acute on chronic respiratory failure with hypoxia ([facility]) J96.21  
 Pneumonia J18.9  Anemia D64.9  Hyperkalemia E87.5  KIRK (acute kidney injury) ([facility] [address] N17.9  Thrombocytopenia ([facility] [address] D69.6  Hypotension I95.9  Chronic bilateral pleural effusions J90  
 Urine retention R33.9 Past Medical History:  
Diagnosis Date  GERD (gastroesophageal reflux disease)   
 pt wife denies  Head and neck cancer ([facility] [address] 9/30/2015  
 radiation and chemo and and surgery  History of squamous cell carcinoma   
 to neck area- 38 radiation treatement and 8 chemo treatment  History of throat cancer 2015  
 peg tube for about 4 months  Hypercholesteremia   
 managed well with meds  Hypertension   
 managed well with meds  Hypomagnesemia 5/20/2020  Rectal cancer ([facility] [address] 2018 28 radiation treatment and chemo pump  Type 2 diabetes mellitus ([facility] [address] oral agent only/AVG BS: /no s.s of hypoglycemia  Vomiting 2/23/2016  
 pt wife denies Family History Problem Relation Age of Onset  Emphysema Father  Lung Disease Father  Cancer Brother Colon  Heart Disease Sister  Hypertension Sister  Heart Disease Sister  Hypertension Sister  Heart Disease Brother  Hypertension Brother  Heart Disease Brother  Hypertension Brother  Heart Disease Brother  Hypertension Brother  Heart Disease Brother  Hypertension Brother  Heart Disease Brother  Hypertension Brother Social History Tobacco Use  Smoking status: Never Smoker  Smokeless tobacco: Never Used Substance Use Topics  Alcohol use: No  
 
Past Surgical History:  
Procedure Laterality Date 2021 Keiry Mondragon N/A 6/2/2020 SIGMOIDOSCOPY FLEXIBLE performed by Jaci Fitzgerald MD at [facility] ENDOSCOPY  
 HX COLONOSCOPY  05/2018  HX HEENT    
 sinus  HX HEENT  2015  
 surgery on right throat for squamous cell ca right ear wedge  HX LYMPH NODE DISSECTION    
 HX ORTHOPAEDIC Right 1966  
 right leg  HX OTHER SURGICAL  9/9/15 EXCISION OF RIGHT Neck and PARAPHARYNGEAL MASS/RIGHT EAR WEDGE RESECTION  
 HX OTHER SURGICAL    
 peg placed and removed  HX TONSILLECTOMY  HX VASCULAR ACCESS    
 placed and removed Prior to Admission medications Medication Sig Start Date End Date Taking? Authorizing Provider  
albuterol-ipratropium (DUO-NEB) 2.5 mg-0.5 mg/3 ml nebu 3 mL by Nebulization route every six (6) hours as needed for Wheezing. 7/2/20   Abad Gomez DO  
bisacodyL (DULCOLAX) 10 mg supp Insert 10 mg into rectum daily. 7/2/20   Nydia High, DO  
insulin glargine (LANTUS) 100 unit/mL injection 10 Units by SubCUTAneous route nightly. 7/2/20   Nydia High, DO  
insulin lispro (HUMALOG) 100 unit/mL injection 8 Units by SubCUTAneous route Before breakfast, lunch, and dinner. 7/2/20   Anish AWAN, DO  
magnesium hydroxide (MILK OF MAGNESIA) 400 mg/5 mL suspension Take 30 mL by mouth daily. 7/2/20   Anish AWAN, DO  
potassium chloride (K-DUR, KLOR-CON) 20 mEq tablet Take 1 Tab by mouth three (3) times daily. 7/2/20   Abad Gomez DO  
predniSONE (DELTASONE) 10 mg tablet Take 30 mg by mouth daily (with breakfast). 7/2/20   Nydia High, DO  
senna-docusate (PERICOLACE) 8.6-50 mg per tablet Take 1 Tab by mouth two (2) times a day. 7/2/20   Abad Gomez DO  
simethicone (MYLICON) 80 mg chewable tablet Take 1 Tab by mouth four (4) times daily as needed for GI Pain. 7/2/20   Anish AWAN, DO  
tamsulosin (FLOMAX) 0.4 mg capsule Take 1 Cap by mouth daily. 7/2/20   Anish AWAN, DO  
furosemide (LASIX) 40 mg tablet Take 1 Tab by mouth two (2) times a day. 7/2/20   Abad Gomez DO  
traMADoL (ULTRAM) 50 mg tablet Take 1 Tab by mouth every six (6) hours as needed for Pain for up to 3 days. Max Daily Amount: 200 mg. Indications: pain 7/2/20 7/5/20  Anish AWAN,   
magnesium oxide (MAG-OX) 400 mg tablet Take 1 Tab by mouth daily.  5/30/20   Abad Gomez DO  
 lisinopriL (PRINIVIL, ZESTRIL) 20 mg tablet Take 20 mg by mouth daily. Augusta Hobbs MD  
glimepiride (AMARYL) 4 mg tablet Take 4 mg by mouth every morning. Augusta Hobbs MD  
traMADoL (ULTRAM) 50 mg tablet Take 1 Tab by mouth every six (6) hours as needed for Pain for up to 30 days. Max Daily Amount: 200 mg. Indications: pain 20  Svitlana Putnam MD  
meloxicam (MOBIC) 7.5 mg tablet Take 1 Tab by mouth daily. Indications: arthralgias 20   Vega Knapp MD  
sodium chloride 1 gram tablet TAKE 2 TABS BY MOUTH TWO (2) TIMES A DAY. 20   Svitlana Putnam MD  
TRUEPLUS LANCETS 28 gauge misc  19   Provider, Historical  
ferrous sulfate 324 mg (65 mg iron) tablet Take  by mouth Daily (before breakfast). Provider, Historical  
docusate sodium (COLACE) 100 mg capsule Take 100 mg by mouth as needed for Constipation. Provider, Historical  
amLODIPine (NORVASC) 10 mg tablet Take 10 mg by mouth daily. In am    Provider, Historical  
metFORMIN (GLUCOPHAGE) 1,000 mg tablet Take 1,000 mg by mouth daily. In am  Indications: type 2 diabetes mellitus    Provider, Historical  
 
 
No Known Allergies Objective:  
 
Visit Vitals /84 (BP 1 Location: Right arm, BP Patient Position: At rest;Supine) Pulse 100 Temp 97.7 °F (36.5 °C) Resp 18 Ht 6' (1.829 m) Wt 77.1 kg (170 lb) SpO2 99% BMI 23.06 kg/m² Temp (24hrs), Av °F (36.7 °C), Min:97.6 °F (36.4 °C), Max:98.6 °F (37 °C) Data Review: CBC: 
Recent Labs  
  20 
1115 07/10/20 
0415 20 
8036 WBC 4.1* 5.3 5.4 GRANS 70 72 74 MONOS 9 8 7 EOS 0* 0* 1 ANEU 2.9 3.9 4.0 ABL 0.8 0.9 0.9 HGB 8.7* 9.0* 6.7* HCT 28.0* 30.4* 21.7*  
PLT 64* 81* 83* BMP: 
Recent Labs  
  20 
1115 07/10/20 
0415 20 CREA 1.07 0.78* 1.00 BUN 26* 21 25*  138 139  
K 3.0* 3.2* 4.0  
 101 103 CO2 31 28 28 AGAP 8 9 8 * 118* 105* LFTS: 
 No results for input(s): TBILI, ALT, AP, TP, ALB in the last 72 hours. No lab exists for component: SGOT Microbiology:  
 
All Micro Results Procedure Component Value Units Date/Time CULTURE, BLOOD [[id number]]  (Abnormal) Collected:  07/08/20 0819 Order Status:  Completed Specimen:  Blood Updated:  07/11/20 8383 Special Requests: RIGHT HAND     
  GRAM STAIN GRAM POSITIVE COCCI AEROBIC AND ANAEROBIC BOTTLES RESULTS VERIFIED, PHONED TO AND READ BACK BY DAVID SANTAMARIA @ [facility] 7/9/20 MM Culture result: STAPHYLOCOCCUS AUREUS ANAEROBIC BOTTLE POSITIVE SENSITIVITY TO FOLLOW  
     
      
  STAPHYLOCOCCUS SPECIES, COAGULASE NEGATIVE AEROBIC BOTTLE POSITIVE THIS ORGANISM MAY BE INDICATIVE OF CULTURE CONTAMINATION, HOWEVER, CLINICAL CORRELATION NEEDS TO BE EVALUATED, AS EACH CASE IS UNIQUE. REFER TO Adal Paulino Dr PANEL ACCESSION [id number] (FROM AEROBIC BOTTLE) CULTURE, BLOOD [[id number]] Collected:  07/10/20 1710 Order Status:  Completed Specimen:  Blood Updated:  07/11/20 0631 Special Requests: --     
  RIGHT 
FOREARM Culture result: NO GROWTH AFTER 12 HOURS     
 CULTURE, BLOOD [[id number]] Collected:  07/10/20 1713 Order Status:  Completed Specimen:  Blood Updated:  07/11/20 0631 Special Requests: --     
  RIGHT 
HAND Culture result: NO GROWTH AFTER 12 HOURS     
 CULTURE, BLOOD [[id number]] Collected:  07/08/20 3204 Order Status:  Completed Specimen:  Blood Updated:  07/11/20 0630 Special Requests: LEFT HAND Culture result: NO GROWTH 3 DAYS     
 CULTURE, BLOOD [[id number]] Order Status:  Sent Specimen:  Blood CULTURE, BLOOD [[id number]] Order Status:  Sent Specimen:  Blood CULTURE, RESPIRATORY/SPUTUM/BRONCH Katherne Pardon STAIN [[id number]] Order Status:  Sent Specimen:  Sputum BLOOD CULTURE ID PANEL [[id number]]  (Abnormal) Collected:  07/08/20 384 Order Status:  Completed Specimen:  Blood Updated:  07/09/20 0745 Acc. no. from [facility] [id number] Staphylococcus Detected Comment: RESULTS VERIFIED, PHONED TO AND READ BACK BY 
Bradley Mcneill RN @ 0059 ON 7/9/20 BY M 
  
  
  mecA (Methicillin-Resistance Genes) Detected Comment: Presence of mecA is highly indicative of methicillin resistance. The test does not replace traditional culture and susceptibilities INTERPRETATION Gram positive cocci in clusters. Identified by realtime PCR as  Coagulase negative Staphylococci Comment: A single positive culture of coagulase negative Staph is likely to be a contaminant in adult patients. Consider discontinuation of antibiotics for gram positive bloodstream infections if patient asymptomatic. THIS TEST DOES NOT REPLACE SENSITIVITY TESTING. Imaging:  
 
 
Signed By: Missy Cuevas MD   
 July 11, 2020

## 2020-07-13 NOTE — PROGRESS NOTES
Problem: Patient Education: Go to Patient Education Activity Goal: Patient/Family Education Description: LTG: Patient will tolerate least restrictive diet without overt signs or symptoms of airway compromise. MET 07/13/20 STG: Patient will tolerate pureed/nectar thick liquids without overt signs or symptoms of airway compromise. Goal revised. MET 07/13/20 STG: Patient will participate in modified barium swallow study as clinically indicated. UNABLE TO COMPLETED 
  
7/8/2020 1457 by Ami Stage, SLP Outcome: Progressing Towards Goal 
 
SPEECH LANGUAGE PATHOLOGY: DYSPHAGIA- Daily Note and Discharge NAME/AGE/GENDER: Edson Johnston is a 79 y.o. male DATE: 7/13/2020 PRIMARY DIAGNOSIS: Acute on chronic respiratory failure with hypoxia ([facility] [address] [J96.21] Procedure(s) (LRB): ULTRASOUND (Bilateral) 5 Days Post-Op ICD-10: Treatment Diagnosis: R13.12 Dysphagia, Oropharyngeal Phase RECOMMENDATIONS  
DIET:  
? Mechanical soft diet ? Thin liquids MEDICATIONS: As tolerated ASPIRATION PRECAUTIONS · Slow rate of intake · Small bites/sips · Upright at 90 degrees during meal 
  
COMPENSATORY STRATEGIES/MODIFICATIONS · None EDUCATION: 
· Recommendations discussed with Patient RECOMMENDATIONS for CONTINUED SPEECH THERAPY:  
No further speech therapy indicated at this time. ASSESSMENT Patient accepted minimal trials during session which limited ability to fully assess swallow function. No overt s/sx of airway compromise with thin liquids by straw, puree, or chewable textures. He does require increased time for oral prep with chewables, but is able to fully clear oral cavity of all residue. Patient with limited intake this hospitalization. He continues to decline food trials despite encouragement. When discussing puree vs. Mechanical soft food options he states \"I don't care. You decide\".   
 
Recommend upgrade diet to mechanical soft textures in hopes of increasing po intake. However, suspect patient will continue to eat minimal amounts of food. Ok for thin liquids as no cough or throat clear was noted today. No further speech therapy indicated at this time as patient is on least restrictive oral diet. May benefit from assistance from RD to assist with intake and Palliative care in regards to goals of care. Please consider re-consult to speech if new concerns arise. REHABILITATION POTENTIAL FOR STATED GOALS: Poor PLAN   
FREQUENCY/DURATION: no additional speech therapy indicated - Recommendations for next treatment session: No additional speech therapy indicated at this time. SUBJECTIVE Limited participation despite encouragement. Appears depressed. Did not engage in decision making with clinician. History of Present Injury/Illness: Mr. Kirstin Aguero  has a past medical history of GERD (gastroesophageal reflux disease), Head and neck cancer ([facility] [address] (9/30/2015), History of squamous cell carcinoma, History of throat cancer (2015), Hypercholesteremia, Hypertension, Hypomagnesemia (5/20/2020), Rectal cancer ([facility] [address] (2018), Type 2 diabetes mellitus ([facility] [address] and Vomiting (2/23/2016). Fly Webster He also  has a past surgical history that includes hx orthopaedic (Right, 1966); hx other surgical (9/9/15); hx heent; hx tonsillectomy; hx heent (2015); hx colonoscopy (05/2018); hx vascular access; hx lymph node dissection; hx other surgical; and flexible sigmoidoscopy (N/A, 6/2/2020). Problem List:  (Impairments causing functional limitations): 1. Oropharyngeal dysphagia Orientation:  
Person Place Pain: Pain Scale 1: Numeric (0 - 10) Pain Intensity 1: 5 Pain Location 1: Generalized Pain Intervention(s) 1: Medication (see MAR) OBJECTIVE Patient continues to accept minimal trials during session. AM meal with thin liquids on table despite nectar thick liquid recommendations. Meal was essentially untouched, and patient declined trials.  After much encouragement he consumed 2 sips of water by straw without s/sx of airway compromise. Only 2 tsp of puree and 1 bite of mixed consistency accepted. Slowed oral prep with soft chewable, but able to fully clear oral cavity adequately. No additional trials consumed despite repeated encouragement from clinician. INTERDISCIPLINARY COLLABORATION: Registered Nurse PRECAUTIONS/ALLERGIES: Patient has no known allergies. Tool Used: Dysphagia Outcome and Severity Scale (ZOYA) Score Comments Normal Diet  [] 7 With no strategies or extra time needed Functional Swallow  [] 6 May have mild oral or pharyngeal delay Mild Dysphagia  [] 5 Which may require one diet consistency restricted Mild-Moderate Dysphagia  [] 4 With 1-2 diet consistencies restricted Moderate Dysphagia  [x] 3 With 2 or more diet consistencies restricted Moderate-Severe Dysphagia  [] 2 With partial PO strategies (trials with ST only) Severe Dysphagia  [] 1 With inability to tolerate any PO safely Score:  Initial: 3 Most Recent: 5 (Date 07/08/20 ) Interpretation of Tool: The Dysphagia Outcome and Severity Scale (ZOYA) is a simple, easy-to-use, 7-point scale developed to systematically rate the functional severity of dysphagia based on objective assessment and make recommendations for diet level, independence level, and type of nutrition. Current Medications:  
Current Facility-Administered Medications on File Prior to Encounter Medication Dose Route Frequency Provider Last Rate Last Dose  [DISCONTINUED] 0.9% sodium chloride infusion 250 mL  250 mL IntraVENous PRN Ludwin Nice MD      
 
Current Outpatient Medications on File Prior to Encounter Medication Sig Dispense Refill  albuterol-ipratropium (DUO-NEB) 2.5 mg-0.5 mg/3 ml nebu 3 mL by Nebulization route every six (6) hours as needed for Wheezing. 30 Nebule 0  
 bisacodyL (DULCOLAX) 10 mg supp Insert 10 mg into rectum daily.  1 Each 0  
  insulin glargine (LANTUS) 100 unit/mL injection 10 Units by SubCUTAneous route nightly. 1 Vial 0  
 insulin lispro (HUMALOG) 100 unit/mL injection 8 Units by SubCUTAneous route Before breakfast, lunch, and dinner. 1 Vial 0  
 magnesium hydroxide (MILK OF MAGNESIA) 400 mg/5 mL suspension Take 30 mL by mouth daily. 1 Bottle 0  
 potassium chloride (K-DUR, KLOR-CON) 20 mEq tablet Take 1 Tab by mouth three (3) times daily. 1 Tab 0  predniSONE (DELTASONE) 10 mg tablet Take 30 mg by mouth daily (with breakfast). 1 Tab 0  
 senna-docusate (PERICOLACE) 8.6-50 mg per tablet Take 1 Tab by mouth two (2) times a day. 1 Tab 0  
 simethicone (MYLICON) 80 mg chewable tablet Take 1 Tab by mouth four (4) times daily as needed for GI Pain. 1 Tab 0  
 tamsulosin (FLOMAX) 0.4 mg capsule Take 1 Cap by mouth daily. 1 Cap 0  
 furosemide (LASIX) 40 mg tablet Take 1 Tab by mouth two (2) times a day. 1 Tab 0  
 traMADoL (ULTRAM) 50 mg tablet Take 1 Tab by mouth every six (6) hours as needed for Pain for up to 3 days. Max Daily Amount: 200 mg. Indications: pain 12 Tab 0  
 magnesium oxide (MAG-OX) 400 mg tablet Take 1 Tab by mouth daily. 30 Tab 0  
 lisinopriL (PRINIVIL, ZESTRIL) 20 mg tablet Take 20 mg by mouth daily.  glimepiride (AMARYL) 4 mg tablet Take 4 mg by mouth every morning.  traMADoL (ULTRAM) 50 mg tablet Take 1 Tab by mouth every six (6) hours as needed for Pain for up to 30 days. Max Daily Amount: 200 mg. Indications: pain 90 Tab 0  
 meloxicam (MOBIC) 7.5 mg tablet Take 1 Tab by mouth daily. Indications: arthralgias 30 Tab 1  
 sodium chloride 1 gram tablet TAKE 2 TABS BY MOUTH TWO (2) TIMES A DAY. 180 Tab 3  
 TRUEPLUS LANCETS 28 gauge misc  ferrous sulfate 324 mg (65 mg iron) tablet Take  by mouth Daily (before breakfast).  docusate sodium (COLACE) 100 mg capsule Take 100 mg by mouth as needed for Constipation.  amLODIPine (NORVASC) 10 mg tablet Take 10 mg by mouth daily.  In am    
  metFORMIN (GLUCOPHAGE) 1,000 mg tablet Take 1,000 mg by mouth daily. In am  Indications: type 2 diabetes mellitus After treatment position/precautions: · Upright in bed · RN notified · Call light within reach Total Treatment Duration:  
Time In: 1034 Time Out: 1045 [address] CCC-SLP

## 2020-07-13 NOTE — PROGRESS NOTES
Problem: Risk for Spread of Infection Goal: Prevent transmission of infectious organism to others Description: Prevent the transmission of infectious organisms to other patients, staff members, and visitors. Outcome: Progressing Towards Goal 
  
Problem: Patient Education:  Go to Education Activity Goal: Patient/Family Education Outcome: Progressing Towards Goal 
  
Problem: Falls - Risk of 
Goal: *Absence of Falls Description: Document Aranza Samuel Fall Risk and appropriate interventions in the flowsheet. Outcome: Progressing Towards Goal 
Note: Fall Risk Interventions: 
Mobility Interventions: OT consult for ADLs, PT Consult for mobility concerns Mentation Interventions: Adequate sleep, hydration, pain control, Bed/chair exit alarm, Reorient patient, More frequent rounding Medication Interventions: Patient to call before getting OOB, Bed/chair exit alarm Elimination Interventions: Call light in reach, Patient to call for help with toileting needs Problem: Patient Education: Go to Patient Education Activity Goal: Patient/Family Education Outcome: Progressing Towards Goal 
  
Problem: Pressure Injury - Risk of 
Goal: *Prevention of pressure injury Description: Document Jude Scale and appropriate interventions in the flowsheet. Outcome: Progressing Towards Goal 
Note: Pressure Injury Interventions: 
Sensory Interventions: Assess changes in LOC, Minimize linen layers, Keep linens dry and wrinkle-free, Monitor skin under medical devices, Pressure redistribution bed/mattress (bed type), Turn and reposition approx. every two hours (pillows and wedges if needed) Moisture Interventions: Internal/External urinary devices, Minimize layers Activity Interventions: Increase time out of bed, Pressure redistribution bed/mattress(bed type) Mobility Interventions: PT/OT evaluation Nutrition Interventions: Document food/fluid/supplement intake Friction and Shear Interventions: Minimize layers Problem: Patient Education: Go to Patient Education Activity Goal: Patient/Family Education Outcome: Progressing Towards Goal 
  
Problem: Patient Education: Go to Patient Education Activity Goal: Patient/Family Education Description: LTG: Patient will tolerate least restrictive diet without overt signs or symptoms of airway compromise. STG: Patient will tolerate clear liquid diet- nectar thick liquids without overt signs or symptoms of airway compromise. STG: Patient will participate in modified barium swallow study as clinically indicated. Outcome: Progressing Towards Goal 
  
Problem: Patient Education: Go to Patient Education Activity Goal: Patient/Family Education Outcome: Progressing Towards Goal

## 2020-07-13 NOTE — PROGRESS NOTES
SPEECH PATHOLOGY NOTE: 
 
Patient seen by speech therapy this AM. Recommend upgrade diet to mechanical soft textures/thin liquids. Full report to follow Julián [address] CCC-SLP

## 2020-07-13 NOTE — PROGRESS NOTES
Notified Dr. Dori York of [address] [facility] of [phone]. Orders received to use ordered SSI and give 10 units of Humalog.

## 2020-07-13 NOTE — PROGRESS NOTES
Hospitalist Progress Note Admit Date:  2020  5:42 AM  
Name:  Kirk Nino Age:  79 y.o. 
:  1940 MRN:  [id number] PCP:  Larisa Phoenix MD 
Treatment Team: Attending Provider: Jazmine Churchill MD; Consulting Provider: Jazmine Churchill MD; Utilization Review: Wilma Mendez RN; Consulting Provider: Isaias Moraes MD; Consulting Provider: Ally Guevara NP; Consulting Provider: Manjit Rios MD; Care Manager: Corbin Cobb RN Subjective:  
CC:  Shortness of breath  
  
Mr. 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 During prior admit, he had numerous thoracentesis with recollection of fluids and need for IV diuretics. He required high flow O2. Pulmonary followed. It was felt that his effusions were due to hypoalbuminemia as they are transudative. Cytology negative. ECHO preserved EF. He did complete 8 days of zosyn and steroid taper. He was seen by surgery in regards to his rectal cancer with known stricture and declined prior diverting ostomy. He is s/p anal dilation per Meagan. Last chemo . He was seen by GI s/p  flex sig with disimpaction and no stricture noted. He did require PRBC x 2. He has required traore due to urine retention. COVID negative 20. Upon discharge, he went to SNF. He was noted to have increased respiratory distress and added levaquin for possible pneumonia. His wife Josie Hdz feels his oral intake is poor, though she has not seen him since his discharge. I did speak with her via phone on admit. In the ED, he is .5 . He received vancomycin and 2 L NS bolus. CXR shows increased effusions and he was hypoxic and needed 6 L NC 
  
7/9 1U PRBC. Speech eval - pureed and nectar thick liquids via straw 7/10 On 5L NC oxygen. COVID POSITIVE 
7/11 on 5L NC oxygen. 7/12 refused meds/finger sticks initially until he talked to his wife. Took meds after 7/13 Seen and examined at bedside, not wearing oxygen correctly. Speech eval appreciated - advance diet to mechanical soft in order to encourage increased intake Pending ECHO Creatinine bumped, stop lasix 40mg IV BID. Start lasix 20mg IV BID (home dosing equivalent) Nursing notes and chart reviewed. Review of Systems negative with exception of pertinent positives noted above. Objective:  
 
Patient Vitals for the past 24 hrs: 
 Temp Pulse Resp BP SpO2  
07/13/20 1300 97.5 °F (36.4 °C) 86 17 123/70 97 % 07/13/20 0749 97.3 °F (36.3 °C) 87 17 114/77 96 % 07/12/20 2352 98 °F (36.7 °C) 98 18 135/71 97 % 07/12/20 2030 97.3 °F (36.3 °C) 96 20 149/77 98 % 07/12/20 1705 97.7 °F (36.5 °C) 99 20 127/64 99 % Oxygen Therapy O2 Sat (%): 97 % (07/13/20 1300) Pulse via Oximetry: 96 beats per minute (07/08/20 1001) O2 Device: Nasal cannula (07/11/20 0915) O2 Flow Rate (L/min): 2 l/min (07/11/20 0915) FIO2 (%): 50 % (07/08/20 0556) Intake/Output Summary (Last 24 hours) at 7/13/2020 1425 Last data filed at 7/13/2020 0600 Gross per 24 hour Intake  Output 2600 ml Net -2600 ml General:    Cachetic. Alert. Oxygen only in one nostral 
CV:   RRR. No murmur, rub, or gallop. Lungs:   Relatively clear today Abdomen:   Soft, nontender, nondistended. Extremities: Warm and dry. No cyanosis or edema. Skin:     No rashes or jaundice. Data Review: 
I have reviewed all labs, meds, telemetry events, and studies from the last 24 hours. Recent Results (from the past 24 hour(s)) GLUCOSE, POC Collection Time: 07/12/20  4:21 PM  
Result Value Ref Range Glucose (POC) 346 (H) 65 - 100 mg/dL GLUCOSE, POC Collection Time: 07/12/20 11:01 PM  
Result Value Ref Range Glucose (POC) 356 (H) 65 - 100 mg/dL PROTHROMBIN TIME + INR  
 Collection Time: 07/13/20  4:40 AM  
Result Value Ref Range Prothrombin time 17.7 (H) 12.0 - 14.7 sec INR 1.4 PTT Collection Time: 07/13/20  4:40 AM  
Result Value Ref Range aPTT 35.9 (H) 24.3 - 35.4 SEC FIBRINOGEN Collection Time: 07/13/20  4:40 AM  
Result Value Ref Range Fibrinogen 216 190 - 501 mg/dL D DIMER Collection Time: 07/13/20  4:40 AM  
Result Value Ref Range D DIMER 12.78 (HH) <0.56 ug/ml(FEU) CBC WITH AUTOMATED DIFF Collection Time: 07/13/20  5:10 AM  
Result Value Ref Range WBC 4.7 4.3 - 11.1 K/uL  
 RBC 2.56 (L) 4.23 - 5.6 M/uL HGB 7.8 (L) 13.6 - 17.2 g/dL HCT 24.8 (L) 41.1 - 50.3 % MCV 96.9 79.6 - 97.8 FL  
 MCH 30.5 26.1 - 32.9 PG  
 MCHC 31.5 31.4 - 35.0 g/dL  
 RDW 18.0 (H) 11.9 - 14.6 % PLATELET 68 (L) 795 - 450 K/uL MPV 10.5 9.4 - 12.3 FL ABSOLUTE NRBC 0.02 0.0 - 0.2 K/uL  
 DF AUTOMATED NEUTROPHILS 64 43 - 78 % LYMPHOCYTES 22 13 - 44 % MONOCYTES 11 4.0 - 12.0 % EOSINOPHILS 0 (L) 0.5 - 7.8 % BASOPHILS 0 0.0 - 2.0 % IMMATURE GRANULOCYTES 3 0.0 - 5.0 %  
 ABS. NEUTROPHILS 3.0 1.7 - 8.2 K/UL  
 ABS. LYMPHOCYTES 1.0 0.5 - 4.6 K/UL  
 ABS. MONOCYTES 0.5 0.1 - 1.3 K/UL  
 ABS. EOSINOPHILS 0.0 0.0 - 0.8 K/UL  
 ABS. BASOPHILS 0.0 0.0 - 0.2 K/UL  
 ABS. IMM. GRANS. 0.2 0.0 - 0.5 K/UL GLUCOSE, POC Collection Time: 07/13/20  7:46 AM  
Result Value Ref Range Glucose (POC) 196 (H) 65 - 100 mg/dL METABOLIC PANEL, COMPREHENSIVE Collection Time: 07/13/20 10:51 AM  
Result Value Ref Range Sodium 136 136 - 145 mmol/L Potassium 3.6 3.5 - 5.1 mmol/L Chloride 100 98 - 107 mmol/L  
 CO2 29 21 - 32 mmol/L Anion gap 7 7 - 16 mmol/L Glucose 170 (H) 65 - 100 mg/dL BUN 35 (H) 8 - 23 MG/DL Creatinine 1.13 0.8 - 1.5 MG/DL  
 GFR est AA >60 >60 ml/min/1.73m2 GFR est non-AA >60 >60 ml/min/1.73m2 Calcium 8.8 8.3 - 10.4 MG/DL  Bilirubin, total 0.4 0.2 - 1.1 MG/DL  
 ALT (SGPT) 24 12 - 65 U/L  
 AST (SGOT) 152 (H) 15 - 37 U/L Alk. phosphatase 426 (H) 50 - 136 U/L Protein, total 5.7 (L) 6.3 - 8.2 g/dL Albumin 2.0 (L) 3.2 - 4.6 g/dL Globulin 3.7 (H) 2.3 - 3.5 g/dL A-G Ratio 0.5 (L) 1.2 - 3.5 GLUCOSE, POC Collection Time: 07/13/20 12:08 PM  
Result Value Ref Range Glucose (POC) 180 (H) 65 - 100 mg/dL All Micro Results Procedure Component Value Units Date/Time CULTURE, BLOOD [[id number]] Collected:  07/10/20 1710 Order Status:  Completed Specimen:  Blood Updated:  07/13/20 1036 Special Requests: --     
  RIGHT 
FOREARM Culture result: NO GROWTH 3 DAYS     
 CULTURE, BLOOD [[id number]] Collected:  07/10/20 1713 Order Status:  Completed Specimen:  Blood Updated:  07/13/20 1036 Special Requests: --     
  RIGHT 
HAND Culture result: NO GROWTH 3 DAYS     
 CULTURE, BLOOD [[id number]] Collected:  07/08/20 1188 Order Status:  Completed Specimen:  Blood Updated:  07/13/20 1035 Special Requests: LEFT HAND Culture result: NO GROWTH 5 DAYS     
 CULTURE, BLOOD [[id number]]  (Abnormal)  (Susceptibility) Collected:  07/08/20 0612 Order Status:  Completed Specimen:  Blood Updated:  07/12/20 0840 Special Requests: RIGHT HAND     
  GRAM STAIN GRAM POSITIVE COCCI AEROBIC AND ANAEROBIC BOTTLES RESULTS VERIFIED, PHONED TO AND READ BACK BY DAVID SANTAMARIA @ 1916 7/9/20 MM Culture result:    
  * METHICILLIN RESISTANT STAPHYLOCOCCUS AUREUS * ANAEROBIC BOTTLE POSITIVE  
     
      
  STAPHYLOCOCCUS SPECIES, COAGULASE NEGATIVE AEROBIC BOTTLE POSITIVE THIS ORGANISM MAY BE INDICATIVE OF CULTURE CONTAMINATION, HOWEVER, CLINICAL CORRELATION NEEDS TO BE EVALUATED, AS EACH CASE IS UNIQUE. REFER TO Adal Paulino Dr PANEL ACCESSION [id number] (FROM AEROBIC BOTTLE) RESULTS VERIFIED, PHONED TO AND READ BACK BY 
Tabby Martinez RN ON 7/12/20 @0840, TA 
  
 CULTURE, BLOOD [[id number]] Collected:  07/09/20 8269 Order Status:  Canceled Specimen:  Blood CULTURE, BLOOD [[id number]] Collected:  07/09/20 1545 Order Status:  Canceled Specimen:  Blood CULTURE, RESPIRATORY/SPUTUM/BRONCH Андрей Fairchild [[id number]] Collected:  07/09/20 1345 Order Status:  Canceled Specimen:  Sputum BLOOD CULTURE ID PANEL [[id number]]  (Abnormal) Collected:  07/08/20 5504 Order Status:  Completed Specimen:  Blood Updated:  07/09/20 0745 Acc. no. from [employer] [id number] Staphylococcus Detected Comment: RESULTS VERIFIED, PHONED TO AND READ BACK BY 
Parisa Clark RN @ 2088 ON 7/9/20 BY AMM 
  
  
  mecA (Methicillin-Resistance Genes) Detected Comment: Presence of mecA is highly indicative of methicillin resistance. The test does not replace traditional culture and susceptibilities INTERPRETATION Gram positive cocci in clusters. Identified by realtime PCR as  Coagulase negative Staphylococci Comment: A single positive culture of coagulase negative Staph is likely to be a contaminant in adult patients. Consider discontinuation of antibiotics for gram positive bloodstream infections if patient asymptomatic. THIS TEST DOES NOT REPLACE SENSITIVITY TESTING. Current Meds: 
Current Facility-Administered Medications Medication Dose Route Frequency  furosemide (LASIX) injection 20 mg  20 mg IntraVENous Q12H  
 vancomycin (VANCOCIN) 1,000 mg in 0.9% sodium chloride (MBP/ADV) 250 mL  1,000 mg IntraVENous Q24H  
 remdesivir 100 mg in 0.9% sodium chloride 250 mL IVPB  100 mg IntraVENous Q24H  
 0.9% sodium chloride infusion 250 mL  250 mL IntraVENous PRN  
 enoxaparin (LOVENOX) injection 30 mg  30 mg SubCUTAneous Q12H  
 dexamethasone (DECADRON) 4 mg/mL injection 6 mg  6 mg IntraVENous DAILY  albuterol-ipratropium (DUO-NEB) 2.5 MG-0.5 MG/3 ML  3 mL Nebulization Q6H PRN  
 bisacodyL (DULCOLAX) suppository 10 mg  10 mg Rectal DAILY  tamsulosin (FLOMAX) capsule 0.4 mg  0.4 mg Oral DAILY  sodium chloride (NS) flush 5-40 mL  5-40 mL IntraVENous Q8H  
 sodium chloride (NS) flush 5-40 mL  5-40 mL IntraVENous PRN  
 acetaminophen (TYLENOL) tablet 650 mg  650 mg Oral Q6H PRN  
 HYDROcodone-acetaminophen (NORCO) 5-325 mg per tablet 1 Tab  1 Tab Oral Q4H PRN  
 morphine injection 2 mg  2 mg IntraVENous Q4H PRN  
 naloxone (NARCAN) injection 0.4 mg  0.4 mg IntraVENous PRN  
 ondansetron (ZOFRAN) injection 4 mg  4 mg IntraVENous Q4H PRN  
 insulin lispro (HUMALOG) injection   SubCUTAneous AC&HS  ferrous sulfate tablet 325 mg  1 Tab Oral DAILY WITH BREAKFAST Other Studies (last 24 hours): Xr Chest Sngl V Result Date: 7/13/2020 EXAM: Chest x-ray. INDICATION: Dyspnea. COMPARISON: Yesterday's chest x-ray. TECHNIQUE: Frontal view chest x-ray. FINDINGS: Bibasilar lung atelectasis or infiltrates and small pleural effusions are unchanged. The cardiac size is stable. No pneumothorax is identified. IMPRESSION: No interval change. Assessment and Plan:  
 
Hospital Problems as of 7/13/2020 Date Reviewed: 7/10/2020 Codes Class Noted - Resolved POA MRSA bacteremia ICD-10-CM: R78.81, B95.62 
ICD-9-CM: 790.7, 041.12  7/12/2020 - Present Yes * (Principal) Acute on chronic respiratory failure with hypoxia [facility]) ICD-10-CM: J96.21 
ICD-9-CM: 518.84, 799.02  7/8/2020 - Present Yes Pneumonia ICD-10-CM: J18.9 ICD-9-CM: 644  7/8/2020 - Present Yes Anemia (Chronic) ICD-10-CM: D64.9 ICD-9-CM: 285.9  7/8/2020 - Present Yes Hyperkalemia ICD-10-CM: E87.5 ICD-9-CM: 276.7  7/8/2020 - Present Yes KIRK (acute kidney injury) ([facility] [address] ICD-10-CM: N17.9 ICD-9-CM: 584.9  7/8/2020 - Present Yes Thrombocytopenia (HCC) (Chronic) ICD-10-CM: D69.6 ICD-9-CM: 287.5  7/8/2020 - Present Yes Hypotension ICD-10-CM: I95.9 ICD-9-CM: 458.9  7/8/2020 - Present Yes Chronic bilateral pleural effusions (Chronic) ICD-10-CM: J90 ICD-9-CM: 511.9  7/8/2020 - Present Yes Urine retention (Chronic) ICD-10-CM: R33.9 ICD-9-CM: 788.20  7/8/2020 - Present Yes Debility (Chronic) ICD-10-CM: R53.81 ICD-9-CM: 799.3  6/15/2020 - Present Yes Pleural effusion on right ICD-10-CM: J90 ICD-9-CM: 511.9  5/25/2020 - Present Yes Overview Addendum 6/24/2020  8:13 AM by Ander Hui NP  
  6/23/20L  Right thoracentesis:  1100 ml bloody effusion removed 6/10/20:  Bilateral thoracentesis on L( 1100 cc ) and R( 1000 cc) removed 6/3/20:  Right chest thoracentesis with 800 removed. Pleural effusion on left ICD-10-CM: J90 ICD-9-CM: 511.9  5/24/2020 - Present Yes Overview Addendum 6/25/2020 11:40 AM by Ander Hui NP  
   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   
#Acute on chronic hypoxic respiratory failure, pneumonia, COVID positive 
contact and droplet precautions Remote tele Continued vancomycin and maxipime (7/8 - ) 
followup BCx 2 (biofire showing possible MRSA), sputum Stop steroids due to MRSA bacteremia (7/12) Pulmonary consult - appreciated. No tap at this time COVID positive (7/8 - resulted 7/11) convalescent plasma (7/12) ID consulted for remdesivir however doubt he is a candidate due to life expectancy due to cancer Wean O2 as tolerated Continue diuresis  for another day. Probably go back to home med dosing on 7/13 
  
#MRSA bacteremia 
-unknown source -ECHO ordered but he is COVID positive and they may not do it 
-ID following 
  
#Anemia, stable 7/9 hgb 6.7, 1U PRBC 
  
#KIRK, worsening Baseline 0.6, admission 1.51 
IVF as needed Monitor BUN:Cr daily Stop lasix 40mg IV BID, start 20mg IV BID 
  
#Neck and Rectal cancer: 
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  He received carbo/etoposide 2 weeks ago and has also had radiation for anal cancer. Keegan Bateman has required anal dilation in past. \" Chemo on hold Needs bowel regimen  
Needs realistic goals of care discussion. He refuses to discussion intubation/CPR - defers to his wife who wants him full code Palliative care consulted for goals of care - still full code 
  
#DM2: SSI, Holding oral meds #Hyperkalemia, resolved, Following on tele , Holding lisinopril #HTN:, Hold antihypertensives as lower range BP #Urine retention: Juarez, flomax 
  
DC planning/Dispo: back to facility once medically clear DVT ppx:  lovenox 
  
7/11 Discussed code status with wife ([phone]). Informed her that he is suffering, is acutely and chronically ill. The patient defers to the wife for end of life decisions and she said he defers all end of life decisions to her. Explained CPR in detail including broken ribs and the likelihood he would not come off the ventilator. She said she would make ventilator removal decisions when the time comes but she wants him to get better.  
 
Signed: 
Carina Montenegro MD

## 2020-07-13 NOTE — PROGRESS NOTES
Pt lying in bed wuih eyes cloed. Awakens easily to name. Alert and oriented to person and place. Respirations even and unlabored on 2 L/NC. Abdomen soft and non tender. Bowel sounds hypoactive. Juarez catheter intact and patent draining gatito colored urine. Turn and reposition every 2 hours. Bed low and locked. Call light in reach. Will continue to monitor.

## 2020-07-13 NOTE — INTERDISCIPLINARY ROUNDS
Interdisciplinary team rounds were held 7/13/2020 with the following team members:Care Management, Nursing and Physician. Plan of care discussed. See clinical pathway and/or care plan for interventions and desired outcomes.

## 2020-07-13 NOTE — PROGRESS NOTES
Infectious Disease Consult Today's Date: 2020 Admit Date: 2020 Impression: · MRSA bacteremia (1/2) , blood cx 7/10 negative; source unclear · TTE ordered and pending · Not a good candidate for PUSHPA in the setting of COVID-19 
· COVID  · remdesivir started  · Plasma completed  · On dexamethasone · Oral squamous cell cancer and anal cell cancer Plan:  
· Continue IV Vancomycin for MRSA bacteremia · Place PICC · Await TTE; ordered and pending · OK with Dexamethasone; Anti-infectives: · IV Vancomycin (- 
· IV Cefepime (-) · Plasma () · remdesivir (2020 - ) Subjective:  
Hard of hearing. He complains of pain all over, but nothing specific; on 5L O2 nasal cannula No Known Allergies Objective:  
 
Visit Vitals /77 (BP 1 Location: Right arm, BP Patient Position: At rest;Supine) Pulse 87 Temp 97.3 °F (36.3 °C) Resp 17 Ht 6' (1.829 m) Wt 77.1 kg (170 lb) SpO2 96% BMI 23.06 kg/m² Temp (24hrs), Av.6 °F (36.4 °C), Min:97.3 °F (36.3 °C), Max:98 °F (36.7 °C) Physical Exam:   
General:  Alert, able to answer questions; appears chronically ill; very thin Eyes:  Sclera anicteric. Pupils equally round and reactive to light. Mouth/Throat: Mucous membranes normal, oral pharynx clear Neck: Supple Lungs:   Course to auscultation bilaterally, good effort, O2 via NC 5 lpm  
CV:  Regular rate and rhythm, no murmur, click, rub or gallop Abdomen:   Soft, non-tender. bowel sounds normal. non-distended Extremities: No cyanosis or edema Skin: Skin color, texture, turgor normal. no acute rash or lesions Lymph nodes: Cervical and supraclavicular normal  
Musculoskeletal: No swelling or deformity Lines/Devices:  Intact, no erythema, drainage or tenderness Psych: Alert and oriented, normal mood affect given the setting Data Review: CBC: 
Recent Labs  
  20 
0510 20 
1025 20 
0451 WBC 4.7 4.9 4.8 GRANS 64 72 73 MONOS 11 8 5 EOS 0* 0* 0* ANEU 3.0 3.6 3.5 ABL 1.0 0.8 0.8 HGB 7.8* 8.7* 10.4* HCT 24.8* 26.6* 32.3*  
PLT 68* 65* 81* BMP: 
Recent Labs  
  07/12/20 
1025 07/12/20 
0451 07/11/20 
1115 CREA 0.94 0.91 1.07  
BUN 28* 26* 26*  136 140  
K 5.1 4.4 3.0*  
 98 101 CO2 27 28 31 AGAP 10 10 8 * 181* 129* LFTS: 
Recent Labs  
  07/12/20 
1025 07/12/20 
0451 TBILI 0.6 0.7 ALT 19 25 * 473* TP 6.0* 7.4 ALB 1.9* 2.4* Microbiology:  
 
All Micro Results Procedure Component Value Units Date/Time CULTURE, BLOOD [[id number]] Collected:  07/10/20 1710 Order Status:  Completed Specimen:  Blood Updated:  07/13/20 1036 Special Requests: --     
  RIGHT 
FOREARM Culture result: NO GROWTH 3 DAYS     
 CULTURE, BLOOD [[id number]] Collected:  07/10/20 1713 Order Status:  Completed Specimen:  Blood Updated:  07/13/20 1036 Special Requests: --     
  RIGHT 
HAND Culture result: NO GROWTH 3 DAYS     
 CULTURE, BLOOD [[id number]] Collected:  07/08/20 5008 Order Status:  Completed Specimen:  Blood Updated:  07/13/20 1035 Special Requests: LEFT HAND Culture result: NO GROWTH 5 DAYS     
 CULTURE, BLOOD [[id number]]  (Abnormal)  (Susceptibility) Collected:  07/08/20 3828 Order Status:  Completed Specimen:  Blood Updated:  07/12/20 0840 Special Requests: RIGHT HAND     
  GRAM STAIN GRAM POSITIVE COCCI AEROBIC AND ANAEROBIC BOTTLES RESULTS VERIFIED, PHONED TO AND READ BACK BY DAVID SANTAMARIA @ 5124 7/9/20 MM Culture result:    
  * METHICILLIN RESISTANT STAPHYLOCOCCUS AUREUS * ANAEROBIC BOTTLE POSITIVE  
     
      
  STAPHYLOCOCCUS SPECIES, COAGULASE NEGATIVE AEROBIC BOTTLE POSITIVE THIS ORGANISM MAY BE INDICATIVE OF CULTURE CONTAMINATION, HOWEVER, CLINICAL CORRELATION NEEDS TO BE EVALUATED, AS EACH CASE IS UNIQUE. REFER TO Adal Paulino Dr PANEL ACCESSION [id number] (FROM AEROBIC BOTTLE) RESULTS VERIFIED, PHONED TO AND READ BACK BY 
Judith Moulton RN ON 7/12/20 @0840, TA 
  
 CULTURE, BLOOD [[id number]] Collected:  07/09/20 1545 Order Status:  Canceled Specimen:  Blood CULTURE, BLOOD [[id number]] Collected:  07/09/20 1545 Order Status:  Canceled Specimen:  Blood CULTURE, RESPIRATORY/SPUTUM/BRONCH Gayla Sutton [[id number]] Collected:  07/09/20 1345 Order Status:  Canceled Specimen:  Sputum BLOOD CULTURE ID PANEL [[id number]]  (Abnormal) Collected:  07/08/20 0660 Order Status:  Completed Specimen:  Blood Updated:  07/09/20 0745 Acc. no. from [employer] [id number] Staphylococcus Detected Comment: RESULTS VERIFIED, PHONED TO AND READ BACK BY 
Angel Candelario RN @ 4214 ON 7/9/20 BY AMM 
  
  
  mecA (Methicillin-Resistance Genes) Detected Comment: Presence of mecA is highly indicative of methicillin resistance. The test does not replace traditional culture and susceptibilities INTERPRETATION Gram positive cocci in clusters. Identified by realtime PCR as  Coagulase negative Staphylococci Comment: A single positive culture of coagulase negative Staph is likely to be a contaminant in adult patients. Consider discontinuation of antibiotics for gram positive bloodstream infections if patient asymptomatic. THIS TEST DOES NOT REPLACE SENSITIVITY TESTING. Imaging:  
IMPRESSION: Unchanged bibasilar lung atelectasis or infiltrates and pleural 
effusions. Signed By: Catha Opitz, MD   
 July 13, 2020

## 2020-07-14 NOTE — PROGRESS NOTES
PICC Placement Note    PRE-PROCEDURE VERIFICATION  Correct Procedure: yes. Time out completed with assistant Barby Colon RN, VAT and all persons present in agreement with time out. Correct Site:  yes  Temperature: Temp: 97.7 °F (36.5 °C), Temperature Source: Temp Source: Oral  Recent Labs     07/14/20  0513   BUN 45*   CREA 1.60*   PLT 51*   INR 1.4   WBC 5.0     Allergies: Patient has no known allergies. Education materials for [facility] given to patient or family. PROCEDURE DETAIL  A single lumen PICC line was started for antibiotic therapy. The following documentation is in addition to the PICC properties in the lines/airways flowsheet :  Lot #: [id number]  xylocaine used: yes  Mid-Arm Circumference: 22 (cm)  Internal Catheter Length: 42 (cm)  Internal Catheter Total Length: 42 (cm)  Vein Selection for PICC:left brachial  Central Line Bundle followed yes  Complication Related to Insertion: none  Both the insertion guidewire and ECG guidewire were removed intact all ports have positive blood return and were flush well with normal saline. The location of the tip of the PICC is verified using ECG technology. The tip is in the SVC per ECG reading. See image below.      Line is okay to use: yes    Mary Ann Valenzuela RN, VAT

## 2020-07-14 NOTE — PROGRESS NOTES
Pharmacokinetic Consult to Pharmacist    Chelsea Roberts is a 79 y.o. male being treated with vancomycin     Height: 6' (182.9 cm)  Weight: 77.1 kg (170 lb)  Lab Results   Component Value Date/Time    BUN 45 (H) 07/14/2020 05:13 AM    Creatinine 1.60 (H) 07/14/2020 05:13 AM    WBC 5.0 07/14/2020 05:13 AM    Procalcitonin 0.60 07/08/2020 06:01 AM    Lactic acid 1.6 07/08/2020 06:01 AM    Lactic Acid (POC) 2.9 (H) 09/02/2018 07:59 PM      Estimated Creatinine Clearance: 40.8 mL/min (A) (based on SCr of 1.6 mg/dL (H)). Lab Results   Component Value Date/Time    Vancomycin,trough 26.8 (HH) 07/10/2020 11:40 PM    Vancomycin, random 16.9 07/14/2020 03:33 PM       Day 7 of vancomycin. Goal trough is 15-20. Scr increased significantly today. Patient changed to intermittent dosing and level ordered. Random level = 16.9. Will re-dose with 1000 mg x 1.    Due to changing renal function, will continue with intermittent dosing for now    Pharmacy will continue to follow patient and order levels when clinically indicated    Thank you,  [address] [place] [address] Pharmacist  [phone]

## 2020-07-14 NOTE — PROGRESS NOTES
Problem: Risk for Spread of Infection  Goal: Prevent transmission of infectious organism to others  Description: Prevent the transmission of infectious organisms to other patients, staff members, and visitors. Outcome: Progressing Towards Goal     Problem: Pressure Injury - Risk of  Goal: *Prevention of pressure injury  Description: Document Jude Scale and appropriate interventions in the flowsheet. Outcome: Progressing Towards Goal  Note: Pressure Injury Interventions:  Sensory Interventions: Assess changes in LOC    Moisture Interventions: Internal/External urinary devices    Activity Interventions: Increase time out of bed    Mobility Interventions: PT/OT evaluation    Nutrition Interventions: Document food/fluid/supplement intake    Friction and Shear Interventions: Apply protective barrier, creams and emollients                Problem: Patient Education: Go to Patient Education Activity  Goal: Patient/Family Education  Description: LTG: Patient will tolerate least restrictive diet without overt signs or symptoms of airway compromise. STG: Patient will tolerate clear liquid diet- nectar thick liquids without overt signs or symptoms of airway compromise. STG: Patient will participate in modified barium swallow study as clinically indicated. Outcome: Progressing Towards Goal.     Soft foods,sitting up.  Poor appetite but drinking with assist. Pt unable to feed self well due to weakness/spilling food and drink

## 2020-07-14 NOTE — PROGRESS NOTES
Hospitalist Progress Note    Admission Date: 2020  5:42 AM  Reason for Admission/Hospital Course: Acute on chronic respiratory failure with hypoxia (HCC) [J96.21]      24 Hour Events:  Patient seen and examined at bedside this morning. Patient appears to be somewhat listless and non-conversational this morning by choice. Patient says that he does feel little depressed. Otherwise denies any fevers, chills, shortness of breath, chest pain. He states that he feels unwell but cannot clarify any further. ROS:  10 point review of systems is otherwise negative with the exception of the elements mentioned above. No Known Allergies    OBJECTIVE:  Patient Vitals for the past 8 hrs:   BP Temp Pulse Resp SpO2   20 1243   95     20 1159 117/72 97.7 °F (36.5 °C) (!) 103 19 92 %   20 0728 126/56 98.1 °F (36.7 °C) 99 15 97 %     Temp (24hrs), Av.2 °F (36.8 °C), Min:97.7 °F (36.5 °C), Max:98.6 °F (37 °C)     07 -  1900  In: 120 [P.O.:120]  Out: 150 [Urine:150]    PHYSICAL EXAM:  General:  Frail-appearing white male, no acute distress. Affect appears flat. HEENT: Normocephalic, atraumatic, moist oral mucosa  CV:   RRR, no murmurs  Lungs:   Poor air entry bilaterally but likely due to decreased inspiratory effort. Otherwise lungs sound clear  Abdomen:   Soft, nontender, nondistended  MSK:   No gross skeletal defects  Skin:   Multiple bruises on upper extremities from prior IV sites    Neuro:  CN II through XII grossly intact   Psych:  Appears depressed, flat affect.     Labs:      Recent Labs     20  0513 20  0510 20  1025   WBC 5.0 4.7 4.9   RBC 2.78* 2.56* 2.80*   HGB 8.3* 7.8* 8.7*   HCT 27.3* 24.8* 26.6*   MCV 98.2* 96.9 95.0   MCH 29.9 30.5 31.1   MCHC 30.4* 31.5 32.7   RDW 18.6* 18.0* 18.4*   PLT 51* 68* 65*   GRANS 65 64 72   LYMPH 20 22 17   MONOS 12 11 8   EOS 0* 0* 0*   BASOS 0 0 0   IG 2 3 3   DF AUTOMATED AUTOMATED AUTOMATED   ANEU 3.2 3.0 3.6 ABL 1.0 1.0 0.8   ABM 0.6 0.5 0.4   APRIL 0.0 0.0 0.0   ABB 0.0 0.0 0.0   AIG 0.1 0.2 0.1        Recent Labs     07/14/20  0513 07/13/20  1051 07/12/20  1025    136 138   K 3.8 3.6 5.1   CL 97* 100 101   CO2 28 29 27   AGAP 12 7 10   * 170* 217*   BUN 45* 35* 28*   CREA 1.60* 1.13 0.94   GFRAA 54* >60 >60   GFRNA 45* >60 >60   CA 8.7 8.8 8.6   AP 1,035* 426* 356*   TP 5.9* 5.7* 6.0*   ALB 2.0* 2.0* 1.9*   GLOB 3.9* 3.7* 4.1*   AGRAT 0.5* 0.5* 0.5*       Recent Results (from the past 24 hour(s))   GLUCOSE, POC    Collection Time: 07/13/20  4:25 PM   Result Value Ref Range    Glucose (POC) 214 (H) 65 - 100 mg/dL   GLUCOSE, POC    Collection Time: 07/13/20  9:12 PM   Result Value Ref Range    Glucose (POC) 123 (H) 65 - 100 mg/dL   PROTHROMBIN TIME + INR    Collection Time: 07/14/20  5:13 AM   Result Value Ref Range    Prothrombin time 17.7 (H) 12.0 - 14.7 sec    INR 1.4     CBC WITH AUTOMATED DIFF    Collection Time: 07/14/20  5:13 AM   Result Value Ref Range    WBC 5.0 4.3 - 11.1 K/uL    RBC 2.78 (L) 4.23 - 5.6 M/uL    HGB 8.3 (L) 13.6 - 17.2 g/dL    HCT 27.3 (L) 41.1 - 50.3 %    MCV 98.2 (H) 79.6 - 97.8 FL    MCH 29.9 26.1 - 32.9 PG    MCHC 30.4 (L) 31.4 - 35.0 g/dL    RDW 18.6 (H) 11.9 - 14.6 %    PLATELET 51 (L) 536 - 450 K/uL    MPV 11.4 9.4 - 12.3 FL    ABSOLUTE NRBC 0.00 0.0 - 0.2 K/uL    DF AUTOMATED      NEUTROPHILS 65 43 - 78 %    LYMPHOCYTES 20 13 - 44 %    MONOCYTES 12 4.0 - 12.0 %    EOSINOPHILS 0 (L) 0.5 - 7.8 %    BASOPHILS 0 0.0 - 2.0 %    IMMATURE GRANULOCYTES 2 0.0 - 5.0 %    ABS. NEUTROPHILS 3.2 1.7 - 8.2 K/UL    ABS. LYMPHOCYTES 1.0 0.5 - 4.6 K/UL    ABS. MONOCYTES 0.6 0.1 - 1.3 K/UL    ABS. EOSINOPHILS 0.0 0.0 - 0.8 K/UL    ABS. BASOPHILS 0.0 0.0 - 0.2 K/UL    ABS. IMM.  GRANS. 0.1 0.0 - 0.5 K/UL   METABOLIC PANEL, COMPREHENSIVE    Collection Time: 07/14/20  5:13 AM   Result Value Ref Range    Sodium 137 136 - 145 mmol/L    Potassium 3.8 3.5 - 5.1 mmol/L    Chloride 97 (L) 98 - 107 mmol/L    CO2 28 21 - 32 mmol/L    Anion gap 12 7 - 16 mmol/L    Glucose 127 (H) 65 - 100 mg/dL    BUN 45 (H) 8 - 23 MG/DL    Creatinine 1.60 (H) 0.8 - 1.5 MG/DL    GFR est AA 54 (L) >60 ml/min/1.73m2    GFR est non-AA 45 (L) >60 ml/min/1.73m2    Calcium 8.7 8.3 - 10.4 MG/DL    Bilirubin, total 0.6 0.2 - 1.1 MG/DL    ALT (SGPT) 42 12 - 65 U/L    AST (SGOT) 493 (H) 15 - 37 U/L    Alk.  phosphatase 1,035 (H) 50 - 136 U/L    Protein, total 5.9 (L) 6.3 - 8.2 g/dL    Albumin 2.0 (L) 3.2 - 4.6 g/dL    Globulin 3.9 (H) 2.3 - 3.5 g/dL    A-G Ratio 0.5 (L) 1.2 - 3.5     PTT    Collection Time: 07/14/20  5:13 AM   Result Value Ref Range    aPTT 38.9 (H) 24.3 - 35.4 SEC   FIBRINOGEN    Collection Time: 07/14/20  5:13 AM   Result Value Ref Range    Fibrinogen 213 190 - 501 mg/dL   D DIMER    Collection Time: 07/14/20  5:13 AM   Result Value Ref Range    D DIMER >20.00 (HH) <0.56 ug/ml(FEU)   GLUCOSE, POC    Collection Time: 07/14/20  7:21 AM   Result Value Ref Range    Glucose (POC) 143 (H) 65 - 100 mg/dL   GLUCOSE, POC    Collection Time: 07/14/20 11:38 AM   Result Value Ref Range    Glucose (POC) 196 (H) 65 - 100 mg/dL       Current Facility-Administered Medications   Medication Dose Route Frequency    VANCOMYCIN INTERMITTENT DOSING   Other Rx Dosing/Monitoring    furosemide (LASIX) injection 20 mg  20 mg IntraVENous Q12H    remdesivir 100 mg in 0.9% sodium chloride 250 mL IVPB  100 mg IntraVENous Q24H    0.9% sodium chloride infusion 250 mL  250 mL IntraVENous PRN    enoxaparin (LOVENOX) injection 30 mg  30 mg SubCUTAneous Q12H    dexamethasone (DECADRON) 4 mg/mL injection 6 mg  6 mg IntraVENous DAILY    albuterol-ipratropium (DUO-NEB) 2.5 MG-0.5 MG/3 ML  3 mL Nebulization Q6H PRN    bisacodyL (DULCOLAX) suppository 10 mg  10 mg Rectal DAILY    tamsulosin (FLOMAX) capsule 0.4 mg  0.4 mg Oral DAILY    sodium chloride (NS) flush 5-40 mL  5-40 mL IntraVENous Q8H    sodium chloride (NS) flush 5-40 mL  5-40 mL IntraVENous PRN    acetaminophen (TYLENOL) tablet 650 mg  650 mg Oral Q6H PRN    HYDROcodone-acetaminophen (NORCO) 5-325 mg per tablet 1 Tab  1 Tab Oral Q4H PRN    morphine injection 2 mg  2 mg IntraVENous Q4H PRN    naloxone (NARCAN) injection 0.4 mg  0.4 mg IntraVENous PRN    ondansetron (ZOFRAN) injection 4 mg  4 mg IntraVENous Q4H PRN    insulin lispro (HUMALOG) injection   SubCUTAneous AC&HS    ferrous sulfate tablet 325 mg  1 Tab Oral DAILY WITH BREAKFAST          Imaging:    Xr Chest Sngl V    Result Date: 7/14/2020  EXAM: Chest x-ray. INDICATION: Dyspnea. COMPARISON: July 12, 2020. TECHNIQUE: Frontal view chest x-ray. FINDINGS: Bibasilar lung atelectasis or infiltrates and small bilateral pleural effusions are unchanged. The cardiac size is within normal limits. No pneumothorax is identified. IMPRESSION: Unchanged bibasilar lung atelectasis or infiltrates and small pleural effusions. Xr Chest Sngl V    Result Date: 7/13/2020  EXAM: Chest x-ray. INDICATION: Dyspnea. COMPARISON: Yesterday's chest x-ray. TECHNIQUE: Frontal view chest x-ray. FINDINGS: Bibasilar lung atelectasis or infiltrates and small pleural effusions are unchanged. The cardiac size is stable. No pneumothorax is identified. IMPRESSION: No interval change. Xr Chest Sngl V    Result Date: 7/12/2020  EXAM: Chest x-ray. INDICATION: Dyspnea. Covid 19. COMPARISON: Yesterday's chest x-ray. TECHNIQUE: Frontal view chest x-ray. FINDINGS: Bibasilar lung atelectasis or infiltrates and small pleural effusions are unchanged. The cardiac size is stable. No pneumothorax is identified. IMPRESSION: Unchanged bibasilar lung atelectasis or infiltrates and pleural effusions. Xr Chest Sngl V    Result Date: 7/11/2020  EXAM: Chest x-ray. INDICATION: Dyspnea. Rule out Covid 19. COMPARISON: Yesterday's chest x-ray. TECHNIQUE: Frontal view chest x-ray. FINDINGS: Bibasilar lung atelectasis or infiltrates are unchanged.  There are small bilateral pleural effusions, progressed on the left and improved on the right. The cardiac size is within normal limits. No pneumothorax is identified. IMPRESSION: Unchanged bibasilar lung atelectasis or infiltrates. Xr Chest Sngl V    Result Date: 7/10/2020  EXAM: TEMPORARY INDICATION: Pleural effusions, Suspected Covid-19 Patient COMPARISON: 7/9/2020 FINDINGS: A portable AP radiograph of the chest was obtained at 0455 hours. The patient is on a cardiac monitor. I basilar airspace disease and bilateral pleural effusions unchanged. . The cardiac and mediastinal contours and pulmonary vascularity are normal.  The bones and soft tissues are grossly within normal limits. IMPRESSION: Basilar atelectasis or pneumonia and bilateral pleural effusions unchanged. Xr Chest Sngl V    Result Date: 7/9/2020  EXAM: TEMPORARY INDICATION: Respiratory failure COMPARISON: 7/8/2020 FINDINGS: A portable AP radiograph of the chest was obtained at 0515 hours. The patient is on a cardiac monitor. Left pleural effusion left lower lobe airspace disease worse in the interval. Right pleural effusion and right lower lobe airspace disease worse in the interval. The cardiac and mediastinal contours and pulmonary vascularity are normal.  The bones and soft tissues are grossly within normal limits. IMPRESSION: Worsening of bibasilar atelectasis or pneumonia and bilateral pleural effusions. Given the symmetric worsening also consider pulmonary edema. Xr Chest Sngl V    Result Date: 6/24/2020  Chest X-ray INDICATION: Shortness of breath, possible COVID-19 A portable AP view of the chest was obtained. FINDINGS: There is stable infiltrate in the right lung. Right pleural effusion appears smaller. The heart size is stable. The bony thorax is intact.       IMPRESSION: Persistent right-sided pulmonary infiltrate, decreased effusion     Xr Chest Sngl V    Result Date: 6/23/2020  Portable chest x-ray CLINICAL INDICATION: Decreasing oxygen saturation FINDINGS: Single AP view of the chest compared to a similar exam dated 6/19/2020 shows a larger right pleural effusion with airspace opacity in the right lung base. The left lung is clear. The cardiac silhouette and mediastinum are unremarkable. IMPRESSION: Airspace opacity in the right lung base with a larger right pleural effusion. Atelectasis or pneumonia should be considered. Xr Chest Sngl V    Result Date: 6/19/2020   Portable view of the chest 6/19/2020 Comparison: 6/15/2020 Indication: Hypoxia FINDINGS: Cardiac and pulmonary artery enlargement similar in appearance previous examination. Previously noted diffuse perihilar pulmonary parenchymal infiltrates with large bilateral pleural effusions not significant change when compared to previous examination. No interval infiltrate or pneumothorax. No discrete acute osseous lesion seen. IMPRESSION: No significant interval change. Persistent volume overload. Xr Chest Sngl V    Result Date: 6/15/2020  CHEST X-RAY, one view. HISTORY:  Shortness breath. Chest x-ray. TECHNIQUE:  AP upright portable view COMPARISON: 09 June 2020 FINDINGS:   -The lungs: Decreased infiltrate right lung. -The costophrenic angles: Blunted. -The heart size: Borderline. -The pulmonary vasculature: is unremarkable. -Included portion of the upper abdomen: is unremarkable. -Bones: No gross bony lesions. -Other: Patient is rotated. IMPRESSION:  Significant resolution of the right lung infiltrate. Xr Chest Port    Result Date: 7/8/2020  Single View portable upright chest x-ray dated   July 8, 2020 Reference Exam: June 29, 2020 Indication: Sepsis, some shortness of breath Findings: The cardiac silhouette is normal in size and contour. Hazy opacities are again seen in the lungs but improved from the reference study with bilateral effusions again noted.  Patient is rotated some to the right, pulmonary vascularity appears normal.     IMPRESSION: Some improvement in the opacities in the lungs but effusions appear to have enlarged. Xr Chest Port    Result Date: 6/29/2020  Chest portable CLINICAL INDICATION: Follow-up of pleural effusions, acute respiratory failure with hypoxia, dyspnea COMPARISON: June 26, 2020 TECHNIQUE: single AP portable view chest at 4:10 AM semiupright FINDINGS: Stable mediastinal and hilar contours. Patient is rotated. There is persistence of bilateral pulmonary vascular congestion, right greater than left pleural effusions, and right mid to lower lung infiltrate. Overall allowing for technique and positioning the appearance is very similar to prior. IMPRESSION: Pulmonary vascular congestion, infiltrates and small pleural effusions are similar to prior. Xr Chest Port    Result Date: 6/26/2020  EXAM: Chest x-ray. INDICATION: Dyspnea. COMPARISON: June 24, 2020. TECHNIQUE: Frontal view chest x-ray. FINDINGS: Previous bilateral lung edema or infiltrates have improved, with mild residual. The left pleural effusion has resolved. There is a progressed small to moderate loculated right pleural effusion. No pneumothorax is identified. The cardiac size is within normal limits. IMPRESSION: 1. Improving bilateral lung edema or infiltrates. 2. Resolved left pleural effusion. 3. Progressed loculated right pleural effusion.          ASSESSMENT:    Problem List  Date Reviewed: 7/10/2020          Codes Class Noted    MRSA bacteremia ICD-10-CM: R78.81, B95.62  ICD-9-CM: 790.7, 041.12  7/12/2020        * (Principal) Acute on chronic respiratory failure with hypoxia (HCC) ICD-10-CM: J96.21  ICD-9-CM: 518.84, 799.02  7/8/2020        Pneumonia ICD-10-CM: J18.9  ICD-9-CM: 486  7/8/2020        Anemia (Chronic) ICD-10-CM: D64.9  ICD-9-CM: 285.9  7/8/2020        Hyperkalemia ICD-10-CM: E87.5  ICD-9-CM: 276.7  7/8/2020        KIRK (acute kidney injury) ([facility] ICD-10-CM: N17.9  ICD-9-CM: 584.9  7/8/2020        Thrombocytopenia (HCC) (Chronic) ICD-10-CM: D69.6  ICD-9-CM: 287.5  7/8/2020        Hypotension ICD-10-CM: I95.9  ICD-9-CM: 458.9  7/8/2020        Chronic bilateral pleural effusions (Chronic) ICD-10-CM: J90  ICD-9-CM: 511.9  7/8/2020        Urine retention (Chronic) ICD-10-CM: R33.9  ICD-9-CM: 788.20  7/8/2020        Debility (Chronic) ICD-10-CM: R53.81  ICD-9-CM: 799.3  6/15/2020        Rectal obstruction ICD-10-CM: K62.4  ICD-9-CM: 569.2  6/1/2020        Aspiration pneumonia ([facility] ICD-10-CM: J69.0  ICD-9-CM: 507.0  6/1/2020        Neutropenia ([facility] ICD-10-CM: D70.9  ICD-9-CM: 288.00  5/25/2020        Pleural effusion on right ICD-10-CM: J90  ICD-9-CM: 511.9  5/25/2020    Overview Addendum 6/24/2020  8:13 AM by Clemetine Push, NP     6/23/20L  Right thoracentesis:  1100 ml bloody effusion removed  6/10/20:  Bilateral thoracentesis on L( 1100 cc ) and R( 1000 cc) removed    6/3/20:  Right chest thoracentesis with 800 removed.               Acute respiratory failure with hypoxia [facility]) ICD-10-CM: J96.01  ICD-9-CM: 518.81  5/24/2020        Normocytic anemia ICD-10-CM: D64.9  ICD-9-CM: 285.9  5/24/2020        CAP (community acquired pneumonia) ICD-10-CM: J18.9  ICD-9-CM: 524  5/24/2020        Pleural effusion on left ICD-10-CM: J90  ICD-9-CM: 511.9  5/24/2020    Overview Addendum 6/25/2020 11:40 AM by Clemetine Push, NP      6/10/20:  Bilateral thoracentesis on L( 1100 cc ) and R( 1000 cc) removed    6/25/20:  L thoracentesis:  900 ml serosanguinous fluid removed--negative cytology             Hypomagnesemia ICD-10-CM: E83.42  ICD-9-CM: 275.2  5/20/2020        SIADH (syndrome of inappropriate ADH production) ([facility] ICD-10-CM: E22.2  ICD-9-CM: 253.6  3/26/2020        Malignant neoplasm metastatic to bone [facility]) (Chronic) ICD-10-CM: C79.51  ICD-9-CM: 198.5  6/26/2019        Postoperative seroma of subcutaneous tissue after non-dermatologic procedure ICD-10-CM: L76.34  ICD-9-CM: 998.13  9/3/2018        Anal carcinoma ([facility] (Chronic) ICD-10-CM: C21.0  ICD-9-CM: 154.3 8/9/2018        Type 2 diabetes with nephropathy (HCC) (Chronic) ICD-10-CM: E11.21  ICD-9-CM: 250.40, 583.81  7/3/2018        Primary malignant neoplasm of perianal skin ICD-10-CM: C44.500  ICD-9-CM: 173.50  7/3/2018        Diabetes mellitus due to underlying condition with hyperglycemia (HCC) (Chronic) ICD-10-CM: R01.42  ICD-9-CM: 249.80  12/21/2015        Mucositis due to radiation therapy ICD-10-CM: K12.33  ICD-9-CM: 528.09, E879.2  12/15/2015        Radiation esophagitis ICD-10-CM: K20.8  ICD-9-CM: 530.19, E926.9  12/15/2015        COVID-19 ruled out ICD-10-CM: Z03.818  ICD-9-CM: V71.83  12/15/2015        Hyponatremia ICD-10-CM: E87.1  ICD-9-CM: 276.1  11/23/2015        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) (Chronic) ICD-10-CM: C79.89, C80.1  ICD-9-CM: 198.89, 199.1  10/14/2015                PLAN:    Acute on chronic hypoxic respiratory failure, pneumonia, COVID positive  Patient is high risk patient given his multiple comorbidities and cancer. Currently receiving them does appear as well as antibiotic therapy. Status post convalescent plasma on 712   Continue with antibiotics as per ID recs   Follow-up blood cultures   Follow-up pulmonary consult no tap at this time   Continue with remedesivir    Continue with isolation precautions, wean down O2 as tolerated  Continue with dexamethasone     MRSA bacteremia  Unclear source of MRSA bacteremia. Currently pending echo but unable to do so due to COVID status   TTE when appropriate  Continue with IV vancomycin, PICC line needs to be placed     Anemia, stable  Stable. Likely due to anemia of chronic illness     KIRK, likely prerenal  Patient renal function still has not significantly improved. However given COVID status, will be careful with fluids. P.o. intake encouraged.    Dose meds for reduced GFR, follow-up BMP daily, encourage p.o. 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  He received carbo/etoposide 2 weeks ago and has also had radiation for anal cancer. [facility] has required anal dilation in past.  Chemo on hold  He refuses to discussion intubation/CPR - defers to his wife who wants him full code  Palliative care consulted for goals of care - still full code     DM2: SSI, Holding oral meds  Hyperkalemia, resolved, Following on tele , Holding lisinopril  HTN:, Hold antihypertensives as lower range BP  Urine retention: [place], flomax    Fluids:   Encouraging p.o. intake  Electrolytes: Replete as needed  Nutrition: Regular diet, mechanical soft  CODE STATUS: Full code    DVT prophylaxis: Lovenox     Patient appears to be acutely depressed. Will obtain psych consult today.

## 2020-07-14 NOTE — PROGRESS NOTES
Infectious Disease Consult    Today's Date: 2020   Admit Date: 2020    Impression:   · MRSA bacteremia (1/2) , blood cx 7/10 negative; source unclear  · TTE ordered but not getting done because he is COVID +. 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 I am intending a 4 week course of treatment; EOT 2020  · TTE is probably going to have to be done later considering COVID +  · OK with Dexamethasone    Anti-infectives:   · IV Vancomycin (-  · IV Cefepime (-)  · Plasma ()  · remdesivir (2020 - )    Subjective:   Hard of hearing. He again complains of pain all over; no cough; on 4L O2 nasal cannula      No Known Allergies       Objective:     Visit Vitals  /72 (BP 1 Location: Left arm, BP Patient Position: At rest;Supine)   Pulse 95   Temp 97.7 °F (36.5 °C)   Resp 19   Ht 6' (1.829 m)   Wt 77.1 kg (170 lb)   SpO2 92%   BMI 23.06 kg/m²     Temp (24hrs), Av.1 °F (36.7 °C), Min:97.5 °F (36.4 °C), Max:98.6 °F (37 °C)    No change in physical exam from yesterday     Physical Exam:    General:  Alert, able to answer questions; appears chronically ill; very thin   Eyes:  Sclera anicteric. Pupils equally round and reactive to light. Mouth/Throat: Mucous membranes normal, oral pharynx clear   Neck: Supple   Lungs:   Course to auscultation bilaterally, good effort, O2 via NC 5 lpm   CV:  Regular rate and rhythm, no murmur, click, rub or gallop   Abdomen:   Soft, non-tender.  bowel sounds normal. non-distended   Extremities: No cyanosis or edema   Skin: Skin color, texture, turgor normal. no acute rash or lesions   Lymph nodes: Cervical and supraclavicular normal   Musculoskeletal: No swelling or deformity   Lines/Devices:  Intact, no erythema, drainage or tenderness   Psych: Alert and oriented, normal mood affect given the setting       Data Review:     CBC:  Recent Labs     07/14/20  0513 07/13/20  0510 07/12/20  1025   WBC 5.0 4.7 4.9   GRANS 65 64 72   MONOS 12 11 8   EOS 0* 0* 0*   ANEU 3.2 3.0 3.6   ABL 1.0 1.0 0.8   HGB 8.3* 7.8* 8.7*   HCT 27.3* 24.8* 26.6*   PLT 51* 68* 65*       BMP:  Recent Labs     07/14/20  0513 07/13/20  1051 07/12/20  1025   CREA 1.60* 1.13 0.94   BUN 45* 35* 28*    136 138   K 3.8 3.6 5.1   CL 97* 100 101   CO2 28 29 27   AGAP 12 7 10   * 170* 217*       LFTS:  Recent Labs     07/14/20  0513 07/13/20  1051 07/12/20  1025   TBILI 0.6 0.4 0.6   ALT 42 24 19   AP 1,035* 426* 356*   TP 5.9* 5.7* 6.0*   ALB 2.0* 2.0* 1.9*       Microbiology:     All Micro Results     Procedure Component Value Units Date/Time    CULTURE, BLOOD [[id number]] Collected:  07/10/20 1710    Order Status:  Completed Specimen:  Blood Updated:  07/13/20 1036     Special Requests: --        RIGHT  FOREARM       Culture result: NO GROWTH 3 DAYS       CULTURE, BLOOD [[id number]] Collected:  07/10/20 1713    Order Status:  Completed Specimen:  Blood Updated:  07/13/20 1036     Special Requests: --        RIGHT  HAND       Culture result: NO GROWTH 3 DAYS       CULTURE, BLOOD [[id number]] Collected:  07/08/20 0622    Order Status:  Completed Specimen:  Blood Updated:  07/13/20 1035     Special Requests: LEFT HAND        Culture result: NO GROWTH 5 DAYS       CULTURE, BLOOD [[id number]]  (Abnormal)  (Susceptibility) Collected:  07/08/20 0622    Order Status:  Completed Specimen:  Blood Updated:  07/12/20 0840     Special Requests: RIGHT HAND        GRAM STAIN GRAM POSITIVE COCCI               AEROBIC AND ANAEROBIC BOTTLES                  RESULTS VERIFIED, PHONED TO AND READ BACK BY DAVID SANTAMARIA @ 2391 7/9/20 MM           Culture result:       * METHICILLIN RESISTANT STAPHYLOCOCCUS AUREUS * ANAEROBIC BOTTLE POSITIVE                  STAPHYLOCOCCUS SPECIES, COAGULASE NEGATIVE AEROBIC BOTTLE POSITIVE THIS ORGANISM MAY BE INDICATIVE OF CULTURE CONTAMINATION, HOWEVER, CLINICAL CORRELATION NEEDS TO BE EVALUATED, AS EACH CASE IS UNIQUE. REFER TO [id number] [facility] [id number] (FROM AEROBIC BOTTLE)            RESULTS VERIFIED, PHONED TO AND READ BACK BY  Mariano Timmons RN ON 7/12/20 @0840, TA      CULTURE, BLOOD [[id number]] Collected:  07/09/20 1545    Order Status:  Canceled Specimen:  Blood     CULTURE, BLOOD [[id number]] Collected:  07/09/20 1545    Order Status:  Canceled Specimen:  Blood     CULTURE, RESPIRATORY/SPUTUM/BRONCH Aleene Schultze STAIN [[id number]] Collected:  07/09/20 1345    Order Status:  Canceled Specimen:  Sputum     BLOOD CULTURE ID PANEL [[id number]]  (Abnormal) Collected:  07/08/20 0622    Order Status:  Completed Specimen:  Blood Updated:  07/09/20 0745     Acc. no. from Micro Order [id number]     Staphylococcus Detected        Comment: RESULTS VERIFIED, PHONED TO AND READ BACK BY  Marlyn Sinha RN @ 8883 ON 7/9/20 BY [facility]          mecA (Methicillin-Resistance Genes) Detected        Comment: Presence of mecA is highly indicative of methicillin resistance. The test does not replace traditional culture and susceptibilities        INTERPRETATION       Gram positive cocci in clusters. Identified by realtime PCR as  Coagulase negative Staphylococci           Comment: A single positive culture of coagulase negative Staph is likely to be a contaminant in adult patients. Consider discontinuation of antibiotics for gram positive bloodstream infections if patient asymptomatic. THIS TEST DOES NOT REPLACE SENSITIVITY TESTING. Imaging:   CXR (7/14/2020)  IMPRESSION: Unchanged bibasilar lung atelectasis or infiltrates and small pleural effusions.     Signed By: Karina Juarez MD     July 14, 2020

## 2020-07-15 NOTE — PROGRESS NOTES
Comprehensive Nutrition Assessment    Type and Reason for Visit: Initial(LOS)    Nutrition Assessment:  Pt with a history of oral and anal cancer had a recent admission for acute hypoxic respiratory failure due to recurrent bilateral pleural effusions. (multiple thoracenteses during prior admit.) Upon discharge went to SNF. He was noted to have increased respiratory distress and returrns with SOB. Pt has had a  positve COVID 19 result. Progress note indicates pt/s wife, Royal rodríguez, notes she feels pt intake is poor. RD made attempt to speak with RN x2(COVID precautions) , but was unable to connect via telephone. Recorded po intake  for 4 meals: 10%, 0%, 10%, 25%. SLP note indicates recommendation for a mechanical soft diet with thin liquids (noted pt accepted minimal trials for evaluation session). Progress note indicates poor prognosis considering multiple medical issues and COVID +; renal function not improving, poor po intake. Also noting currently still wants patient to be full code with all aggressive measures to be done. Last CM note indicates wife may not fully understand prognosis for pt, but CM working with son to initiate conversation with pt's wife. CM note for considerations for  hospice care. Of note: Pt weight is stated; cannot assess weight change for malnutrition assessment. Estimated Daily Nutrient Needs:  Energy (kcal):  1925- 1322(60-03 kcal/kg)  Protein (g):  65-81(0.8-1 g/kg)         Current Nutrition Therapies:   DIET MECHANICAL SOFT  DIET NUTRITIONAL SUPPLEMENTS All Meals; Glucerna Shake ( )    Anthropometric Measures:  · Height:  6' (182.9 cm)  · Current Body Wt:  77.1 kg (170 lb)     · BMI Categories:  Normal weight (BMI 18.5-24. 9)       Nutrition Diagnosis:   · Inadequate oral intake related oral cancer and  difficulty with oral intake and as evidenced by intake 0-25%, swallowing study results      Nutrition Interventions:   Food and/or Nutrient Delivery: Continue current diet, Start oral nutrition supplement  Coordination of Nutrition Care: Other (specify)    Goals:  (Pt will consume 75% or greater of estimated nutritional needs)       Nutrition Monitoring and Evaluation:   Food/Nutrient Intake Outcomes: Food and nutrient intake, Supplement intake     Discharge Planning:    Pt may benefit from ONS at 3 times/day following discharge.     Electronically signed by Aniya Cutler, [address] on 7/15/2020 at 4:38 PM  Contact [phone]

## 2020-07-15 NOTE — PROGRESS NOTES
Shift Assessment - Patient is alert and oriented x2 with confusion at times. Patient states that he is \"confused and doesn't understand\". Emotional support given. Respirations are even and unlabored. Bowel sounds active x4. Currently resting in a low, locked bed. Call light within reach.

## 2020-07-15 NOTE — PROGRESS NOTES
Hospitalist Progress Note    Admission Date: 2020  5:42 AM  Reason for Admission/Hospital Course: Acute on chronic respiratory failure with hypoxia (HCC) [J96.21]      24 Hour Events:  Patient seen and examined at bedside this morning. Patient reports that he still feels very confused. He is able to answer some questions but seems distracted. Patient reports depressed mood. Denies any pain, chills, fevers. Endorses having some shortness of breath. ROS:  10 point review of systems is otherwise negative with the exception of the elements mentioned above. No Known Allergies    OBJECTIVE:  Patient Vitals for the past 8 hrs:   BP Temp Resp SpO2   07/15/20 0815 117/63 97.8 °F (36.6 °C) 18 97 %     Temp (24hrs), Av.8 °F (36.6 °C), Min:97.7 °F (36.5 °C), Max:98 °F (36.7 °C)    07/15 0701 - 07/15 1900  In: 24 [P.O.:24]  Out: -     PHYSICAL EXAM:  General:          Frail-appearing white male, no acute distress. Affect appears flat. HEENT:           Normocephalic, atraumatic, moist oral mucosa  CV:                  RRR, no murmurs  Lungs:             Poor air entry bilaterally but likely due to decreased inspiratory effort.   Otherwise lungs sound clear  Abdomen:        Soft, nontender, nondistended  MSK:               No gross skeletal defects  Skin:                Multiple bruises on upper extremities from prior IV sites                     Neuro:             CN II through XII grossly intact             Psych:             Appears depressed, flat affect.       Labs:      Recent Labs     20  0513 20  0510   WBC 5.0 4.7   RBC 2.78* 2.56*   HGB 8.3* 7.8*   HCT 27.3* 24.8*   MCV 98.2* 96.9   MCH 29.9 30.5   MCHC 30.4* 31.5   RDW 18.6* 18.0*   PLT 51* 68*   GRANS 65 64   LYMPH 20 22   MONOS 12 11   EOS 0* 0*   BASOS 0 0   IG 2 3   DF AUTOMATED AUTOMATED   ANEU 3.2 3.0   ABL 1.0 1.0   ABM 0.6 0.5   APRIL 0.0 0.0   ABB 0.0 0.0   AIG 0.1 0.2        Recent Labs     07/15/20  0431 20  0513 07/13/20  1051    137 136   K 3.7 3.8 3.6    97* 100   CO2 27 28 29   AGAP 10 12 7   * 127* 170*   BUN 62* 45* 35*   CREA 2.05* 1.60* 1.13   GFRAA 40* 54* >60   GFRNA 33* 45* >60   CA 8.3 8.7 8.8   AP  --  1,035* 426*   TP  --  5.9* 5.7*   ALB  --  2.0* 2.0*   GLOB  --  3.9* 3.7*   AGRAT  --  0.5* 0.5*       Recent Results (from the past 24 hour(s))   VANCOMYCIN, RANDOM    Collection Time: 07/14/20  3:33 PM   Result Value Ref Range    Vancomycin, random 16.9 UG/ML   GLUCOSE, POC    Collection Time: 07/14/20  5:10 PM   Result Value Ref Range    Glucose (POC) 271 (H) 65 - 100 mg/dL   GLUCOSE, POC    Collection Time: 07/14/20  9:17 PM   Result Value Ref Range    Glucose (POC) 250 (H) 65 - 100 mg/dL   PROTHROMBIN TIME + INR    Collection Time: 07/15/20  4:31 AM   Result Value Ref Range    Prothrombin time 19.4 (H) 12.0 - 14.7 sec    INR 1.6     PTT    Collection Time: 07/15/20  4:31 AM   Result Value Ref Range    aPTT 46.9 (H) 24.3 - 35.4 SEC   FIBRINOGEN    Collection Time: 07/15/20  4:31 AM   Result Value Ref Range    Fibrinogen 357 190 - 501 mg/dL   D DIMER    Collection Time: 07/15/20  4:31 AM   Result Value Ref Range    D DIMER 13.88 (HH) <0.56 ug/ml(FEU)   METABOLIC PANEL, BASIC    Collection Time: 07/15/20  4:31 AM   Result Value Ref Range    Sodium 137 136 - 145 mmol/L    Potassium 3.7 3.5 - 5.1 mmol/L    Chloride 100 98 - 107 mmol/L    CO2 27 21 - 32 mmol/L    Anion gap 10 7 - 16 mmol/L    Glucose 193 (H) 65 - 100 mg/dL    BUN 62 (H) 8 - 23 MG/DL    Creatinine 2.05 (H) 0.8 - 1.5 MG/DL    GFR est AA 40 (L) >60 ml/min/1.73m2    GFR est non-AA 33 (L) >60 ml/min/1.73m2    Calcium 8.3 8.3 - 10.4 MG/DL   GLUCOSE, POC    Collection Time: 07/15/20  7:45 AM   Result Value Ref Range    Glucose (POC) 228 (H) 65 - 100 mg/dL   GLUCOSE, POC    Collection Time: 07/15/20 11:32 AM   Result Value Ref Range    Glucose (POC) 216 (H) 65 - 100 mg/dL       Current Facility-Administered Medications   Medication Dose Route Frequency    0.9% sodium chloride infusion  100 mL/hr IntraVENous CONTINUOUS    vancomycin random level reminder   Other ONCE    enoxaparin (LOVENOX) injection 30 mg  30 mg SubCUTAneous Q24H    VANCOMYCIN INTERMITTENT DOSING   Other Rx Dosing/Monitoring    central line flush (saline) syringe 10 mL  10 mL InterCATHeter Q8H    furosemide (LASIX) injection 20 mg  20 mg IntraVENous Q12H    remdesivir 100 mg in 0.9% sodium chloride 250 mL IVPB  100 mg IntraVENous Q24H    dexamethasone (DECADRON) 4 mg/mL injection 6 mg  6 mg IntraVENous DAILY    albuterol-ipratropium (DUO-NEB) 2.5 MG-0.5 MG/3 ML  3 mL Nebulization Q6H PRN    bisacodyL (DULCOLAX) suppository 10 mg  10 mg Rectal DAILY    tamsulosin (FLOMAX) capsule 0.4 mg  0.4 mg Oral DAILY    sodium chloride (NS) flush 5-40 mL  5-40 mL IntraVENous Q8H    sodium chloride (NS) flush 5-40 mL  5-40 mL IntraVENous PRN    acetaminophen (TYLENOL) tablet 650 mg  650 mg Oral Q6H PRN    HYDROcodone-acetaminophen (NORCO) 5-325 mg per tablet 1 Tab  1 Tab Oral Q4H PRN    morphine injection 2 mg  2 mg IntraVENous Q4H PRN    naloxone (NARCAN) injection 0.4 mg  0.4 mg IntraVENous PRN    ondansetron (ZOFRAN) injection 4 mg  4 mg IntraVENous Q4H PRN    insulin lispro (HUMALOG) injection   SubCUTAneous AC&HS    ferrous sulfate tablet 325 mg  1 Tab Oral DAILY WITH BREAKFAST          Imaging:    Xr Chest Sngl V    Result Date: 7/15/2020  EXAM: Chest x-ray. INDICATION: Dyspnea. COMPARISON: Yesterday's chest x-ray. TECHNIQUE: Frontal view chest x-ray. FINDINGS: Bibasilar lung atelectasis or infiltrates and small pleural effusions are unchanged. Cardiac size is within normal limits. No pneumothorax is identified. There is a new left arm PICC line, with its tip just above the cavoatrial junction. IMPRESSION: Unchanged bibasilar lung atelectasis or infiltrates and small pleural effusions. Xr Chest Sngl V    Result Date: 7/14/2020  EXAM: Chest x-ray.  INDICATION: Dyspnea. COMPARISON: July 12, 2020. TECHNIQUE: Frontal view chest x-ray. FINDINGS: Bibasilar lung atelectasis or infiltrates and small bilateral pleural effusions are unchanged. The cardiac size is within normal limits. No pneumothorax is identified. IMPRESSION: Unchanged bibasilar lung atelectasis or infiltrates and small pleural effusions. Xr Chest Sngl V    Result Date: 7/13/2020  EXAM: Chest x-ray. INDICATION: Dyspnea. COMPARISON: Yesterday's chest x-ray. TECHNIQUE: Frontal view chest x-ray. FINDINGS: Bibasilar lung atelectasis or infiltrates and small pleural effusions are unchanged. The cardiac size is stable. No pneumothorax is identified. IMPRESSION: No interval change. Xr Chest Sngl V    Result Date: 7/12/2020  EXAM: Chest x-ray. INDICATION: Dyspnea. Covid 19. COMPARISON: Yesterday's chest x-ray. TECHNIQUE: Frontal view chest x-ray. FINDINGS: Bibasilar lung atelectasis or infiltrates and small pleural effusions are unchanged. The cardiac size is stable. No pneumothorax is identified. IMPRESSION: Unchanged bibasilar lung atelectasis or infiltrates and pleural effusions. Xr Chest Sngl V    Result Date: 7/11/2020  EXAM: Chest x-ray. INDICATION: Dyspnea. Rule out Covid 19. COMPARISON: Yesterday's chest x-ray. TECHNIQUE: Frontal view chest x-ray. FINDINGS: Bibasilar lung atelectasis or infiltrates are unchanged. There are small bilateral pleural effusions, progressed on the left and improved on the right. The cardiac size is within normal limits. No pneumothorax is identified. IMPRESSION: Unchanged bibasilar lung atelectasis or infiltrates. Xr Chest Sngl V    Result Date: 7/10/2020  EXAM: TEMPORARY INDICATION: Pleural effusions, Suspected Covid-19 Patient COMPARISON: 7/9/2020 FINDINGS: A portable AP radiograph of the chest was obtained at 0455 hours. The patient is on a cardiac monitor. I basilar airspace disease and bilateral pleural effusions unchanged. . The cardiac and mediastinal contours and pulmonary vascularity are normal.  The bones and soft tissues are grossly within normal limits. IMPRESSION: Basilar atelectasis or pneumonia and bilateral pleural effusions unchanged. Xr Chest Sngl V    Result Date: 7/9/2020  EXAM: TEMPORARY INDICATION: Respiratory failure COMPARISON: 7/8/2020 FINDINGS: A portable AP radiograph of the chest was obtained at 0515 hours. The patient is on a cardiac monitor. Left pleural effusion left lower lobe airspace disease worse in the interval. Right pleural effusion and right lower lobe airspace disease worse in the interval. The cardiac and mediastinal contours and pulmonary vascularity are normal.  The bones and soft tissues are grossly within normal limits. IMPRESSION: Worsening of bibasilar atelectasis or pneumonia and bilateral pleural effusions. Given the symmetric worsening also consider pulmonary edema. Xr Chest Sngl V    Result Date: 6/24/2020  Chest X-ray INDICATION: Shortness of breath, possible COVID-19 A portable AP view of the chest was obtained. FINDINGS: There is stable infiltrate in the right lung. Right pleural effusion appears smaller. The heart size is stable. The bony thorax is intact. IMPRESSION: Persistent right-sided pulmonary infiltrate, decreased effusion     Xr Chest Sngl V    Result Date: 6/23/2020  Portable chest x-ray CLINICAL INDICATION: Decreasing oxygen saturation FINDINGS: Single AP view of the chest compared to a similar exam dated 6/19/2020 shows a larger right pleural effusion with airspace opacity in the right lung base. The left lung is clear. The cardiac silhouette and mediastinum are unremarkable. IMPRESSION: Airspace opacity in the right lung base with a larger right pleural effusion. Atelectasis or pneumonia should be considered.     Xr Chest Sngl V    Result Date: 6/19/2020   Portable view of the chest 6/19/2020 Comparison: 6/15/2020 Indication: Hypoxia FINDINGS: Cardiac and pulmonary artery enlargement similar in appearance previous examination. Previously noted diffuse perihilar pulmonary parenchymal infiltrates with large bilateral pleural effusions not significant change when compared to previous examination. No interval infiltrate or pneumothorax. No discrete acute osseous lesion seen. IMPRESSION: No significant interval change. Persistent volume overload. Xr Chest Port    Result Date: 7/8/2020  Single View portable upright chest x-ray dated   July 8, 2020 Reference Exam: June 29, 2020 Indication: Sepsis, some shortness of breath Findings: The cardiac silhouette is normal in size and contour. Hazy opacities are again seen in the lungs but improved from the reference study with bilateral effusions again noted. Patient is rotated some to the right, pulmonary vascularity appears normal.     IMPRESSION: Some improvement in the opacities in the lungs but effusions appear to have enlarged. Xr Chest Port    Result Date: 6/29/2020  Chest portable CLINICAL INDICATION: Follow-up of pleural effusions, acute respiratory failure with hypoxia, dyspnea COMPARISON: June 26, 2020 TECHNIQUE: single AP portable view chest at 4:10 AM semiupright FINDINGS: Stable mediastinal and hilar contours. Patient is rotated. There is persistence of bilateral pulmonary vascular congestion, right greater than left pleural effusions, and right mid to lower lung infiltrate. Overall allowing for technique and positioning the appearance is very similar to prior. IMPRESSION: Pulmonary vascular congestion, infiltrates and small pleural effusions are similar to prior. Xr Chest Port    Result Date: 6/26/2020  EXAM: Chest x-ray. INDICATION: Dyspnea. COMPARISON: June 24, 2020. TECHNIQUE: Frontal view chest x-ray. FINDINGS: Previous bilateral lung edema or infiltrates have improved, with mild residual. The left pleural effusion has resolved. There is a progressed small to moderate loculated right pleural effusion.  No pneumothorax is identified. The cardiac size is within normal limits. IMPRESSION: 1. Improving bilateral lung edema or infiltrates. 2. Resolved left pleural effusion. 3. Progressed loculated right pleural effusion. ASSESSMENT:    Problem List  Date Reviewed: 7/10/2020          Codes Class Noted    MRSA bacteremia ICD-10-CM: R78.81, B95.62  ICD-9-CM: 790.7, 041.12  7/12/2020        * (Principal) Acute on chronic respiratory failure with hypoxia (HCC) ICD-10-CM: J96.21  ICD-9-CM: 518.84, 799.02  7/8/2020        Pneumonia ICD-10-CM: J18.9  ICD-9-CM: 486  7/8/2020        Anemia (Chronic) ICD-10-CM: D64.9  ICD-9-CM: 285.9  7/8/2020        Hyperkalemia ICD-10-CM: E87.5  ICD-9-CM: 276.7  7/8/2020        KIRK (acute kidney injury) ([facility] ICD-10-CM: N17.9  ICD-9-CM: 584.9  7/8/2020        Thrombocytopenia (HCC) (Chronic) ICD-10-CM: D69.6  ICD-9-CM: 287.5  7/8/2020        Hypotension ICD-10-CM: I95.9  ICD-9-CM: 458.9  7/8/2020        Chronic bilateral pleural effusions (Chronic) ICD-10-CM: J90  ICD-9-CM: 511.9  7/8/2020        Urine retention (Chronic) ICD-10-CM: R33.9  ICD-9-CM: 788.20  7/8/2020        Debility (Chronic) ICD-10-CM: R53.81  ICD-9-CM: 799.3  6/15/2020        Rectal obstruction ICD-10-CM: K62.4  ICD-9-CM: 569.2  6/1/2020        Aspiration pneumonia ([facility] ICD-10-CM: J69.0  ICD-9-CM: 507.0  6/1/2020        Neutropenia ([facility] ICD-10-CM: D70.9  ICD-9-CM: 288.00  5/25/2020        Pleural effusion on right ICD-10-CM: J90  ICD-9-CM: 511.9  5/25/2020    Overview Addendum 6/24/2020  8:13 AM by Gwen Rodriguez NP     6/23/20L  Right thoracentesis:  1100 ml bloody effusion removed  6/10/20:  Bilateral thoracentesis on L( 1100 cc ) and R( 1000 cc) removed    6/3/20:  Right chest thoracentesis with 800 removed.               Acute respiratory failure with hypoxia [facility]) ICD-10-CM: J96.01  ICD-9-CM: 518.81  5/24/2020        Normocytic anemia ICD-10-CM: D64.9  ICD-9-CM: 285.9  5/24/2020        CAP (community acquired pneumonia) ICD-10-CM: J18.9  ICD-9-CM: 993  5/24/2020        Pleural effusion on left ICD-10-CM: J90  ICD-9-CM: 511.9  5/24/2020    Overview Addendum 6/25/2020 11:40 AM by Marta Villarreal NP      6/10/20:  Bilateral thoracentesis on L( 1100 cc ) and R( 1000 cc) removed    6/25/20:  L thoracentesis:  900 ml serosanguinous fluid removed--negative cytology             Hypomagnesemia ICD-10-CM: E83.42  ICD-9-CM: 275.2  5/20/2020        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) ICD-10-CM: E22.2  ICD-9-CM: 253.6  3/26/2020        Malignant neoplasm metastatic to bone [facility]) (Chronic) ICD-10-CM: C79.51  ICD-9-CM: 198.5  6/26/2019        Postoperative seroma of subcutaneous tissue after non-dermatologic procedure ICD-10-CM: L76.34  ICD-9-CM: 998.13  9/3/2018        Anal carcinoma ([address] (Chronic) ICD-10-CM: C21.0  ICD-9-CM: 154.3  8/9/2018        Type 2 diabetes with nephropathy (HCC) (Chronic) ICD-10-CM: E11.21  ICD-9-CM: 250.40, 583.81  7/3/2018        Primary malignant neoplasm of perianal skin ICD-10-CM: C44.500  ICD-9-CM: 173.50  7/3/2018        Diabetes mellitus due to underlying condition with hyperglycemia (HCC) (Chronic) ICD-10-CM: T70.09  ICD-9-CM: 249.80  12/21/2015        Mucositis due to radiation therapy ICD-10-CM: K12.33  ICD-9-CM: 528.09, E879.2  12/15/2015        Radiation esophagitis ICD-10-CM: 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 Currently receiving them does appear as well as antibiotic therapy.   Status post convalescent plasma on 712. Patient still receiving remdesivir will likely finish his course today. Blood cultures are negative to date. Currently still requiring 4 to 5 L of oxygen by nasal cannula.  Continue with antibiotics as per ID recs   Follow-up blood cultures   Follow-up pulmonary consult no tap at this time   Continue with remedesivir    Continue with isolation precautions, wean down O2 as tolerated  Continue with dexamethasone     MRSA bacteremia  Unclear source of MRSA bacteremia. Currently pending echo but unable to do so due to COVID status   TTE when appropriate  Continue with IV vancomycin, PICC line needs to be placed     Anemia, stable  Stable. Likely due to anemia of chronic illness     KIRK, likely prerenal -worsening  Renal function continues to get worse. Likely prerenal given patient's poor p.o. intake over the last several days. However patient is also been on vancomycin which can cause significant kidney injury as well. IV fluids ordered and will monitor for change.  Dose meds for reduced GFR, follow-up BMP daily, encourage p.o. 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  He received carbo/etoposide 2 weeks ago and has also had radiation for anal cancer. Tab Vick has required anal dilation in past.  Chemo on hold  He refuses to discussion intubation/CPR - defers to his wife who wants him full code  Palliative care consulted for goals of care - still full code     DM2: SSI, Holding oral meds  Hyperkalemia, resolved, Following on tele , Holding lisinopril  HTN:, Hold antihypertensives as lower range BP  Urine retention: [place], flomax    Goals of care discussion:  Lengthy discussion had with patient's wife Sweta Griggs.   Currently still wants patient to be full code with all aggressive measures to be done. She understands that patient has a very poor prognosis at this time given his multiple comorbidities with concurrent COVID infection. I explained to the patient's family that patient has a very high likelihood that he will not improve significantly. She expressed understanding and desire for us to do her best.      Fluids:   Encouraging p.o. intake  Electrolytes: Replete as needed  Nutrition: Regular diet, mechanical soft  CODE STATUS: Full code     DVT prophylaxis: Lovenox     Patient still requiring 4 to 5 L by nasal cannula at this time. Depression may be due to separation as patient has been isolated for the last several days versus COVID delirium. Anticipate discharge in 48 to 72 hours if he continues to rate stable.

## 2020-07-15 NOTE — PROGRESS NOTES
MSN, CM:  MD informed this CM that patient's wife wanted a different rehab facility upon discharge. Upon contacting the wife she was very unrealistic about patient's condition and did not want patient moved. Dr. Michelle Kern spoke with wife and son with hopes of a discharge plan. Son more aware of patient's condition and will speak with the wife. Will attempt contact again tomorrow. Case Management will continue to follow. Attempting to find a hospice house for John  that will allow visitors.

## 2020-07-16 NOTE — PROGRESS NOTES
Assessment complete via flow sheet. Pt is alert and oriented to person and place. Respirations even and unlabored. S1 S2 auscultated. Bowel sounds active, abdomen soft. Denies other needs. Bed in lowest position, side rails up x3, call bell in reach. Instructed to call for assistance. Pt verbalized understanding. Plan of care reviewed with patient.

## 2020-07-16 NOTE — PROGRESS NOTES
MSN, CM:  Attempted to call wife and son again with Dr. Jd Atkinson and once again was unsuccessful. Left message with both parties.

## 2020-07-16 NOTE — INTERDISCIPLINARY ROUNDS
Interdisciplinary team rounds were held 7/16/2020 with the following team members:Care Management, Nursing and Physician. .    Plan of care discussed. See clinical pathway and/or care plan for interventions and desired outcomes.

## 2020-07-16 NOTE — PROGRESS NOTES
Shift Assessment - Patient is alert and oriented x2 with confusion at times. Emotional support given. Respirations are even and unlabored. Bowel sounds active x4. No complaint of pain at this time. Currently resting in a low, locked bed. Call light within reach.

## 2020-07-16 NOTE — PROGRESS NOTES
Nutrition: Following pt for improved po intake. Sent message via [employer] regarding RD availability for consult for specialized nutrition support per MD discretion. RD to follow up on Monday for pt progress with po intake.     Henrique Frye [address], [address] [place] [address], [address], [address] [facility]

## 2020-07-16 NOTE — DISCHARGE INSTRUCTIONS
Nutrition: Continue ONS (Glucerna) 3 times/day at discharge with poor po intake. Advance Care Planning  People with COVID-19 may have no symptoms, mild symptoms, such as fever, cough, and shortness of breath or they may have more severe illness, developing severe and fatal pneumonia. As a result, Advance Care Planning with attention to naming a health care decision maker (someone you trust to make healthcare decisions for you if you could not speak for yourself) and sharing other health care preferences is important BEFORE a possible health crisis. Please contact your Primary Care Provider to discuss Advance Care Planning. Preventing the Spread of Coronavirus Disease 2019 in Homes and Residential Communities  For the most recent information go to [email or web address].    Prevention steps for People with confirmed or suspected COVID-19 (including persons under investigation) who do not need to be hospitalized  and   People with confirmed COVID-19 who were hospitalized and determined to be medically stable to go home    Your healthcare provider and public health staff will evaluate whether you can be cared for at home. If it is determined that you do not need to be hospitalized and can be isolated at home, you will be monitored by staff from your local or state health department. You should follow the prevention steps below until a healthcare provider or local or state health department says you can return to your normal activities. Stay home except to get medical care  People who are mildly ill with COVID-19 are able to isolate at home during their illness. You should restrict activities outside your home, except for getting medical care. Do not go to work, school, or public areas. Avoid using public transportation, ride-sharing, or taxis.   Separate yourself from other people and animals in your home  People: As much as possible, you should stay in a specific room and away from other people in your home. Also, you should use a separate bathroom, if available. Animals: You should restrict contact with pets and other animals while you are sick with COVID-19, just like you would around other people. Although there have not been reports of pets or other animals becoming sick with COVID-19, it is still recommended that people sick with COVID-19 limit contact with animals until more information is known about the virus. When possible, have another member of your household care for your animals while you are sick. If you are sick with COVID-19, avoid contact with your pet, including petting, snuggling, being kissed or licked, and sharing food. If you must care for your pet or be around animals while you are sick, wash your hands before and after you interact with pets and wear a facemask. Call ahead before visiting your doctor  If you have a medical appointment, call the healthcare provider and tell them that you have or may have COVID-19. This will help the healthcare providers office take steps to keep other people from getting infected or exposed. Wear a facemask  You should wear a facemask when you are around other people (e.g., sharing a room or vehicle) or pets and before you enter a healthcare providers office. If you are not able to wear a facemask (for example, because it causes trouble breathing), then people who live with you should not stay in the same room with you, or they should wear a facemask if they enter your room. Cover your coughs and sneezes  Cover your mouth and nose with a tissue when you cough or sneeze. Throw used tissues in a lined trash can. Immediately wash your hands with soap and water for at least 20 seconds or, if soap and water are not available, clean your hands with an alcohol-based hand  that contains at least 60% alcohol.   Clean your hands often  Wash your hands often with soap and water for at least 20 seconds, especially after blowing your nose, coughing, or sneezing; going to the bathroom; and before eating or preparing food. If soap and water are not readily available, use an alcohol-based hand  with at least 60% alcohol, covering all surfaces of your hands and rubbing them together until they feel dry. Soap and water are the best option if hands are visibly dirty. Avoid touching your eyes, nose, and mouth with unwashed hands. Avoid sharing personal household items  You should not share dishes, drinking glasses, cups, eating utensils, towels, or bedding with other people or pets in your home. After using these items, they should be washed thoroughly with soap and water. Clean all high-touch surfaces everyday  High touch surfaces include counters, tabletops, doorknobs, bathroom fixtures, toilets, phones, keyboards, tablets, and bedside tables. Also, clean any surfaces that may have blood, stool, or body fluids on them. Use a household cleaning spray or wipe, according to the label instructions. Labels contain instructions for safe and effective use of the cleaning product including precautions you should take when applying the product, such as wearing gloves and making sure you have good ventilation during use of the product. Monitor your symptoms  Seek prompt medical attention if your illness is worsening (e.g., difficulty breathing). Before seeking care, call your healthcare provider and tell them that you have, or are being evaluated for, COVID-19. Put on a facemask before you enter the facility. These steps will help the healthcare providers office to keep other people in the office or waiting room from getting infected or exposed. Ask your healthcare provider to call the local or [facility] health department.  Persons who are placed under active monitoring or facilitated self-monitoring should follow instructions provided by their local health department or occupational health professionals, as appropriate. When working with your local health department check their available hours. If you have a medical emergency and need to call 911, notify the dispatch personnel that you have, or are being evaluated for COVID-19. If possible, put on a facemask before emergency medical services arrive. Discontinuing home isolation  Patients with confirmed COVID-19 should remain under home isolation precautions until the risk of secondary transmission to others is thought to be low. The decision to discontinue home isolation precautions should be made on a case-by-case basis, in consultation with healthcare providers and state and local health departments.

## 2020-07-16 NOTE — PROGRESS NOTES
Hospitalist Progress Note    Admission Date: 2020  5:42 AM  Reason for Admission/Hospital Course: Acute on chronic respiratory failure with hypoxia (HCC) [J96.21]      24 Hour Events:  Patient seen and examined at bedside this morning. Patient appears to be confused and was unable to answer questions or voice understanding of what I was telling him. Patient reports that he is still not feeling well but cannot specify or detail his discomfort. As per nursing tech by bedside, patient did approximately eat 50% of his meals today. ROS:  10 point review of systems is otherwise negative with the exception of the elements mentioned above. No Known Allergies    OBJECTIVE:  Patient Vitals for the past 8 hrs:   BP Temp Pulse Resp SpO2   20 1159 125/66 97.7 °F (36.5 °C) (!) 101 20 98 %   20 0855 131/84 97.9 °F (36.6 °C) 92 18 92 %     Temp (24hrs), Av.7 °F (36.5 °C), Min:97.4 °F (36.3 °C), Max:98 °F (36.7 °C)     07 -  1900  In: 360 [P.O.:360]  Out: 500 [Urine:500]    PHYSICAL EXAM:  General:          Frail-appearing white male, no acute distress. Affect appears flat. HEENT:           Normocephalic, atraumatic, moist oral mucosa  CV:                  RRR, no murmurs  Lungs:             Poor air entry bilaterally but likely due to decreased inspiratory effort.   Otherwise lungs sound clear  Abdomen:        Soft, nontender, nondistended  MSK:               No gross skeletal defects  Skin:                Multiple bruises on upper extremities from prior IV sites                     Neuro:             CN II through XII grossly intact             Psych:             Appears depressed, flat affect.       Labs:      Recent Labs     20  0513   WBC 5.0   RBC 2.78*   HGB 8.3*   HCT 27.3*   MCV 98.2*   MCH 29.9   MCHC 30.4*   RDW 18.6*   PLT 51*   GRANS 65   LYMPH 20   MONOS 12   EOS 0*   BASOS 0   IG 2   DF AUTOMATED   ANEU 3.2   ABL 1.0   ABM 0.6   APRIL 0.0   ABB 0.0   AIG 0.1 Recent Labs     07/16/20  0416 07/15/20  0431 07/14/20  0513    137 137   K 3.6 3.7 3.8    100 97*   CO2 25 27 28   AGAP 12 10 12   * 193* 127*   BUN 70* 62* 45*   CREA 2.05* 2.05* 1.60*   GFRAA 40* 40* 54*   GFRNA 33* 33* 45*   CA 8.4 8.3 8.7   AP  --   --  1,035*   TP  --   --  5.9*   ALB  --   --  2.0*   GLOB  --   --  3.9*   AGRAT  --   --  0.5*       Recent Results (from the past 24 hour(s))   GLUCOSE, POC    Collection Time: 07/15/20  4:10 PM   Result Value Ref Range    Glucose (POC) 258 (H) 65 - 100 mg/dL   GLUCOSE, POC    Collection Time: 07/15/20  9:24 PM   Result Value Ref Range    Glucose (POC) 212 (H) 65 - 100 mg/dL   VANCOMYCIN, RANDOM    Collection Time: 07/15/20 10:05 PM   Result Value Ref Range    Vancomycin, random 18.3 UG/ML   PROTHROMBIN TIME + INR    Collection Time: 07/16/20  4:16 AM   Result Value Ref Range    Prothrombin time 19.1 (H) 12.0 - 14.7 sec    INR 1.6     PTT    Collection Time: 07/16/20  4:16 AM   Result Value Ref Range    aPTT 44.0 (H) 24.3 - 35.4 SEC   FIBRINOGEN    Collection Time: 07/16/20  4:16 AM   Result Value Ref Range    Fibrinogen 463 190 - 501 mg/dL   D DIMER    Collection Time: 07/16/20  4:16 AM   Result Value Ref Range    D DIMER 9.11 (HH) <0.56 ug/ml(FEU)   METABOLIC PANEL, BASIC    Collection Time: 07/16/20  4:16 AM   Result Value Ref Range    Sodium 138 136 - 145 mmol/L    Potassium 3.6 3.5 - 5.1 mmol/L    Chloride 101 98 - 107 mmol/L    CO2 25 21 - 32 mmol/L    Anion gap 12 7 - 16 mmol/L    Glucose 181 (H) 65 - 100 mg/dL    BUN 70 (H) 8 - 23 MG/DL    Creatinine 2.05 (H) 0.8 - 1.5 MG/DL    GFR est AA 40 (L) >60 ml/min/1.73m2    GFR est non-AA 33 (L) >60 ml/min/1.73m2    Calcium 8.4 8.3 - 10.4 MG/DL   GLUCOSE, POC    Collection Time: 07/16/20  7:27 AM   Result Value Ref Range    Glucose (POC) 194 (H) 65 - 100 mg/dL   GLUCOSE, POC    Collection Time: 07/16/20 11:49 AM   Result Value Ref Range    Glucose (POC) 254 (H) 65 - 100 mg/dL       Current Facility-Administered Medications   Medication Dose Route Frequency    0.9% sodium chloride infusion  100 mL/hr IntraVENous CONTINUOUS    enoxaparin (LOVENOX) injection 30 mg  30 mg SubCUTAneous Q24H    VANCOMYCIN INTERMITTENT DOSING   Other Rx Dosing/Monitoring    central line flush (saline) syringe 10 mL  10 mL InterCATHeter Q8H    furosemide (LASIX) injection 20 mg  20 mg IntraVENous Q12H    dexamethasone (DECADRON) 4 mg/mL injection 6 mg  6 mg IntraVENous DAILY    albuterol-ipratropium (DUO-NEB) 2.5 MG-0.5 MG/3 ML  3 mL Nebulization Q6H PRN    bisacodyL (DULCOLAX) suppository 10 mg  10 mg Rectal DAILY    tamsulosin (FLOMAX) capsule 0.4 mg  0.4 mg Oral DAILY    sodium chloride (NS) flush 5-40 mL  5-40 mL IntraVENous Q8H    sodium chloride (NS) flush 5-40 mL  5-40 mL IntraVENous PRN    acetaminophen (TYLENOL) tablet 650 mg  650 mg Oral Q6H PRN    HYDROcodone-acetaminophen (NORCO) 5-325 mg per tablet 1 Tab  1 Tab Oral Q4H PRN    morphine injection 2 mg  2 mg IntraVENous Q4H PRN    naloxone (NARCAN) injection 0.4 mg  0.4 mg IntraVENous PRN    ondansetron (ZOFRAN) injection 4 mg  4 mg IntraVENous Q4H PRN    insulin lispro (HUMALOG) injection   SubCUTAneous AC&HS    ferrous sulfate tablet 325 mg  1 Tab Oral DAILY WITH BREAKFAST          Imaging:    Xr Chest Sngl V    Result Date: 7/15/2020  EXAM: Chest x-ray. INDICATION: Dyspnea. COMPARISON: Yesterday's chest x-ray. TECHNIQUE: Frontal view chest x-ray. FINDINGS: Bibasilar lung atelectasis or infiltrates and small pleural effusions are unchanged. Cardiac size is within normal limits. No pneumothorax is identified. There is a new left arm PICC line, with its tip just above the cavoatrial junction. IMPRESSION: Unchanged bibasilar lung atelectasis or infiltrates and small pleural effusions. Xr Chest Sngl V    Result Date: 7/14/2020  EXAM: Chest x-ray. INDICATION: Dyspnea. COMPARISON: July 12, 2020. TECHNIQUE: Frontal view chest x-ray.  FINDINGS: Bibasilar lung atelectasis or infiltrates and small bilateral pleural effusions are unchanged. The cardiac size is within normal limits. No pneumothorax is identified. IMPRESSION: Unchanged bibasilar lung atelectasis or infiltrates and small pleural effusions. Xr Chest Sngl V    Result Date: 7/13/2020  EXAM: Chest x-ray. INDICATION: Dyspnea. COMPARISON: Yesterday's chest x-ray. TECHNIQUE: Frontal view chest x-ray. FINDINGS: Bibasilar lung atelectasis or infiltrates and small pleural effusions are unchanged. The cardiac size is stable. No pneumothorax is identified. IMPRESSION: No interval change. Xr Chest Sngl V    Result Date: 7/12/2020  EXAM: Chest x-ray. INDICATION: Dyspnea. Covid 19. COMPARISON: Yesterday's chest x-ray. TECHNIQUE: Frontal view chest x-ray. FINDINGS: Bibasilar lung atelectasis or infiltrates and small pleural effusions are unchanged. The cardiac size is stable. No pneumothorax is identified. IMPRESSION: Unchanged bibasilar lung atelectasis or infiltrates and pleural effusions. Xr Chest Sngl V    Result Date: 7/11/2020  EXAM: Chest x-ray. INDICATION: Dyspnea. Rule out Covid 19. COMPARISON: Yesterday's chest x-ray. TECHNIQUE: Frontal view chest x-ray. FINDINGS: Bibasilar lung atelectasis or infiltrates are unchanged. There are small bilateral pleural effusions, progressed on the left and improved on the right. The cardiac size is within normal limits. No pneumothorax is identified. IMPRESSION: Unchanged bibasilar lung atelectasis or infiltrates. Xr Chest Sngl V    Result Date: 7/10/2020  EXAM: TEMPORARY INDICATION: Pleural effusions, Suspected Covid-19 Patient COMPARISON: 7/9/2020 FINDINGS: A portable AP radiograph of the chest was obtained at 0455 hours. The patient is on a cardiac monitor. I basilar airspace disease and bilateral pleural effusions unchanged. . The cardiac and mediastinal contours and pulmonary vascularity are normal.  The bones and soft tissues are grossly within normal limits. IMPRESSION: Basilar atelectasis or pneumonia and bilateral pleural effusions unchanged. Xr Chest Sngl V    Result Date: 7/9/2020  EXAM: TEMPORARY INDICATION: Respiratory failure COMPARISON: 7/8/2020 FINDINGS: A portable AP radiograph of the chest was obtained at 0515 hours. The patient is on a cardiac monitor. Left pleural effusion left lower lobe airspace disease worse in the interval. Right pleural effusion and right lower lobe airspace disease worse in the interval. The cardiac and mediastinal contours and pulmonary vascularity are normal.  The bones and soft tissues are grossly within normal limits. IMPRESSION: Worsening of bibasilar atelectasis or pneumonia and bilateral pleural effusions. Given the symmetric worsening also consider pulmonary edema. Xr Chest Sngl V    Result Date: 6/24/2020  Chest X-ray INDICATION: Shortness of breath, possible COVID-19 A portable AP view of the chest was obtained. FINDINGS: There is stable infiltrate in the right lung. Right pleural effusion appears smaller. The heart size is stable. The bony thorax is intact. IMPRESSION: Persistent right-sided pulmonary infiltrate, decreased effusion     Xr Chest Sngl V    Result Date: 6/23/2020  Portable chest x-ray CLINICAL INDICATION: Decreasing oxygen saturation FINDINGS: Single AP view of the chest compared to a similar exam dated 6/19/2020 shows a larger right pleural effusion with airspace opacity in the right lung base. The left lung is clear. The cardiac silhouette and mediastinum are unremarkable. IMPRESSION: Airspace opacity in the right lung base with a larger right pleural effusion. Atelectasis or pneumonia should be considered. Xr Chest Sngl V    Result Date: 6/19/2020   Portable view of the chest 6/19/2020 Comparison: 6/15/2020 Indication: Hypoxia FINDINGS: Cardiac and pulmonary artery enlargement similar in appearance previous examination.  Previously noted diffuse perihilar pulmonary parenchymal infiltrates with large bilateral pleural effusions not significant change when compared to previous examination. No interval infiltrate or pneumothorax. No discrete acute osseous lesion seen. IMPRESSION: No significant interval change. Persistent volume overload. Xr Chest Port    Result Date: 7/8/2020  Single View portable upright chest x-ray dated   July 8, 2020 Reference Exam: June 29, 2020 Indication: Sepsis, some shortness of breath Findings: The cardiac silhouette is normal in size and contour. Hazy opacities are again seen in the lungs but improved from the reference study with bilateral effusions again noted. Patient is rotated some to the right, pulmonary vascularity appears normal.     IMPRESSION: Some improvement in the opacities in the lungs but effusions appear to have enlarged. Xr Chest Port    Result Date: 6/29/2020  Chest portable CLINICAL INDICATION: Follow-up of pleural effusions, acute respiratory failure with hypoxia, dyspnea COMPARISON: June 26, 2020 TECHNIQUE: single AP portable view chest at 4:10 AM semiupright FINDINGS: Stable mediastinal and hilar contours. Patient is rotated. There is persistence of bilateral pulmonary vascular congestion, right greater than left pleural effusions, and right mid to lower lung infiltrate. Overall allowing for technique and positioning the appearance is very similar to prior. IMPRESSION: Pulmonary vascular congestion, infiltrates and small pleural effusions are similar to prior. Xr Chest Port    Result Date: 6/26/2020  EXAM: Chest x-ray. INDICATION: Dyspnea. COMPARISON: June 24, 2020. TECHNIQUE: Frontal view chest x-ray. FINDINGS: Previous bilateral lung edema or infiltrates have improved, with mild residual. The left pleural effusion has resolved. There is a progressed small to moderate loculated right pleural effusion. No pneumothorax is identified. The cardiac size is within normal limits.      IMPRESSION: 1. Improving bilateral lung edema or infiltrates. 2. Resolved left pleural effusion. 3. Progressed loculated right pleural effusion. ASSESSMENT:    Problem List  Date Reviewed: 7/10/2020          Codes Class Noted    MRSA bacteremia ICD-10-CM: R78.81, B95.62  ICD-9-CM: 790.7, 041.12  7/12/2020        * (Principal) Acute on chronic respiratory failure with hypoxia (HCC) ICD-10-CM: J96.21  ICD-9-CM: 518.84, 799.02  7/8/2020        Pneumonia ICD-10-CM: J18.9  ICD-9-CM: 486  7/8/2020        Anemia (Chronic) ICD-10-CM: D64.9  ICD-9-CM: 285.9  7/8/2020        Hyperkalemia ICD-10-CM: E87.5  ICD-9-CM: 276.7  7/8/2020        KIRK (acute kidney injury) ([facility] ICD-10-CM: N17.9  ICD-9-CM: 584.9  7/8/2020        Thrombocytopenia (HCC) (Chronic) ICD-10-CM: D69.6  ICD-9-CM: 287.5  7/8/2020        Hypotension ICD-10-CM: I95.9  ICD-9-CM: 458.9  7/8/2020        Chronic bilateral pleural effusions (Chronic) ICD-10-CM: J90  ICD-9-CM: 511.9  7/8/2020        Urine retention (Chronic) ICD-10-CM: R33.9  ICD-9-CM: 788.20  7/8/2020        Debility (Chronic) ICD-10-CM: R53.81  ICD-9-CM: 799.3  6/15/2020        Rectal obstruction ICD-10-CM: K62.4  ICD-9-CM: 569.2  6/1/2020        Aspiration pneumonia ([facility] ICD-10-CM: J69.0  ICD-9-CM: 507.0  6/1/2020        Neutropenia ([facility] ICD-10-CM: D70.9  ICD-9-CM: 288.00  5/25/2020        Pleural effusion on right ICD-10-CM: J90  ICD-9-CM: 511.9  5/25/2020    Overview Addendum 6/24/2020  8:13 AM by Mahnaz Mooney NP     6/23/20L  Right thoracentesis:  1100 ml bloody effusion removed  6/10/20:  Bilateral thoracentesis on L( 1100 cc ) and R( 1000 cc) removed    6/3/20:  Right chest thoracentesis with 800 removed.               Acute respiratory failure with hypoxia (HCC) ICD-10-CM: J96.01  ICD-9-CM: 518.81  5/24/2020        Normocytic anemia ICD-10-CM: D64.9  ICD-9-CM: 285.9  5/24/2020        CAP (community acquired pneumonia) ICD-10-CM: J18.9  ICD-9-CM: 466  5/24/2020        Pleural effusion on left ICD-10-CM: J90  ICD-9-CM: 511.9  5/24/2020    Overview Addendum 6/25/2020 11:40 AM by Veronica Randolph NP      6/10/20:  Bilateral thoracentesis on L( 1100 cc ) and R( 1000 cc) removed    6/25/20:  L thoracentesis:  900 ml serosanguinous fluid removed--negative cytology             Hypomagnesemia ICD-10-CM: E83.42  ICD-9-CM: 275.2  5/20/2020        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) ICD-10-CM: E22.2  ICD-9-CM: 253.6  3/26/2020        Malignant neoplasm metastatic to bone [facility]) (Chronic) ICD-10-CM: C79.51  ICD-9-CM: 198.5  6/26/2019        Postoperative seroma of subcutaneous tissue after non-dermatologic procedure ICD-10-CM: L76.34  ICD-9-CM: 998.13  9/3/2018        Anal carcinoma ([facility] (Chronic) ICD-10-CM: C21.0  ICD-9-CM: 154.3  8/9/2018        Type 2 diabetes with nephropathy (HCC) (Chronic) ICD-10-CM: E11.21  ICD-9-CM: 250.40, 583.81  7/3/2018        Primary malignant neoplasm of perianal skin ICD-10-CM: C44.500  ICD-9-CM: 173.50  7/3/2018        Diabetes mellitus due to underlying condition with hyperglycemia (HCC) (Chronic) ICD-10-CM: L08.74  ICD-9-CM: 249.80  12/21/2015        Mucositis due to radiation therapy ICD-10-CM: K12.33  ICD-9-CM: 528.09, E879.2  12/15/2015        Radiation esophagitis ICD-10-CM: K20.8  ICD-9-CM: 530.19, E926.9  12/15/2015        COVID-19 ruled out ICD-10-CM: Z03.818  ICD-9-CM: V71.83  12/15/2015        Hyponatremia ICD-10-CM: E87.1  ICD-9-CM: 276.1  11/23/2015        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 Currently receiving them does appear as well as antibiotic therapy. Status post convalescent plasma on 712.   Status post Remdesivir  Patient continuing to require 4 to 5 L by oxygen to mean saturations in the low 90s.  Continue with antibiotics as per ID recs   Follow-up blood cultures   Follow-up pulmonary consult no tap at this time   Continue with remedesivir    Continue with isolation precautions, wean down O2 as tolerated  Continue with dexamethasone     MRSA bacteremia  Unclear source of MRSA bacteremia. Currently pending echo but unable to do so due to COVID status. Patient's case was discussed with cardiologist today. Given his positive COVID STATUS, patient should empirically be treated for infective endocarditis as they will not be able to do a PUSHPA or TTE due to his clinical condition. This would mean a prolonged course of antibiotic therapy.  TTE when appropriate  Continue with IV vancomycin, PICC line placed     Anemia, stable  Stable. Likely due to anemia of chronic illness     KIRK, likely prerenal -worsening  Renal function appears to be stable today after receiving fluids. We will give him another bolus today and reassess.  Dose meds for reduced GFR, follow-up BMP daily, encourage p.o. 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  He received carbo/etoposide 2 weeks ago and has also had radiation for anal cancer. [facility] has required anal dilation in past.  Chemo on hold  He refuses to discussion intubation/CPR - defers to his wife who wants him full code  Palliative care consulted for goals of care - still full code     DM2: SSI, Holding oral meds  Hyperkalemia, resolved, Following on tele , Holding lisinopril  HTN:, Hold antihypertensives as lower range BP  Urine retention: [place], flomax    Goals of care discussion:  Lengthy discussion had with patient's wife Tu Marks.   Currently still wants patient to be full code with all aggressive measures to be done. She understands that patient has a very poor prognosis at this time given his multiple comorbidities with concurrent COVID infection. I explained to the patient's family that patient has a very high likelihood that he will not improve significantly. She expressed understanding and desire for us to do her best.    7/15: Lengthy conversation was had with both son and wife. Patient's poor prognosis was discussed with them and all questions regarding his condition were answered. Son will discuss case further with mother in terms of possible placement in hospice other facility depending on their goals of care. 7/16: Multiple attempts were made by case management and myself to contact family to follow-up. No answers were received. We will continue to wait for the response. Fluids:   Encouraging p.o. intake  Electrolytes: Replete as needed  Nutrition: Regular diet, mechanical soft  CODE STATUS: Full code     DVT prophylaxis: Lovenox     Patient still requiring 4 to 5 L by nasal cannula at this time. Depression may be due to separation as patient has been isolated for the last several days versus COVID delirium. Anticipate discharge in 48 to 72 hours if he continues to remain stable. Prognosis is extremely poor at this time.

## 2020-07-16 NOTE — PROGRESS NOTES
MSN, CM:  Attempts were made to contact wife and son with no success. Will attempt again as time allows.

## 2020-07-17 NOTE — PROGRESS NOTES
1755: oxygen saturation 80 on 4L. Lung sounds wet. RT called to Bedside. Pt responds to voice, remains oriented to self only. 1800: Rapid Response called for oxygen saturation 78%. 1801: Charge RN, RT, MD, House Supervisor to bedside. Verbal orders for stat ABG, stat EKG, stat chest X-Ray. 1807: oxygen saturation 92% on 15L non-re breather, /63. Blood glucose 239.   1823: RT suctioning at bedside.

## 2020-07-17 NOTE — PROGRESS NOTES
Pharmacokinetic Consult to Pharmacist    Hawk Loan is a 79 y.o. male being treated for MRSA bacteremia with vancomycin. Height: 6' (182.9 cm)  Weight: 77.1 kg (170 lb)  Lab Results   Component Value Date/Time    BUN 70 (H) 07/17/2020 04:46 AM    Creatinine 1.74 (H) 07/17/2020 04:46 AM    WBC 5.0 07/14/2020 05:13 AM    Procalcitonin 0.60 07/08/2020 06:01 AM    Lactic acid 1.6 07/08/2020 06:01 AM    Lactic Acid (POC) 2.9 (H) 09/02/2018 07:59 PM      Estimated Creatinine Clearance: 37.5 mL/min (A) (based on SCr of 1.74 mg/dL (H)). Lab Results   Component Value Date/Time    Vancomycin,trough 26.8 (HH) 07/10/2020 11:40 PM    Vancomycin, random 20.8 07/17/2020 04:46 AM       Day 10 of vancomycin. Goal trough is 15-20. Random level this AM is 20.8. Will give vancomycin 1000 mg IV x 1. Due to changing renal function, will continue with intermittent dosing for now. Further levels will be ordered as clinically indicated. Pharmacy will continue to follow. Please call with any questions.     Thank you,  Alonso Hunterr, PharmD, BCPS  Clinical Pharmacist  [phone]

## 2020-07-17 NOTE — PROGRESS NOTES
MSN, CM:  Attempted to contact family (wife and son) with Dr. Barb Askew and we were unsuccessful again today. Left message for both parties. Will attempt contact again tomorrow.

## 2020-07-17 NOTE — PROGRESS NOTES
Progress Note - RRT    RRT called for desaturation event. As per nursing staff, saturation noted to be in the 70s. I assessed patient at bedside. Lungs sound congested, rhonchoros, with evidence of possible vomitus on shirt. Lungs sounded completely different than AM exam.      Pt placed no NRB and then high flow. Deep suctioning was performed by RT and patient's saturation immediately improved. I spoke with pt's son Guille King at length. Phone call lasted approximately 25 minutes. As per conversation, pt's HCP Lukasz Sanchez, has authorized son to make decision at this time. As per discussion with son, pt to be made DNR/DNI and comfort measures only. He would like all medications discontinued, no further lab draws, or vital signs checks. They would like to hold off on morphine drip for now as they would like to see him somewhat coherent before he passes. Guille King was notified that immediate family could come see patient given his tenuous condition. List of names was taken down by  and submitted downstairs. All questions regarding plans of care were discussed at length. Son expressed great thanks for efforts of nurses and staff in care of his father.

## 2020-07-17 NOTE — PROGRESS NOTES
Hospitalist Progress Note    Admission Date: 2020  5:42 AM  Reason for Admission/Hospital Course: Acute on chronic respiratory failure with hypoxia (HCC) [J96.21]      24 Hour Events:  Patient seen and examined at bedside this morning. Patient complains of diffuse pain throughout his body. He still says he is confused and does not know what is going on. Patient unable to localize areas of pain specifically. He was unable to answer any further questions. .    ROS:  10 point review of systems is otherwise negative with the exception of the elements mentioned above. No Known Allergies    OBJECTIVE:  Patient Vitals for the past 8 hrs:   BP Temp Pulse Resp SpO2   20 1156     92 %   20 1122 118/60 97.5 °F (36.4 °C) (!) 108 18 (!) 87 %   20 0734 117/61 98.3 °F (36.8 °C) (!) 109 14 90 %     Temp (24hrs), Av.8 °F (36.6 °C), Min:97.1 °F (36.2 °C), Max:98.9 °F (37.2 °C)    No intake/output data recorded. PHYSICAL EXAM:  General:          Frail-appearing white male, no acute distress. Affect appears flat. HEENT:           Normocephalic, atraumatic, moist oral mucosa  CV:                  RRR, no murmurs  Lungs:             Poor air entry bilaterally but likely due to decreased inspiratory effort. Otherwise lungs sound clear  Abdomen:        Soft, nontender, nondistended  MSK:               No gross skeletal defects  Skin:                Multiple bruises on upper extremities from prior IV sites                     Neuro:             CN II through XII grossly intact             Psych:             Appears depressed, flat affect.       Labs:      No results for input(s): WBC, RBC, HGB, HCT, MCV, MCH, MCHC, RDW, PLT, GRANS, LYMPH, MONOS, EOS, BASOS, IG, DF, ANEU, ABL, ABM, APRIL, ABB, AIG, HGBEXT, HCTEXT, PLTEXT, HGBEXT, HCTEXT, PLTEXT in the last 72 hours.     No lab exists for component: MPV     Recent Labs     20  0446 20  0416 07/15/20  0431    138 137   K 3.0* 3.6 3.7  101 100   CO2 24 25 27   AGAP 10 12 10   * 181* 193*   BUN 70* 70* 62*   CREA 1.74* 2.05* 2.05*   GFRAA 49* 40* 40*   GFRNA 40* 33* 33*   CA 7.5* 8.4 8.3       Recent Results (from the past 24 hour(s))   GLUCOSE, POC    Collection Time: 07/16/20  4:35 PM   Result Value Ref Range    Glucose (POC) 263 (H) 65 - 100 mg/dL   GLUCOSE, POC    Collection Time: 07/16/20  8:39 PM   Result Value Ref Range    Glucose (POC) 216 (H) 65 - 100 mg/dL   PROTHROMBIN TIME + INR    Collection Time: 07/17/20  4:46 AM   Result Value Ref Range    Prothrombin time 18.2 (H) 12.0 - 14.7 sec    INR 1.5     PTT    Collection Time: 07/17/20  4:46 AM   Result Value Ref Range    aPTT 45.7 (H) 24.3 - 35.4 SEC   FIBRINOGEN    Collection Time: 07/17/20  4:46 AM   Result Value Ref Range    Fibrinogen 469 190 - 501 mg/dL   D DIMER    Collection Time: 07/17/20  4:46 AM   Result Value Ref Range    D DIMER 9.23 (HH) <0.56 ug/ml(FEU)   METABOLIC PANEL, BASIC    Collection Time: 07/17/20  4:46 AM   Result Value Ref Range    Sodium 138 136 - 145 mmol/L    Potassium 3.0 (L) 3.5 - 5.1 mmol/L    Chloride 104 98 - 107 mmol/L    CO2 24 21 - 32 mmol/L    Anion gap 10 7 - 16 mmol/L    Glucose 144 (H) 65 - 100 mg/dL    BUN 70 (H) 8 - 23 MG/DL    Creatinine 1.74 (H) 0.8 - 1.5 MG/DL    GFR est AA 49 (L) >60 ml/min/1.73m2    GFR est non-AA 40 (L) >60 ml/min/1.73m2    Calcium 7.5 (L) 8.3 - 10.4 MG/DL   VANCOMYCIN, RANDOM    Collection Time: 07/17/20  4:46 AM   Result Value Ref Range    Vancomycin, random 20.8 UG/ML   GLUCOSE, POC    Collection Time: 07/17/20  8:00 AM   Result Value Ref Range    Glucose (POC) 179 (H) 65 - 100 mg/dL   GLUCOSE, POC    Collection Time: 07/17/20 11:23 AM   Result Value Ref Range    Glucose (POC) 232 (H) 65 - 100 mg/dL       Current Facility-Administered Medications   Medication Dose Route Frequency    enoxaparin (LOVENOX) injection 30 mg  30 mg SubCUTAneous Q12H    VANCOMYCIN INTERMITTENT DOSING   Other Rx Dosing/Monitoring  central line flush (saline) syringe 10 mL  10 mL InterCATHeter Q8H    furosemide (LASIX) injection 20 mg  20 mg IntraVENous Q12H    dexamethasone (DECADRON) 4 mg/mL injection 6 mg  6 mg IntraVENous DAILY    albuterol-ipratropium (DUO-NEB) 2.5 MG-0.5 MG/3 ML  3 mL Nebulization Q6H PRN    bisacodyL (DULCOLAX) suppository 10 mg  10 mg Rectal DAILY    tamsulosin (FLOMAX) capsule 0.4 mg  0.4 mg Oral DAILY    sodium chloride (NS) flush 5-40 mL  5-40 mL IntraVENous Q8H    sodium chloride (NS) flush 5-40 mL  5-40 mL IntraVENous PRN    acetaminophen (TYLENOL) tablet 650 mg  650 mg Oral Q6H PRN    HYDROcodone-acetaminophen (NORCO) 5-325 mg per tablet 1 Tab  1 Tab Oral Q4H PRN    morphine injection 2 mg  2 mg IntraVENous Q4H PRN    naloxone (NARCAN) injection 0.4 mg  0.4 mg IntraVENous PRN    ondansetron (ZOFRAN) injection 4 mg  4 mg IntraVENous Q4H PRN    insulin lispro (HUMALOG) injection   SubCUTAneous AC&HS    ferrous sulfate tablet 325 mg  1 Tab Oral DAILY WITH BREAKFAST          Imaging:    Xr Chest Sngl V    Result Date: 7/15/2020  EXAM: Chest x-ray. INDICATION: Dyspnea. COMPARISON: Yesterday's chest x-ray. TECHNIQUE: Frontal view chest x-ray. FINDINGS: Bibasilar lung atelectasis or infiltrates and small pleural effusions are unchanged. Cardiac size is within normal limits. No pneumothorax is identified. There is a new left arm PICC line, with its tip just above the cavoatrial junction. IMPRESSION: Unchanged bibasilar lung atelectasis or infiltrates and small pleural effusions. Xr Chest Sngl V    Result Date: 7/14/2020  EXAM: Chest x-ray. INDICATION: Dyspnea. COMPARISON: July 12, 2020. TECHNIQUE: Frontal view chest x-ray. FINDINGS: Bibasilar lung atelectasis or infiltrates and small bilateral pleural effusions are unchanged. The cardiac size is within normal limits. No pneumothorax is identified.      IMPRESSION: Unchanged bibasilar lung atelectasis or infiltrates and small pleural effusions. Xr Chest Sngl V    Result Date: 7/13/2020  EXAM: Chest x-ray. INDICATION: Dyspnea. COMPARISON: Yesterday's chest x-ray. TECHNIQUE: Frontal view chest x-ray. FINDINGS: Bibasilar lung atelectasis or infiltrates and small pleural effusions are unchanged. The cardiac size is stable. No pneumothorax is identified. IMPRESSION: No interval change. Xr Chest Sngl V    Result Date: 7/12/2020  EXAM: Chest x-ray. INDICATION: Dyspnea. Covid 19. COMPARISON: Yesterday's chest x-ray. TECHNIQUE: Frontal view chest x-ray. FINDINGS: Bibasilar lung atelectasis or infiltrates and small pleural effusions are unchanged. The cardiac size is stable. No pneumothorax is identified. IMPRESSION: Unchanged bibasilar lung atelectasis or infiltrates and pleural effusions. Xr Chest Sngl V    Result Date: 7/11/2020  EXAM: Chest x-ray. INDICATION: Dyspnea. Rule out Covid 19. COMPARISON: Yesterday's chest x-ray. TECHNIQUE: Frontal view chest x-ray. FINDINGS: Bibasilar lung atelectasis or infiltrates are unchanged. There are small bilateral pleural effusions, progressed on the left and improved on the right. The cardiac size is within normal limits. No pneumothorax is identified. IMPRESSION: Unchanged bibasilar lung atelectasis or infiltrates. Xr Chest Sngl V    Result Date: 7/10/2020  EXAM: TEMPORARY INDICATION: Pleural effusions, Suspected Covid-19 Patient COMPARISON: 7/9/2020 FINDINGS: A portable AP radiograph of the chest was obtained at 0455 hours. The patient is on a cardiac monitor. I basilar airspace disease and bilateral pleural effusions unchanged. . The cardiac and mediastinal contours and pulmonary vascularity are normal.  The bones and soft tissues are grossly within normal limits. IMPRESSION: Basilar atelectasis or pneumonia and bilateral pleural effusions unchanged.     Xr Chest Sngl V    Result Date: 7/9/2020  EXAM: TEMPORARY INDICATION: Respiratory failure COMPARISON: 7/8/2020 FINDINGS: A portable AP radiograph of the chest was obtained at 0515 hours. The patient is on a cardiac monitor. Left pleural effusion left lower lobe airspace disease worse in the interval. Right pleural effusion and right lower lobe airspace disease worse in the interval. The cardiac and mediastinal contours and pulmonary vascularity are normal.  The bones and soft tissues are grossly within normal limits. IMPRESSION: Worsening of bibasilar atelectasis or pneumonia and bilateral pleural effusions. Given the symmetric worsening also consider pulmonary edema. Xr Chest Sngl V    Result Date: 6/24/2020  Chest X-ray INDICATION: Shortness of breath, possible COVID-19 A portable AP view of the chest was obtained. FINDINGS: There is stable infiltrate in the right lung. Right pleural effusion appears smaller. The heart size is stable. The bony thorax is intact. IMPRESSION: Persistent right-sided pulmonary infiltrate, decreased effusion     Xr Chest Sngl V    Result Date: 6/23/2020  Portable chest x-ray CLINICAL INDICATION: Decreasing oxygen saturation FINDINGS: Single AP view of the chest compared to a similar exam dated 6/19/2020 shows a larger right pleural effusion with airspace opacity in the right lung base. The left lung is clear. The cardiac silhouette and mediastinum are unremarkable. IMPRESSION: Airspace opacity in the right lung base with a larger right pleural effusion. Atelectasis or pneumonia should be considered. Xr Chest Sngl V    Result Date: 6/19/2020   Portable view of the chest 6/19/2020 Comparison: 6/15/2020 Indication: Hypoxia FINDINGS: Cardiac and pulmonary artery enlargement similar in appearance previous examination. Previously noted diffuse perihilar pulmonary parenchymal infiltrates with large bilateral pleural effusions not significant change when compared to previous examination. No interval infiltrate or pneumothorax. No discrete acute osseous lesion seen.      IMPRESSION: No significant interval change. Persistent volume overload. Xr Chest Port    Result Date: 7/8/2020  Single View portable upright chest x-ray dated   July 8, 2020 Reference Exam: June 29, 2020 Indication: Sepsis, some shortness of breath Findings: The cardiac silhouette is normal in size and contour. Hazy opacities are again seen in the lungs but improved from the reference study with bilateral effusions again noted. Patient is rotated some to the right, pulmonary vascularity appears normal.     IMPRESSION: Some improvement in the opacities in the lungs but effusions appear to have enlarged. Xr Chest Port    Result Date: 6/29/2020  Chest portable CLINICAL INDICATION: Follow-up of pleural effusions, acute respiratory failure with hypoxia, dyspnea COMPARISON: June 26, 2020 TECHNIQUE: single AP portable view chest at 4:10 AM semiupright FINDINGS: Stable mediastinal and hilar contours. Patient is rotated. There is persistence of bilateral pulmonary vascular congestion, right greater than left pleural effusions, and right mid to lower lung infiltrate. Overall allowing for technique and positioning the appearance is very similar to prior. IMPRESSION: Pulmonary vascular congestion, infiltrates and small pleural effusions are similar to prior. Xr Chest Port    Result Date: 6/26/2020  EXAM: Chest x-ray. INDICATION: Dyspnea. COMPARISON: June 24, 2020. TECHNIQUE: Frontal view chest x-ray. FINDINGS: Previous bilateral lung edema or infiltrates have improved, with mild residual. The left pleural effusion has resolved. There is a progressed small to moderate loculated right pleural effusion. No pneumothorax is identified. The cardiac size is within normal limits. IMPRESSION: 1. Improving bilateral lung edema or infiltrates. 2. Resolved left pleural effusion. 3. Progressed loculated right pleural effusion.          ASSESSMENT:    Problem List  Date Reviewed: 7/10/2020          Codes Class Noted    MRSA bacteremia ICD-10-CM: R78.81, B95.62  ICD-9-CM: 790.7, 041.12  7/12/2020        * (Principal) Acute on chronic respiratory failure with hypoxia (HCC) ICD-10-CM: J96.21  ICD-9-CM: 518.84, 799.02  7/8/2020        Pneumonia ICD-10-CM: J18.9  ICD-9-CM: 809  7/8/2020        Anemia (Chronic) ICD-10-CM: D64.9  ICD-9-CM: 285.9  7/8/2020        Hyperkalemia ICD-10-CM: E87.5  ICD-9-CM: 276.7  7/8/2020        KIRK (acute kidney injury) ([facility] ICD-10-CM: N17.9  ICD-9-CM: 584.9  7/8/2020        Thrombocytopenia (HCC) (Chronic) ICD-10-CM: D69.6  ICD-9-CM: 287.5  7/8/2020        Hypotension ICD-10-CM: I95.9  ICD-9-CM: 458.9  7/8/2020        Chronic bilateral pleural effusions (Chronic) ICD-10-CM: J90  ICD-9-CM: 511.9  7/8/2020        Urine retention (Chronic) ICD-10-CM: R33.9  ICD-9-CM: 788.20  7/8/2020        Debility (Chronic) ICD-10-CM: R53.81  ICD-9-CM: 799.3  6/15/2020        Rectal obstruction ICD-10-CM: K62.4  ICD-9-CM: 569.2  6/1/2020        Aspiration pneumonia ([facility] ICD-10-CM: J69.0  ICD-9-CM: 507.0  6/1/2020        Neutropenia ([facility] ICD-10-CM: D70.9  ICD-9-CM: 288.00  5/25/2020        Pleural effusion on right ICD-10-CM: J90  ICD-9-CM: 511.9  5/25/2020    Overview Addendum 6/24/2020  8:13 AM by Sandro Almeida, LIAM     6/23/20L  Right thoracentesis:  1100 ml bloody effusion removed  6/10/20:  Bilateral thoracentesis on L( 1100 cc ) and R( 1000 cc) removed    6/3/20:  Right chest thoracentesis with 800 removed.               Acute respiratory failure with hypoxia [facility]) ICD-10-CM: J96.01  ICD-9-CM: 518.81  5/24/2020        Normocytic anemia ICD-10-CM: D64.9  ICD-9-CM: 285.9  5/24/2020        CAP (community acquired pneumonia) ICD-10-CM: J18.9  ICD-9-CM: 706  5/24/2020        Pleural effusion on left ICD-10-CM: J90  ICD-9-CM: 511.9  5/24/2020    Overview Addendum 6/25/2020 11:40 AM by Sandro Almeida NP      6/10/20:  Bilateral thoracentesis on L( 1100 cc ) and R( 1000 cc) removed    6/25/20:  L thoracentesis:  900 ml serosanguinous fluid removed--negative cytology             Hypomagnesemia ICD-10-CM: E83.42  ICD-9-CM: 275.2  5/20/2020        SIADH (syndrome of inappropriate ADH production) ([facility] ICD-10-CM: E22.2  ICD-9-CM: 253.6  3/26/2020        Malignant neoplasm metastatic to bone [facility]) (Chronic) ICD-10-CM: C79.51  ICD-9-CM: 198.5  6/26/2019        Postoperative seroma of subcutaneous tissue after non-dermatologic procedure ICD-10-CM: L76.34  ICD-9-CM: 998.13  9/3/2018        Anal carcinoma ([facility] (Chronic) ICD-10-CM: C21.0  ICD-9-CM: 154.3  8/9/2018        Type 2 diabetes with nephropathy (HCC) (Chronic) ICD-10-CM: E11.21  ICD-9-CM: 250.40, 583.81  7/3/2018        Primary malignant neoplasm of perianal skin ICD-10-CM: C44.500  ICD-9-CM: 173.50  7/3/2018        Diabetes mellitus due to underlying condition with hyperglycemia (HCC) (Chronic) ICD-10-CM: L53.75  ICD-9-CM: 249.80  12/21/2015        Mucositis due to radiation therapy ICD-10-CM: K12.33  ICD-9-CM: 528.09, E879.2  12/15/2015        Radiation esophagitis ICD-10-CM: K20.8  ICD-9-CM: 530.19, E926.9  12/15/2015        COVID-19 ruled out ICD-10-CM: Z03.818  ICD-9-CM: V71.83  12/15/2015        Hyponatremia ICD-10-CM: E87.1  ICD-9-CM: 276.1  11/23/2015        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 Currently receiving them does appear as well as antibiotic therapy. Status post convalescent plasma on 7/12. Status post Remdesivir  Patient continuing to require 4 to 5 L by oxygen to mean saturations in the low 90s.    Continue with antibiotics as per ID recs   Follow-up blood cultures -repeat cultures negative to date   Follow-up pulmonary consult no tap at this time  Continue with isolation precautions, wean down O2 as tolerated  Continue with dexamethasone (8/10)     MRSA bacteremia  Unclear source of MRSA bacteremia. Currently pending echo but unable to do so due to COVID status. Patient's case was discussed with cardiologist today. Given his positive COVID STATUS, patient should empirically be treated for infective endocarditis as they will not be able to do a PUSHPA or TTE due to his clinical condition. This would mean a prolonged course of antibiotic therapy. Blood cultures are currently negative to date.  TTE when appropriate  Continue with IV vancomycin, PICC line placed     Anemia, stable  Stable. Likely due to anemia of chronic illness     KIRK, likely prerenal   Renal function slightly improved after administration of IV fluids. Patient has poor p.o. intake.  Dose meds for reduced GFR, follow-up BMP daily, encourage p.o. fluid intake  IV fluids PRN when patient cannot drink.     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  He received carbo/etoposide 2 weeks ago and has also had radiation for anal cancer. Austin Roberson has required anal dilation in past.  Chemo on hold  He refuses to discussion intubation/CPR - defers to his wife who wants him full code  Palliative care consulted for goals of care - still full code     DM2: SSI, Holding oral meds  Hyperkalemia, resolved, Following on tele , Holding lisinopril  HTN:, Hold antihypertensives as lower range BP  Urine retention: Juarez, flomax    Goals of care discussion:  7/14:  Lengthy discussion had with patient's wife Henrique Perrin. Currently still wants patient to be full code with all aggressive measures to be done. She understands that patient has a very poor prognosis at this time given his multiple comorbidities with concurrent COVID infection.   I explained to the patient's family that patient has a very high likelihood that he will not improve significantly. She expressed understanding and desire for us to do her best.    7/15: Lengthy conversation was had with both son and wife. Patient's poor prognosis was discussed with them and all questions regarding his condition were answered. Son will discuss case further with mother in terms of possible placement in hospice other facility depending on their goals of care. 7/16: Multiple attempts were made by case management and myself to contact family to follow-up. No answers were received. We will continue to wait for the response. 7/17: Multiple phone calls placed to patient's son and wife. No answer noted. Case management was there at time these phone calls were made. We will continue to try to reach family. Fluids:   Encouraging p.o. intake  Electrolytes: Replete as needed  Nutrition: Regular diet, mechanical soft  CODE STATUS: Full code     DVT prophylaxis: Lovenox     Patient still requiring 4 to 5 L by nasal cannula at this time. Depression may be due to separation as patient has been isolated for the last several days versus COVID delirium. Patient constantly complaining of diffuse body pain. Unable to reach family to have another goals of care discussion as patient's prognosis is extremely poor. Unable to reach family, concerned that family may not be picking up the phone on purpose? Prognosis is extremely poor at this time.

## 2020-07-17 NOTE — PROGRESS NOTES
Patient resting in bed. C/o of pain 9/10 given PRN Norco. Respirations present, non-labored. 4 LPM oxygen via NC. No signs of distress noted. Juarez draining w/o difficulty. Safety measures in place. Will continue to monitor.

## 2020-07-17 NOTE — PROGRESS NOTES
Shift assessment complete. Pt alert, oriented to self only. Respirations shallow. Lung sounds coarse on supplemental 02 via NC. HR tachy, tele in place per MD order. Abdomen soft, flat, with active bowel sounds heard in all four quadrants. Pt with decreased appetite and poor PO intake. Feeding assistance provided for breakfast, pt took 2 bites and held food in mouth. Pt took 3 sips of Glucerna and refused the rest of meal. Skin thin, fragile, pale, bruised. No edema noted. PICC line patent with positive blood return. Juarez catheter patent and draining clear, yellow urine to gravity. No visual signs of pain or distress. All needs met at this time. Safety measures in place, call light within reach, side rails x3. Will continue to monitor.

## 2020-07-17 NOTE — PROGRESS NOTES
Infectious Disease Consult    Today's Date: 2020   Admit Date: 2020    Impression:   · MRSA bacteremia (1/2) (2020), blood cx 7/10 negative; source unclear  · TTE ordered but not getting done because he is COVID +. 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 Attempts to reach the family to have these conversations has been difficult. · Continue IV Vancomycin for MRSA bacteremia. I am intending a 4 week course of treatment if hospice care is not pursued. · TTE is probably going to have to be done later considering COVID +    Anti-infectives:   · IV Vancomycin (-  · IV Cefepime (-)  · Plasma ()  · remdesivir (2020 - )    Subjective:   He is awake but delirious. Afebrile. No Known Allergies       Objective:     Visit Vitals  /60 (BP 1 Location: Right arm, BP Patient Position: At rest)   Pulse (!) 108   Temp 97.5 °F (36.4 °C)   Resp 18   Ht 6' (1.829 m)   Wt 77.1 kg (170 lb)   SpO2 92%   BMI 23.06 kg/m²     Temp (24hrs), Av.8 °F (36.6 °C), Min:97.1 °F (36.2 °C), Max:98.9 °F (37.2 °C)    No change in physical exam from yesterday     Physical Exam:    General:  Arousable; delirious; appears chronically ill; very thin   Eyes:  Sclera anicteric. Pupils equally round and reactive to light. Mouth/Throat: Mucous membranes normal, oral pharynx clear   Neck: Supple   Lungs:   Loud gurgling upper airway sounds   CV:  Regular rate and rhythm, no murmur, click, rub or gallop   Abdomen:   Soft, non-tender.  bowel sounds normal. non-distended   Extremities: No cyanosis or edema   Skin: Skin color, texture, turgor normal. no acute rash or lesions   Lymph nodes: Cervical and supraclavicular normal   Musculoskeletal: No swelling or deformity   Lines/Devices:  Intact, no erythema, drainage or tenderness   Psych: Alert and oriented, normal mood affect given the setting       Data Review:     CBC:  No results for input(s): WBC, GRANS, MONOS, EOS, ANEU, ABL, HGB, HCT, PLT, HGBEXT, HCTEXT, PLTEXT, HGBEXT, HCTEXT, PLTEXT in the last 72 hours. No lab exists for component: LYMPHS,  APRIL    BMP:  Recent Labs     07/17/20  0446 07/16/20  0416 07/15/20  0431   CREA 1.74* 2.05* 2.05*   BUN 70* 70* 62*    138 137   K 3.0* 3.6 3.7    101 100   CO2 24 25 27   AGAP 10 12 10   * 181* 193*       LFTS:  No results for input(s): TBILI, ALT, AP, TP, ALB in the last 72 hours.     No lab exists for component: SGOT    Microbiology:     All Micro Results     Procedure Component Value Units Date/Time    CULTURE, BLOOD [[id number]] Collected:  07/10/20 1713    Order Status:  Completed Specimen:  Blood Updated:  07/15/20 0920     Special Requests: --        RIGHT  HAND       Culture result: NO GROWTH 5 DAYS       CULTURE, BLOOD [[id number]] Collected:  07/10/20 1710    Order Status:  Completed Specimen:  Blood Updated:  07/15/20 0920     Special Requests: --        RIGHT  FOREARM       Culture result: NO GROWTH 5 DAYS       CULTURE, BLOOD [[id number]] Collected:  07/08/20 0622    Order Status:  Completed Specimen:  Blood Updated:  07/13/20 1035     Special Requests: LEFT HAND        Culture result: NO GROWTH 5 DAYS       CULTURE, BLOOD [[id number]]  (Abnormal)  (Susceptibility) Collected:  07/08/20 0622    Order Status:  Completed Specimen:  Blood Updated:  07/12/20 0840     Special Requests: RIGHT HAND        GRAM STAIN GRAM POSITIVE COCCI               AEROBIC AND ANAEROBIC BOTTLES                  RESULTS VERIFIED, PHONED TO AND READ BACK BY DAVID SANTAMARIA @ 031 7/9/20 MM           Culture result:       * METHICILLIN RESISTANT STAPHYLOCOCCUS AUREUS * ANAEROBIC BOTTLE POSITIVE                  STAPHYLOCOCCUS SPECIES, COAGULASE NEGATIVE AEROBIC BOTTLE POSITIVE THIS ORGANISM MAY BE INDICATIVE OF CULTURE CONTAMINATION, HOWEVER, CLINICAL CORRELATION NEEDS TO BE EVALUATED, AS EACH CASE IS UNIQUE. REFER TO [address] [facility] [id number] (FROM AEROBIC BOTTLE)            RESULTS VERIFIED, PHONED TO AND READ BACK BY  Sameer Evans RN ON 7/12/20 @0840, TA      CULTURE, BLOOD [[id number]] Collected:  07/09/20 1545    Order Status:  Canceled Specimen:  Blood     CULTURE, BLOOD [[id number]] Collected:  07/09/20 1545    Order Status:  Canceled Specimen:  Blood     CULTURE, RESPIRATORY/SPUTUM/BRONCH Revonda Real STAIN [[id number]] Collected:  07/09/20 1345    Order Status:  Canceled Specimen:  Sputum     BLOOD CULTURE ID PANEL [[id number]]  (Abnormal) Collected:  07/08/20 0622    Order Status:  Completed Specimen:  Blood Updated:  07/09/20 0745     Acc. no. from Micro Order [id number]     Staphylococcus Detected        Comment: RESULTS VERIFIED, PHONED TO AND READ BACK BY  Radha Roberson RN @ 2016 ON 7/9/20 BY AMM          mecA (Methicillin-Resistance Genes) Detected        Comment: Presence of mecA is highly indicative of methicillin resistance. The test does not replace traditional culture and susceptibilities        INTERPRETATION       Gram positive cocci in clusters. Identified by realtime PCR as  Coagulase negative Staphylococci           Comment: A single positive culture of coagulase negative Staph is likely to be a contaminant in adult patients. Consider discontinuation of antibiotics for gram positive bloodstream infections if patient asymptomatic. THIS TEST DOES NOT REPLACE SENSITIVITY TESTING. Imaging:   CXR (7/14/2020)  IMPRESSION: Unchanged bibasilar lung atelectasis or infiltrates and small pleural effusions.     Signed By: Josiane Heredia MD     July 17, 2020 1. I was told the name of the doctor(s) who took care of my child while in the hospital.    2. I have been told about any important findings on my child's plan of care.    3. The doctor clearly explained my child's diagnosis and other possible diagnoses that were considered.    4. My child's doctor explained all the tests that were done and their results (if available). I understand that some of the test results may not be ready before we go home and I was told how I can get these results. I understand that a summary of my child's hospitalization and important test results will be shared with my child's outpatient doctor.    5. My child's doctor talked to me about what I need to do when we go home.    6. I understand what signs and symptoms to watch for. I understand what symptoms I would need to call my doctor for and/or return to the hospital.    7. I have the phone number to call the hospital for results and/or questions after I leave the hospital.

## 2020-07-17 NOTE — PROGRESS NOTES
Morphine effective. Pt resting quietly with eyes closed, respirations even and unlabored. No visual signs of pain or distress. Will continue to monitor.

## 2020-07-17 NOTE — PROGRESS NOTES
Pt complains of pain. Unable to verbalize location. FLACC score 8. PRN Morphine 2 mg given slow, IVP per MD order. Safety measures in place, call light within reach, side rails x3. Will continue to monitor.

## 2020-07-18 NOTE — PROGRESS NOTES
Hospitalist Progress Note    Admission Date: 2020  5:42 AM  Reason for Admission/Hospital Course: Acute on chronic respiratory failure with hypoxia (HCC) [J96.21]      24 Hour Events:  Patient seen and examined at bedside this morning. Patient's granddaughters at bedside. As per nursing staff, no acute events overnight although patient is now on Airvo high flow. Patient currently comfort measures only. Patient voicing that he is in pain. ROS:  10 point review of systems is otherwise negative with the exception of the elements mentioned above. No Known Allergies    OBJECTIVE:  Patient Vitals for the past 8 hrs:   BP Temp Pulse Resp SpO2   20 0746 139/65 98.1 °F (36.7 °C) (!) 108 18 96 %     Temp (24hrs), Av.9 °F (36.6 °C), Min:97.5 °F (36.4 °C), Max:98.2 °F (36.8 °C)     0701 -  1900  In: -   Out: 175 [Urine:175]    PHYSICAL EXAM:  General:          Frail-appearing white male, no acute distress. Affect appears flat. HEENT:           Normocephalic, atraumatic, moist oral mucosa  CV:                  RRR, no murmurs  Lungs:             Poor air entry bilaterally but likely due to decreased inspiratory effort. Otherwise lungs sound clear  Abdomen:        Soft, nontender, nondistended  MSK:               No gross skeletal defects  Skin:                Multiple bruises on upper extremities from prior IV sites                     Neuro:             CN II through XII grossly intact             Psych:             Appears depressed, flat affect. Currently not oriented and only stating that he is in pain       Labs:      No results for input(s): WBC, RBC, HGB, HCT, MCV, MCH, MCHC, RDW, PLT, GRANS, LYMPH, MONOS, EOS, BASOS, IG, DF, ANEU, ABL, ABM, APRIL, ABB, AIG, HGBEXT, HCTEXT, PLTEXT, HGBEXT, HCTEXT, PLTEXT in the last 72 hours.     No lab exists for component: MPV     Recent Labs     20  0446 20  0416    138   K 3.0* 3.6    101   CO2 24 25   AGAP 10 12   GLU 144* 181*   BUN 70* 70*   CREA 1.74* 2.05*   GFRAA 49* 40*   GFRNA 40* 33*   CA 7.5* 8.4       Recent Results (from the past 24 hour(s))   GLUCOSE, POC    Collection Time: 07/17/20  4:48 PM   Result Value Ref Range    Glucose (POC) 240 (H) 65 - 100 mg/dL   GLUCOSE, POC    Collection Time: 07/17/20  6:06 PM   Result Value Ref Range    Glucose (POC) 239 (H) 65 - 100 mg/dL   POC CG8I    Collection Time: 07/17/20  6:16 PM   Result Value Ref Range    Device: High Flow Nasal Cannula      FIO2 (POC) 100 %    pH (POC) 7.36 7.35 - 7.45      pCO2 (POC) 41.1 35 - 45 MMHG    pO2 (POC) 66 (L) 75 - 100 MMHG    HCO3 (POC) 23.0 22 - 26 MMOL/L    sO2 (POC) 92 (L) 95 - 98 %    Base deficit (POC) 2 mmol/L    Allens test (POC) NOT APPLICABLE      Site RIGHT BRACHIAL      Specimen type (POC) ARTERIAL      Sodium,  (L) 136 - 145 MMOL/L    Potassium, POC 3.9 3.5 - 5.1 MMOL/L    Glucose,  (H) 65 - 100 MG/DL    Calcium, ionized (POC) 1.25 1.12 - 1.32 mmol/L    Performed by BarbieRT     Flow rate (POC) 60.000 L/min    COLLECT TIME 1,808     EKG, 12 LEAD, INITIAL    Collection Time: 07/17/20  6:33 PM   Result Value Ref Range    Ventricular Rate 113 BPM    Atrial Rate 113 BPM    P-R Interval 124 ms    QRS Duration 82 ms    Q-T Interval 310 ms    QTC Calculation (Bezet) 425 ms    Calculated P Axis 64 degrees    Calculated R Axis 59 degrees    Calculated T Axis 82 degrees    Diagnosis       Sinus tachycardia with Premature atrial complexes  Otherwise normal ECG  When compared with ECG of 08-JUL-2020 05:55,  Premature atrial complexes are now Present  Confirmed by Thuy Nazario ([id number]) on 7/18/2020 7:29:24 AM         Current Facility-Administered Medications   Medication Dose Route Frequency    morphine injection 2 mg  2 mg IntraVENous Q4H PRN          Imaging:    Xr Chest Sngl V    Result Date: 7/15/2020  EXAM: Chest x-ray. INDICATION: Dyspnea. COMPARISON: Yesterday's chest x-ray. TECHNIQUE: Frontal view chest x-ray. FINDINGS: Bibasilar lung atelectasis or infiltrates and small pleural effusions are unchanged. Cardiac size is within normal limits. No pneumothorax is identified. There is a new left arm PICC line, with its tip just above the cavoatrial junction. IMPRESSION: Unchanged bibasilar lung atelectasis or infiltrates and small pleural effusions. Xr Chest Sngl V    Result Date: 7/14/2020  EXAM: Chest x-ray. INDICATION: Dyspnea. COMPARISON: July 12, 2020. TECHNIQUE: Frontal view chest x-ray. FINDINGS: Bibasilar lung atelectasis or infiltrates and small bilateral pleural effusions are unchanged. The cardiac size is within normal limits. No pneumothorax is identified. IMPRESSION: Unchanged bibasilar lung atelectasis or infiltrates and small pleural effusions. Xr Chest Sngl V    Result Date: 7/13/2020  EXAM: Chest x-ray. INDICATION: Dyspnea. COMPARISON: Yesterday's chest x-ray. TECHNIQUE: Frontal view chest x-ray. FINDINGS: Bibasilar lung atelectasis or infiltrates and small pleural effusions are unchanged. The cardiac size is stable. No pneumothorax is identified. IMPRESSION: No interval change. Xr Chest Sngl V    Result Date: 7/12/2020  EXAM: Chest x-ray. INDICATION: Dyspnea. Covid 19. COMPARISON: Yesterday's chest x-ray. TECHNIQUE: Frontal view chest x-ray. FINDINGS: Bibasilar lung atelectasis or infiltrates and small pleural effusions are unchanged. The cardiac size is stable. No pneumothorax is identified. IMPRESSION: Unchanged bibasilar lung atelectasis or infiltrates and pleural effusions. Xr Chest Sngl V    Result Date: 7/11/2020  EXAM: Chest x-ray. INDICATION: Dyspnea. Rule out Covid 19. COMPARISON: Yesterday's chest x-ray. TECHNIQUE: Frontal view chest x-ray. FINDINGS: Bibasilar lung atelectasis or infiltrates are unchanged. There are small bilateral pleural effusions, progressed on the left and improved on the right. The cardiac size is within normal limits. No pneumothorax is identified. IMPRESSION: Unchanged bibasilar lung atelectasis or infiltrates. Xr Chest Sngl V    Result Date: 7/10/2020  EXAM: TEMPORARY INDICATION: Pleural effusions, Suspected Covid-19 Patient COMPARISON: 7/9/2020 FINDINGS: A portable AP radiograph of the chest was obtained at 0455 hours. The patient is on a cardiac monitor. I basilar airspace disease and bilateral pleural effusions unchanged. . The cardiac and mediastinal contours and pulmonary vascularity are normal.  The bones and soft tissues are grossly within normal limits. IMPRESSION: Basilar atelectasis or pneumonia and bilateral pleural effusions unchanged. Xr Chest Sngl V    Result Date: 7/9/2020  EXAM: TEMPORARY INDICATION: Respiratory failure COMPARISON: 7/8/2020 FINDINGS: A portable AP radiograph of the chest was obtained at 0515 hours. The patient is on a cardiac monitor. Left pleural effusion left lower lobe airspace disease worse in the interval. Right pleural effusion and right lower lobe airspace disease worse in the interval. The cardiac and mediastinal contours and pulmonary vascularity are normal.  The bones and soft tissues are grossly within normal limits. IMPRESSION: Worsening of bibasilar atelectasis or pneumonia and bilateral pleural effusions. Given the symmetric worsening also consider pulmonary edema. Xr Chest Sngl V    Result Date: 6/24/2020  Chest X-ray INDICATION: Shortness of breath, possible COVID-19 A portable AP view of the chest was obtained. FINDINGS: There is stable infiltrate in the right lung. Right pleural effusion appears smaller. The heart size is stable. The bony thorax is intact.       IMPRESSION: Persistent right-sided pulmonary infiltrate, decreased effusion     Xr Chest Sngl V    Result Date: 6/23/2020  Portable chest x-ray CLINICAL INDICATION: Decreasing oxygen saturation FINDINGS: Single AP view of the chest compared to a similar exam dated 6/19/2020 shows a larger right pleural effusion with airspace opacity in the right lung base. The left lung is clear. The cardiac silhouette and mediastinum are unremarkable. IMPRESSION: Airspace opacity in the right lung base with a larger right pleural effusion. Atelectasis or pneumonia should be considered. Xr Chest Sngl V    Result Date: 6/19/2020   Portable view of the chest 6/19/2020 Comparison: 6/15/2020 Indication: Hypoxia FINDINGS: Cardiac and pulmonary artery enlargement similar in appearance previous examination. Previously noted diffuse perihilar pulmonary parenchymal infiltrates with large bilateral pleural effusions not significant change when compared to previous examination. No interval infiltrate or pneumothorax. No discrete acute osseous lesion seen. IMPRESSION: No significant interval change. Persistent volume overload. Xr Chest Port    Result Date: 7/8/2020  Single View portable upright chest x-ray dated   July 8, 2020 Reference Exam: June 29, 2020 Indication: Sepsis, some shortness of breath Findings: The cardiac silhouette is normal in size and contour. Hazy opacities are again seen in the lungs but improved from the reference study with bilateral effusions again noted. Patient is rotated some to the right, pulmonary vascularity appears normal.     IMPRESSION: Some improvement in the opacities in the lungs but effusions appear to have enlarged. Xr Chest Port    Result Date: 6/29/2020  Chest portable CLINICAL INDICATION: Follow-up of pleural effusions, acute respiratory failure with hypoxia, dyspnea COMPARISON: June 26, 2020 TECHNIQUE: single AP portable view chest at 4:10 AM semiupright FINDINGS: Stable mediastinal and hilar contours. Patient is rotated. There is persistence of bilateral pulmonary vascular congestion, right greater than left pleural effusions, and right mid to lower lung infiltrate. Overall allowing for technique and positioning the appearance is very similar to prior.      IMPRESSION: Pulmonary vascular congestion, infiltrates and small pleural effusions are similar to prior. Xr Chest Port    Result Date: 6/26/2020  EXAM: Chest x-ray. INDICATION: Dyspnea. COMPARISON: June 24, 2020. TECHNIQUE: Frontal view chest x-ray. FINDINGS: Previous bilateral lung edema or infiltrates have improved, with mild residual. The left pleural effusion has resolved. There is a progressed small to moderate loculated right pleural effusion. No pneumothorax is identified. The cardiac size is within normal limits. IMPRESSION: 1. Improving bilateral lung edema or infiltrates. 2. Resolved left pleural effusion. 3. Progressed loculated right pleural effusion.          ASSESSMENT:    Problem List  Date Reviewed: 7/10/2020          Codes Class Noted    MRSA bacteremia ICD-10-CM: R78.81, B95.62  ICD-9-CM: 790.7, 041.12  7/12/2020        * (Principal) Acute on chronic respiratory failure with hypoxia (HCC) ICD-10-CM: J96.21  ICD-9-CM: 518.84, 799.02  7/8/2020        Pneumonia ICD-10-CM: J18.9  ICD-9-CM: 486  7/8/2020        Anemia (Chronic) ICD-10-CM: D64.9  ICD-9-CM: 285.9  7/8/2020        Hyperkalemia ICD-10-CM: E87.5  ICD-9-CM: 276.7  7/8/2020        KIRK (acute kidney injury) ([facility] [address] ICD-10-CM: N17.9  ICD-9-CM: 584.9  7/8/2020        Thrombocytopenia (HCC) (Chronic) ICD-10-CM: D69.6  ICD-9-CM: 287.5  7/8/2020        Hypotension ICD-10-CM: I95.9  ICD-9-CM: 458.9  7/8/2020        Chronic bilateral pleural effusions (Chronic) ICD-10-CM: J90  ICD-9-CM: 511.9  7/8/2020        Urine retention (Chronic) ICD-10-CM: R33.9  ICD-9-CM: 788.20  7/8/2020        Debility (Chronic) ICD-10-CM: R53.81  ICD-9-CM: 799.3  6/15/2020        Rectal obstruction ICD-10-CM: K62.4  ICD-9-CM: 569.2  6/1/2020        Aspiration pneumonia ([facility] ICD-10-CM: J69.0  ICD-9-CM: 507.0  6/1/2020        Neutropenia ([facility] ICD-10-CM: D70.9  ICD-9-CM: 288.00  5/25/2020        Pleural effusion on right ICD-10-CM: J90  ICD-9-CM: 511.9  5/25/2020    Overview Addendum 6/24/2020  8:13 AM by Flo Stone, Abigail Wylie, LIAM     6/23/20L  Right thoracentesis:  1100 ml bloody effusion removed  6/10/20:  Bilateral thoracentesis on L( 1100 cc ) and R( 1000 cc) removed    6/3/20:  Right chest thoracentesis with 800 removed.               Acute respiratory failure with hypoxia (HCC) ICD-10-CM: J96.01  ICD-9-CM: 518.81  5/24/2020        Normocytic anemia ICD-10-CM: D64.9  ICD-9-CM: 285.9  5/24/2020        CAP (community acquired pneumonia) ICD-10-CM: J18.9  ICD-9-CM: 773  5/24/2020        Pleural effusion on left ICD-10-CM: J90  ICD-9-CM: 511.9  5/24/2020    Overview Addendum 6/25/2020 11:40 AM by Jerardo Medina NP      6/10/20:  Bilateral thoracentesis on L( 1100 cc ) and R( 1000 cc) removed    6/25/20:  L thoracentesis:  900 ml serosanguinous fluid removed--negative cytology             Hypomagnesemia ICD-10-CM: E83.42  ICD-9-CM: 275.2  5/20/2020        SIADH (syndrome of inappropriate ADH production) ([facility] [address] ICD-10-CM: E22.2  ICD-9-CM: 253.6  3/26/2020        Malignant neoplasm metastatic to bone [facility]) (Chronic) ICD-10-CM: C79.51  ICD-9-CM: 198.5  6/26/2019        Postoperative seroma of subcutaneous tissue after non-dermatologic procedure ICD-10-CM: L76.34  ICD-9-CM: 998.13  9/3/2018        Anal carcinoma ([facility] [address] (Chronic) ICD-10-CM: C21.0  ICD-9-CM: 154.3  8/9/2018        Type 2 diabetes with nephropathy (HCC) (Chronic) ICD-10-CM: E11.21  ICD-9-CM: 250.40, 583.81  7/3/2018        Primary malignant neoplasm of perianal skin ICD-10-CM: C44.500  ICD-9-CM: 173.50  7/3/2018        Diabetes mellitus due to underlying condition with hyperglycemia (HCC) (Chronic) ICD-10-CM: L53.38  ICD-9-CM: 249.80  12/21/2015        Mucositis due to radiation therapy ICD-10-CM: K12.33  ICD-9-CM: 528.09, E879.2  12/15/2015        Radiation esophagitis ICD-10-CM: 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 Patient likely had major aspiration event yesterday which caused a significant desaturation. Goals of care were discussed with family at length who finally agreed to make patient comfort care given the futility of the situation. We will plan for PEG hospice care. Hospice placement as per family's wishes  Pain control with morphine as needed pushes. Will discuss with family regarding possible morphine drip given patient's significant pain and suffering     MRSA bacteremia  No further treatment as patient is now comfort care. Anemia, stable  Stable. Likely due to anemia of chronic illness     KIRK, likely prerenal   No further lab draws as per patient's family. 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  He received carbo/etoposide 2 weeks ago and has also had radiation for anal cancer. [facility] has required anal dilation in past.   No further intervention given patient is comfort measures only     Goals of care discussion:  7/14:  Lengthy discussion had with patient's wife Mat Coon. Currently still wants patient to be full code with all aggressive measures to be done. She understands that patient has a very poor prognosis at this time given his multiple comorbidities with concurrent COVID infection. I explained to the patient's family that patient has a very high likelihood that he will not improve significantly.   She expressed understanding and desire for us to do her best.    7/15: Lengthy conversation was had with both son and wife. Patient's poor prognosis was discussed with them and all questions regarding his condition were answered. Son will discuss case further with mother in terms of possible placement in hospice other facility depending on their goals of care. 7/16: Multiple attempts were made by case management and myself to contact family to follow-up. No answers were received. We will continue to wait for the response. 7/17: Multiple phone calls placed to patient's son and wife. No answer noted. Case management was there at time these phone calls were made. We will continue to try to reach family. 7/18: Waiting for son to come to the hospital floor as I would like a face-to-face discussion with patient's son. Currently comfort measures only. Fluids:    No further IV fluids  Electrolytes:  No repletion necessary  Nutrition: Regular diet, mechanical soft  CODE STATUS:  DNR/DNI  DVT prophylaxis:  None as per family's wishes    Patient now comfort measures only. Pending hospice placement to facility desired by family.

## 2020-07-18 NOTE — PROGRESS NOTES
Received pt from [facility]) in stable condition. Pt in bed resting quietly. Resp even & unlabored on 4L NC; no acute signs of distress noted. Bed low & locked; call light in reach; no needs voiced.

## 2020-07-18 NOTE — PROGRESS NOTES
Patient stated that he is in a lot of pain. Morphine 2 mg given slow IVP.   Will continue to monitor for change

## 2020-07-18 NOTE — PROGRESS NOTES
Patient's family has requested comfort measures and referral to [address] [place] [address] / [facility]. Spoke with intake at [address] [place] [address] ([phone]). Referral faxed to them at [phone]. They will review referral and contact Case Management with a decision. Case Management will continue to follow. Care Management Interventions  PCP Verified by CM: Yes  Transition of Care Consult (CM Consult): Hospice House(Requesting [facility])  Partner SNF: Yes  Current Support Network: Lives with Spouse  Confirm Follow Up Transport: Other (see comment)  The Plan for Transition of Care is Related to the Following Treatment Goals : Comfort Measures - hospice care.   The Patient and/or Patient Representative was Provided with a Choice of Provider and Agrees with the Discharge Plan?: Yes  Freedom of Choice List was Provided with Basic Dialogue that Supports the Patient's Individualized Plan of Care/Goals, Treatment Preferences and Shares the Quality Data Associated with the Providers?: Yes   Resource Information Provided?: No  Discharge Location  Discharge Placement: Home with hospice

## 2020-07-18 NOTE — PROGRESS NOTES
Shift assessment completed. Pt is in bed with no distress. On airvo. Comfort measures provided. Family just left room. Pt stated he is hurting all over. Will medicate soon. Bed in low and locked position. Call light within reach.

## 2020-07-19 NOTE — PROGRESS NOTES
Patient is observed with labored breathing. Bolus dose of dilaudid offered. Wife Mathew Conde made aware of the change in pt's condition at this time. She will come to be with patient. Will continue to monitor any change.

## 2020-07-19 NOTE — DISCHARGE SUMMARY
Discharge Summary     Patient: Angie Perez MRN: [id number]  SSN: [id number]    YOB: 1940  Age: 79 y.o.   Sex: male       Admit Date: 7/8/2020    Discharge Date: 7/19/2020      Admission Diagnoses: Acute on chronic respiratory failure with hypoxia [facility]) [J96.21]    Discharge Diagnoses:   Problem List as of 7/19/2020 Date Reviewed: 7/10/2020          Codes Class Noted - Resolved    MRSA bacteremia ICD-10-CM: R78.81, B95.62  ICD-9-CM: 790.7, 041.12  7/12/2020 - Present        * (Principal) Acute on chronic respiratory failure with hypoxia (HCC) ICD-10-CM: J96.21  ICD-9-CM: 518.84, 799.02  7/8/2020 - Present        Pneumonia ICD-10-CM: J18.9  ICD-9-CM: 486  7/8/2020 - Present        Anemia (Chronic) ICD-10-CM: D64.9  ICD-9-CM: 285.9  7/8/2020 - Present        Hyperkalemia ICD-10-CM: E87.5  ICD-9-CM: 276.7  7/8/2020 - Present        KIRK (acute kidney injury) ([address] ICD-10-CM: N17.9  ICD-9-CM: 584.9  7/8/2020 - Present        Thrombocytopenia (HCC) (Chronic) ICD-10-CM: D69.6  ICD-9-CM: 287.5  7/8/2020 - Present        Hypotension ICD-10-CM: I95.9  ICD-9-CM: 458.9  7/8/2020 - Present        Chronic bilateral pleural effusions (Chronic) ICD-10-CM: J90  ICD-9-CM: 511.9  7/8/2020 - Present        Urine retention (Chronic) ICD-10-CM: R33.9  ICD-9-CM: 788.20  7/8/2020 - Present        Debility (Chronic) ICD-10-CM: R53.81  ICD-9-CM: 799.3  6/15/2020 - Present        Rectal obstruction ICD-10-CM: K62.4  ICD-9-CM: 569.2  6/1/2020 - Present        Aspiration pneumonia ([facility] ICD-10-CM: J69.0  ICD-9-CM: 507.0  6/1/2020 - Present        Neutropenia ([facility] ICD-10-CM: D70.9  ICD-9-CM: 288.00  5/25/2020 - Present        Pleural effusion on right ICD-10-CM: J90  ICD-9-CM: 511.9  5/25/2020 - Present    Overview Addendum 6/24/2020  8:13 AM by Cheryle Burnett NP     6/23/20L  Right thoracentesis:  1100 ml bloody effusion removed  6/10/20:  Bilateral thoracentesis on L( 1100 cc ) and R( 1000 cc) removed    6/3/20: Right chest thoracentesis with 800 removed.               Acute respiratory failure with hypoxia (HCC) ICD-10-CM: J96.01  ICD-9-CM: 518.81  5/24/2020 - Present        Normocytic anemia ICD-10-CM: D64.9  ICD-9-CM: 285.9  5/24/2020 - Present        CAP (community acquired pneumonia) ICD-10-CM: J18.9  ICD-9-CM: 506  5/24/2020 - Present        Pleural effusion on left ICD-10-CM: J90  ICD-9-CM: 511.9  5/24/2020 - Present    Overview Addendum 6/25/2020 11:40 AM by Cheryle Burnett NP      6/10/20:  Bilateral thoracentesis on L( 1100 cc ) and R( 1000 cc) removed    6/25/20:  L thoracentesis:  900 ml serosanguinous fluid removed--negative cytology             Hypomagnesemia ICD-10-CM: N27.79  ICD-9-CM: 275.2  5/20/2020 - Present        SIADH (syndrome of inappropriate ADH production) ([facility] ICD-10-CM: E22.2  ICD-9-CM: 253.6  3/26/2020 - Present        Malignant neoplasm metastatic to bone [facility]) (Chronic) ICD-10-CM: C79.51  ICD-9-CM: 198.5  6/26/2019 - Present        Postoperative seroma of subcutaneous tissue after non-dermatologic procedure ICD-10-CM: L76.34  ICD-9-CM: 998.13  9/3/2018 - Present        Anal carcinoma ([facility] (Chronic) ICD-10-CM: C21.0  ICD-9-CM: 154.3  8/9/2018 - Present        Type 2 diabetes with nephropathy (HCC) (Chronic) ICD-10-CM: E11.21  ICD-9-CM: 250.40, 583.81  7/3/2018 - Present        Primary malignant neoplasm of perianal skin ICD-10-CM: C44.500  ICD-9-CM: 173.50  7/3/2018 - Present        Diabetes mellitus due to underlying condition with hyperglycemia (HCC) (Chronic) ICD-10-CM: G58.90  ICD-9-CM: 249.80  12/21/2015 - Present        Mucositis due to radiation therapy ICD-10-CM: K12.33  ICD-9-CM: 528.09, E879.2  12/15/2015 - Present        Radiation esophagitis ICD-10-CM: K20.8  ICD-9-CM: 530.19, E926.9  12/15/2015 - Present        COVID-19 ruled out ICD-10-CM: Z03.818  ICD-9-CM: V71.83  12/15/2015 - Present        Hyponatremia ICD-10-CM: E87.1  ICD-9-CM: 276.1  11/23/2015 - Present        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 [facility]) (Chronic) ICD-10-CM: C79.89, C80.1  ICD-9-CM: 198.89, 199.1  10/14/2015 - Present        RESOLVED: Acute metabolic encephalopathy TMP-28-FF: G93.41  ICD-9-CM: 348.31  2020 - 2020        RESOLVED: Cellulitis of groin, left ICD-10-CM: L03.314  ICD-9-CM: 682.2  9/3/2018 - 2020        RESOLVED: Sepsis ([address] ICD-10-CM: A41.9  ICD-9-CM: 038.9, 995.91  2018 - 9/3/2018        RESOLVED: Vomiting ICD-10-CM: R11.10  ICD-9-CM: 787.03  2016 - 2020        RESOLVED: Difficulty in swallowing ICD-10-CM: R13.10  ICD-9-CM: 787.20  12/15/2015 - 2020        RESOLVED: 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 During that admission he had several thoracenteses and he required treatment with intravenous diuretics. He also received intravenous antibiotics and steroids. The patient tested negative for coronavirus on  2020. The patient was discharged and sent to an skilled nursing facility. He developed increased respiratory distress and was treated with Levaquin for possible pneumonia. It was also noticed that he had dysphagia. He was sent back to the emergency room on 0 2020. One of his blood cultures at admission was positive for MRSA, patient was started on appropriate IV antibiotics. ID was consulted. On 07/10/2020 he tested positive for coronavirus infection.   His general condition deteriorated, he developed acute kidney injury and worse mental status. His family made him comfort measures and the patient finally  this morning at 5:30 AM.  I have pronounced him at bedside. Condolences provided to his family.     PE:  Dilated pupils   No breath sounds   No heart rate   No pulse         Disposition:      Discharg  No orders of the defined types were placed in this encounter.       Signed By: Gennaro Hernández MD     2020

## 2020-07-19 NOTE — PROGRESS NOTES
I was informed patient went into respiratory cardiac arrest at 5:30 am. I came to evaluate him. No heart rate detected. Dilated pupils. No breathing and no pulse. Patient pronounced dead. Condolences expressed to family members at bedside.

## 2020-07-19 NOTE — PROGRESS NOTES
Family called this nurse to room because patient stops breathing. This nurse checked patient and found no respiration or pulse detected at this time. Dr Rudy Saldivar was here on the floor and called to room and he pronounced pt dead. Condolences expressed to the family.

## 2020-07-19 NOTE — PROGRESS NOTES
Shift assessment completed. See flow sheet for details. Pt is lethargic. No acute distress noted. Seems comfortable in bed at this time. Bed in low and locked position. Call light within reach.

## 2020-07-20 ENCOUNTER — PATIENT OUTREACH (OUTPATIENT)
Dept: CASE MANAGEMENT | Age: 80
End: 2020-07-20

## 2020-07-21 ENCOUNTER — PATIENT OUTREACH (OUTPATIENT)
Dept: CASE MANAGEMENT | Age: 80
End: 2020-07-21

## 2020-07-21 NOTE — PROGRESS NOTES
Patient transferred to the hospital from [place]. Chart reviewed. Patient passed away 7/19/20. I will notify the team at [facility].

## 2020-07-22 LAB
FUNGUS CULTURE, RFCO2T: NEGATIVE
FUNGUS SMEAR, RFCO1T: NORMAL
FUNGUS SPEC CULT: NORMAL
FUNGUS STAIN, 188244: NORMAL
REFLEX TO ID, RFCO3T: NORMAL
SPECIMEN SOURCE: NORMAL
SPECIMEN SOURCE: NORMAL

## 2020-07-24 LAB
ACID FAST STN SPEC: NEGATIVE
MYCOBACTERIUM SPEC QL CULT: NEGATIVE
SPECIMEN PREPARATION: NORMAL
SPECIMEN SOURCE: NORMAL

## 2021-10-16 NOTE — PROGRESS NOTES
10/16/21    Neurosurgery POD#1    Wounds ok    Swallowing is good    Feeling well with some improvement in the UEs.       P:PT/OT    Ruddy Cody MD Hospitalist Progress Note 2020 Admit Date: 2020 11:00 PM  
NAME: Shavonne García :  1940 MRN:  [id number] Attending: Sean Regalado DO 
PCP:  Hue Valles MD 
 
SUBJECTIVE:  
 Mr. 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 He has been seen by pulm s/p bilateral thoracentesis -, right thoracentesis 6-3, bilateral thoracentesis -9,  and diuresis. Studies transudate, felt due to hypoalbuminemia. ECHO EF 60-65%. He has required high flow O2. He has compelted 8 days zosyn. In regards to his metastatic rectal cancer, he has known anal stricture and declined prior surgical diverting ostomy. He is s/p anal dilation per [facility] in past. CTAP showed constipation/ colonic distention to rectum suspicious for obstruction. oncology recommends followup at discharge, last chemo completed . He has been seen by GI s/p 6-2 flex sig with disimpaction/ no stricture found. Recommends [facility] colorectal followup. Anemia chronic disease transfused 1 unit PRBC on 6/15 Discharge plans pending  
  
 - appears in good spirits wife at bedside. He is eating well. Still very weak Review of Systems negative with exception of pertinent positives noted above PHYSICAL EXAM  
 
Visit Vitals /62 (BP 1 Location: Right arm, BP Patient Position: At rest;Head of bed elevated (Comment degrees)) Pulse 100 Temp 98.1 °F (36.7 °C) Resp 20 Ht 6' (1.829 m) Wt 78.5 kg (173 lb) SpO2 92% BMI 23.46 kg/m² Temp (24hrs), Av.1 °F (36.7 °C), Min:97.6 °F (36.4 °C), Max:98.4 °F (36.9 °C) Oxygen Therapy O2 Sat (%): 92 % (20) Pulse via Oximetry: 102 beats per minute (20) O2 Device: Nasal cannula (20) O2 Flow Rate (L/min): 3 l/min (20) O2 Temperature: 87.8 °F (31 °C) (06/12/20 1454) FIO2 (%): 32 % (06/14/20 1429) ETCO2 (mmHg): 93 mmHg (06/01/20 1624) Intake/Output Summary (Last 24 hours) at 6/17/2020 1937 Last data filed at 6/17/2020 1626 Gross per 24 hour Intake  Output 2750 ml Net -2750 ml General: No acute distress  appears fatigued and chronically ill. Tongue slightly red/inflamed w/o exudates Lungs:  Diminished bilaterally Heart:  Regular rate and rhythm,  No murmur, rub, or gallop Abdomen: Soft, Non distended, Non tender, Positive bowel sounds Extremities: No cyanosis, clubbing or edema Neurologic:  No focal deficits ASSESSMENT Active Hospital Problems Diagnosis Date Noted  Debility 06/15/2020  Rectal obstruction 06/01/2020  Aspiration pneumonia ([address] 06/01/2020  Neutropenia ([facility] 05/25/2020  Pleural effusion on right 05/25/2020  
  6/3/20:  Right chest thoracentesis with 800 removed. 6/10/20:  Bilateral thoracentesis on L( 1100 cc ) and R( 1000 cc) removed  Acute respiratory failure with hypoxia ([address] 05/24/2020  Normocytic anemia 05/24/2020  Pleural effusion on left 05/24/2020  
   6/10/20:  Bilateral thoracentesis on L( 1100 cc ) and R( 1000 cc) removed  Malignant neoplasm metastatic to bone ([address] 06/26/2019  Type 2 diabetes with nephropathy ([address] 07/03/2018  Diabetes mellitus due to underlying condition with hyperglycemia ([address] 12/21/2015  COVID-19 ruled out 12/15/2015  Hyponatremia 11/23/2015 A/p · Acute hypoxic respiratory failure, bilateral effusions: 
· Weaning O2 as tolerant, currently on 3lnc · Antibiotics stopped by pulmonary · Continued IV lasix (remains net negative daily) 
  
· Anal cancer with stricture and rectal obstruction: · Declines surgery · continued bowel regimen. Has had BM today · Add gas-ex · Outpatient colorectal and oncology followups 
  
  HTN:  
· continued norvasc, lisinopril 
  
  
· DM2: 
· Holding amaryl · Continued  lantus (increased on 6/14) · Cont SSI 
  
  
· Anemia: 
· Anemia of chronic disease · Transfuse 1 unit prbc on 6/15 · Slight trend downward  today. Add oral iron and follow · Hyponatremia · Stable in 130's, follow BMP · Debility · Continue PT/OT 
 
DVT Prophylaxis: lovenox sq Dispo - ? [facility] for continued rehab. Pending [employer] precert. Signed By: Sergey Fitzpatrick, DO   
 June 17, 2020

## 2022-09-23 NOTE — PROGRESS NOTES
Problem: Falls - Risk of 
Goal: *Absence of Falls Description: Document Maria Dolores Powell Fall Risk and appropriate interventions in the flowsheet. Outcome: Progressing Towards Goal 
Note: Fall Risk Interventions: 
Mobility Interventions: Communicate number of staff needed for ambulation/transfer, Strengthening exercises (ROM-active/passive) Mentation Interventions: Bed/chair exit alarm Medication Interventions: Patient to call before getting OOB, Teach patient to arise slowly Elimination Interventions: Patient to call for help with toileting needs Problem: Patient Education: Go to Patient Education Activity Goal: Patient/Family Education Outcome: Progressing Towards Goal 
  
Problem: Nutrition Deficit Goal: *Optimize nutritional status Outcome: Progressing Towards Goal 
  
Problem: Pain Goal: *Control of Pain Outcome: Progressing Towards Goal 
Goal: *PALLIATIVE CARE:  Alleviation of Pain Outcome: Progressing Towards Goal 
  
Problem: Patient Education: Go to Patient Education Activity Goal: Patient/Family Education Outcome: Progressing Towards Goal 
  
Problem: Pneumonia: Day 1 Goal: Off Pathway (Use only if patient is Off Pathway) Outcome: Progressing Towards Goal 
Goal: Activity/Safety Outcome: Progressing Towards Goal 
Goal: Consults, if ordered Outcome: Progressing Towards Goal 
Goal: Diagnostic Test/Procedures Outcome: Progressing Towards Goal 
Goal: Nutrition/Diet Outcome: Progressing Towards Goal 
Goal: Medications Outcome: Progressing Towards Goal 
Goal: Respiratory Outcome: Progressing Towards Goal 
Goal: Treatments/Interventions/Procedures Outcome: Progressing Towards Goal 
Goal: Psychosocial 
Outcome: Progressing Towards Goal 
Goal: *Oxygen saturation within defined limits Outcome: Progressing Towards Goal 
Goal: *Influenza vaccine administered (October-March) Outcome: Progressing Towards Goal 
Goal: *Pneumoccocal vaccine administered Outcome: Progressing Towards Goal 
 Radiology Post-Procedure Note    Pre Op Diagnosis: Pelvic Abscess    Post Op Diagnosis:  Drain in place    Procedure:  Drain Placement    Procedure performed by: Titus Bruce M.D.    Written Informed Consent Obtained: Yes    Specimen Removed: YES     Estimated Blood Loss: Minimal    Findings:   Standard Drain Placement    Patient tolerated procedure well.    Titus Bruce MD      Goal: *Hemodynamically stable Outcome: Progressing Towards Goal 
Goal: *Demonstrates progressive activity Outcome: Progressing Towards Goal 
Goal: *Tolerating diet Outcome: Progressing Towards Goal 
  
Problem: Pneumonia: Day 2 Goal: Off Pathway (Use only if patient is Off Pathway) Outcome: Progressing Towards Goal 
Goal: Activity/Safety Outcome: Progressing Towards Goal 
Goal: Consults, if ordered Outcome: Progressing Towards Goal 
Goal: Diagnostic Test/Procedures Outcome: Progressing Towards Goal 
Goal: Nutrition/Diet Outcome: Progressing Towards Goal 
Goal: Discharge Planning Outcome: Progressing Towards Goal 
Goal: Medications Outcome: Progressing Towards Goal 
Goal: Respiratory Outcome: Progressing Towards Goal 
Goal: Treatments/Interventions/Procedures Outcome: Progressing Towards Goal 
Goal: Psychosocial 
Outcome: Progressing Towards Goal 
Goal: *Oxygen saturation within defined limits Outcome: Progressing Towards Goal 
Goal: *Hemodynamically stable Outcome: Progressing Towards Goal 
Goal: *Demonstrates progressive activity Outcome: Progressing Towards Goal 
Goal: *Tolerating diet Outcome: Progressing Towards Goal 
Goal: *Optimal pain control at patient's stated goal 
Outcome: Progressing Towards Goal 
  
Problem: Pneumonia: Day 3 Goal: Off Pathway (Use only if patient is Off Pathway) Outcome: Progressing Towards Goal 
Goal: Activity/Safety Outcome: Progressing Towards Goal 
Goal: Consults, if ordered Outcome: Progressing Towards Goal 
Goal: Diagnostic Test/Procedures Outcome: Progressing Towards Goal 
Goal: Nutrition/Diet Outcome: Progressing Towards Goal 
Goal: Discharge Planning Outcome: Progressing Towards Goal 
Goal: Medications Outcome: Progressing Towards Goal 
Goal: Respiratory Outcome: Progressing Towards Goal 
Goal: Treatments/Interventions/Procedures Outcome: Progressing Towards Goal 
Goal: Psychosocial 
Outcome: Progressing Towards Goal 
 Goal: *Oxygen saturation within defined limits Outcome: Progressing Towards Goal 
Goal: *Hemodynamically stable Outcome: Progressing Towards Goal 
Goal: *Demonstrates progressive activity Outcome: Progressing Towards Goal 
Goal: *Tolerating diet Outcome: Progressing Towards Goal 
Goal: *Describes available resources and support systems Outcome: Progressing Towards Goal 
Goal: *Optimal pain control at patient's stated goal 
Outcome: Progressing Towards Goal 
  
Problem: Pneumonia: Day 4 Goal: Off Pathway (Use only if patient is Off Pathway) Outcome: Progressing Towards Goal 
Goal: Activity/Safety Outcome: Progressing Towards Goal 
Goal: Nutrition/Diet Outcome: Progressing Towards Goal 
Goal: Discharge Planning Outcome: Progressing Towards Goal 
Goal: Medications Outcome: Progressing Towards Goal 
Goal: Respiratory Outcome: Progressing Towards Goal 
Goal: Treatments/Interventions/Procedures Outcome: Progressing Towards Goal 
Goal: Psychosocial 
Outcome: Progressing Towards Goal 
  
Problem: Pneumonia: Discharge Outcomes Goal: *Demonstrates progressive activity Outcome: Progressing Towards Goal 
Goal: *Describes follow-up/return visits to physicians Outcome: Progressing Towards Goal 
Goal: *Tolerating diet Outcome: Progressing Towards Goal 
Goal: *Verbalizes name, dosage, time, side effects, and number of days to continue medications Outcome: Progressing Towards Goal 
Goal: *Influenza immunization Outcome: Progressing Towards Goal 
Goal: *Pneumococcal immunization Outcome: Progressing Towards Goal 
Goal: *Respiratory status at baseline Outcome: Progressing Towards Goal 
Goal: *Vital signs within defined limits Outcome: Progressing Towards Goal 
Goal: *Describes available resources and support systems Outcome: Progressing Towards Goal 
Goal: *Optimal pain control at patient's stated goal 
Outcome: Progressing Towards Goal

## 2024-09-30 NOTE — PROGRESS NOTES
HEARING AID CHECK    Oticon Nera2 miniRITE, NO ear , size 2 85 wires, 8mm bass single, pro wax mini  Service warranty expires: 6-  Repair warranty expires 6-  Replacement warranty expires 6- (RIGHT USED 9/2015)    Audiology April 2018  [employer] Alera 762 purchased in 2010  Subha moody    SUBJECTIVE:  Reason for visit: Hearing aid problems.    ASSESSMENT:  Pt states today that she has two hearing aids that don't work. Unfortunately they are not the Oticon Nera2s that she got here 3 years ago. They are [employer] Alera 761s. I called [employer] and found that she acquired them in 2010. They both have speaker wires for the right ear. She knows which goes in the right ear and which goes in the left. She doesn't know where the other aids are, but knows for sure they are gone. She has a dome stuck in her right ear canal. She was able to have this removed by urgent care, but got a little scratch, so she did not wear the right aid home. Replaced one of the speakers to be left. Both aids are working and pt is happy about that.    FOLLOW-UP:  She may consider new hearing aids and will f/u as needed.     Hospitalist Progress Note Admit Date:  2020  5:42 AM  
Name:  Kirk Nino Age:  79 y.o. 
:  1940 MRN:  [id number] PCP:  Larisa Phoenix MD 
Treatment Team: Attending Provider: Jazmine Churchill MD; Consulting Provider: García Montgomery MD; Primary Nurse: Anabelle Sal; Care Manager: Esther Torres.; Consulting Provider: Jazmine Churchill MD; Utilization Review: Wilma Mendez RN; Consulting Provider: Isaias Moraes MD 
 
Subjective:  
CC:  Shortness of breath  
  
Mr. 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 During prior admit, he had numerous thoracentesis with recollection of fluids and need for IV diuretics. He required high flow O2. Pulmonary followed. It was felt that his effusions were due to hypoalbuminemia as they are transudative. Cytology negative. ECHO preserved EF. He did complete 8 days of zosyn and steroid taper. He was seen by surgery in regards to his rectal cancer with known stricture and declined prior diverting ostomy. He is s/p anal dilation per Meagan. Last chemo . He was seen by GI s/p  flex sig with disimpaction and no stricture noted. He did require PRBC x 2. He has required traore due to urine retention. COVID negative 20. Upon discharge, he went to SNF. He was noted to have increased respiratory distress and added levaquin for possible pneumonia. His wife Josie Hdz feels his oral intake is poor, though she has not seen him since his discharge. I did speak with her via phone on admit. In the ED, he is .5 . He received vancomycin and 2 L NS bolus. CXR shows increased effusions and he was hypoxic and needed 6 L NC 
 
 1U PRBC 
  
7/10 patient seen and examined at bedside. On 5L NC oxygen normal gait and station , full range of motion Says he is short of breath Continue lasix CXR tomorrow Blood culture showing GRAM POSITIVE COCCI, biofire showing coag negative staph. Likely a contaminant. Will continue antibiotics anyway Repeat blood cultures. Refused to discuss goals of care again with me today 07/08/20 0706  100.5 °F (38.1 °C)Abnormal    
 
Nursing notes and chart reviewed. Review of Systems negative with exception of pertinent positives noted above. Objective:  
 
Patient Vitals for the past 24 hrs: 
 Temp Pulse Resp BP SpO2  
07/10/20 1144 98.2 °F (36.8 °C) 92 17 132/66 97 % 07/10/20 0910     96 % 07/10/20 0720 97.7 °F (36.5 °C) 94 18 114/57 94 % 07/10/20 0439 97.6 °F (36.4 °C) 98 18 129/63 97 % 07/09/20 2337 98 °F (36.7 °C) 98 18 122/71 94 % 07/09/20 1940 97.8 °F (36.6 °C) 98 18 131/66 97 % 07/09/20 1606 98.1 °F (36.7 °C) 97 19 131/68 98 % 07/09/20 1245 98.4 °F (36.9 °C) 94 16 118/60 96 % 07/09/20 1221 97.9 °F (36.6 °C) (!) 102 18 120/65 95 % Oxygen Therapy O2 Sat (%): 97 % (07/10/20 1144) Pulse via Oximetry: 96 beats per minute (07/08/20 1001) O2 Device: Nasal cannula (07/10/20 0910) O2 Flow Rate (L/min): 2 l/min (07/10/20 0910) FIO2 (%): 50 % (07/08/20 0556) Intake/Output Summary (Last 24 hours) at 7/10/2020 1220 Last data filed at 7/10/2020 5880 Gross per 24 hour Intake 584.6 ml Output 3650 ml Net -3065.4 ml General:    Cachetic, looks uncomfortable. On 5L NC oxygen CV:   RRR. No murmur, rub, or gallop. Lungs:   Crackles diffusely Abdomen:   Soft, nontender, nondistended. Extremities: Warm and dry. No cyanosis or edema. Skin:     No rashes or jaundice. :  Juarez in place draining clear yellow urine Data Review: 
I have reviewed all labs, meds, telemetry events, and studies from the last 24 hours. Recent Results (from the past 24 hour(s)) GLUCOSE, POC Collection Time: 07/09/20  4:48 PM  
Result Value Ref Range Glucose (POC) 198 (H) 65 - 100 mg/dL GLUCOSE, POC Collection Time: 07/09/20  8:40 PM  
Result Value Ref Range Glucose (POC) 158 (H) 65 - 100 mg/dL METABOLIC PANEL, BASIC Collection Time: 07/10/20  4:15 AM  
Result Value Ref Range Sodium 138 136 - 145 mmol/L Potassium 3.2 (L) 3.5 - 5.1 mmol/L Chloride 101 98 - 107 mmol/L  
 CO2 28 21 - 32 mmol/L Anion gap 9 7 - 16 mmol/L Glucose 118 (H) 65 - 100 mg/dL BUN 21 8 - 23 MG/DL Creatinine 0.78 (L) 0.8 - 1.5 MG/DL  
 GFR est AA >60 >60 ml/min/1.73m2 GFR est non-AA >60 >60 ml/min/1.73m2 Calcium 8.4 8.3 - 10.4 MG/DL  
CBC WITH AUTOMATED DIFF Collection Time: 07/10/20  4:15 AM  
Result Value Ref Range WBC 5.3 4.3 - 11.1 K/uL  
 RBC 3.04 (L) 4.23 - 5.6 M/uL HGB 9.0 (L) 13.6 - 17.2 g/dL HCT 30.4 (L) 41.1 - 50.3 % .0 (H) 79.6 - 97.8 FL  
 MCH 29.6 26.1 - 32.9 PG  
 MCHC 29.6 (L) 31.4 - 35.0 g/dL  
 RDW 19.0 (H) 11.9 - 14.6 % PLATELET 81 (L) 172 - 450 K/uL MPV 9.4 9.4 - 12.3 FL ABSOLUTE NRBC 0.02 0.0 - 0.2 K/uL  
 DF AUTOMATED NEUTROPHILS 72 43 - 78 % LYMPHOCYTES 18 13 - 44 % MONOCYTES 8 4.0 - 12.0 % EOSINOPHILS 0 (L) 0.5 - 7.8 % BASOPHILS 0 0.0 - 2.0 % IMMATURE GRANULOCYTES 2 0.0 - 5.0 %  
 ABS. NEUTROPHILS 3.9 1.7 - 8.2 K/UL  
 ABS. LYMPHOCYTES 0.9 0.5 - 4.6 K/UL  
 ABS. MONOCYTES 0.4 0.1 - 1.3 K/UL  
 ABS. EOSINOPHILS 0.0 0.0 - 0.8 K/UL  
 ABS. BASOPHILS 0.0 0.0 - 0.2 K/UL  
 ABS. IMM. GRANS. 0.1 0.0 - 0.5 K/UL GLUCOSE, POC Collection Time: 07/10/20  7:16 AM  
Result Value Ref Range Glucose (POC) 144 (H) 65 - 100 mg/dL GLUCOSE, POC Collection Time: 07/10/20 11:43 AM  
Result Value Ref Range Glucose (POC) 194 (H) 65 - 100 mg/dL All Micro Results Procedure Component Value Units Date/Time CULTURE, BLOOD [[id number]] Collected:  07/08/20 8344 Order Status:  Completed Specimen:  Blood Updated:  07/10/20 5704 Special Requests: LEFT HAND Culture result: NO GROWTH 2 DAYS CULTURE, BLOOD [[id number]] Collected:  07/08/20 8382 Order Status:  Completed Specimen:  Blood Updated:  07/10/20 0047 Special Requests: RIGHT HAND     
  GRAM STAIN GRAM POSITIVE COCCI AEROBIC AND ANAEROBIC BOTTLES RESULTS VERIFIED, PHONED TO AND READ BACK BY DAVID SANTAMARIA @ 0479 7/9/20 MM Culture result:    
  CULTURE IN PROGRESS,FURTHER UPDATES TO FOLLOW REFER TO [employer] PANEL ACCESSION [id number] CULTURE, BLOOD [[id number]] Order Status:  Sent Specimen:  Blood CULTURE, BLOOD [[id number]] Order Status:  Sent Specimen:  Blood CULTURE, RESPIRATORY/SPUTUM/BRONCH Aleene Schultze STAIN [[id number]] Order Status:  Sent Specimen:  Sputum BLOOD CULTURE ID PANEL [[id number]]  (Abnormal) Collected:  07/08/20 5005 Order Status:  Completed Specimen:  Blood Updated:  07/09/20 0745 Acc. no. from [employer] [id number] Staphylococcus Detected Comment: RESULTS VERIFIED, PHONED TO AND READ BACK BY 
Marlyn Sinha RN @ 2566 ON 7/9/20 BY AMM 
  
  
  mecA (Methicillin-Resistance Genes) Detected Comment: Presence of mecA is highly indicative of methicillin resistance. The test does not replace traditional culture and susceptibilities INTERPRETATION Gram positive cocci in clusters. Identified by realtime PCR as  Coagulase negative Staphylococci Comment: A single positive culture of coagulase negative Staph is likely to be a contaminant in adult patients. Consider discontinuation of antibiotics for gram positive bloodstream infections if patient asymptomatic. THIS TEST DOES NOT REPLACE SENSITIVITY TESTING. Current Meds: 
Current Facility-Administered Medications Medication Dose Route Frequency  potassium bicarb-citric acid (EFFER-K) tablet 40 mEq  40 mEq Oral BID  vancomycin (VANCOCIN) 1,000 mg in 0.9% sodium chloride (MBP/ADV) 250 mL  1,000 mg IntraVENous Q12H  Vancomycin Trough Level Reminder   Other ONCE  
 0.9% sodium chloride infusion 250 mL  250 mL IntraVENous PRN  
 0.9% sodium chloride infusion 250 mL  250 mL IntraVENous PRN  
 albuterol-ipratropium (DUO-NEB) 2.5 MG-0.5 MG/3 ML  3 mL Nebulization Q6H PRN  
 bisacodyL (DULCOLAX) suppository 10 mg  10 mg Rectal DAILY  predniSONE (DELTASONE) tablet 30 mg  30 mg Oral DAILY WITH BREAKFAST  tamsulosin (FLOMAX) capsule 0.4 mg  0.4 mg Oral DAILY  sodium chloride (NS) flush 5-40 mL  5-40 mL IntraVENous Q8H  
 sodium chloride (NS) flush 5-40 mL  5-40 mL IntraVENous PRN  
 acetaminophen (TYLENOL) tablet 650 mg  650 mg Oral Q6H PRN  
 HYDROcodone-acetaminophen (NORCO) 5-325 mg per tablet 1 Tab  1 Tab Oral Q4H PRN  
 morphine injection 2 mg  2 mg IntraVENous Q4H PRN  
 naloxone (NARCAN) injection 0.4 mg  0.4 mg IntraVENous PRN  
 ondansetron (ZOFRAN) injection 4 mg  4 mg IntraVENous Q4H PRN  
 cefepime (MAXIPIME) 2 g in 0.9% sodium chloride (MBP/ADV) 100 mL  2 g IntraVENous Q8H  
 insulin lispro (HUMALOG) injection   SubCUTAneous AC&HS  furosemide (LASIX) injection 40 mg  40 mg IntraVENous Q12H  ferrous sulfate tablet 325 mg  1 Tab Oral DAILY WITH BREAKFAST Facility-Administered Medications Ordered in Other Encounters Medication Dose Route Frequency  0.9% sodium chloride infusion 250 mL  250 mL IntraVENous PRN Other Studies (last 24 hours): Xr Chest Sngl V Result Date: 7/10/2020 EXAM: TEMPORARY INDICATION: Pleural effusions, Suspected Covid-19 Patient COMPARISON: 7/9/2020 FINDINGS: A portable AP radiograph of the chest was obtained at 0455 hours. The patient is on a cardiac monitor. I basilar airspace disease and bilateral pleural effusions unchanged. . The cardiac and mediastinal contours and pulmonary vascularity are normal.  The bones and soft tissues are grossly within normal limits. IMPRESSION: Basilar atelectasis or pneumonia and bilateral pleural effusions unchanged. Assessment and Plan:  
 
Hospital Problems as of 7/10/2020 Date Reviewed: 7/10/2020 Codes Class Noted - Resolved POA * (Principal) Acute on chronic respiratory failure with hypoxia [facility]) ICD-10-CM: J96.21 
ICD-9-CM: 518.84, 799.02  7/8/2020 - Present Yes Pneumonia ICD-10-CM: J18.9 ICD-9-CM: 413  7/8/2020 - Present Yes Anemia (Chronic) ICD-10-CM: D64.9 ICD-9-CM: 285.9  7/8/2020 - Present Yes Hyperkalemia ICD-10-CM: E87.5 ICD-9-CM: 276.7  7/8/2020 - Present Yes KIRK (acute kidney injury) ([facility] [address] ICD-10-CM: N17.9 ICD-9-CM: 584.9  7/8/2020 - Present Yes Thrombocytopenia (HCC) (Chronic) ICD-10-CM: D69.6 ICD-9-CM: 287.5  7/8/2020 - Present Yes Hypotension ICD-10-CM: I95.9 ICD-9-CM: 458.9  7/8/2020 - Present Yes Chronic bilateral pleural effusions (Chronic) ICD-10-CM: J90 ICD-9-CM: 511.9  7/8/2020 - Present Yes Urine retention (Chronic) ICD-10-CM: R33.9 ICD-9-CM: 788.20  7/8/2020 - Present Yes Debility (Chronic) ICD-10-CM: R53.81 ICD-9-CM: 799.3  6/15/2020 - Present Yes Pleural effusion on right ICD-10-CM: J90 ICD-9-CM: 511.9  5/25/2020 - Present Yes Overview Addendum 6/24/2020  8:13 AM by Rama Nick NP  
  6/23/20L  Right thoracentesis:  1100 ml bloody effusion removed 6/10/20:  Bilateral thoracentesis on L( 1100 cc ) and R( 1000 cc) removed 6/3/20:  Right chest thoracentesis with 800 removed. Pleural effusion on left ICD-10-CM: J90 ICD-9-CM: 511.9  5/24/2020 - Present Yes Overview Addendum 6/25/2020 11:40 AM by Rama Nick NP  
   6/10/20:  Bilateral thoracentesis on L( 1100 cc ) and R( 1000 cc) removed 6/25/20:  L thoracentesis:  900 ml serosanguinous fluid removed--negative cytology Anal carcinoma (HCC) (Chronic) ICD-10-CM: C21.0 ICD-9-CM: 154.3  8/9/2018 - Present Yes  Diabetes mellitus due to underlying condition with hyperglycemia (HCC) (Chronic) ICD-10-CM: V46.83 
 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   
#Acute on chronic hypoxic respiratory failure, pneumonia, possible COVID: 
contact and droplet precautions Remote tele Continued vancomycin and maxipime (7/8 - ) 
followup BCx 2 (biofire showing possible MRSA), sputum Continued oral steroids ? ??  
Pulmonary consult - appreciated. No tap at this time COVID testing pending Wean O2 as tolerated Continue diuresis   
 
#Anemia: 
7/9 hgb 6.7, 1U PRBC, lasix after 7/10 9 
  
#KIRK Baseline 0.6, admission 1.51 
IVF as needed Monitor BUN:Cr daily 
  #Neck and Rectal cancer: 
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 He received carbo/etoposide 2 weeks ago and has also had radiation for anal cancer. He has required anal dilation in past. \" 
Chemo on hold Needs bowel regimen Needs realistic goals of care discussion. He refuses to discussion intubation/CPR - defers to his wife who wants him full code Palliative care consulted for goals of care - still full code 
  
#DM2: SSI, Holding oral meds #Hyperkalemia, resolved, Following on tele , Holding lisinopril #HTN:, Hold antihypertensives as lower range BP #Urine retention: Juarez, flomax 
  
DC planning/Dispo: back to facility once medically clear DVT ppx:  SCD for now Signed: 
Lan Stevens MD

## (undated) DEVICE — 2000CC GUARDIAN II: Brand: GUARDIAN

## (undated) DEVICE — JELLY LUBRICATING 10GM PREFIL SYR LUBE

## (undated) DEVICE — DRAPE,TOP,102X53,STERILE: Brand: MEDLINE

## (undated) DEVICE — 3M™ TEGADERM™ TRANSPARENT FILM DRESSING FRAME STYLE, 1626W, 4 IN X 4-3/4 IN (10 CM X 12 CM), 50/CT 4CT/CASE: Brand: 3M™ TEGADERM™

## (undated) DEVICE — SYR 3ML LL TIP 1/10ML GRAD --

## (undated) DEVICE — LUER-LOK 360°: Brand: CONNECTA, LUER-LOK

## (undated) DEVICE — REM POLYHESIVE ADULT PATIENT RETURN ELECTRODE: Brand: VALLEYLAB

## (undated) DEVICE — AMD ANTIMICROBIAL GAUZE SPONGES 8 PLY USP TYPE VII: Brand: CURITY

## (undated) DEVICE — SOLUTION IV 1000ML 0.9% SOD CHL

## (undated) DEVICE — SUT PROL 2-0 30IN SH BLU --

## (undated) DEVICE — STERILE POLYISOPRENE POWDER-FREE SURGICAL GLOVES: Brand: PROTEXIS

## (undated) DEVICE — SUTURE PERMAHAND SZ 3-0 L18IN NONABSORBABLE BLK SILK BRAID A184H

## (undated) DEVICE — Device

## (undated) DEVICE — DRAPE,T,LAPARO,TRANS,STERILE: Brand: MEDLINE

## (undated) DEVICE — SYRINGE MED 5ML STD CLR PLAS N CTRL SLIP TIP DISP

## (undated) DEVICE — GOWN,REINF,POLY,ECL,PP SLV,XL: Brand: MEDLINE

## (undated) DEVICE — DRAPE TWL SURG 16X26IN BLU ORB04] ALLCARE INC]

## (undated) DEVICE — BUTTON SWITCH PENCIL BLADE ELECTRODE, HOLSTER: Brand: EDGE

## (undated) DEVICE — KIT THORCENT 8FR L5IN POLYUR W/ 18/22/25GA NDL 3 W STPCOCK

## (undated) DEVICE — CANNULA NSL ORAL AD FOR CAPNOFLEX CO2 O2 AIRLFE

## (undated) DEVICE — NDL PRT INJ NSAF BLNT 18GX1.5 --

## (undated) DEVICE — SHEET, DRAPE, SPLIT, STERILE: Brand: MEDLINE

## (undated) DEVICE — KENDALL RADIOLUCENT FOAM MONITORING ELECTRODE RECTANGULAR SHAPE: Brand: KENDALL

## (undated) DEVICE — SUTURE PERMAHAND SZ 2-0 L12X18IN NONABSORBABLE BLK SILK A185H

## (undated) DEVICE — SYR 5ML 1/5 GRAD LL NSAF LF --

## (undated) DEVICE — SUTURE VCRL SZ 3-0 L27IN ABSRB UD L26MM SH 1/2 CIR J416H

## (undated) DEVICE — SUTURE PERMAHAND SZ 3-0 L18IN NONABSORBABLE BLK L26MM SH C013D

## (undated) DEVICE — SUTURE VCRL SZ 3-0 L18IN ABSRB UD L26MM SH 1/2 CIR J864D

## (undated) DEVICE — SUTURE MCRYL SZ 2-0 L27IN ABSRB VLT CT-1 L36MM 1/2 CIR TAPR Y339H

## (undated) DEVICE — (D)PREP SKN CHLRAPRP APPL 26ML -- CONVERT TO ITEM 371833

## (undated) DEVICE — GEL MEDC ULTRASOUND 5L -- REPLACED BY 326862

## (undated) DEVICE — DRAPE XR C ARM 41X74IN LF --

## (undated) DEVICE — STANDARD HYPODERMIC NEEDLE,POLYPROPYLENE HUB: Brand: MONOJECT

## (undated) DEVICE — SURGICAL PROCEDURE PACK BASIC ST FRANCIS

## (undated) DEVICE — ABDOMINAL PAD: Brand: DERMACEA

## (undated) DEVICE — CONNECTOR TBNG OD5-7MM O2 END DISP

## (undated) DEVICE — AMD ANTIMICROBIAL GAUZE SPONGES,12 PLY USP TYPE VII, 0.2% POLYHEXAMETHYLENE BIGUANIDE HCI (PHMB): Brand: CURITY

## (undated) DEVICE — NDL HYPO BVL NSAF 25GX1IN LF --

## (undated) DEVICE — 3M™ IOBAN™ 2 ANTIMICROBIAL INCISE DRAPE 6650EZ: Brand: IOBAN™ 2

## (undated) DEVICE — SCOPE RECT LED W INSUFFLATOR

## (undated) DEVICE — SYR 10ML LUER LOK 1/5ML GRAD --

## (undated) DEVICE — (D)STRIP SKN CLSR 0.5X4IN WHT --

## (undated) DEVICE — TRAY PREP DRY W/ PREM GLV 2 APPL 6 SPNG 2 UNDPD 1 OVERWRAP

## (undated) DEVICE — MASTISOL ADHESIVE LIQ 2/3ML

## (undated) DEVICE — SUTURE COAT VCRL SZ 4-0 L18IN ABSRB UD L19MM PS-2 1/2 CIR J496G